# Patient Record
Sex: FEMALE | Race: WHITE
[De-identification: names, ages, dates, MRNs, and addresses within clinical notes are randomized per-mention and may not be internally consistent; named-entity substitution may affect disease eponyms.]

---

## 2018-11-26 ENCOUNTER — HOSPITAL ENCOUNTER (INPATIENT)
Dept: HOSPITAL 72 - EMR | Age: 78
LOS: 52 days | Discharge: NURSING, REHAB OR LONG-TERM CARE | DRG: 5 | End: 2019-01-17
Payer: MEDICARE

## 2018-11-26 VITALS — DIASTOLIC BLOOD PRESSURE: 33 MMHG | SYSTOLIC BLOOD PRESSURE: 105 MMHG

## 2018-11-26 VITALS — SYSTOLIC BLOOD PRESSURE: 206 MMHG | DIASTOLIC BLOOD PRESSURE: 23 MMHG

## 2018-11-26 VITALS — DIASTOLIC BLOOD PRESSURE: 31 MMHG | SYSTOLIC BLOOD PRESSURE: 104 MMHG

## 2018-11-26 VITALS — DIASTOLIC BLOOD PRESSURE: 44 MMHG | SYSTOLIC BLOOD PRESSURE: 109 MMHG

## 2018-11-26 VITALS — SYSTOLIC BLOOD PRESSURE: 98 MMHG | DIASTOLIC BLOOD PRESSURE: 41 MMHG

## 2018-11-26 VITALS — SYSTOLIC BLOOD PRESSURE: 128 MMHG | DIASTOLIC BLOOD PRESSURE: 34 MMHG

## 2018-11-26 VITALS — SYSTOLIC BLOOD PRESSURE: 167 MMHG | DIASTOLIC BLOOD PRESSURE: 44 MMHG

## 2018-11-26 VITALS — SYSTOLIC BLOOD PRESSURE: 128 MMHG | DIASTOLIC BLOOD PRESSURE: 17 MMHG

## 2018-11-26 VITALS — SYSTOLIC BLOOD PRESSURE: 112 MMHG | DIASTOLIC BLOOD PRESSURE: 26 MMHG

## 2018-11-26 VITALS — BODY MASS INDEX: 22.5 KG/M2 | HEIGHT: 62 IN | WEIGHT: 122.25 LBS

## 2018-11-26 VITALS — DIASTOLIC BLOOD PRESSURE: 20 MMHG | SYSTOLIC BLOOD PRESSURE: 99 MMHG

## 2018-11-26 VITALS — SYSTOLIC BLOOD PRESSURE: 125 MMHG | DIASTOLIC BLOOD PRESSURE: 24 MMHG

## 2018-11-26 VITALS — DIASTOLIC BLOOD PRESSURE: 46 MMHG | SYSTOLIC BLOOD PRESSURE: 124 MMHG

## 2018-11-26 VITALS — SYSTOLIC BLOOD PRESSURE: 92 MMHG | DIASTOLIC BLOOD PRESSURE: 61 MMHG

## 2018-11-26 VITALS — SYSTOLIC BLOOD PRESSURE: 154 MMHG | DIASTOLIC BLOOD PRESSURE: 34 MMHG

## 2018-11-26 VITALS — SYSTOLIC BLOOD PRESSURE: 99 MMHG | DIASTOLIC BLOOD PRESSURE: 47 MMHG

## 2018-11-26 DIAGNOSIS — I48.0: ICD-10-CM

## 2018-11-26 DIAGNOSIS — G93.40: ICD-10-CM

## 2018-11-26 DIAGNOSIS — I46.9: ICD-10-CM

## 2018-11-26 DIAGNOSIS — T17.590A: ICD-10-CM

## 2018-11-26 DIAGNOSIS — Z79.01: ICD-10-CM

## 2018-11-26 DIAGNOSIS — J90: ICD-10-CM

## 2018-11-26 DIAGNOSIS — N18.6: ICD-10-CM

## 2018-11-26 DIAGNOSIS — K29.70: ICD-10-CM

## 2018-11-26 DIAGNOSIS — Z78.1: ICD-10-CM

## 2018-11-26 DIAGNOSIS — E87.1: ICD-10-CM

## 2018-11-26 DIAGNOSIS — I12.0: ICD-10-CM

## 2018-11-26 DIAGNOSIS — E43: ICD-10-CM

## 2018-11-26 DIAGNOSIS — D63.1: ICD-10-CM

## 2018-11-26 DIAGNOSIS — Z99.2: ICD-10-CM

## 2018-11-26 DIAGNOSIS — E11.22: ICD-10-CM

## 2018-11-26 DIAGNOSIS — E86.0: ICD-10-CM

## 2018-11-26 DIAGNOSIS — L97.429: ICD-10-CM

## 2018-11-26 DIAGNOSIS — L89.159: ICD-10-CM

## 2018-11-26 DIAGNOSIS — Z99.11: ICD-10-CM

## 2018-11-26 DIAGNOSIS — I73.9: ICD-10-CM

## 2018-11-26 DIAGNOSIS — R71.0: ICD-10-CM

## 2018-11-26 DIAGNOSIS — J96.22: ICD-10-CM

## 2018-11-26 DIAGNOSIS — M86.9: ICD-10-CM

## 2018-11-26 DIAGNOSIS — E11.621: ICD-10-CM

## 2018-11-26 DIAGNOSIS — I34.0: ICD-10-CM

## 2018-11-26 DIAGNOSIS — B96.20: ICD-10-CM

## 2018-11-26 DIAGNOSIS — F03.91: ICD-10-CM

## 2018-11-26 DIAGNOSIS — L03.115: ICD-10-CM

## 2018-11-26 DIAGNOSIS — A41.9: Primary | ICD-10-CM

## 2018-11-26 DIAGNOSIS — I27.20: ICD-10-CM

## 2018-11-26 DIAGNOSIS — R00.1: ICD-10-CM

## 2018-11-26 DIAGNOSIS — N39.0: ICD-10-CM

## 2018-11-26 DIAGNOSIS — R65.21: ICD-10-CM

## 2018-11-26 DIAGNOSIS — J15.1: ICD-10-CM

## 2018-11-26 DIAGNOSIS — J98.11: ICD-10-CM

## 2018-11-26 LAB
ADD MANUAL DIFF: NO
ALBUMIN SERPL-MCNC: 1.7 G/DL (ref 3.4–5)
ALBUMIN/GLOB SERPL: 0.3 {RATIO} (ref 1–2.7)
ALP SERPL-CCNC: 99 U/L (ref 46–116)
ALT SERPL-CCNC: 11 U/L (ref 12–78)
ANION GAP SERPL CALC-SCNC: 5 MMOL/L (ref 5–15)
APPEARANCE UR: (no result)
APTT BLD: 32 SEC (ref 23–33)
APTT PPP: (no result) S
AST SERPL-CCNC: 19 U/L (ref 15–37)
BASOPHILS NFR BLD AUTO: 1.6 % (ref 0–2)
BILIRUB SERPL-MCNC: 0.2 MG/DL (ref 0.2–1)
BUN SERPL-MCNC: 42 MG/DL (ref 7–18)
CALCIUM SERPL-MCNC: 9 MG/DL (ref 8.5–10.1)
CHLORIDE SERPL-SCNC: 101 MMOL/L (ref 98–107)
CK MB SERPL-MCNC: 2.5 NG/ML (ref 0–3.6)
CK SERPL-CCNC: 25 U/L (ref 26–308)
CO2 SERPL-SCNC: 31 MMOL/L (ref 21–32)
CREAT SERPL-MCNC: 3.1 MG/DL (ref 0.55–1.3)
EOSINOPHIL NFR BLD AUTO: 0.5 % (ref 0–3)
ERYTHROCYTE [DISTWIDTH] IN BLOOD BY AUTOMATED COUNT: 21.5 % (ref 11.6–14.8)
GLOBULIN SER-MCNC: 6.6 G/DL
GLUCOSE UR STRIP-MCNC: NEGATIVE MG/DL
HCT VFR BLD CALC: 35.2 % (ref 37–47)
HGB BLD-MCNC: 10 G/DL (ref 12–16)
INR PPP: 0.9 (ref 0.9–1.1)
KETONES UR QL STRIP: NEGATIVE
LEUKOCYTE ESTERASE UR QL STRIP: (no result)
LYMPHOCYTES NFR BLD AUTO: 10.3 % (ref 20–45)
MCV RBC AUTO: 97 FL (ref 80–99)
MONOCYTES NFR BLD AUTO: 7.5 % (ref 1–10)
NEUTROPHILS NFR BLD AUTO: 80.1 % (ref 45–75)
NITRITE UR QL STRIP: NEGATIVE
PH UR STRIP: 7 [PH] (ref 4.5–8)
PLATELET # BLD: 573 K/UL (ref 150–450)
POTASSIUM SERPL-SCNC: 5.5 MMOL/L (ref 3.5–5.1)
PROT UR QL STRIP: (no result)
RBC # BLD AUTO: 3.62 M/UL (ref 4.2–5.4)
SODIUM SERPL-SCNC: 137 MMOL/L (ref 136–145)
SP GR UR STRIP: 1.03 (ref 1–1.03)
UROBILINOGEN UR-MCNC: NORMAL MG/DL (ref 0–1)
WBC # BLD AUTO: 13.3 K/UL (ref 4.8–10.8)

## 2018-11-26 PROCEDURE — 86580 TB INTRADERMAL TEST: CPT

## 2018-11-26 PROCEDURE — 86901 BLOOD TYPING SEROLOGIC RH(D): CPT

## 2018-11-26 PROCEDURE — 82803 BLOOD GASES ANY COMBINATION: CPT

## 2018-11-26 PROCEDURE — 83605 ASSAY OF LACTIC ACID: CPT

## 2018-11-26 PROCEDURE — 80053 COMPREHEN METABOLIC PANEL: CPT

## 2018-11-26 PROCEDURE — 85730 THROMBOPLASTIN TIME PARTIAL: CPT

## 2018-11-26 PROCEDURE — 87340 HEPATITIS B SURFACE AG IA: CPT

## 2018-11-26 PROCEDURE — 31500 INSERT EMERGENCY AIRWAY: CPT

## 2018-11-26 PROCEDURE — 82977 ASSAY OF GGT: CPT

## 2018-11-26 PROCEDURE — 94002 VENT MGMT INPAT INIT DAY: CPT

## 2018-11-26 PROCEDURE — 82607 VITAMIN B-12: CPT

## 2018-11-26 PROCEDURE — 86705 HEP B CORE ANTIBODY IGM: CPT

## 2018-11-26 PROCEDURE — 85610 PROTHROMBIN TIME: CPT

## 2018-11-26 PROCEDURE — 86709 HEPATITIS A IGM ANTIBODY: CPT

## 2018-11-26 PROCEDURE — 82140 ASSAY OF AMMONIA: CPT

## 2018-11-26 PROCEDURE — 94003 VENT MGMT INPAT SUBQ DAY: CPT

## 2018-11-26 PROCEDURE — 31645 BRNCHSC W/THER ASPIR 1ST: CPT

## 2018-11-26 PROCEDURE — 85007 BL SMEAR W/DIFF WBC COUNT: CPT

## 2018-11-26 PROCEDURE — 87081 CULTURE SCREEN ONLY: CPT

## 2018-11-26 PROCEDURE — 71045 X-RAY EXAM CHEST 1 VIEW: CPT

## 2018-11-26 PROCEDURE — 86920 COMPATIBILITY TEST SPIN: CPT

## 2018-11-26 PROCEDURE — 70450 CT HEAD/BRAIN W/O DYE: CPT

## 2018-11-26 PROCEDURE — 93306 TTE W/DOPPLER COMPLETE: CPT

## 2018-11-26 PROCEDURE — 82270 OCCULT BLOOD FECES: CPT

## 2018-11-26 PROCEDURE — 94664 DEMO&/EVAL PT USE INHALER: CPT

## 2018-11-26 PROCEDURE — 93970 EXTREMITY STUDY: CPT

## 2018-11-26 PROCEDURE — 92950 HEART/LUNG RESUSCITATION CPR: CPT

## 2018-11-26 PROCEDURE — 94150 VITAL CAPACITY TEST: CPT

## 2018-11-26 PROCEDURE — 93005 ELECTROCARDIOGRAM TRACING: CPT

## 2018-11-26 PROCEDURE — 76700 US EXAM ABDOM COMPLETE: CPT

## 2018-11-26 PROCEDURE — 86140 C-REACTIVE PROTEIN: CPT

## 2018-11-26 PROCEDURE — 83880 ASSAY OF NATRIURETIC PEPTIDE: CPT

## 2018-11-26 PROCEDURE — 99291 CRITICAL CARE FIRST HOUR: CPT

## 2018-11-26 PROCEDURE — 96365 THER/PROPH/DIAG IV INF INIT: CPT

## 2018-11-26 PROCEDURE — 82728 ASSAY OF FERRITIN: CPT

## 2018-11-26 PROCEDURE — 82962 GLUCOSE BLOOD TEST: CPT

## 2018-11-26 PROCEDURE — 84443 ASSAY THYROID STIM HORMONE: CPT

## 2018-11-26 PROCEDURE — 84550 ASSAY OF BLOOD/URIC ACID: CPT

## 2018-11-26 PROCEDURE — 84100 ASSAY OF PHOSPHORUS: CPT

## 2018-11-26 PROCEDURE — 93925 LOWER EXTREMITY STUDY: CPT

## 2018-11-26 PROCEDURE — 94760 N-INVAS EAR/PLS OXIMETRY 1: CPT

## 2018-11-26 PROCEDURE — 82746 ASSAY OF FOLIC ACID SERUM: CPT

## 2018-11-26 PROCEDURE — 76705 ECHO EXAM OF ABDOMEN: CPT

## 2018-11-26 PROCEDURE — 83735 ASSAY OF MAGNESIUM: CPT

## 2018-11-26 PROCEDURE — 81003 URINALYSIS AUTO W/O SCOPE: CPT

## 2018-11-26 PROCEDURE — 82550 ASSAY OF CK (CPK): CPT

## 2018-11-26 PROCEDURE — 87324 CLOSTRIDIUM AG IA: CPT

## 2018-11-26 PROCEDURE — 87205 SMEAR GRAM STAIN: CPT

## 2018-11-26 PROCEDURE — 86803 HEPATITIS C AB TEST: CPT

## 2018-11-26 PROCEDURE — 85025 COMPLETE CBC W/AUTO DIFF WBC: CPT

## 2018-11-26 PROCEDURE — 87086 URINE CULTURE/COLONY COUNT: CPT

## 2018-11-26 PROCEDURE — 36600 WITHDRAWAL OF ARTERIAL BLOOD: CPT

## 2018-11-26 PROCEDURE — 36415 COLL VENOUS BLD VENIPUNCTURE: CPT

## 2018-11-26 PROCEDURE — 96366 THER/PROPH/DIAG IV INF ADDON: CPT

## 2018-11-26 PROCEDURE — 96375 TX/PRO/DX INJ NEW DRUG ADDON: CPT

## 2018-11-26 PROCEDURE — 94660 CPAP INITIATION&MGMT: CPT

## 2018-11-26 PROCEDURE — 84484 ASSAY OF TROPONIN QUANT: CPT

## 2018-11-26 PROCEDURE — 93880 EXTRACRANIAL BILAT STUDY: CPT

## 2018-11-26 PROCEDURE — 86710 INFLUENZA VIRUS ANTIBODY: CPT

## 2018-11-26 PROCEDURE — 05H633Z INSERTION OF INFUSION DEVICE INTO LEFT SUBCLAVIAN VEIN, PERCUTANEOUS APPROACH: ICD-10-PCS

## 2018-11-26 PROCEDURE — 87040 BLOOD CULTURE FOR BACTERIA: CPT

## 2018-11-26 PROCEDURE — 82553 CREATINE MB FRACTION: CPT

## 2018-11-26 PROCEDURE — 87181 SC STD AGAR DILUTION PER AGT: CPT

## 2018-11-26 PROCEDURE — 93990 DOPPLER FLOW TESTING: CPT

## 2018-11-26 PROCEDURE — 94640 AIRWAY INHALATION TREATMENT: CPT

## 2018-11-26 PROCEDURE — 86850 RBC ANTIBODY SCREEN: CPT

## 2018-11-26 PROCEDURE — 83540 ASSAY OF IRON: CPT

## 2018-11-26 PROCEDURE — 96367 TX/PROPH/DG ADDL SEQ IV INF: CPT

## 2018-11-26 PROCEDURE — 80202 ASSAY OF VANCOMYCIN: CPT

## 2018-11-26 PROCEDURE — 86900 BLOOD TYPING SEROLOGIC ABO: CPT

## 2018-11-26 PROCEDURE — 83550 IRON BINDING TEST: CPT

## 2018-11-26 PROCEDURE — 80048 BASIC METABOLIC PNL TOTAL CA: CPT

## 2018-11-26 PROCEDURE — 87070 CULTURE OTHR SPECIMN AEROBIC: CPT

## 2018-11-26 PROCEDURE — 74018 RADEX ABDOMEN 1 VIEW: CPT

## 2018-11-26 RX ADMIN — DEXTROSE MONOHYDRATE SCH MLS/HR: 50 INJECTION, SOLUTION INTRAVENOUS at 17:00

## 2018-11-26 RX ADMIN — PANTOPRAZOLE SODIUM SCH MG: 40 INJECTION, POWDER, FOR SOLUTION INTRAVENOUS at 21:05

## 2018-11-26 RX ADMIN — DOPAMINE HYDROCHLORIDE IN DEXTROSE SCH MLS/HR: 1.6 INJECTION, SOLUTION INTRAVENOUS at 14:21

## 2018-11-26 NOTE — DIAGNOSTIC IMAGING REPORT
Indications: Dementia, altered mental status

 

Technique: Spiral acquisitions obtained through the brain. Angled axial and coronal 5

x 5 mm slices were reconstructed. Total dose length product 1362.01 mGycm.  CTDI

vol(s) 70.38 mGy. Dose reduction achieved using automated exposure control

 

Comparison: None.

 

Findings: There is slight image degradation due to motion artifact. There is

age-related enlargement of the ventricles and extra axial CSF spaces. There is

periventricular white matter low-attenuation, consistent with chronic ischemic

change. Old lacunar infarct is seen in the anterior limb of the right internal

capsule. Asymmetric basal ganglia calcification seen on the left. No acute

intercranial hemorrhage nor edema, mass effect, nor midline shift. The visualized

orbits and sinuses are unremarkable. The mastoids are clear. The calvarium is

hyperostotic, intact.

 

Impression: Somewhat limited exam due to motion artifact

 

No gross acute intracranial bleed or mass effect

 

Chronic and age-related changes, as described.

 

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

 

The CT scanner at [facility] is accredited by the [employer] College of

Radiology and the scans are performed using protocols designed to limit radiation

exposure to as low as reasonably achievable to attain images of sufficient resolution

adequate for diagnostic evaluation.

## 2018-11-26 NOTE — NUR
CASE MANAGEMENT: REVIEW



78/F BIBA FROM [facility]



CC: AMS



SI: PYELONEPHRITIS . AMS

T 96.1 HR 82 RR 14 /49 SAT 99% MECH VENT FIO2 100

WBC 13.3 H/H 10.0/35.2 5.5 

ABG: PH 7.170 PCO2 74.4 PO2 66.9 HCO3 26.5 SAT 88.6 







IS: VANCO IV X1

ALBUTEROL HHN X1

ZOSYN IV X1

NS IVF BOLUS X1

ALBUMIN IV X1





***INTERQUAL CRITERIA MET: PATIENT ADMITTED TO ICU 11/26/2018***

DCP: PATIENT IS FROM [facility]

## 2018-11-26 NOTE — GENERAL PROGRESS NOTE
Progress Note


Progress Note


Surgery:





After intubation patient hypotensive and bradycardic requiring urgent access.  


Attempt at subclavian unsuccessful.  asked radiology if ultrasound available 

for IJ placement.  


Radiology on way to place IJ with ultrasound guidance.  


thank you.











Bob Lewis Nov 26, 2018 14:14

## 2018-11-26 NOTE — EMERGENCY ROOM REPORT
History of Present Illness


General


Chief Complaint:  Altered Mental Status


Source:  Medical Record, EMS





Present Illness


HPI


This patient presents from a skilled nursing facility.  The patient has 

multiple chronic medical problems.  She has a history of end-stage renal 

disease and is dialysis dependent, dementia and inability to ambulate with 

muscle weakness.  EMS report that they were called to the skilled nursing 

facility for being more altered than usual.  Usually the patient is oriented to 

herself.  There was also concern about oxygenation.  The patient herself is 

minimally responsive and unable to give any history.  There is no other history 

from EMS or the skilled nursing facility.


Allergies:  


Coded Allergies:  


     No Known Allergies (Unverified , 11/26/18)





Patient History


Past Medical History:  see triage record, DM, HTN, MI, CAD, GERD, CVA/TIA, 

dementia, renal disease, dialysis


Past Surgical History:  unable to obtain


Pertinent Family History:  unable to obtain


Social History:  Denies: smoking, alcohol use, drug use


Last Menstrual Period:  unk


Pregnant Now:  No


Reviewed Nursing Documentation:  PMH: Agreed; PSxH: Agreed





Nursing Documentation-PMH


Hx Cardiac Problems:  Yes - a-fib


Hx Hypertension:  Yes


Hx Diabetes:  Yes - esrd





Review of Systems


All Other Systems:  limited





Physical Exam





Vital Signs








  Date Time  Temp Pulse Resp B/P (MAP) Pulse Ox O2 Delivery O2 Flow Rate FiO2


 


11/26/18 08:25      Nasal Cannula  


 


11/26/18 08:25 96.1 82 14 143/40 94  2.0 








Sp02 EP Interpretation:  reviewed, abnormal


General Appearance:  no apparent distress, alert, GCS 15, non-toxic, cachetic, 

thin, Chronically Ill


Head:  normocephalic, atraumatic


Eyes:  bilateral eye normal inspection, bilateral eye PERRL


ENT:  hearing grossly normal, normal pharynx, no angioedema


Neck:  full range of motion, supple/symm/no masses


Respiratory:  chest non-tender, lungs clear, no respiratory distress, no 

retraction, no accessory muscle use, decreased breath sounds


Cardiovascular #1:  regular rate, rhythm, no edema, systolic murmur


Gastrointestinal:  normal bowel sounds, non tender, soft, non-distended, no 

guarding, no rebound


Rectal:  deferred


Musculoskeletal:  other - Contractures.  At baseline


Neurologic:  alert, other - Non-focal.  Altered/Dementia


Psychiatric:  judgement/insight normal, memory normal, mood/affect normal, no 

suicidal/homicidal ideation


Skin:  warm/dry, well hydrated, other - See RN skin exam.  Multiple DU.





Procedures


Intubation


Intubation :  


   Consent:  Emergent


   Intubation Method:  orotracheal


   Tube Size (cm):  7.0


   Medications:  Etomidate, Rocuronium


   Breath Sounds after Intubation:  equal


   Intubation Complications:  no complications


   Post Intubation Xray:  Yes


   Progress/Xray Impression:  L. Lung opacification


   Attempts:  One


   Patient Tolerated:  Well


   Complications:  None





Medical Decision Making


Diagnostic Impression:  


 Primary Impression:  


 Altered mental status


 Additional Impressions:  


 Pyelonephritis


 Pneumonia


ER Course


This elderly and chronically ill female presents with altered mental status.  

She is found to have pyelonephritis and pneumonia.  The patient was 

hypertensive with systolic blood pressures in the 200s on arrival.  However, I'

m unsure of the accuracy of these readings as the diastolic was very low.  

These blood pressures were obtained on the leg and in an elderly female I 

suspect there is significant peripheral arterial disease.  The patient's right 

upper extremity has a PICC line in place in the left upper extremity has an AV 

fistula.  That is why the blood pressures are being taken on the leg.  The 

patient was not tachycardic or in any discomfort or distress during her entire 

ED course.  Initially I had planned and placed in this patient and his stepdown 

unit secondary to the blood pressures, however, the blood pressure read in the 

150s systolic and the patient remained without tachycardia and appeared double 

and nondistressed, sitting the patient was downgraded to telemetry.  The 

patient was given broad-spectrum antibiotics.





Added note: After the patient was admitted to telemetry she desaturated 

suddenly became more altered.  She was transferred to the ICU and placed on a 

nonrebreather mask and I was called from the ICU to come intubate this patient.

  The patient's saturations were in the high 80s and occasionally in the 90s.  

There was a very poor waveform.  However, the patient clearly had declined and 

was intubated by rapid think with intubation without complication on the first 

attempt.  Please see my procedure note.  Post intubation chest x-ray shows 

complete opacification of the left lung consistent with obstruction.  This is a 

new finding from the previous chest x-ray done in the emergency department and 

is likely the etiology of the patient's rapid respiratory failure.  Possibly 

this is secondary to a mucous plug.





This patient is critically ill.  This patient required complex medical decision-

making, aggressive intervention, extensive laboratory workup and monitoring.





Critical care time: 40 minutes.





Laboratory Tests








Test


  11/26/18


08:30 11/26/18


12:30


 


White Blood Count


  13.3 K/UL


(4.8-10.8)  H 


 


 


Red Blood Count


  3.62 M/UL


(4.20-5.40)  L 


 


 


Hemoglobin


  10.0 G/DL


(12.0-16.0)  L 


 


 


Hematocrit


  35.2 %


(37.0-47.0)  L 


 


 


Mean Corpuscular Volume 97 FL (80-99)   


 


Mean Corpuscular Hemoglobin


  27.5 PG


(27.0-31.0) 


 


 


Mean Corpuscular Hemoglobin


Concent 28.4 G/DL


(32.0-36.0)  L 


 


 


Red Cell Distribution Width


  21.5 %


(11.6-14.8)  H 


 


 


Platelet Count


  573 K/UL


(150-450)  H 


 


 


Mean Platelet Volume


  6.1 FL


(6.5-10.1)  L 


 


 


Neutrophils (%) (Auto)


  80.1 %


(45.0-75.0)  H 


 


 


Lymphocytes (%) (Auto)


  10.3 %


(20.0-45.0)  L 


 


 


Monocytes (%) (Auto)


  7.5 %


(1.0-10.0) 


 


 


Eosinophils (%) (Auto)


  0.5 %


(0.0-3.0) 


 


 


Basophils (%) (Auto)


  1.6 %


(0.0-2.0) 


 


 


Prothrombin Time


  10.0 SEC


(9.30-11.50) 


 


 


Prothrombin Time INR 0.9 (0.9-1.1)   


 


PTT


  32 SEC (23-33)


  


 


 


Urine Color Pale yellow   


 


Urine Appearance Turbid   


 


Urine pH 7 (4.5-8.0)   


 


Urine Specific Gravity


  1.030


(1.005-1.035) 


 


 


Urine Protein


  4+ (NEGATIVE)


H 


 


 


Urine Glucose (UA)


  Negative


(NEGATIVE) 


 


 


Urine Ketones


  Negative


(NEGATIVE) 


 


 


Urine Blood


  5+ (NEGATIVE)


H 


 


 


Urine Nitrite


  Negative


(NEGATIVE) 


 


 


Urine Bilirubin


  Negative


(NEGATIVE) 


 


 


Urine Urobilinogen


  Normal MG/DL


(0.0-1.0) 


 


 


Urine Leukocyte Esterase


  3+ (NEGATIVE)


H 


 


 


Urine RBC


  30-40 /HPF (0


- 2)  H 


 


 


Urine WBC


  Tntc /HPF (0 -


2)  H 


 


 


Urine Squamous Epithelial


Cells Few /LPF


(NONE/OCC) 


 


 


Urine Bacteria


  Moderate /HPF


(NONE)  H 


 


 


Sodium Level


  137 MMOL/L


(136-145) 


 


 


Potassium Level


  5.5 MMOL/L


(3.5-5.1)  H 


 


 


Chloride Level


  101 MMOL/L


() 


 


 


Carbon Dioxide Level


  31 MMOL/L


(21-32) 


 


 


Anion Gap


  5 mmol/L


(5-15) 


 


 


Blood Urea Nitrogen


  42 mg/dL


(7-18)  H 


 


 


Creatinine


  3.1 MG/DL


(0.55-1.30)  H 


 


 


Estimate Glomerular


Filtration Rate  mL/min (>60)  


  


 


 


Glucose Level


  174 MG/DL


()  H 


 


 


Lactic Acid Level


  0.70 mmol/L


(0.4-2.0) 


 


 


Calcium Level


  9.0 MG/DL


(8.5-10.1) 


 


 


Total Bilirubin


  0.2 MG/DL


(0.2-1.0) 


 


 


Aspartate Amino Transferase


(AST) 19 U/L (15-37)


  


 


 


Alanine Aminotransferase (ALT)


  11 U/L (12-78)


L 


 


 


Alkaline Phosphatase


  99 U/L


() 


 


 


Total Creatine Kinase


  25 U/L


()  L 


 


 


Creatine Kinase MB


  2.5 NG/ML


(0.0-3.6) 


 


 


Creatine Kinase MB Relative


Index 10.0  


  


 


 


Troponin I


  0.026 ng/mL


(0.000-0.056) 


 


 


Total Protein


  8.3 G/DL


(6.4-8.2)  H 


 


 


Albumin


  1.7 G/DL


(3.4-5.0)  L 


 


 


Globulin 6.6 g/dL   


 


Albumin/Globulin Ratio


  0.3 (1.0-2.7)


L 


 


 


Arterial Blood pH


  


  7.170


(7.350-7.450)


 


Arterial Blood Partial


Pressure CO2 


  74.4 mmHg


(35.0-45.0)  *H


 


Arterial Blood Partial


Pressure O2 


  66.9 mmHg


(75.0-100.0)  L


 


Arterial Blood HCO3


  


  26.5 mmol/L


(22.0-26.0)  H


 


Arterial Blood Oxygen


Saturation 


  88.6 %


()  *L


 


Arterial Blood Base Excess  -2.9 (-2-2)  L


 


Arcadio Test  Positive  








Microbiology








 Date/Time


Source Procedure


Growth Status


 


 


 11/26/18 08:35


Nasal Nares Influenza Types A,B Antigen (ARIAS) - Final Complete








EKG Diagnostic Results


Rate:  normal


Rhythm:  NSR


ST Segments:  no acute changes





Rhythm Strip Diag. Results


EP Interpretation:  yes


Rate:  80's


Rhythm:  NSR, no PVC's, no ectopy





Chest X-Ray Diagnostic Results


Chest X-Ray Diagnostic Results :  


   Chest X-Ray Ordered:  Yes


   # of Views/Limited/Complete:  1 View


   Indication:  Other - AMS


   EP Interpretation:  Yes


   Interpretation:  other - RLL opacity


   Impression:  Other - See above


   Electronically Signed by:  Buck





Last Vital Signs








  Date Time  Temp Pulse Resp B/P (MAP) Pulse Ox O2 Delivery O2 Flow Rate FiO2


 


11/26/18 08:27 96.1 82 12 264/49  Nasal Cannula 2.0 


 


11/26/18 08:25     94   








Disposition:  ADMITTED AS INPATIENT


Condition:  Serious











Justa Weaver DO Nov 26, 2018 09:30

## 2018-11-26 NOTE — OPERATIVE NOTE - PDOC
Operative Note


Operative Note


Date of Operation/Procedure:  Nov 26, 2018


Pre-op Diagnosis:


Sepsis, hypotensive, bradycardic, critical care


Procedure:


left subclavian central venous catheter insertion


Post-op Diagnosis:  same as pre-op


Surgeon:  marti


Anesthesia:  local


Specimen:  none


Complications:  none


Condition:  unstable


Estimated Blood Loss:  minimal


Drains:  none


Implant(s) used?:  No


Indications for Procedure


78 year old female currently admitted for care and management with acute 

decompensation requiring intubation and ICU care.  Hypotensive, bradycardic, 

septic.  Requiring fluids, meds, and pressors.  Central venous access 

recommended and indicated.  patient with right arm midline with only one port 

and unknown age.  difficult peripheral access.  Hx of ESRD on HD.  Evaluated 

extremities and not viable peripheral noted.  Left upper arm with significant 

edema.  Graft not viable as no thrill or bruits noted.  Discussed with 

nephrology.


Description of Procedure


Patient placed in trendelenberg position.  right midline noted.  right upper 

extremity without significant edema or notable surgical history.  left upper 

extremity with significant edema and noted prior surgery.  left graft not 

viable as no thrill or bruits.  right chest wall prepped and draped.  all 

proper equipment worn.  on second pass left subclavian central venous catheter 

was cannulated. venous blood noted.  guidewire placed over needle.  needle 

removed.  skin incision made and dilator used.  central venous catheter 

inserted over guidewire and guidewire removed.  all 3 ports flushed and 

aspirated.  line sutured in place.  dressings applied.  cxr ordered.











Bob Lewis Nov 26, 2018 14:29

## 2018-11-26 NOTE — DIAGNOSTIC IMAGING REPORT
Indication: Shortness of

 

Technique: One view of the chest

 

Comparison: None

 

Findings: There are moderate to large bilateral pleural effusions. There is pulmonary

interstitial edema. There is suggestion of abrupt cut off of the left mainstem

bronchus, endobronchial occlusion not excludable. The heart is enlarged. There is a

right arm midline. The tip is at the level of the upstream subclavian vein. Extensive

surgical clips are seen in the left upper quadrant of the abdomen. The bones are

unremarkable

 

Impression: Cardiomegaly

 

Evidence of congestive heart failure, with pulmonary interstitial edema, moderate to

large bilateral pleural effusions

 

Suggestion of abrupt cut off of the left mainstem bronchus. Could be artifactual, but

endobronchial occlusion not excludable

## 2018-11-26 NOTE — CONSULTATION
Consult Note


Consult Note





asked to evaluate the patient at the request of Dr Cardoso


78 y old female


in ICU


Hypotensive


intubated


left arm fistula no bruit


right arm picc line


unresponsive


triple lumen on left subclavian by surgeon


Assessment/Plan





ESRD


Hypotensive


Septic shock


edematous


hypoalbuminemia


left lung white out post intubation








Fluid challenge


respiratary support


no candidate for dialysis now


pressors


antibiotics











Murphy Templeton MD Nov 26, 2018 14:32

## 2018-11-26 NOTE — CONSULTATION
DATE OF CONSULTATION:  11/26/2018

CARDIAC CONSULTATION



CONSULTING PHYSICIAN:  Mariusz Wade M.D.



REFERRING PHYSICIAN:  Quentin Cardoso M.D.



REASON FOR REFERRAL:  Hypotension and bradycardia.



HISTORY OF PRESENT ILLNESS:  This is a 78-year-old female, who is really

unable to provide much in terms of history and physical, really unable to

provide meaningful history whatsoever.  [facility] and apparently was transferred to acute care

hospital for becoming more altered than usual, apparently usually oriented

to self, and was also apparently an issue with oxygenation.  She was

minimally responsive and unable to give any meaningful history, and she

was admitted to the hospital at [facility] and subsequently

was noted to be in respiratory compromise requiring urgent intubation for

left lung collapse and mucus plugging, and became hypotensive and

bradycardic, required multiple medications and central line was placed due

to lack of vascular access.  _____ in the ICU.  I was notified to see the

patient because of hypotension and bradycardia with blood pressure in the

80s range despite being on maximum doses of dopamine and heart rate in the

80s now on maximum doses of dopamine.  The patient on questioning appears

to be awake and responsive.  Denies any pain in her lower extremities.

Denies any pain in her chest.  Does admit to being short of breath.  Does

admit to be dizzy lightheaded and does admit to having abdominal pain.



PAST MEDICAL HISTORY:  Extensive and includes recurrent urinary tract

infection, history of dysphagia, end-stage renal disease, on hemodialysis,

diabetes mellitus, paroxysmal episodes of atrial fibrillation, essential

hypertension, anemia, protein-calorie malnutrition, dementia, pleural

effusions, gastroesophageal reflux disease, pressure ulcers, neuralgia,

neuritis, open wound of right lower extremity and left lower extremity

being noted.



ALLERGIES:  She is reportedly not allergic to any medications.



SOCIAL HISTORY:  She is a resident of [facility] facility.  Tobacco and

alcoholic beverages are not known.



REVIEW OF SYSTEMS:  As mentioned in HPI.



PHYSICAL EXAMINATION:

GENERAL:  Shows to be elderly female, on a mechanical ventilator.  As

mentioned, she appears to be responsive and communicative.

VITAL SIGNS:  At this time, heart rate of 100.  Last blood pressure was in

the 80s systolic over 50s.

NECK:  Supple.

LUNGS:  Crackles noted at the left base.

CARDIAC:  Regular rhythm.  No heaves or thrills noted.

ABDOMEN:  Soft.  There is some tenderness.  No rebound.  No

guarding.

EXTREMITIES:  There is edema of the upper extremities, the left much worse

than on the right.  Lower extremity edema is minimal.

NEUROLOGICAL:  She is awake and responsive.



LABORATORY VALUES:  White count of 13.3, hemoglobin 10, and platelet count

of 573,000.  Blood gases, pH of 7.17, pCO2 of 74, pO2 of 67, bicarbonate

of 26, 88% saturation.  Sodium 137, potassium 5.5, chloride 101,

bicarbonate 31, BUN of 42, creatinine 3.1, and glucose of 174.  Troponin

was 0.026.  Lactic acid 0.71.  Alkaline phosphatase of 99.  AST and ALT

are normal limits.  Albumin of 1.7 and total protein of 8.3.  INR 0.9 and

PTT of 32.  Urinalysis shows too numerous to count wbc's, 30-40 rbc's, and

3+ leukocyte esterase, although the patient is a known dialysis patient.



ASSESSMENT AND PLAN:

1. Hypotension, possibly sepsis versus volume.

2. Paroxysmal episodes of atrial fibrillation history.

3. Bradycardia secondary to medications, amiodarone possibly.

4. Diabetes mellitus.

5. End-stage renal disease, on hemodialysis.

6. Lung collapse.

7. Respiratory failure, status post intubation.

8. Dysphagia.

9. History of dementia.



Dr. Cardoso, this patient was seen in cardiac consultation.  The patient

will be receiving an albumin bolus ordered by Dr. Templeton from

Nephrology.  She is on maximum dose of dopamine and blood pressure was

apparently low.  We will switch over to Levophed.  She should be continued

on her amiodarone.  Thyroid-stimulating hormone to be checked to make sure

that she is not hypothyroid related to amiodarone therapy.  Pressors to

maintain mean arterial blood pressure in the 60s.  She used to receive

empiric antibiotics.  Her influenza screen was negative.  Her

electrocardiogram that was performed was sinus with right bundle-branch

conduction defect at the time of admission.  Her telemetry basically shows

sinus rhythm and no episodes sinus tachycardia despite her sepsis, likely

from suppression from the beta-blockers.  Again, amiodarone-induced

hypothyroidism needs to be excluded.  Of note, her blood pressure prior to

admission _____ to a level of 200, making adrenal insufficiency likely.

Her blood pressures are being checked in her right lower extremity because

of a shunt that she has on the left upper extremity and a central line

that she has on her right upper extremity, which may additionally effect

blood pressure readings.  I will follow the patient along with you.









  ______________________________________________

  Mariusz Wade M.D.





DR:  Brian

D:  11/26/2018 15:51

T:  11/26/2018 17:26

JOB#:  [id number]

CC:

## 2018-11-26 NOTE — NUR
NURSE NOTES:

HS care rendered.Pos. chg.Kept comfortable.Due med admin.Suctioned.See Latest V/S.Levo.drip 
at 6mcg/min.NPO.Cont.on IV Therapy.

## 2018-11-26 NOTE — DIAGNOSTIC IMAGING REPORT
Indication: Post intubation

 

Technique: One view of the chest

 

Comparison: 5 hours earlier

 

Findings: Interim endotracheal intubation, endotracheal tube tip projecting

approximately 4 cm above the noah. Again demonstrated is abrupt cut off of the left

mainstem bronchus, with now complete opacification of the left lung likely indicating

complete atelectasis of such. The right lung demonstrates better inflation than on

the previous exam. Large right pleural effusion is again demonstrated. There is a

nasogastric tube in place, tip of which projects at the level of the gastric

fundus/body junction. Right arm midline again demonstrated

 

Impression: Satisfactory endotracheal intubation

 

Satisfactory nasogastric intubation

 

Abrupt cut off of the left mainstem bronchus and complete opacification of the left

hemithorax, suspect endobronchial obstruction with complete atelectasis of left lung.

Previous exam also showed evidence of significant pleural fluid on the left

 

Improved aeration of the right lung

 

Large right pleural effusion, moderate interstitial congestion persist

 

Value findings phoned to Dr. Cardoso at the time of interpretation

## 2018-11-26 NOTE — CONSULTATION
History of Present Illness


General


Date patient seen:  Nov 26, 2018


Chief Complaint:  Altered Mental Status


Reason for Consultation:  venous access, critical care





Present Illness


HPI


78 year old elderly female with multiple medical comorbidities just admitted 

and now with respiratory compromise requiring urgent intubation.  Hypotensive, 

edouard cardic, left lung collapse/plug, now requiring multiple meds, fluids, and 

possible pressors.  only has right arm midline with single lumen.  cannot 

obtain peripheral access.  central venous access needed for critical care.  

surgery called to evaluate and assist with care.


Allergies:  


Coded Allergies:  


     No Known Allergies (Unverified , 11/26/18)





Medication History


Scheduled


Alprazolam* (Xanax*), 1 MG ORAL TID, (Reported)


Amino Acids/Protein Hydrolys (Pro-Stat Liquid), 30 ML ORAL TWICE A DAY, (

Reported)


Amiodarone Hcl* (Amiodarone Hcl*), 200 MG ORAL EVERY 12 HOURS, (Reported)


Ascorbic Acid* (Vitamin C*), 500 MG ORAL DAILY, (Reported)


Docusate Sodium* (Docusate Sodium*), 100 MG ORAL TWICE A DAY, (Reported)


Gabapentin* (Gabapentin*), 300 MG ORAL BEDTIME, (Reported)


Metoprolol Tartrate* (Metoprolol Tartrate*), 50 MG ORAL EVERY 12 HOURS, (

Reported)


Mupirocin* (Mupirocin*), 1 APPLIC TOPIC THREE TIMES A DAY, (Reported)


Pantoprazole* (Pantoprazole*), 40 MG ORAL DAILY, (Reported)


Sertraline Hcl* (Sertraline Hcl*), 100 MG ORAL DAILY, (Reported)


Sevelamer Carbonate* (Renvela*), 800 MG ORAL THREE TIMES A DAY, (Reported)





Scheduled PRN


Acetaminophen* (Acetaminophen 325MG Tablet*), 650 MG ORAL Q4H PRN for Pain 

Scale (3-5), (Reported)





Miscellaneous Medications


Apixaban (Eliquis), 2.5 MG PO, (Reported)


Sennosides/Docusate Sodium (Senna Laxative Tablet), 2 EACH PO, (Reported)





Patient History


Limited by:  medical condition


History Provided By:  Medical Record


Healthcare decision maker





Resuscitation status





Advanced Directive on File








Past Medical/Surgical History


Past Medical/Surgical History:  


(1) Hypotension


(2) Bradycardia


(3) Sepsis


(4) Pyelonephritis


(5) Altered mental status





Review of Systems


ROS Narrative


cannot obtain given medical condition





Physical Exam


General Appearance:  severe distress, thin


Lines, tubes and drains:  other


HEENT:  atraumatic


Neck:  normal inspection


Respiratory/Chest:  on vent, other


Cardiovascular/Chest:  bradycardia


Abdomen:  soft, no organomegaly, no mass


Extremities:  other


Skin Exam:  warm/dry


Neurologic:  unresponsiveness





Last 24 Hour Vital Signs








  Date Time  Temp Pulse Resp B/P (MAP) Pulse Ox O2 Delivery O2 Flow Rate FiO2


 


11/26/18 11:15  71 19 154/34 99 Non-Rebreather 10.0 98


 


11/26/18 09:55 96.1 77 19 167/44 99 Non-Rebreather 10.0 100


 


11/26/18 09:55  79 18   Non-Rebreather 15.0 100


 


11/26/18 09:19  79 18  93 Non-Rebreather  100


 


11/26/18 09:09 96.1 88 17 206/23 94 Non-Rebreather 15.0 100


 


11/26/18 09:08  80 17  96 Non-Rebreather  100


 


11/26/18 09:08  80 17   Non-Rebreather  100


 


11/26/18 08:27 96.1 82 12 264/49  Nasal Cannula 2.0 


 


11/26/18 08:25 96.1 82 14 143/40 94 Nasal Cannula 2.0 


 


11/26/18 08:25      Nasal Cannula  











Laboratory Tests








Test


  11/26/18


08:30 11/26/18


12:30


 


White Blood Count


  13.3 K/UL


(4.8-10.8)  H 


 


 


Red Blood Count


  3.62 M/UL


(4.20-5.40)  L 


 


 


Hemoglobin


  10.0 G/DL


(12.0-16.0)  L 


 


 


Hematocrit


  35.2 %


(37.0-47.0)  L 


 


 


Mean Corpuscular Volume 97 FL (80-99)   


 


Mean Corpuscular Hemoglobin


  27.5 PG


(27.0-31.0) 


 


 


Mean Corpuscular Hemoglobin


Concent 28.4 G/DL


(32.0-36.0)  L 


 


 


Red Cell Distribution Width


  21.5 %


(11.6-14.8)  H 


 


 


Platelet Count


  573 K/UL


(150-450)  H 


 


 


Mean Platelet Volume


  6.1 FL


(6.5-10.1)  L 


 


 


Neutrophils (%) (Auto)


  80.1 %


(45.0-75.0)  H 


 


 


Lymphocytes (%) (Auto)


  10.3 %


(20.0-45.0)  L 


 


 


Monocytes (%) (Auto)


  7.5 %


(1.0-10.0) 


 


 


Eosinophils (%) (Auto)


  0.5 %


(0.0-3.0) 


 


 


Basophils (%) (Auto)


  1.6 %


(0.0-2.0) 


 


 


Prothrombin Time


  10.0 SEC


(9.30-11.50) 


 


 


Prothromb Time International


Ratio 0.9 (0.9-1.1)  


  


 


 


Activated Partial


Thromboplast Time 32 SEC (23-33)


  


 


 


Urine Color Pale yellow   


 


Urine Appearance Turbid   


 


Urine pH 7 (4.5-8.0)   


 


Urine Specific Gravity


  1.030


(1.005-1.035) 


 


 


Urine Protein


  4+ (NEGATIVE)


H 


 


 


Urine Glucose (UA)


  Negative


(NEGATIVE) 


 


 


Urine Ketones


  Negative


(NEGATIVE) 


 


 


Urine Blood


  5+ (NEGATIVE)


H 


 


 


Urine Nitrite


  Negative


(NEGATIVE) 


 


 


Urine Bilirubin


  Negative


(NEGATIVE) 


 


 


Urine Urobilinogen


  Normal MG/DL


(0.0-1.0) 


 


 


Urine Leukocyte Esterase


  3+ (NEGATIVE)


H 


 


 


Urine RBC


  30-40 /HPF (0


- 2)  H 


 


 


Urine WBC


  Tntc /HPF (0 -


2)  H 


 


 


Urine Squamous Epithelial


Cells Few /LPF


(NONE/OCC) 


 


 


Urine Bacteria


  Moderate /HPF


(NONE)  H 


 


 


Sodium Level


  137 MMOL/L


(136-145) 


 


 


Potassium Level


  5.5 MMOL/L


(3.5-5.1)  H 


 


 


Chloride Level


  101 MMOL/L


() 


 


 


Carbon Dioxide Level


  31 MMOL/L


(21-32) 


 


 


Anion Gap


  5 mmol/L


(5-15) 


 


 


Blood Urea Nitrogen


  42 mg/dL


(7-18)  H 


 


 


Creatinine


  3.1 MG/DL


(0.55-1.30)  H 


 


 


Estimat Glomerular Filtration


Rate  mL/min (>60)  


  


 


 


Glucose Level


  174 MG/DL


()  H 


 


 


Lactic Acid Level


  0.70 mmol/L


(0.4-2.0) 


 


 


Calcium Level


  9.0 MG/DL


(8.5-10.1) 


 


 


Total Bilirubin


  0.2 MG/DL


(0.2-1.0) 


 


 


Aspartate Amino Transf


(AST/SGOT) 19 U/L (15-37)


  


 


 


Alanine Aminotransferase


(ALT/SGPT) 11 U/L (12-78)


L 


 


 


Alkaline Phosphatase


  99 U/L


() 


 


 


Total Creatine Kinase


  25 U/L


()  L 


 


 


Creatine Kinase MB


  2.5 NG/ML


(0.0-3.6) 


 


 


Creatine Kinase MB Relative


Index 10.0  


  


 


 


Troponin I


  0.026 ng/mL


(0.000-0.056) 


 


 


Total Protein


  8.3 G/DL


(6.4-8.2)  H 


 


 


Albumin


  1.7 G/DL


(3.4-5.0)  L 


 


 


Globulin 6.6 g/dL   


 


Albumin/Globulin Ratio


  0.3 (1.0-2.7)


L 


 


 


Arterial Blood pH


  


  7.170


(7.350-7.450)


 


Arterial Blood Partial


Pressure CO2 


  74.4 mmHg


(35.0-45.0)  *H


 


Arterial Blood Partial


Pressure O2 


  66.9 mmHg


(75.0-100.0)  L


 


Arterial Blood HCO3


  


  26.5 mmol/L


(22.0-26.0)  H


 


Arterial Blood Oxygen


Saturation 


  88.6 %


()  *L


 


Arterial Blood Base Excess  -2.9 (-2-2)  L


 


Arcadio Test  Positive  











Microbiology








 Date/Time


Source Procedure


Growth Status


 


 


 11/26/18 08:35


Nasal Nares Influenza Types A,B Antigen (ARIAS) - Final Complete








Height (Feet):  5


Height (Inches):  3.00


Weight (Pounds):  145





Assessment/Plan


Problem List:  


(1) Sepsis


Assessment & Plan:  acute decompensation 


hypotensive


bradycardic


respiratory decompensation requiring intubation and vent support


needs central venous access for meds, fluids and possible pressors 


thank you


will follow with recs


ICD Codes:  A41.9 - Sepsis, unspecified organism


SNOMED:  54080412


(2) Hypotension


ICD Codes:  I95.9 - Hypotension, unspecified


SNOMED:  96363291


(3) Bradycardia


ICD Codes:  R00.1 - Bradycardia, unspecified


SNOMED:  92306892


(4) Altered mental status


ICD Codes:  R41.82 - Altered mental status, unspecified


SNOMED:  530876828


Status:  unchanged











JoshuaBob Nov 26, 2018 14:08

## 2018-11-26 NOTE — NUR
ED Nurse Note:pt. was transfered to ICU for respiratory distress, ABG done , 
report given to Juliana LEON

## 2018-11-26 NOTE — NUR
ATTEMTPTED TO DRAW ABG POST INTUBATION SEVERAL TIMES BY 3 RTs, BUT UNABLE TO OBTAIN ARTERIAL 
SAMPLE (KEEP GETTING VENOUS).  NIGHT SHIFT RT MADE AWARE AND WILL ALSO ATTEMPT TO OBTAIN 
ARTERIAL SAMPLE.

## 2018-11-26 NOTE — CARDIOLOGY PROGRESS NOTE
Assessment/Plan


Assessment/Plan


[id number]





switch to  levopphed 


alb ordered by dr choudhury 


check tsh to exclude amiod induced hypothyroid 


empic abx 


abd u/s need to exclude abd sepsis 


check





Objective





Last 24 Hour Vital Signs








  Date Time  Temp Pulse Resp B/P (MAP) Pulse Ox O2 Delivery O2 Flow Rate FiO2


 


11/26/18 14:35 96.1 71 19 154/34 99 Non-Rebreather 10.0 98


 


11/26/18 14:21    81/21    


 


11/26/18 11:15  71 19 154/34 99 Non-Rebreather 10.0 98


 


11/26/18 09:55 96.1 77 19 167/44 99 Non-Rebreather 10.0 100


 


11/26/18 09:55  79 18   Non-Rebreather 15.0 100


 


11/26/18 09:19  79 18  93 Non-Rebreather  100


 


11/26/18 09:09 96.1 88 17 206/23 94 Non-Rebreather 15.0 100


 


11/26/18 09:08  80 17  96 Non-Rebreather  100


 


11/26/18 09:08  80 17   Non-Rebreather  100


 


11/26/18 08:27 96.1 82 12 264/49  Nasal Cannula 2.0 


 


11/26/18 08:25 96.1 82 14 143/40 94 Nasal Cannula 2.0 


 


11/26/18 08:25      Nasal Cannula  











Laboratory Tests








Test


  11/26/18


08:30 11/26/18


12:30


 


White Blood Count


  13.3 K/UL


(4.8-10.8)  H 


 


 


Red Blood Count


  3.62 M/UL


(4.20-5.40)  L 


 


 


Hemoglobin


  10.0 G/DL


(12.0-16.0)  L 


 


 


Hematocrit


  35.2 %


(37.0-47.0)  L 


 


 


Mean Corpuscular Volume 97 FL (80-99)   


 


Mean Corpuscular Hemoglobin


  27.5 PG


(27.0-31.0) 


 


 


Mean Corpuscular Hemoglobin


Concent 28.4 G/DL


(32.0-36.0)  L 


 


 


Red Cell Distribution Width


  21.5 %


(11.6-14.8)  H 


 


 


Platelet Count


  573 K/UL


(150-450)  H 


 


 


Mean Platelet Volume


  6.1 FL


(6.5-10.1)  L 


 


 


Neutrophils (%) (Auto)


  80.1 %


(45.0-75.0)  H 


 


 


Lymphocytes (%) (Auto)


  10.3 %


(20.0-45.0)  L 


 


 


Monocytes (%) (Auto)


  7.5 %


(1.0-10.0) 


 


 


Eosinophils (%) (Auto)


  0.5 %


(0.0-3.0) 


 


 


Basophils (%) (Auto)


  1.6 %


(0.0-2.0) 


 


 


Prothrombin Time


  10.0 SEC


(9.30-11.50) 


 


 


Prothromb Time International


Ratio 0.9 (0.9-1.1)  


  


 


 


Activated Partial


Thromboplast Time 32 SEC (23-33)


  


 


 


Urine Color Pale yellow   


 


Urine Appearance Turbid   


 


Urine pH 7 (4.5-8.0)   


 


Urine Specific Gravity


  1.030


(1.005-1.035) 


 


 


Urine Protein


  4+ (NEGATIVE)


H 


 


 


Urine Glucose (UA)


  Negative


(NEGATIVE) 


 


 


Urine Ketones


  Negative


(NEGATIVE) 


 


 


Urine Blood


  5+ (NEGATIVE)


H 


 


 


Urine Nitrite


  Negative


(NEGATIVE) 


 


 


Urine Bilirubin


  Negative


(NEGATIVE) 


 


 


Urine Urobilinogen


  Normal MG/DL


(0.0-1.0) 


 


 


Urine Leukocyte Esterase


  3+ (NEGATIVE)


H 


 


 


Urine RBC


  30-40 /HPF (0


- 2)  H 


 


 


Urine WBC


  Tntc /HPF (0 -


2)  H 


 


 


Urine Squamous Epithelial


Cells Few /LPF


(NONE/OCC) 


 


 


Urine Bacteria


  Moderate /HPF


(NONE)  H 


 


 


Sodium Level


  137 MMOL/L


(136-145) 


 


 


Potassium Level


  5.5 MMOL/L


(3.5-5.1)  H 


 


 


Chloride Level


  101 MMOL/L


() 


 


 


Carbon Dioxide Level


  31 MMOL/L


(21-32) 


 


 


Anion Gap


  5 mmol/L


(5-15) 


 


 


Blood Urea Nitrogen


  42 mg/dL


(7-18)  H 


 


 


Creatinine


  3.1 MG/DL


(0.55-1.30)  H 


 


 


Estimat Glomerular Filtration


Rate  mL/min (>60)  


  


 


 


Glucose Level


  174 MG/DL


()  H 


 


 


Lactic Acid Level


  0.70 mmol/L


(0.4-2.0) 


 


 


Calcium Level


  9.0 MG/DL


(8.5-10.1) 


 


 


Total Bilirubin


  0.2 MG/DL


(0.2-1.0) 


 


 


Aspartate Amino Transf


(AST/SGOT) 19 U/L (15-37)


  


 


 


Alanine Aminotransferase


(ALT/SGPT) 11 U/L (12-78)


L 


 


 


Alkaline Phosphatase


  99 U/L


() 


 


 


Total Creatine Kinase


  25 U/L


()  L 


 


 


Creatine Kinase MB


  2.5 NG/ML


(0.0-3.6) 


 


 


Creatine Kinase MB Relative


Index 10.0  


  


 


 


Troponin I


  0.026 ng/mL


(0.000-0.056) 


 


 


Total Protein


  8.3 G/DL


(6.4-8.2)  H 


 


 


Albumin


  1.7 G/DL


(3.4-5.0)  L 


 


 


Globulin 6.6 g/dL   


 


Albumin/Globulin Ratio


  0.3 (1.0-2.7)


L 


 


 


Arterial Blood pH


  


  7.170


(7.350-7.450)


 


Arterial Blood Partial


Pressure CO2 


  74.4 mmHg


(35.0-45.0)  *H


 


Arterial Blood Partial


Pressure O2 


  66.9 mmHg


(75.0-100.0)  L


 


Arterial Blood HCO3


  


  26.5 mmol/L


(22.0-26.0)  H


 


Arterial Blood Oxygen


Saturation 


  88.6 %


()  *L


 


Arterial Blood Base Excess  -2.9 (-2-2)  L


 


Arcadio Test  Positive  











Microbiology








 Date/Time


Source Procedure


Growth Status


 


 


 11/26/18 08:35


Nasal Nares Influenza Types A,B Antigen (ARIAS) - Final Complete

















Mariusz Wade MD Nov 26, 2018 15:51

## 2018-11-26 NOTE — NUR
ED Nurse Note:pt. was BIBA from SNF for ALOC , on arrival she is A/Ox0, O2 
sats 94% on non rebrizer mask at 15L, pt. placed on cardiac monitor, EKG done 
,blood and urine sent to labs, F/C removed as of ERMD order, pt. receiving 
breathing treatment , IV antibiotic given, pt. came with ASHISH single lumen PICC 
line in working condition

## 2018-11-26 NOTE — NUR
NURSE NOTES:

Pressor chg.to Levo.as ordered.Pharma.called for albumin order stated still no verification 
on compatibility to levo.B/P improved on Levo.drip.Cont.to monitor.

## 2018-11-26 NOTE — NUR
NURSE NOTES:

Recvd.on a vent.Orally intubated see settings,AAO able to follows simple command.On 
bila.soft wrist restraints prev.self-injury.See V/S.Dopa.drip in progress TIT. to 
BPS>90.Scope SR.Denies CP.Unable to Admin.Alb.Per pharma.incompatible to Dopa. 
aware.See order for LEVO.drip.F/Cath intact.Newly inserted by am nurse.Anuric.Hemodialysis 
pt.suctioned Tk beige sec.NS Lavaged.

## 2018-11-26 NOTE — NUR
ED Nurse Note:pt. has labored breathing VS are with in normals, MD notified and 
OKed to transfer pt. to SDU

## 2018-11-26 NOTE — NUR
NURSE NOTES:

Patient arrived from ED, with one Midline access, on the right arm and left arm has a AV 
shunt, she gets hemodialysis MWF, requires intubation ordered by MD esquivel. BP is also low, 
a NGT was applied, chest xray done. Has wounds, and is on a SPR mattress. Left lung is very 
diminished. Is from [facility]. MD esquivel has been called and notified about 
the patients condition. CHest xray showed a collapsed left lung, family has been notified 
about consent for broncoscopy, patient is ful code with no known allergies , Dr Deleon is 
on the case and has been notified of patient condition.

## 2018-11-26 NOTE — DIAGNOSTIC IMAGING REPORT
Indication: Post line placement

 

Technique: One view of the chest

 

Comparison: One hour earlier

 

Findings: Interim placement left subclavian central venous catheter, tip of which

projects cephalad beyond the edge of image, presumably within the internal jugular

vein. Stable satisfactory position of nasogastric and endotracheal tubes. The left

lung has partially reexpanded in the interim. There is still opacification of the

left lower hemithorax, however. The left mainstem bronchus now appears to be grossly

patent. Abrupt cut off seen previously is not definitely evident currently. Large

right pleural effusion, generalized interstitial edema persist

 

Impression: Malposition of left subclavian central venous catheter, tip presumably

within the left internal jugular vein. Dr. Lewis is aware

 

Interim partial reaeration of the left lung. However, there is still considerable

left mid and lower lung opacification, presumably due to combination of pleural fluid

and residual atelectasis

 

Interstitial edema, large right pleural effusion persists

 

Findings discussed by phone with patient's nurse at the time of interpretation

## 2018-11-26 NOTE — HISTORY AND PHYSICAL REPORT
DATE OF ADMISSION:  11/26/2018

HISTORY OF PRESENT ILLNESS:  This is a 78-year-old female, who came to the

hospital with complaint of having alteration in her mental status.  The

patient was apparently doing fairly well at home; however she is on

hemodialysis.  She was minimally responsive.  She was seen in the ER and

workup was done which showed left lung pneumonia.  The patient was

subsequently intubated at my request by the ER physician.  The patient

subsequently become hypotensive and bradycardic and a central line was

placed.  She is currently on dopamine.  At this point, she has been seen

by Cardiology, Nephrology, and General Surgery.



PAST MEDICAL HISTORY:  Notable for previous urinary tract infection, ESRD

on dialysis, diabetes mellitus, atrial fibrillation, hypertension, anemia,

dementia, GERD, history of open wounds of the lower extremities.



ALLERGIES:  None.



SOCIAL HISTORY:  She is a resident of convalescent facility.  Tobacco,

alcohol, ___________  not known.



HOME MEDICATIONS:  Reviewed and reconciled in chart.



PHYSICAL EXAMINATION:

GENERAL:  Reveals a elderly female.

VITAL SIGNS:  Blood pressure is 90/60, heart rate 102, respirations 22, she

is afebrile.

HEENT:  Unremarkable.  She is intubated but alert.

CHEST:  Clear breath sounds bilaterally with decreased breath sounds at the

bases.

ABDOMEN:  Soft.

NEUROLOGIC:  Nonfocal.



LABORATORY AND DIAGNOSTIC DATA:  White count 13123, hemoglobin 10, platelet

count is normal.  ABG shows pH 7.17, pCO2 74, pO2 57.  Potassium 5.5,

creatinine of 3.1, glucose 174.  She has multiple pus cells in the urine.

X-ray chest shows left lung opacifications suspicious for left mucus plug.

 



IMPRESSION:

1. Hypotension.

2. Sepsis.

3. Atrial fibrillation.

4. Bradycardia.

5. Diabetes mellitus.

6. End-stage renal disease on dialysis.

7. Left lung collapse.

8. Respiratory failure.

9. Dysphagia.

10. Dementia.



DISCUSSION:

1. Continue medications.

2. Start Levophed.

3. She will need a bronchoscopy.  I discussed with the son who agrees

and consents.

4. She needs central line.  She will need   ______

5. Use broad-spectrum antibiotics.  I concur with the use of Protonix,

vancomycin, Zosyn, IV fluids, albumin.  The patient is critically ill.

Discussed with family and will follow carefully.









  ______________________________________________

  Quentin Cardoso M.D.





DR:  Kleber

D:  11/26/2018 20:41

T:  11/26/2018 21:02

JOB#:  [id number]

CC:

## 2018-11-27 VITALS — DIASTOLIC BLOOD PRESSURE: 37 MMHG | SYSTOLIC BLOOD PRESSURE: 132 MMHG

## 2018-11-27 VITALS — DIASTOLIC BLOOD PRESSURE: 44 MMHG | SYSTOLIC BLOOD PRESSURE: 98 MMHG

## 2018-11-27 VITALS — DIASTOLIC BLOOD PRESSURE: 39 MMHG | SYSTOLIC BLOOD PRESSURE: 153 MMHG

## 2018-11-27 VITALS — DIASTOLIC BLOOD PRESSURE: 28 MMHG | SYSTOLIC BLOOD PRESSURE: 122 MMHG

## 2018-11-27 VITALS — DIASTOLIC BLOOD PRESSURE: 34 MMHG | SYSTOLIC BLOOD PRESSURE: 132 MMHG

## 2018-11-27 VITALS — DIASTOLIC BLOOD PRESSURE: 78 MMHG | SYSTOLIC BLOOD PRESSURE: 106 MMHG

## 2018-11-27 VITALS — DIASTOLIC BLOOD PRESSURE: 39 MMHG | SYSTOLIC BLOOD PRESSURE: 124 MMHG

## 2018-11-27 VITALS — DIASTOLIC BLOOD PRESSURE: 36 MMHG | SYSTOLIC BLOOD PRESSURE: 131 MMHG

## 2018-11-27 VITALS — DIASTOLIC BLOOD PRESSURE: 32 MMHG | SYSTOLIC BLOOD PRESSURE: 103 MMHG

## 2018-11-27 VITALS — DIASTOLIC BLOOD PRESSURE: 40 MMHG | SYSTOLIC BLOOD PRESSURE: 55 MMHG

## 2018-11-27 VITALS — DIASTOLIC BLOOD PRESSURE: 45 MMHG | SYSTOLIC BLOOD PRESSURE: 152 MMHG

## 2018-11-27 VITALS — DIASTOLIC BLOOD PRESSURE: 29 MMHG | SYSTOLIC BLOOD PRESSURE: 95 MMHG

## 2018-11-27 VITALS — DIASTOLIC BLOOD PRESSURE: 58 MMHG | SYSTOLIC BLOOD PRESSURE: 97 MMHG

## 2018-11-27 VITALS — DIASTOLIC BLOOD PRESSURE: 40 MMHG | SYSTOLIC BLOOD PRESSURE: 140 MMHG

## 2018-11-27 VITALS — DIASTOLIC BLOOD PRESSURE: 53 MMHG | SYSTOLIC BLOOD PRESSURE: 135 MMHG

## 2018-11-27 VITALS — SYSTOLIC BLOOD PRESSURE: 153 MMHG | DIASTOLIC BLOOD PRESSURE: 42 MMHG

## 2018-11-27 VITALS — SYSTOLIC BLOOD PRESSURE: 133 MMHG | DIASTOLIC BLOOD PRESSURE: 37 MMHG

## 2018-11-27 VITALS — SYSTOLIC BLOOD PRESSURE: 100 MMHG | DIASTOLIC BLOOD PRESSURE: 33 MMHG

## 2018-11-27 VITALS — SYSTOLIC BLOOD PRESSURE: 101 MMHG | DIASTOLIC BLOOD PRESSURE: 45 MMHG

## 2018-11-27 VITALS — DIASTOLIC BLOOD PRESSURE: 42 MMHG | SYSTOLIC BLOOD PRESSURE: 59 MMHG

## 2018-11-27 VITALS — DIASTOLIC BLOOD PRESSURE: 42 MMHG | SYSTOLIC BLOOD PRESSURE: 138 MMHG

## 2018-11-27 VITALS — SYSTOLIC BLOOD PRESSURE: 90 MMHG | DIASTOLIC BLOOD PRESSURE: 43 MMHG

## 2018-11-27 VITALS — SYSTOLIC BLOOD PRESSURE: 95 MMHG | DIASTOLIC BLOOD PRESSURE: 18 MMHG

## 2018-11-27 VITALS — SYSTOLIC BLOOD PRESSURE: 85 MMHG | DIASTOLIC BLOOD PRESSURE: 13 MMHG

## 2018-11-27 VITALS — SYSTOLIC BLOOD PRESSURE: 154 MMHG | DIASTOLIC BLOOD PRESSURE: 67 MMHG

## 2018-11-27 VITALS — DIASTOLIC BLOOD PRESSURE: 27 MMHG | SYSTOLIC BLOOD PRESSURE: 118 MMHG

## 2018-11-27 VITALS — SYSTOLIC BLOOD PRESSURE: 117 MMHG | DIASTOLIC BLOOD PRESSURE: 54 MMHG

## 2018-11-27 VITALS — SYSTOLIC BLOOD PRESSURE: 139 MMHG | DIASTOLIC BLOOD PRESSURE: 42 MMHG

## 2018-11-27 VITALS — DIASTOLIC BLOOD PRESSURE: 51 MMHG | SYSTOLIC BLOOD PRESSURE: 114 MMHG

## 2018-11-27 VITALS — SYSTOLIC BLOOD PRESSURE: 62 MMHG | DIASTOLIC BLOOD PRESSURE: 34 MMHG

## 2018-11-27 VITALS — DIASTOLIC BLOOD PRESSURE: 72 MMHG | SYSTOLIC BLOOD PRESSURE: 96 MMHG

## 2018-11-27 VITALS — SYSTOLIC BLOOD PRESSURE: 126 MMHG | DIASTOLIC BLOOD PRESSURE: 26 MMHG

## 2018-11-27 VITALS — DIASTOLIC BLOOD PRESSURE: 51 MMHG | SYSTOLIC BLOOD PRESSURE: 132 MMHG

## 2018-11-27 VITALS — DIASTOLIC BLOOD PRESSURE: 56 MMHG | SYSTOLIC BLOOD PRESSURE: 87 MMHG

## 2018-11-27 VITALS — DIASTOLIC BLOOD PRESSURE: 31 MMHG | SYSTOLIC BLOOD PRESSURE: 126 MMHG

## 2018-11-27 VITALS — SYSTOLIC BLOOD PRESSURE: 121 MMHG | DIASTOLIC BLOOD PRESSURE: 66 MMHG

## 2018-11-27 VITALS — DIASTOLIC BLOOD PRESSURE: 83 MMHG | SYSTOLIC BLOOD PRESSURE: 110 MMHG

## 2018-11-27 VITALS — SYSTOLIC BLOOD PRESSURE: 142 MMHG | DIASTOLIC BLOOD PRESSURE: 51 MMHG

## 2018-11-27 VITALS — SYSTOLIC BLOOD PRESSURE: 113 MMHG | DIASTOLIC BLOOD PRESSURE: 48 MMHG

## 2018-11-27 VITALS — SYSTOLIC BLOOD PRESSURE: 132 MMHG | DIASTOLIC BLOOD PRESSURE: 37 MMHG

## 2018-11-27 VITALS — SYSTOLIC BLOOD PRESSURE: 120 MMHG | DIASTOLIC BLOOD PRESSURE: 23 MMHG

## 2018-11-27 VITALS — SYSTOLIC BLOOD PRESSURE: 98 MMHG | DIASTOLIC BLOOD PRESSURE: 38 MMHG

## 2018-11-27 VITALS — SYSTOLIC BLOOD PRESSURE: 135 MMHG | DIASTOLIC BLOOD PRESSURE: 62 MMHG

## 2018-11-27 VITALS — DIASTOLIC BLOOD PRESSURE: 30 MMHG | SYSTOLIC BLOOD PRESSURE: 125 MMHG

## 2018-11-27 VITALS — SYSTOLIC BLOOD PRESSURE: 143 MMHG | DIASTOLIC BLOOD PRESSURE: 40 MMHG

## 2018-11-27 VITALS — DIASTOLIC BLOOD PRESSURE: 29 MMHG | SYSTOLIC BLOOD PRESSURE: 115 MMHG

## 2018-11-27 VITALS — DIASTOLIC BLOOD PRESSURE: 35 MMHG | SYSTOLIC BLOOD PRESSURE: 130 MMHG

## 2018-11-27 VITALS — SYSTOLIC BLOOD PRESSURE: 124 MMHG | DIASTOLIC BLOOD PRESSURE: 26 MMHG

## 2018-11-27 VITALS — SYSTOLIC BLOOD PRESSURE: 106 MMHG | DIASTOLIC BLOOD PRESSURE: 57 MMHG

## 2018-11-27 VITALS — DIASTOLIC BLOOD PRESSURE: 43 MMHG | SYSTOLIC BLOOD PRESSURE: 146 MMHG

## 2018-11-27 VITALS — DIASTOLIC BLOOD PRESSURE: 22 MMHG | SYSTOLIC BLOOD PRESSURE: 97 MMHG

## 2018-11-27 LAB
ADD MANUAL DIFF: YES
ALBUMIN SERPL-MCNC: 1.5 G/DL (ref 3.4–5)
ALBUMIN/GLOB SERPL: 0.3 {RATIO} (ref 1–2.7)
ALP SERPL-CCNC: 75 U/L (ref 46–116)
ALT SERPL-CCNC: 7 U/L (ref 12–78)
AMMONIA PLAS-SCNC: 11 UMOL/L (ref 11–32)
ANION GAP SERPL CALC-SCNC: 12 MMOL/L (ref 5–15)
AST SERPL-CCNC: 15 U/L (ref 15–37)
BILIRUB SERPL-MCNC: 0.5 MG/DL (ref 0.2–1)
BUN SERPL-MCNC: 47 MG/DL (ref 7–18)
CALCIUM SERPL-MCNC: 8.4 MG/DL (ref 8.5–10.1)
CHLORIDE SERPL-SCNC: 102 MMOL/L (ref 98–107)
CO2 SERPL-SCNC: 25 MMOL/L (ref 21–32)
CREAT SERPL-MCNC: 3.5 MG/DL (ref 0.55–1.3)
ERYTHROCYTE [DISTWIDTH] IN BLOOD BY AUTOMATED COUNT: 20.9 % (ref 11.6–14.8)
GAMMA GLUTAMYL TRANSPEPTIDASE: 8 U/L (ref 5–85)
GLOBULIN SER-MCNC: 5.5 G/DL
HCT VFR BLD CALC: 30.2 % (ref 37–47)
HGB BLD-MCNC: 9 G/DL (ref 12–16)
MCV RBC AUTO: 95 FL (ref 80–99)
PHOSPHATE SERPL-MCNC: 3.3 MG/DL (ref 2.5–4.9)
PLATELET # BLD: 420 K/UL (ref 150–450)
POTASSIUM SERPL-SCNC: 5.1 MMOL/L (ref 3.5–5.1)
RBC # BLD AUTO: 3.2 M/UL (ref 4.2–5.4)
SODIUM SERPL-SCNC: 139 MMOL/L (ref 136–145)
WBC # BLD AUTO: 20 K/UL (ref 4.8–10.8)

## 2018-11-27 PROCEDURE — 5A1D70Z PERFORMANCE OF URINARY FILTRATION, INTERMITTENT, LESS THAN 6 HOURS PER DAY: ICD-10-PCS

## 2018-11-27 PROCEDURE — 0BH17EZ INSERTION OF ENDOTRACHEAL AIRWAY INTO TRACHEA, VIA NATURAL OR ARTIFICIAL OPENING: ICD-10-PCS

## 2018-11-27 PROCEDURE — 5A1955Z RESPIRATORY VENTILATION, GREATER THAN 96 CONSECUTIVE HOURS: ICD-10-PCS

## 2018-11-27 PROCEDURE — 0BC78ZZ EXTIRPATION OF MATTER FROM LEFT MAIN BRONCHUS, VIA NATURAL OR ARTIFICIAL OPENING ENDOSCOPIC: ICD-10-PCS

## 2018-11-27 RX ADMIN — DOPAMINE HYDROCHLORIDE IN DEXTROSE SCH MLS/HR: 1.6 INJECTION, SOLUTION INTRAVENOUS at 23:30

## 2018-11-27 RX ADMIN — SODIUM CHLORIDE SCH MLS/HR: 0.9 INJECTION INTRAVENOUS at 14:00

## 2018-11-27 RX ADMIN — CHLORHEXIDINE GLUCONATE SCH APPLIC: 213 SOLUTION TOPICAL at 20:26

## 2018-11-27 RX ADMIN — HUMAN INSULIN SCH MLS/HR: 100 INJECTION, SOLUTION SUBCUTANEOUS at 13:17

## 2018-11-27 RX ADMIN — PANTOPRAZOLE SODIUM SCH MG: 40 INJECTION, POWDER, FOR SOLUTION INTRAVENOUS at 09:30

## 2018-11-27 RX ADMIN — PANTOPRAZOLE SODIUM SCH MG: 40 INJECTION, POWDER, FOR SOLUTION INTRAVENOUS at 20:33

## 2018-11-27 RX ADMIN — SODIUM CHLORIDE SCH MLS/HR: 0.9 INJECTION INTRAVENOUS at 23:34

## 2018-11-27 RX ADMIN — AMIODARONE HYDROCHLORIDE SCH MG: 200 TABLET ORAL at 20:33

## 2018-11-27 RX ADMIN — DEXTROSE MONOHYDRATE SCH MLS/HR: 50 INJECTION, SOLUTION INTRAVENOUS at 09:31

## 2018-11-27 NOTE — NUR
NURSE NOTES:

Left a message for Dr. Wade regarding patient running A. fibrillation with RVR rate 
ranging from 130-150s, patient has no prn orders. Will await MD call back.

## 2018-11-27 NOTE — CARDIOLOGY PROGRESS NOTE
Assessment/Plan


Assessment/Plan


1. Hypotension, possibly sepsis versus volume.


2. Paroxysmal episodes of atrial fibrillation history.


3. Bradycardia secondary to medications, amiodarone possibly.


4. Diabetes mellitus.


5. End-stage renal disease, on hemodialysis.


6. Lung collapse.


7. Respiratory failure, status post intubation.


8. Dysphagia.


9. History of dementia.


10. Pulm htn 


11.  Pleural effusion 











has been  in and out of afib will increae amiod for a few doses watch for edouard 


tele reviewed ekg reviewed 


echo reviewed


was extubated but did poorly then reintubated 


hd bronch 


echo personally reviewed lv function is hyperdynamic 


has bilat pleural effusion 


on vasopressor 


d/w rn regradin taper 


was off pressor until after extubation 


urine cx postive 


keep on vent 


may need to consider thoracentesis


[facility] 


d/w son 


remain critically ill but better than last ntie 


iv abx 


dialysis





Subjective


Cardiovascular:  Denies: chest pain, lightheadedness


Respiratory:  Denies: shortness of breath


Gastrointestinal/Abdominal:  Denies: abdominal pain


Genitourinary:  Denies: burning


Subjective


on the vent not verbal but communicates with head motion





Objective





Last 24 Hour Vital Signs








  Date Time  Temp Pulse Resp B/P (MAP) Pulse Ox O2 Delivery O2 Flow Rate FiO2


 


11/27/18 18:00    141/39    


 


11/27/18 17:00  78 16 153/39 (77) 100   


 


11/27/18 17:00    153/39    


 


11/27/18 16:35  79 16     50


 


11/27/18 16:30  78 16 153/42 (79) 100   


 


11/27/18 16:00      Mechanical Ventilator  





      Mechanical Ventilator  





      Mechanical Ventilator  


 


11/27/18 16:00  76      


 


11/27/18 16:00    153/42    


 


11/27/18 16:00  78 20 146/43 (77) 100   


 


11/27/18 15:30  77 20 152/45 (80) 100   


 


11/27/18 15:17  74 20     50


 


11/27/18 15:00  78 20 142/51 (81) 100   


 


11/27/18 14:36    154/54    


 


11/27/18 14:30  118 20 154/67 (96) 93   


 


11/27/18 14:00  114 20 132/51 (78) 100   


 


11/27/18 14:00    132/51    


 


11/27/18 13:57        50


 


11/27/18 13:40        50


 


11/27/18 13:25  118 20 114/51 (72) 100   


 


11/27/18 13:20  118 20 117/54 (75) 100   


 


11/27/18 13:15  121 20 87/56 (66) 94   


 


11/27/18 13:10  119 20 62/34 (43) 94   


 


11/27/18 13:05  124 20 59/42 (48) 94   


 


11/27/18 13:04  123 20     100


 


11/27/18 13:00    55/40    


 


11/27/18 13:00  123 22 55/40 (45) 94   


 


11/27/18 12:55  142 8  71 Facial  100


 


11/27/18 12:47        100


 


11/27/18 12:47      Mechanical Ventilator  





      Mechanical Ventilator  





      Mechanical Ventilator  


 


11/27/18 12:30  122 22 103/32 (55) 93   


 


11/27/18 12:00  128      


 


11/27/18 12:00    138/42    


 


11/27/18 12:00 98.9 120 22 138/42 (74) 82   


 


11/27/18 11:30  128 22 139/42 (74) 91   


 


11/27/18 11:13  140 18   Venturi Mask 12.0 40


 


11/27/18 11:13      Venturi Mask 12.0 40


 


11/27/18 11:11      Venturi Mask 12.0 40


 


11/27/18 11:00  136 22 135/53 (80) 91   


 


11/27/18 11:00    135/53    


 


11/27/18 10:30  143 22 135/62 (86) 91   


 


11/27/18 10:00  142 22 121/66 (84) 95   


 


11/27/18 10:00    121/66    


 


11/27/18 09:30  127 20 101/45 (63) 100   


 


11/27/18 09:00  130 20 106/57 (73) 100   


 


11/27/18 09:00    106/57    


 


11/27/18 09:00        45


 


11/27/18 08:42  131 20     50


 


11/27/18 08:30 99.1 129 20 97/58 (71) 100   


 


11/27/18 08:00      Mechanical Ventilator  





      Mechanical Ventilator  





      Mechanical Ventilator  


 


11/27/18 08:00  127 20 85/13 (37) 100   


 


11/27/18 08:00    124/26    


 


11/27/18 08:00  79      


 


11/27/18 07:30  75 20 124/26 (58) 100   


 


11/27/18 07:00    124/39    


 


11/27/18 07:00  75 20 124/39 (67) 100   


 


11/27/18 06:51  74 20     50


 


11/27/18 06:00  142 20 96/72 (80) 100   


 


11/27/18 05:04  145 20     50


 


11/27/18 05:00  146 20 106/78 (87) 100   


 


11/27/18 04:30  154 20 95/29 (51) 100   


 


11/27/18 04:18    110/83    


 


11/27/18 04:00        50


 


11/27/18 04:00  154      


 


11/27/18 04:00 99.2 154 22 110/83 (92) 100   


 


11/27/18 04:00      Mechanical Ventilator  





      Mechanical Ventilator  


 


11/27/18 03:30  158 21 90/43 (59) 100   


 


11/27/18 03:00  73 20 113/48 (69) 100   


 


11/27/18 03:00  75 20     50


 


11/27/18 02:30  69 20 98/38 (58) 100   


 


11/27/18 02:00  68 20 120/23 (55) 100   


 


11/27/18 01:46  68 20     50


 


11/27/18 01:30  68 20 126/26 (59) 100   


 


11/27/18 01:00  69 20 118/27 (57) 100   


 


11/27/18 00:30  69 20 100/33 (55) 100   


 


11/27/18 00:00      Mechanical Ventilator  





      Mechanical Ventilator  


 


11/27/18 00:00        50


 


11/27/18 00:00 97.2 67 20 98/44 (62) 100   


 


11/26/18 23:30  66 20 105/33 (57) 100   


 


11/26/18 23:04  65 21     50


 


11/26/18 23:00  66 18 92/61 (71) 100   


 


11/26/18 22:30  64 21 99/47 (64) 100   


 


11/26/18 22:00  64 20 112/26 (54) 100   


 


11/26/18 21:30  63 19 104/31 (55) 100   


 


11/26/18 21:00  63 16 98/41 (60) 100   


 


11/26/18 20:46  64 21     50


 


11/26/18 20:40        50


 


11/26/18 20:30  63 20 109/44 (65) 100   


 


11/26/18 20:25  62      


 


11/26/18 20:21  64 20     100


 


11/26/18 20:00  67 18 128/34 (65) 100   


 


11/26/18 20:00    99/20    


 


11/26/18 20:00        100


 


11/26/18 19:55      Mechanical Ventilator  





      Mechanical Ventilator  


 


11/26/18 19:45  82 20 99/20 (46) 99   


 


11/26/18 19:30  90 19 125/24 (57) 99   


 


11/26/18 19:15  99 20 124/46 (72) 99   


 


11/26/18 19:00 96.6 100 20 128/17 (54) 100   








General Appearance:  no apparent distress, alert


Neck:  supple


Cardiovascular:  normal rate


Respiratory/Chest:  lungs clear, normal breath sounds


Abdomen:  normal bowel sounds, non tender, soft


Extremities:  no swelling











Intake and Output  


 


 11/26/18 11/27/18





 19:00 07:00


 


Intake Total 127.5 ml 1307.5 ml


 


Output Total 160 ml 15 ml


 


Balance -32.5 ml 1292.5 ml


 


  


 


Intake Oral 0 ml 0 ml


 


IV Total 127.5 ml 1307.5 ml


 


Output Urine Total 160 ml 15 ml











Laboratory Tests








Test


  11/26/18


20:38 11/27/18


05:50 11/27/18


08:02 11/27/18


13:55


 


Arterial Blood pH


  7.549


(7.350-7.450) 


  


  7.467


(7.350-7.450)


 


Arterial Blood Partial


Pressure CO2 28.0 mmHg


(35.0-45.0)  L 


  


  33.5 mmHg


(35.0-45.0)  L


 


Arterial Blood Partial


Pressure O2 395.6 mmHg


(75.0-100.0)  H 


  


  274.6 mmHg


(75.0-100.0)  H


 


Arterial Blood HCO3


  23.9 mmol/L


(22.0-26.0) 


  


  23.7 mmol/L


(22.0-26.0)


 


Arterial Blood Oxygen


Saturation 99.9 %


() 


  


  99.8 %


()


 


Arterial Blood Base Excess 2.1 (-2-2)  H   0.2 (-2-2)  


 


Arcadio Test Positive     N/a  


 


White Blood Count


  


  20.0 K/UL


(4.8-10.8)  #H 


  


 


 


Red Blood Count


  


  3.20 M/UL


(4.20-5.40)  L 


  


 


 


Hemoglobin


  


  9.0 G/DL


(12.0-16.0)  L 


  


 


 


Hematocrit


  


  30.2 %


(37.0-47.0)  L 


  


 


 


Mean Corpuscular Volume  95 FL (80-99)    


 


Mean Corpuscular Hemoglobin


  


  28.3 PG


(27.0-31.0) 


  


 


 


Mean Corpuscular Hemoglobin


Concent 


  29.9 G/DL


(32.0-36.0)  L 


  


 


 


Red Cell Distribution Width


  


  20.9 %


(11.6-14.8)  H 


  


 


 


Platelet Count


  


  420 K/UL


(150-450) 


  


 


 


Mean Platelet Volume


  


  6.1 FL


(6.5-10.1)  L 


  


 


 


Neutrophils (%) (Auto)


  


  % (45.0-75.0)


  


  


 


 


Lymphocytes (%) (Auto)


  


  % (20.0-45.0)


  


  


 


 


Monocytes (%) (Auto)   % (1.0-10.0)    


 


Eosinophils (%) (Auto)   % (0.0-3.0)    


 


Basophils (%) (Auto)   % (0.0-2.0)    


 


Differential Total Cells


Counted 


  100  


  


  


 


 


Neutrophils % (Manual)  89 % (45-75)  H  


 


Lymphocytes % (Manual)  7 % (20-45)  L  


 


Monocytes % (Manual)  3 % (1-10)    


 


Eosinophils % (Manual)  0 % (0-3)    


 


Basophils % (Manual)  1 % (0-2)    


 


Band Neutrophils  0 % (0-8)    


 


Nucleated Red Blood Cells  1 /100 WBC    


 


Platelet Estimate  Adequate    


 


Platelet Morphology  Normal    


 


Hypochromasia  2+    


 


Anisocytosis  2+    


 


Sodium Level


  


  139 MMOL/L


(136-145) 


  


 


 


Potassium Level


  


  5.1 MMOL/L


(3.5-5.1) 


  


 


 


Chloride Level


  


  102 MMOL/L


() 


  


 


 


Carbon Dioxide Level


  


  25 MMOL/L


(21-32) 


  


 


 


Anion Gap


  


  12 mmol/L


(5-15) 


  


 


 


Blood Urea Nitrogen


  


  47 mg/dL


(7-18)  H 


  


 


 


Creatinine


  


  3.5 MG/DL


(0.55-1.30)  H 


  


 


 


Estimat Glomerular Filtration


Rate 


   mL/min (>60)  


  


  


 


 


Glucose Level


  


  115 MG/DL


()  H 


  


 


 


Lactic Acid Level


  


  2.80 mmol/L


(0.4-2.0)  H 1.90 mmol/L


(0.66-2.22) 


 


 


Uric Acid


  


  5.7 MG/DL


(2.6-7.2) 


  


 


 


Calcium Level


  


  8.4 MG/DL


(8.5-10.1)  L 


  


 


 


Phosphorus Level


  


  3.3 MG/DL


(2.5-4.9) 


  


 


 


Magnesium Level


  


  2.1 MG/DL


(1.8-2.4) 


  


 


 


Total Bilirubin


  


  0.5 MG/DL


(0.2-1.0) 


  


 


 


Gamma Glutamyl Transpeptidase  8 U/L (5-85)    


 


Aspartate Amino Transf


(AST/SGOT) 


  15 U/L (15-37)


  


  


 


 


Alanine Aminotransferase


(ALT/SGPT) 


  7 U/L (12-78)


L 


  


 


 


Alkaline Phosphatase


  


  75 U/L


() 


  


 


 


Ammonia


  


  11 umol/L


(11-32) 


  


 


 


C-Reactive Protein,


Quantitative 


  11.3 mg/dL


(0.00-0.90)  H 


  


 


 


Pro-B-Type Natriuretic Peptide


  


  > 34128 pg/mL


(0-125)  H 


  


 


 


Total Protein


  


  7.0 G/DL


(6.4-8.2) 


  


 


 


Albumin


  


  1.5 G/DL


(3.4-5.0)  L 


  


 


 


Globulin  5.5 g/dL    


 


Albumin/Globulin Ratio


  


  0.3 (1.0-2.7)


L 


  


 











Microbiology








 Date/Time


Source Procedure


Growth Status


 


 


 11/26/18 08:35


Nasal Nares Influenza Types A,B Antigen (ARIAS) - Final Complete


 


 11/26/18 08:30


Urine,Clean Catch Urine Culture - Preliminary


Gram Negative Bacillus 1 Resulted

















Mariusz Wade MD Nov 27, 2018 18:56

## 2018-11-27 NOTE — NUR
NURSE NOTES:

Patient received lying in bed, awake, alert, orally intubated ET size 7.0, lip at 20cm vent 
settings: AC 20, TV-500, FiO2 50%, PEEP-5, saturating 99%, crackles heard to lung upon 
auscultation, for bronchoscopy today. NPO, with left nare NG tube. Pabon intact and 
draining, yellow with sediments to whitish colored output. Right upper Arm Midline running 
Levophed at 6 mcg/min. Sinus Rhythm on the monitor rate 70. Will continue to monitor.

## 2018-11-27 NOTE — NUR
HAND-OFF: 

Report given to CAROLYN Hanks. Dr. Wade at facility, was updated on patient's status was A. 
fib RVR but converted to SR, still on levophed drip.

## 2018-11-27 NOTE — NUR
NURSE NOTES:

Bedside bronchoscopy done by Dr. Cardoso, RN and 2 RTs at bedside. Patient tolerated by 
patient. Planning for extubation in about 20 minutes as patient tolerates and remove NG tube 
once extubated per MD. Also ordered BiPAP PRN, Fio2 40%, place on venturi mask after 
extubation.

## 2018-11-27 NOTE — NEPHROLOGY PROGRESS NOTE
Assessment/Plan


Problem List:  


(1) ESRD (end stage renal disease)


(2) Hypotension


(3) Sepsis


(4) Hypoalbuminemia


Assessment





ESRD


Hypotensive


Septic shock


edematous


hypoalbuminemia


left lung white out post intubation


Plan





Fluid challenge


respiratary support


no candidate for dialysis now


pressors


antibiotics


bronch suction left lung





Subjective


ROS Limited/Unobtainable:  Yes





Objective


Objective





Last 24 Hour Vital Signs








  Date Time  Temp Pulse Resp B/P (MAP) Pulse Ox O2 Delivery O2 Flow Rate FiO2


 


11/27/18 09:00        45


 


11/27/18 08:42  131 20     50


 


11/27/18 07:00  75 20 124/39 (67) 100   


 


11/27/18 06:51  74 20     50


 


11/27/18 06:00  142 20 96/72 (80) 100   


 


11/27/18 05:04  145 20     50


 


11/27/18 05:00  146 20 106/78 (87) 100   


 


11/27/18 04:30  154 20 95/29 (51) 100   


 


11/27/18 04:18    110/83    


 


11/27/18 04:00        50


 


11/27/18 04:00  154      


 


11/27/18 04:00 99.2 154 22 110/83 (92) 100   


 


11/27/18 04:00      Mechanical Ventilator  





      Mechanical Ventilator  


 


11/27/18 03:30  158 21 90/43 (59) 100   


 


11/27/18 03:00  73 20 113/48 (69) 100   


 


11/27/18 03:00  75 20     50


 


11/27/18 02:30  69 20 98/38 (58) 100   


 


11/27/18 02:00  68 20 120/23 (55) 100   


 


11/27/18 01:46  68 20     50


 


11/27/18 01:30  68 20 126/26 (59) 100   


 


11/27/18 01:00  69 20 118/27 (57) 100   


 


11/27/18 00:30  69 20 100/33 (55) 100   


 


11/27/18 00:00      Mechanical Ventilator  





      Mechanical Ventilator  


 


11/27/18 00:00        50


 


11/27/18 00:00 97.2 67 20 98/44 (62) 100   


 


11/26/18 23:30  66 20 105/33 (57) 100   


 


11/26/18 23:04  65 21     50


 


11/26/18 23:00  66 18 92/61 (71) 100   


 


11/26/18 22:30  64 21 99/47 (64) 100   


 


11/26/18 22:00  64 20 112/26 (54) 100   


 


11/26/18 21:30  63 19 104/31 (55) 100   


 


11/26/18 21:00  63 16 98/41 (60) 100   


 


11/26/18 20:46  64 21     50


 


11/26/18 20:40        50


 


11/26/18 20:30  63 20 109/44 (65) 100   


 


11/26/18 20:25  62      


 


11/26/18 20:21  64 20     100


 


11/26/18 20:00  67 18 128/34 (65) 100   


 


11/26/18 20:00    99/20    


 


11/26/18 20:00        100


 


11/26/18 19:55      Mechanical Ventilator  





      Mechanical Ventilator  


 


11/26/18 19:45  82 20 99/20 (46) 99   


 


11/26/18 19:30  90 19 125/24 (57) 99   


 


11/26/18 19:15  99 20 124/46 (72) 99   


 


11/26/18 19:00 96.6 100 20 128/17 (54) 100   


 


11/26/18 17:01  69 20     100


 


11/26/18 16:00        100


 


11/26/18 16:00  100      


 


11/26/18 14:41  71 20     100


 


11/26/18 14:35 96.1 71 19 154/34 99 Non-Rebreather 10.0 98


 


11/26/18 14:21    81/21    


 


11/26/18 13:55      Mechanical Ventilator  





      Mechanical Ventilator  


 


11/26/18 12:55  65 20     100


 


11/26/18 11:15  71 19 154/34 99 Non-Rebreather 10.0 98

















Intake and Output  


 


 11/26/18 11/27/18





 19:00 07:00


 


Intake Total 127.5 ml 1285.0 ml


 


Output Total 160 ml 15 ml


 


Balance -32.5 ml 1270.0 ml


 


  


 


Intake Oral 0 ml 0 ml


 


IV Total 127.5 ml 1285.0 ml


 


Output Urine Total 160 ml 15 ml








Laboratory Tests


11/26/18 12:30: 


Arterial Blood pH 7.170*L, Arterial Blood Partial Pressure CO2 74.4*H, Arterial 

Blood Partial Pressure O2 66.9L, Arterial Blood HCO3 26.5H, Arterial Blood 

Oxygen Saturation 88.6*L, Arterial Blood Base Excess -2.9L, Arcadio Test Positive


11/26/18 18:00: 


Troponin I 0.025, Thyroid Stimulating Hormone (TSH) 2.721


11/26/18 20:38: 


Arterial Blood pH 7.549H, Arterial Blood Partial Pressure CO2 28.0L, Arterial 

Blood Partial Pressure O2 395.6H, Arterial Blood HCO3 23.9, Arterial Blood 

Oxygen Saturation 99.9, Arterial Blood Base Excess 2.1H, Arcadio Test Positive


11/27/18 05:50: 


White Blood Count 20.0#H, Red Blood Count 3.20L, Hemoglobin 9.0L, Hematocrit 

30.2L, Mean Corpuscular Volume 95, Mean Corpuscular Hemoglobin 28.3, Mean 

Corpuscular Hemoglobin Concent 29.9L, Red Cell Distribution Width 20.9H, 

Platelet Count 420, Mean Platelet Volume 6.1L, Neutrophils (%) (Auto) , 

Lymphocytes (%) (Auto) , Monocytes (%) (Auto) , Eosinophils (%) (Auto) , 

Basophils (%) (Auto) , Neutrophils % (Manual) [Pending], Lymphocytes % (Manual) 

[Pending], Platelet Estimate [Pending], Platelet Morphology [Pending], Sodium 

Level 139, Potassium Level 5.1, Chloride Level 102, Carbon Dioxide Level 25, 

Anion Gap 12, Blood Urea Nitrogen 47H, Creatinine 3.5H, Estimat Glomerular 

Filtration Rate , Glucose Level 115H, Lactic Acid Level 2.80H, Uric Acid 5.7, 

Calcium Level 8.4L, Phosphorus Level 3.3, Magnesium Level 2.1, Total Bilirubin 

0.5, Gamma Glutamyl Transpeptidase 8, Aspartate Amino Transf (AST/SGOT) 15, 

Alanine Aminotransferase (ALT/SGPT) 7L, Alkaline Phosphatase 75, Ammonia 11, C-

Reactive Protein, Quantitative 10.8H, Pro-B-Type Natriuretic Peptide > 61130C, 

Total Protein 7.0, Albumin 1.5L, Globulin 5.5, Albumin/Globulin Ratio 0.3L


11/27/18 08:02: Lactic Acid Level 1.90


Height (Feet):  5


Height (Inches):  2.00


Weight (Pounds):  132


General Appearance:  mild distress


EENT:  other - intubated having bronch


Cardiovascular:  tachycardia, arrhythmia


Respiratory/Chest:  decreased breath sounds


Abdomen:  distended











Murphy Templeton MD Nov 27, 2018 10:30

## 2018-11-27 NOTE — NUR
RD ASSESSMENT & RECOMMENDATIONS

SEE CARE ACTIVITY FOR COMPLETE ASSESSMENT



DAILY ESTIMATED NEEDS:

Needs based on Critical care+ Wounds + ESRD (HD held at this time)/ 60kg 

22-30  kcals/kg 

5191-4843  total kcals

(without HD: 1g/kg) (with HD: 1.5-2.0g/kg)  g protein/kg

(without HD: 60g) (with HD: 90-120g)  g total protein 





NUTRITION DIAGNOSIS:

* Swallowing difficulty R/T respiratory status as evidenced by pt orally

intubated, NPO at this time.

* Increased kcal/prot needs R/T wound healing as evidenced by pt admitted

w/ multiple wounds including advanced sacral wound, pending evaluation.

* Altered nutrition related lab values R/T ESRD dx, clinical condition as

evidenced by elev creat (3.5), elev BNP >98655, low BP, on pressor.



CURRENT DIET:  NPO 





PO DIET RECOMMENDATIONS:

SLP evaluation post extubation, prior to PO diet.  



ENTERAL NUTRITION RECOMMENDATIONS:

Nepro @ 40ml/hr x 24 hrs + Prosource 1pkt TID  to provide 960ml, 1728kcal, 111g prot, 698ml 
free water 



** WITH HEMODYNAMIC STABILITY **

    - (w/ HD) 1. Initiate Nepro @ 10ml/hr x 6 hrs, advance 10mlq 4-6 hrs as tolerated to 
goal rate of 40ml/hr x 24 hrs

                  2. Add Prosource 1 pkt TID.

    - (without HD) Rec goal rate of Nepro @ 35ml/hr x 24 hrs to provide 840ml, 1512kcal, 68g 
prot, 611ml free water



*****WITHOUT HEMODYNAMIC STABILITY, REC TROPHIC FEEDS OF NEPRO @ 10ML/HR X 24 HRS*****







ADDITIONAL RECOMMENDATIONS:

* Calibrated bedscale wt for accurate CBW 

* Monitor HD stability for ability to feed, resume HD 

* Wound healing- w/ GI access rec to give Nephrovite x 1, Scotty 1pkt BID 

          -f/up with wound evaluation.  

* Monitor Renal fxn and lytes- HD held at this time

## 2018-11-27 NOTE — NUR
NURSE NOTES:

Recvd.on a Vent.Awake,Alert orally intubated,see settings.S/P BRONCHO.by  
Sat-%.Suctioned beige tk. sec.NS Lavaged.See V/S.LEVO.drip in progress TIT.to 
BPS>90.Scope SR had episodes of AT-Fib with RVR. aware.See order for 
amiodarone.Pos. chg.Maintain on Restraints prev.self-injury.AVF (L) upper arm pos.thrill and 
Bruit.Tony.for poss.HD in am.[facility] aware

## 2018-11-27 NOTE — DIAGNOSTIC IMAGING REPORT
Indication: Post intubation

 

Technique: One view of the chest

 

Comparison: 11/26/2018

 

Findings: There is an endotracheal tube again demonstrated, tip in good position

approximately 4 cm above the noah. Stable satisfactory position of nasogastric

tube. There is slightly improved aeration of the left lung. Large left pleural

effusion nonetheless persists. Moderate to large right pleural effusion is again

demonstrated, may be slightly improved. Diffuse interstitial congestion is unchanged.

The heart remains borderline enlarged. Right arm midline again demonstrated

 

Impression: Satisfactory position of endotracheal tube

 

Somewhat improved aeration of the left lung. This may be due to decreased pleural

fluid, versus improved atelectasis, or both. Large left pleural effusion nonetheless

persists

 

Apparent decreased but persistent moderate to large right pleural effusion

 

Persistent interstitial congestion

 

Other stable tube and line positions as described.

## 2018-11-27 NOTE — NUR
NURSE NOTES:

Patient extubated per Dr. Cardoso's order after done with bronchoscopy procedure earlier. 
Patient extubated by Ruthann RT, VS were: 142/61, RR-19, Oxygen saturation of 99% on venturi 
mask at 40%, heart rate 130-140s. Head of bed elevated. Will continue to monitor.

## 2018-11-27 NOTE — NUR
NURSE NOTES:

Left a message to Dr. Pate in regards to patient unable to obtain another peripheral IV 
access for other medications, was unable to administer albumin ordered by Dr. Templeton and 
unable to give zosyn for patient scheduled at 1400 and son of patient at bedside wanting to 
speak with him. Will await call back.

## 2018-11-27 NOTE — EMERGENCY ROOM REPORT
Physical Exam


Called to intubate patient.  The patient had a bronchoscopy in the morning.  

She was extubated.  [facility] patient she was fully alert.  She had deteriorating 

mental status after that.  Respiratory therapy tried BiPAP just prior to my 

being called to intubate the patient.  She failed BiPAP and was extremely 

lethargic.


Last 24 Hour Vital Signs








  Date Time  Temp Pulse Resp B/P (MAP) Pulse Ox O2 Delivery O2 Flow Rate FiO2


 


11/27/18 15:17  74 20     50


 


11/27/18 14:36    154/54    


 


11/27/18 14:00    132/51    


 


11/27/18 13:57        50


 


11/27/18 13:40        50


 


11/27/18 13:25  118 20 114/51 (72) 100   


 


11/27/18 13:20  118 20 117/54 (75) 100   


 


11/27/18 13:15  121 20 87/56 (66) 94   


 


11/27/18 13:10  119 20 62/34 (43) 94   


 


11/27/18 13:05  124 20 59/42 (48) 94   


 


11/27/18 13:04  123 20     100


 


11/27/18 13:00    55/40    


 


11/27/18 13:00  123 22 55/40 (45) 94   


 


11/27/18 12:55  142 8  71 Facial  100


 


11/27/18 12:47        100


 


11/27/18 12:47      Mechanical Ventilator  





      Mechanical Ventilator  





      Mechanical Ventilator  


 


11/27/18 12:30  122 22 103/32 (55) 93   


 


11/27/18 12:00    138/42    


 


11/27/18 12:00 98.9 120 22 138/42 (74) 82   


 


11/27/18 11:30  128 22 139/42 (74) 91   


 


11/27/18 11:13  140 18   Venturi Mask 12.0 40


 


11/27/18 11:13      Venturi Mask 12.0 40


 


11/27/18 11:11      Venturi Mask 12.0 40


 


11/27/18 11:00  136 22 135/53 (80) 91   


 


11/27/18 11:00    135/53    


 


11/27/18 10:30  143 22 135/62 (86) 91   


 


11/27/18 10:00  142 22 121/66 (84) 95   


 


11/27/18 10:00    121/66    


 


11/27/18 09:30  127 20 101/45 (63) 100   


 


11/27/18 09:00  130 20 106/57 (73) 100   


 


11/27/18 09:00    106/57    


 


11/27/18 09:00        45


 


11/27/18 08:42  131 20     50


 


11/27/18 08:30 99.1 129 20 97/58 (71) 100   


 


11/27/18 08:00      Mechanical Ventilator  





      Mechanical Ventilator  





      Mechanical Ventilator  


 


11/27/18 08:00  127 20 85/13 (37) 100   


 


11/27/18 08:00    124/26    


 


11/27/18 07:30  75 20 124/26 (58) 100   


 


11/27/18 07:00    124/39    


 


11/27/18 07:00  75 20 124/39 (67) 100   


 


11/27/18 06:51  74 20     50


 


11/27/18 06:00  142 20 96/72 (80) 100   


 


11/27/18 05:04  145 20     50


 


11/27/18 05:00  146 20 106/78 (87) 100   


 


11/27/18 04:30  154 20 95/29 (51) 100   


 


11/27/18 04:18    110/83    


 


11/27/18 04:00        50


 


11/27/18 04:00  154      


 


11/27/18 04:00 99.2 154 22 110/83 (92) 100   


 


11/27/18 04:00      Mechanical Ventilator  





      Mechanical Ventilator  


 


11/27/18 03:30  158 21 90/43 (59) 100   


 


11/27/18 03:00  73 20 113/48 (69) 100   


 


11/27/18 03:00  75 20     50


 


11/27/18 02:30  69 20 98/38 (58) 100   


 


11/27/18 02:00  68 20 120/23 (55) 100   


 


11/27/18 01:46  68 20     50


 


11/27/18 01:30  68 20 126/26 (59) 100   


 


11/27/18 01:00  69 20 118/27 (57) 100   


 


11/27/18 00:30  69 20 100/33 (55) 100   


 


11/27/18 00:00      Mechanical Ventilator  





      Mechanical Ventilator  


 


11/27/18 00:00        50


 


11/27/18 00:00 97.2 67 20 98/44 (62) 100   


 


11/26/18 23:30  66 20 105/33 (57) 100   


 


11/26/18 23:04  65 21     50


 


11/26/18 23:00  66 18 92/61 (71) 100   


 


11/26/18 22:30  64 21 99/47 (64) 100   


 


11/26/18 22:00  64 20 112/26 (54) 100   


 


11/26/18 21:30  63 19 104/31 (55) 100   


 


11/26/18 21:00  63 16 98/41 (60) 100   


 


11/26/18 20:46  64 21     50


 


11/26/18 20:40        50


 


11/26/18 20:30  63 20 109/44 (65) 100   


 


11/26/18 20:25  62      


 


11/26/18 20:21  64 20     100


 


11/26/18 20:00  67 18 128/34 (65) 100   


 


11/26/18 20:00    99/20    


 


11/26/18 20:00        100


 


11/26/18 19:55      Mechanical Ventilator  





      Mechanical Ventilator  


 


11/26/18 19:45  82 20 99/20 (46) 99   


 


11/26/18 19:30  90 19 125/24 (57) 99   


 


11/26/18 19:15  99 20 124/46 (72) 99   


 


11/26/18 19:00 96.6 100 20 128/17 (54) 100   


 


11/26/18 17:01  69 20     100








Sp02 EP Interpretation:  reviewed, abnormal - interpreted as low by me


General Appearance:  Stupor


Eyes:  bilateral eye normal inspection, bilateral eye PERRL


ENT:  moist mucus membranes


Neck:  supple


Respiratory:  decreased breath sounds, other - poor tidal volume - assisted by 

RT


Cardiovascular #1:  normal peripheral pulses, regular rate, rhythm


Gastrointestinal:  normal inspection


Musculoskeletal:  other - flaccid


Neurologic:  other - stupor


Psychiatric:  other - stupor


Skin:  cyanosis


Intubation


Intubation :  


   Consent:  Emergent


   Intubation Method:  orotracheal


   Tube Size (cm):  7.5


   Medications:  Other - none


   Breath Sounds after Intubation:  equal


   Intubation Complications:  no complications


   Post Intubation Xray:  Yes


   Attempts:  One


   Patient Tolerated:  Well


   Complications:  None





Medical Decision Making


Diagnostic Impression:  


 Primary Impression:  


 Respiratory failure


 Qualified Codes:  J96.01 - Acute respiratory failure with hypoxia; J96.02 - 

Acute respiratory failure with hypercapnia


 Additional Impressions:  


 Altered mental status


 Pyelonephritis


 Pneumonia


ER Course


Patient recently extubated with poor tidal volume and stupor.


Intubated.


Improved oxygenation prior and post intubation.


CXR with appropriate tube placement.


Chest X-Ray Diagnostic Results


Chest X-Ray Diagnostic Results :  


   Chest X-Ray Ordered:  Yes


   # of Views/Limited/Complete:  1 View


   EP Interpretation:  Yes


   Interpretation:  no effusion, no pneumothorax, other - ET good placement and 

bilateral infiltrates





Last Vital Signs








  Date Time  Temp Pulse Resp B/P (MAP) Pulse Ox O2 Delivery O2 Flow Rate FiO2


 


11/27/18 15:17  74 20     50


 


11/27/18 14:36    154/54    


 


11/27/18 13:25     100   


 


11/27/18 12:55      Facial  


 


11/27/18 12:00 98.9       


 


11/27/18 11:13       12.0 








Status:  improved


Disposition:  ADMITTED AS INPATIENT


Condition:  Critical


Referrals:  


NON PHYSICIAN (PCP)











Simon Hill MD Nov 27, 2018 16:12

## 2018-11-27 NOTE — NUR
NURSE NOTES:

HS care rendered.Pos to comfort.Due med admin.FSBS-128,covered.Suctioned.See V/S.Levo drip 
taper down to 6mcg/min.

-------------------------------------------------------------------------------

Addendum: 11/27/18 at 2258 by KIRK MCGOWAN RN

-------------------------------------------------------------------------------

error in FSBS.No accucheck.

## 2018-11-27 NOTE — OPERATIVE NOTE - PDOC
Operative Note


Operative Note


Date of Operation/Procedure:  Nov 27, 2018


Chief Complaint:  left lung collapse


Pre-op Diagnosis:


left lung collapse


Procedure:


bronchoscopy and lavage


Post-op Diagnosis:


mucous plug removed from left side


Post-op Diagnosis:  same as pre-op


Surgeon:  Walker


Anesthesia:  local, other - none


Specimen:  none


Complications:  none


Condition:  unstable


Estimated Blood Loss:  minimal


Drains:  none


Implant(s) used?:  No


Description of Procedure


after informed consent from son, a bronchoscope was introduced using existing 

endotracheal tube down to level of noah  Inspection followed by lavage of 

both major bronchi.  Mucous plugs were removed.  procedure was completed 

without complications











Quentin Cardoso MD Nov 27, 2018 14:08

## 2018-11-27 NOTE — GENERAL SURGERY PROGRESS NOTE
General Surgery-Progress Note


Subjective


Procedure Performed


left subclavian central venous catheter insertion


Additional Comments


central line removed after noted to have malpositioned.  unable to rewire. 


leukocytosis


labs noted


on pressors intermittently 


extubated this AM





Objective





Last 24 Hour Vital Signs








  Date Time  Temp Pulse Resp B/P (MAP) Pulse Ox O2 Delivery O2 Flow Rate FiO2


 


11/27/18 15:17  74 20     50


 


11/27/18 14:36    154/54    


 


11/27/18 14:00    132/51    


 


11/27/18 13:57        50


 


11/27/18 13:40        50


 


11/27/18 13:25  118 20 114/51 (72) 100   


 


11/27/18 13:20  118 20 117/54 (75) 100   


 


11/27/18 13:15  121 20 87/56 (66) 94   


 


11/27/18 13:10  119 20 62/34 (43) 94   


 


11/27/18 13:05  124 20 59/42 (48) 94   


 


11/27/18 13:04  123 20     100


 


11/27/18 13:00    55/40    


 


11/27/18 13:00  123 22 55/40 (45) 94   


 


11/27/18 12:55  142 8  71 Facial  100


 


11/27/18 12:47        100


 


11/27/18 12:47      Mechanical Ventilator  





      Mechanical Ventilator  





      Mechanical Ventilator  


 


11/27/18 12:30  122 22 103/32 (55) 93   


 


11/27/18 12:00    138/42    


 


11/27/18 12:00 98.9 120 22 138/42 (74) 82   


 


11/27/18 11:30  128 22 139/42 (74) 91   


 


11/27/18 11:13  140 18   Venturi Mask 12.0 40


 


11/27/18 11:13      Venturi Mask 12.0 40


 


11/27/18 11:11      Venturi Mask 12.0 40


 


11/27/18 11:00  136 22 135/53 (80) 91   


 


11/27/18 11:00    135/53    


 


11/27/18 10:30  143 22 135/62 (86) 91   


 


11/27/18 10:00  142 22 121/66 (84) 95   


 


11/27/18 10:00    121/66    


 


11/27/18 09:30  127 20 101/45 (63) 100   


 


11/27/18 09:00  130 20 106/57 (73) 100   


 


11/27/18 09:00    106/57    


 


11/27/18 09:00        45


 


11/27/18 08:42  131 20     50


 


11/27/18 08:30 99.1 129 20 97/58 (71) 100   


 


11/27/18 08:00      Mechanical Ventilator  





      Mechanical Ventilator  





      Mechanical Ventilator  


 


11/27/18 08:00  127 20 85/13 (37) 100   


 


11/27/18 08:00    124/26    


 


11/27/18 07:30  75 20 124/26 (58) 100   


 


11/27/18 07:00    124/39    


 


11/27/18 07:00  75 20 124/39 (67) 100   


 


11/27/18 06:51  74 20     50


 


11/27/18 06:00  142 20 96/72 (80) 100   


 


11/27/18 05:04  145 20     50


 


11/27/18 05:00  146 20 106/78 (87) 100   


 


11/27/18 04:30  154 20 95/29 (51) 100   


 


11/27/18 04:18    110/83    


 


11/27/18 04:00        50


 


11/27/18 04:00  154      


 


11/27/18 04:00 99.2 154 22 110/83 (92) 100   


 


11/27/18 04:00      Mechanical Ventilator  





      Mechanical Ventilator  


 


11/27/18 03:30  158 21 90/43 (59) 100   


 


11/27/18 03:00  73 20 113/48 (69) 100   


 


11/27/18 03:00  75 20     50


 


11/27/18 02:30  69 20 98/38 (58) 100   


 


11/27/18 02:00  68 20 120/23 (55) 100   


 


11/27/18 01:46  68 20     50


 


11/27/18 01:30  68 20 126/26 (59) 100   


 


11/27/18 01:00  69 20 118/27 (57) 100   


 


11/27/18 00:30  69 20 100/33 (55) 100   


 


11/27/18 00:00      Mechanical Ventilator  





      Mechanical Ventilator  


 


11/27/18 00:00        50


 


11/27/18 00:00 97.2 67 20 98/44 (62) 100   


 


11/26/18 23:30  66 20 105/33 (57) 100   


 


11/26/18 23:04  65 21     50


 


11/26/18 23:00  66 18 92/61 (71) 100   


 


11/26/18 22:30  64 21 99/47 (64) 100   


 


11/26/18 22:00  64 20 112/26 (54) 100   


 


11/26/18 21:30  63 19 104/31 (55) 100   


 


11/26/18 21:00  63 16 98/41 (60) 100   


 


11/26/18 20:46  64 21     50


 


11/26/18 20:40        50


 


11/26/18 20:30  63 20 109/44 (65) 100   


 


11/26/18 20:25  62      


 


11/26/18 20:21  64 20     100


 


11/26/18 20:00  67 18 128/34 (65) 100   


 


11/26/18 20:00    99/20    


 


11/26/18 20:00        100


 


11/26/18 19:55      Mechanical Ventilator  





      Mechanical Ventilator  


 


11/26/18 19:45  82 20 99/20 (46) 99   


 


11/26/18 19:30  90 19 125/24 (57) 99   


 


11/26/18 19:15  99 20 124/46 (72) 99   


 


11/26/18 19:00 96.6 100 20 128/17 (54) 100   


 


11/26/18 17:01  69 20     100


 


11/26/18 16:00        100


 


11/26/18 16:00  100      








I&O











Intake and Output  


 


 11/26/18 11/27/18





 19:00 07:00


 


Intake Total 127.5 ml 1307.5 ml


 


Output Total 160 ml 15 ml


 


Balance -32.5 ml 1292.5 ml


 


  


 


Intake Oral 0 ml 0 ml


 


IV Total 127.5 ml 1307.5 ml


 


Output Urine Total 160 ml 15 ml








Dressing:  dry


Wound:  clean


Drains:  other


Cardiovascular:  RSR


Respiratory:  decreased breath sounds


Abdomen:  soft, flat, non-tender, present bowel sounds


Extremities:  no cyanosis





Laboratory Tests








Test


  11/26/18


18:00 11/26/18


20:38 11/27/18


05:50 11/27/18


08:02


 


Troponin I


  0.025 ng/mL


(0.000-0.056) 


  


  


 


 


Thyroid Stimulating Hormone


(TSH) 2.721 uiU/mL


(0.358-3.740) 


  


  


 


 


Arterial Blood pH


  


  7.549


(7.350-7.450) 


  


 


 


Arterial Blood Partial


Pressure CO2 


  28.0 mmHg


(35.0-45.0)  L 


  


 


 


Arterial Blood Partial


Pressure O2 


  395.6 mmHg


(75.0-100.0)  H 


  


 


 


Arterial Blood HCO3


  


  23.9 mmol/L


(22.0-26.0) 


  


 


 


Arterial Blood Oxygen


Saturation 


  99.9 %


() 


  


 


 


Arterial Blood Base Excess  2.1 (-2-2)  H  


 


Arcadio Test  Positive    


 


White Blood Count


  


  


  20.0 K/UL


(4.8-10.8)  #H 


 


 


Red Blood Count


  


  


  3.20 M/UL


(4.20-5.40)  L 


 


 


Hemoglobin


  


  


  9.0 G/DL


(12.0-16.0)  L 


 


 


Hematocrit


  


  


  30.2 %


(37.0-47.0)  L 


 


 


Mean Corpuscular Volume   95 FL (80-99)   


 


Mean Corpuscular Hemoglobin


  


  


  28.3 PG


(27.0-31.0) 


 


 


Mean Corpuscular Hemoglobin


Concent 


  


  29.9 G/DL


(32.0-36.0)  L 


 


 


Red Cell Distribution Width


  


  


  20.9 %


(11.6-14.8)  H 


 


 


Platelet Count


  


  


  420 K/UL


(150-450) 


 


 


Mean Platelet Volume


  


  


  6.1 FL


(6.5-10.1)  L 


 


 


Neutrophils (%) (Auto)


  


  


  % (45.0-75.0)


  


 


 


Lymphocytes (%) (Auto)


  


  


  % (20.0-45.0)


  


 


 


Monocytes (%) (Auto)    % (1.0-10.0)   


 


Eosinophils (%) (Auto)    % (0.0-3.0)   


 


Basophils (%) (Auto)    % (0.0-2.0)   


 


Differential Total Cells


Counted 


  


  100  


  


 


 


Neutrophils % (Manual)   89 % (45-75)  H 


 


Lymphocytes % (Manual)   7 % (20-45)  L 


 


Monocytes % (Manual)   3 % (1-10)   


 


Eosinophils % (Manual)   0 % (0-3)   


 


Basophils % (Manual)   1 % (0-2)   


 


Band Neutrophils   0 % (0-8)   


 


Nucleated Red Blood Cells   1 /100 WBC   


 


Platelet Estimate   Adequate   


 


Platelet Morphology   Normal   


 


Hypochromasia   2+   


 


Anisocytosis   2+   


 


Sodium Level


  


  


  139 MMOL/L


(136-145) 


 


 


Potassium Level


  


  


  5.1 MMOL/L


(3.5-5.1) 


 


 


Chloride Level


  


  


  102 MMOL/L


() 


 


 


Carbon Dioxide Level


  


  


  25 MMOL/L


(21-32) 


 


 


Anion Gap


  


  


  12 mmol/L


(5-15) 


 


 


Blood Urea Nitrogen


  


  


  47 mg/dL


(7-18)  H 


 


 


Creatinine


  


  


  3.5 MG/DL


(0.55-1.30)  H 


 


 


Estimat Glomerular Filtration


Rate 


  


   mL/min (>60)  


  


 


 


Glucose Level


  


  


  115 MG/DL


()  H 


 


 


Lactic Acid Level


  


  


  2.80 mmol/L


(0.4-2.0)  H 1.90 mmol/L


(0.66-2.22)


 


Uric Acid


  


  


  5.7 MG/DL


(2.6-7.2) 


 


 


Calcium Level


  


  


  8.4 MG/DL


(8.5-10.1)  L 


 


 


Phosphorus Level


  


  


  3.3 MG/DL


(2.5-4.9) 


 


 


Magnesium Level


  


  


  2.1 MG/DL


(1.8-2.4) 


 


 


Total Bilirubin


  


  


  0.5 MG/DL


(0.2-1.0) 


 


 


Gamma Glutamyl Transpeptidase   8 U/L (5-85)   


 


Aspartate Amino Transf


(AST/SGOT) 


  


  15 U/L (15-37)


  


 


 


Alanine Aminotransferase


(ALT/SGPT) 


  


  7 U/L (12-78)


L 


 


 


Alkaline Phosphatase


  


  


  75 U/L


() 


 


 


Ammonia


  


  


  11 umol/L


(11-32) 


 


 


C-Reactive Protein,


Quantitative 


  


  11.3 mg/dL


(0.00-0.90)  H 


 


 


Pro-B-Type Natriuretic Peptide


  


  


  > 60804 pg/mL


(0-125)  H 


 


 


Total Protein


  


  


  7.0 G/DL


(6.4-8.2) 


 


 


Albumin


  


  


  1.5 G/DL


(3.4-5.0)  L 


 


 


Globulin   5.5 g/dL   


 


Albumin/Globulin Ratio


  


  


  0.3 (1.0-2.7)


L 


 


 


Test


  11/27/18


13:55 


  


  


 


 


Arterial Blood pH


  7.467


(7.350-7.450) 


  


  


 


 


Arterial Blood Partial


Pressure CO2 33.5 mmHg


(35.0-45.0)  L 


  


  


 


 


Arterial Blood Partial


Pressure O2 274.6 mmHg


(75.0-100.0)  H 


  


  


 


 


Arterial Blood HCO3


  23.7 mmol/L


(22.0-26.0) 


  


  


 


 


Arterial Blood Oxygen


Saturation 99.8 %


() 


  


  


 


 


Arterial Blood Base Excess 0.2 (-2-2)     


 


Arcadio Test N/a     











Plan


Problems:  


(1) Sepsis


Assessment & Plan:  acute decompensation 


hypotensive


bradycardic


respiratory decompensation requiring intubation and vent support - since 

extubated and doing better


midline being used and safe for now


may need another line later if peripheral not possible after resuscitation


once more stable can consider diet


wean pressors


thank you


will follow with recs 





(2) Hypotension


(3) Bradycardia


(4) Altered mental status











Bob Lewis Nov 27, 2018 15:52

## 2018-11-27 NOTE — CARDIOLOGY REPORT
--------------- APPROVED REPORT --------------





EXAM: Two-dimensional and M-mode echocardiogram with Doppler and color 

Doppler.



INDICATION

LV function



M-Mode DIMENSIONS 

IVSd1.3 (0.7-1.1cm)Left Atrium (MM)5.4 (1.6-4.0cm)

LVDd4.1 (3.5-5.6cm)Aortic Root3.0 (2.0-3.7cm)

PWd1.3 (0.7-1.1cm)Aortic Cusp Exc.1.7 (1.5-2.0cm)



LVDs2.4 (2.5-4.0cm)

PWs1.9 cm





Technically difficult study due to poor acoustical windows.

Normal left ventricular chamber size, systolic function and wall motion to extent 

visualized.

Left ventricular ejection fraction estimated to be  65-70 %.

Mild left ventricular hypertrophy.

No evidence of pericardial effusion. 

Severe left atrial enlargement.

Mild right atrial enlargement.

Right ventricular chamber size is within normal limits.

Moderate focal aortic valve sclerosis with adequate cusp excursion.

Moderately thickened mitral valve leaflets with mildly reduced excursion. Echogenic 

material note on mitral valve leaflets may be calcification.

Heavy mitral annulus and aortic root calcification.

Pulmonic valve not well visualized.

Normal tricuspid valve structure. 

IVC at normal size with physiologic collapse.



A  color flow and spectral Doppler study was performed and revealed:

Trace aortic regurgitation.

Moderate mitral regurgitation.

Peak mitral valve pressure gradient of  17 mmHg and a mean gradient of  7 mmHg

Can not determine left ventricular diastolic function based on mitral diastolic 

velocities due to atrial fibrillation.

Moderate tricuspid regurgitation.

Tricuspid  systolic velocities suggests peak right ventricular systolic pressure of  77  

mmHg, consistent with severe pulmonary hypertension.

Pulmonic regurgitation present.

## 2018-11-27 NOTE — NUR
NURSE NOTES:

Patient re-intubated at bedside by ER Doctor Dr. Hill. According to charge nurse patient 
was desaturating in the 70s while on the venti mask, place on the BIPAP still to no avail, 
oxygen saturation was low in the mid 80s, patient diaphoretic and increased work of 
breathing. Inserted ET 7.5, lipline at 23, AC- 20, TV-500, FiO2 100%, PEEP-5. CXR ordered 
and ABG to be drawn. Will continue to monitor the patient.

## 2018-11-27 NOTE — NUR
Received Patient on vent settings of ACVC RR 20, , FIO2 45%, PEEP +5. Patient is 
currently in no distress. Intubated with a 7.0 ETT at 20cm at the lip. Suctioned small 
amount of thin white secretions. Breath sounds reveal diminished rhonchi bilaterally. HR 
high 70s saturating at 100%. Vent plugged into red outlet. Alarms are on and audible. Will 
continue to monitor throughout the day.

## 2018-11-27 NOTE — NUR
NURSE NOTES:

Nursing Director CAROLYN Daily also tried multiple IV attempts x 3, failed. Miyeon RN also 
tried also to insert IV x2, but to no avail.

## 2018-11-27 NOTE — NUR
Patient extubated per Dr. Larose orders at 1100. Placed patient on Venti mask of 40% FIO2 
12 LPM. After about an hour an a half I got a call from the ICU saying to place PT on BIPAP 
because increased WOB. Placed patient on BIPAP 12/6 Fio2 100% for about 2 minutes. Charge 
Nurse called ER doctor Liz to come upstairs and reintubate Patient. Reintubated Patient 
with a 7.5 ETT at 23 at the lip. Vent settings ACVC RR 20, , FIO2 100%, PEEP +5.

## 2018-11-27 NOTE — NUR
CASE MANAGEMENT: REVIEW





SI: A-FIB . SEPSIS . ESRD ON HD 

T 98.9 HR 72 RR 20 BP 85/13  % MECH VENT FIO2 50

WBC 20.0 H/H 9.0/30.2 BUN 47 CR 3.5 LACTIC ACID 2.80 







IS: AMIODARONE PO Q12HR

LEVOPHED GTT

DOPAMINE GTT 





***ICU STATUS***

DCP: PATIENT IS FROM [facility]

## 2018-11-27 NOTE — NUR
NURSE NOTES:

Left a message to Dr. Cardoso regarding patient's white count of 20.0 from 13.3, will await 
call back from MD.

## 2018-11-27 NOTE — NUR
NURSE NOTES:

Attempted to insert peripheral IV line to infuse Albumin IV since there is no compatibility 
to run medication with levophed per Jhon Pharmacist. Attempted x2 and Trinidad LEON attempted 
also x2 but to no avail.

## 2018-11-27 NOTE — DIAGNOSTIC IMAGING REPORT
Indication: Abdominal pain

 

Technique: Gray-scale and duplex images of the upper abdomen were obtained

 

Comparison: none

 

Findings: Exam is limited due to patient body habitus   Gallbladder is surgically

absent. Common bile duct measures 5 mm in diameter.  No intrahepatic biliary ductal

dilatation.  Liver demonstrates normal echogenicity, no focal abnormality.   Portal

vein and hepatic veins are patent.   Pancreas is incompletely visualized due to

overlying bowel gas, visualized portions are unremarkable.    Spleen is unremarkable.

 Left kidney cannot be visualized. Right kidney measures 10.3 cm length. Right kidney

demonstrates increased echogenicity  There is no hydronephrosis.   Multiple cysts are

seen in the right kidney . Non-aneurysmal abdominal aorta .  There is a right large

pleural effusion incidentally noted

 

Impression: Surgically absent gallbladder. Negative for dilated ducts

 

Limited exam, as described, with nonvisualization of the left kidney, incomplete

visualization of the pancreas

 

Increased right renal echogenicity, consistent with medical renal disease

 

Large right pleural effusion

 

Incidental finding right renal cysts

## 2018-11-27 NOTE — PULMONOLOGY PROGRESS NOTE
Assessment/Plan


Assessment/Plan


IMPRESSION:


1. Hypotension.


2. Sepsis.


3. Atrial fibrillation.


4. Bradycardia.


5. Diabetes mellitus.


6. End-stage renal disease on dialysis.


7. Left lung collapse.


8. Respiratory failure.


9. Dysphagia.


10. Dementia.





DISCUSSION:


1. Continue medications.


2. as needed Levophed.


3. She will need a bronchoscopy.  I discussed with the son who agrees


and consents.


4. She needs central line versus PICC line


5. Use broad-spectrum antibiotics.  I concur with the use of Protonix,


vancomycin, Zosyn, IV fluids, albumin.  


6. I will attempt to wean and extubate today.














  ______________________________________________


  Quentin Cardoso M.D.





Subjective


Interval Events:  awake and responsive, xray chest reviewed


Constitutional:  Reports: no symptoms


HEENT:  Repors: no symptoms


Respiratory:  Reports: no symptoms


Cardiovascular:  Reports: no symptoms


Gastrointestinal/Abdominal:  Reports: no symptoms


Genitourinary:  Reports: no symptoms


Neurologic:  Reports: no symptoms


Allergies:  


Coded Allergies:  


     No Known Allergies (Unverified , 11/26/18)





Objective





Last 24 Hour Vital Signs








  Date Time  Temp Pulse Resp B/P (MAP) Pulse Ox O2 Delivery O2 Flow Rate FiO2


 


11/27/18 13:57        50


 


11/27/18 13:04  123 20     100


 


11/27/18 12:55  142 8  71 Facial  100


 


11/27/18 12:47      Mechanical Ventilator  





      Mechanical Ventilator  





      Mechanical Ventilator  


 


11/27/18 11:13  140 18   Venturi Mask 12.0 40


 


11/27/18 11:13      Venturi Mask 12.0 40


 


11/27/18 11:11      Venturi Mask 12.0 40


 


11/27/18 10:00  142 22 121/66 (84) 95   


 


11/27/18 09:30  127 20 101/45 (63) 100   


 


11/27/18 09:00  130 20 106/57 (73) 100   


 


11/27/18 09:00        45


 


11/27/18 08:42  131 20     50


 


11/27/18 08:30 99.1 129 20 97/58 (71) 100   


 


11/27/18 08:00      Mechanical Ventilator  





      Mechanical Ventilator  





      Mechanical Ventilator  


 


11/27/18 08:00  127 20 85/13 (37) 100   


 


11/27/18 07:30  75 20 124/26 (58) 100   


 


11/27/18 07:00  75 20 124/39 (67) 100   


 


11/27/18 06:51  74 20     50


 


11/27/18 06:00  142 20 96/72 (80) 100   


 


11/27/18 05:04  145 20     50


 


11/27/18 05:00  146 20 106/78 (87) 100   


 


11/27/18 04:30  154 20 95/29 (51) 100   


 


11/27/18 04:18    110/83    


 


11/27/18 04:00        50


 


11/27/18 04:00  154      


 


11/27/18 04:00 99.2 154 22 110/83 (92) 100   


 


11/27/18 04:00      Mechanical Ventilator  





      Mechanical Ventilator  


 


11/27/18 03:30  158 21 90/43 (59) 100   


 


11/27/18 03:00  73 20 113/48 (69) 100   


 


11/27/18 03:00  75 20     50


 


11/27/18 02:30  69 20 98/38 (58) 100   


 


11/27/18 02:00  68 20 120/23 (55) 100   


 


11/27/18 01:46  68 20     50


 


11/27/18 01:30  68 20 126/26 (59) 100   


 


11/27/18 01:00  69 20 118/27 (57) 100   


 


11/27/18 00:30  69 20 100/33 (55) 100   


 


11/27/18 00:00      Mechanical Ventilator  





      Mechanical Ventilator  


 


11/27/18 00:00        50


 


11/27/18 00:00 97.2 67 20 98/44 (62) 100   


 


11/26/18 23:30  66 20 105/33 (57) 100   


 


11/26/18 23:04  65 21     50


 


11/26/18 23:00  66 18 92/61 (71) 100   


 


11/26/18 22:30  64 21 99/47 (64) 100   


 


11/26/18 22:00  64 20 112/26 (54) 100   


 


11/26/18 21:30  63 19 104/31 (55) 100   


 


11/26/18 21:00  63 16 98/41 (60) 100   


 


11/26/18 20:46  64 21     50


 


11/26/18 20:40        50


 


11/26/18 20:30  63 20 109/44 (65) 100   


 


11/26/18 20:25  62      


 


11/26/18 20:21  64 20     100


 


11/26/18 20:00  67 18 128/34 (65) 100   


 


11/26/18 20:00    99/20    


 


11/26/18 20:00        100


 


11/26/18 19:55      Mechanical Ventilator  





      Mechanical Ventilator  


 


11/26/18 19:45  82 20 99/20 (46) 99   


 


11/26/18 19:30  90 19 125/24 (57) 99   


 


11/26/18 19:15  99 20 124/46 (72) 99   


 


11/26/18 19:00 96.6 100 20 128/17 (54) 100   


 


11/26/18 17:01  69 20     100


 


11/26/18 16:00        100


 


11/26/18 16:00  100      


 


11/26/18 14:41  71 20     100


 


11/26/18 14:35 96.1 71 19 154/34 99 Non-Rebreather 10.0 98


 


11/26/18 14:21    81/21    

















Intake and Output  


 


 11/26/18 11/27/18





 19:00 07:00


 


Intake Total 127.5 ml 1285.0 ml


 


Output Total 160 ml 15 ml


 


Balance -32.5 ml 1270.0 ml


 


  


 


Intake Oral 0 ml 0 ml


 


IV Total 127.5 ml 1285.0 ml


 


Output Urine Total 160 ml 15 ml








General Appearance:  no acute distress


HEENT:  normocephalic


Respiratory/Chest:  chest wall non-tender, lungs clear


Cardiovascular:  normal peripheral pulses, normal rate


Abdomen:  normal bowel sounds, soft, non tender





Microbiology








 Date/Time


Source Procedure


Growth Status


 


 


 11/26/18 08:35


Nasal Nares Influenza Types A,B Antigen (ARIAS) - Final Complete


 


 11/26/18 08:30


Urine,Clean Catch Urine Culture - Preliminary


Gram Negative Bacillus 1 Resulted








Laboratory Tests


11/26/18 18:00: 


Troponin I 0.025, Thyroid Stimulating Hormone (TSH) 2.721


11/26/18 20:38: 


Arterial Blood pH 7.549H, Arterial Blood Partial Pressure CO2 28.0L, Arterial 

Blood Partial Pressure O2 395.6H, Arterial Blood HCO3 23.9, Arterial Blood 

Oxygen Saturation 99.9, Arterial Blood Base Excess 2.1H, Arcadio Test Positive


11/27/18 05:50: 


White Blood Count 20.0#H, Red Blood Count 3.20L, Hemoglobin 9.0L, Hematocrit 

30.2L, Mean Corpuscular Volume 95, Mean Corpuscular Hemoglobin 28.3, Mean 

Corpuscular Hemoglobin Concent 29.9L, Red Cell Distribution Width 20.9H, 

Platelet Count 420, Mean Platelet Volume 6.1L, Neutrophils (%) (Auto) , 

Lymphocytes (%) (Auto) , Monocytes (%) (Auto) , Eosinophils (%) (Auto) , 

Basophils (%) (Auto) , Differential Total Cells Counted 100, Neutrophils % (

Manual) 89H, Lymphocytes % (Manual) 7L, Monocytes % (Manual) 3, Eosinophils % (

Manual) 0, Basophils % (Manual) 1, Band Neutrophils 0, Nucleated Red Blood 

Cells 1, Platelet Estimate Adequate, Platelet Morphology Normal, Hypochromasia 2

+, Anisocytosis 2+, Sodium Level 139, Potassium Level 5.1, Chloride Level 102, 

Carbon Dioxide Level 25, Anion Gap 12, Blood Urea Nitrogen 47H, Creatinine 3.5H

, Estimat Glomerular Filtration Rate , Glucose Level 115H, Lactic Acid Level 

2.80H, Uric Acid 5.7, Calcium Level 8.4L, Phosphorus Level 3.3, Magnesium Level 

2.1, Total Bilirubin 0.5, Gamma Glutamyl Transpeptidase 8, Aspartate Amino 

Transf (AST/SGOT) 15, Alanine Aminotransferase (ALT/SGPT) 7L, Alkaline 

Phosphatase 75, Ammonia 11, C-Reactive Protein, Quantitative 11.3H, Pro-B-Type 

Natriuretic Peptide > 28521H, Total Protein 7.0, Albumin 1.5L, Globulin 5.5, 

Albumin/Globulin Ratio 0.3L


11/27/18 08:02: Lactic Acid Level 1.90


11/27/18 13:55: 


Arterial Blood pH 7.467H, Arterial Blood Partial Pressure CO2 33.5L, Arterial 

Blood Partial Pressure O2 274.6H, Arterial Blood HCO3 23.7, Arterial Blood 

Oxygen Saturation 99.8, Arterial Blood Base Excess 0.2, Arcadio Test N/a





Current Medications








 Medications


  (Trade)  Dose


 Ordered  Sig/Tony


 Route


 PRN Reason  Start Time


 Stop Time Status Last Admin


Dose Admin


 


 Chlorhexidine


 Gluconate


  (Juana-Hex 2%)  1 applic  DAILY@2000


 TOPIC


   11/27/18 20:00


 12/27/18 19:59   


 


 


 Dextrose/Sodium


 Chloride  1,000 ml @ 


 50 mls/hr  Q20H


 IV


   11/27/18 10:27


 12/27/18 10:26  11/27/18 13:17


 


 


 Dopamine HCl/


 Dextrose  250 ml @ 0


 mls/hr  Q24H


 IV


   11/26/18 14:21


 12/26/18 14:20  11/26/18 14:21


 


 


 Heparin Sodium/


 Sodium Chloride


  (Heparin 2000


 units/Ns 1000ml


 premix)  2,000 unit  ONCE  PRN


 INJ


 PICC  11/27/18 14:00


 11/28/18 23:59   


 


 


 Lidocaine HCl


  (Xylocaine 1%


 30ml)  30 ml  ONCE  PRN


 INJ


 PICC  11/27/18 14:00


 11/28/18 23:59   


 


 


 Norepinephrine


 Bitartrate 4 mg/


 Dextrose  250 ml @ 0


 mls/hr  Q24H


 IV


   11/26/18 16:30


 12/26/18 16:29  11/27/18 04:18


 


 


 Pantoprazole


  (Protonix)  40 mg  Q12HR


 IVP


   11/26/18 21:00


 12/27/18 08:59  11/27/18 09:30


 


 


 Piperacillin Sod/


 Tazobactam Sod


 2.25 gm/Dextrose  55 ml @ 


 110 mls/hr  Q8HR


 IV


   11/27/18 14:00


 12/2/18 13:59   


 


 


 Vancomycin HCl


  (Vanco rx to


 dose)  1 ea  DAILY  PRN


 MISC


 Per rx protocol  11/26/18 14:30


 12/26/18 14:29   


 

















Quentin Cardoso MD Nov 27, 2018 14:04

## 2018-11-28 VITALS — SYSTOLIC BLOOD PRESSURE: 102 MMHG | DIASTOLIC BLOOD PRESSURE: 24 MMHG

## 2018-11-28 VITALS — SYSTOLIC BLOOD PRESSURE: 108 MMHG | DIASTOLIC BLOOD PRESSURE: 20 MMHG

## 2018-11-28 VITALS — SYSTOLIC BLOOD PRESSURE: 104 MMHG | DIASTOLIC BLOOD PRESSURE: 21 MMHG

## 2018-11-28 VITALS — DIASTOLIC BLOOD PRESSURE: 21 MMHG | SYSTOLIC BLOOD PRESSURE: 99 MMHG

## 2018-11-28 VITALS — DIASTOLIC BLOOD PRESSURE: 32 MMHG | SYSTOLIC BLOOD PRESSURE: 113 MMHG

## 2018-11-28 VITALS — SYSTOLIC BLOOD PRESSURE: 105 MMHG | DIASTOLIC BLOOD PRESSURE: 25 MMHG

## 2018-11-28 VITALS — SYSTOLIC BLOOD PRESSURE: 114 MMHG | DIASTOLIC BLOOD PRESSURE: 36 MMHG

## 2018-11-28 VITALS — SYSTOLIC BLOOD PRESSURE: 118 MMHG | DIASTOLIC BLOOD PRESSURE: 42 MMHG

## 2018-11-28 VITALS — DIASTOLIC BLOOD PRESSURE: 15 MMHG | SYSTOLIC BLOOD PRESSURE: 131 MMHG

## 2018-11-28 VITALS — DIASTOLIC BLOOD PRESSURE: 10 MMHG | SYSTOLIC BLOOD PRESSURE: 141 MMHG

## 2018-11-28 VITALS — SYSTOLIC BLOOD PRESSURE: 140 MMHG | DIASTOLIC BLOOD PRESSURE: 28 MMHG

## 2018-11-28 VITALS — SYSTOLIC BLOOD PRESSURE: 116 MMHG | DIASTOLIC BLOOD PRESSURE: 61 MMHG

## 2018-11-28 VITALS — DIASTOLIC BLOOD PRESSURE: 18 MMHG | SYSTOLIC BLOOD PRESSURE: 102 MMHG

## 2018-11-28 VITALS — DIASTOLIC BLOOD PRESSURE: 23 MMHG | SYSTOLIC BLOOD PRESSURE: 104 MMHG

## 2018-11-28 VITALS — SYSTOLIC BLOOD PRESSURE: 111 MMHG | DIASTOLIC BLOOD PRESSURE: 20 MMHG

## 2018-11-28 VITALS — SYSTOLIC BLOOD PRESSURE: 98 MMHG | DIASTOLIC BLOOD PRESSURE: 23 MMHG

## 2018-11-28 VITALS — SYSTOLIC BLOOD PRESSURE: 139 MMHG | DIASTOLIC BLOOD PRESSURE: 35 MMHG

## 2018-11-28 VITALS — SYSTOLIC BLOOD PRESSURE: 104 MMHG | DIASTOLIC BLOOD PRESSURE: 24 MMHG

## 2018-11-28 VITALS — SYSTOLIC BLOOD PRESSURE: 110 MMHG | DIASTOLIC BLOOD PRESSURE: 29 MMHG

## 2018-11-28 VITALS — DIASTOLIC BLOOD PRESSURE: 22 MMHG | SYSTOLIC BLOOD PRESSURE: 105 MMHG

## 2018-11-28 VITALS — DIASTOLIC BLOOD PRESSURE: 26 MMHG | SYSTOLIC BLOOD PRESSURE: 100 MMHG

## 2018-11-28 VITALS — DIASTOLIC BLOOD PRESSURE: 29 MMHG | SYSTOLIC BLOOD PRESSURE: 108 MMHG

## 2018-11-28 VITALS — SYSTOLIC BLOOD PRESSURE: 107 MMHG | DIASTOLIC BLOOD PRESSURE: 29 MMHG

## 2018-11-28 VITALS — DIASTOLIC BLOOD PRESSURE: 22 MMHG | SYSTOLIC BLOOD PRESSURE: 100 MMHG

## 2018-11-28 VITALS — DIASTOLIC BLOOD PRESSURE: 31 MMHG | SYSTOLIC BLOOD PRESSURE: 150 MMHG

## 2018-11-28 VITALS — SYSTOLIC BLOOD PRESSURE: 109 MMHG | DIASTOLIC BLOOD PRESSURE: 21 MMHG

## 2018-11-28 VITALS — DIASTOLIC BLOOD PRESSURE: 17 MMHG | SYSTOLIC BLOOD PRESSURE: 90 MMHG

## 2018-11-28 LAB
% IRON SATURATION: 16 % (ref 15–50)
ADD MANUAL DIFF: NO
ADD MANUAL DIFF: YES
ALBUMIN SERPL-MCNC: 1.6 G/DL (ref 3.4–5)
ALBUMIN/GLOB SERPL: 0.4 {RATIO} (ref 1–2.7)
ALP SERPL-CCNC: 61 U/L (ref 46–116)
ALT SERPL-CCNC: 6 U/L (ref 12–78)
ANION GAP SERPL CALC-SCNC: 12 MMOL/L (ref 5–15)
AST SERPL-CCNC: 11 U/L (ref 15–37)
BILIRUB SERPL-MCNC: 0.6 MG/DL (ref 0.2–1)
BUN SERPL-MCNC: 52 MG/DL (ref 7–18)
CALCIUM SERPL-MCNC: 8 MG/DL (ref 8.5–10.1)
CHLORIDE SERPL-SCNC: 103 MMOL/L (ref 98–107)
CO2 SERPL-SCNC: 23 MMOL/L (ref 21–32)
CREAT SERPL-MCNC: 3.7 MG/DL (ref 0.55–1.3)
ERYTHROCYTE [DISTWIDTH] IN BLOOD BY AUTOMATED COUNT: 19.7 % (ref 11.6–14.8)
ERYTHROCYTE [DISTWIDTH] IN BLOOD BY AUTOMATED COUNT: 20 % (ref 11.6–14.8)
FERRITIN SERPL-MCNC: 1080 NG/ML (ref 8–388)
GLOBULIN SER-MCNC: 4.4 G/DL
HCT VFR BLD CALC: 22.1 % (ref 37–47)
HCT VFR BLD CALC: 22.2 % (ref 37–47)
HGB BLD-MCNC: 6.7 G/DL (ref 12–16)
HGB BLD-MCNC: 6.8 G/DL (ref 12–16)
IRON SERPL-MCNC: 13 UG/DL (ref 50–175)
MCV RBC AUTO: 91 FL (ref 80–99)
MCV RBC AUTO: 93 FL (ref 80–99)
PHOSPHATE SERPL-MCNC: 3.1 MG/DL (ref 2.5–4.9)
PLATELET # BLD: 253 K/UL (ref 150–450)
PLATELET # BLD: 254 K/UL (ref 150–450)
POTASSIUM SERPL-SCNC: 4.2 MMOL/L (ref 3.5–5.1)
RBC # BLD AUTO: 2.39 M/UL (ref 4.2–5.4)
RBC # BLD AUTO: 2.41 M/UL (ref 4.2–5.4)
SODIUM SERPL-SCNC: 138 MMOL/L (ref 136–145)
TIBC SERPL-MCNC: 83 UG/DL (ref 250–450)
UNSATURATED IRON BINDING: 70 UG/DL (ref 112–346)
WBC # BLD AUTO: 13.3 K/UL (ref 4.8–10.8)
WBC # BLD AUTO: 13.7 K/UL (ref 4.8–10.8)

## 2018-11-28 RX ADMIN — SODIUM CHLORIDE SCH MLS/HR: 0.9 INJECTION INTRAVENOUS at 06:11

## 2018-11-28 RX ADMIN — MEROPENEM SCH MLS/HR: 500 INJECTION INTRAVENOUS at 13:30

## 2018-11-28 RX ADMIN — HUMAN INSULIN SCH MLS/HR: 100 INJECTION, SOLUTION SUBCUTANEOUS at 06:11

## 2018-11-28 RX ADMIN — SODIUM CHLORIDE SCH MLS/HR: 0.9 INJECTION INTRAVENOUS at 21:15

## 2018-11-28 RX ADMIN — PANTOPRAZOLE SODIUM SCH MG: 40 INJECTION, POWDER, FOR SOLUTION INTRAVENOUS at 09:12

## 2018-11-28 RX ADMIN — PANTOPRAZOLE SODIUM SCH MG: 40 INJECTION, POWDER, FOR SOLUTION INTRAVENOUS at 21:15

## 2018-11-28 RX ADMIN — CHLORHEXIDINE GLUCONATE SCH APPLIC: 213 SOLUTION TOPICAL at 21:04

## 2018-11-28 RX ADMIN — ERYTHROPOIETIN SCH UNITS: 20000 INJECTION, SOLUTION INTRAVENOUS; SUBCUTANEOUS at 21:16

## 2018-11-28 RX ADMIN — AMIODARONE HYDROCHLORIDE SCH MG: 200 TABLET ORAL at 09:13

## 2018-11-28 RX ADMIN — AMIODARONE HYDROCHLORIDE SCH MG: 200 TABLET ORAL at 21:15

## 2018-11-28 NOTE — NUR
NURSE NOTES:

See V/S.Remain OFF Levo.drip.Cont.on IV Hydration.Bathe,Linen chg.Blood drawn for cbc/cmp 
etc.spec.to lab.

## 2018-11-28 NOTE — DIAGNOSTIC IMAGING REPORT
--------------- APPROVED REPORT --------------





CPT Code: 38792



Present Symptoms

Shortness of breath 





BILATERAL: Imaging reveals a patent deep venous system bilaterally. There is no evidence 

of thrombus within the femoral, popliteal or tibial segments. The greater saphenous veins 

are also within normal limits. Doppler indicates normal spontaneous flow within these 

segments.

## 2018-11-28 NOTE — NUR
NURSE NOTES:

1 unit PRBC Blood transfusion finished, VS taken and recorded , BP - 115/48, HR- 68, T-98.0 
axillary, RR-18. No transfusion reaction noted. Currently getting hemodialysis.

## 2018-11-28 NOTE — NUR
NURSE NOTES:

Dr. Daigle made aware of hemoglobin 6.8, hct 22.1, from hgb 9.0 yesterday, orders received 
to transfuse 1 unit PRBC and epogen 79852 units SQ every MWF. Order noted and carried out.

## 2018-11-28 NOTE — NUR
NURSE NOTES:

HS care rendered.Pos. chg.Kept comfortable.Suctioned.HD completed Sanya.procedure.Due meds 
admin.Cardioscope rhythm converted to At-Fib. aware.Pt.on Amio.

## 2018-11-28 NOTE — NUR
NURSE NOTES:

Patient received lying in bed, awake, alert, orally intubated ET size 7.5, lip at 23cm vent 
settings: AC 20, TV-500, FiO2 50%, PEEP-5, saturating 100%. Left nare NG tube in place, NPO. 
Bilateral soft wrist restraints in place, pulses intact. Left ARM with swelling noted, left 
arm AV shunt. Pabon intact and draining, pinkish with sediments to whitish colored output. 
Right upper Arm Midline running D5S at 50 ml/hr. Sinus Rhythm on the monitor rate 70. Will 
continue to monitor the patient. Pending hemodialysis today.

## 2018-11-28 NOTE — NUR
NURSE NOTES:

Dr. Santillan made aware of patient positive for VRE of rectum, no orders at this time. 
Contact precaution implemented.

## 2018-11-28 NOTE — NUR
NURSE NOTES:

Patient started with 1 unit PRBC, verified blood and patient with Mi Yeon, RN. VS taken and 
recorded in paper form. Son at bedside.

## 2018-11-28 NOTE — CONSULTATION
Consult Note


Assessment/Plan


A/


1) Bilateral lower extremity wounds - arterial etiology?


2) Cellulitis RLE 2/2 infected wounds


3) DM


4) h/o PAD


5) ESRD





P/


1) Cont current wound care regimen. No surgical intervention indicated at this 

time. Leukocytosis resolving.


2) Vasc consult pending with Dr Spencer. Will await recs


3) Abx per ID


4) Will cancel MRI of the foot at this time. Will need MRI of left ankle and 

left tib-fib. Discussed with nursing that patient was admitted with mid line 

access, unclear why. Nurse will call SNF to determine reason for access and if 

previous imaging done.


5) Will follow closely





Thank you Dick Adams DPM Nov 28, 2018 12:45

## 2018-11-28 NOTE — CARDIOLOGY REPORT
--------------- APPROVED REPORT --------------





EKG Measurement

Heart Pbcm60GEAG

SC 

152P79

[id number]

[id number]

[id number]





Normal sinus rhythm

Normal ECG

## 2018-11-28 NOTE — NUR
UNABLE TO PERFORM MRI BECAUSE PT ON VENT, WHICH CAN'T BE BROUGHT INTO M.R. MAGNET. CAROLYN DIAS 
HAS BEEN INFORMED. TJB 11:38

## 2018-11-28 NOTE — NUR
NURSE NOTES:WOUND CARE NOTES:Pt presented with multiple pressure injuries present on 
admission. Full thickness pressure injury to sacrum (L)(L)2.4cm x (W)4.4cm x (D)0.9cm, 
undermining 12-12 with tunneling 9-10 by 11.3cm. In close proximity to sacral wound on L 
buttocks purple ,indurated area with small area of slough measuring (L)0.6cm x (W)0.5cm 
noted. Arched surgical scar noted to L buttocks at site of tunneling. Resolving pressure 
injury noted to R ischium (L)3cm x (W)2cm. Wound bed epithelialized with maroon 
discoloration without induration periwound. Closed area with white pocket and purple margins 
noted to distal,paz R tibia (L)6cm x (W)2.7cm.Pt flinched when site minimally palpated. 
Full thickness wound noted to distal/medial RLE (L)6.2cm x (W)3.8cm .Wound noted to have 95% 
mixed slough /necrosis ,marginal erythema. Wound malodorous.Wound posterior R tibia (L)4.7cm 
x (W) 3cm ,100% necrotic with marginal erythema. Dry eschar with marginal erythema  (L)0.5cm 
x (W)0.7cm noted to medial R heel.Non-blanchable erythema noted to medial L malleolus. Dry 
brown eschar noted to medial L 1st malleolus (L)1.4cm x (W)1cm. L heel and lateral aspect 
boggy with non-blanchable erythema. Pt flinches when area minimally palpated.



Tx.Plan:Cleanse sacral wound with Saline.Loose pack with Hydrogel impregnated packing strip 
(Attention to tunneled area 

           at 9-10).Cover with Optifoam drsg Daily and prn.

           Reposition at least every 2hours or as tolerated.

           Air Fluidized mattress.

           Off-load heels with pillow.

## 2018-11-28 NOTE — CONSULTATION
DATE OF CONSULTATION:  11/28/2018

INFECTIOUS DISEASES CONSULTATION



CONSULTING PHYSICIAN:  Chase Santillan M.D.



REFERRING PHYSICIAN:  Gavino Daigle M.D.



REASON FOR CONSULTATION:  Decubitus ulcer infection.



HISTORY OF PRESENTING ILLNESS:  This is a 78-year-old lady with history of

diabetes, renal failure, on dialysis, hypertension, GERD, dementia, who

came in because she was hypotensive.  She was found to have pneumonia and

decubitus ulcer infection, and Infectious Diseases consultation has been

obtained for antibiotics.



PAST MEDICAL HISTORY:

1. History of diabetes.

2. Renal failure, on dialysis.

3. Hypertension.

4. Atrial fibrillation.

5. Anemia.

6. Dementia.

7. GERD.



SOCIAL HISTORY:  Unknown.



FAMILY HISTORY:  Unknown.



REVIEW OF SYSTEMS:  Unable to obtain currently.



MEDICATIONS:  As an inpatient, she is on Epogen, iron, amiodarone,

chlorhexidine, subcutaneous heparin, Zosyn, lidocaine, Protonix,

vancomycin, norepinephrine, dopamine.



ALLERGIES:  No known drug allergies.



PHYSICAL EXAMINATION:

VITAL SIGNS:  Temperature of 97.6, T-max of 98.9, pulse of 63, respiratory

rate of 12, blood pressure 104/23, O2 saturation 100%.

HEENT:  Pupils equally reactive to light and accommodation.  Mouth appears

clean without thrush.  The patient is intubated.

NECK:  Supple.  No adenopathy.  No JVD.

CARDIOVASCULAR:  Regular rate and rhythm.  No murmurs.

LUNGS:  Clear to auscultation bilaterally.  No crackles.  No

wheezes.

ABDOMEN:  Soft and nontender.  No organomegaly.

EXTREMITIES:  No cyanosis, no clubbing, and no edema.  Right arm PICC line

noted.

SKIN:  Sacral decubitus ulcer noted.  Left heel ulcer noted, which is

necrotic.  Leg ulcer noted, which is necrotic on the right side.



LABORATORY AND DIAGNOSTIC DATA:  White count 13.3, hemoglobin 6.8,

hematocrit 22.1, MCV 91, and platelet count of 253,000.  Sodium 138,

potassium 4.2, chloride 103, bicarbonate 23, BUN 52, creatinine 3.7,

glucose 72, calcium 8.  Total bilirubin 0.6, AST 11, ALT 6, alkaline

phosphatase 61.  Beta-natriuretic peptide more than 35,000.  Total protein

6, albumin 1.6.  UA is showing too numerous to count white cells.  Urine

culture is growing ESBL E. coli which is susceptible to piperacillin and

tazobactam as well as ertapenem, as well as Streptococcus species.

11/26/2018, rectal swab was positive for VRE.  11/26/2018, blood cultures

are negative.  11/26/2018, nasal swab was negative for influenza A and B.

 



ASSESSMENT:  Chest x-ray is showing somewhat improved aeration of the left

lung, this may be due to decreased pleural fluid versus atelectasis, large

left-sided pleural effusion _____ moderate-to-large right-sided pleural

effusion noted.  2D echocardiogram is showing trace aortic regurgitation,

moderate mitral regurgitation, severe pulmonary hypertension noted.

Ultrasound of legs showed no evidence of DVT.  Abdominal ultrasound is

showing surgically absent gallbladder, negative for dilated ducts, medical

renal disease noted, large right-sided pleural effusion noted.  CT of head

is showing no gross bleed or mass effect, chronic age-related changes

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



ASSESSMENT:

1. This is a 78-year-old lady with history of dementia, diabetes, renal

failure, on dialysis, who comes in with altered mental status and was

found to have ESBL E. coli urinary tract infection as well as

Streptococcus urinary tract infection, would be concerned regarding VRE

urinary tract infection.

2. Septic shock.

3. Leukocytosis is improving.

4. Decubitus ulcer infection.



PLAN:

1. Discontinue vancomycin and Zosyn.

2. We will start the patient on meropenem and linezolid.

3. We will follow up cultures and adjust antibiotics accordingly.



I would like to thank, Dr. Daigle, for this consultation.









  ______________________________________________

  Chase Santillan M.D.





DR:  Brice

D:  11/28/2018 11:01

T:  11/28/2018 17:24

JOB#:  [id number]

CC:  Gavino Daigle M.D.; Fax#:  [phone]

## 2018-11-28 NOTE — NUR
NURSE NOTES:

Patient's son Simon at bedside, able to interact with son. No complains of pain at this 
time. No acute respiratory distress.

## 2018-11-28 NOTE — CARDIOLOGY REPORT
--------------- APPROVED REPORT --------------





EKG Measurement

Heart [id number]

OH 

174P53

[id number]

[id number]

[id number]





Normal sinus rhythm

Possible Left atrial enlargement

Possible Anterior infarct, age undetermined

Abnormal ECG

## 2018-11-28 NOTE — NEPHROLOGY PROGRESS NOTE
Assessment/Plan


Assessment


Seee below


Plan


Sepsis due to infected diabetic ulcers, R/O Osteo. On IV Abx. Called ID, Vasc. 

Sx, Podiatry.


Septic Shock pressors PRN


ESRD HD q MWF 


Anemia of CKD transfuse today, SAMANTHA high dose.


Resp Failure - Vent per Pul. Try to wean





Subjective


Subjective


Awake + alert





Objective


Objective





Last 24 Hour Vital Signs








  Date Time  Temp Pulse Resp B/P (MAP) Pulse Ox O2 Delivery O2 Flow Rate FiO2


 


11/28/18 08:40  65 20     40


 


11/28/18 08:00        50


 


11/28/18 08:00      Mechanical Ventilator  





      Mechanical Ventilator  





      Mechanical Ventilator  


 


11/28/18 06:46  60 20     40


 


11/28/18 06:30  71 20 116/61 (79) 100   


 


11/28/18 06:00  61 20 102/24 (50) 100   


 


11/28/18 06:00      Mechanical Ventilator  





      Mechanical Ventilator  





      Mechanical Ventilator  


 


11/28/18 05:20  61 20     40


 


11/28/18 05:00  63 20 108/29 (55) 100   


 


11/28/18 04:00        50


 


11/28/18 04:00      Mechanical Ventilator  





      Mechanical Ventilator  





      Mechanical Ventilator  


 


11/28/18 04:00  66 20 102/18 (46) 100   


 


11/28/18 04:00  66      


 


11/28/18 03:00  71 20     40


 


11/28/18 03:00  70 20 105/22 (49) 100   


 


11/28/18 02:00      Mechanical Ventilator  





      Mechanical Ventilator  





      Mechanical Ventilator  


 


11/28/18 02:00  68 20 105/25 (51) 100   


 


11/28/18 01:30  67 20 100/22 (48) 100   


 


11/28/18 01:00  71 20     40


 


11/28/18 01:00  72 20 104/24 (50) 100   


 


11/28/18 00:30  65 20 100/26 (50) 100   


 


11/28/18 00:00        50


 


11/28/18 00:00      Mechanical Ventilator  





      Mechanical Ventilator  





      Mechanical Ventilator  


 


11/28/18 00:00 98.7 73 20 90/17 (41) 100   


 


11/28/18 00:00  69      


 


11/27/18 23:30    97/22    


 


11/27/18 23:30  73 20 97/22 (47) 100   


 


11/27/18 23:00  77 20 95/18 (43) 100   


 


11/27/18 22:50  71 20     40


 


11/27/18 22:30  69 20 122/28 (59) 100   


 


11/27/18 22:00  69 20 126/31 (62) 99   


 


11/27/18 21:30  70 20 125/30 (61) 100   


 


11/27/18 21:28    115/29    


 


11/27/18 21:19  72 20     40


 


11/27/18 21:00  74 20 115/29 (57) 100   


 


11/27/18 20:45  72 17 133/37 (69) 100   


 


11/27/18 20:30  72 20 143/40 (74) 100   


 


11/27/18 20:15  71 20 131/36 (67) 100   


 


11/27/18 20:00      Mechanical Ventilator  





      Mechanical Ventilator  





      Mechanical Ventilator  


 


11/27/18 20:00        50


 


11/27/18 20:00  72      


 


11/27/18 20:00 98.9 72 17 131/36 (67) 100   


 


11/27/18 19:30  73 17 130/35 (66) 100   


 


11/27/18 19:28  73 16     40


 


11/27/18 19:00  74 16 132/34 (66) 100   


 


11/27/18 18:30  72 16 132/37 (68) 100   


 


11/27/18 18:00    141/39    


 


11/27/18 18:00  72 16 132/37 (68) 100   


 


11/27/18 17:30  78 16 140/40 (73) 100   


 


11/27/18 17:00  78 16 153/39 (77) 100   


 


11/27/18 17:00    153/39    


 


11/27/18 16:35  79 16     50


 


11/27/18 16:30  78 16 153/42 (79) 100   


 


11/27/18 16:00      Mechanical Ventilator  





      Mechanical Ventilator  





      Mechanical Ventilator  


 


11/27/18 16:00  76      


 


11/27/18 16:00    153/42    


 


11/27/18 16:00  78 20 146/43 (77) 100   


 


11/27/18 15:30  77 20 152/45 (80) 100   


 


11/27/18 15:17  74 20     50


 


11/27/18 15:00  78 20 142/51 (81) 100   


 


11/27/18 14:36    154/54    


 


11/27/18 14:30  118 20 154/67 (96) 93   


 


11/27/18 14:00  114 20 132/51 (78) 100   


 


11/27/18 14:00    132/51    


 


11/27/18 13:57        50


 


11/27/18 13:40        50


 


11/27/18 13:25  118 20 114/51 (72) 100   


 


11/27/18 13:20  118 20 117/54 (75) 100   


 


11/27/18 13:15  121 20 87/56 (66) 94   


 


11/27/18 13:10  119 20 62/34 (43) 94   


 


11/27/18 13:05  124 20 59/42 (48) 94   


 


11/27/18 13:04  123 20     100


 


11/27/18 13:00    55/40    


 


11/27/18 13:00  123 22 55/40 (45) 94   


 


11/27/18 12:55  142 8  71 Facial  100


 


11/27/18 12:47        100


 


11/27/18 12:47      Mechanical Ventilator  





      Mechanical Ventilator  





      Mechanical Ventilator  


 


11/27/18 12:30  122 22 103/32 (55) 93   


 


11/27/18 12:00  128      


 


11/27/18 12:00    138/42    


 


11/27/18 12:00 98.9 120 22 138/42 (74) 82   


 


11/27/18 11:30  128 22 139/42 (74) 91   


 


11/27/18 11:13  140 18   Venturi Mask 12.0 40


 


11/27/18 11:13      Venturi Mask 12.0 40


 


11/27/18 11:11      Venturi Mask 12.0 40


 


11/27/18 11:00  136 22 135/53 (80) 91   


 


11/27/18 11:00    135/53    


 


11/27/18 10:30  143 22 135/62 (86) 91   

















Intake and Output  


 


 11/27/18 11/28/18





 18:59 06:59


 


Intake Total 582.5 ml 755.0 ml


 


Output Total 93 ml 30 ml


 


Balance 489.5 ml 725.0 ml


 


  


 


Intake Oral 0 ml 0 ml


 


IV Total 582.5 ml 705.0 ml


 


Other  50 ml


 


Output Urine Total 93 ml 30 ml


 


# Bowel Movements  1








Laboratory Tests


11/27/18 13:55: 


Arterial Blood pH 7.467H, Arterial Blood Partial Pressure CO2 33.5L, Arterial 

Blood Partial Pressure O2 274.6H, Arterial Blood HCO3 23.7, Arterial Blood 

Oxygen Saturation 99.8, Arterial Blood Base Excess 0.2, Arcadio Test N/a


11/28/18 04:30: 


White Blood Count 13.7H, Red Blood Count 2.39L, Hemoglobin 6.7*L, Hematocrit 

22.2L, Mean Corpuscular Volume 93, Mean Corpuscular Hemoglobin 28.0, Mean 

Corpuscular Hemoglobin Concent 30.2L, Red Cell Distribution Width 20.0H, 

Platelet Count 254, Mean Platelet Volume 6.3L, Neutrophils (%) (Auto) , 

Lymphocytes (%) (Auto) , Monocytes (%) (Auto) , Eosinophils (%) (Auto) , 

Basophils (%) (Auto) , Differential Total Cells Counted 100, Neutrophils % (

Manual) 89H, Lymphocytes % (Manual) 4L, Monocytes % (Manual) 4, Eosinophils % (

Manual) 3, Basophils % (Manual) 0, Band Neutrophils 0, Platelet Estimate 

Adequate, Platelet Morphology Normal, Hypochromasia 1+, Anisocytosis 1+, Sodium 

Level 138, Potassium Level 4.2, Chloride Level 103, Carbon Dioxide Level 23, 

Anion Gap 12, Blood Urea Nitrogen 52H, Creatinine 3.7H, Estimat Glomerular 

Filtration Rate , Glucose Level 72L, Uric Acid 6.7, Calcium Level 8.0L, 

Phosphorus Level 3.1, Magnesium Level 1.9, Iron Level 13L, Total Iron Binding 

Capacity 83L, Percent Iron Saturation 16, Unsaturated Iron Binding 70L, 

Ferritin 1080H, Total Bilirubin 0.6, Aspartate Amino Transf (AST/SGOT) 11L, 

Alanine Aminotransferase (ALT/SGPT) 6L, Alkaline Phosphatase 61, Troponin I 

0.055, Pro-B-Type Natriuretic Peptide > 59410T, Total Protein 6.0L, Albumin 1.6L

, Globulin 4.4, Albumin/Globulin Ratio 0.4L, Vitamin B12 Level 525, Folate 18.1

, Random Vancomycin Level 34.9


11/28/18 05:30: 


White Blood Count 13.3H, Red Blood Count 2.41L, Hemoglobin 6.8*L, Hematocrit 

22.1L, Mean Corpuscular Volume 91, Mean Corpuscular Hemoglobin 28.0, Mean 

Corpuscular Hemoglobin Concent 30.6L, Red Cell Distribution Width 19.7H, 

Platelet Count 253, Mean Platelet Volume 6.1L, Neutrophils (%) (Auto) , 

Lymphocytes (%) (Auto) , Monocytes (%) (Auto) , Eosinophils (%) (Auto) , 

Basophils (%) (Auto) 


11/28/18 08:30: 


Arterial Blood pH 7.565*H, Arterial Blood Partial Pressure CO2 25.5L, Arterial 

Blood Partial Pressure O2 70.1L, Arterial Blood HCO3 22.6, Arterial Blood 

Oxygen Saturation 94.9L, Arterial Blood Base Excess 0.7, Arcadio Test Positive


Height (Feet):  5


Height (Inches):  2.00


Weight (Pounds):  131


Objective


Intubated, On vent.


Cv Rr


Lungs B tenishachi.


Abd SNT. BS +


E Rt. foot diabetic ulcers with pus











Gavino Daigle MD Nov 28, 2018 10:15

## 2018-11-28 NOTE — CONSULTATION
DATE OF CONSULTATION:  11/28/2018

CONSULTING PHYSICIAN:  Dick Freedman D.P.M.



REQUESTING PHYSICIAN:  Gavino Daigle M.D.



REASON FOR CONSULTATION:  Nonhealing chronic ulcers of bilateral lower

extremities that are infected.



HISTORY OF PRESENT ILLNESS:  The patient is a 78-year-old female, who was

admitted to [facility] on 11/26/2018 for pyelonephritis and

altered mental status.  The patient had a complicated emergency department

course requiring intubation.  History was mainly obtained through chart

review.  It was noted the patient has been admitted from [facility] and had some type of midline placed.  Unclear what the

access was used for.



PAST MEDICAL HISTORY:  Significant for bradycardia, sepsis, hypertension,

pneumonia, pyelonephritis, altered mental status, end-stage renal disease,

hypoalbuminemia, history of dysphagia, history of recurrent urinary tract

infections, diabetes mellitus, atrial fibrillation, dementia,

gastroesophageal reflux disease, and neuralgia.



MEDICATIONS:  Per MAR and include linezolid, meropenem, heparin, and single

dose of Zosyn.



ALLERGIES:  She has no known drug allergies.



SOCIAL HISTORY:  Noncontributory.



REVIEW OF SYSTEMS:  Unobtainable as the patient is intubated.



PHYSICAL EXAMINATION:

VITAL SIGNS:  Temperature is 97.6, pulse is 69, respiration is 15, blood

pressure is 104/23, and saturating 100% on ventilator.

LOWER EXTREMITY:  Vascular, nonpalpable pedal pulses noted bilaterally.

Feet are equally warm.  No cyanosis is noted.

DERMATOLOGICAL:  Full-thickness ulceration is noted on the right posterior

leg, which is completely necrotic.  There is erythematous border noted.

Mild serous discharge is noted from the site.  There is a right anterior

leg ulcer, which appears superficial in nature.  Periwound erythema is

noted.  Again, mild serous drainage, but no bone or tendon exposed.  There

is a full-thickness right medial ankle wound that is down to the level of

muscle.  No bone or tendon is exposed.  This is the largest and most

extensive wound on the lower extremity.  Moderate amount of serous

drainage is noted.  No malodor is noted from the site.  Periwound erythema

is noted.  There are two small areas of necrosis on the left heel as well

as the left hallux.  These are completely dry.  No signs of infection are

noted.

MUSCULOSKELETAL:  No gross deformities are noted.



LABORATORY DATA:  White blood cell count is 13.3, down from yesterday of

21.0, hemoglobin and hematocrit is 6.8 and 22.1, and platelet count is

253,000.  Potassium is 4.2, BUN is 52, and creatinine is 3.7.  Lactic acid

is 1.90.  Glucose is 72.  C-reactive protein is 11.3.  Total protein is

1.5.  INR 0.9.  A venous ultrasounds of bilateral lower extremities shows

no deep venous thrombosis.



CULTURES:  Rectal cultures positive for vancomycin resistant enterococcus.

Urine cultures are positive for ESBL.  Blood cultures, no growth x2 after

24 hours.



ASSESSMENT:

1. Bilateral lower extremity wounds-arterial etiology?

2. Cellulitis, right lower extremity secondary to infected wounds.

3. Diabetes mellitus.

4. History of peripheral arterial disease.

5. End-stage renal disease.



PLAN:

1. Continue wound care regimen.  No surgical intervention is indicated

at this time.  Leukocytosis is resolving.

2. Vascular consult is pending with Dr. Spencer.  We will await

records.

3. Continue antibiotics per Infectious Disease.

4. We will cancel MRI of the foot at this time.  We will need an MRI of

the left ankle and left tib fib, but discussed with nursing that the

patient was admitted with a midline access unclear why.  Nurse will call a

skilled nursing facility to determine the reason for access and if

previous imaging done to elaborate if access was needed for antibiotic

course for possible osteomyelitis.

5. We will follow closely.



Thank you for the courtesy of this consultation, Dr. Daigle.









  ______________________________________________

  Dick Freedman D.P.M.





DR:  CRISTAL

D:  11/28/2018 13:18

T:  11/28/2018 19:38

JOB#:  [id number]

CC:

## 2018-11-28 NOTE — NUR
NURSE NOTES:

Recvd.on a vent.orally intubated.See settings.Lungs few scatt Rh.P.Ox-96%.suctioned tk.beige 
sec.NS Lavaged.On soft wrist restraints prev.self-injury.AVF (L) upper arm HD in 
progress.See V/s.Scope SR.denies CP.

## 2018-11-28 NOTE — NUR
CASE MANAGEMENT: REVIEW





SI: A-FIB . SEPSIS . ESRD ON HD 

T 97.6 HR 66 RR 20 BP 98/23 SAT 98% MECH VENT FIO2 40

WBC 13.3 H/H 6.7/22.2 BUN 52 CR 3.7 







IS: AMIODARONE PO Q12HR

LEVOPHED GTT

DOPAMINE GTT 





***ICU STATUS***

DCP: PATIENT IS FROM [facility]

## 2018-11-28 NOTE — GENERAL SURGERY PROGRESS NOTE
General Surgery-Progress Note


Subjective


Procedure Performed


left subclavian central venous catheter insertion


Additional Comments


respiratory insufficiency; reintubated.  wounds noted.





Objective





Last 24 Hour Vital Signs








  Date Time  Temp Pulse Resp B/P (MAP) Pulse Ox O2 Delivery O2 Flow Rate FiO2


 


11/28/18 13:06  69 15     40


 


11/28/18 13:00  66 18 111/20 (50) 99   


 


11/28/18 12:00  72      


 


11/28/18 12:00        40


 


11/28/18 12:00 98.0 80 21 108/20 (49) 100   


 


11/28/18 12:00      Mechanical Ventilator  





      Mechanical Ventilator  





      Mechanical Ventilator  


 


11/28/18 11:00  80 21 109/21 (50) 100   


 


11/28/18 10:59  76 14     40


 


11/28/18 10:08  63 12     


 


11/28/18 10:00  63 20 104/23 (50) 100   


 


11/28/18 09:00  65 20 98/23 (48) 98   


 


11/28/18 08:40  65 20     40


 


11/28/18 08:00 97.6 66 20 104/21 (48) 96   


 


11/28/18 08:00        50


 


11/28/18 08:00      Mechanical Ventilator  





      Mechanical Ventilator  





      Mechanical Ventilator  


 


11/28/18 08:00  64      


 


11/28/18 07:00  63 20 99/21 (47) 100   


 


11/28/18 06:46  60 20     40


 


11/28/18 06:30  71 20 116/61 (79) 100   


 


11/28/18 06:00  61 20 102/24 (50) 100   


 


11/28/18 06:00      Mechanical Ventilator  





      Mechanical Ventilator  





      Mechanical Ventilator  


 


11/28/18 05:20  61 20     40


 


11/28/18 05:00  63 20 108/29 (55) 100   


 


11/28/18 04:00        50


 


11/28/18 04:00      Mechanical Ventilator  





      Mechanical Ventilator  





      Mechanical Ventilator  


 


11/28/18 04:00  66 20 102/18 (46) 100   


 


11/28/18 04:00  66      


 


11/28/18 03:00  71 20     40


 


11/28/18 03:00  70 20 105/22 (49) 100   


 


11/28/18 02:00      Mechanical Ventilator  





      Mechanical Ventilator  





      Mechanical Ventilator  


 


11/28/18 02:00  68 20 105/25 (51) 100   


 


11/28/18 01:30  67 20 100/22 (48) 100   


 


11/28/18 01:00  71 20     40


 


11/28/18 01:00  72 20 104/24 (50) 100   


 


11/28/18 00:30  65 20 100/26 (50) 100   


 


11/28/18 00:00        50


 


11/28/18 00:00      Mechanical Ventilator  





      Mechanical Ventilator  





      Mechanical Ventilator  


 


11/28/18 00:00 98.7 73 20 90/17 (41) 100   


 


11/28/18 00:00  69      


 


11/27/18 23:30    97/22    


 


11/27/18 23:30  73 20 97/22 (47) 100   


 


11/27/18 23:00  77 20 95/18 (43) 100   


 


11/27/18 22:50  71 20     40


 


11/27/18 22:30  69 20 122/28 (59) 100   


 


11/27/18 22:00  69 20 126/31 (62) 99   


 


11/27/18 21:30  70 20 125/30 (61) 100   


 


11/27/18 21:28    115/29    


 


11/27/18 21:19  72 20     40


 


11/27/18 21:00  74 20 115/29 (57) 100   


 


11/27/18 20:45  72 17 133/37 (69) 100   


 


11/27/18 20:30  72 20 143/40 (74) 100   


 


11/27/18 20:15  71 20 131/36 (67) 100   


 


11/27/18 20:00      Mechanical Ventilator  





      Mechanical Ventilator  





      Mechanical Ventilator  


 


11/27/18 20:00        50


 


11/27/18 20:00  72      


 


11/27/18 20:00 98.9 72 17 131/36 (67) 100   


 


11/27/18 19:30  73 17 130/35 (66) 100   


 


11/27/18 19:28  73 16     40


 


11/27/18 19:00  74 16 132/34 (66) 100   


 


11/27/18 18:30  72 16 132/37 (68) 100   


 


11/27/18 18:00    141/39    


 


11/27/18 18:00  72 16 132/37 (68) 100   


 


11/27/18 17:30  78 16 140/40 (73) 100   


 


11/27/18 17:00  78 16 153/39 (77) 100   


 


11/27/18 17:00    153/39    


 


11/27/18 16:35  79 16     50


 


11/27/18 16:30  78 16 153/42 (79) 100   


 


11/27/18 16:00      Mechanical Ventilator  





      Mechanical Ventilator  





      Mechanical Ventilator  


 


11/27/18 16:00  76      


 


11/27/18 16:00    153/42    


 


11/27/18 16:00  78 20 146/43 (77) 100   


 


11/27/18 15:30  77 20 152/45 (80) 100   


 


11/27/18 15:17  74 20     50


 


11/27/18 15:00  78 20 142/51 (81) 100   


 


11/27/18 14:36    154/54    


 


11/27/18 14:30  118 20 154/67 (96) 93   


 


11/27/18 14:00  114 20 132/51 (78) 100   


 


11/27/18 14:00    132/51    


 


11/27/18 13:57        50








I&O











Intake and Output  


 


 11/27/18 11/28/18





 19:00 07:00


 


Intake Total 647.5 ml 667.5 ml


 


Output Total 98 ml 55 ml


 


Balance 549.5 ml 612.5 ml


 


  


 


Intake Oral 0 ml 0 ml


 


IV Total 647.5 ml 617.5 ml


 


Other  50 ml


 


Output Urine Total 98 ml 55 ml


 


# Bowel Movements  1








Dressing:  saturated


Wound:  other


Drains:  other


Cardiovascular:  RSR


Respiratory:  decreased breath sounds


Abdomen:  soft, flat, present bowel sounds


Extremities:  other





Laboratory Tests








Test


  11/27/18


13:55 11/28/18


04:30 11/28/18


05:30 11/28/18


08:30


 


Arterial Blood pH


  7.467


(7.350-7.450) 


  


  7.565


(7.350-7.450)


 


Arterial Blood Partial


Pressure CO2 33.5 mmHg


(35.0-45.0)  L 


  


  25.5 mmHg


(35.0-45.0)  L


 


Arterial Blood Partial


Pressure O2 274.6 mmHg


(75.0-100.0)  H 


  


  70.1 mmHg


(75.0-100.0)  L


 


Arterial Blood HCO3


  23.7 mmol/L


(22.0-26.0) 


  


  22.6 mmol/L


(22.0-26.0)


 


Arterial Blood Oxygen


Saturation 99.8 %


() 


  


  94.9 %


()  L


 


Arterial Blood Base Excess 0.2 (-2-2)     0.7 (-2-2)  


 


Arcadio Test N/a     Positive  


 


White Blood Count


  


  13.7 K/UL


(4.8-10.8)  H 13.3 K/UL


(4.8-10.8)  H 


 


 


Red Blood Count


  


  2.39 M/UL


(4.20-5.40)  L 2.41 M/UL


(4.20-5.40)  L 


 


 


Hemoglobin


  


  6.7 G/DL


(12.0-16.0)  *L 6.8 G/DL


(12.0-16.0)  *L 


 


 


Hematocrit


  


  22.2 %


(37.0-47.0)  L 22.1 %


(37.0-47.0)  L 


 


 


Mean Corpuscular Volume  93 FL (80-99)   91 FL (80-99)   


 


Mean Corpuscular Hemoglobin


  


  28.0 PG


(27.0-31.0) 28.0 PG


(27.0-31.0) 


 


 


Mean Corpuscular Hemoglobin


Concent 


  30.2 G/DL


(32.0-36.0)  L 30.6 G/DL


(32.0-36.0)  L 


 


 


Red Cell Distribution Width


  


  20.0 %


(11.6-14.8)  H 19.7 %


(11.6-14.8)  H 


 


 


Platelet Count


  


  254 K/UL


(150-450) 253 K/UL


(150-450) 


 


 


Mean Platelet Volume


  


  6.3 FL


(6.5-10.1)  L 6.1 FL


(6.5-10.1)  L 


 


 


Neutrophils (%) (Auto)


  


  % (45.0-75.0)


  % (45.0-75.0)


  


 


 


Lymphocytes (%) (Auto)


  


  % (20.0-45.0)


  % (20.0-45.0)


  


 


 


Monocytes (%) (Auto)   % (1.0-10.0)    % (1.0-10.0)   


 


Eosinophils (%) (Auto)   % (0.0-3.0)    % (0.0-3.0)   


 


Basophils (%) (Auto)   % (0.0-2.0)    % (0.0-2.0)   


 


Differential Total Cells


Counted 


  100  


  


  


 


 


Neutrophils % (Manual)  89 % (45-75)  H  


 


Lymphocytes % (Manual)  4 % (20-45)  L  


 


Monocytes % (Manual)  4 % (1-10)    


 


Eosinophils % (Manual)  3 % (0-3)    


 


Basophils % (Manual)  0 % (0-2)    


 


Band Neutrophils  0 % (0-8)    


 


Platelet Estimate  Adequate    


 


Platelet Morphology  Normal    


 


Hypochromasia  1+    


 


Anisocytosis  1+    


 


Sodium Level


  


  138 MMOL/L


(136-145) 


  


 


 


Potassium Level


  


  4.2 MMOL/L


(3.5-5.1) 


  


 


 


Chloride Level


  


  103 MMOL/L


() 


  


 


 


Carbon Dioxide Level


  


  23 MMOL/L


(21-32) 


  


 


 


Anion Gap


  


  12 mmol/L


(5-15) 


  


 


 


Blood Urea Nitrogen


  


  52 mg/dL


(7-18)  H 


  


 


 


Creatinine


  


  3.7 MG/DL


(0.55-1.30)  H 


  


 


 


Estimat Glomerular Filtration


Rate 


   mL/min (>60)  


  


  


 


 


Glucose Level


  


  72 MG/DL


()  L 


  


 


 


Uric Acid


  


  6.7 MG/DL


(2.6-7.2) 


  


 


 


Calcium Level


  


  8.0 MG/DL


(8.5-10.1)  L 


  


 


 


Phosphorus Level


  


  3.1 MG/DL


(2.5-4.9) 


  


 


 


Magnesium Level


  


  1.9 MG/DL


(1.8-2.4) 


  


 


 


Iron Level


  


  13 ug/dL


()  L 


  


 


 


Total Iron Binding Capacity


  


  83 ug/dL


(250-450)  L 


  


 


 


Percent Iron Saturation  16 % (15-50)    


 


Unsaturated Iron Binding


  


  70 ug/dL


(112-346)  L 


  


 


 


Ferritin


  


  1080 NG/ML


(8-388)  H 


  


 


 


Total Bilirubin


  


  0.6 MG/DL


(0.2-1.0) 


  


 


 


Aspartate Amino Transf


(AST/SGOT) 


  11 U/L (15-37)


L 


  


 


 


Alanine Aminotransferase


(ALT/SGPT) 


  6 U/L (12-78)


L 


  


 


 


Alkaline Phosphatase


  


  61 U/L


() 


  


 


 


Troponin I


  


  0.055 ng/mL


(0.000-0.056) 


  


 


 


Pro-B-Type Natriuretic Peptide


  


  > 04865 pg/mL


(0-125)  H 


  


 


 


Total Protein


  


  6.0 G/DL


(6.4-8.2)  L 


  


 


 


Albumin


  


  1.6 G/DL


(3.4-5.0)  L 


  


 


 


Globulin  4.4 g/dL    


 


Albumin/Globulin Ratio


  


  0.4 (1.0-2.7)


L 


  


 


 


Vitamin B12 Level


  


  525 PG/ML


(193-986) 


  


 


 


Folate


  


  18.1 NG/ML


(8.6-58.9) 


  


 


 


Random Vancomycin Level  34.9 ug/mL    











Plan


Problems:  


(1) Sepsis


Assessment & Plan:  acute decompensation 


hypotensive


bradycardic


respiratory decompensation requiring intubation and vent support - failed 

extubation 


midline being used and safe for now


may need another line later if peripheral not possible - PICC


thank you


will follow with recs 





(2) Hypotension


(3) Bradycardia


(4) Altered mental status


(5) Decubital ulcer


Assessment & Plan:  Patient with multiple pressure injuries where have been 

present since admission. 


Full thickness pressure injury to sacrum (L)(L)2.4cm x (W)4.4cm x (D)0.9cm, 

undermining 12-12 with tunneling 9-10 by 11.3cm. 


In close proximity to sacral wound on L buttocks purple ,indurated area with 

small area of slough measuring (L)0.6cm x (W)0.5cm noted. Arched surgical scar 

noted to L buttocks at site of tunneling. 


Resolving pressure injury noted to R ischium (L)3cm x (W)2cm. Wound bed 

epithelialized with maroon discoloration without induration periwound. 


Closed area with white pocket and purple margins noted to distal,paz R tibia 

(L)6cm x (W)2.7cm


Full thickness wound noted to distal/medial RLE (L)6.2cm x (W)3.8cm .Wound 

noted to have 95% mixed slough /necrosis ,marginal erythema. Wound malodorous.


Wound posterior R tibia (L)4.7cm x (W) 3cm ,100% necrotic with marginal 

erythema. Dry eschar with marginal erythema 


(L)0.5cm x (W)0.7cm noted to medial R heel.Non-blanchable erythema noted to 

medial L malleolus. 


Dry brown eschar noted to medial L 1st malleolus (L)1.4cm x (W)1cm. 


L heel and lateral aspect boggy with non-blanchable erythema. 





Tx.Plan:


Cleanse sacral wound with Saline.Loose pack with Hydrogel impregnated packing 

strip (Attention to tunneled area at 9-10).Cover with Optifoam drsg Daily and 

prn.


           


Reposition at least every 2hours or as tolerated.


           


Air Fluidized mattress.


           


Off-load heels with pillow.





Appreciate Podiatry and Vascular input














Bob Lweis Nov 28, 2018 13:50

## 2018-11-28 NOTE — CARDIOLOGY PROGRESS NOTE
Assessment/Plan


Assessment/Plan


1. Hypotension, possibly sepsis versus volume.


2. Paroxysmal episodes of atrial fibrillation history.


3. Bradycardia secondary to medications, amiodarone possibly.


4. Diabetes mellitus.


5. End-stage renal disease, on hemodialysis.


6. Lung collapse.


7. Respiratory failure, status post intubation.


8. Dysphagia.


9. History of dementia.


10. Pulm htn 


11.  Pleural effusion 








 in and out of afib will increae amiod for a few doses watch for edouard 


tele reviewed ekg reviewed 


echo personally reviewed lv function is hyperdynamic 


has bilat pleural effusion 


off vasopressor   


urine cx postive 


wean o ff vent 


may need to consider thoracentesis


cotienu icu leve of care for now 


remain critically ill 


iv abx 


dialysis  1 liter removed





Subjective


Cardiovascular:  Denies: chest pain, irregular heart rate, lightheadedness


Respiratory:  Denies: shortness of breath


Gastrointestinal/Abdominal:  Denies: abdominal pain


Genitourinary:  Denies: burning


Subjective


on the vent not verbal but communicates with head motion





Objective





Last 24 Hour Vital Signs








  Date Time  Temp Pulse Resp B/P (MAP) Pulse Ox O2 Delivery O2 Flow Rate FiO2


 


11/28/18 21:27  102 17     40


 


11/28/18 19:29  70 14     40


 


11/28/18 18:00  68 18 114/36 (62) 100   


 


11/28/18 17:11  68 15     40


 


11/28/18 17:00  68 16 140/28 (65) 99   


 


11/28/18 16:00  68      


 


11/28/18 16:00 98.0 68 16 131/15 (53) 98   


 


11/28/18 16:00        40


 


11/28/18 16:00      Mechanical Ventilator  





      Mechanical Ventilator  





      Mechanical Ventilator  


 


11/28/18 15:00 99.1 68 14 141/10 (53) 98   


 


11/28/18 14:56  69 15     40


 


11/28/18 14:00  72 19 113/32 (59) 98   


 


11/28/18 13:06  69 15     40


 


11/28/18 13:00  66 18 111/20 (50) 99   


 


11/28/18 12:00  72      


 


11/28/18 12:00        40


 


11/28/18 12:00 98.0 80 21 108/20 (49) 100   


 


11/28/18 12:00      Mechanical Ventilator  





      Mechanical Ventilator  





      Mechanical Ventilator  


 


11/28/18 11:00  80 21 109/21 (50) 100   


 


11/28/18 10:59  76 14     40


 


11/28/18 10:08  63 12     


 


11/28/18 10:00  63 20 104/23 (50) 100   


 


11/28/18 09:00  65 20 98/23 (48) 98   


 


11/28/18 08:40  65 20     40


 


11/28/18 08:00 97.6 66 20 104/21 (48) 96   


 


11/28/18 08:00        50


 


11/28/18 08:00      Mechanical Ventilator  





      Mechanical Ventilator  





      Mechanical Ventilator  


 


11/28/18 08:00  64      


 


11/28/18 07:00  63 20 99/21 (47) 100   


 


11/28/18 06:46  60 20     40


 


11/28/18 06:30  71 20 116/61 (79) 100   


 


11/28/18 06:00  61 20 102/24 (50) 100   


 


11/28/18 06:00      Mechanical Ventilator  





      Mechanical Ventilator  





      Mechanical Ventilator  


 


11/28/18 05:20  61 20     40


 


11/28/18 05:00  63 20 108/29 (55) 100   


 


11/28/18 04:00        50


 


11/28/18 04:00      Mechanical Ventilator  





      Mechanical Ventilator  





      Mechanical Ventilator  


 


11/28/18 04:00  66 20 102/18 (46) 100   


 


11/28/18 04:00  66      


 


11/28/18 03:00  71 20     40


 


11/28/18 03:00  70 20 105/22 (49) 100   


 


11/28/18 02:00      Mechanical Ventilator  





      Mechanical Ventilator  





      Mechanical Ventilator  


 


11/28/18 02:00  68 20 105/25 (51) 100   


 


11/28/18 01:30  67 20 100/22 (48) 100   


 


11/28/18 01:00  71 20     40


 


11/28/18 01:00  72 20 104/24 (50) 100   


 


11/28/18 00:30  65 20 100/26 (50) 100   


 


11/28/18 00:00        50


 


11/28/18 00:00      Mechanical Ventilator  





      Mechanical Ventilator  





      Mechanical Ventilator  


 


11/28/18 00:00 98.7 73 20 90/17 (41) 100   


 


11/28/18 00:00  69      


 


11/27/18 23:30    97/22    


 


11/27/18 23:30  73 20 97/22 (47) 100   


 


11/27/18 23:00  77 20 95/18 (43) 100   


 


11/27/18 22:50  71 20     40


 


11/27/18 22:30  69 20 122/28 (59) 100   


 


11/27/18 22:00  69 20 126/31 (62) 99   








General Appearance:  alert


Neck:  supple


Cardiovascular:  normal rate, regular rhythm


Respiratory/Chest:  lungs clear


Abdomen:  normal bowel sounds, non tender, soft


Extremities:  no swelling











Intake and Output  


 


 11/27/18 11/28/18





 18:59 06:59


 


Intake Total 582.5 ml 755.0 ml


 


Output Total 93 ml 30 ml


 


Balance 489.5 ml 725.0 ml


 


  


 


Intake Oral 0 ml 0 ml


 


IV Total 582.5 ml 705.0 ml


 


Other  50 ml


 


Output Urine Total 93 ml 30 ml


 


# Bowel Movements  1











Laboratory Tests








Test


  11/28/18


04:30 11/28/18


05:30 11/28/18


08:30


 


White Blood Count


  13.7 K/UL


(4.8-10.8)  H 13.3 K/UL


(4.8-10.8)  H 


 


 


Red Blood Count


  2.39 M/UL


(4.20-5.40)  L 2.41 M/UL


(4.20-5.40)  L 


 


 


Hemoglobin


  6.7 G/DL


(12.0-16.0)  *L 6.8 G/DL


(12.0-16.0)  *L 


 


 


Hematocrit


  22.2 %


(37.0-47.0)  L 22.1 %


(37.0-47.0)  L 


 


 


Mean Corpuscular Volume 93 FL (80-99)   91 FL (80-99)   


 


Mean Corpuscular Hemoglobin


  28.0 PG


(27.0-31.0) 28.0 PG


(27.0-31.0) 


 


 


Mean Corpuscular Hemoglobin


Concent 30.2 G/DL


(32.0-36.0)  L 30.6 G/DL


(32.0-36.0)  L 


 


 


Red Cell Distribution Width


  20.0 %


(11.6-14.8)  H 19.7 %


(11.6-14.8)  H 


 


 


Platelet Count


  254 K/UL


(150-450) 253 K/UL


(150-450) 


 


 


Mean Platelet Volume


  6.3 FL


(6.5-10.1)  L 6.1 FL


(6.5-10.1)  L 


 


 


Neutrophils (%) (Auto)


  % (45.0-75.0)


  % (45.0-75.0)


  


 


 


Lymphocytes (%) (Auto)


  % (20.0-45.0)


  % (20.0-45.0)


  


 


 


Monocytes (%) (Auto)  % (1.0-10.0)    % (1.0-10.0)   


 


Eosinophils (%) (Auto)  % (0.0-3.0)    % (0.0-3.0)   


 


Basophils (%) (Auto)  % (0.0-2.0)    % (0.0-2.0)   


 


Differential Total Cells


Counted 100  


  


  


 


 


Neutrophils % (Manual) 89 % (45-75)  H  


 


Lymphocytes % (Manual) 4 % (20-45)  L  


 


Monocytes % (Manual) 4 % (1-10)    


 


Eosinophils % (Manual) 3 % (0-3)    


 


Basophils % (Manual) 0 % (0-2)    


 


Band Neutrophils 0 % (0-8)    


 


Platelet Estimate Adequate    


 


Platelet Morphology Normal    


 


Hypochromasia 1+    


 


Anisocytosis 1+    


 


Sodium Level


  138 MMOL/L


(136-145) 


  


 


 


Potassium Level


  4.2 MMOL/L


(3.5-5.1) 


  


 


 


Chloride Level


  103 MMOL/L


() 


  


 


 


Carbon Dioxide Level


  23 MMOL/L


(21-32) 


  


 


 


Anion Gap


  12 mmol/L


(5-15) 


  


 


 


Blood Urea Nitrogen


  52 mg/dL


(7-18)  H 


  


 


 


Creatinine


  3.7 MG/DL


(0.55-1.30)  H 


  


 


 


Estimat Glomerular Filtration


Rate  mL/min (>60)  


  


  


 


 


Glucose Level


  72 MG/DL


()  L 


  


 


 


Uric Acid


  6.7 MG/DL


(2.6-7.2) 


  


 


 


Calcium Level


  8.0 MG/DL


(8.5-10.1)  L 


  


 


 


Phosphorus Level


  3.1 MG/DL


(2.5-4.9) 


  


 


 


Magnesium Level


  1.9 MG/DL


(1.8-2.4) 


  


 


 


Iron Level


  13 ug/dL


()  L 


  


 


 


Total Iron Binding Capacity


  83 ug/dL


(250-450)  L 


  


 


 


Percent Iron Saturation 16 % (15-50)    


 


Unsaturated Iron Binding


  70 ug/dL


(112-346)  L 


  


 


 


Ferritin


  1080 NG/ML


(8-388)  H 


  


 


 


Total Bilirubin


  0.6 MG/DL


(0.2-1.0) 


  


 


 


Aspartate Amino Transf


(AST/SGOT) 11 U/L (15-37)


L 


  


 


 


Alanine Aminotransferase


(ALT/SGPT) 6 U/L (12-78)


L 


  


 


 


Alkaline Phosphatase


  61 U/L


() 


  


 


 


Troponin I


  0.055 ng/mL


(0.000-0.056) 


  


 


 


Pro-B-Type Natriuretic Peptide


  > 55534 pg/mL


(0-125)  H 


  


 


 


Total Protein


  6.0 G/DL


(6.4-8.2)  L 


  


 


 


Albumin


  1.6 G/DL


(3.4-5.0)  L 


  


 


 


Globulin 4.4 g/dL    


 


Albumin/Globulin Ratio


  0.4 (1.0-2.7)


L 


  


 


 


Vitamin B12 Level


  525 PG/ML


(193-986) 


  


 


 


Folate


  18.1 NG/ML


(8.6-58.9) 


  


 


 


Random Vancomycin Level 34.9 ug/mL    


 


Arterial Blood pH


  


  


  7.565


(7.350-7.450)


 


Arterial Blood Partial


Pressure CO2 


  


  25.5 mmHg


(35.0-45.0)  L


 


Arterial Blood Partial


Pressure O2 


  


  70.1 mmHg


(75.0-100.0)  L


 


Arterial Blood HCO3


  


  


  22.6 mmol/L


(22.0-26.0)


 


Arterial Blood Oxygen


Saturation 


  


  94.9 %


()  L


 


Arterial Blood Base Excess   0.7 (-2-2)  


 


Arcadio Test   Positive  











Microbiology








 Date/Time


Source Procedure


Growth Status


 


 


 11/26/18 08:45


Blood Blood Culture - Preliminary


NO GROWTH AFTER 24 HOURS Resulted


 


 11/26/18 08:35


Blood Blood Culture - Preliminary


NO GROWTH AFTER 24 HOURS Resulted


 


 11/26/18 08:35


Nasal Nares Influenza Types A,B Antigen (ARIAS) - Final Complete


 


 11/26/18 08:30


Urine,Clean Catch Urine Culture - Preliminary


Escherichia Coli - Esbl


Streptococcus Species Resulted


 


 11/26/18 11:16


Rectum VRE Culture - Final


Enterococcus Faecium - Vre Complete

















Mariusz Wade MD Nov 28, 2018 22:01

## 2018-11-29 VITALS — DIASTOLIC BLOOD PRESSURE: 42 MMHG | SYSTOLIC BLOOD PRESSURE: 131 MMHG

## 2018-11-29 VITALS — DIASTOLIC BLOOD PRESSURE: 22 MMHG | SYSTOLIC BLOOD PRESSURE: 140 MMHG

## 2018-11-29 VITALS — SYSTOLIC BLOOD PRESSURE: 121 MMHG | DIASTOLIC BLOOD PRESSURE: 44 MMHG

## 2018-11-29 VITALS — DIASTOLIC BLOOD PRESSURE: 12 MMHG | SYSTOLIC BLOOD PRESSURE: 92 MMHG

## 2018-11-29 VITALS — SYSTOLIC BLOOD PRESSURE: 123 MMHG | DIASTOLIC BLOOD PRESSURE: 36 MMHG

## 2018-11-29 VITALS — DIASTOLIC BLOOD PRESSURE: 15 MMHG | SYSTOLIC BLOOD PRESSURE: 138 MMHG

## 2018-11-29 VITALS — DIASTOLIC BLOOD PRESSURE: 33 MMHG | SYSTOLIC BLOOD PRESSURE: 123 MMHG

## 2018-11-29 VITALS — DIASTOLIC BLOOD PRESSURE: 27 MMHG | SYSTOLIC BLOOD PRESSURE: 118 MMHG

## 2018-11-29 VITALS — SYSTOLIC BLOOD PRESSURE: 135 MMHG | DIASTOLIC BLOOD PRESSURE: 39 MMHG

## 2018-11-29 VITALS — DIASTOLIC BLOOD PRESSURE: 44 MMHG | SYSTOLIC BLOOD PRESSURE: 124 MMHG

## 2018-11-29 VITALS — DIASTOLIC BLOOD PRESSURE: 22 MMHG | SYSTOLIC BLOOD PRESSURE: 135 MMHG

## 2018-11-29 VITALS — SYSTOLIC BLOOD PRESSURE: 144 MMHG | DIASTOLIC BLOOD PRESSURE: 42 MMHG

## 2018-11-29 VITALS — DIASTOLIC BLOOD PRESSURE: 38 MMHG | SYSTOLIC BLOOD PRESSURE: 111 MMHG

## 2018-11-29 VITALS — SYSTOLIC BLOOD PRESSURE: 133 MMHG | DIASTOLIC BLOOD PRESSURE: 27 MMHG

## 2018-11-29 VITALS — SYSTOLIC BLOOD PRESSURE: 131 MMHG | DIASTOLIC BLOOD PRESSURE: 26 MMHG

## 2018-11-29 VITALS — DIASTOLIC BLOOD PRESSURE: 34 MMHG | SYSTOLIC BLOOD PRESSURE: 110 MMHG

## 2018-11-29 VITALS — DIASTOLIC BLOOD PRESSURE: 38 MMHG | SYSTOLIC BLOOD PRESSURE: 143 MMHG

## 2018-11-29 VITALS — SYSTOLIC BLOOD PRESSURE: 145 MMHG | DIASTOLIC BLOOD PRESSURE: 93 MMHG

## 2018-11-29 VITALS — SYSTOLIC BLOOD PRESSURE: 117 MMHG | DIASTOLIC BLOOD PRESSURE: 39 MMHG

## 2018-11-29 VITALS — DIASTOLIC BLOOD PRESSURE: 36 MMHG | SYSTOLIC BLOOD PRESSURE: 125 MMHG

## 2018-11-29 VITALS — SYSTOLIC BLOOD PRESSURE: 117 MMHG | DIASTOLIC BLOOD PRESSURE: 58 MMHG

## 2018-11-29 VITALS — DIASTOLIC BLOOD PRESSURE: 30 MMHG | SYSTOLIC BLOOD PRESSURE: 115 MMHG

## 2018-11-29 RX ADMIN — PANTOPRAZOLE SODIUM SCH MG: 40 INJECTION, POWDER, FOR SOLUTION INTRAVENOUS at 20:37

## 2018-11-29 RX ADMIN — CHLORHEXIDINE GLUCONATE SCH APPLIC: 213 SOLUTION TOPICAL at 20:01

## 2018-11-29 RX ADMIN — AMIODARONE HYDROCHLORIDE SCH MG: 200 TABLET ORAL at 20:37

## 2018-11-29 RX ADMIN — PANTOPRAZOLE SODIUM SCH MG: 40 INJECTION, POWDER, FOR SOLUTION INTRAVENOUS at 08:44

## 2018-11-29 RX ADMIN — AMIODARONE HYDROCHLORIDE SCH MG: 200 TABLET ORAL at 09:16

## 2018-11-29 RX ADMIN — SODIUM CHLORIDE SCH MLS/HR: 0.9 INJECTION INTRAVENOUS at 20:36

## 2018-11-29 RX ADMIN — MEROPENEM SCH MLS/HR: 500 INJECTION INTRAVENOUS at 08:45

## 2018-11-29 NOTE — NUR
NURSE NOTES: Patient repositioned, patient put back on to original vent setting due to ABG 
results. Dr. Cardoso office called and left a voice message informing him of results. 
Awaiting call back. Patient restraints were removed during hygiene and during oral care. 
Head of bed is at 30 degrees, bed is locked and in the lowest position, bed alarm is on. 
Will continue to monitor patient and follow plan of care.

## 2018-11-29 NOTE — NUR
NURSE NOTES: Patient repositioned, patient orally suctioned with white tannish secretions. 
Oral care has been given. No acute distress noted. Patient bilateral wrist restraints has 
been released for repositioning and oral care, skin in intact and pulses are  present. 
Patient will continue to be monitored.

## 2018-11-29 NOTE — INFECTIOUS DISEASES PROG NOTE
Assessment/Plan


Assessment/Plan


A:


Sepsis


UTI


Cellulitis of R leg


Hypercapnic respiratory


failure


ESRD on HD


DM


Anemia


Dementia


PVD


R pleural effusion


Mucous plug


P;


Continue Zyvox & Meropenem





Subjective


ROS Limited/Unobtainable:  Yes


Neurologic:  Reports: other - on restraint


Allergies:  


Coded Allergies:  


     No Known Allergies (Unverified , 11/26/18)





Objective


Vital Signs





Last 24 Hour Vital Signs








  Date Time  Temp Pulse Resp B/P (MAP) Pulse Ox O2 Delivery O2 Flow Rate FiO2


 


11/29/18 09:29  94 14     40


 


11/29/18 06:51  107 13     40


 


11/29/18 06:00  100 13 133/27 (62) 96   


 


11/29/18 05:14  98 13     40


 


11/29/18 05:00  101 14 135/39 (71) 96   


 


11/29/18 04:00      Mechanical Ventilator  





      Mechanical Ventilator  





      Mechanical Ventilator  


 


11/29/18 04:00        40


 


11/29/18 04:00 98.5 100 16 124/44 (70) 100   


 


11/29/18 04:00  100      


 


11/29/18 03:10  108 15     40


 


11/29/18 03:00  103 15 123/36 (65) 98   


 


11/29/18 02:00  101 14 111/38 (62) 100   


 


11/29/18 01:00  106 16 117/39 (65) 100   


 


11/29/18 00:46  102 14     40


 


11/29/18 00:00        40


 


11/29/18 00:00      Mechanical Ventilator  





      Mechanical Ventilator  





      Mechanical Ventilator  


 


11/29/18 00:00 98.8 105 16 110/34 (59) 97   


 


11/29/18 00:00  105      


 


11/28/18 23:00  102 15 107/29 (55) 97   


 


11/28/18 22:42  107 13     40


 


11/28/18 22:00  108 16 118/42 (67) 98   


 


11/28/18 21:27  102 17     40


 


11/28/18 21:00  107 17 110/29 (56) 98   


 


11/28/18 20:00      Mechanical Ventilator  





      Mechanical Ventilator  





      Mechanical Ventilator  


 


11/28/18 20:00  106      


 


11/28/18 20:00 98.7 106 17 139/35 (69) 94   


 


11/28/18 20:00        40


 


11/28/18 19:29  70 14     40


 


11/28/18 19:00  76 20 150/31 (70) 96   


 


11/28/18 18:00  68 18 114/36 (62) 100   


 


11/28/18 17:11  68 15     40


 


11/28/18 17:00  68 16 140/28 (65) 99   


 


11/28/18 16:00  68      


 


11/28/18 16:00 98.0 68 16 131/15 (53) 98   


 


11/28/18 16:00        40


 


11/28/18 16:00      Mechanical Ventilator  





      Mechanical Ventilator  





      Mechanical Ventilator  


 


11/28/18 15:00 99.1 68 14 141/10 (53) 98   


 


11/28/18 14:56  69 15     40


 


11/28/18 14:00  72 19 113/32 (59) 98   


 


11/28/18 13:06  69 15     40


 


11/28/18 13:00  66 18 111/20 (50) 99   


 


11/28/18 12:00  72      


 


11/28/18 12:00        40


 


11/28/18 12:00 98.0 80 21 108/20 (49) 100   


 


11/28/18 12:00      Mechanical Ventilator  





      Mechanical Ventilator  





      Mechanical Ventilator  


 


11/28/18 11:00  80 21 109/21 (50) 100   


 


11/28/18 10:59  76 14     40


 


11/28/18 10:08  63 12     


 


11/28/18 10:00  63 20 104/23 (50) 100   








Height (Feet):  5


Height (Inches):  2.00


Weight (Pounds):  130


HEENT:  other - orally intubated


Respiratory/Chest:  decreased breath sounds, other - in R side, on ventilator


Abdomen:  soft, non tender


Extremities:  other - mild edema of left arm


Skin:  ulcers


Neurologic/Psychiatric:  alert, responsive





Microbiology








 Date/Time


Source Procedure


Growth Status


 


 


 11/26/18 11:16


Rectum VRE Culture - Final


Enterococcus Faecium - Vre Complete











Current Medications








 Medications


  (Trade)  Dose


 Ordered  Sig/Tony


 Route


 PRN Reason  Start Time


 Stop Time Status Last Admin


Dose Admin


 


 Amiodarone HCl


  (Cordarone)  200 mg  EVERY 12  HOURS


 ORAL


   11/27/18 21:00


 12/27/18 20:59  11/29/18 09:16


 


 


 Chlorhexidine


 Gluconate


  (Juana-Hex 2%)  1 applic  DAILY@2000


 TOPIC


   11/27/18 20:00


 12/27/18 19:59  11/28/18 21:04


 


 


 Epoetin Rupesh


  (Procrit (for


 ESRD on dialysis))  10,000 units  MON-WED-FRI


 SUBQ


   11/28/18 21:00


 12/28/18 20:59  11/28/18 21:16


 


 


 Iron Sucrose 100


 mg/Sodium Chloride  60 ml @ 


 240 mls/hr  BEDTIME


 IV


   11/28/18 21:00


 12/2/18 21:14  11/28/18 21:15


 


 


 Linezolid


  (Zyvox)  600 mg  EVERY 12  HOURS


 ORAL


   11/28/18 21:00


 12/3/18 20:59  11/29/18 08:44


 


 


 Meropenem 500 mg/


 Sodium Chloride  55 ml @ 


 110 mls/hr  DAILY


 IVPB


   11/28/18 12:00


 12/3/18 11:59  11/29/18 08:45


 


 


 Norepinephrine


 Bitartrate 4 mg/


 Dextrose  250 ml @ 0


 mls/hr  Q24H


 IV


   11/26/18 16:30


 12/26/18 16:29  11/27/18 21:28


 


 


 Pantoprazole


  (Protonix)  40 mg  Q12HR


 IVP


   11/26/18 21:00


 12/27/18 08:59  11/29/18 08:44


 

















Luke Maynard MD Nov 29, 2018 10:05

## 2018-11-29 NOTE — NUR
NURSE NOTES: Called Dr. Daigle to inform him that the patient does not have Labs for today 
and informed him that yesterday the hemoglobin was 6.8 and we have not had any labs drawn 
afterwards results. Awaiting call back.

## 2018-11-29 NOTE — NUR
NURSE NOTES:Called and left message for  to make aware of PICC line order for 
today. Awaiting call back.

## 2018-11-29 NOTE — NUR
RESPIRATORY NOTE:

Received pt on AC 12, 500VT, 40%, PEEP +5. Pt intubated w/ ETT 7.5 @ 23cm lipline, secured 
by anchorfast. Pt is alert/awake, follows commands. Son is currently at bedside. Pt's hands 
on soft restraints to prevent from self-extubation. B/S moy. rhonchi, sxn minimal amounts of 
thick/thin, clear/white secretions. Vent plugged into red outlet, ambubag at bedside. Pt 
resting comfrotably, in no apparent distress at this time. Will continue to monitor pt.

## 2018-11-29 NOTE — NUR
NURSE NOTES:

CALLED [facility] BY DAY SHIFT BERNIE/CAROLYN THAT PATTIE WAS AWARE.

## 2018-11-29 NOTE — NUR
NURSE NOTES:Dr. Daigle came to visit the patient and made aware of todays no lab orders. 
Dr. Daigle said labs are to be drawn with dialysis. Will continue to monitor patient and 
follow plan of care.

## 2018-11-29 NOTE — NUR
NURSE NOTES: patient had a BM, patient cleaned and repositioned. Patient weaning trial has 
begun with Cpap 8, 40% fio2, 02 saturation 95%. No acute distress noted at this time. 
Bilateral heels are elevated, bilateral heel protectors. patient is afebrile.Head of bed is 
at 30 degrees, bed is locked an in the lowest position. Will continue to monitor the patient 
and follow plan of care.

## 2018-11-29 NOTE — NUR
NURSE NOTES: Patient repositioned, patient is asleep, son is at bedside. patient is on vent 
settings Ac 12, , Fio2 40%, peep 5, 02 saturation 99%. No change in condition. Patient 
has bilateral restraints skin is intact, pulses are present. Head of bed is at 30 degrees, 
bed is locked and in the lowest position, bed alarm is on. Will continue to monitor patient 
and follow plan of care.

## 2018-11-29 NOTE — CARDIOLOGY PROGRESS NOTE
Assessment/Plan


Assessment/Plan


1. Hypotension, possibly sepsis versus volume.


2. Paroxysmal episodes of atrial fibrillation history.


3. Bradycardia secondary to medications, amiodarone possibly.


4. Diabetes mellitus.


5. End-stage renal disease, on hemodialysis.


6. Lung collapse.


7. Respiratory failure, status post intubation.


8. Dysphagia.


9. History of dementia.


10. Pulm htn 


11.  Pleural effusion 








 in and out of afib will increae amiod for a few doses watch for edouard 


tele reviewed


echo personally reviewed lv function is hyperdynamic 


has bilat pleural effusion 


off vasopressor   


urine cx postive 


wean o ff vent 


may need to consider thoracentesis


cotienu icu leve of care for now 


is anemic may need prbc tx will 


? source f anemia 


cannot anticoagualte until h/h stable


labs in am





Subjective


Cardiovascular:  Denies: chest pain


Respiratory:  Denies: shortness of breath


Gastrointestinal/Abdominal:  Denies: abdominal pain


Genitourinary:  Denies: burning


Subjective


on the vent not verbal but communicates with head motion





Objective





Last 24 Hour Vital Signs








  Date Time  Temp Pulse Resp B/P (MAP) Pulse Ox O2 Delivery O2 Flow Rate FiO2


 


11/29/18 21:00  90 15     40


 


11/29/18 21:00  98 17 145/93 (110) 98   


 


11/29/18 20:00 98.6 101 16 131/42 (71) 98   


 


11/29/18 19:00  96 16 144/42 (76) 98   


 


11/29/18 18:50  87 12     40


 


11/29/18 18:00  96 14 125/36 (65) 98   


 


11/29/18 17:00  98 14     40


 


11/29/18 17:00  96 19 123/33 (63) 97   


 


11/29/18 16:30    123/33    


 


11/29/18 16:00 98.9 97 12 92/12 (38) 95   


 


11/29/18 16:00  108      


 


11/29/18 16:00      Mechanical Ventilator  





      Mechanical Ventilator  





      Mechanical Ventilator  


 


11/29/18 15:08        40


 


11/29/18 15:05  115 14     40


 


11/29/18 15:00  115 19 135/22 (59) 92   


 


11/29/18 14:00  122 19  93   


 


11/29/18 13:21  105 18     40


 


11/29/18 13:00  125 27  94   


 


11/29/18 12:00        40


 


11/29/18 12:00  103      


 


11/29/18 12:00      Mechanical Ventilator  





      Mechanical Ventilator  





      Mechanical Ventilator  


 


11/29/18 12:00  121 27 118/27 (57) 96   


 


11/29/18 11:00  110 22 117/58 (77) 93   


 


11/29/18 10:48  110 14     40


 


11/29/18 10:48     95   


 


11/29/18 10:00  100 14 121/44 (69) 98   


 


11/29/18 09:29  94 14     40


 


11/29/18 09:00  102 15 115/30 (58) 96   


 


11/29/18 08:00  106      


 


11/29/18 08:00        40


 


11/29/18 08:00  100 13 138/15 (56) 96   


 


11/29/18 08:00      Mechanical Ventilator  





      Mechanical Ventilator  





      Mechanical Ventilator  


 


11/29/18 07:00 100.0 101 13 140/22 (61) 97   


 


11/29/18 06:51  107 13     40


 


11/29/18 06:00  100 13 133/27 (62) 96   


 


11/29/18 05:14  98 13     40


 


11/29/18 05:00  101 14 135/39 (71) 96   


 


11/29/18 04:00      Mechanical Ventilator  





      Mechanical Ventilator  





      Mechanical Ventilator  


 


11/29/18 04:00        40


 


11/29/18 04:00 98.5 100 16 124/44 (70) 100   


 


11/29/18 04:00  100      


 


11/29/18 03:10  108 15     40


 


11/29/18 03:00  103 15 123/36 (65) 98   


 


11/29/18 02:00  101 14 111/38 (62) 100   


 


11/29/18 01:00  106 16 117/39 (65) 100   


 


11/29/18 00:46  102 14     40


 


11/29/18 00:00        40


 


11/29/18 00:00      Mechanical Ventilator  





      Mechanical Ventilator  





      Mechanical Ventilator  


 


11/29/18 00:00 98.8 105 16 110/34 (59) 97   


 


11/29/18 00:00  105      


 


11/28/18 23:00  102 15 107/29 (55) 97   


 


11/28/18 22:42  107 13     40








General Appearance:  alert, on vent


Neck:  supple


Cardiovascular:  irregularly irregular


Respiratory/Chest:  lungs clear


Abdomen:  normal bowel sounds, non tender, soft


Extremities:  no swelling











Intake and Output  


 


 11/28/18 11/29/18





 19:00 07:00


 


Intake Total 290 ml 110 ml


 


Output Total 105 ml 0 ml


 


Balance 185 ml 110 ml


 


  


 


Intake Oral 60 ml 0 ml


 


Free Water 120 ml 


 


IV Total 110 ml 60 ml


 


Other  50 ml


 


Output Urine Total 105 ml 0 ml











Laboratory Tests








Test


  11/29/18


13:50


 


Arterial Blood pH


  7.250


(7.350-7.450)


 


Arterial Blood Partial


Pressure CO2 48.3 mmHg


(35.0-45.0)  H


 


Arterial Blood Partial


Pressure O2 75.1 mmHg


(75.0-100.0)


 


Arterial Blood HCO3


  20.8 mmol/L


(22.0-26.0)  L


 


Arterial Blood Oxygen


Saturation 91.8 %


()  L


 


Arterial Blood Base Excess -6.4 (-2-2)  L


 


Arcadio Test Positive  











Microbiology








 Date/Time


Source Procedure


Growth Status


 


 


 11/28/18 11:40


Other(Specify in comment) Gram Stain - Final Resulted


 


 11/28/18 11:40


Other(Specify in comment) Wound Culture - Preliminary Resulted





 11/28/18 11:40


Sacral Wound Gram Stain - Final Resulted


 


 11/28/18 11:40


Sacral Wound Wound Culture - Preliminary Resulted

















Mariusz Wade MD Nov 29, 2018 22:07

## 2018-11-29 NOTE — NUR
Social Service Note



Patient currently orally intubated, opened eyes briefly and unable to make needs known.  
Patient is a resident of [facility] 11/15/2018.  Patient receives dialysis at 
[facility] [phone], T,TH,Sat at 745am.  SW obtained a copy of patient's advance 
directive from [facility].  Patient's son Simon Consbruck [phone] and Jesús Consbruck 
[phone].  SW addressed code status, patient is a full code at this time. POLST in 
chart.  Son is requesting a nursing facility in the [facility] to be closer to him 
and his brother.  LYRIC will inform CM.  Will monitor and assist as needed.

## 2018-11-29 NOTE — NEPHROLOGY PROGRESS NOTE
Assessment/Plan


Assessment


Seee below


Plan


MOF


Sepsis due to infected diabetic ulcers, R/O Osteo. On IV Abx. Called ID, Vasc. 

Sx, Podiatry.


Septic Shock pressors PRN


ESRD HD q MWF 


Anemia of CKD transfuse today, SAMANTHA high dose.


Resp Failure - Vent per Pul. Try to wean


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 with LAE. LVEF 65% . Anticoagulate?





Subjective


Subjective


Awake + alert. Having leg Arterial Duplex.





Objective


Objective





Last 24 Hour Vital Signs








  Date Time  Temp Pulse Resp B/P (MAP) Pulse Ox O2 Delivery O2 Flow Rate FiO2


 


11/29/18 09:29  94 14     40


 


11/29/18 06:51  107 13     40


 


11/29/18 06:00  100 13 133/27 (62) 96   


 


11/29/18 05:14  98 13     40


 


11/29/18 05:00  101 14 135/39 (71) 96   


 


11/29/18 04:00      Mechanical Ventilator  





      Mechanical Ventilator  





      Mechanical Ventilator  


 


11/29/18 04:00        40


 


11/29/18 04:00 98.5 100 16 124/44 (70) 100   


 


11/29/18 04:00  100      


 


11/29/18 03:10  108 15     40


 


11/29/18 03:00  103 15 123/36 (65) 98   


 


11/29/18 02:00  101 14 111/38 (62) 100   


 


11/29/18 01:00  106 16 117/39 (65) 100   


 


11/29/18 00:46  102 14     40


 


11/29/18 00:00        40


 


11/29/18 00:00      Mechanical Ventilator  





      Mechanical Ventilator  





      Mechanical Ventilator  


 


11/29/18 00:00 98.8 105 16 110/34 (59) 97   


 


11/29/18 00:00  105      


 


11/28/18 23:00  102 15 107/29 (55) 97   


 


11/28/18 22:42  107 13     40


 


11/28/18 22:00  108 16 118/42 (67) 98   


 


11/28/18 21:27  102 17     40


 


11/28/18 21:00  107 17 110/29 (56) 98   


 


11/28/18 20:00      Mechanical Ventilator  





      Mechanical Ventilator  





      Mechanical Ventilator  


 


11/28/18 20:00  106      


 


11/28/18 20:00 98.7 106 17 139/35 (69) 94   


 


11/28/18 20:00        40


 


11/28/18 19:29  70 14     40


 


11/28/18 19:00  76 20 150/31 (70) 96   


 


11/28/18 18:00  68 18 114/36 (62) 100   


 


11/28/18 17:11  68 15     40


 


11/28/18 17:00  68 16 140/28 (65) 99   


 


11/28/18 16:00  68      


 


11/28/18 16:00 98.0 68 16 131/15 (53) 98   


 


11/28/18 16:00        40


 


11/28/18 16:00      Mechanical Ventilator  





      Mechanical Ventilator  





      Mechanical Ventilator  


 


11/28/18 15:00 99.1 68 14 141/10 (53) 98   


 


11/28/18 14:56  69 15     40


 


11/28/18 14:00  72 19 113/32 (59) 98   


 


11/28/18 13:06  69 15     40


 


11/28/18 13:00  66 18 111/20 (50) 99   


 


11/28/18 12:00  72      


 


11/28/18 12:00        40


 


11/28/18 12:00 98.0 80 21 108/20 (49) 100   


 


11/28/18 12:00      Mechanical Ventilator  





      Mechanical Ventilator  





      Mechanical Ventilator  


 


11/28/18 11:00  80 21 109/21 (50) 100   


 


11/28/18 10:59  76 14     40


 


11/28/18 10:08  63 12     


 


11/28/18 10:00  63 20 104/23 (50) 100   

















Intake and Output  


 


 11/28/18 11/29/18





 19:00 07:00


 


Intake Total 290 ml 110 ml


 


Output Total 105 ml 0 ml


 


Balance 185 ml 110 ml


 


  


 


Intake Oral 60 ml 0 ml


 


Free Water 120 ml 


 


IV Total 110 ml 60 ml


 


Other  50 ml


 


Output Urine Total 105 ml 0 ml








Height (Feet):  5


Height (Inches):  2.00


Weight (Pounds):  130


Objective


Intubated, On vent.


Cv IRR/IRR


Lungs B shai.


Abd SNT. BS +


E Rt. foot diabetic ulcers with pus











Gavino Daigle MD Nov 29, 2018 09:48

## 2018-11-29 NOTE — PULMONOLOGY PROGRESS NOTE
Assessment/Plan


Assessment/Plan


IMPRESSION:


1. Hypotension.


2. Sepsis.


3. Atrial fibrillation.


4. Bradycardia.


5. Diabetes mellitus.


6. End-stage renal disease on dialysis.


7. Left lung collapse.


8. Respiratory failure.


9. Dysphagia.


10. Dementia.





DISCUSSION:


1. Continue medications.


2. as needed Levophed.


3. status post bronchoscopy


4. Central access


5. Use broad-spectrum antibiotics.  


6. continue weaning attempts














  ______________________________________________


  Quentin Cardoso M.D.





Subjective


Interval Events:  remains intubated


Constitutional:  Reports: no symptoms


HEENT:  Repors: no symptoms


Respiratory:  Reports: no symptoms


Cardiovascular:  Reports: no symptoms


Gastrointestinal/Abdominal:  Reports: no symptoms


Genitourinary:  Reports: no symptoms


Allergies:  


Coded Allergies:  


     No Known Allergies (Unverified , 11/26/18)





Objective





Last 24 Hour Vital Signs








  Date Time  Temp Pulse Resp B/P (MAP) Pulse Ox O2 Delivery O2 Flow Rate FiO2


 


11/29/18 12:00  121 27 118/27 (57) 96   


 


11/29/18 11:00  110 22 117/58 (77) 93   


 


11/29/18 10:48  110 14     40


 


11/29/18 10:48     95   


 


11/29/18 10:00  100 14 121/44 (69) 98   


 


11/29/18 09:29  94 14     40


 


11/29/18 09:00  102 15 115/30 (58) 96   


 


11/29/18 08:00  106      


 


11/29/18 08:00        40


 


11/29/18 08:00  100 13 138/15 (56) 96   


 


11/29/18 08:00      Mechanical Ventilator  





      Mechanical Ventilator  





      Mechanical Ventilator  


 


11/29/18 07:00 100.0 101 13 140/22 (61) 97   


 


11/29/18 06:51  107 13     40


 


11/29/18 06:00  100 13 133/27 (62) 96   


 


11/29/18 05:14  98 13     40


 


11/29/18 05:00  101 14 135/39 (71) 96   


 


11/29/18 04:00      Mechanical Ventilator  





      Mechanical Ventilator  





      Mechanical Ventilator  


 


11/29/18 04:00        40


 


11/29/18 04:00 98.5 100 16 124/44 (70) 100   


 


11/29/18 04:00  100      


 


11/29/18 03:10  108 15     40


 


11/29/18 03:00  103 15 123/36 (65) 98   


 


11/29/18 02:00  101 14 111/38 (62) 100   


 


11/29/18 01:00  106 16 117/39 (65) 100   


 


11/29/18 00:46  102 14     40


 


11/29/18 00:00        40


 


11/29/18 00:00      Mechanical Ventilator  





      Mechanical Ventilator  





      Mechanical Ventilator  


 


11/29/18 00:00 98.8 105 16 110/34 (59) 97   


 


11/29/18 00:00  105      


 


11/28/18 23:00  102 15 107/29 (55) 97   


 


11/28/18 22:42  107 13     40


 


11/28/18 22:00  108 16 118/42 (67) 98   


 


11/28/18 21:27  102 17     40


 


11/28/18 21:00  107 17 110/29 (56) 98   


 


11/28/18 20:00      Mechanical Ventilator  





      Mechanical Ventilator  





      Mechanical Ventilator  


 


11/28/18 20:00  106      


 


11/28/18 20:00 98.7 106 17 139/35 (69) 94   


 


11/28/18 20:00        40


 


11/28/18 19:29  70 14     40


 


11/28/18 19:00  76 20 150/31 (70) 96   


 


11/28/18 18:00  68 18 114/36 (62) 100   


 


11/28/18 17:11  68 15     40


 


11/28/18 17:00  68 16 140/28 (65) 99   


 


11/28/18 16:00  68      


 


11/28/18 16:00 98.0 68 16 131/15 (53) 98   


 


11/28/18 16:00        40


 


11/28/18 16:00      Mechanical Ventilator  





      Mechanical Ventilator  





      Mechanical Ventilator  


 


11/28/18 15:00 99.1 68 14 141/10 (53) 98   


 


11/28/18 14:56  69 15     40


 


11/28/18 14:00  72 19 113/32 (59) 98   


 


11/28/18 13:06  69 15     40

















Intake and Output  


 


 11/28/18 11/29/18





 18:59 06:59


 


Intake Total 400 ml 110 ml


 


Output Total 125 ml 10 ml


 


Balance 275 ml 100 ml


 


  


 


Intake Oral 60 ml 0 ml


 


Free Water 120 ml 


 


IV Total 220 ml 60 ml


 


Other  50 ml


 


Output Urine Total 125 ml 10 ml








General Appearance:  no acute distress


HEENT:  normocephalic


Respiratory/Chest:  chest wall non-tender, lungs clear


Cardiovascular:  normal peripheral pulses


Abdomen:  normal bowel sounds





Microbiology








 Date/Time


Source Procedure


Growth Status


 


 


 11/28/18 11:40


Other(Specify in comment) Gram Stain - Final Resulted


 


 11/28/18 11:40


Other(Specify in comment) Wound Culture - Preliminary Resulted





 11/28/18 11:40


Sacral Wound Gram Stain - Final Resulted


 


 11/28/18 11:40


Sacral Wound Wound Culture - Preliminary Resulted











Current Medications








 Medications


  (Trade)  Dose


 Ordered  Sig/Tony


 Route


 PRN Reason  Start Time


 Stop Time Status Last Admin


Dose Admin


 


 Amiodarone HCl


  (Cordarone)  200 mg  EVERY 12  HOURS


 ORAL


   11/27/18 21:00


 12/27/18 20:59  11/29/18 09:16


 


 


 Chlorhexidine


 Gluconate


  (Juana-Hex 2%)  1 applic  DAILY@2000


 TOPIC


   11/27/18 20:00


 12/27/18 19:59  11/28/18 21:04


 


 


 Epoetin Rupesh


  (Procrit (for


 ESRD on dialysis))  10,000 units  MON-WED-FRI


 SUBQ


   11/28/18 21:00


 12/28/18 20:59  11/28/18 21:16


 


 


 Heparin Sodium


  (Porcine)


  (Heparin Sod


 1000 units/ml


 10ml)  2,000 unit  ONCE  PRN


 IV


 DIALYSIS  11/29/18 09:54


 11/30/18 23:59   


 


 


 Iron Sucrose 100


 mg/Sodium Chloride  60 ml @ 


 240 mls/hr  BEDTIME


 IV


   11/28/18 21:00


 12/2/18 21:14  11/28/18 21:15


 


 


 Linezolid


  (Zyvox)  600 mg  EVERY 12  HOURS


 ORAL


   11/28/18 21:00


 12/3/18 20:59  11/29/18 08:44


 


 


 Meropenem 500 mg/


 Sodium Chloride  55 ml @ 


 110 mls/hr  DAILY


 IVPB


   11/28/18 12:00


 12/3/18 11:59  11/29/18 08:45


 


 


 Norepinephrine


 Bitartrate 4 mg/


 Dextrose  250 ml @ 0


 mls/hr  Q24H


 IV


   11/26/18 16:30


 12/26/18 16:29  11/27/18 21:28


 


 


 Pantoprazole


  (Protonix)  40 mg  Q12HR


 IVP


   11/26/18 21:00


 12/27/18 08:59  11/29/18 08:44


 


 


 Sodium Chloride  1,000 ml @ 


 500 mls/hr  Q2H PRN


 IVLG


 sbp<90 during hd  11/29/18 09:54


 11/30/18 23:59   


 

















Quentin Cardoso MD Nov 29, 2018 13:04

## 2018-11-29 NOTE — NUR
NURSE NOTES: Patient repositioned, patient is alert and trying to mouth words, patient is 
A-fib on the monitor. Patient now has orders for weaning protocol. No acute distress noted. 
Will continue to monitor patient and follow plan of care.

## 2018-11-29 NOTE — GENERAL SURGERY PROGRESS NOTE
General Surgery-Progress Note


Subjective


Procedure Performed


left subclavian central venous catheter insertion


Additional Comments


in ICU on vent.  awake and alert today.  responsive.  following commands.  son 

at bedside.





Objective





Last 24 Hour Vital Signs








  Date Time  Temp Pulse Resp B/P (MAP) Pulse Ox O2 Delivery O2 Flow Rate FiO2


 


11/29/18 17:00  98 14     40


 


11/29/18 17:00  96 19 123/33 (63) 97   


 


11/29/18 16:30    123/33    


 


11/29/18 16:00 98.9 97 12 92/12 (38) 95   


 


11/29/18 16:00  108      


 


11/29/18 16:00      Mechanical Ventilator  





      Mechanical Ventilator  





      Mechanical Ventilator  


 


11/29/18 15:08        40


 


11/29/18 15:05  115 14     40


 


11/29/18 15:00  115 19 135/22 (59) 92   


 


11/29/18 14:00  122 19  93   


 


11/29/18 13:21  105 18     40


 


11/29/18 13:00  125 27  94   


 


11/29/18 12:00        40


 


11/29/18 12:00  103      


 


11/29/18 12:00      Mechanical Ventilator  





      Mechanical Ventilator  





      Mechanical Ventilator  


 


11/29/18 12:00  121 27 118/27 (57) 96   


 


11/29/18 11:00  110 22 117/58 (77) 93   


 


11/29/18 10:48  110 14     40


 


11/29/18 10:48     95   


 


11/29/18 10:00  100 14 121/44 (69) 98   


 


11/29/18 09:29  94 14     40


 


11/29/18 09:00  102 15 115/30 (58) 96   


 


11/29/18 08:00  106      


 


11/29/18 08:00        40


 


11/29/18 08:00  100 13 138/15 (56) 96   


 


11/29/18 08:00      Mechanical Ventilator  





      Mechanical Ventilator  





      Mechanical Ventilator  


 


11/29/18 07:00 100.0 101 13 140/22 (61) 97   


 


11/29/18 06:51  107 13     40


 


11/29/18 06:00  100 13 133/27 (62) 96   


 


11/29/18 05:14  98 13     40


 


11/29/18 05:00  101 14 135/39 (71) 96   


 


11/29/18 04:00      Mechanical Ventilator  





      Mechanical Ventilator  





      Mechanical Ventilator  


 


11/29/18 04:00        40


 


11/29/18 04:00 98.5 100 16 124/44 (70) 100   


 


11/29/18 04:00  100      


 


11/29/18 03:10  108 15     40


 


11/29/18 03:00  103 15 123/36 (65) 98   


 


11/29/18 02:00  101 14 111/38 (62) 100   


 


11/29/18 01:00  106 16 117/39 (65) 100   


 


11/29/18 00:46  102 14     40


 


11/29/18 00:00        40


 


11/29/18 00:00      Mechanical Ventilator  





      Mechanical Ventilator  





      Mechanical Ventilator  


 


11/29/18 00:00 98.8 105 16 110/34 (59) 97   


 


11/29/18 00:00  105      


 


11/28/18 23:00  102 15 107/29 (55) 97   


 


11/28/18 22:42  107 13     40


 


11/28/18 22:00  108 16 118/42 (67) 98   


 


11/28/18 21:27  102 17     40


 


11/28/18 21:00  107 17 110/29 (56) 98   


 


11/28/18 20:00      Mechanical Ventilator  





      Mechanical Ventilator  





      Mechanical Ventilator  


 


11/28/18 20:00  106      


 


11/28/18 20:00 98.7 106 17 139/35 (69) 94   


 


11/28/18 20:00        40


 


11/28/18 19:29  70 14     40


 


11/28/18 19:00  76 20 150/31 (70) 96   


 


11/28/18 18:00  68 18 114/36 (62) 100   








I&O











Intake and Output  


 


 11/28/18 11/29/18





 19:00 07:00


 


Intake Total 290 ml 110 ml


 


Output Total 105 ml 0 ml


 


Balance 185 ml 110 ml


 


  


 


Intake Oral 60 ml 0 ml


 


Free Water 120 ml 


 


IV Total 110 ml 60 ml


 


Other  50 ml


 


Output Urine Total 105 ml 0 ml








Dressing:  saturated


Wound:  other


Drains:  other


Cardiovascular:  RSR


Respiratory:  decreased breath sounds


Abdomen:  soft, flat, non-tender, present bowel sounds


Extremities:  edema, tenderness, no cyanosis





Laboratory Tests








Test


  11/29/18


13:50


 


Arterial Blood pH


  7.250


(7.350-7.450)


 


Arterial Blood Partial


Pressure CO2 48.3 mmHg


(35.0-45.0)  H


 


Arterial Blood Partial


Pressure O2 75.1 mmHg


(75.0-100.0)


 


Arterial Blood HCO3


  20.8 mmol/L


(22.0-26.0)  L


 


Arterial Blood Oxygen


Saturation 91.8 %


()  L


 


Arterial Blood Base Excess -6.4 (-2-2)  L


 


Arcadio Test Positive  











Plan


Problems:  


(1) Sepsis


Assessment & Plan:  acute decompensation 


hypotensive


bradycardic


respiratory decompensation requiring intubation and vent support - failed 

extubation 


midline being used and safe for now


may need another line later if peripheral not possible - PICC


thank you


will follow with recs 





(2) Hypotension


(3) Bradycardia


(4) Altered mental status


(5) Decubital ulcer


Assessment & Plan:  Patient with multiple pressure injuries where have been 

present since admission. 


Full thickness pressure injury to sacrum (L)(L)2.4cm x (W)4.4cm x (D)0.9cm, 

undermining 12-12 with tunneling 9-10 by 11.3cm. 


In close proximity to sacral wound on L buttocks purple ,indurated area with 

small area of slough measuring (L)0.6cm x (W)0.5cm noted. Arched surgical scar 

noted to L buttocks at site of tunneling. 


Resolving pressure injury noted to R ischium (L)3cm x (W)2cm. Wound bed 

epithelialized with maroon discoloration without induration periwound. 


Closed area with white pocket and purple margins noted to distal,paz R tibia 

(L)6cm x (W)2.7cm


Full thickness wound noted to distal/medial RLE (L)6.2cm x (W)3.8cm .Wound 

noted to have 95% mixed slough /necrosis ,marginal erythema. Wound malodorous.


Wound posterior R tibia (L)4.7cm x (W) 3cm ,100% necrotic with marginal 

erythema. Dry eschar with marginal erythema 


(L)0.5cm x (W)0.7cm noted to medial R heel.Non-blanchable erythema noted to 

medial L malleolus. 


Dry brown eschar noted to medial L 1st malleolus (L)1.4cm x (W)1cm. 


L heel and lateral aspect boggy with non-blanchable erythema. 





Tx.Plan:


Cleanse sacral wound with Saline.Loose pack with Hydrogel impregnated packing 

strip (Attention to tunneled area at 9-10).Cover with Optifoam drsg Daily and 

prn.


           


Reposition at least every 2hours or as tolerated.


           


Air Fluidized mattress.


           


Off-load heels with pillow.





Appreciate Podiatry and Vascular input














Bob Lewis Nov 29, 2018 17:54

## 2018-11-29 NOTE — NUR
NURSE NOTES: Received patient from CAROLYN Hanks. Patient received laying in bed, orally 
intubated ETT size 7.5, 23cm midline, Ac 12, , Fio2 40%, Peep 5, Bilateral lung sound 
of rhonchi. No acute distress noted, 02 saturation 97%. Patient is connected to cardiac 
monitor , patient is A-FIB on the monitor. Paten is NPO, abdomen is soft and non 
tender. Patient is anuric. Patient has a right midline single lumen , patient and 
asymptomatic. Patient has a left AV shunt, bruit and thrill present. Patient left arm is 
edematous. Patients upper extremities and hands are swollen. Patient has several lower 
extremities wounds, pictures have been taken and dressing are dry and intact at this time. 
Call light has been given to patient and she has been instructed on how to use the call 
light, bed is locked and in the lowest position, bed alarm is on. Will continue to monitor 
patient and follow plan of care.


-------------------------------------------------------------------------------

Addendum: 11/29/18 at 1059 by BERNIE FOWLER RN

-------------------------------------------------------------------------------

Patient also has bilateral wrist restraints pulses are present and skin at the wrist is 
intact.

## 2018-11-29 NOTE — NUR
CASE MANAGEMENT: REVIEW





SI: A-FIB . SEPSIS . ESRD ON HD 

T 98.9  RR 14 BP 92/12 SAT 95% MECH VENT FIO2 40









IS: AMIODARONE PO Q12HR

LEVOPHED GTT

DOPAMINE GTT 





***ICU STATUS***

DCP: PATIENT IS FROM [facility]

## 2018-11-29 NOTE — NUR
NURSE NOTES:

PATIENT AWOKE, ALERT, ORIENTED, ON ETT TO VENT AC12//FIO2 40%/PEEP 5, O2 SATURATION 98% 
NOTED, NGT TO LEFT NARES, INTACT, ABDOMEN SOFT, HYPOACTIVE BOWEL SOUND X4 QUADRANTS,  NO 
BOWEL MOVEMENT STATUS, AV SHUNT TO LEFT UPPER ARM, PALPABLE THRILL, BRUIT, MID-LINE TO RIGHT 
ARM, INTACT AND PATENT, 2 POINT SOFT RESTRAINTS FOR SAFETY, ON P200 BED, PROVIDED CALL LIGHT 
WITHIN REACH, MADE LOWER BED POSITION, WILL CONTINUE TO MONITOR.

## 2018-11-29 NOTE — DIAGNOSTIC IMAGING REPORT
Indication: Abdominal pain

 

Technique: Grayscale duplex images of the abdominal aorta

 

Comparison: Ultrasound abdomen 11/26/2018

 

Findings: Abdominal aorta is normal in caliber. Color Doppler imaging demonstrates

patency, with normal Doppler flow velocities and waveforms. The proximal common iliac

arteries are also patent, nonaneurysmal.

 

Impression: Unremarkable abdominal aorta

## 2018-11-29 NOTE — NUR
RESPIRATORY NOTE: 



Pt. was received on Mechanical ventilator: AC: 500, 12, 40%, +5. Patient is currently 
stable, but HR elevates. Patient is comfortable on current vent settings. will continue to 
monitor patient

## 2018-11-29 NOTE — NUR
NURSE NOTES:

Bathe,Linen chg.Suctioned.Sanya.vent settings.More Awake,Alert.VSS.Scope Rhythm IN/OUT 
SR-At.FIB.Asymptomatic.

## 2018-11-29 NOTE — NUR
NURSE NOTES: Patient repositioned, patient cleaned, son is at bedside. Patient has denied 
pain entire shift. Patient dressing have all been changed. Dr. Lewis came to see 
patient. No new orders noted. Bilateral legs are elevated, bed is locked and in the lowest 
position, bed alarm is on, 3 side rails are up. Will continue to monitor patient and follow 
plan of care. patient remains NPO.

## 2018-11-30 VITALS — DIASTOLIC BLOOD PRESSURE: 24 MMHG | SYSTOLIC BLOOD PRESSURE: 128 MMHG

## 2018-11-30 VITALS — SYSTOLIC BLOOD PRESSURE: 79 MMHG | DIASTOLIC BLOOD PRESSURE: 40 MMHG

## 2018-11-30 VITALS — SYSTOLIC BLOOD PRESSURE: 102 MMHG | DIASTOLIC BLOOD PRESSURE: 13 MMHG

## 2018-11-30 VITALS — DIASTOLIC BLOOD PRESSURE: 19 MMHG | SYSTOLIC BLOOD PRESSURE: 157 MMHG

## 2018-11-30 VITALS — SYSTOLIC BLOOD PRESSURE: 138 MMHG | DIASTOLIC BLOOD PRESSURE: 15 MMHG

## 2018-11-30 VITALS — SYSTOLIC BLOOD PRESSURE: 103 MMHG | DIASTOLIC BLOOD PRESSURE: 16 MMHG

## 2018-11-30 VITALS — SYSTOLIC BLOOD PRESSURE: 112 MMHG | DIASTOLIC BLOOD PRESSURE: 18 MMHG

## 2018-11-30 VITALS — SYSTOLIC BLOOD PRESSURE: 128 MMHG | DIASTOLIC BLOOD PRESSURE: 27 MMHG

## 2018-11-30 VITALS — SYSTOLIC BLOOD PRESSURE: 107 MMHG | DIASTOLIC BLOOD PRESSURE: 20 MMHG

## 2018-11-30 VITALS — DIASTOLIC BLOOD PRESSURE: 26 MMHG | SYSTOLIC BLOOD PRESSURE: 117 MMHG

## 2018-11-30 VITALS — SYSTOLIC BLOOD PRESSURE: 84 MMHG | DIASTOLIC BLOOD PRESSURE: 14 MMHG

## 2018-11-30 VITALS — SYSTOLIC BLOOD PRESSURE: 124 MMHG | DIASTOLIC BLOOD PRESSURE: 17 MMHG

## 2018-11-30 VITALS — DIASTOLIC BLOOD PRESSURE: 32 MMHG | SYSTOLIC BLOOD PRESSURE: 100 MMHG

## 2018-11-30 VITALS — SYSTOLIC BLOOD PRESSURE: 114 MMHG | DIASTOLIC BLOOD PRESSURE: 21 MMHG

## 2018-11-30 VITALS — SYSTOLIC BLOOD PRESSURE: 123 MMHG | DIASTOLIC BLOOD PRESSURE: 46 MMHG

## 2018-11-30 VITALS — SYSTOLIC BLOOD PRESSURE: 120 MMHG | DIASTOLIC BLOOD PRESSURE: 23 MMHG

## 2018-11-30 VITALS — SYSTOLIC BLOOD PRESSURE: 109 MMHG | DIASTOLIC BLOOD PRESSURE: 18 MMHG

## 2018-11-30 VITALS — SYSTOLIC BLOOD PRESSURE: 115 MMHG | DIASTOLIC BLOOD PRESSURE: 17 MMHG

## 2018-11-30 VITALS — SYSTOLIC BLOOD PRESSURE: 118 MMHG | DIASTOLIC BLOOD PRESSURE: 12 MMHG

## 2018-11-30 VITALS — DIASTOLIC BLOOD PRESSURE: 17 MMHG | SYSTOLIC BLOOD PRESSURE: 120 MMHG

## 2018-11-30 VITALS — SYSTOLIC BLOOD PRESSURE: 122 MMHG | DIASTOLIC BLOOD PRESSURE: 13 MMHG

## 2018-11-30 VITALS — SYSTOLIC BLOOD PRESSURE: 145 MMHG | DIASTOLIC BLOOD PRESSURE: 31 MMHG

## 2018-11-30 VITALS — DIASTOLIC BLOOD PRESSURE: 11 MMHG | SYSTOLIC BLOOD PRESSURE: 154 MMHG

## 2018-11-30 VITALS — SYSTOLIC BLOOD PRESSURE: 110 MMHG | DIASTOLIC BLOOD PRESSURE: 72 MMHG

## 2018-11-30 LAB
ADD MANUAL DIFF: NO
ALBUMIN SERPL-MCNC: 1.9 G/DL (ref 3.4–5)
ALBUMIN/GLOB SERPL: 0.4 {RATIO} (ref 1–2.7)
ALP SERPL-CCNC: 141 U/L (ref 46–116)
ALT SERPL-CCNC: 8 U/L (ref 12–78)
ANION GAP SERPL CALC-SCNC: 9 MMOL/L (ref 5–15)
AST SERPL-CCNC: 9 U/L (ref 15–37)
BASOPHILS NFR BLD AUTO: 0.9 % (ref 0–2)
BILIRUB SERPL-MCNC: 0.4 MG/DL (ref 0.2–1)
BUN SERPL-MCNC: 32 MG/DL (ref 7–18)
CALCIUM SERPL-MCNC: 8.2 MG/DL (ref 8.5–10.1)
CHLORIDE SERPL-SCNC: 98 MMOL/L (ref 98–107)
CO2 SERPL-SCNC: 23 MMOL/L (ref 21–32)
CREAT SERPL-MCNC: 4.6 MG/DL (ref 0.55–1.3)
EOSINOPHIL NFR BLD AUTO: 4.5 % (ref 0–3)
ERYTHROCYTE [DISTWIDTH] IN BLOOD BY AUTOMATED COUNT: 15.5 % (ref 11.6–14.8)
GLOBULIN SER-MCNC: 5.4 G/DL
HCT VFR BLD CALC: 34.4 % (ref 37–47)
HGB BLD-MCNC: 10.4 G/DL (ref 12–16)
IRON SERPL-MCNC: 57 UG/DL (ref 50–175)
LYMPHOCYTES NFR BLD AUTO: 19 % (ref 20–45)
MCV RBC AUTO: 89 FL (ref 80–99)
MONOCYTES NFR BLD AUTO: 7.7 % (ref 1–10)
NEUTROPHILS NFR BLD AUTO: 68 % (ref 45–75)
PLATELET # BLD: 148 K/UL (ref 150–450)
POTASSIUM SERPL-SCNC: 4 MMOL/L (ref 3.5–5.1)
RBC # BLD AUTO: 3.87 M/UL (ref 4.2–5.4)
SODIUM SERPL-SCNC: 130 MMOL/L (ref 136–145)
WBC # BLD AUTO: 5.2 K/UL (ref 4.8–10.8)

## 2018-11-30 RX ADMIN — PANTOPRAZOLE SODIUM SCH MG: 40 INJECTION, POWDER, FOR SOLUTION INTRAVENOUS at 20:49

## 2018-11-30 RX ADMIN — SODIUM CHLORIDE SCH MLS/HR: 0.9 INJECTION INTRAVENOUS at 20:50

## 2018-11-30 RX ADMIN — ERYTHROPOIETIN SCH UNITS: 20000 INJECTION, SOLUTION INTRAVENOUS; SUBCUTANEOUS at 20:49

## 2018-11-30 RX ADMIN — PANTOPRAZOLE SODIUM SCH MG: 40 INJECTION, POWDER, FOR SOLUTION INTRAVENOUS at 09:19

## 2018-11-30 RX ADMIN — AMIODARONE HYDROCHLORIDE SCH MG: 200 TABLET ORAL at 09:20

## 2018-11-30 RX ADMIN — CHLORHEXIDINE GLUCONATE SCH APPLIC: 213 SOLUTION TOPICAL at 19:27

## 2018-11-30 RX ADMIN — AMIODARONE HYDROCHLORIDE SCH MG: 200 TABLET ORAL at 20:48

## 2018-11-30 RX ADMIN — MEROPENEM SCH MLS/HR: 500 INJECTION INTRAVENOUS at 09:19

## 2018-11-30 NOTE — CARDIOLOGY PROGRESS NOTE
Assessment/Plan


Assessment/Plan


1. Hypotension, possibly sepsis versus volume.


2. Paroxysmal episodes of atrial fibrillation history.


3. Bradycardia secondary to medications, amiodarone possibly.


4. Diabetes mellitus.


5. End-stage renal disease, on hemodialysis.


6. Lung collapse.


7. Respiratory failure, status post intubation.


8. Dysphagia.


9. History of dementia.


10. Pulm htn 


11.  Pleural effusion 








 in and out of afib will increae amiod for a few doses watch for edouard 


tele reviewed


echo personally reviewed lv function is hyperdynamic 


has bilat pleural effusion 


off vasopressor   


urine cx postive 


wean o ff vent 


may need to consider thoracentesis


cotienu icu leve of care for now s 


anemic s/p prbc tx


? source f anemia 


cannot anticoagualte until h/h stable


labs in am





Subjective


Cardiovascular:  Denies: chest pain, lightheadedness, palpitations


Respiratory:  Reports: shortness of breath


Gastrointestinal/Abdominal:  Denies: abdominal pain


Subjective


on the vent not verbal but communicates with head motion





Objective





Last 24 Hour Vital Signs








  Date Time  Temp Pulse Resp B/P (MAP) Pulse Ox O2 Delivery O2 Flow Rate FiO2


 


11/30/18 17:15  85 14     40


 


11/30/18 17:00 97.6 93 14 120/17 (51) 100   


 


11/30/18 16:30    120/17    


 


11/30/18 16:00        40


 


11/30/18 16:00  92 16 115/17 (49) 100   


 


11/30/18 16:00      Mechanical Ventilator  





      Mechanical Ventilator  





      Mechanical Ventilator  


 


11/30/18 16:00  87      


 


11/30/18 15:00  92 16 84/14 (37) 100   


 


11/30/18 14:40  83 17     40


 


11/30/18 14:00  95 12 114/21 (52) 100   


 


11/30/18 13:00  94 12 100/32 (54) 100   


 


11/30/18 12:55  95 14     40


 


11/30/18 12:00  95 13 103/16 (45) 99   


 


11/30/18 12:00        40


 


11/30/18 12:00  108      


 


11/30/18 12:00      Mechanical Ventilator  





      Mechanical Ventilator  





      Mechanical Ventilator  


 


11/30/18 11:00  99 15 110/72 (85) 97   


 


11/30/18 10:49  111 22     40


 


11/30/18 10:00  119 28 128/24 (58) 97   


 


11/30/18 09:06     98   


 


11/30/18 09:03  86 22     40


 


11/30/18 09:00  121 24 109/18 (48) 98   


 


11/30/18 08:00  90      


 


11/30/18 08:00 99.4 96 13 122/13 (49) 99   


 


11/30/18 08:00        40


 


11/30/18 08:00      Mechanical Ventilator  





      Mechanical Ventilator  





      Mechanical Ventilator  


 


11/30/18 07:00  94 12     40


 


11/30/18 07:00  86 12 117/26 (56) 99   


 


11/30/18 06:00  94 14 118/12 (47) 99   


 


11/30/18 05:00  99 14 123/46 (71) 98   


 


11/30/18 05:00  90 13     40


 


11/30/18 04:06  90      


 


11/30/18 04:00        40


 


11/30/18 04:00 98.6 98 13 102/13 (42) 99   


 


11/30/18 04:00      Mechanical Ventilator  





      Mechanical Ventilator  





      Mechanical Ventilator  


 


11/30/18 03:00  98 14 120/23 (55) 98   


 


11/30/18 02:55  94 14     40


 


11/30/18 02:00  98 16 112/18 (49) 98   


 


11/30/18 01:00  98 15 79/40 (53) 97   


 


11/30/18 00:41  106 17     40


 


11/30/18 00:00        40


 


11/30/18 00:00      Mechanical Ventilator  





      Mechanical Ventilator  





      Mechanical Ventilator  


 


11/30/18 00:00 99.1 96 14 124/17 (52) 97   


 


11/29/18 23:50  100      


 


11/29/18 23:00  97 17 143/38 (73) 97   


 


11/29/18 22:55  92 17     40


 


11/29/18 22:00  98 15 131/26 (61) 98   


 


11/29/18 21:00  90 15     40


 


11/29/18 21:00  98 17 145/93 (110) 98   


 


11/29/18 20:00        40


 


11/29/18 20:00 98.6 101 16 131/42 (71) 98   


 


11/29/18 20:00      Mechanical Ventilator  





      Mechanical Ventilator  





      Mechanical Ventilator  


 


11/29/18 19:37  99      








General Appearance:  no apparent distress, alert, on vent, patient on isolation


Neck:  supple


Cardiovascular:  irregularly irregular


Respiratory/Chest:  lungs clear


Abdomen:  normal bowel sounds, non tender, soft


Extremities:  trace edema











Intake and Output  


 


 11/29/18 11/30/18





 18:59 06:59


 


Intake Total 110 ml 110 ml


 


Balance 110 ml 110 ml


 


  


 


Intake Oral 0 ml 0 ml


 


IV Total 110 ml 60 ml


 


Other  50 ml


 


# Bowel Movements 1 2











Laboratory Tests








Test


  11/30/18


01:45 11/30/18


04:00


 


Stool Occult Blood


  Negative


(NEGATIVE) 


 


 


White Blood Count


  


  5.2 K/UL


(4.8-10.8)


 


Red Blood Count


  


  3.87 M/UL


(4.20-5.40)  L


 


Hemoglobin


  


  10.4 G/DL


(12.0-16.0)  L


 


Hematocrit


  


  34.4 %


(37.0-47.0)  L


 


Mean Corpuscular Volume  89 FL (80-99)  


 


Mean Corpuscular Hemoglobin


  


  27.0 PG


(27.0-31.0)


 


Mean Corpuscular Hemoglobin


Concent 


  30.3 G/DL


(32.0-36.0)  L


 


Red Cell Distribution Width


  


  15.5 %


(11.6-14.8)  H


 


Platelet Count


  


  148 K/UL


(150-450)  L


 


Mean Platelet Volume


  


  8.4 FL


(6.5-10.1)


 


Neutrophils (%) (Auto)


  


  68.0 %


(45.0-75.0)


 


Lymphocytes (%) (Auto)


  


  19.0 %


(20.0-45.0)  L


 


Monocytes (%) (Auto)


  


  7.7 %


(1.0-10.0)


 


Eosinophils (%) (Auto)


  


  4.5 %


(0.0-3.0)  H


 


Basophils (%) (Auto)


  


  0.9 %


(0.0-2.0)


 


Sodium Level


  


  130 MMOL/L


(136-145)  L


 


Potassium Level


  


  4.0 MMOL/L


(3.5-5.1)


 


Chloride Level


  


  98 MMOL/L


()


 


Carbon Dioxide Level


  


  23 MMOL/L


(21-32)


 


Anion Gap


  


  9 mmol/L


(5-15)


 


Blood Urea Nitrogen


  


  32 mg/dL


(7-18)  H


 


Creatinine


  


  4.6 MG/DL


(0.55-1.30)  H


 


Estimat Glomerular Filtration


Rate 


   mL/min (>60)  


 


 


Glucose Level


  


  110 MG/DL


()  H


 


Calcium Level


  


  8.2 MG/DL


(8.5-10.1)  L


 


Iron Level


  


  57 ug/dL


()


 


Total Bilirubin


  


  0.4 MG/DL


(0.2-1.0)


 


Aspartate Amino Transf


(AST/SGOT) 


  9 U/L (15-37)


L


 


Alanine Aminotransferase


(ALT/SGPT) 


  8 U/L (12-78)


L


 


Alkaline Phosphatase


  


  141 U/L


()  H


 


Total Protein


  


  7.3 G/DL


(6.4-8.2)


 


Albumin


  


  1.9 G/DL


(3.4-5.0)  L


 


Globulin  5.4 g/dL  


 


Albumin/Globulin Ratio


  


  0.4 (1.0-2.7)


L











Microbiology








 Date/Time


Source Procedure


Growth Status


 


 


 11/28/18 11:40


Other(Specify in comment) Gram Stain - Final Resulted


 


 11/28/18 11:40 Wound Culture - Preliminary


Gram Negative Bacillus 1


Candida Albicans Resulted





 11/28/18 11:40


Sacral Wound Gram Stain - Final Resulted


 


 11/28/18 11:40 Wound Culture - Preliminary


Staphylococcus Aureus


Candida Albicans Resulted

















Mariusz Wade MD Nov 30, 2018 19:08

## 2018-11-30 NOTE — NUR
CASE MANAGEMENT: REVIEW





SI: A-FIB . SEPSIS . ESRD ON HD 

T 99.1  RR 12 BP  79/40 SAT 98% MECH VENT FIO2 40 

H/H 10.4/34.4  BUN 32 CR 4.6 







IS: AMIODARONE PO Q12HR

LEVOPHED GTT

PROTONIX IV Q12HR

PROCRIT SQ MWF

ZYVOX PO Q12HR 

NGT FEEDING NEPRO @10ML/HR 



***ICU STATUS***

DCP: PATIENT IS FROM [facility]

## 2018-11-30 NOTE — NUR
RESPIRATORY NOTE:

Received pt on vent, intubated with ETT 7.5 placed 23cm at the lip, secured via anchor fast. 
no redness around facial area. alarms on and audible. vent plugged into red outlet with ambu 
bag at bedside. will cont to monitor throughout the day.

## 2018-11-30 NOTE — NUR
NURSE NOTES:

PATIENT WANTED TO REMOVE RESTRAINTS AND TRIED TO TOUCH LINE THAT INFORMED AND EDUCATED FOR 
REMOVAL RESTRAINTS AND PT'S CARE , RELEASED RESTRAINTS AND REAPPLIED FOR SAFETY, WILL 
CONTINUE TO MONITOR.

## 2018-11-30 NOTE — DIAGNOSTIC IMAGING REPORT
--------------- APPROVED REPORT --------------





CPT Code: 31547



Vascular Symptoms

Comments: Altered menal status

Weakness



Doppler Spectral Velocity Analysis

RightLeft







arteries. The Doppler spectral flow analysis indicates the degree of stenosis is minimal 

(20%) in the common carotid arteries, (10%) in the internal carotid arteries, and (10%) 

in the external carotid arteries.

VERTEBRAL- The vertebral arteries are patent, without evidence of stenosis or steal.



arteries. The Doppler spectral flow analysis indicates the degree of stenosis is minimal 

(20%) in the common carotid arteries, (5%) in the internal carotid arteries, and (10%) in 

the external carotid arteries.

VERTEBRAL- The vertebral arteries are patent, without evidence of stenosis or steal.

## 2018-11-30 NOTE — NUR
NURSE NOTES: Patient repositioned. No change in condition. hemodialysis pulled of 1 liter. 
Patient linen has been changed, dressing are dry and intact. Bilateral arms and legs are 
elevated with pillows, heel protectors are noted. Bed is locked and in the lowest position, 
bed alarm is on. Will continue to monitor patient and follow plan of care.

## 2018-11-30 NOTE — NUR
NURSE NOTES: Patient began hemodialysis. Patient son called and inquired about the patient 
having dialysis. Patient repositioned prior to hemodialysis starting, oral care given and 
suctioned moderate amount  of thick white sections. Head of bed is locked and in the lowest 
position, bed alarm is on, bed is locked and in th eowst position, bilateral arms and legs 
elevated with pillows, heels protectors noted.

## 2018-11-30 NOTE — NUR
NURSE NOTES:

PATIENT AWOKE, ALERT, ORIENTED, ON ETT TO VENT AC12//FIO2 40%/PEEP 5, O2 SATURATION 
100% NOTED, NGT TO LEFT NARES, INTACT, ONGOING NEPRO AT 20ML/HR, NO RESIDUE NOTED, ABDOMEN 
SOFT, HYPOACTIVE BOWEL SOUND X4 QUADRANTS,  NO BOWEL MOVEMENT STATUS, AV SHUNT TO LEFT UPPER 
ARM, PALPABLE THRILL, BRUIT, MID-LINE TO RIGHT ARM, INTACT AND PATENT, 2 POINT SOFT 
RESTRAINTS FOR SAFETY, ON P200 BED, PROVIDED CALL LIGHT WITHIN REACH, MADE LOWER BED 
POSITION, WILL CONTINUE TO MONITOR.

## 2018-11-30 NOTE — DIAGNOSTIC IMAGING REPORT
--------------- APPROVED REPORT --------------





CPT Code: 32399



Symptoms

Right 

Comments: Right leg cellulitis





RIGHT LEG: Common femoral artery waveform analysis is within normal limits at rest. Color 

flow duplex sonography reveals calcification throughout the superficial femoral and 

popliteal arteries. There is no evidence of significant stenosis or occlusion within 

these segments. Doppler flow was triphasic in these segments. The tibioperoneal trunk was 

not well visualized. The distal posterior, and anterior tibial arteries were not well 

visualized. The proximal posterior, anterior and dorsalis pedis arteries were also 

calcified. Doppler tibial artery waveform analysis is compatible with minimal ischemia at 

rest.

LEFT LEG: Common femoral artery waveform analysis is within normal limits at rest. Color 

flow duplex sonography reveals calcification throughout the superficial femoral and 

popliteal arteries. There is no evidence of significant stenosis or occlusion within 

these segments. Doppler flow was triphasic in these segments. The tibioperoneal trunk was 

not well visualized. The distal posterior, and anterior tibial arteries were not well 

visualized. The proximal posterior, anterior and dorsalis pedis arteries were also 

calcified. Doppler tibial artery waveform analysis is compatible with mild ischemia at 

rest.

## 2018-11-30 NOTE — PULMONOLOGY PROGRESS NOTE
Assessment/Plan


Assessment/Plan


IMPRESSION:


1. Hypotension.


2. Sepsis.


3. Atrial fibrillation.


4. Bradycardia.


5. Diabetes mellitus.


6. End-stage renal disease on dialysis.


7. Left lung collapse.


8. Respiratory failure.


9. Dysphagia.


10. Dementia.


11. Anemia





DISCUSSION:


1. Continue medications.


2. as needed Levophed.


3. status post bronchoscopy


4. Central access


5. Use broad-spectrum antibiotics.  


6. continue weaning attempts














  ______________________________________________


  Quentin Cardoso M.D.





Subjective


Interval Events:  Not weaning; s/p prbc


Constitutional:  Reports: no symptoms


HEENT:  Repors: no symptoms


Cardiovascular:  Reports: no symptoms


Gastrointestinal/Abdominal:  Reports: no symptoms


Genitourinary:  Reports: no symptoms


Allergies:  


Coded Allergies:  


     No Known Allergies (Unverified , 11/26/18)





Objective





Last 24 Hour Vital Signs








  Date Time  Temp Pulse Resp B/P (MAP) Pulse Ox O2 Delivery O2 Flow Rate FiO2


 


11/30/18 07:00  94 12     40


 


11/30/18 07:00  86 12 117/26 (56) 99   


 


11/30/18 06:00  94 14 118/12 (47) 99   


 


11/30/18 05:00  99 14 123/46 (71) 98   


 


11/30/18 05:00  90 13     40


 


11/30/18 04:06  90      


 


11/30/18 04:00        40


 


11/30/18 04:00 98.6 98 13 102/13 (42) 99   


 


11/30/18 04:00      Mechanical Ventilator  





      Mechanical Ventilator  





      Mechanical Ventilator  


 


11/30/18 03:00  98 14 120/23 (55) 98   


 


11/30/18 02:55  94 14     40


 


11/30/18 02:00  98 16 112/18 (49) 98   


 


11/30/18 01:00  98 15 79/40 (53) 97   


 


11/30/18 00:41  106 17     40


 


11/30/18 00:00        40


 


11/30/18 00:00      Mechanical Ventilator  





      Mechanical Ventilator  





      Mechanical Ventilator  


 


11/30/18 00:00 99.1 96 14 124/17 (52) 97   


 


11/29/18 23:50  100      


 


11/29/18 23:00  97 17 143/38 (73) 97   


 


11/29/18 22:55  92 17     40


 


11/29/18 22:00  98 15 131/26 (61) 98   


 


11/29/18 21:00  90 15     40


 


11/29/18 21:00  98 17 145/93 (110) 98   


 


11/29/18 20:00        40


 


11/29/18 20:00 98.6 101 16 131/42 (71) 98   


 


11/29/18 20:00      Mechanical Ventilator  





      Mechanical Ventilator  





      Mechanical Ventilator  


 


11/29/18 19:37  99      


 


11/29/18 19:00  96 16 144/42 (76) 98   


 


11/29/18 18:50  87 12     40


 


11/29/18 18:00  96 14 125/36 (65) 98   


 


11/29/18 17:00  98 14     40


 


11/29/18 17:00  96 19 123/33 (63) 97   


 


11/29/18 16:30    123/33    


 


11/29/18 16:00 98.9 97 12 92/12 (38) 95   


 


11/29/18 16:00  108      


 


11/29/18 16:00      Mechanical Ventilator  





      Mechanical Ventilator  





      Mechanical Ventilator  


 


11/29/18 15:08        40


 


11/29/18 15:05  115 14     40


 


11/29/18 15:00  115 19 135/22 (59) 92   


 


11/29/18 14:00  122 19  93   


 


11/29/18 13:21  105 18     40


 


11/29/18 13:00  125 27  94   


 


11/29/18 12:00        40


 


11/29/18 12:00  103      


 


11/29/18 12:00      Mechanical Ventilator  





      Mechanical Ventilator  





      Mechanical Ventilator  


 


11/29/18 12:00  121 27 118/27 (57) 96   


 


11/29/18 11:00  110 22 117/58 (77) 93   


 


11/29/18 10:48  110 14     40


 


11/29/18 10:48     95   


 


11/29/18 10:00  100 14 121/44 (69) 98   


 


11/29/18 09:29  94 14     40

















Intake and Output  


 


 11/29/18 11/30/18





 19:00 07:00


 


Intake Total 110 ml 110 ml


 


Balance 110 ml 110 ml


 


  


 


Intake Oral 0 ml 0 ml


 


IV Total 110 ml 60 ml


 


Other  50 ml


 


# Bowel Movements 1 2








General Appearance:  no acute distress


HEENT:  normocephalic


Respiratory/Chest:  chest wall non-tender, lungs clear


Cardiovascular:  normal peripheral pulses, normal rate


Abdomen:  normal bowel sounds





Microbiology








 Date/Time


Source Procedure


Growth Status


 


 


 11/28/18 11:40


Other(Specify in comment) Gram Stain - Final Resulted


 


 11/28/18 11:40


Other(Specify in comment) Wound Culture - Preliminary Resulted





 11/28/18 11:40


Sacral Wound Gram Stain - Final Resulted


 


 11/28/18 11:40


Sacral Wound Wound Culture - Preliminary Resulted








Laboratory Tests


11/29/18 13:50: 


Arterial Blood pH 7.250*L, Arterial Blood Partial Pressure CO2 48.3H, Arterial 

Blood Partial Pressure O2 75.1, Arterial Blood HCO3 20.8L, Arterial Blood 

Oxygen Saturation 91.8L, Arterial Blood Base Excess -6.4L, Arcadio Test Positive


11/30/18 01:45: Stool Occult Blood [Pending]


11/30/18 04:00: 


White Blood Count 5.2, Red Blood Count 3.87L, Hemoglobin 10.4L, Hematocrit 34.4L

, Mean Corpuscular Volume 89, Mean Corpuscular Hemoglobin 27.0, Mean 

Corpuscular Hemoglobin Concent 30.3L, Red Cell Distribution Width 15.5H, 

Platelet Count 148L, Mean Platelet Volume 8.4, Neutrophils (%) (Auto) 68.0, 

Lymphocytes (%) (Auto) 19.0L, Monocytes (%) (Auto) 7.7, Eosinophils (%) (Auto) 

4.5H, Basophils (%) (Auto) 0.9, Sodium Level 130L, Potassium Level 4.0, 

Chloride Level 98, Carbon Dioxide Level 23, Anion Gap 9, Blood Urea Nitrogen 32H

, Creatinine 4.6H, Estimat Glomerular Filtration Rate , Glucose Level 110H, 

Calcium Level 8.2L, Iron Level 57, Total Bilirubin 0.4, Aspartate Amino Transf (

AST/SGOT) 9L, Alanine Aminotransferase (ALT/SGPT) 8L, Alkaline Phosphatase 141H

, Total Protein 7.3, Albumin 1.9L, Globulin 5.4, Albumin/Globulin Ratio 0.4L





Current Medications








 Medications


  (Trade)  Dose


 Ordered  Sig/Tony


 Route


 PRN Reason  Start Time


 Stop Time Status Last Admin


Dose Admin


 


 Amiodarone HCl


  (Cordarone)  200 mg  EVERY 12  HOURS


 ORAL


   11/27/18 21:00


 12/27/18 20:59  11/29/18 20:37


 


 


 Chlorhexidine


 Gluconate


  (Juana-Hex 2%)  1 applic  DAILY@2000


 TOPIC


   11/27/18 20:00


 12/27/18 19:59  11/29/18 20:01


 


 


 Epoetin Rupesh


  (Procrit (for


 ESRD on dialysis))  10,000 units  MON-WED-FRI


 SUBQ


   11/28/18 21:00


 12/28/18 20:59  11/28/18 21:16


 


 


 Heparin Sodium


  (Porcine)


  (Heparin Sod


 1000 units/ml


 10ml)  2,000 unit  ONCE  PRN


 IV


 DIALYSIS  11/29/18 09:54


 11/30/18 23:59   


 


 


 Iron Sucrose 100


 mg/Sodium Chloride  60 ml @ 


 240 mls/hr  BEDTIME


 IV


   11/28/18 21:00


 12/2/18 21:14  11/29/18 20:36


 


 


 Linezolid


  (Zyvox)  600 mg  EVERY 12  HOURS


 ORAL


   11/28/18 21:00


 12/3/18 20:59  11/29/18 20:37


 


 


 Meropenem 500 mg/


 Sodium Chloride  55 ml @ 


 110 mls/hr  DAILY


 IVPB


   11/28/18 12:00


 12/3/18 11:59  11/29/18 08:45


 


 


 Norepinephrine


 Bitartrate 4 mg/


 Dextrose  250 ml @ 0


 mls/hr  Q24H


 IV


   11/26/18 16:30


 12/26/18 16:29  11/27/18 21:28


 


 


 Pantoprazole


  (Protonix)  40 mg  Q12HR


 IVP


   11/26/18 21:00


 12/27/18 08:59  11/29/18 20:37


 


 


 Sodium Chloride  1,000 ml @ 


 500 mls/hr  Q2H PRN


 IVLG


 sbp<90 during hd  11/29/18 09:54


 11/30/18 23:59   


 

















Quentin Cardoso MD Nov 30, 2018 09:03

## 2018-11-30 NOTE — NUR
NURSE NOTES: Patient repositioned, attempted to suction patient, patient refused. Will try 
to suctioned patient later on throughout shift. patient restraints released and reapplied. 
Patient is watching tv. No acute distress noted. Patient denies pain. Bilateral arms and 
legs are elevated with pillows, heel protectors are noted. Head of bed is locked and in the 
lowest position. Will continue to monitor patient and follow plan of care.

## 2018-11-30 NOTE — NUR
NURSE NOTES: Patient received from [facility]. Patient received in bed with ETT size 7.5, 
23cm, Vent setting Ac 12, Tv 500, Fio2 40%, P5, 02 saturation 100%. Bilateral lung sound of 
rhonchi upon auscultation. Patient is connected to the cardiac monitor HR 96, Patient is 
a-fib on the monitor. Patient abdomen is soft and non tender, patient is NPO at this time, 
Patient has a left nares NGT tube patent and flushed with 20ml, no residual noted, placement 
verified with bubbling sound heard upon auscultation. Patient is anuric. Patient has 
hemodialysis scheduled. Patient has a left arm Av shift patent bruit and thrill noted, right 
arm AV shunt not in use. Patient has sacral wound covered in dressing, right leg ulcer, left 
big toe ulcer and DTI noted. Right Midline patent with single lumen patent with TKO. Head of 
bed is at 30 degrees, bed is locked and in the lowest position. Bilateral wrist restraints 
noted, skin is intact and pulses are present. Will continue to monitor patient and follow 
plan of care.

## 2018-11-30 NOTE — NUR
NURSE NOTES: Called Dr. Daigle to inform him of patient positive MRSA of the nares 
results. Will endorse night shift nurse to follow up.

## 2018-11-30 NOTE — PODIATRIC PROGRESS NOTE
Assessment/Plan


Patient


Juliana Vogt is a 78 year old female who was admitted on Nov 26, 2018 at 10

:32 with


Assessment/Plan


A/


1) Bilateral lower extremity wounds - arterial etiology


2) Cellulitis RLE 2/2 infected wounds - improving


3) DM


4) h/o PAD


5) ESRD





P/


1) Wound orders placed for foam border gauze to be replaced daily. No surgical 

intervention indicated at this time. Leukocytosis resolved


2) Vasc consult with Dr Spencer - will need angio of RLE


3) Abx per ID


4) Will need MRI of left ankle and left tib-fib when more stable.  


5) Will follow closely





Thank you Dr Daigle





Subjective


Allergies:  


Coded Allergies:  


     No Known Allergies (Unverified , 11/26/18)


Subjective


Patient intubated but more alert





Objective


Exam





Last 24 Hour Vital Signs








  Date Time  Temp Pulse Resp B/P (MAP) Pulse Ox O2 Delivery O2 Flow Rate FiO2


 


11/30/18 07:00  94 12     40


 


11/30/18 07:00  86 12 117/26 (56) 99   


 


11/30/18 06:00  94 14 118/12 (47) 99   


 


11/30/18 05:00  99 14 123/46 (71) 98   


 


11/30/18 05:00  90 13     40


 


11/30/18 04:06  90      


 


11/30/18 04:00        40


 


11/30/18 04:00 98.6 98 13 102/13 (42) 99   


 


11/30/18 04:00      Mechanical Ventilator  





      Mechanical Ventilator  





      Mechanical Ventilator  


 


11/30/18 03:00  98 14 120/23 (55) 98   


 


11/30/18 02:55  94 14     40


 


11/30/18 02:00  98 16 112/18 (49) 98   


 


11/30/18 01:00  98 15 79/40 (53) 97   


 


11/30/18 00:41  106 17     40


 


11/30/18 00:00        40


 


11/30/18 00:00      Mechanical Ventilator  





      Mechanical Ventilator  





      Mechanical Ventilator  


 


11/30/18 00:00 99.1 96 14 124/17 (52) 97   


 


11/29/18 23:50  100      


 


11/29/18 23:00  97 17 143/38 (73) 97   


 


11/29/18 22:55  92 17     40


 


11/29/18 22:00  98 15 131/26 (61) 98   


 


11/29/18 21:00  90 15     40


 


11/29/18 21:00  98 17 145/93 (110) 98   


 


11/29/18 20:00        40


 


11/29/18 20:00 98.6 101 16 131/42 (71) 98   


 


11/29/18 20:00      Mechanical Ventilator  





      Mechanical Ventilator  





      Mechanical Ventilator  


 


11/29/18 19:37  99      


 


11/29/18 19:00  96 16 144/42 (76) 98   


 


11/29/18 18:50  87 12     40


 


11/29/18 18:00  96 14 125/36 (65) 98   


 


11/29/18 17:00  98 14     40


 


11/29/18 17:00  96 19 123/33 (63) 97   


 


11/29/18 16:30    123/33    


 


11/29/18 16:00 98.9 97 12 92/12 (38) 95   


 


11/29/18 16:00  108      


 


11/29/18 16:00      Mechanical Ventilator  





      Mechanical Ventilator  





      Mechanical Ventilator  


 


11/29/18 15:08        40


 


11/29/18 15:05  115 14     40


 


11/29/18 15:00  115 19 135/22 (59) 92   


 


11/29/18 14:00  122 19  93   


 


11/29/18 13:21  105 18     40


 


11/29/18 13:00  125 27  94   


 


11/29/18 12:00        40


 


11/29/18 12:00  103      


 


11/29/18 12:00      Mechanical Ventilator  





      Mechanical Ventilator  





      Mechanical Ventilator  


 


11/29/18 12:00  121 27 118/27 (57) 96   


 


11/29/18 11:00  110 22 117/58 (77) 93   


 


11/29/18 10:48  110 14     40


 


11/29/18 10:48     95   


 


11/29/18 10:00  100 14 121/44 (69) 98   


 


11/29/18 09:29  94 14     40


 


11/29/18 09:00  102 15 115/30 (58) 96   











Laboratory Tests








Test


  11/29/18


13:50 11/30/18


01:45 11/30/18


04:00


 


Arterial Blood pH


  7.250


(7.350-7.450) 


  


 


 


Arterial Blood Partial


Pressure CO2 48.3 mmHg


(35.0-45.0)  H 


  


 


 


Arterial Blood Partial


Pressure O2 75.1 mmHg


(75.0-100.0) 


  


 


 


Arterial Blood HCO3


  20.8 mmol/L


(22.0-26.0)  L 


  


 


 


Arterial Blood Oxygen


Saturation 91.8 %


()  L 


  


 


 


Arterial Blood Base Excess -6.4 (-2-2)  L  


 


Arcadio Test Positive    


 


Stool Occult Blood  Pending   


 


White Blood Count


  


  


  5.2 K/UL


(4.8-10.8)


 


Red Blood Count


  


  


  3.87 M/UL


(4.20-5.40)  L


 


Hemoglobin


  


  


  10.4 G/DL


(12.0-16.0)  L


 


Hematocrit


  


  


  34.4 %


(37.0-47.0)  L


 


Mean Corpuscular Volume   89 FL (80-99)  


 


Mean Corpuscular Hemoglobin


  


  


  27.0 PG


(27.0-31.0)


 


Mean Corpuscular Hemoglobin


Concent 


  


  30.3 G/DL


(32.0-36.0)  L


 


Red Cell Distribution Width


  


  


  15.5 %


(11.6-14.8)  H


 


Platelet Count


  


  


  148 K/UL


(150-450)  L


 


Mean Platelet Volume


  


  


  8.4 FL


(6.5-10.1)


 


Neutrophils (%) (Auto)


  


  


  68.0 %


(45.0-75.0)


 


Lymphocytes (%) (Auto)


  


  


  19.0 %


(20.0-45.0)  L


 


Monocytes (%) (Auto)


  


  


  7.7 %


(1.0-10.0)


 


Eosinophils (%) (Auto)


  


  


  4.5 %


(0.0-3.0)  H


 


Basophils (%) (Auto)


  


  


  0.9 %


(0.0-2.0)


 


Sodium Level


  


  


  130 MMOL/L


(136-145)  L


 


Potassium Level


  


  


  4.0 MMOL/L


(3.5-5.1)


 


Chloride Level


  


  


  98 MMOL/L


()


 


Carbon Dioxide Level


  


  


  23 MMOL/L


(21-32)


 


Anion Gap


  


  


  9 mmol/L


(5-15)


 


Blood Urea Nitrogen


  


  


  32 mg/dL


(7-18)  H


 


Creatinine


  


  


  4.6 MG/DL


(0.55-1.30)  H


 


Estimat Glomerular Filtration


Rate 


  


   mL/min (>60)  


 


 


Glucose Level


  


  


  110 MG/DL


()  H


 


Calcium Level


  


  


  8.2 MG/DL


(8.5-10.1)  L


 


Iron Level


  


  


  57 ug/dL


()


 


Total Bilirubin


  


  


  0.4 MG/DL


(0.2-1.0)


 


Aspartate Amino Transf


(AST/SGOT) 


  


  9 U/L (15-37)


L


 


Alanine Aminotransferase


(ALT/SGPT) 


  


  8 U/L (12-78)


L


 


Alkaline Phosphatase


  


  


  141 U/L


()  H


 


Total Protein


  


  


  7.3 G/DL


(6.4-8.2)


 


Albumin


  


  


  1.9 G/DL


(3.4-5.0)  L


 


Globulin   5.4 g/dL  


 


Albumin/Globulin Ratio


  


  


  0.4 (1.0-2.7)


L











Microbiology








 Date/Time


Source Procedure


Growth Status


 


 


 11/26/18 08:45


Blood Blood Culture - Preliminary


NO GROWTH AFTER 72 HOURS Resulted





 11/28/18 11:40


Other(Specify in comment) Gram Stain - Final Resulted


 


 11/28/18 11:40


Other(Specify in comment) Wound Culture - Preliminary Resulted


 


 11/26/18 08:35


Nasal Nares Influenza Types A,B Antigen (ARIAS) - Final Complete


 


 11/26/18 08:30


Urine,Clean Catch Urine Culture - Final


Escherichia Coli - Esbl


Enterococcus Faecium - Vre Complete





 11/28/18 11:40


Sacral Wound Gram Stain - Final Resulted


 


 11/28/18 11:40


Sacral Wound Wound Culture - Preliminary Resulted











Dermatological


Dermatological Narrative


dressings are clean dry and intact. No Dick Hu [id number] Nov 30, 2018 08:47

## 2018-11-30 NOTE — NUR
RD ASSESSMENT & RECOMMENDATIONS

SEE CARE ACTIVITY FOR COMPLETE ASSESSMENT



DAILY ESTIMATED NEEDS:

Needs based on Critical care+ Advanced Wounds + ESRD/ 60kg 

22-30  kcals/kg 

4967-4834  total kcals

1.5-2.0  g protein/kg

90-120g  g total protein 

per MD  mL/kg

 total fluid mLs



NUTRITION DIAGNOSIS:

* Swallowing difficulty R/T respiratory status as evidenced by pt orally

intubated, NPO at this time.

* Increased kcal/prot needs R/T wound healing as evidenced by pt admitted

w/ multiple wounds including stage 4 sacral wound, refer to WC eval.

* Altered nutrition related lab values R/T ESRD dx, clinical condition as

evidenced by elev creat (4.6)trend up, low Na (130), elev BNP >74593, low

BP, pressor held at this time.





CURRENT TF:NPO 





PO DIET RECOMMENDATIONS:

SLP evaluation post extubation, prior to PO diet -> CCHO MED, RENAL 





ENTERAL NUTRITION RECOMMENDATIONS:

Nepro @ 40ml/hr x 24 hrs + Prosource 1pkt TID  to provide 960ml, 1728kcal, 111g prot, 698ml 
free water 



** WITH HEMODYNAMIC STABILITY **

1. Initiate Nepro @ 10ml/hr x 8 hrs, advance 10mlq 6-8 hrs as tolerated to goal rate

2. Add Prosource 1 pkt TID to meet increased prot needs





*****WITHOUT HEMODYNAMIC STABILITY, REC TROPHIC FEEDS OF NEPRO @ 10ML/HR X 24 HRS*****







ADDITIONAL RECOMMENDATIONS:

* Calibrated bedscale wt for accurate CBW 

* Monitor HD stability- NE held at this time  

* Wound healing-  Nephrovite x 1, Scotty 1pkt BID 

* Monitor Renal fxn and lytes 

* SSI w/ TF- h/o DM 

* SLP eval post extubation

## 2018-11-30 NOTE — GENERAL SURGERY PROGRESS NOTE
General Surgery-Progress Note


Subjective


Procedure Performed


left subclavian central venous catheter insertion


Additional Comments


doing well overall.  labs improved





Objective





Last 24 Hour Vital Signs








  Date Time  Temp Pulse Resp B/P (MAP) Pulse Ox O2 Delivery O2 Flow Rate FiO2


 


11/30/18 16:00        40


 


11/30/18 16:00  92 16 115/17 (49) 100   


 


11/30/18 16:00      Mechanical Ventilator  





      Mechanical Ventilator  





      Mechanical Ventilator  


 


11/30/18 16:00  87      


 


11/30/18 15:00  92 16 84/14 (37) 100   


 


11/30/18 14:40  83 17     40


 


11/30/18 14:00  95 12 114/21 (52) 100   


 


11/30/18 13:00  94 12 100/32 (54) 100   


 


11/30/18 12:55  95 14     40


 


11/30/18 12:00  95 13 103/16 (45) 99   


 


11/30/18 12:00        40


 


11/30/18 12:00  108      


 


11/30/18 12:00      Mechanical Ventilator  





      Mechanical Ventilator  





      Mechanical Ventilator  


 


11/30/18 11:00  99 15 110/72 (85) 97   


 


11/30/18 10:49  111 22     40


 


11/30/18 10:00  119 28 128/24 (58) 97   


 


11/30/18 09:06     98   


 


11/30/18 09:03  86 22     40


 


11/30/18 09:00  121 24 109/18 (48) 98   


 


11/30/18 08:00  90      


 


11/30/18 08:00 99.4 96 13 122/13 (49) 99   


 


11/30/18 08:00        40


 


11/30/18 08:00      Mechanical Ventilator  





      Mechanical Ventilator  





      Mechanical Ventilator  


 


11/30/18 07:00  94 12     40


 


11/30/18 07:00  86 12 117/26 (56) 99   


 


11/30/18 06:00  94 14 118/12 (47) 99   


 


11/30/18 05:00  99 14 123/46 (71) 98   


 


11/30/18 05:00  90 13     40


 


11/30/18 04:06  90      


 


11/30/18 04:00        40


 


11/30/18 04:00 98.6 98 13 102/13 (42) 99   


 


11/30/18 04:00      Mechanical Ventilator  





      Mechanical Ventilator  





      Mechanical Ventilator  


 


11/30/18 03:00  98 14 120/23 (55) 98   


 


11/30/18 02:55  94 14     40


 


11/30/18 02:00  98 16 112/18 (49) 98   


 


11/30/18 01:00  98 15 79/40 (53) 97   


 


11/30/18 00:41  106 17     40


 


11/30/18 00:00        40


 


11/30/18 00:00      Mechanical Ventilator  





      Mechanical Ventilator  





      Mechanical Ventilator  


 


11/30/18 00:00 99.1 96 14 124/17 (52) 97   


 


11/29/18 23:50  100      


 


11/29/18 23:00  97 17 143/38 (73) 97   


 


11/29/18 22:55  92 17     40


 


11/29/18 22:00  98 15 131/26 (61) 98   


 


11/29/18 21:00  90 15     40


 


11/29/18 21:00  98 17 145/93 (110) 98   


 


11/29/18 20:00        40


 


11/29/18 20:00 98.6 101 16 131/42 (71) 98   


 


11/29/18 20:00      Mechanical Ventilator  





      Mechanical Ventilator  





      Mechanical Ventilator  


 


11/29/18 19:37  99      


 


11/29/18 19:00  96 16 144/42 (76) 98   


 


11/29/18 18:50  87 12     40


 


11/29/18 18:00  96 14 125/36 (65) 98   








I&O











Intake and Output  


 


 11/29/18 11/30/18





 18:59 06:59


 


Intake Total 110 ml 110 ml


 


Balance 110 ml 110 ml


 


  


 


Intake Oral 0 ml 0 ml


 


IV Total 110 ml 60 ml


 


Other  50 ml


 


# Bowel Movements 1 2








Dressing:  saturated


Wound:  other


Drains:  other


Cardiovascular:  RSR


Respiratory:  decreased breath sounds


Abdomen:  soft, flat, non-tender, present bowel sounds


Extremities:  other





Laboratory Tests








Test


  11/30/18


01:45 11/30/18


04:00


 


Stool Occult Blood


  Negative


(NEGATIVE) 


 


 


White Blood Count


  


  5.2 K/UL


(4.8-10.8)


 


Red Blood Count


  


  3.87 M/UL


(4.20-5.40)  L


 


Hemoglobin


  


  10.4 G/DL


(12.0-16.0)  L


 


Hematocrit


  


  34.4 %


(37.0-47.0)  L


 


Mean Corpuscular Volume  89 FL (80-99)  


 


Mean Corpuscular Hemoglobin


  


  27.0 PG


(27.0-31.0)


 


Mean Corpuscular Hemoglobin


Concent 


  30.3 G/DL


(32.0-36.0)  L


 


Red Cell Distribution Width


  


  15.5 %


(11.6-14.8)  H


 


Platelet Count


  


  148 K/UL


(150-450)  L


 


Mean Platelet Volume


  


  8.4 FL


(6.5-10.1)


 


Neutrophils (%) (Auto)


  


  68.0 %


(45.0-75.0)


 


Lymphocytes (%) (Auto)


  


  19.0 %


(20.0-45.0)  L


 


Monocytes (%) (Auto)


  


  7.7 %


(1.0-10.0)


 


Eosinophils (%) (Auto)


  


  4.5 %


(0.0-3.0)  H


 


Basophils (%) (Auto)


  


  0.9 %


(0.0-2.0)


 


Sodium Level


  


  130 MMOL/L


(136-145)  L


 


Potassium Level


  


  4.0 MMOL/L


(3.5-5.1)


 


Chloride Level


  


  98 MMOL/L


()


 


Carbon Dioxide Level


  


  23 MMOL/L


(21-32)


 


Anion Gap


  


  9 mmol/L


(5-15)


 


Blood Urea Nitrogen


  


  32 mg/dL


(7-18)  H


 


Creatinine


  


  4.6 MG/DL


(0.55-1.30)  H


 


Estimat Glomerular Filtration


Rate 


   mL/min (>60)  


 


 


Glucose Level


  


  110 MG/DL


()  H


 


Calcium Level


  


  8.2 MG/DL


(8.5-10.1)  L


 


Iron Level


  


  57 ug/dL


()


 


Total Bilirubin


  


  0.4 MG/DL


(0.2-1.0)


 


Aspartate Amino Transf


(AST/SGOT) 


  9 U/L (15-37)


L


 


Alanine Aminotransferase


(ALT/SGPT) 


  8 U/L (12-78)


L


 


Alkaline Phosphatase


  


  141 U/L


()  H


 


Total Protein


  


  7.3 G/DL


(6.4-8.2)


 


Albumin


  


  1.9 G/DL


(3.4-5.0)  L


 


Globulin  5.4 g/dL  


 


Albumin/Globulin Ratio


  


  0.4 (1.0-2.7)


L











Plan


Problems:  


(1) Sepsis


Assessment & Plan:  acute decompensation 


hypotensive


bradycardic


respiratory decompensation requiring intubation and vent support - failed 

extubation 





improving


labs improved


she looks much better


cont current ICU care and management


thank you


will follow with recs 





(2) Hypotension


(3) Bradycardia


(4) Altered mental status


(5) Decubital ulcer


Assessment & Plan:  Patient with multiple pressure injuries where have been 

present since admission. 


Full thickness pressure injury to sacrum (L)(L)2.4cm x (W)4.4cm x (D)0.9cm, 

undermining 12-12 with tunneling 9-10 by 11.3cm. 


In close proximity to sacral wound on L buttocks purple ,indurated area with 

small area of slough measuring (L)0.6cm x (W)0.5cm noted. Arched surgical scar 

noted to L buttocks at site of tunneling. 


Resolving pressure injury noted to R ischium (L)3cm x (W)2cm. Wound bed 

epithelialized with maroon discoloration without induration periwound. 


Closed area with white pocket and purple margins noted to distal,paz R tibia 

(L)6cm x (W)2.7cm


Full thickness wound noted to distal/medial RLE (L)6.2cm x (W)3.8cm .Wound 

noted to have 95% mixed slough /necrosis ,marginal erythema. Wound malodorous.


Wound posterior R tibia (L)4.7cm x (W) 3cm ,100% necrotic with marginal 

erythema. Dry eschar with marginal erythema 


(L)0.5cm x (W)0.7cm noted to medial R heel.Non-blanchable erythema noted to 

medial L malleolus. 


Dry brown eschar noted to medial L 1st malleolus (L)1.4cm x (W)1cm. 


L heel and lateral aspect boggy with non-blanchable erythema. 





Tx.Plan:


Cleanse sacral wound with Saline.Loose pack with Hydrogel impregnated packing 

strip (Attention to tunneled area at 9-10).Cover with Optifoam drsg Daily and 

prn.


           


Reposition at least every 2hours or as tolerated.


           


Air Fluidized mattress.


           


Off-load heels with pillow.





Appreciate Podiatry and Vascular input





Plan for anigo late by Vascular 














Bob Lewis Nov 30, 2018 17:02

## 2018-11-30 NOTE — GENERAL PROGRESS NOTE
Progress Note


Progress Note


Patient seen and examined earlier





Resp failure


Awake following commands


Off pressors


Arrhythmia


ESRD


Calcific multilevel tibial arterial occlusive disease


2+ femorals 2+ popliteal palpable pulses with absent DP PT pulses


Feet warm


Right medial deep ankle wound


Left heel ankle decub necrosis





Rec


Medical optimization in progress


Weaning trial


Optimize nutrition


Will need selective leg angiogram to assess for percutaneous revascularization


Ankle wound care per podiatry--I&D wound vac


Abx per ID


Off load & anticoagulate for arrhythmia








d/w icu nurse


d/w pmd











Himanshu Spencer MD Nov 30, 2018 08:18

## 2018-11-30 NOTE — NEPHROLOGY PROGRESS NOTE
Assessment/Plan


Assessment


Seee below


Plan


MOF, improving slowly.


Sepsis due to infected diabetic ulcers, R/O Osteo. On IV Abx. Called ID, Vasc. 

Sx, Podiatry.


Septic Shock pressors PRN


ESRD HD q MWF 


Anemia of CKD transfused yesterday, ASMANTHA high dose. Hct up to 34!


Resp Failure - Vent per Pul. Trying to wean. KEVIN Cardoso.


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 with LAE. LVEF 65% . Anticoagulate when 

stable. KEVIN Wade.


PVD. Needs Angio. KEVIN Spencer.





Subjective


Subjective


Awake + alert. No c/o





Objective


Objective





Last 24 Hour Vital Signs








  Date Time  Temp Pulse Resp B/P (MAP) Pulse Ox O2 Delivery O2 Flow Rate FiO2


 


11/30/18 07:00  94 12     40


 


11/30/18 07:00  86 12 117/26 (56) 99   


 


11/30/18 06:00  94 14 118/12 (47) 99   


 


11/30/18 05:00  99 14 123/46 (71) 98   


 


11/30/18 05:00  90 13     40


 


11/30/18 04:06  90      


 


11/30/18 04:00        40


 


11/30/18 04:00 98.6 98 13 102/13 (42) 99   


 


11/30/18 04:00      Mechanical Ventilator  





      Mechanical Ventilator  





      Mechanical Ventilator  


 


11/30/18 03:00  98 14 120/23 (55) 98   


 


11/30/18 02:55  94 14     40


 


11/30/18 02:00  98 16 112/18 (49) 98   


 


11/30/18 01:00  98 15 79/40 (53) 97   


 


11/30/18 00:41  106 17     40


 


11/30/18 00:00        40


 


11/30/18 00:00      Mechanical Ventilator  





      Mechanical Ventilator  





      Mechanical Ventilator  


 


11/30/18 00:00 99.1 96 14 124/17 (52) 97   


 


11/29/18 23:50  100      


 


11/29/18 23:00  97 17 143/38 (73) 97   


 


11/29/18 22:55  92 17     40


 


11/29/18 22:00  98 15 131/26 (61) 98   


 


11/29/18 21:00  90 15     40


 


11/29/18 21:00  98 17 145/93 (110) 98   


 


11/29/18 20:00        40


 


11/29/18 20:00 98.6 101 16 131/42 (71) 98   


 


11/29/18 20:00      Mechanical Ventilator  





      Mechanical Ventilator  





      Mechanical Ventilator  


 


11/29/18 19:37  99      


 


11/29/18 19:00  96 16 144/42 (76) 98   


 


11/29/18 18:50  87 12     40


 


11/29/18 18:00  96 14 125/36 (65) 98   


 


11/29/18 17:00  98 14     40


 


11/29/18 17:00  96 19 123/33 (63) 97   


 


11/29/18 16:30    123/33    


 


11/29/18 16:00 98.9 97 12 92/12 (38) 95   


 


11/29/18 16:00  108      


 


11/29/18 16:00      Mechanical Ventilator  





      Mechanical Ventilator  





      Mechanical Ventilator  


 


11/29/18 15:08        40


 


11/29/18 15:05  115 14     40


 


11/29/18 15:00  115 19 135/22 (59) 92   


 


11/29/18 14:00  122 19  93   


 


11/29/18 13:21  105 18     40


 


11/29/18 13:00  125 27  94   


 


11/29/18 12:00        40


 


11/29/18 12:00  103      


 


11/29/18 12:00      Mechanical Ventilator  





      Mechanical Ventilator  





      Mechanical Ventilator  


 


11/29/18 12:00  121 27 118/27 (57) 96   


 


11/29/18 11:00  110 22 117/58 (77) 93   


 


11/29/18 10:48  110 14     40


 


11/29/18 10:48     95   


 


11/29/18 10:00  100 14 121/44 (69) 98   


 


11/29/18 09:29  94 14     40


 


11/29/18 09:00  102 15 115/30 (58) 96   


 


11/29/18 08:00  106      


 


11/29/18 08:00        40


 


11/29/18 08:00  100 13 138/15 (56) 96   


 


11/29/18 08:00      Mechanical Ventilator  





      Mechanical Ventilator  





      Mechanical Ventilator  

















Intake and Output  


 


 11/29/18 11/30/18





 19:00 07:00


 


Intake Total 110 ml 110 ml


 


Balance 110 ml 110 ml


 


  


 


Intake Oral 0 ml 0 ml


 


IV Total 110 ml 60 ml


 


Other  50 ml


 


# Bowel Movements 1 2








Laboratory Tests


11/29/18 13:50: 


Arterial Blood pH 7.250*L, Arterial Blood Partial Pressure CO2 48.3H, Arterial 

Blood Partial Pressure O2 75.1, Arterial Blood HCO3 20.8L, Arterial Blood 

Oxygen Saturation 91.8L, Arterial Blood Base Excess -6.4L, Arcadio Test Positive


11/30/18 01:45: Stool Occult Blood [Pending]


11/30/18 04:00: 


White Blood Count 5.2, Red Blood Count 3.87L, Hemoglobin 10.4L, Hematocrit 34.4L

, Mean Corpuscular Volume 89, Mean Corpuscular Hemoglobin 27.0, Mean 

Corpuscular Hemoglobin Concent 30.3L, Red Cell Distribution Width 15.5H, 

Platelet Count 148L, Mean Platelet Volume 8.4, Neutrophils (%) (Auto) 68.0, 

Lymphocytes (%) (Auto) 19.0L, Monocytes (%) (Auto) 7.7, Eosinophils (%) (Auto) 

4.5H, Basophils (%) (Auto) 0.9, Sodium Level 130L, Potassium Level 4.0, 

Chloride Level 98, Carbon Dioxide Level 23, Anion Gap 9, Blood Urea Nitrogen 32H

, Creatinine 4.6H, Estimat Glomerular Filtration Rate , Glucose Level 110H, 

Calcium Level 8.2L, Iron Level 57, Total Bilirubin 0.4, Aspartate Amino Transf (

AST/SGOT) 9L, Alanine Aminotransferase (ALT/SGPT) 8L, Alkaline Phosphatase 141H

, Total Protein 7.3, Albumin 1.9L, Globulin 5.4, Albumin/Globulin Ratio 0.4L


Height (Feet):  5


Height (Inches):  2.00


Weight (Pounds):  131


Objective


Intubated, On vent.


Cv IRR/IRR


Lungs B shai.


Abd SNT. BS +


E Rt. foot diabetic ulcers with pus











Gavino Daigle MD Nov 30, 2018 07:32

## 2018-11-30 NOTE — NUR
NURSE NOTES: Patient reposition, patient connected to the heart monitor A-fib on the 
monitor. Patient has been put  

C-pap PS 8, Fio2 40% at 0900, patient tolerating so far. Patient left arm swollen 2+ 
indentation, bilateral radial pulses bounding upon palpation. Patient suctioned orally with 
thick white secretions. Patient given oral care. Patient started on feeding nepro at 10ml, 
goal is 30. Head of bed is at 30 degrees, bed is locked and in the lowest position. Will 
continue to monitor patient and follow plan of care.

## 2018-11-30 NOTE — NUR
RESPIRATORY NOTE:

Received pt on AC 12, 500VT, 40%, PEEP +5. Pt intubated w/ ETT 7.5 @ 23cm lipline, secured 
by anchorfast. Pt is alert/awake, follows commands. Pt's hands on soft restraints to prevent 
from self-extubation. B/S moy. rhonchi, sxn scant amounts of thick/thin, clear/white 
secretions. Vent plugged into red outlet, ambubag at bedside. Pt resting comfortably on 
current settings, in no apparent distress at this time. Will continue to monitor pt.

## 2018-12-01 VITALS — DIASTOLIC BLOOD PRESSURE: 34 MMHG | SYSTOLIC BLOOD PRESSURE: 68 MMHG

## 2018-12-01 VITALS — DIASTOLIC BLOOD PRESSURE: 36 MMHG | SYSTOLIC BLOOD PRESSURE: 123 MMHG

## 2018-12-01 VITALS — DIASTOLIC BLOOD PRESSURE: 30 MMHG | SYSTOLIC BLOOD PRESSURE: 163 MMHG

## 2018-12-01 VITALS — DIASTOLIC BLOOD PRESSURE: 25 MMHG | SYSTOLIC BLOOD PRESSURE: 163 MMHG

## 2018-12-01 VITALS — SYSTOLIC BLOOD PRESSURE: 169 MMHG | DIASTOLIC BLOOD PRESSURE: 17 MMHG

## 2018-12-01 VITALS — SYSTOLIC BLOOD PRESSURE: 150 MMHG | DIASTOLIC BLOOD PRESSURE: 42 MMHG

## 2018-12-01 VITALS — SYSTOLIC BLOOD PRESSURE: 171 MMHG | DIASTOLIC BLOOD PRESSURE: 34 MMHG

## 2018-12-01 VITALS — SYSTOLIC BLOOD PRESSURE: 181 MMHG | DIASTOLIC BLOOD PRESSURE: 15 MMHG

## 2018-12-01 VITALS — SYSTOLIC BLOOD PRESSURE: 142 MMHG | DIASTOLIC BLOOD PRESSURE: 19 MMHG

## 2018-12-01 VITALS — DIASTOLIC BLOOD PRESSURE: 48 MMHG | SYSTOLIC BLOOD PRESSURE: 167 MMHG

## 2018-12-01 VITALS — DIASTOLIC BLOOD PRESSURE: 26 MMHG | SYSTOLIC BLOOD PRESSURE: 134 MMHG

## 2018-12-01 VITALS — SYSTOLIC BLOOD PRESSURE: 125 MMHG | DIASTOLIC BLOOD PRESSURE: 42 MMHG

## 2018-12-01 VITALS — DIASTOLIC BLOOD PRESSURE: 13 MMHG | SYSTOLIC BLOOD PRESSURE: 195 MMHG

## 2018-12-01 VITALS — SYSTOLIC BLOOD PRESSURE: 179 MMHG | DIASTOLIC BLOOD PRESSURE: 17 MMHG

## 2018-12-01 VITALS — SYSTOLIC BLOOD PRESSURE: 160 MMHG | DIASTOLIC BLOOD PRESSURE: 27 MMHG

## 2018-12-01 VITALS — SYSTOLIC BLOOD PRESSURE: 164 MMHG | DIASTOLIC BLOOD PRESSURE: 21 MMHG

## 2018-12-01 VITALS — SYSTOLIC BLOOD PRESSURE: 142 MMHG | DIASTOLIC BLOOD PRESSURE: 21 MMHG

## 2018-12-01 VITALS — DIASTOLIC BLOOD PRESSURE: 26 MMHG | SYSTOLIC BLOOD PRESSURE: 197 MMHG

## 2018-12-01 VITALS — DIASTOLIC BLOOD PRESSURE: 26 MMHG | SYSTOLIC BLOOD PRESSURE: 146 MMHG

## 2018-12-01 VITALS — SYSTOLIC BLOOD PRESSURE: 153 MMHG | DIASTOLIC BLOOD PRESSURE: 19 MMHG

## 2018-12-01 VITALS — SYSTOLIC BLOOD PRESSURE: 157 MMHG | DIASTOLIC BLOOD PRESSURE: 25 MMHG

## 2018-12-01 VITALS — DIASTOLIC BLOOD PRESSURE: 54 MMHG | SYSTOLIC BLOOD PRESSURE: 127 MMHG

## 2018-12-01 RX ADMIN — MEROPENEM SCH MLS/HR: 500 INJECTION INTRAVENOUS at 08:36

## 2018-12-01 RX ADMIN — CHLORHEXIDINE GLUCONATE SCH APPLIC: 213 SOLUTION TOPICAL at 20:38

## 2018-12-01 RX ADMIN — FENTANYL CITRATE PRN MCG: 50 INJECTION INTRAMUSCULAR; INTRAVENOUS at 20:40

## 2018-12-01 RX ADMIN — PANTOPRAZOLE SODIUM SCH MG: 40 INJECTION, POWDER, FOR SOLUTION INTRAVENOUS at 20:39

## 2018-12-01 RX ADMIN — AMIODARONE HYDROCHLORIDE SCH MG: 200 TABLET ORAL at 08:35

## 2018-12-01 RX ADMIN — AMIODARONE HYDROCHLORIDE SCH MG: 200 TABLET ORAL at 20:39

## 2018-12-01 RX ADMIN — SODIUM CHLORIDE SCH MLS/HR: 0.9 INJECTION INTRAVENOUS at 20:38

## 2018-12-01 RX ADMIN — PANTOPRAZOLE SODIUM SCH MG: 40 INJECTION, POWDER, FOR SOLUTION INTRAVENOUS at 08:35

## 2018-12-01 RX ADMIN — FENTANYL CITRATE PRN MCG: 50 INJECTION INTRAMUSCULAR; INTRAVENOUS at 23:12

## 2018-12-01 NOTE — NUR
Received Patient on ACVC RR 12, , Fio2 40%, PEEP +5. Patient intubated with a 7.5 ETT 
at 23cm at the lip. Patient currently on soft restraints to prevent self extubation. 
Bilateral rhonchi heard throughout lung fields. Suctioned thin white/ clear secretions. 
Patient is alert and awake and follows commands. Vent plugged into red outlet. Alarms on and 
audible. Will continue to monitor throughout the day.

## 2018-12-01 NOTE — NUR
NURSE NOTES:

patient asleep in bed comfortably. vss. afebrile. Tolerating well with current vent 
settings. o2 saturation %.no facial grimace or moaning noted.

## 2018-12-01 NOTE — GENERAL SURGERY PROGRESS NOTE
General Surgery-Progress Note


Subjective


Procedure Performed


left subclavian central venous catheter insertion


Symptoms:  improved


Additional Comments


awake.  responsive.  wants ET tube out.  wants restraints off





Objective





Last 24 Hour Vital Signs








  Date Time  Temp Pulse Resp B/P (MAP) Pulse Ox O2 Delivery O2 Flow Rate FiO2


 


12/1/18 15:05  78 12     40


 


12/1/18 12:49  78 13     40


 


12/1/18 12:00      Mechanical Ventilator  





      Mechanical Ventilator  





      Mechanical Ventilator  


 


12/1/18 12:00        40


 


12/1/18 11:26  80 13     40


 


12/1/18 10:00  83 12 163/30 (74) 100   


 


12/1/18 09:38     100   


 


12/1/18 09:37  102 12     40


 


12/1/18 09:19  83 22     40


 


12/1/18 09:00  88 15 181/15 (70) 100   


 


12/1/18 08:00  79 12 167/48 (87) 100   


 


12/1/18 08:00      Mechanical Ventilator  





      Mechanical Ventilator  





      Mechanical Ventilator  


 


12/1/18 08:00        40


 


12/1/18 07:00  81 12 171/34 (79) 100   


 


12/1/18 06:55  81 13     40


 


12/1/18 06:00  84 12 123/36 (65) 100   


 


12/1/18 05:05  105 14     40


 


12/1/18 05:00  99 15 127/54 (78) 100   


 


12/1/18 04:00        40


 


12/1/18 04:00 98.0 100 16 125/42 (69) 99   


 


12/1/18 04:00      Mechanical Ventilator  





      Mechanical Ventilator  





      Mechanical Ventilator  


 


12/1/18 03:52  101      


 


12/1/18 03:03  96 16     40


 


12/1/18 03:00  98 15 142/19 (60) 100   


 


12/1/18 02:00  100 14 146/26 (66) 100   


 


12/1/18 01:00  95 16     40


 


12/1/18 01:00  102 15 142/21 (61) 100   


 


12/1/18 00:15  99      


 


12/1/18 00:00 98.0 99 15 134/26 (62) 100   


 


12/1/18 00:00        40


 


12/1/18 00:00      Mechanical Ventilator  





      Mechanical Ventilator  





      Mechanical Ventilator  


 


11/30/18 23:00  96 13     40


 


11/30/18 23:00  98 15 145/31 (69) 100   


 


11/30/18 22:00  97 14 157/19 (65) 99   


 


11/30/18 21:00  98 14     40


 


11/30/18 21:00  97 14 154/11 (58) 100   


 


11/30/18 20:00      Mechanical Ventilator  





      Mechanical Ventilator  





      Mechanical Ventilator  


 


11/30/18 20:00        40


 


11/30/18 20:00 98.1 96 15 138/15 (56) 100   


 


11/30/18 20:00  92      


 


11/30/18 19:00  96 13     40


 


11/30/18 19:00  93 15 128/27 (60) 100   


 


11/30/18 18:00  93 13 107/20 (49) 100   


 


11/30/18 17:15  85 14     40


 


11/30/18 17:00 97.6 93 14 120/17 (51) 100   


 


11/30/18 16:30    120/17    


 


11/30/18 16:00        40


 


11/30/18 16:00  92 16 115/17 (49) 100   


 


11/30/18 16:00      Mechanical Ventilator  





      Mechanical Ventilator  





      Mechanical Ventilator  


 


11/30/18 16:00  87      








I&O











Intake and Output  


 


 11/30/18 12/1/18





 19:00 07:00


 


Intake Total 265 ml 460 ml


 


Balance 265 ml 460 ml


 


  


 


Intake Oral 0 ml 0 ml


 


Free Water 70 ml 


 


IV Total 55 ml 60 ml


 


Tube Feeding 140 ml 350 ml


 


Other  50 ml


 


# Voids  1


 


# Bowel Movements  4








Dressing:  other


Wound:  other


Drains:  other


Cardiovascular:  RSR


Respiratory:  decreased breath sounds


Abdomen:  soft, flat, non-tender, present bowel sounds


Extremities:  other





Laboratory Tests








Test


  12/1/18


07:25


 


Arterial Blood pH


  7.396


(7.350-7.450)


 


Arterial Blood Partial


Pressure CO2 39.8 mmHg


(35.0-45.0)


 


Arterial Blood Partial


Pressure O2 95.1 mmHg


(75.0-100.0)


 


Arterial Blood HCO3


  23.9 mmol/L


(22.0-26.0)


 


Arterial Blood Oxygen


Saturation 96.9 %


()


 


Arterial Blood Base Excess -0.9 (-2-2)  


 


Arcadio Test N/a  











Plan


Problems:  


(1) Sepsis


Assessment & Plan:  acute decompensation 


hypotensive


bradycardic


respiratory decompensation requiring intubation and vent support - failed 

extubation 





improving


leukocytosis resolved


she looks much better


cont current ICU care and management


thank you


will follow with recs 





(2) Hypotension


(3) Bradycardia


(4) Altered mental status


(5) Decubital ulcer


Assessment & Plan:  Patient with multiple pressure injuries where have been 

present since admission. 


Full thickness pressure injury to sacrum (L)(L)2.4cm x (W)4.4cm x (D)0.9cm, 

undermining 12-12 with tunneling 9-10 by 11.3cm. 


In close proximity to sacral wound on L buttocks purple ,indurated area with 

small area of slough measuring (L)0.6cm x (W)0.5cm noted. Arched surgical scar 

noted to L buttocks at site of tunneling. 


Resolving pressure injury noted to R ischium (L)3cm x (W)2cm. Wound bed 

epithelialized with maroon discoloration without induration periwound. 


Closed area with white pocket and purple margins noted to distal,paz R tibia 

(L)6cm x (W)2.7cm


Full thickness wound noted to distal/medial RLE (L)6.2cm x (W)3.8cm .Wound 

noted to have 95% mixed slough /necrosis ,marginal erythema. Wound malodorous.


Wound posterior R tibia (L)4.7cm x (W) 3cm ,100% necrotic with marginal 

erythema. Dry eschar with marginal erythema 


(L)0.5cm x (W)0.7cm noted to medial R heel.Non-blanchable erythema noted to 

medial L malleolus. 


Dry brown eschar noted to medial L 1st malleolus (L)1.4cm x (W)1cm. 


L heel and lateral aspect boggy with non-blanchable erythema. 





Tx.Plan:


Cleanse sacral wound with Saline.Loose pack with Hydrogel impregnated packing 

strip (Attention to tunneled area at 9-10).Cover with Optifoam drsg Daily and 

prn.


           


Reposition at least every 2hours or as tolerated.


           


Air Fluidized mattress.


           


Off-load heels with pillow.





Appreciate Podiatry and Vascular input





Plan for anigo late by Vascular 





Additional Comments


son requests for restraints to be removed.  he is at bedside and states will 

monitor her.  explained risks of doing so and he expressed understanding.  

discussed risks of self extubation or removal of lines and tubes.  he 

understood and will monitor her.  okay to loosen restraints as they are causing 

her anxiety.











Bob Leiws Dec 1, 2018 15:31

## 2018-12-01 NOTE — NUR
NURSE NOTES:

MORNING CARE AND ORAL CARE WAS DONE, PATIENT TRIED TO REMOVE NGT, WILL CONTINUE TO MONITOR 
AND SECURED RESTRAINTS.

## 2018-12-01 NOTE — NUR
NURSE NOTES:

patient has multiple wounds and c/o pain in sacral area (6/10). there is no pain medication 
order. called Dr. ABAD and obtained that Fentanyl 25 mcg IV q2hrs and PRN for severe pain. 
will proceed the order.

## 2018-12-01 NOTE — NEPHROLOGY PROGRESS NOTE
Assessment/Plan


Assessment


Seee below


Plan


MOF, improving slowly.


Sepsis due to infected diabetic ulcers, R/O Osteo. On IV Abx. Called ID, Vasc. 

Sx, Podiatry.


Septic Shock pressors PRN


ESRD HD q MWF 


Anemia of CKD transfused yesterday, SAMANTHA high dose. Hct up to 34!


Resp Failure - Vent per Pul. Trying to wean. KEVIN Cardoso.


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 with LAE. LVEF 65% . Anticoagulate when 

stable. KEVIN Wade.


PVD. Needs Angio. KEVIN Spencer.





Subjective


Subjective


Awake + alert. No c/o





Objective


Objective





Last 24 Hour Vital Signs








  Date Time  Temp Pulse Resp B/P (MAP) Pulse Ox O2 Delivery O2 Flow Rate FiO2


 


12/1/18 11:26  80 13     40


 


12/1/18 10:00  83 12 163/30 (74) 100   


 


12/1/18 09:38     100   


 


12/1/18 09:37  102 12     40


 


12/1/18 09:19  83 22     40


 


12/1/18 09:00  88 15 181/15 (70) 100   


 


12/1/18 08:00  79 12 167/48 (87) 100   


 


12/1/18 08:00      Mechanical Ventilator  





      Mechanical Ventilator  





      Mechanical Ventilator  


 


12/1/18 08:00        40


 


12/1/18 07:00  81 12 171/34 (79) 100   


 


12/1/18 06:55  81 13     40


 


12/1/18 06:00  84 12 123/36 (65) 100   


 


12/1/18 05:05  105 14     40


 


12/1/18 05:00  99 15 127/54 (78) 100   


 


12/1/18 04:00        40


 


12/1/18 04:00 98.0 100 16 125/42 (69) 99   


 


12/1/18 04:00      Mechanical Ventilator  





      Mechanical Ventilator  





      Mechanical Ventilator  


 


12/1/18 03:52  101      


 


12/1/18 03:03  96 16     40


 


12/1/18 03:00  98 15 142/19 (60) 100   


 


12/1/18 02:00  100 14 146/26 (66) 100   


 


12/1/18 01:00  95 16     40


 


12/1/18 01:00  102 15 142/21 (61) 100   


 


12/1/18 00:15  99      


 


12/1/18 00:00 98.0 99 15 134/26 (62) 100   


 


12/1/18 00:00        40


 


12/1/18 00:00      Mechanical Ventilator  





      Mechanical Ventilator  





      Mechanical Ventilator  


 


11/30/18 23:00  96 13     40


 


11/30/18 23:00  98 15 145/31 (69) 100   


 


11/30/18 22:00  97 14 157/19 (65) 99   


 


11/30/18 21:00  98 14     40


 


11/30/18 21:00  97 14 154/11 (58) 100   


 


11/30/18 20:00      Mechanical Ventilator  





      Mechanical Ventilator  





      Mechanical Ventilator  


 


11/30/18 20:00        40


 


11/30/18 20:00 98.1 96 15 138/15 (56) 100   


 


11/30/18 20:00  92      


 


11/30/18 19:00  96 13     40


 


11/30/18 19:00  93 15 128/27 (60) 100   


 


11/30/18 18:00  93 13 107/20 (49) 100   


 


11/30/18 17:15  85 14     40


 


11/30/18 17:00 97.6 93 14 120/17 (51) 100   


 


11/30/18 16:30    120/17    


 


11/30/18 16:00        40


 


11/30/18 16:00  92 16 115/17 (49) 100   


 


11/30/18 16:00      Mechanical Ventilator  





      Mechanical Ventilator  





      Mechanical Ventilator  


 


11/30/18 16:00  87      


 


11/30/18 15:00  92 16 84/14 (37) 100   


 


11/30/18 14:40  83 17     40


 


11/30/18 14:00  95 12 114/21 (52) 100   


 


11/30/18 13:00  94 12 100/32 (54) 100   


 


11/30/18 12:55  95 14     40


 


11/30/18 12:00  95 13 103/16 (45) 99   


 


11/30/18 12:00        40


 


11/30/18 12:00  108      


 


11/30/18 12:00      Mechanical Ventilator  





      Mechanical Ventilator  





      Mechanical Ventilator  

















Intake and Output  


 


 11/30/18 12/1/18





 19:00 07:00


 


Intake Total 265 ml 460 ml


 


Balance 265 ml 460 ml


 


  


 


Intake Oral 0 ml 0 ml


 


Free Water 70 ml 


 


IV Total 55 ml 60 ml


 


Tube Feeding 140 ml 350 ml


 


Other  50 ml


 


# Voids  1


 


# Bowel Movements  4








Laboratory Tests


12/1/18 07:25: 


Arterial Blood pH 7.396, Arterial Blood Partial Pressure CO2 39.8, Arterial 

Blood Partial Pressure O2 95.1, Arterial Blood HCO3 23.9, Arterial Blood Oxygen 

Saturation 96.9, Arterial Blood Base Excess -0.9, Arcadio Test N/a


Height (Feet):  5


Height (Inches):  2.00


Weight (Pounds):  129


Objective


Intubated, On vent.


Cv IRR/IRR


Lungs B tenishachi.


Abd SNT. BS +


E Rt. foot diabetic ulcers with pus











Gavino Daigle MD Dec 1, 2018 11:49

## 2018-12-01 NOTE — NUR
NURSE NOTES:

Received patient and the change of shift from out going nurse Rey RN.  Awake, alert and 
able to respond with nodding head and blinking eyes.

Patient is on ventilator and setting AC 12, , FiO2 40% and peep 5. NGT feeding Nephro 
is infusing @ 30ml/hr mildred no residual. Head of bed in 30 degree.  Heel protector intact on 
both feet. No s/s of acute distress. HR 78, oxygen saturation 100%, Resp 14 and B/P 167/48.

## 2018-12-01 NOTE — DIAGNOSTIC IMAGING REPORT
EXAM:

  XR Chest, 1 View

 

CLINICAL HISTORY:

  ABN CHST

 

TECHNIQUE:

  Frontal view of the chest.

 

COMPARISON:

  Chest x-ray 11/27/18

 

FINDINGS:

  Lungs:  Mild vascular congestion is stable.  Bibasilar 

atelectasis/airspace disease, stable.

  Pleural space:  Small right pleural effusion, stable.  Moderate left 

pleural effusion increased in size.  No pneumothorax.

  Heart:  Cardiomegaly.

  Mediastinum:  Unremarkable.

  Bones/joints:  Unremarkable.

  Vasculature:  Aortic knob calcification.

  Tubes, lines and devices:  Right PICC line tip to the right axilla is 

unchanged.  Endotracheal tube 1.8 cm above the noah.  NG tube traverses 

the diaphragm into the dominant.

 

IMPRESSION:     

1.  Mild vascular congestion is stable.  Bibasilar atelectasis/airspace 

disease.   

2.   Moderate left pleural effusion increased in size. Small right 

pleural effusion, stable. 

3.  Lines and tubes stable.

## 2018-12-01 NOTE — PULMONOLGY CRITICAL CARE NOTE
Critical Care - Asmt/Plan


Assessment/Plan:


Assessment/Plan


IMPRESSION:


1. Hypotension.


2. Sepsis.


3. Atrial fibrillation.


4. Bradycardia.


5. Diabetes mellitus.


6. End-stage renal disease on dialysis.


7. Left lung collapse.


8. Respiratory failure.


9. Dysphagia.


10. Dementia.


11. Anemia





DISCUSSION:


1. Continue medications.


2. as needed Levophed.


3. status post bronchoscopy


4. Central access


5. Use broad-spectrum antibiotics.  


6. continue weaning attempts














  ______________________________________________


  Quentin Cardoso M.D.





Subjective


Interval Events:  Not weaning; s/p prbc


Constitutional:  Reports: no symptoms


HEENT:  Repors: no symptoms


Cardiovascular:  Reports: no symptoms


Gastrointestinal/Abdominal:  Reports: no symptoms


Genitourinary:  Reports: no symptoms


Allergies:  


Coded Allergies:  


     No Known Allergies (Unverified , 11/26/18)





Objective





Vital Signs Noted





General Appearance:  no acute distress


HEENT:  normocephalic


Respiratory/Chest:  chest wall non-tender, lungs clear


Cardiovascular:  normal peripheral pulses, normal rate


Abdomen:  normal bowel sounds





Microbiology








 Date/Time


Source Procedure


Growth Status


 


 


 11/28/18 11:40


Other(Specify in comment) Gram Stain - Final Resulted


 


 11/28/18 11:40


Other(Specify in comment) Wound Culture - Preliminary Resulted





 11/28/18 11:40


Sacral Wound Gram Stain - Final Resulted


 


 11/28/18 11:40


Sacral Wound Wound Culture - Preliminary Resulted








Laboratory Tests


11/29/18 13:50: 


Arterial Blood pH 7.250*L, Arterial Blood Partial Pressure CO2 48.3H, Arterial 

Blood Partial Pressure O2 75.1, Arterial Blood HCO3 20.8L, Arterial Blood 

Oxygen Saturation 91.8L, Arterial Blood Base Excess -6.4L, Arcadio Test Positive


11/30/18 01:45: Stool Occult Blood [Pending]


11/30/18 04:00: 


White Blood Count 5.2, Red Blood Count 3.87L, Hemoglobin 10.4L, Hematocrit 34.4L

, Mean Corpuscular Volume 89, Mean Corpuscular Hemoglobin 27.0, Mean 

Corpuscular Hemoglobin Concent 30.3L, Red Cell Distribution Width 15.5H, 

Platelet Count 148L, Mean Platelet Volume 8.4, Neutrophils (%) (Auto) 68.0, 

Lymphocytes (%) (Auto) 19.0L, Monocytes (%) (Auto) 7.7, Eosinophils (%) (Auto) 

4.5H, Basophils (%) (Auto) 0.9, Sodium Level 130L, Potassium Level 4.0, 

Chloride Level 98, Carbon Dioxide Level 23, Anion Gap 9, Blood Urea Nitrogen 32H

, Creatinine 4.6H, Estimat Glomerular Filtration Rate , Glucose Level 110H, 

Calcium Level 8.2L, Iron Level 57, Total Bilirubin 0.4, Aspartate Amino Transf (

AST/SGOT) 9L, Alanine Aminotransferase (ALT/SGPT) 8L, Alkaline Phosphatase 141H

, Total Protein 7.3, Albumin 1.9L, Globulin 5.4, Albumin/Globulin Ratio 0.4L





Current Medications








 Medications


  (Trade)  Dose


 Ordered  Sig/Tony


 Route


 PRN Reason  Start Time


 Stop Time Status Last Admin


Dose Admin


 


 Amiodarone HCl


  (Cordarone)  200 mg  EVERY 12  HOURS


 ORAL


   11/27/18 21:00


 12/27/18 20:59  11/29/18 20:37


 


 


 Chlorhexidine


 Gluconate


  (Juana-Hex 2%)  1 applic  DAILY@2000


 TOPIC


   11/27/18 20:00


 12/27/18 19:59  11/29/18 20:01


 


 


 Epoetin Rupesh


  (Procrit (for


 ESRD on dialysis))  10,000 units  MON-WED-FRI


 SUBQ


   11/28/18 21:00


 12/28/18 20:59  11/28/18 21:16


 


 


 Heparin Sodium


  (Porcine)


  (Heparin Sod


 1000 units/ml


 10ml)  2,000 unit  ONCE  PRN


 IV


 DIALYSIS  11/29/18 09:54


 11/30/18 23:59   


 


 


 Iron Sucrose 100


 mg/Sodium Chloride  60 ml @ 


 240 mls/hr  BEDTIME


 IV


   11/28/18 21:00


 12/2/18 21:14  11/29/18 20:36


 


 


 Linezolid


  (Zyvox)  600 mg  EVERY 12  HOURS


 ORAL


   11/28/18 21:00


 12/3/18 20:59  11/29/18 20:37


 


 


 Meropenem 500 mg/


 Sodium Chloride  55 ml @ 


 110 mls/hr  DAILY


 IVPB


   11/28/18 12:00


 12/3/18 11:59  11/29/18 08:45


 


 


 Norepinephrine


 Bitartrate 4 mg/


 Dextrose  250 ml @ 0


 mls/hr  Q24H


 IV


   11/26/18 16:30


 12/26/18 16:29  11/27/18 21:28


 


 


 Pantoprazole


  (Protonix)  40 mg  Q12HR


 IVP


   11/26/18 21:00


 12/27/18 08:59  11/29/18 20:37


 


 


 Sodium Chloride  1,000 ml @ 


 500 mls/hr  Q2H PRN


 IVLG


 sbp<90 during hd  11/29/18 09:54


 11/30/18 23:59   


 











Critical Care - Objective





Last 24 Hour Vital Signs








  Date Time  Temp Pulse Resp B/P (MAP) Pulse Ox O2 Delivery O2 Flow Rate FiO2


 


12/1/18 12:49  78 13     40


 


12/1/18 11:26  80 13     40


 


12/1/18 10:00  83 12 163/30 (74) 100   


 


12/1/18 09:38     100   


 


12/1/18 09:37  102 12     40


 


12/1/18 09:19  83 22     40


 


12/1/18 09:00  88 15 181/15 (70) 100   


 


12/1/18 08:00  79 12 167/48 (87) 100   


 


12/1/18 08:00      Mechanical Ventilator  





      Mechanical Ventilator  





      Mechanical Ventilator  


 


12/1/18 08:00        40


 


12/1/18 07:00  81 12 171/34 (79) 100   


 


12/1/18 06:55  81 13     40


 


12/1/18 06:00  84 12 123/36 (65) 100   


 


12/1/18 05:05  105 14     40


 


12/1/18 05:00  99 15 127/54 (78) 100   


 


12/1/18 04:00        40


 


12/1/18 04:00 98.0 100 16 125/42 (69) 99   


 


12/1/18 04:00      Mechanical Ventilator  





      Mechanical Ventilator  





      Mechanical Ventilator  


 


12/1/18 03:52  101      


 


12/1/18 03:03  96 16     40


 


12/1/18 03:00  98 15 142/19 (60) 100   


 


12/1/18 02:00  100 14 146/26 (66) 100   


 


12/1/18 01:00  95 16     40


 


12/1/18 01:00  102 15 142/21 (61) 100   


 


12/1/18 00:15  99      


 


12/1/18 00:00 98.0 99 15 134/26 (62) 100   


 


12/1/18 00:00        40


 


12/1/18 00:00      Mechanical Ventilator  





      Mechanical Ventilator  





      Mechanical Ventilator  


 


11/30/18 23:00  96 13     40


 


11/30/18 23:00  98 15 145/31 (69) 100   


 


11/30/18 22:00  97 14 157/19 (65) 99   


 


11/30/18 21:00  98 14     40


 


11/30/18 21:00  97 14 154/11 (58) 100   


 


11/30/18 20:00      Mechanical Ventilator  





      Mechanical Ventilator  





      Mechanical Ventilator  


 


11/30/18 20:00        40


 


11/30/18 20:00 98.1 96 15 138/15 (56) 100   


 


11/30/18 20:00  92      


 


11/30/18 19:00  96 13     40


 


11/30/18 19:00  93 15 128/27 (60) 100   


 


11/30/18 18:00  93 13 107/20 (49) 100   


 


11/30/18 17:15  85 14     40


 


11/30/18 17:00 97.6 93 14 120/17 (51) 100   


 


11/30/18 16:30    120/17    


 


11/30/18 16:00        40


 


11/30/18 16:00  92 16 115/17 (49) 100   


 


11/30/18 16:00      Mechanical Ventilator  





      Mechanical Ventilator  





      Mechanical Ventilator  


 


11/30/18 16:00  87      


 


11/30/18 15:00  92 16 84/14 (37) 100   


 


11/30/18 14:40  83 17     40











Critical Care - Subjective


ROS Limited/Unobtainable:  Yes


Condition:  stable


FI02:  40


Vent Support Breath Rate:  12


Vent Support Mode:  AC


Vent Tidal Volume:  500


Sputum Amount:  Scant


PEEP:  5.0


PIP:  32


Tube Feeding Amount:  30


I&O:











Intake and Output  


 


 11/30/18 12/1/18





 19:00 07:00


 


Intake Total 265 ml 460 ml


 


Balance 265 ml 460 ml


 


  


 


Intake Oral 0 ml 0 ml


 


Free Water 70 ml 


 


IV Total 55 ml 60 ml


 


Tube Feeding 140 ml 350 ml


 


Other  50 ml


 


# Voids  1


 


# Bowel Movements  4








ET-Tube:  7.5


ET Position:  23











Simon Silva MD Dec 1, 2018 14:21

## 2018-12-01 NOTE — NUR
NURSE NOTES:

Patient was seen by Dr. Mac and no new order. B/p 190/29, HR 79, resp 15 and oxygen 
saturation 100%.

## 2018-12-01 NOTE — CARDIOLOGY PROGRESS NOTE
Assessment/Plan


Assessment/Plan


respiratory failure, improving, plan to wean 


atrial fibrillation, at present time in sinus rhythm, but was in a fib last 

night and her rate was about 100. not anticoagulatetd due to low HB





Subjective


Subjective


The patient is intubated, family member at her site. she is pointing to her arms

, complaining on discomfort and then to her abdomen.





Objective





Last 24 Hour Vital Signs








  Date Time  Temp Pulse Resp B/P (MAP) Pulse Ox O2 Delivery O2 Flow Rate FiO2


 


12/1/18 17:01  75 14     40


 


12/1/18 17:00  74 15 164/21 (68) 100   


 


12/1/18 16:30    150/31    


 


12/1/18 16:00      Mechanical Ventilator  





      Mechanical Ventilator  





      Mechanical Ventilator  


 


12/1/18 16:00  77 13 160/27 (71) 100   


 


12/1/18 16:00        40


 


12/1/18 15:05  78 12     40


 


12/1/18 15:00  78 14 169/17 (67) 100   


 


12/1/18 14:00  77 14 163/25 (71) 100   


 


12/1/18 13:00  77 13 197/26 (82) 100   


 


12/1/18 12:49  78 13     40


 


12/1/18 12:00      Mechanical Ventilator  





      Mechanical Ventilator  





      Mechanical Ventilator  


 


12/1/18 12:00        40


 


12/1/18 12:00 98.4 80 16 195/13 (73) 100   


 


12/1/18 12:00  79      


 


12/1/18 11:26  80 13     40


 


12/1/18 11:00  83 14 195/13 (73) 100   


 


12/1/18 10:00  83 12 163/30 (74) 100   


 


12/1/18 09:38     100   


 


12/1/18 09:37  102 12     40


 


12/1/18 09:19  83 22     40


 


12/1/18 09:00  88 15 181/15 (70) 100   


 


12/1/18 08:00  79      


 


12/1/18 08:00 98.7 79 12 167/48 (87) 100   


 


12/1/18 08:00      Mechanical Ventilator  





      Mechanical Ventilator  





      Mechanical Ventilator  


 


12/1/18 08:00        40


 


12/1/18 07:00  81 12 171/34 (79) 100   


 


12/1/18 06:55  81 13     40


 


12/1/18 06:00  84 12 123/36 (65) 100   


 


12/1/18 05:05  105 14     40


 


12/1/18 05:00  99 15 127/54 (78) 100   


 


12/1/18 04:00        40


 


12/1/18 04:00 98.0 100 16 125/42 (69) 99   


 


12/1/18 04:00      Mechanical Ventilator  





      Mechanical Ventilator  





      Mechanical Ventilator  


 


12/1/18 03:52  101      


 


12/1/18 03:03  96 16     40


 


12/1/18 03:00  98 15 142/19 (60) 100   


 


12/1/18 02:00  100 14 146/26 (66) 100   


 


12/1/18 01:00  95 16     40


 


12/1/18 01:00  102 15 142/21 (61) 100   


 


12/1/18 00:15  99      


 


12/1/18 00:00 98.0 99 15 134/26 (62) 100   


 


12/1/18 00:00        40


 


12/1/18 00:00      Mechanical Ventilator  





      Mechanical Ventilator  





      Mechanical Ventilator  


 


11/30/18 23:00  96 13     40


 


11/30/18 23:00  98 15 145/31 (69) 100   


 


11/30/18 22:00  97 14 157/19 (65) 99   


 


11/30/18 21:00  98 14     40


 


11/30/18 21:00  97 14 154/11 (58) 100   


 


11/30/18 20:00      Mechanical Ventilator  





      Mechanical Ventilator  





      Mechanical Ventilator  


 


11/30/18 20:00        40


 


11/30/18 20:00 98.1 96 15 138/15 (56) 100   


 


11/30/18 20:00  92      


 


11/30/18 19:00  96 13     40


 


11/30/18 19:00  93 15 128/27 (60) 100   








General Appearance:  on vent


EENT:  PERRL/EOMI


Neck:  no JVD


Rhythm:  Afib - in and out of atrial fibrillation


Cardiovascular:  tachycardia, systolic murmur


Respiratory/Chest:  decreased breath sounds


Abdomen:  non tender


Extremities:  slow capillary refill











Intake and Output  


 


 11/30/18 12/1/18





 18:59 06:59


 


Intake Total 265 ml 430 ml


 


Balance 265 ml 430 ml


 


  


 


Intake Oral 0 ml 0 ml


 


Free Water 70 ml 


 


IV Total 55 ml 60 ml


 


Tube Feeding 140 ml 320 ml


 


Other  50 ml


 


# Voids  1


 


# Bowel Movements  4











Laboratory Tests








Test


  12/1/18


07:25


 


Arterial Blood pH


  7.396


(7.350-7.450)


 


Arterial Blood Partial


Pressure CO2 39.8 mmHg


(35.0-45.0)


 


Arterial Blood Partial


Pressure O2 95.1 mmHg


(75.0-100.0)


 


Arterial Blood HCO3


  23.9 mmol/L


(22.0-26.0)


 


Arterial Blood Oxygen


Saturation 96.9 %


()


 


Arterial Blood Base Excess -0.9 (-2-2)  


 


[facility] Test N/a  

















Annette Sykes MD Dec 1, 2018 18:10

## 2018-12-01 NOTE — NUR
NURSE NOTES:

Able to respond with care and no s/s of acute distress. B/P 181/15, HR 88, Resp 15 and 
oxygen saturation 100%.

## 2018-12-01 NOTE — NUR
NURSE NOTES:

Received report from CAROLYN SYKES. patient is awake, able to communicate with nodding head and 
blinking eyes. patient is orally intubated with 7.5 ETT at 23cm at the lip.ventilated with 
AC12  FIO2 35% PEEP 5 and sating %. no s/s of respiratory or cardiac distress 
noted. SR  with HR of 69. /25 RR 14. Afebrile.ongoing NGT feeding of Nepro at 30cc/hr 
with no residual. HOB elevated.patient is currently on bilateral soft wrist restraints to 
prevent self extubation.AV shunt on left upper arm with positive bruit and thrill. Midline 
on ASHISH patent and asymptomatic.no signs of pain at this time.Family at bedside. bed in 
locked and lowest position and alarm on. call light in reach.will resume plan of care.

## 2018-12-01 NOTE — INFECTIOUS DISEASES PROG NOTE
Assessment/Plan


Assessment/Plan


antibiotics : linezolid, meropenem





A


1. VRE | e.coli UTI


2. shock


3. leucocytosis improving


4. respiratory failure


5. renal failure


6. decubitus ulcers


7. nasal MRSA colonization


8. rectal VRE colonization





P


1. continue linezolid, meropenem


2. will follow up cultures





Subjective


ROS Limited/Unobtainable:  Yes


Allergies:  


Coded Allergies:  


     No Known Allergies (Unverified , 11/26/18)





Objective


Vital Signs





Last 24 Hour Vital Signs








  Date Time  Temp Pulse Resp B/P (MAP) Pulse Ox O2 Delivery O2 Flow Rate FiO2


 


12/1/18 10:00  83 12 163/30 (74) 100   


 


12/1/18 09:38     100   


 


12/1/18 09:37  102 11     40


 


12/1/18 09:19  83 22     40


 


12/1/18 09:00  88 15 181/15 (70) 100   


 


12/1/18 08:00  79 12 167/48 (87) 100   


 


12/1/18 08:00      Mechanical Ventilator  





      Mechanical Ventilator  





      Mechanical Ventilator  


 


12/1/18 08:00        40


 


12/1/18 07:00  81 12 171/34 (79) 100   


 


12/1/18 06:55  81 13     40


 


12/1/18 06:00  84 12 123/36 (65) 100   


 


12/1/18 05:05  105 14     40


 


12/1/18 05:00  99 15 127/54 (78) 100   


 


12/1/18 04:00        40


 


12/1/18 04:00 98.0 100 16 125/42 (69) 99   


 


12/1/18 04:00      Mechanical Ventilator  





      Mechanical Ventilator  





      Mechanical Ventilator  


 


12/1/18 03:52  101      


 


12/1/18 03:03  96 16     40


 


12/1/18 03:00  98 15 142/19 (60) 100   


 


12/1/18 02:00  100 14 146/26 (66) 100   


 


12/1/18 01:00  95 16     40


 


12/1/18 01:00  102 15 142/21 (61) 100   


 


12/1/18 00:15  99      


 


12/1/18 00:00 98.0 99 15 134/26 (62) 100   


 


12/1/18 00:00        40


 


12/1/18 00:00      Mechanical Ventilator  





      Mechanical Ventilator  





      Mechanical Ventilator  


 


11/30/18 23:00  96 13     40


 


11/30/18 23:00  98 15 145/31 (69) 100   


 


11/30/18 22:00  97 14 157/19 (65) 99   


 


11/30/18 21:00  98 14     40


 


11/30/18 21:00  97 14 154/11 (58) 100   


 


11/30/18 20:00      Mechanical Ventilator  





      Mechanical Ventilator  





      Mechanical Ventilator  


 


11/30/18 20:00        40


 


11/30/18 20:00 98.1 96 15 138/15 (56) 100   


 


11/30/18 20:00  92      


 


11/30/18 19:00  96 13     40


 


11/30/18 19:00  93 15 128/27 (60) 100   


 


11/30/18 18:00  93 13 107/20 (49) 100   


 


11/30/18 17:15  85 14     40


 


11/30/18 17:00 97.6 93 14 120/17 (51) 100   


 


11/30/18 16:30    120/17    


 


11/30/18 16:00        40


 


11/30/18 16:00  92 16 115/17 (49) 100   


 


11/30/18 16:00      Mechanical Ventilator  





      Mechanical Ventilator  





      Mechanical Ventilator  


 


11/30/18 16:00  87      


 


11/30/18 15:00  92 16 84/14 (37) 100   


 


11/30/18 14:40  83 17     40


 


11/30/18 14:00  95 12 114/21 (52) 100   


 


11/30/18 13:00  94 12 100/32 (54) 100   


 


11/30/18 12:55  95 14     40


 


11/30/18 12:00  95 13 103/16 (45) 99   


 


11/30/18 12:00        40


 


11/30/18 12:00  108      


 


11/30/18 12:00      Mechanical Ventilator  





      Mechanical Ventilator  





      Mechanical Ventilator  








Height (Feet):  5


Height (Inches):  2.00


Weight (Pounds):  129


HEENT:  other - intubated


Respiratory/Chest:  lungs clear


Cardiovascular:  normal rate, regular rhythm, no gallop/murmur


Abdomen:  soft, non tender


Extremities:  no edema, other - right arm PICC





Microbiology








 Date/Time


Source Procedure


Growth Status


 


 


 11/28/18 11:40


Other(Specify in comment) Gram Stain - Final Complete


 


 11/28/18 11:40 Wound Culture - Final


Pseudomonas Aeruginosa


Candida Albicans Complete





 11/28/18 11:40


Sacral Wound Gram Stain - Final Complete


 


 11/28/18 11:40 Wound Culture - Final


Staphylococcus Aureus - Mrsa


Candida Albicans Complete











Laboratory Tests








Test


  12/1/18


07:25


 


Arterial Blood pH


  7.396


(7.350-7.450)


 


Arterial Blood Partial


Pressure CO2 39.8 mmHg


(35.0-45.0)


 


Arterial Blood Partial


Pressure O2 95.1 mmHg


(75.0-100.0)


 


Arterial Blood HCO3


  23.9 mmol/L


(22.0-26.0)


 


Arterial Blood Oxygen


Saturation 96.9 %


()


 


Arterial Blood Base Excess -0.9 (-2-2)  


 


Arcadio Test N/a  











Current Medications








 Medications


  (Trade)  Dose


 Ordered  Sig/Tony


 Route


 PRN Reason  Start Time


 Stop Time Status Last Admin


Dose Admin


 


 Amiodarone HCl


  (Cordarone)  200 mg  EVERY 12  HOURS


 ORAL


   11/27/18 21:00


 12/27/18 20:59  12/1/18 08:35


 


 


 Chlorhexidine


 Gluconate


  (Juana-Hex 2%)  1 applic  DAILY@2000


 TOPIC


   11/27/18 20:00


 12/27/18 19:59  11/30/18 19:27


 


 


 Epoetin Rupesh


  (Procrit (for


 ESRD on dialysis))  10,000 units  MON-WED-FRI


 SUBQ


   11/28/18 21:00


 12/28/18 20:59  11/30/18 20:49


 


 


 Iron Sucrose 100


 mg/Sodium Chloride  60 ml @ 


 240 mls/hr  BEDTIME


 IV


   11/28/18 21:00


 12/2/18 21:14  11/30/18 20:50


 


 


 Linezolid


  (Zyvox)  600 mg  EVERY 12  HOURS


 ORAL


   11/28/18 21:00


 12/3/18 20:59  12/1/18 08:36


 


 


 Meropenem 500 mg/


 Sodium Chloride  55 ml @ 


 110 mls/hr  DAILY


 IVPB


   11/28/18 12:00


 12/3/18 11:59  12/1/18 08:36


 


 


 Norepinephrine


 Bitartrate 4 mg/


 Dextrose  250 ml @ 0


 mls/hr  Q24H


 IV


   11/26/18 16:30


 12/26/18 16:29  11/27/18 21:28


 


 


 Pantoprazole


  (Protonix)  40 mg  Q12HR


 IVP


   11/26/18 21:00


 12/27/18 08:59  12/1/18 08:35


 

















Chase Santillan MD Dec 1, 2018 11:09

## 2018-12-01 NOTE — NUR
NURSE NOTES:

Dr. ABAD seen and examined patient and ordered that weaning determination tomorrow morning. 
will carried order.

## 2018-12-02 VITALS — SYSTOLIC BLOOD PRESSURE: 118 MMHG | DIASTOLIC BLOOD PRESSURE: 22 MMHG

## 2018-12-02 VITALS — DIASTOLIC BLOOD PRESSURE: 39 MMHG | SYSTOLIC BLOOD PRESSURE: 61 MMHG

## 2018-12-02 VITALS — SYSTOLIC BLOOD PRESSURE: 154 MMHG | DIASTOLIC BLOOD PRESSURE: 17 MMHG

## 2018-12-02 VITALS — DIASTOLIC BLOOD PRESSURE: 27 MMHG | SYSTOLIC BLOOD PRESSURE: 127 MMHG

## 2018-12-02 VITALS — DIASTOLIC BLOOD PRESSURE: 26 MMHG | SYSTOLIC BLOOD PRESSURE: 178 MMHG

## 2018-12-02 VITALS — DIASTOLIC BLOOD PRESSURE: 24 MMHG | SYSTOLIC BLOOD PRESSURE: 125 MMHG

## 2018-12-02 VITALS — SYSTOLIC BLOOD PRESSURE: 163 MMHG | DIASTOLIC BLOOD PRESSURE: 18 MMHG

## 2018-12-02 VITALS — DIASTOLIC BLOOD PRESSURE: 36 MMHG | SYSTOLIC BLOOD PRESSURE: 121 MMHG

## 2018-12-02 VITALS — DIASTOLIC BLOOD PRESSURE: 15 MMHG | SYSTOLIC BLOOD PRESSURE: 159 MMHG

## 2018-12-02 VITALS — DIASTOLIC BLOOD PRESSURE: 18 MMHG | SYSTOLIC BLOOD PRESSURE: 162 MMHG

## 2018-12-02 VITALS — SYSTOLIC BLOOD PRESSURE: 198 MMHG | DIASTOLIC BLOOD PRESSURE: 31 MMHG

## 2018-12-02 VITALS — DIASTOLIC BLOOD PRESSURE: 17 MMHG | SYSTOLIC BLOOD PRESSURE: 178 MMHG

## 2018-12-02 VITALS — SYSTOLIC BLOOD PRESSURE: 171 MMHG | DIASTOLIC BLOOD PRESSURE: 15 MMHG

## 2018-12-02 VITALS — SYSTOLIC BLOOD PRESSURE: 158 MMHG | DIASTOLIC BLOOD PRESSURE: 15 MMHG

## 2018-12-02 VITALS — SYSTOLIC BLOOD PRESSURE: 127 MMHG | DIASTOLIC BLOOD PRESSURE: 49 MMHG

## 2018-12-02 VITALS — DIASTOLIC BLOOD PRESSURE: 22 MMHG | SYSTOLIC BLOOD PRESSURE: 120 MMHG

## 2018-12-02 VITALS — DIASTOLIC BLOOD PRESSURE: 17 MMHG | SYSTOLIC BLOOD PRESSURE: 159 MMHG

## 2018-12-02 VITALS — DIASTOLIC BLOOD PRESSURE: 12 MMHG | SYSTOLIC BLOOD PRESSURE: 121 MMHG

## 2018-12-02 VITALS — SYSTOLIC BLOOD PRESSURE: 166 MMHG | DIASTOLIC BLOOD PRESSURE: 22 MMHG

## 2018-12-02 VITALS — DIASTOLIC BLOOD PRESSURE: 26 MMHG | SYSTOLIC BLOOD PRESSURE: 170 MMHG

## 2018-12-02 VITALS — SYSTOLIC BLOOD PRESSURE: 84 MMHG | DIASTOLIC BLOOD PRESSURE: 67 MMHG

## 2018-12-02 VITALS — DIASTOLIC BLOOD PRESSURE: 30 MMHG | SYSTOLIC BLOOD PRESSURE: 168 MMHG

## 2018-12-02 RX ADMIN — FENTANYL CITRATE PRN MCG: 50 INJECTION INTRAMUSCULAR; INTRAVENOUS at 02:50

## 2018-12-02 RX ADMIN — AMIODARONE HYDROCHLORIDE SCH MG: 200 TABLET ORAL at 20:48

## 2018-12-02 RX ADMIN — MINOXIDIL SCH MG: 2.5 TABLET ORAL at 20:48

## 2018-12-02 RX ADMIN — FENTANYL CITRATE PRN MCG: 50 INJECTION INTRAMUSCULAR; INTRAVENOUS at 13:14

## 2018-12-02 RX ADMIN — SODIUM CHLORIDE SCH MLS/HR: 0.9 INJECTION INTRAVENOUS at 20:46

## 2018-12-02 RX ADMIN — FENTANYL CITRATE PRN MCG: 50 INJECTION INTRAMUSCULAR; INTRAVENOUS at 17:33

## 2018-12-02 RX ADMIN — PANTOPRAZOLE SODIUM SCH MG: 40 INJECTION, POWDER, FOR SOLUTION INTRAVENOUS at 09:31

## 2018-12-02 RX ADMIN — PANTOPRAZOLE SODIUM SCH MG: 40 INJECTION, POWDER, FOR SOLUTION INTRAVENOUS at 20:46

## 2018-12-02 RX ADMIN — MEROPENEM SCH MLS/HR: 500 INJECTION INTRAVENOUS at 09:41

## 2018-12-02 RX ADMIN — AMIODARONE HYDROCHLORIDE SCH MG: 200 TABLET ORAL at 09:41

## 2018-12-02 RX ADMIN — CHLORHEXIDINE GLUCONATE SCH APPLIC: 213 SOLUTION TOPICAL at 20:09

## 2018-12-02 RX ADMIN — FENTANYL CITRATE PRN MCG: 50 INJECTION INTRAMUSCULAR; INTRAVENOUS at 09:47

## 2018-12-02 NOTE — INFECTIOUS DISEASES PROG NOTE
Assessment/Plan


Assessment/Plan


A:


Sepsis


UTI


Cellulitis of R leg


Hypercapnic respiratory


failure


ESRD on HD


DM


Anemia


Dementia


PVD


R pleural effusion


Mucous plug


P;


Continue Zyvox & Meropenem





Subjective


ROS Limited/Unobtainable:  Yes


Constitutional:  Reports: no symptoms


Respiratory:  Reports: other - failed weaning


Neurologic:  Reports: other - on restraint


Allergies:  


Coded Allergies:  


     No Known Allergies (Unverified , 11/26/18)





Objective


Vital Signs





Last 24 Hour Vital Signs








  Date Time  Temp Pulse Resp B/P (MAP) Pulse Ox O2 Delivery O2 Flow Rate FiO2


 


12/2/18 10:49  62 12     30


 


12/2/18 09:04     100   


 


12/2/18 08:57  69 14     30


 


12/2/18 07:00  64 14 178/26 (76) 100   


 


12/2/18 06:45  64 13     30


 


12/2/18 06:00  65 14 178/17 (70) 100   


 


12/2/18 05:23  67 13     30


 


12/2/18 05:00 98.5 68 13 166/22 (70) 100   


 


12/2/18 04:00  68 12 158/15 (62) 100   


 


12/2/18 04:00  67      


 


12/2/18 04:00      Mechanical Ventilator  





      Mechanical Ventilator  





      Mechanical Ventilator  


 


12/2/18 04:00        35


 


12/2/18 03:14  65 13     35


 


12/2/18 03:00  64 12 159/15 (63) 100   


 


12/2/18 02:00  67 14 159/17 (64) 100   


 


12/2/18 01:05  67 12     35


 


12/2/18 01:00  66 13 162/18 (66) 100   


 


12/2/18 00:00        35


 


12/2/18 00:00 98.5 68 12 154/17 (62) 100   


 


12/2/18 00:00      Mechanical Ventilator  





      Mechanical Ventilator  





      Mechanical Ventilator  


 


12/2/18 00:00  71      


 


12/1/18 23:06  74 12     35


 


12/1/18 23:00  73 14 179/17 (70) 100   


 


12/1/18 22:00  72 13 68/34 (45) 100   


 


12/1/18 21:03  71 12     35


 


12/1/18 21:00  71 13 150/42 (78) 100   


 


12/1/18 20:00  69      


 


12/1/18 20:00      Mechanical Ventilator  





      Mechanical Ventilator  





      Mechanical Ventilator  


 


12/1/18 20:00        35


 


12/1/18 20:00 98.6 73 20 157/25 (69) 100   


 


12/1/18 19:00  75 14 153/19 (63) 100   


 


12/1/18 18:51  75 13     35


 


12/1/18 18:00  75 14 153/19 (63) 100   


 


12/1/18 17:01  75 14     40


 


12/1/18 17:00  74 15 164/21 (68) 100   


 


12/1/18 16:30    150/31    


 


12/1/18 16:00      Mechanical Ventilator  





      Mechanical Ventilator  





      Mechanical Ventilator  


 


12/1/18 16:00 98.6 77 13 160/27 (71) 100   


 


12/1/18 16:00        40


 


12/1/18 15:05  78 12     40


 


12/1/18 15:00  78 14 169/17 (67) 100   


 


12/1/18 14:00  77 14 163/25 (71) 100   


 


12/1/18 13:00  77 13 197/26 (82) 100   


 


12/1/18 12:49  78 13     40


 


12/1/18 12:00      Mechanical Ventilator  





      Mechanical Ventilator  





      Mechanical Ventilator  


 


12/1/18 12:00        40


 


12/1/18 12:00 98.4 80 16 195/13 (73) 100   


 


12/1/18 12:00  79      








Height (Feet):  5


Height (Inches):  2.00


Weight (Pounds):  132


General Appearance:  no acute distress


HEENT:  mucous membranes moist, other - orally intubated


Respiratory/Chest:  lungs clear, other - on ventilator


Cardiovascular:  normal rate, other -  R arm midline


Abdomen:  soft, non tender, other - NG tube


Neurologic/Psychiatric:  alert, responsive





Current Medications








 Medications


  (Trade)  Dose


 Ordered  Sig/Tony


 Route


 PRN Reason  Start Time


 Stop Time Status Last Admin


Dose Admin


 


 Amiodarone HCl


  (Cordarone)  200 mg  EVERY 12  HOURS


 ORAL


   11/27/18 21:00


 12/27/18 20:59  12/2/18 09:41


 


 


 Chlorhexidine


 Gluconate


  (Juana-Hex 2%)  1 applic  DAILY@2000


 TOPIC


   11/27/18 20:00


 12/27/18 19:59  12/1/18 20:38


 


 


 Epoetin Rupesh


  (Procrit (for


 ESRD on dialysis))  10,000 units  MON-WED-FRI


 SUBQ


   11/28/18 21:00


 12/28/18 20:59  11/30/18 20:49


 


 


 Fentanyl Citrate


  (Sublimaze 100


 mcg/2 mL)  25 mcg  Q2H  PRN


 IV


 Pain Scale (6-10)  12/1/18 20:15


 12/8/18 20:14  12/2/18 09:47


 


 


 Heparin Sodium


  (Porcine)


  (Heparin Sod


 1000 units/ml


 10ml)  2,000 unit  ONCE


 IV


   12/3/18 10:30


 12/3/18 23:59   


 


 


 Iron Sucrose 100


 mg/Sodium Chloride  60 ml @ 


 240 mls/hr  BEDTIME


 IV


   11/28/18 21:00


 12/2/18 21:14  12/1/18 20:38


 


 


 Linezolid


  (Zyvox)  600 mg  EVERY 12  HOURS


 ORAL


   11/28/18 21:00


 12/3/18 20:59  12/2/18 09:41


 


 


 Meropenem 500 mg/


 Sodium Chloride  55 ml @ 


 110 mls/hr  DAILY


 IVPB


   11/28/18 12:00


 12/3/18 11:59  12/2/18 09:41


 


 


 Minoxidil


  (Loniten)  5 mg  Q12HR


 NG


   12/2/18 21:00


 1/1/19 20:59   


 


 


 Norepinephrine


 Bitartrate 4 mg/


 Dextrose  250 ml @ 0


 mls/hr  Q24H


 IV


   11/26/18 16:30


 12/26/18 16:29  11/27/18 21:28


 


 


 Pantoprazole


  (Protonix)  40 mg  Q12HR


 IVP


   11/26/18 21:00


 12/27/18 08:59  12/2/18 09:31


 


 


 Sodium Chloride  1,000 ml @ 


 500 mls/hr  Q2H PRN


 IVLG


 sbp<90 during hd  12/3/18 10:19


 12/3/18 23:59   


 

















Luke Maynard MD Dec 2, 2018 11:46

## 2018-12-02 NOTE — NEPHROLOGY PROGRESS NOTE
Assessment/Plan


Assessment


Seee below


Plan


MOF, improving slowly.


Sepsis due to infected diabetic ulcers, R/O Osteo. On IV Abx. Called ID, Vasc. 

Sx, Podiatry.


Septic Shock pressors PRN


ESRD HD q MWF 


Anemia of CKD transfused yesterday, SAMANTHA high dose. Hct up to 34!


Resp Failure - Vent per Pul. Trying to wean. KEVIN Cardoso.


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 with LAE. LVEF 65% . Anticoagulate when 

stable. KEVIN Wade.


PVD. Needs Angio. KEVIN Spencer.





Subjective


Subjective


Awake + alert. No c/o





Objective


Objective





Last 24 Hour Vital Signs








  Date Time  Temp Pulse Resp B/P (MAP) Pulse Ox O2 Delivery O2 Flow Rate FiO2


 


12/2/18 09:04     100   


 


12/2/18 08:57  69 14     30


 


12/2/18 07:00  64 14 178/26 (76) 100   


 


12/2/18 06:45  64 13     30


 


12/2/18 06:00  65 14 178/17 (70) 100   


 


12/2/18 05:23  67 13     30


 


12/2/18 05:00 98.5 68 13 166/22 (70) 100   


 


12/2/18 04:00  68 12 158/15 (62) 100   


 


12/2/18 04:00  67      


 


12/2/18 04:00      Mechanical Ventilator  





      Mechanical Ventilator  





      Mechanical Ventilator  


 


12/2/18 04:00        35


 


12/2/18 03:14  65 13     35


 


12/2/18 03:00  64 12 159/15 (63) 100   


 


12/2/18 02:00  67 14 159/17 (64) 100   


 


12/2/18 01:05  67 12     35


 


12/2/18 01:00  66 13 162/18 (66) 100   


 


12/2/18 00:00        35


 


12/2/18 00:00 98.5 68 12 154/17 (62) 100   


 


12/2/18 00:00      Mechanical Ventilator  





      Mechanical Ventilator  





      Mechanical Ventilator  


 


12/2/18 00:00  71      


 


12/1/18 23:06  74 12     35


 


12/1/18 23:00  73 14 179/17 (70) 100   


 


12/1/18 22:00  72 13 68/34 (45) 100   


 


12/1/18 21:03  71 12     35


 


12/1/18 21:00  71 13 150/42 (78) 100   


 


12/1/18 20:00  69      


 


12/1/18 20:00      Mechanical Ventilator  





      Mechanical Ventilator  





      Mechanical Ventilator  


 


12/1/18 20:00        35


 


12/1/18 20:00 98.6 73 20 157/25 (69) 100   


 


12/1/18 19:00  75 14 153/19 (63) 100   


 


12/1/18 18:51  75 13     35


 


12/1/18 18:00  75 14 153/19 (63) 100   


 


12/1/18 17:01  75 14     40


 


12/1/18 17:00  74 15 164/21 (68) 100   


 


12/1/18 16:30    150/31    


 


12/1/18 16:00      Mechanical Ventilator  





      Mechanical Ventilator  





      Mechanical Ventilator  


 


12/1/18 16:00 98.6 77 13 160/27 (71) 100   


 


12/1/18 16:00        40


 


12/1/18 15:05  78 12     40


 


12/1/18 15:00  78 14 169/17 (67) 100   


 


12/1/18 14:00  77 14 163/25 (71) 100   


 


12/1/18 13:00  77 13 197/26 (82) 100   


 


12/1/18 12:49  78 13     40


 


12/1/18 12:00      Mechanical Ventilator  





      Mechanical Ventilator  





      Mechanical Ventilator  


 


12/1/18 12:00        40


 


12/1/18 12:00 98.4 80 16 195/13 (73) 100   


 


12/1/18 12:00  79      


 


12/1/18 11:26  80 13     40


 


12/1/18 11:00  83 14 195/13 (73) 100   

















Intake and Output  


 


 12/1/18 12/2/18





 19:00 07:00


 


Intake Total 360 ml 570 ml


 


Balance 360 ml 570 ml


 


  


 


Free Water  150 ml


 


IV Total  60 ml


 


Tube Feeding 360 ml 360 ml


 


# Voids  1


 


# Bowel Movements 4 2








Height (Feet):  5


Height (Inches):  2.00


Weight (Pounds):  132


Objective


Intubated, On vent.


BP very high 180/40 p ~60 A. Fib


Cv IRR/IRR


Lungs B ronchi.


Abd SNT. BS +


E Rt. foot diabetic ulcers with pus











Gavino Daigle MD Dec 2, 2018 10:19

## 2018-12-02 NOTE — GENERAL SURGERY PROGRESS NOTE
General Surgery-Progress Note


Subjective


Procedure Performed


left subclavian central venous catheter insertion


Additional Comments


no acute events.  able to write on paper.  son at bedside.





Objective





Last 24 Hour Vital Signs








  Date Time  Temp Pulse Resp B/P (MAP) Pulse Ox O2 Delivery O2 Flow Rate FiO2


 


12/2/18 12:47  60 12     30


 


12/2/18 12:00      Mechanical Ventilator  





      Mechanical Ventilator  





      Mechanical Ventilator  


 


12/2/18 12:00  63      


 


12/2/18 10:49  62 12     30


 


12/2/18 09:04     100   


 


12/2/18 08:57  69 14     30


 


12/2/18 08:00  69      


 


12/2/18 08:00      Mechanical Ventilator  





      Mechanical Ventilator  





      Mechanical Ventilator  


 


12/2/18 07:00  64 14 178/26 (76) 100   


 


12/2/18 06:45  64 13     30


 


12/2/18 06:00  65 14 178/17 (70) 100   


 


12/2/18 05:23  67 13     30


 


12/2/18 05:00 98.5 68 13 166/22 (70) 100   


 


12/2/18 04:00  68 12 158/15 (62) 100   


 


12/2/18 04:00  67      


 


12/2/18 04:00      Mechanical Ventilator  





      Mechanical Ventilator  





      Mechanical Ventilator  


 


12/2/18 04:00        35


 


12/2/18 03:14  65 13     35


 


12/2/18 03:00  64 12 159/15 (63) 100   


 


12/2/18 02:00  67 14 159/17 (64) 100   


 


12/2/18 01:05  67 12     35


 


12/2/18 01:00  66 13 162/18 (66) 100   


 


12/2/18 00:00        35


 


12/2/18 00:00 98.5 68 12 154/17 (62) 100   


 


12/2/18 00:00      Mechanical Ventilator  





      Mechanical Ventilator  





      Mechanical Ventilator  


 


12/2/18 00:00  71      


 


12/1/18 23:06  74 12     35


 


12/1/18 23:00  73 14 179/17 (70) 100   


 


12/1/18 22:00  72 13 68/34 (45) 100   


 


12/1/18 21:03  71 12     35


 


12/1/18 21:00  71 13 150/42 (78) 100   


 


12/1/18 20:00  69      


 


12/1/18 20:00      Mechanical Ventilator  





      Mechanical Ventilator  





      Mechanical Ventilator  


 


12/1/18 20:00        35


 


12/1/18 20:00 98.6 73 20 157/25 (69) 100   


 


12/1/18 19:00  75 14 153/19 (63) 100   


 


12/1/18 18:51  75 13     35


 


12/1/18 18:00  75 14 153/19 (63) 100   


 


12/1/18 17:01  75 14     40


 


12/1/18 17:00  74 15 164/21 (68) 100   


 


12/1/18 16:30    150/31    


 


12/1/18 16:00      Mechanical Ventilator  





      Mechanical Ventilator  





      Mechanical Ventilator  


 


12/1/18 16:00 98.6 77 13 160/27 (71) 100   


 


12/1/18 16:00        40


 


12/1/18 15:05  78 12     40


 


12/1/18 15:00  78 14 169/17 (67) 100   


 


12/1/18 14:00  77 14 163/25 (71) 100   








I&O











Intake and Output  


 


 12/1/18 12/2/18





 19:00 07:00


 


Intake Total 360 ml 570 ml


 


Balance 360 ml 570 ml


 


  


 


Free Water  150 ml


 


IV Total  60 ml


 


Tube Feeding 360 ml 360 ml


 


# Voids  1


 


# Bowel Movements 4 2








Dressing:  other


Wound:  other


Drains:  other


Cardiovascular:  RSR


Respiratory:  decreased breath sounds


Abdomen:  soft, flat, non-tender, present bowel sounds


Extremities:  no cyanosis





Plan


Problems:  


(1) Sepsis


Assessment & Plan:  acute decompensation 


hypotensive


bradycardic


respiratory decompensation requiring intubation and vent support - failed 

extubation 





improving


leukocytosis resolved


she looks much better


cont current ICU care and management


thank you


will follow with recs 





(2) Hypotension


(3) Bradycardia


(4) Altered mental status


(5) Decubital ulcer


Assessment & Plan:  Patient with multiple pressure injuries where have been 

present since admission. 


Full thickness pressure injury to sacrum (L)(L)2.4cm x (W)4.4cm x (D)0.9cm, 

undermining 12-12 with tunneling 9-10 by 11.3cm. 


In close proximity to sacral wound on L buttocks purple ,indurated area with 

small area of slough measuring (L)0.6cm x (W)0.5cm noted. Arched surgical scar 

noted to L buttocks at site of tunneling. 


Resolving pressure injury noted to R ischium (L)3cm x (W)2cm. Wound bed 

epithelialized with maroon discoloration without induration periwound. 


Closed area with white pocket and purple margins noted to distal,paz R tibia 

(L)6cm x (W)2.7cm


Full thickness wound noted to distal/medial RLE (L)6.2cm x (W)3.8cm .Wound 

noted to have 95% mixed slough /necrosis ,marginal erythema. Wound malodorous.


Wound posterior R tibia (L)4.7cm x (W) 3cm ,100% necrotic with marginal 

erythema. Dry eschar with marginal erythema 


(L)0.5cm x (W)0.7cm noted to medial R heel.Non-blanchable erythema noted to 

medial L malleolus. 


Dry brown eschar noted to medial L 1st malleolus (L)1.4cm x (W)1cm. 


L heel and lateral aspect boggy with non-blanchable erythema. 





Tx.Plan:


Cleanse sacral wound with Saline.Loose pack with Hydrogel impregnated packing 

strip (Attention to tunneled area at 9-10).Cover with Optifoam drsg Daily and 

prn.


           


Reposition at least every 2hours or as tolerated.


           


Air Fluidized mattress.


           


Off-load heels with pillow.





Appreciate Podiatry and Vascular input





Plan for anigo late by Vascular 














Bob Lewis Dec 2, 2018 14:07

## 2018-12-02 NOTE — NUR
RESPIRATORY NOTE:

Received pt on AC 12, 500VT, 30%, PEEP +5. Pt intubated w/ ETT 7.5 @ 23cm lipline, secured 
by anchorfast. Pt alert/awake, follows commands. Both hands on soft restraints to prevent pt 
from self-extubation. Vent plugged into red outlet, ambubag at bedside. Pt denies SOB/chest 
pain at this time. Will continue to monitor pt.

## 2018-12-02 NOTE — PULMONOLGY CRITICAL CARE NOTE
Critical Care - Asmt/Plan


Assessment/Plan:


Assessment/Plan


IMPRESSION:


1. Hypotension.


2. Sepsis.


3. Atrial fibrillation.


4. Bradycardia.


5. Diabetes mellitus.


6. End-stage renal disease on dialysis.


7. Left lung collapse.


8. Respiratory failure.


9. Dysphagia.


10. Dementia.


11. Anemia





DISCUSSION:


1. Continue medications.


2. as needed Levophed.


3. status post bronchoscopy


4. Central access


5. Use broad-spectrum antibiotics.  


6. continue weaning attempts














  ______________________________________________


  Quentin Cardoso M.D.





Subjective


Interval Events:  Not weaning; s/p prbc


Constitutional:  Reports: no symptoms


HEENT:  Repors: no symptoms


Cardiovascular:  Reports: no symptoms


Gastrointestinal/Abdominal:  Reports: no symptoms


Genitourinary:  Reports: no symptoms


Allergies:  


Coded Allergies:  


     No Known Allergies (Unverified , 11/26/18)





Objective





Vital Signs Noted





General Appearance:  no acute distress


HEENT:  normocephalic


Respiratory/Chest:  chest wall non-tender, lungs clear


Cardiovascular:  normal peripheral pulses, normal rate


Abdomen:  normal bowel sounds





Microbiology








 Date/Time


Source Procedure


Growth Status


 


 


 11/28/18 11:40


Other(Specify in comment) Gram Stain - Final Resulted


 


 11/28/18 11:40


Other(Specify in comment) Wound Culture - Preliminary Resulted





 11/28/18 11:40


Sacral Wound Gram Stain - Final Resulted


 


 11/28/18 11:40


Sacral Wound Wound Culture - Preliminary Resulted








Laboratory Tests


11/29/18 13:50: 


Arterial Blood pH 7.250*L, Arterial Blood Partial Pressure CO2 48.3H, Arterial 

Blood Partial Pressure O2 75.1, Arterial Blood HCO3 20.8L, Arterial Blood 

Oxygen Saturation 91.8L, Arterial Blood Base Excess -6.4L, Arcadio Test Positive


11/30/18 01:45: Stool Occult Blood [Pending]


11/30/18 04:00: 


White Blood Count 5.2, Red Blood Count 3.87L, Hemoglobin 10.4L, Hematocrit 34.4L

, Mean Corpuscular Volume 89, Mean Corpuscular Hemoglobin 27.0, Mean 

Corpuscular Hemoglobin Concent 30.3L, Red Cell Distribution Width 15.5H, 

Platelet Count 148L, Mean Platelet Volume 8.4, Neutrophils (%) (Auto) 68.0, 

Lymphocytes (%) (Auto) 19.0L, Monocytes (%) (Auto) 7.7, Eosinophils (%) (Auto) 

4.5H, Basophils (%) (Auto) 0.9, Sodium Level 130L, Potassium Level 4.0, 

Chloride Level 98, Carbon Dioxide Level 23, Anion Gap 9, Blood Urea Nitrogen 32H

, Creatinine 4.6H, Estimat Glomerular Filtration Rate , Glucose Level 110H, 

Calcium Level 8.2L, Iron Level 57, Total Bilirubin 0.4, Aspartate Amino Transf (

AST/SGOT) 9L, Alanine Aminotransferase (ALT/SGPT) 8L, Alkaline Phosphatase 141H

, Total Protein 7.3, Albumin 1.9L, Globulin 5.4, Albumin/Globulin Ratio 0.4L





Current Medications








 Medications


  (Trade)  Dose


 Ordered  Sig/Tony


 Route


 PRN Reason  Start Time


 Stop Time Status Last Admin


Dose Admin


 


 Amiodarone HCl


  (Cordarone)  200 mg  EVERY 12  HOURS


 ORAL


   11/27/18 21:00


 12/27/18 20:59  11/29/18 20:37


 


 


 Chlorhexidine


 Gluconate


  (Juana-Hex 2%)  1 applic  DAILY@2000


 TOPIC


   11/27/18 20:00


 12/27/18 19:59  11/29/18 20:01


 


 


 Epoetin Rupesh


  (Procrit (for


 ESRD on dialysis))  10,000 units  MON-WED-FRI


 SUBQ


   11/28/18 21:00


 12/28/18 20:59  11/28/18 21:16


 


 


 Heparin Sodium


  (Porcine)


  (Heparin Sod


 1000 units/ml


 10ml)  2,000 unit  ONCE  PRN


 IV


 DIALYSIS  11/29/18 09:54


 11/30/18 23:59   


 


 


 Iron Sucrose 100


 mg/Sodium Chloride  60 ml @ 


 240 mls/hr  BEDTIME


 IV


   11/28/18 21:00


 12/2/18 21:14  11/29/18 20:36


 


 


 Linezolid


  (Zyvox)  600 mg  EVERY 12  HOURS


 ORAL


   11/28/18 21:00


 12/3/18 20:59  11/29/18 20:37


 


 


 Meropenem 500 mg/


 Sodium Chloride  55 ml @ 


 110 mls/hr  DAILY


 IVPB


   11/28/18 12:00


 12/3/18 11:59  11/29/18 08:45


 


 


 Norepinephrine


 Bitartrate 4 mg/


 Dextrose  250 ml @ 0


 mls/hr  Q24H


 IV


   11/26/18 16:30


 12/26/18 16:29  11/27/18 21:28


 


 


 Pantoprazole


  (Protonix)  40 mg  Q12HR


 IVP


   11/26/18 21:00


 12/27/18 08:59  11/29/18 20:37


 


 


 Sodium Chloride  1,000 ml @ 


 500 mls/hr  Q2H PRN


 IVLG


 sbp<90 during hd  11/29/18 09:54


 11/30/18 23:59   


 











Critical Care - Objective





Last 24 Hour Vital Signs








  Date Time  Temp Pulse Resp B/P (MAP) Pulse Ox O2 Delivery O2 Flow Rate FiO2


 


12/2/18 10:49  62 12     30


 


12/2/18 09:04     100   


 


12/2/18 08:57  69 14     30


 


12/2/18 07:00  64 14 178/26 (76) 100   


 


12/2/18 06:45  64 13     30


 


12/2/18 06:00  65 14 178/17 (70) 100   


 


12/2/18 05:23  67 13     30


 


12/2/18 05:00 98.5 68 13 166/22 (70) 100   


 


12/2/18 04:00  68 12 158/15 (62) 100   


 


12/2/18 04:00  67      


 


12/2/18 04:00      Mechanical Ventilator  





      Mechanical Ventilator  





      Mechanical Ventilator  


 


12/2/18 04:00        35


 


12/2/18 03:14  65 13     35


 


12/2/18 03:00  64 12 159/15 (63) 100   


 


12/2/18 02:00  67 14 159/17 (64) 100   


 


12/2/18 01:05  67 12     35


 


12/2/18 01:00  66 13 162/18 (66) 100   


 


12/2/18 00:00        35


 


12/2/18 00:00 98.5 68 12 154/17 (62) 100   


 


12/2/18 00:00      Mechanical Ventilator  





      Mechanical Ventilator  





      Mechanical Ventilator  


 


12/2/18 00:00  71      


 


12/1/18 23:06  74 12     35


 


12/1/18 23:00  73 14 179/17 (70) 100   


 


12/1/18 22:00  72 13 68/34 (45) 100   


 


12/1/18 21:03  71 12     35


 


12/1/18 21:00  71 13 150/42 (78) 100   


 


12/1/18 20:00  69      


 


12/1/18 20:00      Mechanical Ventilator  





      Mechanical Ventilator  





      Mechanical Ventilator  


 


12/1/18 20:00        35


 


12/1/18 20:00 98.6 73 20 157/25 (69) 100   


 


12/1/18 19:00  75 14 153/19 (63) 100   


 


12/1/18 18:51  75 13     35


 


12/1/18 18:00  75 14 153/19 (63) 100   


 


12/1/18 17:01  75 14     40


 


12/1/18 17:00  74 15 164/21 (68) 100   


 


12/1/18 16:30    150/31    


 


12/1/18 16:00      Mechanical Ventilator  





      Mechanical Ventilator  





      Mechanical Ventilator  


 


12/1/18 16:00 98.6 77 13 160/27 (71) 100   


 


12/1/18 16:00        40


 


12/1/18 15:05  78 12     40


 


12/1/18 15:00  78 14 169/17 (67) 100   


 


12/1/18 14:00  77 14 163/25 (71) 100   


 


12/1/18 13:00  77 13 197/26 (82) 100   


 


12/1/18 12:49  78 13     40


 


12/1/18 12:00      Mechanical Ventilator  





      Mechanical Ventilator  





      Mechanical Ventilator  


 


12/1/18 12:00        40


 


12/1/18 12:00 98.4 80 16 195/13 (73) 100   


 


12/1/18 12:00  79      











Critical Care - Subjective


ROS Limited/Unobtainable:  No


FI02:  30


Vent Support Breath Rate:  12


Vent Support Mode:  AC


Vent Tidal Volume:  500


Sputum Amount:  Small


PEEP:  5.0


PIP:  28


Tube Feeding Amount:  30


I&O:











Intake and Output  


 


 12/1/18 12/2/18





 19:00 07:00


 


Intake Total 360 ml 570 ml


 


Balance 360 ml 570 ml


 


  


 


Free Water  150 ml


 


IV Total  60 ml


 


Tube Feeding 360 ml 360 ml


 


# Voids  1


 


# Bowel Movements 4 2








ET-Tube:  7.5


ET Position:  23











Simon Silva MD Dec 2, 2018 11:52

## 2018-12-02 NOTE — NUR
NURSE NOTES:

Received report from Shauna LEON. patient is awake, able to communicate with nodding head and 
blinking eyes. patient is  intubated with 7.5 ETT at 23cm at the lip.ventilated with AC12 TV 
500 FIO2 35% PEEP 5 and sating 100%. no s/s of respiratory or cardiac distress noted. VSS. 
Afebrile.ongoing NGT feeding of Nepro at 30cc/hr, no residual.  bilateral soft wrist 
restraints .AV shunt on left upper arm with positive bruit and thrill. Midline on ASHISH patent 
and asymptomatic. 6/10 pain at this time. Family at bedside. bed in locked and lowest 
position and alarm on. call light in reach.will resume plan of care.

## 2018-12-02 NOTE — NUR
NURSE NOTES:

Received pt in no acute distress. Asleep but arousable to voice. Remains orally intubated 
and appears to be tolerating current vent settings of AC12  fiO2 .30, peep 5 and 
saturating 100%Secretions mod amt, mostly orally, thin, white, Chest sounds with scatttered 
rhonchi. Pt apparrently failed weaning today. On bila soft wrist restraints as pt tends to 
be impulsive. NGT in situ, and TF with Nepro continue at 30ml/h with 0 residuals. Abdomen 
soft, no BM at this time. MKidline cath on ASHISH intact. IV at TKO. AV graft on VALE with 
strong bruit and thrill. Anuric, pt foe HD ruth morning. Wound dressings dry and intact. Will 
continue to monitor

## 2018-12-02 NOTE — NUR
RESPIRATORY NOTE:



Patient received mechanically ventilated on  with current ordered vent settings. 
Patient is orally intubated with a size 7.5 ETT tube with 23cm at the lip line. ETT tube is 
secured with anchor fast with no apparent skin breakdown or redness noted at this time. 
Bilateral coarse breath sounds were auscultated and moderate amount thick clear light/yellow 
secretions were suctioned without incident. There is an ambu bag available at the bedside 
and the vent is connected to a red outlet. Vent alarms are are audible and functional. Will 
continue to monitor patient.

## 2018-12-02 NOTE — CARDIOLOGY PROGRESS NOTE
Assessment/Plan


Assessment/Plan


no atrial fibrillation since last night





Subjective


Subjective


The patient is intubated, family member at her site. did not sleep at night





Objective





Last 24 Hour Vital Signs








  Date Time  Temp Pulse Resp B/P (MAP) Pulse Ox O2 Delivery O2 Flow Rate FiO2


 


12/2/18 12:47  60 12     30


 


12/2/18 12:00        30


 


12/2/18 12:00      Mechanical Ventilator  





      Mechanical Ventilator  





      Mechanical Ventilator  


 


12/2/18 12:00  63      


 


12/2/18 10:49  62 12     30


 


12/2/18 09:04     100   


 


12/2/18 08:57  69 14     30


 


12/2/18 08:00        35


 


12/2/18 08:00  69      


 


12/2/18 08:00      Mechanical Ventilator  





      Mechanical Ventilator  





      Mechanical Ventilator  


 


12/2/18 07:00  64 14 178/26 (76) 100   


 


12/2/18 06:45  64 13     30


 


12/2/18 06:00  65 14 178/17 (70) 100   


 


12/2/18 05:23  67 13     30


 


12/2/18 05:00 98.5 68 13 166/22 (70) 100   


 


12/2/18 04:00  68 12 158/15 (62) 100   


 


12/2/18 04:00  67      


 


12/2/18 04:00      Mechanical Ventilator  





      Mechanical Ventilator  





      Mechanical Ventilator  


 


12/2/18 04:00        35


 


12/2/18 03:14  65 13     35


 


12/2/18 03:00  64 12 159/15 (63) 100   


 


12/2/18 02:00  67 14 159/17 (64) 100   


 


12/2/18 01:05  67 12     35


 


12/2/18 01:00  66 13 162/18 (66) 100   


 


12/2/18 00:00        35


 


12/2/18 00:00 98.5 68 12 154/17 (62) 100   


 


12/2/18 00:00      Mechanical Ventilator  





      Mechanical Ventilator  





      Mechanical Ventilator  


 


12/2/18 00:00  71      


 


12/1/18 23:06  74 12     35


 


12/1/18 23:00  73 14 179/17 (70) 100   


 


12/1/18 22:00  72 13 68/34 (45) 100   


 


12/1/18 21:03  71 12     35


 


12/1/18 21:00  71 13 150/42 (78) 100   


 


12/1/18 20:00  69      


 


12/1/18 20:00      Mechanical Ventilator  





      Mechanical Ventilator  





      Mechanical Ventilator  


 


12/1/18 20:00        35


 


12/1/18 20:00 98.6 73 20 157/25 (69) 100   


 


12/1/18 19:00  75 14 153/19 (63) 100   


 


12/1/18 18:51  75 13     35


 


12/1/18 18:00  75 14 153/19 (63) 100   


 


12/1/18 17:01  75 14     40


 


12/1/18 17:00  74 15 164/21 (68) 100   


 


12/1/18 16:30    150/31    


 


12/1/18 16:00      Mechanical Ventilator  





      Mechanical Ventilator  





      Mechanical Ventilator  


 


12/1/18 16:00 98.6 77 13 160/27 (71) 100   


 


12/1/18 16:00        40


 


12/1/18 15:05  78 12     40


 


12/1/18 15:00  78 14 169/17 (67) 100   








General Appearance:  on vent


EENT:  PERRL/EOMI


Neck:  no JVD


Cardiovascular:  regular rhythm


Respiratory/Chest:  decreased breath sounds, crackles/rales


Abdomen:  soft


Extremities:  slow capillary refill











Intake and Output  


 


 12/1/18 12/2/18





 19:00 07:00


 


Intake Total 360 ml 570 ml


 


Balance 360 ml 570 ml


 


  


 


Free Water  150 ml


 


IV Total  60 ml


 


Tube Feeding 360 ml 360 ml


 


# Voids  1


 


# Bowel Movements 4 2

















Annette Sykes MD Dec 2, 2018 14:02

## 2018-12-02 NOTE — NUR
CASE MANAGEMENT: REVIEW



12/02/2018



SI: A-FIB . SEPSIS . ESRD ON HD 

T 98.5 HR 68 RR 13 B/P 166/22 SATS 100% ON MECH VENT fIO2 35 

NO LABS TODAY 







IS: AMIODARONE PO Q12HR

LEVOPHED GTT

PROTONIX IV Q12HR

PROCRIT SQ MWF

ZYVOX PO Q12HR 

NGT FEEDING NEPRO @10ML/HR 



***ICU STATUS***



DCP: PATIENT IS FROM [facility]

## 2018-12-02 NOTE — NUR
NURSE NOTES:

Awake, no signs of acute distress noted.Tolerating well with NGT feeds.no residual noted. 
keep HOB elevated.

## 2018-12-02 NOTE — NUR
NURSE NOTES:

provided sponge bath with using CHG. Suctioned with small amount of white thin 
secretions.oral care done.SR with HR of 68.Afebrile.continue on bilateral soft wrist 
restraints to prevent self extubation and pull out medical device.will continue to monitor 
patient..

## 2018-12-03 VITALS — DIASTOLIC BLOOD PRESSURE: 13 MMHG | SYSTOLIC BLOOD PRESSURE: 119 MMHG

## 2018-12-03 VITALS — SYSTOLIC BLOOD PRESSURE: 102 MMHG | DIASTOLIC BLOOD PRESSURE: 47 MMHG

## 2018-12-03 VITALS — DIASTOLIC BLOOD PRESSURE: 12 MMHG | SYSTOLIC BLOOD PRESSURE: 123 MMHG

## 2018-12-03 VITALS — DIASTOLIC BLOOD PRESSURE: 16 MMHG | SYSTOLIC BLOOD PRESSURE: 116 MMHG

## 2018-12-03 VITALS — DIASTOLIC BLOOD PRESSURE: 36 MMHG | SYSTOLIC BLOOD PRESSURE: 102 MMHG

## 2018-12-03 VITALS — SYSTOLIC BLOOD PRESSURE: 121 MMHG | DIASTOLIC BLOOD PRESSURE: 40 MMHG

## 2018-12-03 VITALS — SYSTOLIC BLOOD PRESSURE: 101 MMHG | DIASTOLIC BLOOD PRESSURE: 33 MMHG

## 2018-12-03 VITALS — SYSTOLIC BLOOD PRESSURE: 123 MMHG | DIASTOLIC BLOOD PRESSURE: 12 MMHG

## 2018-12-03 VITALS — SYSTOLIC BLOOD PRESSURE: 104 MMHG | DIASTOLIC BLOOD PRESSURE: 30 MMHG

## 2018-12-03 VITALS — SYSTOLIC BLOOD PRESSURE: 128 MMHG | DIASTOLIC BLOOD PRESSURE: 30 MMHG

## 2018-12-03 VITALS — DIASTOLIC BLOOD PRESSURE: 19 MMHG | SYSTOLIC BLOOD PRESSURE: 116 MMHG

## 2018-12-03 VITALS — DIASTOLIC BLOOD PRESSURE: 60 MMHG | SYSTOLIC BLOOD PRESSURE: 94 MMHG

## 2018-12-03 VITALS — SYSTOLIC BLOOD PRESSURE: 127 MMHG | DIASTOLIC BLOOD PRESSURE: 22 MMHG

## 2018-12-03 VITALS — DIASTOLIC BLOOD PRESSURE: 24 MMHG | SYSTOLIC BLOOD PRESSURE: 107 MMHG

## 2018-12-03 VITALS — SYSTOLIC BLOOD PRESSURE: 102 MMHG | DIASTOLIC BLOOD PRESSURE: 36 MMHG

## 2018-12-03 VITALS — DIASTOLIC BLOOD PRESSURE: 12 MMHG | SYSTOLIC BLOOD PRESSURE: 124 MMHG

## 2018-12-03 VITALS — SYSTOLIC BLOOD PRESSURE: 125 MMHG | DIASTOLIC BLOOD PRESSURE: 18 MMHG

## 2018-12-03 VITALS — SYSTOLIC BLOOD PRESSURE: 120 MMHG | DIASTOLIC BLOOD PRESSURE: 44 MMHG

## 2018-12-03 VITALS — SYSTOLIC BLOOD PRESSURE: 122 MMHG | DIASTOLIC BLOOD PRESSURE: 36 MMHG

## 2018-12-03 VITALS — SYSTOLIC BLOOD PRESSURE: 121 MMHG | DIASTOLIC BLOOD PRESSURE: 17 MMHG

## 2018-12-03 VITALS — SYSTOLIC BLOOD PRESSURE: 83 MMHG | DIASTOLIC BLOOD PRESSURE: 36 MMHG

## 2018-12-03 VITALS — SYSTOLIC BLOOD PRESSURE: 91 MMHG | DIASTOLIC BLOOD PRESSURE: 48 MMHG

## 2018-12-03 VITALS — DIASTOLIC BLOOD PRESSURE: 18 MMHG | SYSTOLIC BLOOD PRESSURE: 109 MMHG

## 2018-12-03 VITALS — SYSTOLIC BLOOD PRESSURE: 130 MMHG | DIASTOLIC BLOOD PRESSURE: 12 MMHG

## 2018-12-03 LAB
ADD MANUAL DIFF: NO
ALBUMIN SERPL-MCNC: 1.5 G/DL (ref 3.4–5)
ALBUMIN/GLOB SERPL: 0.3 {RATIO} (ref 1–2.7)
ALP SERPL-CCNC: 99 U/L (ref 46–116)
ALT SERPL-CCNC: 6 U/L (ref 12–78)
ANION GAP SERPL CALC-SCNC: 11 MMOL/L (ref 5–15)
AST SERPL-CCNC: 15 U/L (ref 15–37)
BASOPHILS NFR BLD AUTO: 1.5 % (ref 0–2)
BILIRUB SERPL-MCNC: 0.3 MG/DL (ref 0.2–1)
BUN SERPL-MCNC: 46 MG/DL (ref 7–18)
CALCIUM SERPL-MCNC: 8.7 MG/DL (ref 8.5–10.1)
CHLORIDE SERPL-SCNC: 105 MMOL/L (ref 98–107)
CO2 SERPL-SCNC: 25 MMOL/L (ref 21–32)
CREAT SERPL-MCNC: 3.6 MG/DL (ref 0.55–1.3)
EOSINOPHIL NFR BLD AUTO: 4.6 % (ref 0–3)
ERYTHROCYTE [DISTWIDTH] IN BLOOD BY AUTOMATED COUNT: 18.8 % (ref 11.6–14.8)
GLOBULIN SER-MCNC: 4.6 G/DL
HCT VFR BLD CALC: 31.4 % (ref 37–47)
HGB BLD-MCNC: 9.4 G/DL (ref 12–16)
LYMPHOCYTES NFR BLD AUTO: 9.2 % (ref 20–45)
MCV RBC AUTO: 93 FL (ref 80–99)
MONOCYTES NFR BLD AUTO: 10.2 % (ref 1–10)
NEUTROPHILS NFR BLD AUTO: 74.6 % (ref 45–75)
PHOSPHATE SERPL-MCNC: 3.8 MG/DL (ref 2.5–4.9)
PLATELET # BLD: 278 K/UL (ref 150–450)
POTASSIUM SERPL-SCNC: 3.4 MMOL/L (ref 3.5–5.1)
RBC # BLD AUTO: 3.37 M/UL (ref 4.2–5.4)
SODIUM SERPL-SCNC: 141 MMOL/L (ref 136–145)
WBC # BLD AUTO: 5.4 K/UL (ref 4.8–10.8)

## 2018-12-03 RX ADMIN — METOPROLOL TARTRATE SCH MG: 25 TABLET, FILM COATED ORAL at 21:00

## 2018-12-03 RX ADMIN — PANTOPRAZOLE SODIUM SCH MG: 40 INJECTION, POWDER, FOR SOLUTION INTRAVENOUS at 09:39

## 2018-12-03 RX ADMIN — ERYTHROPOIETIN SCH UNITS: 20000 INJECTION, SOLUTION INTRAVENOUS; SUBCUTANEOUS at 20:50

## 2018-12-03 RX ADMIN — MEROPENEM SCH MLS/HR: 500 INJECTION INTRAVENOUS at 09:39

## 2018-12-03 RX ADMIN — MINOXIDIL SCH MG: 2.5 TABLET ORAL at 21:00

## 2018-12-03 RX ADMIN — AMIODARONE HYDROCHLORIDE SCH MG: 200 TABLET ORAL at 20:40

## 2018-12-03 RX ADMIN — CHLORHEXIDINE GLUCONATE SCH APPLIC: 213 SOLUTION TOPICAL at 20:39

## 2018-12-03 RX ADMIN — MINOXIDIL SCH MG: 2.5 TABLET ORAL at 09:00

## 2018-12-03 RX ADMIN — PANTOPRAZOLE SODIUM SCH MG: 40 INJECTION, POWDER, FOR SOLUTION INTRAVENOUS at 20:39

## 2018-12-03 RX ADMIN — AMIODARONE HYDROCHLORIDE SCH MG: 200 TABLET ORAL at 09:38

## 2018-12-03 NOTE — NUR
NURSE NOTES:

R.T reports pt not making volume effort to continue SIMV mode. Will attempt again later when 
pt is more awake.

## 2018-12-03 NOTE — NUR
RESPIRATORY NOTE: Received pt on current vent settings. Pt endotracheal tube is patent and 
secured. No resp distress noted. Suctioned pt prn. Vent alarms are on and audible. Vent is 
plugged into red outlet. Will monitor pt progress.

## 2018-12-03 NOTE — NEPHROLOGY PROGRESS NOTE
Assessment/Plan


Assessment


Seee below


Plan


MOF, improving slowly.


Sepsis due to infected diabetic ulcers, R/O Osteo. On IV Abx. Called ID, Vasc. 

Sx, Podiatry.


Septic Shock pressors PRN


ESRD HD q MWF 


Anemia of CKD , SAMANTHA high dose. Transfuse PRN.


Resp Failure - Vent per Pul. Trying to wean. KEVIN Cardoso.


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 with LAE. LVEF 65% . Anticoagulate when 

stable. KEVIN Wade.


PVD. Needs Angio. KEVIN Spencer.





Subjective


Subjective


Awake + alert. No c/o





Objective


Objective





Last 24 Hour Vital Signs








  Date Time  Temp Pulse Resp B/P (MAP) Pulse Ox O2 Delivery O2 Flow Rate FiO2


 


12/3/18 08:00        30


 


12/3/18 08:00      Mechanical Ventilator  





      Mechanical Ventilator  





      Mechanical Ventilator  


 


12/3/18 07:00  53 12 123/12 (49) 100   


 


12/3/18 06:58  55 12     30


 


12/3/18 06:00  52 12 128/30 (62) 99   


 


12/3/18 05:12  54 12     30


 


12/3/18 05:00  56 12 121/17 (51) 100   


 


12/3/18 04:00        30


 


12/3/18 04:00 97.7 61 13 116/19 (51) 100   


 


12/3/18 04:00  61      


 


12/3/18 04:00      Mechanical Ventilator  





      Mechanical Ventilator  





      Mechanical Ventilator  


 


12/3/18 03:12  59 12     30


 


12/3/18 03:00  63 12 125/18 (53) 100   


 


12/3/18 02:00  53 12 109/18 (48) 100   


 


12/3/18 01:10  54 12     30


 


12/3/18 01:00  56 12 124/12 (49) 100   


 


12/3/18 00:00 97.7 56 12 127/22 (57) 100   


 


12/3/18 00:00      Mechanical Ventilator  





      Mechanical Ventilator  





      Mechanical Ventilator  


 


12/3/18 00:00        30


 


12/2/18 23:13  53 12     30


 


12/2/18 23:00  52 12 120/22 (54) 100   


 


12/2/18 22:00  53 12 118/22 (54) 100   


 


12/2/18 21:05  63 12     30


 


12/2/18 21:00  56 12 125/24 (57) 100   


 


12/2/18 20:48    121/36    


 


12/2/18 20:00 98.5 54 12 121/36 (64) 100   


 


12/2/18 20:00        30


 


12/2/18 20:00      Mechanical Ventilator  





      Mechanical Ventilator  





      Mechanical Ventilator  


 


12/2/18 20:00  54      


 


12/2/18 19:03  61 12     30


 


12/2/18 19:03  61 12   Mechanical Ventilator  30


 


12/2/18 19:00  57 14 127/49 (75) 100   


 


12/2/18 18:00  57 14 127/27 (60) 100   


 


12/2/18 17:06  63 12     30


 


12/2/18 17:00  65 14 84/67 (73) 100   


 


12/2/18 16:30    102/52    


 


12/2/18 16:00 98.6 62 14 121/12 (48) 100   


 


12/2/18 16:00        30


 


12/2/18 16:00      Mechanical Ventilator  





      Mechanical Ventilator  





      Mechanical Ventilator  


 


12/2/18 16:00  60      


 


12/2/18 15:00  62 14 61/39 (46) 100   


 


12/2/18 14:46  62 12     30


 


12/2/18 14:00  64 14 171/15 (66) 100   


 


12/2/18 13:00  60 14 163/18 (66) 100   


 


12/2/18 12:47  60 12     30


 


12/2/18 12:00        30


 


12/2/18 12:00      Mechanical Ventilator  





      Mechanical Ventilator  





      Mechanical Ventilator  


 


12/2/18 12:00  63      


 


12/2/18 12:00 98.2 60 12 168/30 (76) 100   


 


12/2/18 11:00  62 13 178/26 (76) 100   


 


12/2/18 10:49  62 12     30


 


12/2/18 10:00  64 12 198/31 (86) 100   


 


12/2/18 09:04     100   


 


12/2/18 09:00  74 14 178/26 (76) 99   


 


12/2/18 08:57  69 14     30

















Intake and Output  


 


 12/2/18 12/3/18





 19:00 07:00


 


Intake Total 470 ml 360 ml


 


Balance 470 ml 360 ml


 


  


 


IV Total 110 ml 


 


Tube Feeding 360 ml 360 ml


 


# Bowel Movements 1 








Laboratory Tests


12/3/18 04:45: 


White Blood Count 5.4, Red Blood Count 3.37L, Hemoglobin 9.4L, Hematocrit 31.4L

, Mean Corpuscular Volume 93, Mean Corpuscular Hemoglobin 27.8, Mean 

Corpuscular Hemoglobin Concent 29.9L, Red Cell Distribution Width 18.8H, 

Platelet Count 278, Mean Platelet Volume 6.1L, Neutrophils (%) (Auto) 74.6, 

Lymphocytes (%) (Auto) 9.2L, Monocytes (%) (Auto) 10.2H, Eosinophils (%) (Auto) 

4.6H, Basophils (%) (Auto) 1.5, Sodium Level 141, Potassium Level 3.4L, 

Chloride Level 105, Carbon Dioxide Level 25, Anion Gap 11, Blood Urea Nitrogen 

46H, Creatinine 3.6H, Estimat Glomerular Filtration Rate , Glucose Level 113H, 

Calcium Level 8.7, Phosphorus Level 3.8, Total Bilirubin 0.3, Aspartate Amino 

Transf (AST/SGOT) 15, Alanine Aminotransferase (ALT/SGPT) 6L, Alkaline 

Phosphatase 99, Total Protein 6.1L, Albumin 1.5L, Globulin 4.6, Albumin/

Globulin Ratio 0.3L


Height (Feet):  5


Height (Inches):  2.00


Weight (Pounds):  144


Objective


Intubated, On vent.


BP measured more accurately with a smaller cuff placed on rt. FA.


Cv IRR/IRR


Lungs B ronchi.


Abd SNT. BS +


E Rt. foot diabetic ulcers with pus











Gavino Daigle MD Dec 3, 2018 08:54

## 2018-12-03 NOTE — NUR
NURSE NOTES:

Received report from Jeronimo LEON. patient is asleep,arouses with light shaking. appears 
fatigued. no c/o pain at this time. patient is  intubated with 7.5 ETT at 23cm at the 
lip.ventilated with AC12  FIO2 35% PEEP 5 and 02 npt881%. No s/s of respiratory 
distress noted. VSS. Afebrile. NGT feeding of Nepro at 30cc/hr, no residual. abdomen flat, 
bowel sounds present in all quadrants.  bilateral soft wrist restraints noted, circulation 
check. AV shunt on left upper arm, bruit and thrill present. ASHISH midline patent and intact. 
contact and fall precautions in place. bed in locked and lowest position and alarm on. call 
light in reach. education on H>D and plan of care. will  continue to monitor to pt closely..

## 2018-12-03 NOTE — NUR
NURSE NOTES:

Called pt's son Simon to inform him that H.D will be complete in an hour. Pt son requested 
notification. pt VSS. 111/38 91 99%, 16,

## 2018-12-03 NOTE — PULMONOLOGY PROGRESS NOTE
Assessment/Plan


Assessment/Plan


IMPRESSION:


1. Hypotension.


2. Sepsis.


3. Atrial fibrillation.


4. Bradycardia.


5. Diabetes mellitus.


6. End-stage renal disease on dialysis.


7. Left lung collapse. Resolved


8. Respiratory failure.


9. Dysphagia.


10. Dementia.


11. Anemia


12. Pneumonia





DISCUSSION:


1. Continue medications.


2. as needed Levophed.


3. status post bronchoscopy


4. Central access


5. Use broad-spectrum antibiotics.  


6. continue weaning attempts














  ______________________________________________


  Quentin Cardoso M.D.





Subjective


Interval Events:  Remains on vent


Constitutional:  Reports: no symptoms


HEENT:  Repors: no symptoms


Respiratory:  Reports: no symptoms


Cardiovascular:  Reports: no symptoms


Gastrointestinal/Abdominal:  Reports: no symptoms


Genitourinary:  Reports: no symptoms


Allergies:  


Coded Allergies:  


     No Known Allergies (Unverified , 11/26/18)





Objective





Last 24 Hour Vital Signs








  Date Time  Temp Pulse Resp B/P (MAP) Pulse Ox O2 Delivery O2 Flow Rate FiO2


 


12/3/18 08:58     100   


 


12/3/18 08:58  58 12     30


 


12/3/18 08:00        30


 


12/3/18 08:00      Mechanical Ventilator  





      Mechanical Ventilator  





      Mechanical Ventilator  


 


12/3/18 07:00  53 12 123/12 (49) 100   


 


12/3/18 06:58  55 12     30


 


12/3/18 06:00  52 12 128/30 (62) 99   


 


12/3/18 05:12  54 12     30


 


12/3/18 05:00  56 12 121/17 (51) 100   


 


12/3/18 04:00        30


 


12/3/18 04:00 97.7 61 13 116/19 (51) 100   


 


12/3/18 04:00  61      


 


12/3/18 04:00      Mechanical Ventilator  





      Mechanical Ventilator  





      Mechanical Ventilator  


 


12/3/18 03:12  59 12     30


 


12/3/18 03:00  63 12 125/18 (53) 100   


 


12/3/18 02:00  53 12 109/18 (48) 100   


 


12/3/18 01:10  54 12     30


 


12/3/18 01:00  56 12 124/12 (49) 100   


 


12/3/18 00:00 97.7 56 12 127/22 (57) 100   


 


12/3/18 00:00      Mechanical Ventilator  





      Mechanical Ventilator  





      Mechanical Ventilator  


 


12/3/18 00:00        30


 


12/2/18 23:13  53 12     30


 


12/2/18 23:00  52 12 120/22 (54) 100   


 


12/2/18 22:00  53 12 118/22 (54) 100   


 


12/2/18 21:05  63 12     30


 


12/2/18 21:00  56 12 125/24 (57) 100   


 


12/2/18 20:48    121/36    


 


12/2/18 20:00 98.5 54 12 121/36 (64) 100   


 


12/2/18 20:00        30


 


12/2/18 20:00      Mechanical Ventilator  





      Mechanical Ventilator  





      Mechanical Ventilator  


 


12/2/18 20:00  54      


 


12/2/18 19:03  61 12     30


 


12/2/18 19:03  61 12   Mechanical Ventilator  30


 


12/2/18 19:00  57 14 127/49 (75) 100   


 


12/2/18 18:00  57 14 127/27 (60) 100   


 


12/2/18 17:06  63 12     30


 


12/2/18 17:00  65 14 84/67 (73) 100   


 


12/2/18 16:30    102/52    


 


12/2/18 16:00 98.6 62 14 121/12 (48) 100   


 


12/2/18 16:00        30


 


12/2/18 16:00      Mechanical Ventilator  





      Mechanical Ventilator  





      Mechanical Ventilator  


 


12/2/18 16:00  60      


 


12/2/18 15:00  62 14 61/39 (46) 100   


 


12/2/18 14:46  62 12     30


 


12/2/18 14:00  64 14 171/15 (66) 100   


 


12/2/18 13:00  60 14 163/18 (66) 100   


 


12/2/18 12:47  60 12     30


 


12/2/18 12:00        30


 


12/2/18 12:00      Mechanical Ventilator  





      Mechanical Ventilator  





      Mechanical Ventilator  


 


12/2/18 12:00  63      


 


12/2/18 12:00 98.2 60 12 168/30 (76) 100   


 


12/2/18 11:00  62 13 178/26 (76) 100   


 


12/2/18 10:49  62 12     30


 


12/2/18 10:00  64 12 198/31 (86) 100   

















Intake and Output  


 


 12/2/18 12/3/18





 19:00 07:00


 


Intake Total 470 ml 360 ml


 


Balance 470 ml 360 ml


 


  


 


IV Total 110 ml 


 


Tube Feeding 360 ml 360 ml


 


# Bowel Movements 1 








General Appearance:  no acute distress


HEENT:  normocephalic


Respiratory/Chest:  chest wall non-tender


Cardiovascular:  normal peripheral pulses, normal rate


Abdomen:  normal bowel sounds, soft, non tender


Laboratory Tests


12/3/18 04:45: 


White Blood Count 5.4, Red Blood Count 3.37L, Hemoglobin 9.4L, Hematocrit 31.4L

, Mean Corpuscular Volume 93, Mean Corpuscular Hemoglobin 27.8, Mean 

Corpuscular Hemoglobin Concent 29.9L, Red Cell Distribution Width 18.8H, 

Platelet Count 278, Mean Platelet Volume 6.1L, Neutrophils (%) (Auto) 74.6, 

Lymphocytes (%) (Auto) 9.2L, Monocytes (%) (Auto) 10.2H, Eosinophils (%) (Auto) 

4.6H, Basophils (%) (Auto) 1.5, Sodium Level 141, Potassium Level 3.4L, 

Chloride Level 105, Carbon Dioxide Level 25, Anion Gap 11, Blood Urea Nitrogen 

46H, Creatinine 3.6H, Estimat Glomerular Filtration Rate , Glucose Level 113H, 

Calcium Level 8.7, Phosphorus Level 3.8, Total Bilirubin 0.3, Aspartate Amino 

Transf (AST/SGOT) 15, Alanine Aminotransferase (ALT/SGPT) 6L, Alkaline 

Phosphatase 99, Total Protein 6.1L, Albumin 1.5L, Globulin 4.6, Albumin/

Globulin Ratio 0.3L





Current Medications








 Medications


  (Trade)  Dose


 Ordered  Sig/Tony


 Route


 PRN Reason  Start Time


 Stop Time Status Last Admin


Dose Admin


 


 Amiodarone HCl


  (Cordarone)  200 mg  EVERY 12  HOURS


 ORAL


   11/27/18 21:00


 12/27/18 20:59  12/2/18 20:48


 


 


 Chlorhexidine


 Gluconate


  (Juana-Hex 2%)  1 applic  DAILY@2000


 TOPIC


   11/27/18 20:00


 12/27/18 19:59  12/2/18 20:09


 


 


 Epoetin Rupesh


  (Procrit (for


 ESRD on dialysis))  10,000 units  MON-WED-FRI


 SUBQ


   11/28/18 21:00


 12/28/18 20:59  11/30/18 20:49


 


 


 Fentanyl Citrate


  (Sublimaze 100


 mcg/2 mL)  25 mcg  Q2H  PRN


 IV


 Pain Scale (6-10)  12/1/18 20:15


 12/8/18 20:14  12/2/18 17:33


 


 


 Heparin Sodium


  (Porcine)


  (Heparin Sod


 1000 units/ml


 10ml)  2,000 unit  ONCE


 IV


   12/3/18 10:30


 12/3/18 23:59   


 


 


 Linezolid


  (Zyvox)  600 mg  EVERY 12  HOURS


 ORAL


   11/28/18 21:00


 12/8/18 20:59  12/2/18 20:48


 


 


 Meropenem 500 mg/


 Sodium Chloride  55 ml @ 


 110 mls/hr  DAILY


 IVPB


   11/28/18 12:00


 12/8/18 11:59  12/2/18 09:41


 


 


 Minoxidil


  (Loniten)  5 mg  Q12HR


 NG


   12/2/18 21:00


 1/1/19 20:59  12/2/18 20:48


 


 


 Norepinephrine


 Bitartrate 4 mg/


 Dextrose  250 ml @ 0


 mls/hr  Q24H


 IV


   11/26/18 16:30


 12/26/18 16:29  11/27/18 21:28


 


 


 Pantoprazole


  (Protonix)  40 mg  Q12HR


 IVP


   11/26/18 21:00


 12/27/18 08:59  12/2/18 20:46


 


 


 Sodium Chloride  1,000 ml @ 


 500 mls/hr  Q2H PRN


 IVLG


 sbp<90 during hd  12/3/18 10:19


 12/3/18 23:59   


 

















Quentin Cardoso MD Dec 3, 2018 09:14

## 2018-12-03 NOTE — NUR
CASE MANAGEMENT: REVIEW





SI: A-FIB . SEPSIS . ESRD ON HD 

T 97.7 HR 53 RR 12 BP 91/48 % MECH VENT FIO2 30

H/H 9.4/31.4 3.4 BUN 46 CR 3.6 







IS: LEVOPHED GTT

PROTONIX IV Q12HR

PROCRIT SQ MWF

ZYVOX PO Q12HR 

NGT FEEDING NEPRO @10ML/HR 



***ICU STATUS***

DCP: PATIENT IS FROM [facility]

## 2018-12-03 NOTE — NUR
NURSE NOTES:

Endorsement received from CAROLYN Silva. Patient awake, opens eyes spontaneously, follows 
commands. Communicates by nodding or shaking head when asked with a yes or no question. 
Orally intubated with ET 7.5, 23 lipline. AC 12 Vt 500 PEEP 5 30% FiO2. With NGT at left 
nare, placement reconfirmed per auscultation. Ongoing Nepro 30 ml/hr. No residual. With 
right upper arm midline, with good backflow and no resistance when flushed. On SPR mattress. 
Bed locked and in low position. Head of bed elevated. Call light within reach.

## 2018-12-03 NOTE — NUR
NURSE NOTES:

Continues to sleep soundly. Bilat soft wrist restraints renewed per order. HR 56, sinus 
edouard; asymptomatic. Afebrile.

## 2018-12-03 NOTE — DIAGNOSTIC IMAGING REPORT
--------------- APPROVED REPORT --------------





CPT Code: 32547



Present Symptoms

Comments: Weakness





BILATERAL UPPER EXTREMITY: Imaging reveals patency of the internal jugular, subclavian, 

axillary and brachial veins. The cephalic and basilic veins are also patent.  Doppler 

indicates normal spontaneous flow within these venous segments, bilaterally. The mid 

brachial vein in left arm was not imaged due to PICC Line bandage.

LEFT UPPER EXTREMITY: Velocities obtained from the fistula are within normal limits in 

the proximal and distal anastomotic site. The venous outflow is widely patent. There is 

no evidence of pseudo-aneurysm or abscess. Edema is noted in lower left arm.

## 2018-12-03 NOTE — GENERAL SURGERY PROGRESS NOTE
General Surgery-Progress Note


Subjective


Procedure Performed


left subclavian central venous catheter insertion


Additional Comments


still intubated.  looks more tired today.





Objective





Last 24 Hour Vital Signs








  Date Time  Temp Pulse Resp B/P (MAP) Pulse Ox O2 Delivery O2 Flow Rate FiO2


 


12/3/18 13:00  60 12 121/40 (67) 100   


 


12/3/18 12:45        30


 


12/3/18 12:42  58 16     30


 


12/3/18 12:00  55      


 


12/3/18 12:00 97.9 55 12 116/16 (49) 100   


 


12/3/18 12:00      Mechanical Ventilator  





      Mechanical Ventilator  





      Mechanical Ventilator  


 


12/3/18 12:00        30


 


12/3/18 11:18  57 12     30


 


12/3/18 11:00  58 12 130/12 (51) 100   


 


12/3/18 10:00  58 12 119/13 (48) 100   


 


12/3/18 09:00    91/48    


 


12/3/18 09:00  64 13 123/12 (49) 100   


 


12/3/18 08:58     100   


 


12/3/18 08:58  58 12     30


 


12/3/18 08:00        30


 


12/3/18 08:00  57      


 


12/3/18 08:00 97.8 62 16 91/48 (62) 99   


 


12/3/18 08:00      Mechanical Ventilator  





      Mechanical Ventilator  





      Mechanical Ventilator  


 


12/3/18 07:00  53 12 123/12 (49) 100   


 


12/3/18 06:58  55 12     30


 


12/3/18 06:00  52 12 128/30 (62) 99   


 


12/3/18 05:12  54 12     30


 


12/3/18 05:00  56 12 121/17 (51) 100   


 


12/3/18 04:00        30


 


12/3/18 04:00 97.7 61 13 116/19 (51) 100   


 


12/3/18 04:00  61      


 


12/3/18 04:00      Mechanical Ventilator  





      Mechanical Ventilator  





      Mechanical Ventilator  


 


12/3/18 03:12  59 12     30


 


12/3/18 03:00  63 12 125/18 (53) 100   


 


12/3/18 02:00  53 12 109/18 (48) 100   


 


12/3/18 01:10  54 12     30


 


12/3/18 01:00  56 12 124/12 (49) 100   


 


12/3/18 00:00 97.7 56 12 127/22 (57) 100   


 


12/3/18 00:00      Mechanical Ventilator  





      Mechanical Ventilator  





      Mechanical Ventilator  


 


12/3/18 00:00        30


 


12/2/18 23:13  53 12     30


 


12/2/18 23:00  52 12 120/22 (54) 100   


 


12/2/18 22:00  53 12 118/22 (54) 100   


 


12/2/18 21:05  63 12     30


 


12/2/18 21:00  56 12 125/24 (57) 100   


 


12/2/18 20:48    121/36    


 


12/2/18 20:00 98.5 54 12 121/36 (64) 100   


 


12/2/18 20:00        30


 


12/2/18 20:00      Mechanical Ventilator  





      Mechanical Ventilator  





      Mechanical Ventilator  


 


12/2/18 20:00  54      


 


12/2/18 19:03  61 12     30


 


12/2/18 19:03  61 12   Mechanical Ventilator  30


 


12/2/18 19:00  57 14 127/49 (75) 100   


 


12/2/18 18:00  57 14 127/27 (60) 100   


 


12/2/18 17:06  63 12     30


 


12/2/18 17:00  65 14 84/67 (73) 100   


 


12/2/18 16:30    102/52    


 


12/2/18 16:00 98.6 62 14 121/12 (48) 100   


 


12/2/18 16:00        30


 


12/2/18 16:00      Mechanical Ventilator  





      Mechanical Ventilator  





      Mechanical Ventilator  


 


12/2/18 16:00  60      


 


12/2/18 15:00  62 14 61/39 (46) 100   


 


12/2/18 14:46  62 12     30








I&O











Intake and Output  


 


 12/2/18 12/3/18





 19:00 07:00


 


Intake Total 470 ml 360 ml


 


Balance 470 ml 360 ml


 


  


 


IV Total 110 ml 


 


Tube Feeding 360 ml 360 ml


 


# Bowel Movements 1 








Dressing:  saturated


Wound:  other


Drains:  other


Cardiovascular:  RSR


Respiratory:  clear


Abdomen:  soft, non-tender, present bowel sounds


Extremities:  other





Laboratory Tests








Test


  12/3/18


04:45


 


White Blood Count


  5.4 K/UL


(4.8-10.8)


 


Red Blood Count


  3.37 M/UL


(4.20-5.40)  L


 


Hemoglobin


  9.4 G/DL


(12.0-16.0)  L


 


Hematocrit


  31.4 %


(37.0-47.0)  L


 


Mean Corpuscular Volume 93 FL (80-99)  


 


Mean Corpuscular Hemoglobin


  27.8 PG


(27.0-31.0)


 


Mean Corpuscular Hemoglobin


Concent 29.9 G/DL


(32.0-36.0)  L


 


Red Cell Distribution Width


  18.8 %


(11.6-14.8)  H


 


Platelet Count


  278 K/UL


(150-450)


 


Mean Platelet Volume


  6.1 FL


(6.5-10.1)  L


 


Neutrophils (%) (Auto)


  74.6 %


(45.0-75.0)


 


Lymphocytes (%) (Auto)


  9.2 %


(20.0-45.0)  L


 


Monocytes (%) (Auto)


  10.2 %


(1.0-10.0)  H


 


Eosinophils (%) (Auto)


  4.6 %


(0.0-3.0)  H


 


Basophils (%) (Auto)


  1.5 %


(0.0-2.0)


 


Sodium Level


  141 MMOL/L


(136-145)


 


Potassium Level


  3.4 MMOL/L


(3.5-5.1)  L


 


Chloride Level


  105 MMOL/L


()


 


Carbon Dioxide Level


  25 MMOL/L


(21-32)


 


Anion Gap


  11 mmol/L


(5-15)


 


Blood Urea Nitrogen


  46 mg/dL


(7-18)  H


 


Creatinine


  3.6 MG/DL


(0.55-1.30)  H


 


Estimat Glomerular Filtration


Rate  mL/min (>60)  


 


 


Glucose Level


  113 MG/DL


()  H


 


Calcium Level


  8.7 MG/DL


(8.5-10.1)


 


Phosphorus Level


  3.8 MG/DL


(2.5-4.9)


 


Total Bilirubin


  0.3 MG/DL


(0.2-1.0)


 


Aspartate Amino Transf


(AST/SGOT) 15 U/L (15-37)


 


 


Alanine Aminotransferase


(ALT/SGPT) 6 U/L (12-78)


L


 


Alkaline Phosphatase


  99 U/L


()


 


Total Protein


  6.1 G/DL


(6.4-8.2)  L


 


Albumin


  1.5 G/DL


(3.4-5.0)  L


 


Globulin 4.6 g/dL  


 


Albumin/Globulin Ratio


  0.3 (1.0-2.7)


L











Plan


Problems:  


(1) Sepsis


Assessment & Plan:  acute decompensation 


hypotensive


bradycardic


respiratory decompensation requiring intubation and vent support - failed 

extubation 





improving


leukocytosis resolved


cont current ICU care and management


may need trach if unable to wean off went with history of failed extubation 


thank you


will follow with recs 





(2) Hypotension


(3) Bradycardia


(4) Altered mental status


(5) Decubital ulcer


Assessment & Plan:  Patient with multiple pressure injuries where have been 

present since admission. 


Full thickness pressure injury to sacrum (L)(L)2.4cm x (W)4.4cm x (D)0.9cm, 

undermining 12-12 with tunneling 9-10 by 11.3cm. 


In close proximity to sacral wound on L buttocks purple ,indurated area with 

small area of slough measuring (L)0.6cm x (W)0.5cm noted. Arched surgical scar 

noted to L buttocks at site of tunneling. 


Resolving pressure injury noted to R ischium (L)3cm x (W)2cm. Wound bed 

epithelialized with maroon discoloration without induration periwound. 


Closed area with white pocket and purple margins noted to distal,paz R tibia 

(L)6cm x (W)2.7cm


Full thickness wound noted to distal/medial RLE (L)6.2cm x (W)3.8cm .Wound 

noted to have 95% mixed slough /necrosis ,marginal erythema. Wound malodorous.


Wound posterior R tibia (L)4.7cm x (W) 3cm ,100% necrotic with marginal 

erythema. Dry eschar with marginal erythema 


(L)0.5cm x (W)0.7cm noted to medial R heel.Non-blanchable erythema noted to 

medial L malleolus. 


Dry brown eschar noted to medial L 1st malleolus (L)1.4cm x (W)1cm. 


L heel and lateral aspect boggy with non-blanchable erythema. 





Tx.Plan:


Cleanse sacral wound with Saline.Loose pack with Hydrogel impregnated packing 

strip (Attention to tunneled area at 9-10).Cover with Optifoam drsg Daily and 

prn.


           


Reposition at least every 2hours or as tolerated.


           


Air Fluidized mattress.


           


Off-load heels with pillow.





Appreciate Podiatry and Vascular input





Plan for anigo late by Vascular 














Bob Lewis Dec 3, 2018 14:39

## 2018-12-03 NOTE — NUR
NURSE NOTES:

Complete bath given. No BM. Awake and calm, cooperative. Wound dressings changed. Remains 
anuric. Left UA AV graft with strong bruit and thrill. Tolerates TF and current vent 
parameters.

## 2018-12-03 NOTE — CARDIOLOGY PROGRESS NOTE
Assessment/Plan


Assessment/Plan


1. Hypotension, possibly sepsis versus volume.


2. Paroxysmal episodes of atrial fibrillation history.


3. Bradycardia secondary to medications, amiodarone possibly.


4. Diabetes mellitus.


5. End-stage renal disease, on hemodialysis.


6. Lung collapse.


7. Respiratory failure, status post intubation.


8. Dysphagia.


9. History of dementia.


10. Pulm htn 


11.  Pleural effusion 








in and out afib 


tele reviewed


echo personally reviewed lv function is hyperdynamic 


has bilat pleural effusion 


off vasopressor   


urine cx postive 


wean o ff vent 


may need to consider thoracentesis


anemic s/p prbc tx


? source of anemia stool ob - one timde 


consider heparin if hgb stable





Subjective


ROS Limited/Unobtainable:  Yes


Subjective


on the vent not verbal but communicates with head motion





Objective





Last 24 Hour Vital Signs








  Date Time  Temp Pulse Resp B/P (MAP) Pulse Ox O2 Delivery O2 Flow Rate FiO2


 


12/3/18 19:05  101 13     30


 


12/3/18 19:00  102 14 107/24 (51) 99   


 


12/3/18 18:00  102 14 104/30 (54) 99   


 


12/3/18 17:21  58 12     30


 


12/3/18 17:00  102 14 83/36 (52) 99   


 


12/3/18 16:30    95/32    


 


12/3/18 16:00  110      


 


12/3/18 16:00 97.9 101 16 102/47 (65) 98   


 


12/3/18 16:00      Mechanical Ventilator  





      Mechanical Ventilator  





      Mechanical Ventilator  


 


12/3/18 15:25  57 12     30


 


12/3/18 15:00  102 16 120/44 (69) 100   


 


12/3/18 14:00  59 12 122/36 (64) 100   


 


12/3/18 13:32        30


 


12/3/18 13:00  60 12 121/40 (67) 100   


 


12/3/18 12:45        30


 


12/3/18 12:42  58 16     30


 


12/3/18 12:00  55      


 


12/3/18 12:00 97.9 55 12 116/16 (49) 100   


 


12/3/18 12:00      Mechanical Ventilator  





      Mechanical Ventilator  





      Mechanical Ventilator  


 


12/3/18 12:00        30


 


12/3/18 11:18  57 12     30


 


12/3/18 11:00  58 12 130/12 (51) 100   


 


12/3/18 10:00  58 12 119/13 (48) 100   


 


12/3/18 09:00    91/48    


 


12/3/18 09:00  64 13 123/12 (49) 100   


 


12/3/18 08:58     100   


 


12/3/18 08:58  58 12     30


 


12/3/18 08:00        30


 


12/3/18 08:00  57      


 


12/3/18 08:00 97.8 62 16 91/48 (62) 99   


 


12/3/18 08:00      Mechanical Ventilator  





      Mechanical Ventilator  





      Mechanical Ventilator  


 


12/3/18 07:00  53 12 123/12 (49) 100   


 


12/3/18 06:58  55 12     30


 


12/3/18 06:00  52 12 128/30 (62) 99   


 


12/3/18 05:12  54 12     30


 


12/3/18 05:00  56 12 121/17 (51) 100   


 


12/3/18 04:00        30


 


12/3/18 04:00 97.7 61 13 116/19 (51) 100   


 


12/3/18 04:00  61      


 


12/3/18 04:00      Mechanical Ventilator  





      Mechanical Ventilator  





      Mechanical Ventilator  


 


12/3/18 03:12  59 12     30


 


12/3/18 03:00  63 12 125/18 (53) 100   


 


12/3/18 02:00  53 12 109/18 (48) 100   


 


12/3/18 01:10  54 12     30


 


12/3/18 01:00  56 12 124/12 (49) 100   


 


12/3/18 00:00 97.7 56 12 127/22 (57) 100   


 


12/3/18 00:00      Mechanical Ventilator  





      Mechanical Ventilator  





      Mechanical Ventilator  


 


12/3/18 00:00        30


 


12/2/18 23:13  53 12     30


 


12/2/18 23:00  52 12 120/22 (54) 100   


 


12/2/18 22:00  53 12 118/22 (54) 100   


 


12/2/18 21:05  63 12     30


 


12/2/18 21:00  56 12 125/24 (57) 100   


 


12/2/18 20:48    121/36    


 


12/2/18 20:00 98.5 54 12 121/36 (64) 100   


 


12/2/18 20:00        30


 


12/2/18 20:00      Mechanical Ventilator  





      Mechanical Ventilator  





      Mechanical Ventilator  


 


12/2/18 20:00  54      








General Appearance:  no apparent distress, on vent, patient on isolation


Neck:  supple


Cardiovascular:  tachycardia, irregularly irregular


Respiratory/Chest:  rhonchi - bilaterally


Abdomen:  normal bowel sounds, non tender, soft


Extremities:  no swelling











Intake and Output  


 


 12/2/18 12/3/18





 19:00 07:00


 


Intake Total 470 ml 360 ml


 


Balance 470 ml 360 ml


 


  


 


IV Total 110 ml 


 


Tube Feeding 360 ml 360 ml


 


# Bowel Movements 1 











Laboratory Tests








Test


  12/3/18


04:45


 


White Blood Count


  5.4 K/UL


(4.8-10.8)


 


Red Blood Count


  3.37 M/UL


(4.20-5.40)  L


 


Hemoglobin


  9.4 G/DL


(12.0-16.0)  L


 


Hematocrit


  31.4 %


(37.0-47.0)  L


 


Mean Corpuscular Volume 93 FL (80-99)  


 


Mean Corpuscular Hemoglobin


  27.8 PG


(27.0-31.0)


 


Mean Corpuscular Hemoglobin


Concent 29.9 G/DL


(32.0-36.0)  L


 


Red Cell Distribution Width


  18.8 %


(11.6-14.8)  H


 


Platelet Count


  278 K/UL


(150-450)


 


Mean Platelet Volume


  6.1 FL


(6.5-10.1)  L


 


Neutrophils (%) (Auto)


  74.6 %


(45.0-75.0)


 


Lymphocytes (%) (Auto)


  9.2 %


(20.0-45.0)  L


 


Monocytes (%) (Auto)


  10.2 %


(1.0-10.0)  H


 


Eosinophils (%) (Auto)


  4.6 %


(0.0-3.0)  H


 


Basophils (%) (Auto)


  1.5 %


(0.0-2.0)


 


Sodium Level


  141 MMOL/L


(136-145)


 


Potassium Level


  3.4 MMOL/L


(3.5-5.1)  L


 


Chloride Level


  105 MMOL/L


()


 


Carbon Dioxide Level


  25 MMOL/L


(21-32)


 


Anion Gap


  11 mmol/L


(5-15)


 


Blood Urea Nitrogen


  46 mg/dL


(7-18)  H


 


Creatinine


  3.6 MG/DL


(0.55-1.30)  H


 


Estimat Glomerular Filtration


Rate  mL/min (>60)  


 


 


Glucose Level


  113 MG/DL


()  H


 


Calcium Level


  8.7 MG/DL


(8.5-10.1)


 


Phosphorus Level


  3.8 MG/DL


(2.5-4.9)


 


Total Bilirubin


  0.3 MG/DL


(0.2-1.0)


 


Aspartate Amino Transf


(AST/SGOT) 15 U/L (15-37)


 


 


Alanine Aminotransferase


(ALT/SGPT) 6 U/L (12-78)


L


 


Alkaline Phosphatase


  99 U/L


()


 


Total Protein


  6.1 G/DL


(6.4-8.2)  L


 


Albumin


  1.5 G/DL


(3.4-5.0)  L


 


Globulin 4.6 g/dL  


 


Albumin/Globulin Ratio


  0.3 (1.0-2.7)


L

















Mariusz Wade MD Dec 3, 2018 19:21

## 2018-12-04 VITALS — DIASTOLIC BLOOD PRESSURE: 26 MMHG | SYSTOLIC BLOOD PRESSURE: 108 MMHG

## 2018-12-04 VITALS — SYSTOLIC BLOOD PRESSURE: 108 MMHG | DIASTOLIC BLOOD PRESSURE: 26 MMHG

## 2018-12-04 VITALS — SYSTOLIC BLOOD PRESSURE: 109 MMHG | DIASTOLIC BLOOD PRESSURE: 48 MMHG

## 2018-12-04 VITALS — SYSTOLIC BLOOD PRESSURE: 116 MMHG | DIASTOLIC BLOOD PRESSURE: 19 MMHG

## 2018-12-04 VITALS — SYSTOLIC BLOOD PRESSURE: 114 MMHG | DIASTOLIC BLOOD PRESSURE: 42 MMHG

## 2018-12-04 VITALS — SYSTOLIC BLOOD PRESSURE: 118 MMHG | DIASTOLIC BLOOD PRESSURE: 63 MMHG

## 2018-12-04 VITALS — DIASTOLIC BLOOD PRESSURE: 12 MMHG | SYSTOLIC BLOOD PRESSURE: 95 MMHG

## 2018-12-04 VITALS — DIASTOLIC BLOOD PRESSURE: 48 MMHG | SYSTOLIC BLOOD PRESSURE: 134 MMHG

## 2018-12-04 VITALS — SYSTOLIC BLOOD PRESSURE: 102 MMHG | DIASTOLIC BLOOD PRESSURE: 29 MMHG

## 2018-12-04 VITALS — DIASTOLIC BLOOD PRESSURE: 45 MMHG | SYSTOLIC BLOOD PRESSURE: 134 MMHG

## 2018-12-04 VITALS — DIASTOLIC BLOOD PRESSURE: 45 MMHG | SYSTOLIC BLOOD PRESSURE: 98 MMHG

## 2018-12-04 VITALS — SYSTOLIC BLOOD PRESSURE: 111 MMHG | DIASTOLIC BLOOD PRESSURE: 26 MMHG

## 2018-12-04 VITALS — SYSTOLIC BLOOD PRESSURE: 129 MMHG | DIASTOLIC BLOOD PRESSURE: 25 MMHG

## 2018-12-04 VITALS — SYSTOLIC BLOOD PRESSURE: 136 MMHG | DIASTOLIC BLOOD PRESSURE: 26 MMHG

## 2018-12-04 VITALS — SYSTOLIC BLOOD PRESSURE: 97 MMHG | DIASTOLIC BLOOD PRESSURE: 60 MMHG

## 2018-12-04 VITALS — SYSTOLIC BLOOD PRESSURE: 112 MMHG | DIASTOLIC BLOOD PRESSURE: 46 MMHG

## 2018-12-04 VITALS — SYSTOLIC BLOOD PRESSURE: 115 MMHG | DIASTOLIC BLOOD PRESSURE: 41 MMHG

## 2018-12-04 VITALS — SYSTOLIC BLOOD PRESSURE: 102 MMHG | DIASTOLIC BLOOD PRESSURE: 37 MMHG

## 2018-12-04 VITALS — SYSTOLIC BLOOD PRESSURE: 123 MMHG | DIASTOLIC BLOOD PRESSURE: 30 MMHG

## 2018-12-04 VITALS — SYSTOLIC BLOOD PRESSURE: 118 MMHG | DIASTOLIC BLOOD PRESSURE: 89 MMHG

## 2018-12-04 VITALS — SYSTOLIC BLOOD PRESSURE: 128 MMHG | DIASTOLIC BLOOD PRESSURE: 22 MMHG

## 2018-12-04 VITALS — SYSTOLIC BLOOD PRESSURE: 118 MMHG | DIASTOLIC BLOOD PRESSURE: 28 MMHG

## 2018-12-04 VITALS — SYSTOLIC BLOOD PRESSURE: 116 MMHG | DIASTOLIC BLOOD PRESSURE: 13 MMHG

## 2018-12-04 VITALS — SYSTOLIC BLOOD PRESSURE: 123 MMHG | DIASTOLIC BLOOD PRESSURE: 16 MMHG

## 2018-12-04 RX ADMIN — METOPROLOL TARTRATE SCH MG: 25 TABLET, FILM COATED ORAL at 08:49

## 2018-12-04 RX ADMIN — PANTOPRAZOLE SODIUM SCH MG: 40 INJECTION, POWDER, FOR SOLUTION INTRAVENOUS at 08:51

## 2018-12-04 RX ADMIN — AMIODARONE HYDROCHLORIDE SCH MG: 200 TABLET ORAL at 21:03

## 2018-12-04 RX ADMIN — CHLORHEXIDINE GLUCONATE SCH APPLIC: 213 SOLUTION TOPICAL at 20:09

## 2018-12-04 RX ADMIN — METOPROLOL TARTRATE SCH MG: 25 TABLET, FILM COATED ORAL at 21:03

## 2018-12-04 RX ADMIN — MEROPENEM SCH MLS/HR: 500 INJECTION INTRAVENOUS at 10:08

## 2018-12-04 RX ADMIN — PANTOPRAZOLE SODIUM SCH MG: 40 INJECTION, POWDER, FOR SOLUTION INTRAVENOUS at 21:01

## 2018-12-04 RX ADMIN — MINOXIDIL SCH MG: 2.5 TABLET ORAL at 08:52

## 2018-12-04 RX ADMIN — AMIODARONE HYDROCHLORIDE SCH MG: 200 TABLET ORAL at 08:51

## 2018-12-04 RX ADMIN — MINOXIDIL SCH MG: 2.5 TABLET ORAL at 21:03

## 2018-12-04 NOTE — NUR
NURSE NOTES:

Patient's son at bedside, updated on patient's status. Patient with no acute distress. 
Respirations even and unlabored. Repositioned patient.

## 2018-12-04 NOTE — NUR
RD ASSESSMENT & RECOMMENDATIONS

SEE CARE ACTIVITY FOR COMPLETE ASSESSMENT



DAILY ESTIMATED NEEDS:

Needs based on Critical care+ Advanced Wounds + ESRD/ 60kg 

22-30  kcals/kg 

9880-1296  total kcals

1.5-2.0  g protein/kg

90-120g  g total protein 

Fluid per MD  



NUTRITION DIAGNOSIS:

* Swallowing difficulty R/T respiratory status as evidenced by pt orally

intubated, now on NGT feeds. (UPDATED)

* Increased kcal/prot needs R/T wound healing as evidenced by pt admitted

w/ multiple wounds including stage 4 sacral wound, refer to WC eval.

* Altered nutrition related lab values R/T ESRD dx, clinical condition as

evidenced by elev creat (4.6-> 3.6), low Na (130-> wnl), elev BNP >33084,

low BP, pressor held at this time.





CURRENT TF: Nepro @30ml/hr 





PO DIET RECOMMENDATIONS:

SLP evaluation post extubation, prior to PO diet -> [facility] MED, RENAL 





ENTERAL NUTRITION RECOMMENDATIONS:

Nepro @ 35ml/hr x 24 hrs + Prosource 1pkt TID  to provide 840ml, 1512kcal, 68g + 33g prot, 
611ml free water 



** WITH HEMODYNAMIC STABILITY **

1. Initiate Nepro @ 10ml/hr x 8 hrs, advance 10mlq 6-8 hrs as

    tolerated to goal rate

2. Add Prosource 1 pkt TID to meet increased prot needs



*****WITHOUT HEMODYNAMIC STABILITY: REC TROPHIC FEEDS OF NEPRO @ 10ML/HR X 24 HRS*****







ADDITIONAL RECOMMENDATIONS:

* Calibrated bedscale wt for accurate CBW 

* Monitor HD stability- NE held at this time  

* Wound healing-  Nephrovite x 1, Scotty 1pkt BID 

* Monitor Renal fxn and lytes 

* SSI w/ TF- h/o DM 

* SLP eval post extubation

## 2018-12-04 NOTE — NUR
NURSE NOTES:

Bed bath, change of linens, change of dressings done. Patient refused oral care at this 
time.

## 2018-12-04 NOTE — NUR
RESPIRATORY NOTE: Placed pt on SIMV 8- 30% FiO2- peep of 5- PS 10, pt is tolerating well, no 
SOB or resp distress noted. Pt is alert and awake, understands the weaning plan. CAROLYN Donaldson 
notified. Will continue to monitor closely.

## 2018-12-04 NOTE — NUR
NURSE NOTES:

Received patient from CAROLYN Perry. Patient awake, alert and oriented, opens eyes spontaneously, 
able to follow commands. Orally intubated with ET 7.5, lip line 23cm, AC 12, , PEEP 5, 
FiO2 30%, O2 sat 100%. No acute distress noted. NGT at left nare, running nepro 30ml/hr. No 
residual. Head of bed elevated. Right upper arm midline intact and patent. On bilateral 
wrist soft restraints. Skin is intact, pulses present. Wound dressings placed, intact, clean 
and dry. Heels are offloaded. On weaning trials. Left AV shunt with bruit and thrill. noted 
with left arm edema. no signs of infection. Afib with rate of low 100s. Bed in lowest 
position. Bed alarm on. Call light within reach.

## 2018-12-04 NOTE — CARDIOLOGY PROGRESS NOTE
Assessment/Plan


Assessment/Plan


1. Hypotension, possibly sepsis versus volume.


2. Paroxysmal episodes of atrial fibrillation history.


3. Bradycardia secondary to medications, amiodarone possibly.


4. Diabetes mellitus.


5. End-stage renal disease, on hemodialysis.


6. Lung collapse.


7. Respiratory failure, status post intubation.


8. Dysphagia.


9. History of dementia.


10. Pulm htn 


11.  Pleural effusion 








in and out afib still despite bid amiod will increase to 400 mh bid watchj for 

edouard 


tele reviewed


echo personally reviewed lv function is hyperdynamic 


has bilat pleural effusion 


off vasopressor   


urine cx postive 


wean o ff vent 


may need to consider thoracentesis


anemic s/p prbc tx


? source of anemia stool ob - one time 


consider heparin if hgb stable 


repeat stool ob and if still neg will start on heparin





Subjective


ROS Limited/Unobtainable:  Yes


Subjective


on the vent not verbal but communicates with head motion





Objective





Last 24 Hour Vital Signs








  Date Time  Temp Pulse Resp B/P (MAP) Pulse Ox O2 Delivery O2 Flow Rate FiO2


 


12/4/18 19:19  93 12     30


 


12/4/18 16:30  88 12     30


 


12/4/18 14:40  88 12     30


 


12/4/18 12:40        30


 


12/4/18 12:32  98 12     30


 


12/4/18 12:00  101      


 


12/4/18 12:00 99.0 97 20 118/89 (99) 100   


 


12/4/18 12:00      Mechanical Ventilator  





      Mechanical Ventilator  





      Mechanical Ventilator  


 


12/4/18 11:00  94 18 116/13 (47) 100   


 


12/4/18 10:34  86 14     30


 


12/4/18 10:00  93 16 116/19 (51) 100   


 


12/4/18 09:00  100 19 102/37 (58) 100   


 


12/4/18 08:49  114  97/60    


 


12/4/18 08:38  95 20     30


 


12/4/18 08:37     97   


 


12/4/18 08:18  99 16     30


 


12/4/18 08:00 98.0 102 19 109/48 (68) 99   


 


12/4/18 08:00  105      


 


12/4/18 08:00      Mechanical Ventilator  





      Mechanical Ventilator  





      Mechanical Ventilator  


 


12/4/18 08:00        30


 


12/4/18 07:26  91 12     30


 


12/4/18 07:00  95 12 97/60 (72) 100   


 


12/4/18 06:00  89 14 102/29 (53) 100   


 


12/4/18 05:12  84 12     30


 


12/4/18 05:00 97.9 102 14 98/45 (62) 100   


 


12/4/18 04:00  84      


 


12/4/18 04:00        30


 


12/4/18 04:00  102 14 134/45 (74) 100   


 


12/4/18 04:00      Mechanical Ventilator  





      Mechanical Ventilator  





      Mechanical Ventilator  


 


12/4/18 03:27  98 12     30


 


12/4/18 03:00  98 12 134/48 (76) 100   


 


12/4/18 02:00  102 12 112/46 (68) 100   


 


12/4/18 01:00  101 12 115/41 (65) 100   


 


12/4/18 00:56  96 13     30


 


12/4/18 00:00        30


 


12/4/18 00:00 97.9 95 15 114/42 (66) 100   


 


12/4/18 00:00      Mechanical Ventilator  





      Mechanical Ventilator  





      Mechanical Ventilator  


 


12/4/18 00:00  101      


 


12/3/18 23:20  99 14     30


 


12/3/18 23:00  104 15 102/36 (58) 100   


 


12/3/18 22:00  103 15 102/36 (58) 100   


 


12/3/18 21:00  102  90/35    


 


12/3/18 21:00    90/35    


 


12/3/18 21:00  103 14 101/33 (55) 100   


 


12/3/18 20:48  99 12     30








General Appearance:  on vent, patient on isolation


Neck:  supple


Cardiovascular:  irregularly irregular


Respiratory/Chest:  lungs clear, normal breath sounds


Abdomen:  non tender, soft


Extremities:  no swelling











Intake and Output  


 


 12/3/18 12/4/18





 19:00 07:00


 


Intake Total 2620 ml 470 ml


 


Balance 2620 ml 470 ml


 


  


 


Free Water 150 ml 50 ml


 


IV Total 110 ml 


 


Tube Feeding 360 ml 360 ml


 


Hemodialysis 2000 ml 


 


Other  60 ml


 


# Voids 1 


 


# Bowel Movements 1 1

















Mariusz Wade MD Dec 4, 2018 20:03

## 2018-12-04 NOTE — NUR
NURSE NOTES:

Patient was repositioned, oral care provided. patient kept clean. No distress noted. Patient 
on bilateral wrist soft restraints. Skin intact, warm and dry.

## 2018-12-04 NOTE — DIAGNOSTIC IMAGING REPORT
Indication: Abnormal chest sounds

 

Technique: One view of the chest

 

Comparison: 12/1/2018

 

Findings: Stable satisfactory positions of endotracheal and nasogastric tubes.

Right-sided pleural effusion is stable or slightly increased. Left-sided pleural

effusion appears improved, and there is improved aeration of the left lung overall as

compared to prior study. Interstitial congestion in both lungs is probably unchanged

 

Impression: Stable slightly increased right-sided pleural effusion

 

Improved left pleural effusion and overall improved left lung aeration, over 3 days

 

Persistent interstitial edema

## 2018-12-04 NOTE — NUR
NURSE NOTES:

Dr. Cardoso in facility, assessed the patient, updated on patient status. patient was weaned 
yesterday, lasted for 1hour on SIMV. On weaning determination daily. Last chest xray done 
was 12/1. MD acknowledged and will order chest xray.

## 2018-12-04 NOTE — INFECTIOUS DISEASES PROG NOTE
Assessment/Plan


Assessment/Plan


antibiotics : linezolid, meropenem





A


1. VRE | e.coli UTI


2. shock


3. leucocytosis resolved


4. respiratory failure


5. renal failure


6. decubitus ulcers


7. nasal MRSA colonization


8. rectal VRE colonization


9. pleural effusion





P


1. continue linezolid, meropenem


2. sputum culture


3. will follow up cultures





Subjective


ROS Limited/Unobtainable:  Yes


Allergies:  


Coded Allergies:  


     No Known Allergies (Unverified , 11/26/18)





Objective


Vital Signs





Last 24 Hour Vital Signs








  Date Time  Temp Pulse Resp B/P (MAP) Pulse Ox O2 Delivery O2 Flow Rate FiO2


 


12/4/18 08:49  114  97/60    


 


12/4/18 08:38  95 20     30


 


12/4/18 08:37     97   


 


12/4/18 08:18  99 16     30


 


12/4/18 08:00        30


 


12/4/18 07:26  91 12     30


 


12/4/18 06:00  89 14 102/29 (53) 100   


 


12/4/18 05:12  84 12     30


 


12/4/18 05:00 97.9 102 14 98/45 (62) 100   


 


12/4/18 04:00  84      


 


12/4/18 04:00        30


 


12/4/18 04:00  102 14 134/45 (74) 100   


 


12/4/18 04:00      Mechanical Ventilator  





      Mechanical Ventilator  





      Mechanical Ventilator  


 


12/4/18 03:27  98 12     30


 


12/4/18 03:00  98 12 134/48 (76) 100   


 


12/4/18 02:00  102 12 112/46 (68) 100   


 


12/4/18 01:00  101 12 115/41 (65) 100   


 


12/4/18 00:56  96 13     30


 


12/4/18 00:00        30


 


12/4/18 00:00 97.9 95 15 114/42 (66) 100   


 


12/4/18 00:00      Mechanical Ventilator  





      Mechanical Ventilator  





      Mechanical Ventilator  


 


12/4/18 00:00  101      


 


12/3/18 23:20  99 14     30


 


12/3/18 23:00  104 15 102/36 (58) 100   


 


12/3/18 22:00  103 15 102/36 (58) 100   


 


12/3/18 21:00  102  90/35    


 


12/3/18 21:00    90/35    


 


12/3/18 21:00  103 14 101/33 (55) 100   


 


12/3/18 20:48  99 12     30


 


12/3/18 20:00 97.8 103 15 94/60 (71) 100   


 


12/3/18 20:00      Mechanical Ventilator  





      Mechanical Ventilator  





      Mechanical Ventilator  


 


12/3/18 20:00  101      


 


12/3/18 20:00        30


 


12/3/18 19:05  101 13     30


 


12/3/18 19:00  102 14 107/24 (51) 99   


 


12/3/18 18:00  102 14 104/30 (54) 99   


 


12/3/18 17:21  58 12     30


 


12/3/18 17:00  102 14 83/36 (52) 99   


 


12/3/18 16:30    95/32    


 


12/3/18 16:00  110      


 


12/3/18 16:00 97.9 101 16 102/47 (65) 98   


 


12/3/18 16:00      Mechanical Ventilator  





      Mechanical Ventilator  





      Mechanical Ventilator  


 


12/3/18 15:25  57 12     30


 


12/3/18 15:00  102 16 120/44 (69) 100   


 


12/3/18 14:00  59 12 122/36 (64) 100   


 


12/3/18 13:32        30


 


12/3/18 13:00  60 12 121/40 (67) 100   


 


12/3/18 12:45        30


 


12/3/18 12:42  58 16     30


 


12/3/18 12:00  55      


 


12/3/18 12:00 97.9 55 12 116/16 (49) 100   


 


12/3/18 12:00      Mechanical Ventilator  





      Mechanical Ventilator  





      Mechanical Ventilator  


 


12/3/18 12:00        30


 


12/3/18 11:18  57 12     30


 


12/3/18 11:00  58 12 130/12 (51) 100   








Height (Feet):  5


Height (Inches):  2.00


Weight (Pounds):  123


HEENT:  other - intubated


Respiratory/Chest:  lungs clear


Cardiovascular:  normal rate, regular rhythm, no gallop/murmur


Abdomen:  soft, non tender


Extremities:  no edema, other - right arm PICC





Current Medications








 Medications


  (Trade)  Dose


 Ordered  Sig/Tony


 Route


 PRN Reason  Start Time


 Stop Time Status Last Admin


Dose Admin


 


 Amiodarone HCl


  (Cordarone)  200 mg  EVERY 12  HOURS


 ORAL


   11/27/18 21:00


 12/27/18 20:59  12/4/18 08:51


 


 


 Chlorhexidine


 Gluconate


  (Juana-Hex 2%)  1 applic  DAILY@2000


 TOPIC


   11/27/18 20:00


 12/27/18 19:59  12/3/18 20:39


 


 


 Epoetin Rupesh


  (Procrit (for


 ESRD on dialysis))  10,000 units  MON-WED-FRI


 SUBQ


   11/28/18 21:00


 12/28/18 20:59  12/3/18 20:50


 


 


 Fentanyl Citrate


  (Sublimaze 100


 mcg/2 mL)  25 mcg  Q2H  PRN


 IV


 Pain Scale (6-10)  12/1/18 20:15


 12/8/18 20:14  12/2/18 17:33


 


 


 Linezolid


  (Zyvox)  600 mg  EVERY 12  HOURS


 ORAL


   11/28/18 21:00


 12/8/18 20:59  12/4/18 08:50


 


 


 Meropenem 500 mg/


 Sodium Chloride  55 ml @ 


 110 mls/hr  DAILY


 IVPB


   11/28/18 12:00


 12/8/18 11:59  12/4/18 10:08


 


 


 Metoprolol


 Tartrate


  (Lopressor)  12.5 mg  Q12HR


 ORAL


   12/3/18 21:00


 1/2/19 20:59  12/4/18 08:49


 


 


 Minoxidil


  (Loniten)  5 mg  Q12HR


 NG


   12/2/18 21:00


 1/1/19 20:59  12/2/18 20:48


 


 


 Norepinephrine


 Bitartrate 4 mg/


 Dextrose  250 ml @ 0


 mls/hr  Q24H


 IV


   11/26/18 16:30


 12/26/18 16:29  11/27/18 21:28


 


 


 Pantoprazole


  (Protonix)  40 mg  Q12HR


 IVP


   11/26/18 21:00


 12/27/18 08:59  12/4/18 08:51


 

















Chase Santillan MD Dec 4, 2018 10:29

## 2018-12-04 NOTE — NUR
NURSE NOTES:

Recvd.on a vent.orally intubated.See settings.Lungs few scatt.Rh.Diminished BS at 
bases.P.Ox-100%.Bila.soft wrist restraints on,Prev.self-injury.See V/S.Scope 
At-Fib. aware.NGT-Feeding in progress.AVF (L)upper arm pos.thrill and bruit.Tony 
for poss. HD in am.Pos.chg.Suctioned.

## 2018-12-04 NOTE — NUR
NURSE NOTES:

HS care rendered.Backrub with Lotion.Pos.to comfort.Suctioned.NS lavaged.Sanya.4hrs of weaning 
today.Back on AC mode at [facility].Due med admin.

## 2018-12-04 NOTE — NEPHROLOGY PROGRESS NOTE
Assessment/Plan


Assessment


Seee below


Plan


MOF, improving slowly.


Sepsis due to infected diabetic ulcers, R/O Osteo. On IV Abx. Called ID, Vasc. 

Sx, Podiatry.


Septic Shock pressors PRN


ESRD HD q MWF 


Anemia of CKD , SAMANTHA high dose. Transfuse PRN.


Resp Failure - Vent per Pul. Trying to wean. KEVIN Cardoso.


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 with LAE. LVEF 65% . Anticoagulate when 

stable. KEVIN Wade.


PVD. Needs Angio. KEVIN Spencer.





Subjective


Subjective


Awake + alert. No c/o





Objective


Objective





Last 24 Hour Vital Signs








  Date Time  Temp Pulse Resp B/P (MAP) Pulse Ox O2 Delivery O2 Flow Rate FiO2


 


12/4/18 12:40        30


 


12/4/18 12:32  98 12     30


 


12/4/18 12:00  101      


 


12/4/18 12:00 99.0 97 20 118/89 (99) 100   


 


12/4/18 12:00      Mechanical Ventilator  





      Mechanical Ventilator  





      Mechanical Ventilator  


 


12/4/18 11:00  94 18 116/13 (47) 100   


 


12/4/18 10:34  86 14     30


 


12/4/18 10:00  93 16 116/19 (51) 100   


 


12/4/18 09:00  100 19 102/37 (58) 100   


 


12/4/18 08:49  114  97/60    


 


12/4/18 08:38  95 20     30


 


12/4/18 08:37     97   


 


12/4/18 08:18  99 16     30


 


12/4/18 08:00 98.0 102 19 109/48 (68) 99   


 


12/4/18 08:00  105      


 


12/4/18 08:00      Mechanical Ventilator  





      Mechanical Ventilator  





      Mechanical Ventilator  


 


12/4/18 08:00        30


 


12/4/18 07:26  91 12     30


 


12/4/18 07:00  95 12 97/60 (72) 100   


 


12/4/18 06:00  89 14 102/29 (53) 100   


 


12/4/18 05:12  84 12     30


 


12/4/18 05:00 97.9 102 14 98/45 (62) 100   


 


12/4/18 04:00  84      


 


12/4/18 04:00        30


 


12/4/18 04:00  102 14 134/45 (74) 100   


 


12/4/18 04:00      Mechanical Ventilator  





      Mechanical Ventilator  





      Mechanical Ventilator  


 


12/4/18 03:27  98 12     30


 


12/4/18 03:00  98 12 134/48 (76) 100   


 


12/4/18 02:00  102 12 112/46 (68) 100   


 


12/4/18 01:00  101 12 115/41 (65) 100   


 


12/4/18 00:56  96 13     30


 


12/4/18 00:00        30


 


12/4/18 00:00 97.9 95 15 114/42 (66) 100   


 


12/4/18 00:00      Mechanical Ventilator  





      Mechanical Ventilator  





      Mechanical Ventilator  


 


12/4/18 00:00  101      


 


12/3/18 23:20  99 14     30


 


12/3/18 23:00  104 15 102/36 (58) 100   


 


12/3/18 22:00  103 15 102/36 (58) 100   


 


12/3/18 21:00  102  90/35    


 


12/3/18 21:00    90/35    


 


12/3/18 21:00  103 14 101/33 (55) 100   


 


12/3/18 20:48  99 12     30


 


12/3/18 20:00 97.8 103 15 94/60 (71) 100   


 


12/3/18 20:00      Mechanical Ventilator  





      Mechanical Ventilator  





      Mechanical Ventilator  


 


12/3/18 20:00  101      


 


12/3/18 20:00        30


 


12/3/18 19:05  101 13     30


 


12/3/18 19:00  102 14 107/24 (51) 99   


 


12/3/18 18:00  102 14 104/30 (54) 99   


 


12/3/18 17:21  58 12     30


 


12/3/18 17:00  102 14 83/36 (52) 99   


 


12/3/18 16:30    95/32    


 


12/3/18 16:00  110      


 


12/3/18 16:00 97.9 101 16 102/47 (65) 98   


 


12/3/18 16:00      Mechanical Ventilator  





      Mechanical Ventilator  





      Mechanical Ventilator  


 


12/3/18 15:25  57 12     30


 


12/3/18 15:00  102 16 120/44 (69) 100   

















Intake and Output  


 


 12/3/18 12/4/18





 19:00 07:00


 


Intake Total 2620 ml 470 ml


 


Balance 2620 ml 470 ml


 


  


 


Free Water 150 ml 50 ml


 


IV Total 110 ml 


 


Tube Feeding 360 ml 360 ml


 


Hemodialysis 2000 ml 


 


Other  60 ml


 


# Voids 1 


 


# Bowel Movements 1 1








Height (Feet):  5


Height (Inches):  2.00


Weight (Pounds):  123


Objective


Intubated, On vent.


BP measured more accurately with a smaller cuff placed on rt. FA.


Cv IRR/IRR


Lungs B ronchi.


Abd SNT. BS +


E Rt. foot diabetic ulcers with pus











Gavino Daigle MD Dec 4, 2018 14:14

## 2018-12-04 NOTE — NUR
NURSE NOTES:

Dr. Daigle in facility, saw and assessed patient. MD informed of patient without PRN 
medications for pain, ordered tylenol prn, agreed to discontinued levophed order. Order 
noted and carried out.

## 2018-12-04 NOTE — PULMONOLOGY PROGRESS NOTE
Assessment/Plan


Assessment/Plan


IMPRESSION:


1. Hypotension.


2. Sepsis.


3. Atrial fibrillation.


4. Bradycardia.


5. Diabetes mellitus.


6. End-stage renal disease on dialysis.


7. Left lung collapse. Resolved


8. Respiratory failure.


9. Dysphagia.


10. Dementia.


11. Anemia


12. Pneumonia





DISCUSSION:


1. Continue medications.


2. as needed Levophed.


3. status post bronchoscopy


4. Central access


5. Use broad-spectrum antibiotics.  


6. continue weaning attempts





May need repeat bronchoscopy as she has persistent consolidation LLL


WIll check CXR today














  ______________________________________________


  Quentin Cardoso M.D.





Subjective


Interval Events:  Failed wean yesterday


Constitutional:  Reports: no symptoms


HEENT:  Repors: no symptoms


Respiratory:  Reports: no symptoms


Cardiovascular:  Reports: no symptoms


Gastrointestinal/Abdominal:  Reports: no symptoms


Genitourinary:  Reports: no symptoms


Allergies:  


Coded Allergies:  


     No Known Allergies (Unverified , 11/26/18)





Objective





Last 24 Hour Vital Signs








  Date Time  Temp Pulse Resp B/P (MAP) Pulse Ox O2 Delivery O2 Flow Rate FiO2


 


12/4/18 07:26  91 12     30


 


12/4/18 06:00  89 14 102/29 (53) 100   


 


12/4/18 05:12  84 12     30


 


12/4/18 05:00 97.9 102 14 98/45 (62) 100   


 


12/4/18 04:00  84      


 


12/4/18 04:00        30


 


12/4/18 04:00  102 14 134/45 (74) 100   


 


12/4/18 04:00      Mechanical Ventilator  





      Mechanical Ventilator  





      Mechanical Ventilator  


 


12/4/18 03:27  98 12     30


 


12/4/18 03:00  98 12 134/48 (76) 100   


 


12/4/18 02:00  102 12 112/46 (68) 100   


 


12/4/18 01:00  101 12 115/41 (65) 100   


 


12/4/18 00:56  96 13     30


 


12/4/18 00:00        30


 


12/4/18 00:00 97.9 95 15 114/42 (66) 100   


 


12/4/18 00:00      Mechanical Ventilator  





      Mechanical Ventilator  





      Mechanical Ventilator  


 


12/4/18 00:00  101      


 


12/3/18 23:20  99 14     30


 


12/3/18 23:00  104 15 102/36 (58) 100   


 


12/3/18 22:00  103 15 102/36 (58) 100   


 


12/3/18 21:00  102  90/35    


 


12/3/18 21:00    90/35    


 


12/3/18 21:00  103 14 101/33 (55) 100   


 


12/3/18 20:48  99 12     30


 


12/3/18 20:00 97.8 103 15 94/60 (71) 100   


 


12/3/18 20:00      Mechanical Ventilator  





      Mechanical Ventilator  





      Mechanical Ventilator  


 


12/3/18 20:00  101      


 


12/3/18 20:00        30


 


12/3/18 19:05  101 13     30


 


12/3/18 19:00  102 14 107/24 (51) 99   


 


12/3/18 18:00  102 14 104/30 (54) 99   


 


12/3/18 17:21  58 12     30


 


12/3/18 17:00  102 14 83/36 (52) 99   


 


12/3/18 16:30    95/32    


 


12/3/18 16:00  110      


 


12/3/18 16:00 97.9 101 16 102/47 (65) 98   


 


12/3/18 16:00      Mechanical Ventilator  





      Mechanical Ventilator  





      Mechanical Ventilator  


 


12/3/18 15:25  57 12     30


 


12/3/18 15:00  102 16 120/44 (69) 100   


 


12/3/18 14:00  59 12 122/36 (64) 100   


 


12/3/18 13:32        30


 


12/3/18 13:00  60 12 121/40 (67) 100   


 


12/3/18 12:45        30


 


12/3/18 12:42  58 16     30


 


12/3/18 12:00  55      


 


12/3/18 12:00 97.9 55 12 116/16 (49) 100   


 


12/3/18 12:00      Mechanical Ventilator  





      Mechanical Ventilator  





      Mechanical Ventilator  


 


12/3/18 12:00        30


 


12/3/18 11:18  57 12     30


 


12/3/18 11:00  58 12 130/12 (51) 100   


 


12/3/18 10:00  58 12 119/13 (48) 100   


 


12/3/18 09:00    91/48    


 


12/3/18 09:00  64 13 123/12 (49) 100   


 


12/3/18 08:58     100   


 


12/3/18 08:58  58 12     30

















Intake and Output  


 


 12/3/18 12/4/18





 19:00 07:00


 


Intake Total 2620 ml 440 ml


 


Balance 2620 ml 440 ml


 


  


 


Free Water 150 ml 50 ml


 


IV Total 110 ml 


 


Tube Feeding 360 ml 330 ml


 


Hemodialysis 2000 ml 


 


Other  60 ml


 


# Voids 1 


 


# Bowel Movements 1 1








General Appearance:  no acute distress


HEENT:  normocephalic


Respiratory/Chest:  chest wall non-tender, decreased breath sounds


Cardiovascular:  normal peripheral pulses, normal rate


Abdomen:  normal bowel sounds





Current Medications








 Medications


  (Trade)  Dose


 Ordered  Sig/Tony


 Route


 PRN Reason  Start Time


 Stop Time Status Last Admin


Dose Admin


 


 Amiodarone HCl


  (Cordarone)  200 mg  EVERY 12  HOURS


 ORAL


   11/27/18 21:00


 12/27/18 20:59  12/3/18 20:40


 


 


 Chlorhexidine


 Gluconate


  (Juana-Hex 2%)  1 applic  DAILY@2000


 TOPIC


   11/27/18 20:00


 12/27/18 19:59  12/3/18 20:39


 


 


 Epoetin Rupesh


  (Procrit (for


 ESRD on dialysis))  10,000 units  MON-WED-FRI


 SUBQ


   11/28/18 21:00


 12/28/18 20:59  12/3/18 20:50


 


 


 Fentanyl Citrate


  (Sublimaze 100


 mcg/2 mL)  25 mcg  Q2H  PRN


 IV


 Pain Scale (6-10)  12/1/18 20:15


 12/8/18 20:14  12/2/18 17:33


 


 


 Linezolid


  (Zyvox)  600 mg  EVERY 12  HOURS


 ORAL


   11/28/18 21:00


 12/8/18 20:59  12/3/18 20:39


 


 


 Meropenem 500 mg/


 Sodium Chloride  55 ml @ 


 110 mls/hr  DAILY


 IVPB


   11/28/18 12:00


 12/8/18 11:59  12/3/18 09:39


 


 


 Metoprolol


 Tartrate


  (Lopressor)  12.5 mg  Q12HR


 ORAL


   12/3/18 21:00


 1/2/19 20:59   


 


 


 Minoxidil


  (Loniten)  5 mg  Q12HR


 NG


   12/2/18 21:00


 1/1/19 20:59  12/2/18 20:48


 


 


 Norepinephrine


 Bitartrate 4 mg/


 Dextrose  250 ml @ 0


 mls/hr  Q24H


 IV


   11/26/18 16:30


 12/26/18 16:29  11/27/18 21:28


 


 


 Pantoprazole


  (Protonix)  40 mg  Q12HR


 IVP


   11/26/18 21:00


 12/27/18 08:59  12/3/18 20:39


 

















Quentin Cardoso MD Dec 4, 2018 08:19

## 2018-12-04 NOTE — NUR
RESPIRATORY NOTE: Received pt on ETT size 7.5 @ 23cm lips line with current vent settings: 
AC 12-500ml-30%- peep of 5, saturates at %. No SOB or acute resp distress noted, vent 
is plugged into the red outlet, alarms are set and audible, ambu bag is at bedside. Will 
continue to monitor.

## 2018-12-04 NOTE — NUR
CASE MANAGEMENT: REVIEW





SI: A-FIB . SEPSIS . ESRD ON HD . ORALLY INTUBATED 

T 99.0  RR 20 /89 % MECH VENT FIO2 30









IS: MEROPENEM IV QD

AMIODARONE PO Q12HR

PROTONIX IV Q12HR

LOPRESSOR PO Q12HR

NGT FEEDING NEPRO @10ML/HR 



***ICU STATUS***

DCP: PATIENT IS FROM [facility]

## 2018-12-04 NOTE — NUR
NURSE NOTES:

Patient not in respiratory distress, tolerating weaning. On SIMV rate 8, pressure support 
10, O2 saturation 100%, respiration rate 19, even and unlabored. Will continue to monitor.

## 2018-12-05 VITALS — SYSTOLIC BLOOD PRESSURE: 131 MMHG | DIASTOLIC BLOOD PRESSURE: 10 MMHG

## 2018-12-05 VITALS — SYSTOLIC BLOOD PRESSURE: 137 MMHG | DIASTOLIC BLOOD PRESSURE: 17 MMHG

## 2018-12-05 VITALS — SYSTOLIC BLOOD PRESSURE: 120 MMHG | DIASTOLIC BLOOD PRESSURE: 12 MMHG

## 2018-12-05 VITALS — SYSTOLIC BLOOD PRESSURE: 124 MMHG | DIASTOLIC BLOOD PRESSURE: 21 MMHG

## 2018-12-05 VITALS — DIASTOLIC BLOOD PRESSURE: 62 MMHG | SYSTOLIC BLOOD PRESSURE: 96 MMHG

## 2018-12-05 VITALS — SYSTOLIC BLOOD PRESSURE: 161 MMHG | DIASTOLIC BLOOD PRESSURE: 20 MMHG

## 2018-12-05 VITALS — SYSTOLIC BLOOD PRESSURE: 92 MMHG | DIASTOLIC BLOOD PRESSURE: 15 MMHG

## 2018-12-05 VITALS — SYSTOLIC BLOOD PRESSURE: 142 MMHG | DIASTOLIC BLOOD PRESSURE: 20 MMHG

## 2018-12-05 VITALS — DIASTOLIC BLOOD PRESSURE: 59 MMHG | SYSTOLIC BLOOD PRESSURE: 133 MMHG

## 2018-12-05 VITALS — DIASTOLIC BLOOD PRESSURE: 10 MMHG | SYSTOLIC BLOOD PRESSURE: 130 MMHG

## 2018-12-05 VITALS — SYSTOLIC BLOOD PRESSURE: 118 MMHG | DIASTOLIC BLOOD PRESSURE: 37 MMHG

## 2018-12-05 VITALS — SYSTOLIC BLOOD PRESSURE: 126 MMHG | DIASTOLIC BLOOD PRESSURE: 21 MMHG

## 2018-12-05 VITALS — SYSTOLIC BLOOD PRESSURE: 90 MMHG | DIASTOLIC BLOOD PRESSURE: 73 MMHG

## 2018-12-05 VITALS — SYSTOLIC BLOOD PRESSURE: 131 MMHG | DIASTOLIC BLOOD PRESSURE: 92 MMHG

## 2018-12-05 VITALS — SYSTOLIC BLOOD PRESSURE: 70 MMHG | DIASTOLIC BLOOD PRESSURE: 54 MMHG

## 2018-12-05 VITALS — SYSTOLIC BLOOD PRESSURE: 91 MMHG | DIASTOLIC BLOOD PRESSURE: 77 MMHG

## 2018-12-05 VITALS — SYSTOLIC BLOOD PRESSURE: 119 MMHG | DIASTOLIC BLOOD PRESSURE: 13 MMHG

## 2018-12-05 VITALS — DIASTOLIC BLOOD PRESSURE: 59 MMHG | SYSTOLIC BLOOD PRESSURE: 98 MMHG

## 2018-12-05 VITALS — DIASTOLIC BLOOD PRESSURE: 27 MMHG

## 2018-12-05 VITALS — SYSTOLIC BLOOD PRESSURE: 136 MMHG | DIASTOLIC BLOOD PRESSURE: 13 MMHG

## 2018-12-05 VITALS — DIASTOLIC BLOOD PRESSURE: 21 MMHG | SYSTOLIC BLOOD PRESSURE: 112 MMHG

## 2018-12-05 VITALS — DIASTOLIC BLOOD PRESSURE: 56 MMHG | SYSTOLIC BLOOD PRESSURE: 115 MMHG

## 2018-12-05 VITALS — SYSTOLIC BLOOD PRESSURE: 140 MMHG | DIASTOLIC BLOOD PRESSURE: 16 MMHG

## 2018-12-05 VITALS — DIASTOLIC BLOOD PRESSURE: 19 MMHG | SYSTOLIC BLOOD PRESSURE: 127 MMHG

## 2018-12-05 LAB
ADD MANUAL DIFF: NO
ALBUMIN SERPL-MCNC: 1.3 G/DL (ref 3.4–5)
ALBUMIN/GLOB SERPL: 0.3 {RATIO} (ref 1–2.7)
ALP SERPL-CCNC: 70 U/L (ref 46–116)
ALT SERPL-CCNC: < 6 U/L (ref 12–78)
ANION GAP SERPL CALC-SCNC: 9 MMOL/L (ref 5–15)
AST SERPL-CCNC: 14 U/L (ref 15–37)
BASOPHILS NFR BLD AUTO: 0.7 % (ref 0–2)
BILIRUB SERPL-MCNC: 0.3 MG/DL (ref 0.2–1)
BUN SERPL-MCNC: 49 MG/DL (ref 7–18)
CALCIUM SERPL-MCNC: 8.4 MG/DL (ref 8.5–10.1)
CHLORIDE SERPL-SCNC: 104 MMOL/L (ref 98–107)
CO2 SERPL-SCNC: 29 MMOL/L (ref 21–32)
CREAT SERPL-MCNC: 3.4 MG/DL (ref 0.55–1.3)
EOSINOPHIL NFR BLD AUTO: 4.8 % (ref 0–3)
ERYTHROCYTE [DISTWIDTH] IN BLOOD BY AUTOMATED COUNT: 18.8 % (ref 11.6–14.8)
GLOBULIN SER-MCNC: 4.8 G/DL
HCT VFR BLD CALC: 30.7 % (ref 37–47)
HGB BLD-MCNC: 9.3 G/DL (ref 12–16)
LYMPHOCYTES NFR BLD AUTO: 18 % (ref 20–45)
MCV RBC AUTO: 94 FL (ref 80–99)
MONOCYTES NFR BLD AUTO: 10.7 % (ref 1–10)
NEUTROPHILS NFR BLD AUTO: 65.8 % (ref 45–75)
PHOSPHATE SERPL-MCNC: 2.9 MG/DL (ref 2.5–4.9)
PLATELET # BLD: 243 K/UL (ref 150–450)
POTASSIUM SERPL-SCNC: 3.3 MMOL/L (ref 3.5–5.1)
RBC # BLD AUTO: 3.26 M/UL (ref 4.2–5.4)
SODIUM SERPL-SCNC: 142 MMOL/L (ref 136–145)
WBC # BLD AUTO: 4.6 K/UL (ref 4.8–10.8)

## 2018-12-05 RX ADMIN — METOPROLOL TARTRATE SCH MG: 25 TABLET, FILM COATED ORAL at 21:43

## 2018-12-05 RX ADMIN — PANTOPRAZOLE SODIUM SCH MG: 40 INJECTION, POWDER, FOR SOLUTION INTRAVENOUS at 10:02

## 2018-12-05 RX ADMIN — ERYTHROPOIETIN SCH UNITS: 20000 INJECTION, SOLUTION INTRAVENOUS; SUBCUTANEOUS at 20:39

## 2018-12-05 RX ADMIN — CHLORHEXIDINE GLUCONATE SCH APPLIC: 213 SOLUTION TOPICAL at 20:38

## 2018-12-05 RX ADMIN — METOPROLOL TARTRATE SCH MG: 25 TABLET, FILM COATED ORAL at 10:00

## 2018-12-05 RX ADMIN — MINOXIDIL SCH MG: 2.5 TABLET ORAL at 09:59

## 2018-12-05 RX ADMIN — MEROPENEM SCH MLS/HR: 500 INJECTION INTRAVENOUS at 10:03

## 2018-12-05 RX ADMIN — PANTOPRAZOLE SODIUM SCH MG: 40 INJECTION, POWDER, FOR SOLUTION INTRAVENOUS at 20:38

## 2018-12-05 RX ADMIN — MINOXIDIL SCH MG: 2.5 TABLET ORAL at 21:42

## 2018-12-05 RX ADMIN — AMIODARONE HYDROCHLORIDE SCH MG: 200 TABLET ORAL at 10:00

## 2018-12-05 NOTE — NUR
NURSE NOTES: Patient repositioned, patient received oral care and suctioned with clear thick 
oral secretions. Patient had a large bowel movement, gaitan brown and soft. No acute 
distress noted No change in condition. Stool has been collected and sent down to the lab. 
Head of bed is at 30 degrees, bed is locked and in the lowest position, bed alarm is on, 3 
side rails are up. Will continue to monitor patient and follow plan of care.

## 2018-12-05 NOTE — NUR
NURSE NOTES: Patient received from CAROLYN Hanks. Patient is alert in bed, patient is has a ETT 
to the middle of the mouth, size 7.5, 23cm at the lip, vent setting AC 12, , FIO2 30%, 
Peep 5, 02 saturation 100%. No acute distress noted at this time. Patient is connected to 
the cardiac monitor A-fib and sinus rhythm HR 63. Bilateral lung sound of rhonchi upon 
auscultation. Abdomen is large, soft and non tender, patient has a NGT in the right nares, 
55cm at the nose line, patient is connected to feeding Nepro 1.8 at 50ml/hr, no residual 
noted, placement verification heard gurgling upon auscultation. Patient is anuric. Patient 
has a sacral wound and several wounds on both legs, dressing are all dry and intact at this 
time. Patient has a right midline, asymptomatic, patent with TKO. Bed is at 30 degrees, bed 
is locked and in the lowest position, bed alarm is on, will continue to monitor patient and 
follow plan of care.

## 2018-12-05 NOTE — CARDIOLOGY PROGRESS NOTE
Assessment/Plan


Assessment/Plan


1. Hypotension, possibly sepsis versus volume.


2. Paroxysmal episodes of atrial fibrillation history.


3. Bradycardia secondary to medications, amiodarone possibly.


4. Diabetes mellitus.


5. End-stage renal disease, on hemodialysis.


6. Lung collapse.


7. Respiratory failure, status post intubation.


8. Dysphagia.


9. History of dementia.


10. Pulm htn 


11.  Pleural effusion 








in and out afib still despite bid amiod will increase to 400 mh bid watchj for 

edouard 


tele reviewed


stool ob not resulted yet 


nwo sinus increase amiod for a few days





Subjective


Subjective


on the vent not verbal but communicates with head motion





Objective





Last 24 Hour Vital Signs








  Date Time  Temp Pulse Resp B/P (MAP) Pulse Ox O2 Delivery O2 Flow Rate FiO2


 


12/5/18 18:00 98.2 68 15 /27 99   


 


12/5/18 17:00  68 14 92/15 (40) 100   


 


12/5/18 16:47  68 13     30


 


12/5/18 16:00      Mechanical Ventilator  





      Mechanical Ventilator  


 


12/5/18 16:00  67      


 


12/5/18 16:00        30


 


12/5/18 16:00  67 14 70/54 (59) 99   


 


12/5/18 15:00  66 14 91/77 (82) 100   


 


12/5/18 14:54  64 12     30


 


12/5/18 14:00  64 13 131/92 (105) 100   


 


12/5/18 13:15  61 12     30


 


12/5/18 13:00  64 14 96/62 (73) 100   


 


12/5/18 12:00        30


 


12/5/18 12:00  64 14 118/37 (64) 100   


 


12/5/18 12:00      Mechanical Ventilator  





      Mechanical Ventilator  


 


12/5/18 12:00  58      


 


12/5/18 11:00  67 19 133/59 (83) 100   


 


12/5/18 10:49  58 12     30


 


12/5/18 10:00  89 19 115/56 (75) 100   


 


12/5/18 10:00  64  108/17    


 


12/5/18 09:59    108/17    


 


12/5/18 09:00  65 13 127/19 (55) 100   


 


12/5/18 08:37     100   


 


12/5/18 08:36  69 17     30


 


12/5/18 08:32  62 10     30


 


12/5/18 08:00  61      


 


12/5/18 08:00 98.2 60 12 124/21 (55) 100   


 


12/5/18 08:00      Mechanical Ventilator  





      Mechanical Ventilator  





      Mechanical Ventilator  


 


12/5/18 08:00        30


 


12/5/18 07:29  61 12     30


 


12/5/18 07:00  63 12 136/13 (54) 100   


 


12/5/18 06:00  65 12 112/21 (51) 100   


 


12/5/18 05:01  63 12     30


 


12/5/18 05:00  64 14 98/59 (72) 100   


 


12/5/18 04:00 98.4 61 13 140/16 (57) 100   


 


12/5/18 04:00      Mechanical Ventilator  





      Mechanical Ventilator  





      Mechanical Ventilator  


 


12/5/18 04:00        30


 


12/5/18 04:00  61      


 


12/5/18 03:06  58 12     30


 


12/5/18 03:00  58 12 137/17 (57) 100   


 


12/5/18 02:00  58 12 131/10 (50) 100   


 


12/5/18 01:10  58 12     30


 


12/5/18 01:00  58 12 126/21 (56) 100   


 


12/5/18 00:00 98.3 58 12 90/73 (79) 100   


 


12/5/18 00:00        30


 


12/5/18 00:00      Mechanical Ventilator  





      Mechanical Ventilator  





      Mechanical Ventilator  


 


12/5/18 00:00  58      


 


12/4/18 23:15  58 12     30


 


12/4/18 23:00  59 12 123/16 (51) 100   


 


12/4/18 22:00  60 12 128/22 (57) 100   


 


12/4/18 21:24  64 13     30


 


12/4/18 21:03  104  118/63    


 


12/4/18 21:03    118/63    


 


12/4/18 21:00  92 12 118/63 (81) 100   








General Appearance:  on vent, patient on isolation











Intake and Output  


 


 12/4/18 12/5/18





 19:00 07:00


 


Intake Total 620 ml 470 ml


 


Balance 620 ml 470 ml


 


  


 


Free Water 150 ml 110 ml


 


IV Total 110 ml 


 


Tube Feeding 360 ml 360 ml


 


# Bowel Movements  1











Laboratory Tests








Test


  12/5/18


04:57


 


White Blood Count


  4.6 K/UL


(4.8-10.8)  L


 


Red Blood Count


  3.26 M/UL


(4.20-5.40)  L


 


Hemoglobin


  9.3 G/DL


(12.0-16.0)  L


 


Hematocrit


  30.7 %


(37.0-47.0)  L


 


Mean Corpuscular Volume 94 FL (80-99)  


 


Mean Corpuscular Hemoglobin


  28.6 PG


(27.0-31.0)


 


Mean Corpuscular Hemoglobin


Concent 30.4 G/DL


(32.0-36.0)  L


 


Red Cell Distribution Width


  18.8 %


(11.6-14.8)  H


 


Platelet Count


  243 K/UL


(150-450)


 


Mean Platelet Volume


  6.5 FL


(6.5-10.1)


 


Neutrophils (%) (Auto)


  65.8 %


(45.0-75.0)


 


Lymphocytes (%) (Auto)


  18.0 %


(20.0-45.0)  L


 


Monocytes (%) (Auto)


  10.7 %


(1.0-10.0)  H


 


Eosinophils (%) (Auto)


  4.8 %


(0.0-3.0)  H


 


Basophils (%) (Auto)


  0.7 %


(0.0-2.0)


 


Sodium Level


  142 MMOL/L


(136-145)


 


Potassium Level


  3.3 MMOL/L


(3.5-5.1)  L


 


Chloride Level


  104 MMOL/L


()


 


Carbon Dioxide Level


  29 MMOL/L


(21-32)


 


Anion Gap


  9 mmol/L


(5-15)


 


Blood Urea Nitrogen


  49 mg/dL


(7-18)  H


 


Creatinine


  3.4 MG/DL


(0.55-1.30)  H


 


Estimat Glomerular Filtration


Rate  mL/min (>60)  


 


 


Glucose Level


  87 MG/DL


()


 


Calcium Level


  8.4 MG/DL


(8.5-10.1)  L


 


Phosphorus Level


  2.9 MG/DL


(2.5-4.9)


 


Total Bilirubin


  0.3 MG/DL


(0.2-1.0)


 


Aspartate Amino Transf


(AST/SGOT) 14 U/L (15-37)


L


 


Alanine Aminotransferase


(ALT/SGPT) < 6 U/L


(12-78)  L


 


Alkaline Phosphatase


  70 U/L


()


 


Total Protein


  6.1 G/DL


(6.4-8.2)  L


 


Albumin


  1.3 G/DL


(3.4-5.0)  L


 


Globulin 4.8 g/dL  


 


Albumin/Globulin Ratio


  0.3 (1.0-2.7)


L











Microbiology








 Date/Time


Source Procedure


Growth Status


 


 


 12/4/18 17:30


Sputum Gram Stain - Final Resulted


 


 12/4/18 17:30


Sputum Sputum Culture - Preliminary Resulted

















Mariusz Wade MD Dec 5, 2018 20:23

## 2018-12-05 NOTE — CARDIOLOGY PROGRESS NOTE
Assessment/Plan


Assessment/Plan


1. Hypotension, possibly sepsis versus volume.


2. Paroxysmal episodes of atrial fibrillation history.


3. Bradycardia secondary to medications, amiodarone possibly.


4. Diabetes mellitus.


5. End-stage renal disease, on hemodialysis.


6. Lung collapse.


7. Respiratory failure, status post intubation.


8. Dysphagia.


9. History of dementia.


10. Pulm htn 


11.  Pleural effusion 








in and out afib still despite bid amiod will increase to 400 mh bid watch for 

edouard 


tele reviewed


stool ob not resulted yet 


now sinus increase amiod for a few days





Subjective


Subjective


on the vent not verbal but communicates with head motion





Objective





Last 24 Hour Vital Signs








  Date Time  Temp Pulse Resp B/P (MAP) Pulse Ox O2 Delivery O2 Flow Rate FiO2


 


12/5/18 18:00 98.2 68 15 /27 99   


 


12/5/18 17:00  68 14 92/15 (40) 100   


 


12/5/18 16:47  68 13     30


 


12/5/18 16:00      Mechanical Ventilator  





      Mechanical Ventilator  


 


12/5/18 16:00  67      


 


12/5/18 16:00        30


 


12/5/18 16:00  67 14 70/54 (59) 99   


 


12/5/18 15:00  66 14 91/77 (82) 100   


 


12/5/18 14:54  64 12     30


 


12/5/18 14:00  64 13 131/92 (105) 100   


 


12/5/18 13:15  61 12     30


 


12/5/18 13:00  64 14 96/62 (73) 100   


 


12/5/18 12:00        30


 


12/5/18 12:00  64 14 118/37 (64) 100   


 


12/5/18 12:00      Mechanical Ventilator  





      Mechanical Ventilator  


 


12/5/18 12:00  58      


 


12/5/18 11:00  67 19 133/59 (83) 100   


 


12/5/18 10:49  58 12     30


 


12/5/18 10:00  89 19 115/56 (75) 100   


 


12/5/18 10:00  64  108/17    


 


12/5/18 09:59    108/17    


 


12/5/18 09:00  65 13 127/19 (55) 100   


 


12/5/18 08:37     100   


 


12/5/18 08:36  69 17     30


 


12/5/18 08:32  62 10     30


 


12/5/18 08:00  61      


 


12/5/18 08:00 98.2 60 12 124/21 (55) 100   


 


12/5/18 08:00      Mechanical Ventilator  





      Mechanical Ventilator  





      Mechanical Ventilator  


 


12/5/18 08:00        30


 


12/5/18 07:29  61 12     30


 


12/5/18 07:00  63 12 136/13 (54) 100   


 


12/5/18 06:00  65 12 112/21 (51) 100   


 


12/5/18 05:01  63 12     30


 


12/5/18 05:00  64 14 98/59 (72) 100   


 


12/5/18 04:00 98.4 61 13 140/16 (57) 100   


 


12/5/18 04:00      Mechanical Ventilator  





      Mechanical Ventilator  





      Mechanical Ventilator  


 


12/5/18 04:00        30


 


12/5/18 04:00  61      


 


12/5/18 03:06  58 12     30


 


12/5/18 03:00  58 12 137/17 (57) 100   


 


12/5/18 02:00  58 12 131/10 (50) 100   


 


12/5/18 01:10  58 12     30


 


12/5/18 01:00  58 12 126/21 (56) 100   


 


12/5/18 00:00 98.3 58 12 90/73 (79) 100   


 


12/5/18 00:00        30


 


12/5/18 00:00      Mechanical Ventilator  





      Mechanical Ventilator  





      Mechanical Ventilator  


 


12/5/18 00:00  58      


 


12/4/18 23:15  58 12     30


 


12/4/18 23:00  59 12 123/16 (51) 100   


 


12/4/18 22:00  60 12 128/22 (57) 100   


 


12/4/18 21:24  64 13     30


 


12/4/18 21:03  104  118/63    


 


12/4/18 21:03    118/63    


 


12/4/18 21:00  92 12 118/63 (81) 100   

















Intake and Output  


 


 12/4/18 12/5/18





 19:00 07:00


 


Intake Total 620 ml 470 ml


 


Balance 620 ml 470 ml


 


  


 


Free Water 150 ml 110 ml


 


IV Total 110 ml 


 


Tube Feeding 360 ml 360 ml


 


# Bowel Movements  1











Laboratory Tests








Test


  12/5/18


04:57


 


White Blood Count


  4.6 K/UL


(4.8-10.8)  L


 


Red Blood Count


  3.26 M/UL


(4.20-5.40)  L


 


Hemoglobin


  9.3 G/DL


(12.0-16.0)  L


 


Hematocrit


  30.7 %


(37.0-47.0)  L


 


Mean Corpuscular Volume 94 FL (80-99)  


 


Mean Corpuscular Hemoglobin


  28.6 PG


(27.0-31.0)


 


Mean Corpuscular Hemoglobin


Concent 30.4 G/DL


(32.0-36.0)  L


 


Red Cell Distribution Width


  18.8 %


(11.6-14.8)  H


 


Platelet Count


  243 K/UL


(150-450)


 


Mean Platelet Volume


  6.5 FL


(6.5-10.1)


 


Neutrophils (%) (Auto)


  65.8 %


(45.0-75.0)


 


Lymphocytes (%) (Auto)


  18.0 %


(20.0-45.0)  L


 


Monocytes (%) (Auto)


  10.7 %


(1.0-10.0)  H


 


Eosinophils (%) (Auto)


  4.8 %


(0.0-3.0)  H


 


Basophils (%) (Auto)


  0.7 %


(0.0-2.0)


 


Sodium Level


  142 MMOL/L


(136-145)


 


Potassium Level


  3.3 MMOL/L


(3.5-5.1)  L


 


Chloride Level


  104 MMOL/L


()


 


Carbon Dioxide Level


  29 MMOL/L


(21-32)


 


Anion Gap


  9 mmol/L


(5-15)


 


Blood Urea Nitrogen


  49 mg/dL


(7-18)  H


 


Creatinine


  3.4 MG/DL


(0.55-1.30)  H


 


Estimat Glomerular Filtration


Rate  mL/min (>60)  


 


 


Glucose Level


  87 MG/DL


()


 


Calcium Level


  8.4 MG/DL


(8.5-10.1)  L


 


Phosphorus Level


  2.9 MG/DL


(2.5-4.9)


 


Total Bilirubin


  0.3 MG/DL


(0.2-1.0)


 


Aspartate Amino Transf


(AST/SGOT) 14 U/L (15-37)


L


 


Alanine Aminotransferase


(ALT/SGPT) < 6 U/L


(12-78)  L


 


Alkaline Phosphatase


  70 U/L


()


 


Total Protein


  6.1 G/DL


(6.4-8.2)  L


 


Albumin


  1.3 G/DL


(3.4-5.0)  L


 


Globulin 4.8 g/dL  


 


Albumin/Globulin Ratio


  0.3 (1.0-2.7)


L











Microbiology








 Date/Time


Source Procedure


Growth Status


 


 


 12/4/18 17:30


Sputum Gram Stain - Final Resulted


 


 12/4/18 17:30


Sputum Sputum Culture - Preliminary Resulted

















Mariusz Wade MD Dec 5, 2018 20:27

## 2018-12-05 NOTE — NUR
NURSE NOTES:

Inct.of soft brownish stool.Lynda Larsen chg.Blood drawn for cbc/cmp and phos.spec.to 
Lab.Suctioned.Tolerated vent.settings.Weaning trial again this am.See V/S.Scope pattern 
remain sinus.No CP.Tony.for poss.HD this am.AVF pos.thrill and bruit.See I/O.

## 2018-12-05 NOTE — NEPHROLOGY PROGRESS NOTE
Assessment/Plan


Assessment


Seee below


Plan


MOF, improving slowly.


Sepsis due to infected diabetic ulcers. On IV Abx. Followed by ID, Vasc. Sx, 

Podiatry.


Septic Shock pressors PRN


ESRD HD q MWF 


Anemia of CKD , SAMANTHA high dose. Transfuse PRN.


Resp Failure - Vent per Pul. Trying to wean. KEVIN Cardoso.


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 with LAE. LVEF 65% . Anticoagulate when 

stable. KEVIN Wade.


PVD. Needs Angio. KEVIN Spencer.





Subjective


Subjective


Awake + alert. No c/o





Objective


Objective





Last 24 Hour Vital Signs








  Date Time  Temp Pulse Resp B/P (MAP) Pulse Ox O2 Delivery O2 Flow Rate FiO2


 


12/5/18 07:29  61 12     30


 


12/5/18 07:00  63 12 136/13 (54) 100   


 


12/5/18 06:00  65 12 112/21 (51) 100   


 


12/5/18 05:01  63 12     30


 


12/5/18 05:00  64 14 98/59 (72) 100   


 


12/5/18 04:00 98.4 61 13 140/16 (57) 100   


 


12/5/18 04:00      Mechanical Ventilator  





      Mechanical Ventilator  





      Mechanical Ventilator  


 


12/5/18 04:00        30


 


12/5/18 04:00  61      


 


12/5/18 03:06  58 12     30


 


12/5/18 03:00  58 12 137/17 (57) 100   


 


12/5/18 02:00  58 12 131/10 (50) 100   


 


12/5/18 01:10  58 12     30


 


12/5/18 01:00  58 12 126/21 (56) 100   


 


12/5/18 00:00 98.3 58 12 90/73 (79) 100   


 


12/5/18 00:00        30


 


12/5/18 00:00      Mechanical Ventilator  





      Mechanical Ventilator  





      Mechanical Ventilator  


 


12/5/18 00:00  58      


 


12/4/18 23:15  58 12     30


 


12/4/18 23:00  59 12 123/16 (51) 100   


 


12/4/18 22:00  60 12 128/22 (57) 100   


 


12/4/18 21:24  64 13     30


 


12/4/18 21:03  104  118/63    


 


12/4/18 21:03    118/63    


 


12/4/18 21:00  92 12 118/63 (81) 100   


 


12/4/18 20:00  90      


 


12/4/18 20:00        30


 


12/4/18 20:00 98.7 90 12 95/12 (39) 100   


 


12/4/18 20:00      Mechanical Ventilator  





      Mechanical Ventilator  





      Mechanical Ventilator  


 


12/4/18 19:19  93 12     30


 


12/4/18 19:00  94 12 111/26 (54) 100   


 


12/4/18 18:00  92 12 118/28 (58) 100   


 


12/4/18 17:00  93 12 129/25 (59) 100   


 


12/4/18 16:30  88 12     30


 


12/4/18 16:00 98.5 94 12 136/26 (62) 100   


 


12/4/18 16:00      Mechanical Ventilator  





      Mechanical Ventilator  





      Mechanical Ventilator  


 


12/4/18 16:00  89      


 


12/4/18 15:00  93 12 108/26 (53) 100   


 


12/4/18 14:40  88 12     30


 


12/4/18 14:00      Mechanical Ventilator  





      Mechanical Ventilator  





      Mechanical Ventilator  


 


12/4/18 14:00  92 12 108/26 (53) 100   


 


12/4/18 13:00  91 12 123/30 (61) 100   


 


12/4/18 12:40        30


 


12/4/18 12:32  98 12     30


 


12/4/18 12:00  101      


 


12/4/18 12:00 99.0 97 20 118/89 (99) 100   


 


12/4/18 12:00      Mechanical Ventilator  





      Mechanical Ventilator  





      Mechanical Ventilator  


 


12/4/18 11:00  94 18 116/13 (47) 100   


 


12/4/18 10:34  86 14     30


 


12/4/18 10:00  93 16 116/19 (51) 100   


 


12/4/18 09:00  100 19 102/37 (58) 100   


 


12/4/18 08:49  114  97/60    


 


12/4/18 08:38  95 20     30


 


12/4/18 08:37     97   


 


12/4/18 08:18  99 16     30


 


12/4/18 08:00 98.0 102 19 109/48 (68) 99   


 


12/4/18 08:00  105      


 


12/4/18 08:00      Mechanical Ventilator  





      Mechanical Ventilator  





      Mechanical Ventilator  


 


12/4/18 08:00        30

















Intake and Output  


 


 12/4/18 12/5/18





 18:59 06:59


 


Intake Total 620 ml 470 ml


 


Balance 620 ml 470 ml


 


  


 


Free Water 150 ml 110 ml


 


IV Total 110 ml 


 


Tube Feeding 360 ml 360 ml


 


# Bowel Movements  1








Laboratory Tests


12/5/18 04:57: 


White Blood Count 4.6L, Red Blood Count 3.26L, Hemoglobin 9.3L, Hematocrit 30.7L

, Mean Corpuscular Volume 94, Mean Corpuscular Hemoglobin 28.6, Mean 

Corpuscular Hemoglobin Concent 30.4L, Red Cell Distribution Width 18.8H, 

Platelet Count 243, Mean Platelet Volume 6.5, Neutrophils (%) (Auto) 65.8, 

Lymphocytes (%) (Auto) 18.0L, Monocytes (%) (Auto) 10.7H, Eosinophils (%) (Auto

) 4.8H, Basophils (%) (Auto) 0.7, Sodium Level 142, Potassium Level 3.3L, 

Chloride Level 104, Carbon Dioxide Level 29, Anion Gap 9, Blood Urea Nitrogen 

49H, Creatinine 3.4H, Estimat Glomerular Filtration Rate , Glucose Level 87, 

Calcium Level 8.4L, Phosphorus Level 2.9, Total Bilirubin 0.3, Aspartate Amino 

Transf (AST/SGOT) 14L, Alanine Aminotransferase (ALT/SGPT) < 6L, Alkaline 

Phosphatase 70, Total Protein 6.1L, Albumin 1.3L, Globulin 4.8, Albumin/

Globulin Ratio 0.3L


Height (Feet):  5


Height (Inches):  2.00


Weight (Pounds):  125


Objective


Intubated, On vent.


BP measured more accurately with a smaller cuff placed on rt. FA.


Cv IRR/IRR


Lungs B shai.


Abd SNT. BS +


E Rt. foot diabetic ulcers with pus











Gavino Daigle MD Dec 5, 2018 07:45

## 2018-12-05 NOTE — NUR
NURSE NOTES: Hemodialysis has been completed. HD nurse informed staff that he removed 1 
Liter from the patient. Hemodialysis nurse also informed staff that dressing was changed. 
Will  continue to monitor patient and follow plan of care.

## 2018-12-05 NOTE — GENERAL SURGERY PROGRESS NOTE
General Surgery-Progress Note


Subjective


Procedure Performed


left subclavian central venous catheter insertion


Additional Comments


no acute events.  still intubated.  more awake and responsive today.  follow 

commands





Objective





Last 24 Hour Vital Signs








  Date Time  Temp Pulse Resp B/P (MAP) Pulse Ox O2 Delivery O2 Flow Rate FiO2


 


12/5/18 13:15  61 12     30


 


12/5/18 12:00        30


 


12/5/18 12:00      Mechanical Ventilator  





      Mechanical Ventilator  


 


12/5/18 12:00  58      


 


12/5/18 11:00  67 19 133/59 (83) 100   


 


12/5/18 10:49  58 12     30


 


12/5/18 10:00  89 19 115/56 (75) 100   


 


12/5/18 10:00  64  108/17    


 


12/5/18 09:59    108/17    


 


12/5/18 09:00  65 13 127/19 (55) 100   


 


12/5/18 08:37     100   


 


12/5/18 08:36  69 17     30


 


12/5/18 08:32  62 10     30


 


12/5/18 08:00  61      


 


12/5/18 08:00 98.2 60 12 124/21 (55) 100   


 


12/5/18 08:00      Mechanical Ventilator  





      Mechanical Ventilator  





      Mechanical Ventilator  


 


12/5/18 08:00        30


 


12/5/18 07:29  61 12     30


 


12/5/18 07:00  63 12 136/13 (54) 100   


 


12/5/18 06:00  65 12 112/21 (51) 100   


 


12/5/18 05:01  63 12     30


 


12/5/18 05:00  64 14 98/59 (72) 100   


 


12/5/18 04:00 98.4 61 13 140/16 (57) 100   


 


12/5/18 04:00      Mechanical Ventilator  





      Mechanical Ventilator  





      Mechanical Ventilator  


 


12/5/18 04:00        30


 


12/5/18 04:00  61      


 


12/5/18 03:06  58 12     30


 


12/5/18 03:00  58 12 137/17 (57) 100   


 


12/5/18 02:00  58 12 131/10 (50) 100   


 


12/5/18 01:10  58 12     30


 


12/5/18 01:00  58 12 126/21 (56) 100   


 


12/5/18 00:00 98.3 58 12 90/73 (79) 100   


 


12/5/18 00:00        30


 


12/5/18 00:00      Mechanical Ventilator  





      Mechanical Ventilator  





      Mechanical Ventilator  


 


12/5/18 00:00  58      


 


12/4/18 23:15  58 12     30


 


12/4/18 23:00  59 12 123/16 (51) 100   


 


12/4/18 22:00  60 12 128/22 (57) 100   


 


12/4/18 21:24  64 13     30


 


12/4/18 21:03  104  118/63    


 


12/4/18 21:03    118/63    


 


12/4/18 21:00  92 12 118/63 (81) 100   


 


12/4/18 20:00  90      


 


12/4/18 20:00        30


 


12/4/18 20:00 98.7 90 12 95/12 (39) 100   


 


12/4/18 20:00      Mechanical Ventilator  





      Mechanical Ventilator  





      Mechanical Ventilator  


 


12/4/18 19:19  93 12     30


 


12/4/18 19:00  94 12 111/26 (54) 100   


 


12/4/18 18:00  92 12 118/28 (58) 100   


 


12/4/18 17:00  93 12 129/25 (59) 100   


 


12/4/18 16:30  88 12     30


 


12/4/18 16:00 98.5 94 12 136/26 (62) 100   


 


12/4/18 16:00      Mechanical Ventilator  





      Mechanical Ventilator  





      Mechanical Ventilator  


 


12/4/18 16:00  89      


 


12/4/18 15:00  93 12 108/26 (53) 100   


 


12/4/18 14:40  88 12     30


 


12/4/18 14:00      Mechanical Ventilator  





      Mechanical Ventilator  





      Mechanical Ventilator  


 


12/4/18 14:00  92 12 108/26 (53) 100   








I&O











Intake and Output  


 


 12/4/18 12/5/18





 19:00 07:00


 


Intake Total 620 ml 470 ml


 


Balance 620 ml 470 ml


 


  


 


Free Water 150 ml 110 ml


 


IV Total 110 ml 


 


Tube Feeding 360 ml 360 ml


 


# Bowel Movements  1








Dressing:  other


Wound:  other


Drains:  other


Cardiovascular:  RSR


Respiratory:  decreased breath sounds


Abdomen:  soft, flat, non-tender, present bowel sounds


Extremities:  other





Laboratory Tests








Test


  12/5/18


04:57


 


White Blood Count


  4.6 K/UL


(4.8-10.8)  L


 


Red Blood Count


  3.26 M/UL


(4.20-5.40)  L


 


Hemoglobin


  9.3 G/DL


(12.0-16.0)  L


 


Hematocrit


  30.7 %


(37.0-47.0)  L


 


Mean Corpuscular Volume 94 FL (80-99)  


 


Mean Corpuscular Hemoglobin


  28.6 PG


(27.0-31.0)


 


Mean Corpuscular Hemoglobin


Concent 30.4 G/DL


(32.0-36.0)  L


 


Red Cell Distribution Width


  18.8 %


(11.6-14.8)  H


 


Platelet Count


  243 K/UL


(150-450)


 


Mean Platelet Volume


  6.5 FL


(6.5-10.1)


 


Neutrophils (%) (Auto)


  65.8 %


(45.0-75.0)


 


Lymphocytes (%) (Auto)


  18.0 %


(20.0-45.0)  L


 


Monocytes (%) (Auto)


  10.7 %


(1.0-10.0)  H


 


Eosinophils (%) (Auto)


  4.8 %


(0.0-3.0)  H


 


Basophils (%) (Auto)


  0.7 %


(0.0-2.0)


 


Sodium Level


  142 MMOL/L


(136-145)


 


Potassium Level


  3.3 MMOL/L


(3.5-5.1)  L


 


Chloride Level


  104 MMOL/L


()


 


Carbon Dioxide Level


  29 MMOL/L


(21-32)


 


Anion Gap


  9 mmol/L


(5-15)


 


Blood Urea Nitrogen


  49 mg/dL


(7-18)  H


 


Creatinine


  3.4 MG/DL


(0.55-1.30)  H


 


Estimat Glomerular Filtration


Rate  mL/min (>60)  


 


 


Glucose Level


  87 MG/DL


()


 


Calcium Level


  8.4 MG/DL


(8.5-10.1)  L


 


Phosphorus Level


  2.9 MG/DL


(2.5-4.9)


 


Total Bilirubin


  0.3 MG/DL


(0.2-1.0)


 


Aspartate Amino Transf


(AST/SGOT) 14 U/L (15-37)


L


 


Alanine Aminotransferase


(ALT/SGPT) < 6 U/L


(12-78)  L


 


Alkaline Phosphatase


  70 U/L


()


 


Total Protein


  6.1 G/DL


(6.4-8.2)  L


 


Albumin


  1.3 G/DL


(3.4-5.0)  L


 


Globulin 4.8 g/dL  


 


Albumin/Globulin Ratio


  0.3 (1.0-2.7)


L











Plan


Problems:  


(1) Sepsis


Assessment & Plan:  acute decompensation 


hypotensive


bradycardic


respiratory decompensation requiring intubation and vent support - failed 

extubation 





leukocytosis resolved


cont current ICU care and management


may need trach if unable to wean off went with history of failed extubation 


thank you


will follow with recs 





(2) Hypotension


(3) Bradycardia


(4) Altered mental status


(5) Decubital ulcer


Assessment & Plan:  Patient with multiple pressure injuries where have been 

present since admission. 


Full thickness pressure injury to sacrum (L)(L)2.4cm x (W)4.4cm x (D)0.9cm, 

undermining 12-12 with tunneling 9-10 by 11.3cm. 


In close proximity to sacral wound on L buttocks purple ,indurated area with 

small area of slough measuring (L)0.6cm x (W)0.5cm noted. Arched surgical scar 

noted to L buttocks at site of tunneling. 


Resolving pressure injury noted to R ischium (L)3cm x (W)2cm. Wound bed 

epithelialized with maroon discoloration without induration periwound. 


Closed area with white pocket and purple margins noted to distal,paz R tibia 

(L)6cm x (W)2.7cm


Full thickness wound noted to distal/medial RLE (L)6.2cm x (W)3.8cm .Wound 

noted to have 95% mixed slough /necrosis ,marginal erythema. Wound malodorous.


Wound posterior R tibia (L)4.7cm x (W) 3cm ,100% necrotic with marginal 

erythema. Dry eschar with marginal erythema 


(L)0.5cm x (W)0.7cm noted to medial R heel.Non-blanchable erythema noted to 

medial L malleolus. 


Dry brown eschar noted to medial L 1st malleolus (L)1.4cm x (W)1cm. 


L heel and lateral aspect boggy with non-blanchable erythema. 





Tx.Plan:


Cleanse sacral wound with Saline.Loose pack with Hydrogel impregnated packing 

strip (Attention to tunneled area at 9-10).Cover with Optifoam drsg Daily and 

prn.


           


Reposition at least every 2hours or as tolerated.


           


Air Fluidized mattress.


           


Off-load heels with pillow.





Appreciate Podiatry and Vascular input





Plan for anigo late by Vascular 














Bob Lewis Dec 5, 2018 13:40

## 2018-12-05 NOTE — NUR
Weaning began. Patient placed on Spontaneous Breathing with PS 8 and PEEP +5. Will continue 
to monitor patient.

## 2018-12-05 NOTE — NUR
NURSE NOTES: Patient repositioned, received oral care, patient orally suctioned with thick, 
clear sputum. Patient is resistive to have her mouth cleaned. Will continue to educate and 
encourage patient to allow staff to suction and clean her mouth. Patient received 
medications. Bed is at 30 degrees, bed is locked ans in the lowest position, bed alarm is 
on. Will continue to monitor patient and follow plan of care.

## 2018-12-05 NOTE — NUR
CASE MANAGEMENT: REVIEW





SI: A-FIB . SEPSIS . ESRD ON HD MWF  . ORALLY INTUBATED 

T 98.4 HR 61 RR 12 BP 98/59 % MECH VENT FIO2 30

WBC 4.6 H/H 9.3/30.7 K 3.3 BUN 49 CR 3.4 









IS: MEROPENEM IV QD

AMIODARONE PO Q12HR

PROTONIX IV Q12HR

LOPRESSOR PO Q12HR

NGT FEEDING NEPRO @10ML/HR 

TRANSFUSE PRBC PRN



***ICU STATUS***

DCP: PATIENT IS FROM [facility]

## 2018-12-05 NOTE — NUR
NURSE NOTES:

Endorsement received from CAROLYN Marks. Patient awake, opens eyes spontaneously, follows 
commands. Communicates by nodding or shaking head when asked with a yes or no question. 
Orally intubated with ET 7.5, 23 lipline. AC 12 Vt 500 PEEP 5 30% FiO2. NGT pulled out by 
patient. Inserted at left nare, with 2 RN verifying placement per auscultation. With right 
upper arm midline, with good backflow and no resistance when flushed. Bilateral soft wrist 
restraints present. With AV shunt at left arm. On SPR mattress. Bed locked and in low 
position. Head of bed elevated. Call light within reach

## 2018-12-05 NOTE — PULMONOLOGY PROGRESS NOTE
Assessment/Plan


Assessment/Plan


IMPRESSION:


1. Hypotension.


2. Sepsis.


3. Atrial fibrillation.


4. Bradycardia.


5. Diabetes mellitus.


6. End-stage renal disease on dialysis.


7. Left lung collapse. Resolved


8. Respiratory failure.


9. Dysphagia.


10. Dementia.


11. Anemia


12. Pneumonia





DISCUSSION:


1. Continue medications.


2. CXR improved


3. status post bronchoscopy


4. Central access


5. Use broad-spectrum antibiotics.  


6. continue weaning attempts





Continue weaning











  ______________________________________________


  Quentin Cardoso M.D.





Subjective


Interval Events:  Remains intubated; CXR is better


Constitutional:  Reports: no symptoms


HEENT:  Repors: no symptoms


Respiratory:  Reports: no symptoms


Cardiovascular:  Reports: no symptoms


Gastrointestinal/Abdominal:  Reports: no symptoms


Allergies:  


Coded Allergies:  


     No Known Allergies (Unverified , 11/26/18)





Objective





Last 24 Hour Vital Signs








  Date Time  Temp Pulse Resp B/P (MAP) Pulse Ox O2 Delivery O2 Flow Rate FiO2


 


12/5/18 06:00  65 12 112/21 (51) 100   


 


12/5/18 05:01  63 12     30


 


12/5/18 05:00  64 14 98/59 (72) 100   


 


12/5/18 04:00 98.4 61 13 140/16 (57) 100   


 


12/5/18 04:00      Mechanical Ventilator  





      Mechanical Ventilator  





      Mechanical Ventilator  


 


12/5/18 04:00        30


 


12/5/18 04:00  61      


 


12/5/18 03:06  58 12     30


 


12/5/18 03:00  58 12 137/17 (57) 100   


 


12/5/18 02:00  58 12 131/10 (50) 100   


 


12/5/18 01:10  58 12     30


 


12/5/18 01:00  58 12 126/21 (56) 100   


 


12/5/18 00:00 98.3 58 12 90/73 (79) 100   


 


12/5/18 00:00        30


 


12/5/18 00:00      Mechanical Ventilator  





      Mechanical Ventilator  





      Mechanical Ventilator  


 


12/5/18 00:00  58      


 


12/4/18 23:15  58 12     30


 


12/4/18 23:00  59 12 123/16 (51) 100   


 


12/4/18 22:00  60 12 128/22 (57) 100   


 


12/4/18 21:24  64 13     30


 


12/4/18 21:03  104  118/63    


 


12/4/18 21:03    118/63    


 


12/4/18 21:00  92 12 118/63 (81) 100   


 


12/4/18 20:00  90      


 


12/4/18 20:00        30


 


12/4/18 20:00 98.7 90 12 95/12 (39) 100   


 


12/4/18 20:00      Mechanical Ventilator  





      Mechanical Ventilator  





      Mechanical Ventilator  


 


12/4/18 19:19  93 12     30


 


12/4/18 19:00  94 12 111/26 (54) 100   


 


12/4/18 18:00  92 12 118/28 (58) 100   


 


12/4/18 17:00  93 12 129/25 (59) 100   


 


12/4/18 16:30  88 12     30


 


12/4/18 16:00 98.5 94 12 136/26 (62) 100   


 


12/4/18 16:00      Mechanical Ventilator  





      Mechanical Ventilator  





      Mechanical Ventilator  


 


12/4/18 16:00  89      


 


12/4/18 15:00  93 12 108/26 (53) 100   


 


12/4/18 14:40  88 12     30


 


12/4/18 14:00      Mechanical Ventilator  





      Mechanical Ventilator  





      Mechanical Ventilator  


 


12/4/18 14:00  92 12 108/26 (53) 100   


 


12/4/18 13:00  91 12 123/30 (61) 100   


 


12/4/18 12:40        30


 


12/4/18 12:32  98 12     30


 


12/4/18 12:00  101      


 


12/4/18 12:00 99.0 97 20 118/89 (99) 100   


 


12/4/18 12:00      Mechanical Ventilator  





      Mechanical Ventilator  





      Mechanical Ventilator  


 


12/4/18 11:00  94 18 116/13 (47) 100   


 


12/4/18 10:34  86 14     30


 


12/4/18 10:00  93 16 116/19 (51) 100   


 


12/4/18 09:00  100 19 102/37 (58) 100   


 


12/4/18 08:49  114  97/60    


 


12/4/18 08:38  95 20     30


 


12/4/18 08:37     97   


 


12/4/18 08:18  99 16     30


 


12/4/18 08:00 98.0 102 19 109/48 (68) 99   


 


12/4/18 08:00  105      


 


12/4/18 08:00      Mechanical Ventilator  





      Mechanical Ventilator  





      Mechanical Ventilator  


 


12/4/18 08:00        30


 


12/4/18 07:26  91 12     30

















Intake and Output  


 


 12/4/18 12/5/18





 18:59 06:59


 


Intake Total 620 ml 470 ml


 


Balance 620 ml 470 ml


 


  


 


Free Water 150 ml 110 ml


 


IV Total 110 ml 


 


Tube Feeding 360 ml 360 ml


 


# Bowel Movements  1








General Appearance:  no acute distress


HEENT:  normocephalic


Respiratory/Chest:  chest wall non-tender, decreased breath sounds


Cardiovascular:  normal peripheral pulses, regular rhythm


Abdomen:  normal bowel sounds, soft, non tender


Laboratory Tests


12/5/18 04:57: 


White Blood Count 4.6L, Red Blood Count 3.26L, Hemoglobin 9.3L, Hematocrit 30.7L

, Mean Corpuscular Volume 94, Mean Corpuscular Hemoglobin 28.6, Mean 

Corpuscular Hemoglobin Concent 30.4L, Red Cell Distribution Width 18.8H, 

Platelet Count 243, Mean Platelet Volume 6.5, Neutrophils (%) (Auto) 65.8, 

Lymphocytes (%) (Auto) 18.0L, Monocytes (%) (Auto) 10.7H, Eosinophils (%) (Auto

) 4.8H, Basophils (%) (Auto) 0.7, Sodium Level 142, Potassium Level 3.3L, 

Chloride Level 104, Carbon Dioxide Level 29, Anion Gap 9, Blood Urea Nitrogen 

49H, Creatinine 3.4H, Estimat Glomerular Filtration Rate , Glucose Level 87, 

Calcium Level 8.4L, Phosphorus Level 2.9, Total Bilirubin 0.3, Aspartate Amino 

Transf (AST/SGOT) 14L, Alanine Aminotransferase (ALT/SGPT) < 6L, Alkaline 

Phosphatase 70, Total Protein 6.1L, Albumin 1.3L, Globulin 4.8, Albumin/

Globulin Ratio 0.3L





Current Medications








 Medications


  (Trade)  Dose


 Ordered  Sig/Tony


 Route


 PRN Reason  Start Time


 Stop Time Status Last Admin


Dose Admin


 


 Acetaminophen


  (Tylenol)  650 mg  Q6H  PRN


 ORAL


 Mild Pain/Temp > 100.5  12/4/18 14:15


 1/3/19 14:14   


 


 


 Amiodarone HCl


  (Cordarone)  200 mg  EVERY 12  HOURS


 ORAL


   11/27/18 21:00


 12/27/18 20:59  12/4/18 21:03


 


 


 Chlorhexidine


 Gluconate


  (Juana-Hex 2%)  1 applic  DAILY@2000


 TOPIC


   11/27/18 20:00


 12/27/18 19:59  12/4/18 20:09


 


 


 Epoetin Rupesh


  (Procrit (for


 ESRD on dialysis))  10,000 units  MON-WED-FRI


 SUBQ


   11/28/18 21:00


 12/28/18 20:59  12/3/18 20:50


 


 


 Fentanyl Citrate


  (Sublimaze 100


 mcg/2 mL)  25 mcg  Q2H  PRN


 IV


 Pain Scale (6-10)  12/1/18 20:15


 12/8/18 20:14  12/2/18 17:33


 


 


 Heparin Sodium


  (Porcine)


  (Heparin Sod


 1000 units/ml


 10ml)  2,000 unit  ONCE


 IV


   12/5/18 14:15


 12/5/18 23:59   


 


 


 Linezolid


  (Zyvox)  600 mg  EVERY 12  HOURS


 ORAL


   11/28/18 21:00


 12/8/18 20:59  12/4/18 21:03


 


 


 Meropenem 500 mg/


 Sodium Chloride  55 ml @ 


 110 mls/hr  DAILY


 IVPB


   11/28/18 12:00


 12/8/18 11:59  12/4/18 10:08


 


 


 Metoprolol


 Tartrate


  (Lopressor)  12.5 mg  Q12HR


 ORAL


   12/3/18 21:00


 1/2/19 20:59  12/4/18 21:03


 


 


 Minoxidil


  (Loniten)  5 mg  Q12HR


 NG


   12/2/18 21:00


 1/1/19 20:59  12/4/18 21:03


 


 


 Pantoprazole


  (Protonix)  40 mg  Q12HR


 IVP


   11/26/18 21:00


 12/27/18 08:59  12/4/18 21:01


 


 


 Sodium Chloride  1,000 ml @ 


 500 mls/hr  Q2H PRN


 IVLG


 sbp<90 during hd  12/5/18 14:02


 12/5/18 23:59   


 

















Quentin Cardoso MD Dec 5, 2018 07:04

## 2018-12-05 NOTE — NUR
NURSE NOTES: Patient repositioned, oral care given. Patient is alert mouthing words, patient 
has bilateral wrist restraint, skin is intact, pulses are present. Bilateral arms and legs 
are elevated with pillows, head of bed is elevated to 30 degrees, bed is locked and in the 
lowest position, Bed alarm is on. No acute distress noted, no change in condition. Will 
continue to monitor patient and follow plan of care.

## 2018-12-05 NOTE — CONSULTATION
DATE OF CONSULTATION:  11/30/2018

VASCULAR SURGERY



CONSULTING PHYSICIAN:  Himanshu Spencer M.D.



REFERRING PHYSICIAN:  Sammy Garcia M.D.



REASON FOR CONSULTATION:  Lower extremity wounds, ulceration, and PAD.



HISTORY OF PRESENT COMPLAINT:  This is a 78-year-old female, who is

currently in the intensive care unit at [facility] with

respiratory failure, on a ventilator.  The patient has a history of

end-stage renal failure, on hemodialysis, was found to have right

bilateral leg wound and necrosis.  Vascular Surgery is consulted for

further evaluation.  The patient is currently awake on a ventilator and

follows simple commands, but all the history is obtained from the medical

records.



PAST MEDICAL HISTORY:  As above.  History of arrhythmia, end-stage renal

failure, on hemodialysis, hypertension, and PAD.



MEDICATIONS:  See attached MAR.



ALLERGIES:  No known drug allergies.



SOCIAL HISTORY:  Unobtainable.



FAMILY HISTORY:  Unobtainable.



SYSTEM REVIEW:  Unobtainable.  She is on the ventilator.



PHYSICAL EXAMINATION:

VITAL SIGNS:  The patient is afebrile at 97, heart rate 70, blood pressure

130/70, and respirations 16.  She has bilateral arm edema.  She has

palpable radial pulses.

LUNGS:  Rhonchi bilaterally.  On the ventilator.

HEART:  Regular rate and rhythm.

ABDOMEN:  Soft and nontender.

EXTREMITIES:  The patient has palpable femoral pulses and palpable

popliteal pulses.  Absent dorsalis pedis and posterior tibial pulses

bilaterally.  Feet are warm.  She has a right medial ankle wound, which is

deep with necrosis.  She has a left ankle heel decubitus necrosis with no

significant infection.



IMPRESSION:

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

necrosis.  Right ankle wound appears to be deeper.

2. Respiratory failure, on a ventilator.

3. End-stage renal failure, on hemodialysis.

4. History of arrhythmia.



PLAN:

1. Medical optimization progress.  Weaning trial.  If she fails weaning,

she will need a tracheostomy and a feeding gastrostomy tube.

2. Optimize nutrition.  Ankle wound care by Podiatry service.  She will

need I and D and wound VAC.  Antibiotics per Infectious Disease Service.

Offload the legs with decubitus precautions and anticoagulants for her

arrhythmia.  Once more medically optimized and cleared and out of

intensive care unit, we will schedule the patient for selective leg

angiography to assess for percutaneous revascularization to improve the

flow and wound healing.  The above was discussed at length with the

primary medical service and intensive care unit nurse.









  ______________________________________________

  Himanshu Spencer M.D.





DR:  TAMMY

D:  12/04/2018 17:08

T:  12/05/2018 01:54

JOB#:  [id number]

CC:  Himanshu Spencer M.D.; Fax#:  [phone]

SAMMY GARCIA M.D.  ; FAX#:  [phone]

## 2018-12-06 VITALS — SYSTOLIC BLOOD PRESSURE: 121 MMHG | DIASTOLIC BLOOD PRESSURE: 24 MMHG

## 2018-12-06 VITALS — DIASTOLIC BLOOD PRESSURE: 17 MMHG | SYSTOLIC BLOOD PRESSURE: 101 MMHG

## 2018-12-06 VITALS — DIASTOLIC BLOOD PRESSURE: 35 MMHG | SYSTOLIC BLOOD PRESSURE: 133 MMHG

## 2018-12-06 VITALS — SYSTOLIC BLOOD PRESSURE: 74 MMHG | DIASTOLIC BLOOD PRESSURE: 55 MMHG

## 2018-12-06 VITALS — DIASTOLIC BLOOD PRESSURE: 25 MMHG | SYSTOLIC BLOOD PRESSURE: 118 MMHG

## 2018-12-06 VITALS — SYSTOLIC BLOOD PRESSURE: 90 MMHG | DIASTOLIC BLOOD PRESSURE: 60 MMHG

## 2018-12-06 VITALS — SYSTOLIC BLOOD PRESSURE: 96 MMHG | DIASTOLIC BLOOD PRESSURE: 23 MMHG

## 2018-12-06 VITALS — DIASTOLIC BLOOD PRESSURE: 33 MMHG | SYSTOLIC BLOOD PRESSURE: 130 MMHG

## 2018-12-06 VITALS — DIASTOLIC BLOOD PRESSURE: 16 MMHG | SYSTOLIC BLOOD PRESSURE: 106 MMHG

## 2018-12-06 VITALS — SYSTOLIC BLOOD PRESSURE: 123 MMHG | DIASTOLIC BLOOD PRESSURE: 11 MMHG

## 2018-12-06 VITALS — SYSTOLIC BLOOD PRESSURE: 136 MMHG | DIASTOLIC BLOOD PRESSURE: 87 MMHG

## 2018-12-06 VITALS — DIASTOLIC BLOOD PRESSURE: 66 MMHG | SYSTOLIC BLOOD PRESSURE: 133 MMHG

## 2018-12-06 VITALS — DIASTOLIC BLOOD PRESSURE: 23 MMHG | SYSTOLIC BLOOD PRESSURE: 92 MMHG

## 2018-12-06 VITALS — DIASTOLIC BLOOD PRESSURE: 90 MMHG | SYSTOLIC BLOOD PRESSURE: 109 MMHG

## 2018-12-06 VITALS — SYSTOLIC BLOOD PRESSURE: 81 MMHG | DIASTOLIC BLOOD PRESSURE: 63 MMHG

## 2018-12-06 VITALS — SYSTOLIC BLOOD PRESSURE: 111 MMHG | DIASTOLIC BLOOD PRESSURE: 35 MMHG

## 2018-12-06 VITALS — SYSTOLIC BLOOD PRESSURE: 101 MMHG | DIASTOLIC BLOOD PRESSURE: 16 MMHG

## 2018-12-06 VITALS — SYSTOLIC BLOOD PRESSURE: 137 MMHG | DIASTOLIC BLOOD PRESSURE: 33 MMHG

## 2018-12-06 VITALS — DIASTOLIC BLOOD PRESSURE: 20 MMHG | SYSTOLIC BLOOD PRESSURE: 128 MMHG

## 2018-12-06 VITALS — DIASTOLIC BLOOD PRESSURE: 93 MMHG | SYSTOLIC BLOOD PRESSURE: 108 MMHG

## 2018-12-06 VITALS — DIASTOLIC BLOOD PRESSURE: 98 MMHG | SYSTOLIC BLOOD PRESSURE: 118 MMHG

## 2018-12-06 VITALS — SYSTOLIC BLOOD PRESSURE: 111 MMHG | DIASTOLIC BLOOD PRESSURE: 22 MMHG

## 2018-12-06 VITALS — SYSTOLIC BLOOD PRESSURE: 104 MMHG | DIASTOLIC BLOOD PRESSURE: 80 MMHG

## 2018-12-06 VITALS — SYSTOLIC BLOOD PRESSURE: 144 MMHG | DIASTOLIC BLOOD PRESSURE: 11 MMHG

## 2018-12-06 PROCEDURE — 0JBN0ZZ EXCISION OF RIGHT LOWER LEG SUBCUTANEOUS TISSUE AND FASCIA, OPEN APPROACH: ICD-10-PCS

## 2018-12-06 RX ADMIN — METOPROLOL TARTRATE SCH MG: 25 TABLET, FILM COATED ORAL at 09:09

## 2018-12-06 RX ADMIN — AMIODARONE HYDROCHLORIDE SCH MG: 200 TABLET ORAL at 14:08

## 2018-12-06 RX ADMIN — METOPROLOL TARTRATE SCH MG: 25 TABLET, FILM COATED ORAL at 20:54

## 2018-12-06 RX ADMIN — PANTOPRAZOLE SODIUM SCH MG: 40 INJECTION, POWDER, FOR SOLUTION INTRAVENOUS at 09:07

## 2018-12-06 RX ADMIN — MINOXIDIL SCH MG: 2.5 TABLET ORAL at 20:50

## 2018-12-06 RX ADMIN — MINOXIDIL SCH MG: 2.5 TABLET ORAL at 09:08

## 2018-12-06 RX ADMIN — AMIODARONE HYDROCHLORIDE SCH MG: 200 TABLET ORAL at 22:41

## 2018-12-06 RX ADMIN — PANTOPRAZOLE SODIUM SCH MG: 40 INJECTION, POWDER, FOR SOLUTION INTRAVENOUS at 20:48

## 2018-12-06 RX ADMIN — MEROPENEM SCH MLS/HR: 500 INJECTION INTRAVENOUS at 09:07

## 2018-12-06 RX ADMIN — CHLORHEXIDINE GLUCONATE SCH APPLIC: 213 SOLUTION TOPICAL at 20:07

## 2018-12-06 NOTE — NUR
NURSE NOTES:

Received from CAROLYN Marks. Patient awake, opens eyes spontaneously. Able to  Communicate by 
writing. Orally intubated with ET 7.5, 23 lip line. AC 12 Vt 500 PEEP 5  FiO2 30%. ASHISH 
midline. On bilateral soft wrist restraints present. With AV shunt at left arm. Contact per 
cautions,  On SPR mattress. Bed locked and in low position. Head of bed elevated. Call light 
within reach. No signs of distress noted. Will continue to monitor.

## 2018-12-06 NOTE — NUR
RESPIRATORY NOTE:placed pt back on previous vent settings due to pt complaining she can't 
breath and low bp. RN notified of changes. Will cont. to monitor pt.

## 2018-12-06 NOTE — PULMONOLOGY PROGRESS NOTE
Assessment/Plan


Assessment/Plan


IMPRESSION:


1. Hypotension.


2. Sepsis.


3. Atrial fibrillation.


4. Bradycardia.


5. Diabetes mellitus.


6. End-stage renal disease on dialysis.


7. Left lung collapse. Resolved


8. Respiratory failure.


9. Dysphagia.


10. Dementia.


11. Anemia


12. Pneumonia





DISCUSSION:


1. Continue medications.


2. CXR improved


3. status post bronchoscopy


4. Central access


5. Use broad-spectrum antibiotics.  


6. continue weaning attempts





Continue weaning; discussed with RN


CXR improved











  ______________________________________________


  Quentin Cardoso M.D.





Subjective


Interval Events:  None new; unable to wean yesterday


Constitutional:  Reports: no symptoms


HEENT:  Repors: no symptoms


Respiratory:  Reports: no symptoms


Cardiovascular:  Reports: no symptoms


Gastrointestinal/Abdominal:  Reports: no symptoms


Genitourinary:  Reports: no symptoms


Allergies:  


Coded Allergies:  


     No Known Allergies (Unverified , 11/26/18)





Objective





Last 24 Hour Vital Signs








  Date Time  Temp Pulse Resp B/P (MAP) Pulse Ox O2 Delivery O2 Flow Rate FiO2


 


12/6/18 09:09  70  108/93    


 


12/6/18 09:08    108/93    


 


12/6/18 07:06  72 13     30


 


12/6/18 07:00  70 14 104/80 (88) 100   


 


12/6/18 06:00  70 14 111/22 (51) 100   


 


12/6/18 05:30  70 12     30


 


12/6/18 05:00  70 14 133/35 (67) 100   


 


12/6/18 04:00  69      


 


12/6/18 04:00      Mechanical Ventilator  





      Mechanical Ventilator  


 


12/6/18 04:00 99.0 71 14 144/11 (55) 100   


 


12/6/18 04:00        30


 


12/6/18 03:30  71 12     30


 


12/6/18 03:00  70 14 133/66 (88) 100   


 


12/6/18 02:00  71 15 123/11 (48) 100   


 


12/6/18 01:30  70 12     30


 


12/6/18 01:00 99.8 68 15 121/24 (56) 100   


 


12/6/18 00:00  68 15 136/87 (103) 100   


 


12/6/18 00:00  68      


 


12/6/18 00:00      Mechanical Ventilator  





      Mechanical Ventilator  


 


12/5/18 23:38  67 13     30


 


12/5/18 23:00  68 14 130/10 (50) 100   


 


12/5/18 22:00  73 17 120/12 (48) 99   


 


12/5/18 21:43  76  128/13    


 


12/5/18 21:42    128/13    


 


12/5/18 21:30  82 14     30


 


12/5/18 21:00  79 17 119/13 (48) 99   


 


12/5/18 20:00      Mechanical Ventilator  





      Mechanical Ventilator  


 


12/5/18 20:00 99.0 79 17 142/20 (60) 99   


 


12/5/18 20:00  76      


 


12/5/18 20:00        30


 


12/5/18 19:30  84 15     30


 


12/5/18 19:00  79 17 161/20 (67) 99   


 


12/5/18 18:00 98.2 68 15 /27 99   


 


12/5/18 17:00  68 14 92/15 (40) 100   


 


12/5/18 16:47  68 13     30


 


12/5/18 16:00      Mechanical Ventilator  





      Mechanical Ventilator  


 


12/5/18 16:00  67      


 


12/5/18 16:00        30


 


12/5/18 16:00  67 14 70/54 (59) 99   


 


12/5/18 15:00  66 14 91/77 (82) 100   


 


12/5/18 14:54  64 12     30


 


12/5/18 14:00  64 13 131/92 (105) 100   


 


12/5/18 13:15  61 12     30


 


12/5/18 13:00  64 14 96/62 (73) 100   


 


12/5/18 12:00        30


 


12/5/18 12:00  64 14 118/37 (64) 100   


 


12/5/18 12:00      Mechanical Ventilator  





      Mechanical Ventilator  


 


12/5/18 12:00  58      


 


12/5/18 11:00  67 19 133/59 (83) 100   


 


12/5/18 10:49  58 12     30


 


12/5/18 10:00  89 19 115/56 (75) 100   


 


12/5/18 10:00  64  108/17    


 


12/5/18 09:59    108/17    

















Intake and Output  


 


 12/5/18 12/6/18





 18:59 06:59


 


Intake Total 1360 ml 450 ml


 


Balance 1360 ml 450 ml


 


  


 


Free Water 60 ml 90 ml


 


Tube Feeding 300 ml 360 ml


 


Hemodialysis 1000 ml 


 


# Bowel Movements 1 








General Appearance:  no acute distress


HEENT:  normocephalic


Respiratory/Chest:  chest wall non-tender


Cardiovascular:  normal peripheral pulses, normal rate


Abdomen:  normal bowel sounds





Microbiology








 Date/Time


Source Procedure


Growth Status


 


 


 12/4/18 17:30


Sputum Gram Stain - Final Complete


 


 12/4/18 17:30 Sputum Culture - Final


Candida Albicans Complete








Laboratory Tests


12/5/18 19:55: Stool Occult Blood [Pending]





Current Medications








 Medications


  (Trade)  Dose


 Ordered  Sig/Tony


 Route


 PRN Reason  Start Time


 Stop Time Status Last Admin


Dose Admin


 


 Acetaminophen


  (Tylenol)  650 mg  Q6H  PRN


 ORAL


 Mild Pain/Temp > 100.5  12/4/18 14:15


 1/3/19 14:14   


 


 


 Amiodarone HCl


  (Cordarone)  400 mg  EVERY 12  HOURS


 ORAL


   12/5/18 21:00


 1/4/19 20:59  12/5/18 20:39


 


 


 Chlorhexidine


 Gluconate


  (Juana-Hex 2%)  1 applic  DAILY@2000


 TOPIC


   11/27/18 20:00


 12/27/18 19:59  12/5/18 20:38


 


 


 Epoetin Rupesh


  (Procrit (for


 ESRD on dialysis))  10,000 units  MON-WED-FRI


 SUBQ


   11/28/18 21:00


 12/28/18 20:59  12/5/18 20:39


 


 


 Heparin Sodium


  (Porcine)


  (Heparin Sod


 1000 units/ml


 10ml)  2,000 unit  ONCE  ONCE


 IV


   12/7/18 08:00


 12/7/18 08:01   


 


 


 Linezolid


  (Zyvox)  600 mg  EVERY 12  HOURS


 ORAL


   11/28/18 21:00


 12/8/18 20:59  12/6/18 09:10


 


 


 Meropenem 500 mg/


 Sodium Chloride  55 ml @ 


 110 mls/hr  DAILY


 IVPB


   11/28/18 12:00


 12/8/18 11:59  12/6/18 09:07


 


 


 Metoprolol


 Tartrate


  (Lopressor)  12.5 mg  Q12HR


 ORAL


   12/3/18 21:00


 1/2/19 20:59  12/6/18 09:09


 


 


 Minoxidil


  (Loniten)  5 mg  Q12HR


 NG


   12/2/18 21:00


 1/1/19 20:59  12/6/18 09:08


 


 


 Pantoprazole


  (Protonix)  40 mg  Q12HR


 IVP


   11/26/18 21:00


 12/27/18 08:59  12/6/18 09:07


 


 


 Sodium Chloride  1,000 ml @ 


 500 mls/hr  Q2H PRN


 IVLG


 sbp<90 during hd  12/7/18 07:51


 1/6/19 07:50   


 

















Quentin Cardoso MD Dec 6, 2018 09:35

## 2018-12-06 NOTE — NUR
NURSE NOTES: Patient stopped weaning, patient is complaining that she cant breath and her 
blood pressure dropped. patient put back on original vent settings Ac 12, Tv 500, fio2 30%, 
peep 5, 02 saturation 100%. Bilateral arms are elevated and bilateral heels are elevated 
both with pillows. Head of bed is at 30 degrees, be is locked and in the lowest position, 3 
side rails are up. Will continue to monitor patient and follow plan of care.

## 2018-12-06 NOTE — NUR
NURSE NOTES: Patient had a bowel movement, patient cleaned. patient does not want blood 
pressure cuff to be on, patient is constantly refusing bp cuff and restraints. patient has 
been educated on the importance of both Bp, and restraints. No change of condition, No acute 
distress noted. Bilateral arms elevated with pillows, bilateral legs with pillows. Head of 
bed is at 30 degrees, bed alarm is on, bed is locked and in its lowest position. Will 
continue to monitor patient and follow plan of care.

## 2018-12-06 NOTE — NUR
NURSE NOTES: Patient repositioned, orally suctioned, No change in condition. No acute 
distress noted. Nepro at 30ml, no residual noted. Head of be is at 30 degrees, bed is locked 
and in the lowest position, 3 side rails are up. Bilateral arms are elevated, bilateral legs 
are elevated both with pillows. Will continue to monitor patient and follow plan of care.

## 2018-12-06 NOTE — NUR
NURSE NOTES: Patient repositioned, oral care given, No acute distress noted. Head of bed is 
at 30 degrees, bed is locked, bed alarm is on, 3 side rails are up. Will continue to monitor 
patient and follow plan of care.

## 2018-12-06 NOTE — NUR
NURSE NOTES: Called Dr. Cartagena left message with  (Chau) in regards to 
patient increased dose of amiodarone, Patient heart rate is in the 60's. Awaiting call back.

## 2018-12-06 NOTE — NUR
CASE MANAGEMENT: REVIEW





SI: A-FIB . SEPSIS . ESRD ON HD MWF  . ORALLY INTUBATED 

T 98.2 HR 70 RR 14 BP 92/23 % MECH VENT FIO2 30









IS: MEROPENEM IV QD

ZYVOX PO QD

NGT FEEDING NEPRO @10ML/HR 





***ICU STATUS***

DCP: PATIENT IS FROM [facility]

## 2018-12-06 NOTE — NUR
NURSE NOTES: Called [facility] at [phone] spoke to HARVINDER to inform of order for tomorrow 
scheduled hemodialysis. Will continue to monitor patient and follow plan of care.

## 2018-12-06 NOTE — NUR
RD ASSESSMENT & RECOMMENDATIONS

SEE CARE ACTIVITY FOR COMPLETE ASSESSMENT



DAILY ESTIMATED NEEDS:

Needs based on Critical care+ Advanced Wounds + ESRD/ 60kg 

22-30  kcals/kg 

[id number]  total kcals

1.5-2.0  g protein/kg

90-120g  g total protein 

Fluid per MD, on HD 

 

NUTRITION DIAGNOSIS:

* Swallowing difficulty R/T respiratory status as evidenced by pt orally

intubated, now on NGT feeds. (UPDATED)

* Increased kcal/prot needs R/T wound healing as evidenced by pt admitted

w/ multiple wounds including stage 4 sacral wound, refer to WC eval.

* Altered nutrition related lab values R/T ESRD dx, clinical condition as

evidenced by elev creat (4.6-> 3.4), low Na (130-> wnl), elev BNP >39814,

low BP, pressor held at this time.





CURRENT TF: Nepro @30ml/hr 



PO DIET RECOMMENDATIONS:

SLP evaluation post extubation, prior to PO diet -> CCHO MED, RENAL 



ENTERAL NUTRITION RECOMMENDATIONS:

Nepro @ 35ml/hr x 24 hrs + Prosource 1pkt TID  

to provide 840ml, 1512kcal, 68g + 33g prot, 611ml free water 



** WITH HEMODYNAMIC STABILITY **

1. Initiate Nepro @ 10ml/hr x 8 hrs, advance 10mlq 6-8 hrs 

    as tolerated to goal rate

2. Add Prosource 1 pkt TID to meet increased prot needs



(WITHOUT HEMODYNAMIC STABILITY, REC TROPHIC FEEDS OF NEPRO @ 10ML/HR X 24 HRS)







ADDITIONAL RECOMMENDATIONS:

* Calibrated bedscale wt for accurate CBW 

   (pt w/ added air release mattress + pump, est 20-25#) 

* Monitor HD stability- NE held at this time  

* Wound healing-  Nephrovite x 1, Scotty 1pkt BID 

* Monitor Renal fxn and lytes 

* SSI w/ TF- h/o DM 

* SLP eval post extubation as appropriate

## 2018-12-06 NOTE — NUR
NURSE NOTES: Received patient from [facility]. Received patient alert, intubated with size 7.5, 
23cm at the lip, vent setting AC 12, Tv 500, fio2 30%, peep 5, 02 saturation 100%. No acute 
distress noted. Patient is connected to the cardiac monitor, patent is sinus rhythm at this 
time on the monitor. Patient has a right upper arm picc, asymptomatic, patent with TKO. 
Patient has bilateral wrist restraints, skin is intact, bilateral pulse are resent.Patient 
has left extremity swelling.Patient has a NGT in the left nares, placement verified with 
auscultation bubbling sounds heard, no residual noted, NGT running NEPRO at 30ml. Patient 
anuric. Contact precaution noted. Head of bed is at 30 degrees, bed is locked and in the 
lowest position, bed alarm is on, 3 side rails are up. Will continue to monitor patient and 
follow plan of care.

## 2018-12-06 NOTE — NUR
NURSE NOTES: Patient began weaning trial PS 8, fio2 30%. Patient tolerating so far. Will 
continue to monitor patient and follow plan of care.

## 2018-12-06 NOTE — CARDIOLOGY PROGRESS NOTE
Assessment/Plan


Assessment/Plan


1. Hypotension, possibly sepsis versus volume.


2. Paroxysmal episodes of atrial fibrillation history.


3. Bradycardia secondary to medications, amiodarone possibly.


4. Diabetes mellitus.


5. End-stage renal disease, on hemodialysis.


6. Lung collapse.


7. Respiratory failure, status post intubation.


8. Dysphagia.


9. History of dementia.


10. Pulm htn 


11.  Pleural effusion 








in and out afib amido now 200 tid  


tele reviewed


stool ob neg x2 


now sinus increase amiod for a few days





Subjective


ROS Limited/Unobtainable:  Yes


Subjective


on the vent not verbal but communicates with head motion





Objective





Last 24 Hour Vital Signs








  Date Time  Temp Pulse Resp B/P (MAP) Pulse Ox O2 Delivery O2 Flow Rate FiO2


 


12/6/18 20:00        30


 


12/6/18 20:00      Mechanical Ventilator  


 


12/6/18 19:29  75 12     30


 


12/6/18 18:00 97.7 73 15 106/16 (46) 100   


 


12/6/18 17:00  75 15 109/90 (96) 100   


 


12/6/18 16:30  74 12     30


 


12/6/18 16:00      Mechanical Ventilator  





      Mechanical Ventilator  


 


12/6/18 16:00  65 12 101/17 (45) 100   


 


12/6/18 16:00  73      


 


12/6/18 16:00        30


 


12/6/18 15:00  72 15 118/98 (105) 100   


 


12/6/18 14:30  75 14     30


 


12/6/18 14:00  71 15 74/55 (61) 100   


 


12/6/18 13:30  71 12     30


 


12/6/18 13:00  73 15 118/25 (56) 100   


 


12/6/18 12:00      Mechanical Ventilator  





      Mechanical Ventilator  


 


12/6/18 12:00  74 20 96/23 (47) 100   


 


12/6/18 12:00        30


 


12/6/18 12:00  73      


 


12/6/18 11:10  79 18     30


 


12/6/18 11:10     100   


 


12/6/18 11:00  82 20 81/63 (69) 100   


 


12/6/18 10:00  81 21 90/60 (70) 100   


 


12/6/18 09:40  78 16     30


 


12/6/18 09:09  70  108/93    


 


12/6/18 09:08    108/93    


 


12/6/18 09:00  70 15 108/93 (98) 100   


 


12/6/18 08:00      Mechanical Ventilator  





      Mechanical Ventilator  


 


12/6/18 08:00 98.2 70 14 92/23 (46) 100   


 


12/6/18 08:00        30


 


12/6/18 08:00  71      


 


12/6/18 07:06  72 13     30


 


12/6/18 07:00  70 14 104/80 (88) 100   


 


12/6/18 06:00  70 14 111/22 (51) 100   


 


12/6/18 05:30  70 12     30


 


12/6/18 05:00  70 14 133/35 (67) 100   


 


12/6/18 04:00  69      


 


12/6/18 04:00      Mechanical Ventilator  





      Mechanical Ventilator  


 


12/6/18 04:00 99.0 71 14 144/11 (55) 100   


 


12/6/18 04:00        30


 


12/6/18 03:30  71 12     30


 


12/6/18 03:00  70 14 133/66 (88) 100   


 


12/6/18 02:00  71 15 123/11 (48) 100   


 


12/6/18 01:30  70 12     30


 


12/6/18 01:00 99.8 68 15 121/24 (56) 100   


 


12/6/18 00:00  68 15 136/87 (103) 100   


 


12/6/18 00:00  68      


 


12/6/18 00:00      Mechanical Ventilator  





      Mechanical Ventilator  


 


12/5/18 23:38  67 13     30


 


12/5/18 23:00  68 14 130/10 (50) 100   


 


12/5/18 22:00  73 17 120/12 (48) 99   


 


12/5/18 21:43  76  128/13    


 


12/5/18 21:42    128/13    


 


12/5/18 21:30  82 14     30


 


12/5/18 21:00  79 17 119/13 (48) 99   

















Intake and Output  


 


 12/5/18 12/6/18





 19:00 07:00


 


Intake Total 1470 ml 480 ml


 


Balance 1470 ml 480 ml


 


  


 


Free Water 60 ml 120 ml


 


IV Total 110 ml 


 


Tube Feeding 300 ml 360 ml


 


Hemodialysis 1000 ml 


 


# Bowel Movements 1 











Microbiology








 Date/Time


Source Procedure


Growth Status


 


 


 12/4/18 17:30


Sputum Gram Stain - Final Complete


 


 12/4/18 17:30 Sputum Culture - Final


Candida Albicans Complete

















Mariusz Wade MD Dec 6, 2018 20:42

## 2018-12-06 NOTE — NEPHROLOGY PROGRESS NOTE
Assessment/Plan


Assessment


Seee below


Plan


MOF, improving slowly.


Sepsis due to infected diabetic ulcers. On IV Abx. Followed by ID, Vasc. Sx, 

Podiatry.


Septic Shock pressors PRN


ESRD HD q MWF 


Anemia of CKD , SAMANTHA high dose. Transfuse PRN.


Resp Failure - Vent per Pul. Trying to wean. KEVIN Cardoso.


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 with LAE. LVEF 65% . Anticoagulate when 

stable. KEVIN Wade. In and out A. Fib.











If unweanable will need Trach + PEG ------> Subacute





Subjective


Subjective


Awake + alert. No c/o





Objective


Objective





Last 24 Hour Vital Signs








  Date Time  Temp Pulse Resp B/P (MAP) Pulse Ox O2 Delivery O2 Flow Rate FiO2


 


12/6/18 07:00  70 14 104/80 (88) 100   


 


12/6/18 06:00  70 14 111/22 (51) 100   


 


12/6/18 05:30  70 12     30


 


12/6/18 05:00  70 14 133/35 (67) 100   


 


12/6/18 04:00  69      


 


12/6/18 04:00      Mechanical Ventilator  





      Mechanical Ventilator  


 


12/6/18 04:00 99.0 71 14 144/11 (55) 100   


 


12/6/18 04:00        30


 


12/6/18 03:30  71 12     30


 


12/6/18 03:00  70 14 133/66 (88) 100   


 


12/6/18 02:00  71 15 123/11 (48) 100   


 


12/6/18 01:30  70 12     30


 


12/6/18 01:00 99.8 68 15 121/24 (56) 100   


 


12/6/18 00:00  68 15 136/87 (103) 100   


 


12/6/18 00:00  68      


 


12/6/18 00:00      Mechanical Ventilator  





      Mechanical Ventilator  


 


12/5/18 23:38  67 13     30


 


12/5/18 23:00  68 14 130/10 (50) 100   


 


12/5/18 22:00  73 17 120/12 (48) 99   


 


12/5/18 21:43  76  128/13    


 


12/5/18 21:42    128/13    


 


12/5/18 21:30  82 14     30


 


12/5/18 21:00  79 17 119/13 (48) 99   


 


12/5/18 20:00      Mechanical Ventilator  





      Mechanical Ventilator  


 


12/5/18 20:00 99.0 79 17 142/20 (60) 99   


 


12/5/18 20:00  76      


 


12/5/18 20:00        30


 


12/5/18 19:30  84 15     30


 


12/5/18 19:00  79 17 161/20 (67) 99   


 


12/5/18 18:00 98.2 68 15 /27 99   


 


12/5/18 17:00  68 14 92/15 (40) 100   


 


12/5/18 16:47  68 13     30


 


12/5/18 16:00      Mechanical Ventilator  





      Mechanical Ventilator  


 


12/5/18 16:00  67      


 


12/5/18 16:00        30


 


12/5/18 16:00  67 14 70/54 (59) 99   


 


12/5/18 15:00  66 14 91/77 (82) 100   


 


12/5/18 14:54  64 12     30


 


12/5/18 14:00  64 13 131/92 (105) 100   


 


12/5/18 13:15  61 12     30


 


12/5/18 13:00  64 14 96/62 (73) 100   


 


12/5/18 12:00        30


 


12/5/18 12:00  64 14 118/37 (64) 100   


 


12/5/18 12:00      Mechanical Ventilator  





      Mechanical Ventilator  


 


12/5/18 12:00  58      


 


12/5/18 11:00  67 19 133/59 (83) 100   


 


12/5/18 10:49  58 12     30


 


12/5/18 10:00  89 19 115/56 (75) 100   


 


12/5/18 10:00  64  108/17    


 


12/5/18 09:59    108/17    


 


12/5/18 09:00  65 13 127/19 (55) 100   


 


12/5/18 08:37     100   


 


12/5/18 08:36  69 17     30


 


12/5/18 08:32  62 10     30


 


12/5/18 08:00  61      


 


12/5/18 08:00 98.2 60 12 124/21 (55) 100   


 


12/5/18 08:00      Mechanical Ventilator  





      Mechanical Ventilator  





      Mechanical Ventilator  


 


12/5/18 08:00        30

















Intake and Output  


 


 12/5/18 12/6/18





 18:59 06:59


 


Intake Total 1360 ml 450 ml


 


Balance 1360 ml 450 ml


 


  


 


Free Water 60 ml 90 ml


 


Tube Feeding 300 ml 360 ml


 


Hemodialysis 1000 ml 


 


# Bowel Movements 1 








Laboratory Tests


12/5/18 19:55: Stool Occult Blood [Pending]


Height (Feet):  5


Height (Inches):  2.00


Weight (Pounds):  125


Objective


Intubated, On vent.


BP measured more accurately with a smaller cuff placed on rt. FA.


Cv IRR/IRR


Lungs B ronchi.


Abd SNT. BS +


E Rt. foot diabetic ulcers with pus











Gavino Daigle MD Dec 6, 2018 07:46

## 2018-12-06 NOTE — NUR
NURSE NOTES: Called Dr Griggs [phone] spoke to Temi in regards to holding the 
amiodarone. Awaiting call back.

## 2018-12-06 NOTE — NUR
NURSE NOTES: Patient repositioned, patient suctioned with thick clear secretions, patient 
released from restraints. Patient is clean and comfortable, denies pain at this time, no 
acute distress noted. Head of bed is at 30 degrees, bed is locked and in the lowest 
position, bed alarm is on. Will continue to monitor patient and follow plan of care.

## 2018-12-06 NOTE — GENERAL SURGERY PROGRESS NOTE
General Surgery-Progress Note


Subjective


Procedure Performed


left subclavian central venous catheter insertion


Additional Comments


awake and following commands.  asking for ET tube removal





Objective





Last 24 Hour Vital Signs








  Date Time  Temp Pulse Resp B/P (MAP) Pulse Ox O2 Delivery O2 Flow Rate FiO2


 


12/6/18 13:30  71 12     30


 


12/6/18 13:00  73 15 118/25 (56) 100   


 


12/6/18 12:00      Mechanical Ventilator  





      Mechanical Ventilator  


 


12/6/18 12:00  74 20 96/23 (47) 100   


 


12/6/18 12:00        30


 


12/6/18 12:00  73      


 


12/6/18 11:10  79 18     30


 


12/6/18 11:10     100   


 


12/6/18 11:00  82 20 81/63 (69) 100   


 


12/6/18 10:00  81 21 90/60 (70) 100   


 


12/6/18 09:40  78 16     30


 


12/6/18 09:09  70  108/93    


 


12/6/18 09:08    108/93    


 


12/6/18 09:00  70 15 108/93 (98) 100   


 


12/6/18 08:00      Mechanical Ventilator  





      Mechanical Ventilator  


 


12/6/18 08:00 98.2 70 14 92/23 (46) 100   


 


12/6/18 08:00        30


 


12/6/18 08:00  71      


 


12/6/18 07:06  72 13     30


 


12/6/18 07:00  70 14 104/80 (88) 100   


 


12/6/18 06:00  70 14 111/22 (51) 100   


 


12/6/18 05:30  70 12     30


 


12/6/18 05:00  70 14 133/35 (67) 100   


 


12/6/18 04:00  69      


 


12/6/18 04:00      Mechanical Ventilator  





      Mechanical Ventilator  


 


12/6/18 04:00 99.0 71 14 144/11 (55) 100   


 


12/6/18 04:00        30


 


12/6/18 03:30  71 12     30


 


12/6/18 03:00  70 14 133/66 (88) 100   


 


12/6/18 02:00  71 15 123/11 (48) 100   


 


12/6/18 01:30  70 12     30


 


12/6/18 01:00 99.8 68 15 121/24 (56) 100   


 


12/6/18 00:00  68 15 136/87 (103) 100   


 


12/6/18 00:00  68      


 


12/6/18 00:00      Mechanical Ventilator  





      Mechanical Ventilator  


 


12/5/18 23:38  67 13     30


 


12/5/18 23:00  68 14 130/10 (50) 100   


 


12/5/18 22:00  73 17 120/12 (48) 99   


 


12/5/18 21:43  76  128/13    


 


12/5/18 21:42    128/13    


 


12/5/18 21:30  82 14     30


 


12/5/18 21:00  79 17 119/13 (48) 99   


 


12/5/18 20:00      Mechanical Ventilator  





      Mechanical Ventilator  


 


12/5/18 20:00 99.0 79 17 142/20 (60) 99   


 


12/5/18 20:00  76      


 


12/5/18 20:00        30


 


12/5/18 19:30  84 15     30


 


12/5/18 19:00  79 17 161/20 (67) 99   


 


12/5/18 18:00 98.2 68 15 /27 99   


 


12/5/18 17:00  68 14 92/15 (40) 100   


 


12/5/18 16:47  68 13     30


 


12/5/18 16:00      Mechanical Ventilator  





      Mechanical Ventilator  


 


12/5/18 16:00  67      


 


12/5/18 16:00        30


 


12/5/18 16:00  67 14 70/54 (59) 99   


 


12/5/18 15:00  66 14 91/77 (82) 100   


 


12/5/18 14:54  64 12     30








I&O











Intake and Output  


 


 12/5/18 12/6/18





 19:00 07:00


 


Intake Total 1470 ml 480 ml


 


Balance 1470 ml 480 ml


 


  


 


Free Water 60 ml 120 ml


 


IV Total 110 ml 


 


Tube Feeding 300 ml 360 ml


 


Hemodialysis 1000 ml 


 


# Bowel Movements 1 








Dressing:  other


Wound:  other


Drains:  other


Cardiovascular:  RSR


Respiratory:  decreased breath sounds


Abdomen:  soft, flat, non-tender, present bowel sounds


Extremities:  other





Laboratory Tests








Test


  12/5/18


19:55


 


Stool Occult Blood


  Negative


(NEGATIVE)











Plan


Problems:  


(1) Sepsis


Assessment & Plan:  acute decompensation 


hypotensive


bradycardic


respiratory decompensation requiring intubation and vent support - failed 

extubation 





leukocytosis resolved


cont current ICU care and management


may need trach if unable to wean off went with history of failed extubation 


thank you


will follow with recs 





(2) Hypotension


(3) Bradycardia


(4) Altered mental status


(5) Decubital ulcer


Assessment & Plan:  Patient with multiple pressure injuries where have been 

present since admission. 


Full thickness pressure injury to sacrum (L)(L)2.4cm x (W)4.4cm x (D)0.9cm, 

undermining 12-12 with tunneling 9-10 by 11.3cm. 


In close proximity to sacral wound on L buttocks purple ,indurated area with 

small area of slough measuring (L)0.6cm x (W)0.5cm noted. Arched surgical scar 

noted to L buttocks at site of tunneling. 


Resolving pressure injury noted to R ischium (L)3cm x (W)2cm. Wound bed 

epithelialized with maroon discoloration without induration periwound. 


Closed area with white pocket and purple margins noted to distal,paz R tibia 

(L)6cm x (W)2.7cm


Full thickness wound noted to distal/medial RLE (L)6.2cm x (W)3.8cm .Wound 

noted to have 95% mixed slough /necrosis ,marginal erythema. Wound malodorous.


Wound posterior R tibia (L)4.7cm x (W) 3cm ,100% necrotic with marginal 

erythema. Dry eschar with marginal erythema 


(L)0.5cm x (W)0.7cm noted to medial R heel.Non-blanchable erythema noted to 

medial L malleolus. 


Dry brown eschar noted to medial L 1st malleolus (L)1.4cm x (W)1cm. 


L heel and lateral aspect boggy with non-blanchable erythema. 





Tx.Plan:


Cleanse sacral wound with Saline.Loose pack with Hydrogel impregnated packing 

strip (Attention to tunneled area at 9-10).Cover with Optifoam drsg Daily and 

prn.


           


Reposition at least every 2hours or as tolerated.


           


Air Fluidized mattress.


           


Off-load heels with pillow.





Appreciate Podiatry and Vascular input





Plan for anigo late by Vascular 














Bob Lewis Dec 6, 2018 14:03

## 2018-12-06 NOTE — NUR
RESPIRATORY NOTE:Started Weaning pt on cpap psv 8 peep 5 fio2 30%. pt is stable and showing 
no s/s of distress. Rn notified of weaning. will cont. to monitor pt.

## 2018-12-06 NOTE — PODIATRIC PROGRESS NOTE
Assessment/Plan


Patient


Juliana Vogt is a 78 year old female who was admitted on Nov 26, 2018 at 10

:32 with


Problems:  


(1) ESRD (end stage renal disease)


(2) Decubital ulcer


(3) Pneumonia


(4) Pyelonephritis


(5) Altered mental status


Assessment/Plan


R posterior leg wound and medial ankle wound was excisionally debrided the 

wound utilizing sharp debridement. Patient tolerated the procedure. 


continue local wound care cleanse with NS and application Santyl.





Subjective


Allergies:  


Coded Allergies:  


     No Known Allergies (Unverified , 11/26/18)


Subjective


patient was seen at the bed side in NAD f/u b/l lower extremity wounds. Patient 

is afebrile WBC is wnl. 


Patient was consulted for vascular procedure but was differed for now due to 

patients current condition and her status in ICU. Local wound care is 

recommended.





Objective


Exam





Last 24 Hour Vital Signs








  Date Time  Temp Pulse Resp B/P (MAP) Pulse Ox O2 Delivery O2 Flow Rate FiO2


 


12/6/18 13:30  71 12     30


 


12/6/18 13:00  73 15 118/25 (56) 100   


 


12/6/18 12:00      Mechanical Ventilator  





      Mechanical Ventilator  


 


12/6/18 12:00  74 20 96/23 (47) 100   


 


12/6/18 12:00        30


 


12/6/18 12:00  73      


 


12/6/18 11:10  79 18     30


 


12/6/18 11:10     100   


 


12/6/18 11:00  82 20 81/63 (69) 100   


 


12/6/18 10:00  81 21 90/60 (70) 100   


 


12/6/18 09:40  78 16     30


 


12/6/18 09:09  70  108/93    


 


12/6/18 09:08    108/93    


 


12/6/18 09:00  70 15 108/93 (98) 100   


 


12/6/18 08:00      Mechanical Ventilator  





      Mechanical Ventilator  


 


12/6/18 08:00 98.2 70 14 92/23 (46) 100   


 


12/6/18 08:00        30


 


12/6/18 08:00  71      


 


12/6/18 07:06  72 13     30


 


12/6/18 07:00  70 14 104/80 (88) 100   


 


12/6/18 06:00  70 14 111/22 (51) 100   


 


12/6/18 05:30  70 12     30


 


12/6/18 05:00  70 14 133/35 (67) 100   


 


12/6/18 04:00  69      


 


12/6/18 04:00      Mechanical Ventilator  





      Mechanical Ventilator  


 


12/6/18 04:00 99.0 71 14 144/11 (55) 100   


 


12/6/18 04:00        30


 


12/6/18 03:30  71 12     30


 


12/6/18 03:00  70 14 133/66 (88) 100   


 


12/6/18 02:00  71 15 123/11 (48) 100   


 


12/6/18 01:30  70 12     30


 


12/6/18 01:00 99.8 68 15 121/24 (56) 100   


 


12/6/18 00:00  68 15 136/87 (103) 100   


 


12/6/18 00:00  68      


 


12/6/18 00:00      Mechanical Ventilator  





      Mechanical Ventilator  


 


12/5/18 23:38  67 13     30


 


12/5/18 23:00  68 14 130/10 (50) 100   


 


12/5/18 22:00  73 17 120/12 (48) 99   


 


12/5/18 21:43  76  128/13    


 


12/5/18 21:42    128/13    


 


12/5/18 21:30  82 14     30


 


12/5/18 21:00  79 17 119/13 (48) 99   


 


12/5/18 20:00      Mechanical Ventilator  





      Mechanical Ventilator  


 


12/5/18 20:00 99.0 79 17 142/20 (60) 99   


 


12/5/18 20:00  76      


 


12/5/18 20:00        30


 


12/5/18 19:30  84 15     30


 


12/5/18 19:00  79 17 161/20 (67) 99   


 


12/5/18 18:00 98.2 68 15 /27 99   


 


12/5/18 17:00  68 14 92/15 (40) 100   


 


12/5/18 16:47  68 13     30


 


12/5/18 16:00      Mechanical Ventilator  





      Mechanical Ventilator  


 


12/5/18 16:00  67      


 


12/5/18 16:00        30


 


12/5/18 16:00  67 14 70/54 (59) 99   











Laboratory Tests








Test


  12/5/18


19:55


 


Stool Occult Blood


  Negative


(NEGATIVE)











Microbiology








 Date/Time


Source Procedure


Growth Status


 


 


 11/26/18 08:45


Blood Blood Culture - Final


NO GROWTH AFTER 5 DAYS Complete





 11/28/18 11:40


Other(Specify in comment) Gram Stain - Final Complete


 


 11/28/18 11:40 Wound Culture - Final


Pseudomonas Aeruginosa


Candida Albicans Complete





 12/4/18 17:30


Sputum Gram Stain - Final Complete


 


 12/4/18 17:30 Sputum Culture - Final


Candida Albicans Complete


 


 11/26/18 08:30


Urine,Clean Catch Urine Culture - Final


Escherichia Coli - Esbl


Enterococcus Faecium - Vre Complete





 11/28/18 11:40


Sacral Wound Gram Stain - Final Complete


 


 11/28/18 11:40 Wound Culture - Final


Staphylococcus Aureus - Mrsa


Candida Albicans Complete











Dermatological


Dermatological Narrative


Ulceration is noted to anterior R shin. fibrous tissue is noted. no drainage or 

discharge, no pus or odor stable. 


Ulceration noted posterior aspect of the R leg. Black fibrotic tissue noted. 50/

50 pink not beefy base. no drainage or discharge. extended to subq


Ulceration R ankle probs to bone think fibrotic tissue with periwound erythema. 

non granulating pink rim is seen consisted with arterial ulceration. no 

drainage or discharge is seen no sign of infection is noted. no cellulitis. 


Ulceration noted to the Medial aspect of the L heel dry scab, no sign of 

drainage or discharge noted.











Simon Denson DPM Dec 6, 2018 15:38

## 2018-12-06 NOTE — NUR
NURSE NOTES:

Oral care , turned and repositioned at this time. No signs of distress noted. Will continue 
to monitor.

## 2018-12-06 NOTE — NUR
NURSE NOTES: Dr. Griggs called back new orders noted and carried out. Will continue to 
monitor patient and follow plan of care.

## 2018-12-07 VITALS — SYSTOLIC BLOOD PRESSURE: 99 MMHG | DIASTOLIC BLOOD PRESSURE: 30 MMHG

## 2018-12-07 VITALS — DIASTOLIC BLOOD PRESSURE: 33 MMHG | SYSTOLIC BLOOD PRESSURE: 105 MMHG

## 2018-12-07 VITALS — DIASTOLIC BLOOD PRESSURE: 64 MMHG | SYSTOLIC BLOOD PRESSURE: 125 MMHG

## 2018-12-07 VITALS — DIASTOLIC BLOOD PRESSURE: 43 MMHG | SYSTOLIC BLOOD PRESSURE: 142 MMHG

## 2018-12-07 VITALS — DIASTOLIC BLOOD PRESSURE: 62 MMHG | SYSTOLIC BLOOD PRESSURE: 122 MMHG

## 2018-12-07 VITALS — SYSTOLIC BLOOD PRESSURE: 98 MMHG | DIASTOLIC BLOOD PRESSURE: 30 MMHG

## 2018-12-07 VITALS — SYSTOLIC BLOOD PRESSURE: 145 MMHG | DIASTOLIC BLOOD PRESSURE: 24 MMHG

## 2018-12-07 VITALS — DIASTOLIC BLOOD PRESSURE: 19 MMHG | SYSTOLIC BLOOD PRESSURE: 115 MMHG

## 2018-12-07 VITALS — DIASTOLIC BLOOD PRESSURE: 22 MMHG | SYSTOLIC BLOOD PRESSURE: 98 MMHG

## 2018-12-07 VITALS — DIASTOLIC BLOOD PRESSURE: 29 MMHG | SYSTOLIC BLOOD PRESSURE: 138 MMHG

## 2018-12-07 VITALS — DIASTOLIC BLOOD PRESSURE: 18 MMHG | SYSTOLIC BLOOD PRESSURE: 97 MMHG

## 2018-12-07 VITALS — DIASTOLIC BLOOD PRESSURE: 27 MMHG | SYSTOLIC BLOOD PRESSURE: 99 MMHG

## 2018-12-07 VITALS — DIASTOLIC BLOOD PRESSURE: 12 MMHG | SYSTOLIC BLOOD PRESSURE: 126 MMHG

## 2018-12-07 VITALS — SYSTOLIC BLOOD PRESSURE: 99 MMHG | DIASTOLIC BLOOD PRESSURE: 24 MMHG

## 2018-12-07 VITALS — DIASTOLIC BLOOD PRESSURE: 34 MMHG | SYSTOLIC BLOOD PRESSURE: 103 MMHG

## 2018-12-07 VITALS — DIASTOLIC BLOOD PRESSURE: 18 MMHG | SYSTOLIC BLOOD PRESSURE: 102 MMHG

## 2018-12-07 VITALS — DIASTOLIC BLOOD PRESSURE: 17 MMHG | SYSTOLIC BLOOD PRESSURE: 113 MMHG

## 2018-12-07 VITALS — SYSTOLIC BLOOD PRESSURE: 131 MMHG | DIASTOLIC BLOOD PRESSURE: 24 MMHG

## 2018-12-07 VITALS — DIASTOLIC BLOOD PRESSURE: 25 MMHG | SYSTOLIC BLOOD PRESSURE: 90 MMHG

## 2018-12-07 VITALS — DIASTOLIC BLOOD PRESSURE: 64 MMHG | SYSTOLIC BLOOD PRESSURE: 128 MMHG

## 2018-12-07 VITALS — SYSTOLIC BLOOD PRESSURE: 125 MMHG | DIASTOLIC BLOOD PRESSURE: 19 MMHG

## 2018-12-07 VITALS — SYSTOLIC BLOOD PRESSURE: 122 MMHG | DIASTOLIC BLOOD PRESSURE: 30 MMHG

## 2018-12-07 VITALS — DIASTOLIC BLOOD PRESSURE: 68 MMHG | SYSTOLIC BLOOD PRESSURE: 128 MMHG

## 2018-12-07 VITALS — SYSTOLIC BLOOD PRESSURE: 129 MMHG | DIASTOLIC BLOOD PRESSURE: 61 MMHG

## 2018-12-07 LAB
ADD MANUAL DIFF: NO
ALBUMIN SERPL-MCNC: 1.5 G/DL (ref 3.4–5)
ALBUMIN/GLOB SERPL: 0.3 {RATIO} (ref 1–2.7)
ALP SERPL-CCNC: 98 U/L (ref 46–116)
ALT SERPL-CCNC: < 6 U/L (ref 12–78)
ANION GAP SERPL CALC-SCNC: 8 MMOL/L (ref 5–15)
AST SERPL-CCNC: 19 U/L (ref 15–37)
BASOPHILS NFR BLD AUTO: 1 % (ref 0–2)
BILIRUB SERPL-MCNC: 0.3 MG/DL (ref 0.2–1)
BUN SERPL-MCNC: 44 MG/DL (ref 7–18)
CALCIUM SERPL-MCNC: 8.8 MG/DL (ref 8.5–10.1)
CHLORIDE SERPL-SCNC: 102 MMOL/L (ref 98–107)
CO2 SERPL-SCNC: 30 MMOL/L (ref 21–32)
CREAT SERPL-MCNC: 3.1 MG/DL (ref 0.55–1.3)
EOSINOPHIL NFR BLD AUTO: 2.3 % (ref 0–3)
ERYTHROCYTE [DISTWIDTH] IN BLOOD BY AUTOMATED COUNT: 19.4 % (ref 11.6–14.8)
GLOBULIN SER-MCNC: 5.3 G/DL
HCT VFR BLD CALC: 33 % (ref 37–47)
HGB BLD-MCNC: 10 G/DL (ref 12–16)
LYMPHOCYTES NFR BLD AUTO: 11.6 % (ref 20–45)
MCV RBC AUTO: 94 FL (ref 80–99)
MONOCYTES NFR BLD AUTO: 8.9 % (ref 1–10)
NEUTROPHILS NFR BLD AUTO: 76.1 % (ref 45–75)
PLATELET # BLD: 233 K/UL (ref 150–450)
POTASSIUM SERPL-SCNC: 3 MMOL/L (ref 3.5–5.1)
RBC # BLD AUTO: 3.51 M/UL (ref 4.2–5.4)
SODIUM SERPL-SCNC: 140 MMOL/L (ref 136–145)
WBC # BLD AUTO: 6.1 K/UL (ref 4.8–10.8)

## 2018-12-07 RX ADMIN — CHLORHEXIDINE GLUCONATE SCH APPLIC: 213 SOLUTION TOPICAL at 20:19

## 2018-12-07 RX ADMIN — MINOXIDIL SCH MG: 2.5 TABLET ORAL at 09:16

## 2018-12-07 RX ADMIN — AMIODARONE HYDROCHLORIDE SCH MG: 200 TABLET ORAL at 05:15

## 2018-12-07 RX ADMIN — METOPROLOL TARTRATE SCH MG: 25 TABLET, FILM COATED ORAL at 20:26

## 2018-12-07 RX ADMIN — AMIODARONE HYDROCHLORIDE SCH MG: 200 TABLET ORAL at 13:34

## 2018-12-07 RX ADMIN — AMIODARONE HYDROCHLORIDE SCH MG: 200 TABLET ORAL at 21:28

## 2018-12-07 RX ADMIN — MINOXIDIL SCH MG: 2.5 TABLET ORAL at 20:26

## 2018-12-07 RX ADMIN — PANTOPRAZOLE SODIUM SCH MG: 40 INJECTION, POWDER, FOR SOLUTION INTRAVENOUS at 09:16

## 2018-12-07 RX ADMIN — METOPROLOL TARTRATE SCH MG: 25 TABLET, FILM COATED ORAL at 09:00

## 2018-12-07 RX ADMIN — ERYTHROPOIETIN SCH UNITS: 20000 INJECTION, SOLUTION INTRAVENOUS; SUBCUTANEOUS at 21:28

## 2018-12-07 RX ADMIN — HEPARIN SODIUM SCH UNITS: 5000 INJECTION INTRAVENOUS; SUBCUTANEOUS at 21:30

## 2018-12-07 RX ADMIN — PANTOPRAZOLE SODIUM SCH MG: 40 INJECTION, POWDER, FOR SOLUTION INTRAVENOUS at 20:19

## 2018-12-07 RX ADMIN — MEROPENEM SCH MLS/HR: 500 INJECTION INTRAVENOUS at 09:16

## 2018-12-07 NOTE — NUR
NURSE NOTES:

BM x1. Currently being dialyzed. Diastolic bp low (20) but patient asymptomatic / tolerating 
well.

## 2018-12-07 NOTE — PULMONOLOGY PROGRESS NOTE
Assessment/Plan


Assessment/Plan


1. Hypotension.


2. Sepsis.


3. Atrial fibrillation.


4. Bradycardia.


5. Diabetes mellitus.


6. End-stage renal disease on dialysis.


7. Left lung collapse. s/p bronchoscopy 


8. Respiratory failure, acute


9. Dysphagia.


10. Dementia.


11. Anemia


12. Pneumonia





PLAN


respiratory care


try to wean


monitor imaging


oxygen 


aspiration precautions


keep negative


supportive care


will follow up and recommend


impression, plan, and exam edited and reviewed in detail


care discussed with RN





medications/laboratory data/nursing notes/ICU care reviewed in detail


note reviewed and edited


care discussed with RN and RT


ICU time spent 40  minutes





Subjective


ROS Limited/Unobtainable:  Yes


Allergies:  


Coded Allergies:  


     No Known Allergies (Unverified , 11/26/18)


Subjective


care noted and reviewed


no distress at present


s/p bronchoscopy





Objective





Last 24 Hour Vital Signs








  Date Time  Temp Pulse Resp B/P (MAP) Pulse Ox O2 Delivery O2 Flow Rate FiO2


 


12/7/18 07:01  65 12     30


 


12/7/18 07:00  67 16 126/12 (50) 100   


 


12/7/18 06:00  65 16 131/24 (59) 100   


 


12/7/18 05:29  65 12     30


 


12/7/18 05:00  76 16 129/61 (83) 100   


 


12/7/18 04:00  67      


 


12/7/18 04:00  66 13 122/30 (60) 100   


 


12/7/18 04:00        30


 


12/7/18 04:00      Mechanical Ventilator  


 


12/7/18 03:40  69 12     30


 


12/7/18 03:00  70 12 125/19 (54) 100   


 


12/7/18 02:00  70 14 142/43 (76) 100   


 


12/7/18 01:05  70 13     30


 


12/7/18 01:00  70 13 138/29 (65) 100   


 


12/7/18 00:00      Mechanical Ventilator  


 


12/7/18 00:00        30


 


12/7/18 00:00  65      


 


12/7/18 00:00 98.3 68 16 145/24 (64) 100   


 


12/6/18 23:00  63 12 130/33 (65) 100   


 


12/6/18 22:37  62 12     30


 


12/6/18 22:00  63 12 137/33 (67) 100   


 


12/6/18 21:00  73 15 128/20 (56) 100   


 


12/6/18 20:54  71  128/20    


 


12/6/18 20:50    128/20    


 


12/6/18 20:44  74 16     30


 


12/6/18 20:00 98.1 74 15 111/35 (60) 100   


 


12/6/18 20:00        30


 


12/6/18 20:00  74      


 


12/6/18 20:00      Mechanical Ventilator  


 


12/6/18 19:29  75 12     30


 


12/6/18 19:00  74 16 101/16 (44) 100   


 


12/6/18 18:00 97.7 73 15 106/16 (46) 100   


 


12/6/18 17:00  75 15 109/90 (96) 100   


 


12/6/18 16:30  74 12     30


 


12/6/18 16:00      Mechanical Ventilator  





      Mechanical Ventilator  


 


12/6/18 16:00  65 12 101/17 (45) 100   


 


12/6/18 16:00  73      


 


12/6/18 16:00        30


 


12/6/18 15:00  72 15 118/98 (105) 100   


 


12/6/18 14:30  75 14     30


 


12/6/18 14:00  71 15 74/55 (61) 100   


 


12/6/18 13:30  71 12     30


 


12/6/18 13:00  73 15 118/25 (56) 100   


 


12/6/18 12:00      Mechanical Ventilator  





      Mechanical Ventilator  


 


12/6/18 12:00  74 20 96/23 (47) 100   


 


12/6/18 12:00        30


 


12/6/18 12:00  73      


 


12/6/18 11:10  79 18     30


 


12/6/18 11:10     100   


 


12/6/18 11:00  82 20 81/63 (69) 100   


 


12/6/18 10:00  81 21 90/60 (70) 100   


 


12/6/18 09:40  78 16     30


 


12/6/18 09:09  70  108/93    


 


12/6/18 09:08    108/93    


 


12/6/18 09:00  70 15 108/93 (98) 100   


 


12/6/18 08:00      Mechanical Ventilator  





      Mechanical Ventilator  


 


12/6/18 08:00 98.2 70 14 92/23 (46) 100   


 


12/6/18 08:00        30


 


12/6/18 08:00  71      

















Intake and Output  


 


 12/6/18 12/7/18





 19:00 07:00


 


Intake Total 500 ml 360 ml


 


Balance 500 ml 360 ml


 


  


 


Free Water 30 ml 


 


IV Total 110 ml 


 


Tube Feeding 360 ml 360 ml


 


# Bowel Movements  2








Objective


WDWN


NAD


intubated


aware


reduced breath sounds bilaterally without rhonchi or wheeze


[id number] without MRG


NABS nontender no HSM


no CCE


cachectic


nonfocal





Microbiology








 Date/Time


Source Procedure


Growth Status


 


 


 12/4/18 17:30


Sputum Gram Stain - Final Complete


 


 12/4/18 17:30 Sputum Culture - Final


Candida Albicans Complete








Laboratory Tests


12/7/18 04:15: 


White Blood Count 6.1, Red Blood Count 3.51L, Hemoglobin 10.0L, Hematocrit 33.0L

, Mean Corpuscular Volume 94, Mean Corpuscular Hemoglobin 28.5, Mean 

Corpuscular Hemoglobin Concent 30.3L, Red Cell Distribution Width 19.4H, 

Platelet Count 233, Mean Platelet Volume 6.0L, Neutrophils (%) (Auto) 76.1H, 

Lymphocytes (%) (Auto) 11.6L, Monocytes (%) (Auto) 8.9, Eosinophils (%) (Auto) 

2.3, Basophils (%) (Auto) 1.0, Sodium Level 140, Potassium Level 3.0L, Chloride 

Level 102, Carbon Dioxide Level 30, Anion Gap 8, Blood Urea Nitrogen 44H, 

Creatinine 3.1H, Estimat Glomerular Filtration Rate , Glucose Level 117H, 

Calcium Level 8.8, Total Bilirubin 0.3, Aspartate Amino Transf (AST/SGOT) 19, 

Alanine Aminotransferase (ALT/SGPT) < 6L, Alkaline Phosphatase 98, Total 

Protein 6.8, Albumin 1.5L, Globulin 5.3, Albumin/Globulin Ratio 0.3L





Current Medications








 Medications


  (Trade)  Dose


 Ordered  Sig/Tony


 Route


 PRN Reason  Start Time


 Stop Time Status Last Admin


Dose Admin


 


 Acetaminophen


  (Tylenol)  650 mg  Q6H  PRN


 ORAL


 Mild Pain/Temp > 100.5  12/4/18 14:15


 1/3/19 14:14  12/6/18 20:11


 


 


 Amiodarone HCl


  (Cordarone)  200 mg  EVERY 8  HOURS


 NG


   12/6/18 14:00


 1/5/19 13:59  12/7/18 05:15


 


 


 Chlorhexidine


 Gluconate


  (Juana-Hex 2%)  1 applic  DAILY@2000


 TOPIC


   11/27/18 20:00


 12/27/18 19:59  12/6/18 20:07


 


 


 Collagenase


  (Santyl)  1 applic  QHS


 TOPIC


   12/6/18 21:00


 1/5/19 20:59  12/6/18 20:57


 


 


 Epoetin Rupesh


  (Procrit (for


 ESRD on dialysis))  10,000 units  MON-WED-FRI


 SUBQ


   11/28/18 21:00


 12/28/18 20:59  12/5/18 20:39


 


 


 Heparin Sodium


  (Porcine)


  (Heparin Sod


 1000 units/ml


 10ml)  2,000 unit  ONCE  ONCE


 IV


   12/7/18 08:00


 12/7/18 08:01   


 


 


 Linezolid


  (Zyvox)  600 mg  EVERY 12  HOURS


 ORAL


   11/28/18 21:00


 12/8/18 20:59  12/6/18 20:50


 


 


 Meropenem 500 mg/


 Sodium Chloride  55 ml @ 


 110 mls/hr  DAILY


 IVPB


   11/28/18 12:00


 12/8/18 11:59  12/6/18 09:07


 


 


 Metoprolol


 Tartrate


  (Lopressor)  12.5 mg  Q12HR


 ORAL


   12/3/18 21:00


 1/2/19 20:59  12/6/18 20:54


 


 


 Minoxidil


  (Loniten)  5 mg  Q12HR


 NG


   12/2/18 21:00


 1/1/19 20:59  12/6/18 20:50


 


 


 Pantoprazole


  (Protonix)  40 mg  Q12HR


 IVP


   11/26/18 21:00


 12/27/18 08:59  12/6/18 20:48


 


 


 Sodium Chloride  1,000 ml @ 


 500 mls/hr  Q2H PRN


 IVLG


 sbp<90 during hd  12/7/18 07:51


 1/6/19 07:50   


 

















Omar Luna MD Dec 7, 2018 07:42

## 2018-12-07 NOTE — NUR
NURSE NOTES:

Pt sleeping at this time. Turned and repositioned. Tolerating current vent settings. No 
signs of distress noted. Will continue to monitor.

## 2018-12-07 NOTE — CARDIOLOGY PROGRESS NOTE
Assessment/Plan


Problem List:  


(1) Paroxysmal A-fib


(2) Bradycardia


(3) Sepsis


(4) Hypotension


(5) Pneumonia


Status:  stable, unchanged


Status Narrative


Pt w/ respiratory failure, on vent.


Sepsis - e coli UTI and ? pneumonia. Hemodynamically improved  - off pressors.


Pt w/ brief AF/AFL today, w  reversion to SR.


Assessment/Plan


Continue vent support 


Continue iv antibiotics per ID


Amiodarone per ng tube. Not a candidate for anticoagulation, due to bleeding 

risk, anemia.





Subjective


ROS Limited/Unobtainable:  Yes


Subjective


Cardiology for Dr. Wade





Intubated,  minimally responsive. Able to follow simple commands.





Objective





Last 24 Hour Vital Signs








  Date Time  Temp Pulse Resp B/P (MAP) Pulse Ox O2 Delivery O2 Flow Rate FiO2


 


12/7/18 19:32  84 30   Mechanical Ventilator  60


 


12/7/18 19:00 98.4 67 16 99/24 (49) 100   


 


12/7/18 18:49  78 12     30


 


12/7/18 18:00  65 16 98/22 (47) 100   


 


12/7/18 17:00  72 16 99/27 (51) 100   


 


12/7/18 16:29  68 12     30


 


12/7/18 16:00        30


 


12/7/18 16:00  66      


 


12/7/18 16:00  67 16 99/30 (53) 100   


 


12/7/18 16:00      Mechanical Ventilator  


 


12/7/18 15:00  66 16 105/33 (57) 100   


 


12/7/18 14:44  64 12     30


 


12/7/18 14:00  65 16 98/30 (52) 100   


 


12/7/18 13:00 98.7 59 16 90/25 (46) 100   


 


12/7/18 12:44  62 12     30


 


12/7/18 12:00  78 16 128/64 (85) 100   


 


12/7/18 12:00      Mechanical Ventilator  


 


12/7/18 12:00  59      


 


12/7/18 11:00  80 17 122/62 (82) 100   


 


12/7/18 10:42  66 12     30


 


12/7/18 10:00  87 16 125/64 (84) 100   


 


12/7/18 09:45        30


 


12/7/18 09:16    128/68    


 


12/7/18 09:00  96 16 128/68 (88) 100   


 


12/7/18 09:00  78  128/58    


 


12/7/18 08:34  73 19     30


 


12/7/18 08:33     100   


 


12/7/18 08:00 98.8 82 16 103/34 (57) 100   


 


12/7/18 08:00        30


 


12/7/18 08:00      Mechanical Ventilator  


 


12/7/18 08:00  88      


 


12/7/18 07:01  65 12     30


 


12/7/18 07:00  67 16 126/12 (50) 100   


 


12/7/18 06:00  65 16 131/24 (59) 100   


 


12/7/18 05:29  65 12     30


 


12/7/18 05:00  76 16 129/61 (83) 100   


 


12/7/18 04:00  67      


 


12/7/18 04:00  66 13 122/30 (60) 100   


 


12/7/18 04:00        30


 


12/7/18 04:00      Mechanical Ventilator  


 


12/7/18 03:40  69 12     30


 


12/7/18 03:00  70 12 125/19 (54) 100   


 


12/7/18 02:00  70 14 142/43 (76) 100   


 


12/7/18 01:05  70 13     30


 


12/7/18 01:00  70 13 138/29 (65) 100   


 


12/7/18 00:00      Mechanical Ventilator  


 


12/7/18 00:00        30


 


12/7/18 00:00  65      


 


12/7/18 00:00 98.3 68 16 145/24 (64) 100   


 


12/6/18 23:00  63 12 130/33 (65) 100   


 


12/6/18 22:37  62 12     30


 


12/6/18 22:00  63 12 137/33 (67) 100   


 


12/6/18 21:00  73 15 128/20 (56) 100   


 


12/6/18 20:54  71  128/20    


 


12/6/18 20:50    128/20    


 


12/6/18 20:44  74 16     30








General Appearance:  lethargic, on vent


EENT:  PERRL/EOMI, other - et tube,


Neck:  supple


Rhythm:  NSR


Cardiovascular:  normal rate, regular rhythm, no gallop/murmur


Respiratory/Chest:  other - clear anteriorly


Abdomen:  non tender, soft


Extremities:  no swelling











Intake and Output  


 


 12/6/18 12/7/18





 19:00 07:00


 


Intake Total 500 ml 360 ml


 


Balance 500 ml 360 ml


 


  


 


Free Water 30 ml 


 


IV Total 110 ml 


 


Tube Feeding 360 ml 360 ml


 


# Bowel Movements  2











Laboratory Tests








Test


  12/7/18


04:15


 


White Blood Count


  6.1 K/UL


(4.8-10.8)


 


Red Blood Count


  3.51 M/UL


(4.20-5.40)  L


 


Hemoglobin


  10.0 G/DL


(12.0-16.0)  L


 


Hematocrit


  33.0 %


(37.0-47.0)  L


 


Mean Corpuscular Volume 94 FL (80-99)  


 


Mean Corpuscular Hemoglobin


  28.5 PG


(27.0-31.0)


 


Mean Corpuscular Hemoglobin


Concent 30.3 G/DL


(32.0-36.0)  L


 


Red Cell Distribution Width


  19.4 %


(11.6-14.8)  H


 


Platelet Count


  233 K/UL


(150-450)


 


Mean Platelet Volume


  6.0 FL


(6.5-10.1)  L


 


Neutrophils (%) (Auto)


  76.1 %


(45.0-75.0)  H


 


Lymphocytes (%) (Auto)


  11.6 %


(20.0-45.0)  L


 


Monocytes (%) (Auto)


  8.9 %


(1.0-10.0)


 


Eosinophils (%) (Auto)


  2.3 %


(0.0-3.0)


 


Basophils (%) (Auto)


  1.0 %


(0.0-2.0)


 


Sodium Level


  140 MMOL/L


(136-145)


 


Potassium Level


  3.0 MMOL/L


(3.5-5.1)  L


 


Chloride Level


  102 MMOL/L


()


 


Carbon Dioxide Level


  30 MMOL/L


(21-32)


 


Anion Gap


  8 mmol/L


(5-15)


 


Blood Urea Nitrogen


  44 mg/dL


(7-18)  H


 


Creatinine


  3.1 MG/DL


(0.55-1.30)  H


 


Estimat Glomerular Filtration


Rate  mL/min (>60)  


 


 


Glucose Level


  117 MG/DL


()  H


 


Calcium Level


  8.8 MG/DL


(8.5-10.1)


 


Total Bilirubin


  0.3 MG/DL


(0.2-1.0)


 


Aspartate Amino Transf


(AST/SGOT) 19 U/L (15-37)


 


 


Alanine Aminotransferase


(ALT/SGPT) < 6 U/L


(12-78)  L


 


Alkaline Phosphatase


  98 U/L


()


 


Total Protein


  6.8 G/DL


(6.4-8.2)


 


Albumin


  1.5 G/DL


(3.4-5.0)  L


 


Globulin 5.3 g/dL  


 


Albumin/Globulin Ratio


  0.3 (1.0-2.7)


L

















Alexandra Acosta MD Dec 7, 2018 20:09

## 2018-12-07 NOTE — NUR
NURSE NOTES:

Patient turned and repositioned,mouth care provided,no c/o pain or respiratory distress 
noted.Will continue to monitor

## 2018-12-07 NOTE — NUR
NURSE NOTES:

Patient turned and repositioned. Denies any pain at this time. No new orders at this time. 
Will continue to monitor patient.

## 2018-12-07 NOTE — NUR
NURSE NOTES:

Received patient alert & oriented x3. Able to make needs known by writing it or pointing. 
Cardiac monitor showing SR. Patient is intubated 7.5/23 cm LL AC 12  FiO2 30% Peep of 
5. Lung sounds diminished bilaterally. Soft, round abdomen, non-tender. Hypoactive bowel 
sounds present on all 4 quadrants. Patient has an OGT Nepro at 30 cc/hr. Dialysis scheduled 
for today. L AV shunt. R midline. patent flushing. Poassium reported to MD. Order obtained 
for weaning trial. Patient verbalizes understanding to cooperate with weaning. Bilateral 
soft wrist restraints on. No discoloration or edema noted related to restraints. Bed on 
lowest position. HOB elevated, call light within reach, bed alarm on for safety. Will 
continue to monitor patient.

## 2018-12-07 NOTE — NUR
NURSE NOTES:

All meds given at this time. Pt comfortable. Denies any pain or discomfort. Will continue to 
monitor.

## 2018-12-07 NOTE — NUR
CASE MANAGEMENT: REVIEW





SI: A-FIB . SEPSIS . ESRD ON HD MWF  . ORALLY INTUBATED 

T 98.7 HR 59 RR 16 BP 90/25 % MECH VENT FIO2 30

H/H 10.0/33.0 K 3.0 BUN 44 CR 3.1 







IS: AMIODARONE NG Q8HR

LOPRESSOR NG Q12HR 

MINOXIDIL NG Q12HR

PROCRIT SQ MWF 

MEROPENEM IV QD

NGT FEEDING NEPRO @10ML/HR 





***ICU STATUS***

DCP: PATIENT IS FROM [facility]

## 2018-12-07 NOTE — NUR
NURSE NOTES:

Patient turned and repositioned. In & Out confusion. Patient cannot differentiate if she is 
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 Reoriented. No new orders. Will continue plan of care.

## 2018-12-07 NOTE — NUR
NURSE NOTES:

Pt able to communicate through writing. states she wants to call her son. Made her aware of 
time and son will be paged in the morning.

## 2018-12-07 NOTE — NUR
NURSE NOTES:

Received bedside report from CAROLYN Cabezas.Patient stable,confused,A&O x3,A fib on cardiac 
monitor,tolerated setting well AC 12  FiO2 30% PEEP 5,GT in place running with Nepro 
30 ml/hr,BS active in all quadrants,dialysis done today 2 L out, 

L AV shunt for dialysis,and ASHISH midline for meds,no blood withdraw,bed secured in a low 
safety position,call light within a reach,will continue to monitor and follow POC

## 2018-12-07 NOTE — NUR
NURSE NOTES:

Patient turned and repositioned. Denies any pain at this time. Cooperating with restraints 
off. No new orders at this time. Will continue to monitor patient.

## 2018-12-07 NOTE — NUR
RESPIRATORY NOTE:

started to wean pt at 0830 with CPAP PS8, fio2 30%. vitals are WNL. will cont to monitor

## 2018-12-07 NOTE — NUR
RESPIRATORY NOTE:

Received pt on the following vent settings, AC 12/500/ peep 5/ 30% fi02 with 7.5 ett 23 at 
the lip. Airway is secure and patent. Ambu bag at bedside. Vent alarms are on and audible. 
Will continue to monitor.

## 2018-12-07 NOTE — NUR
NURSE NOTES:

Morning care provided at this time. Wound care done. Turned and repositioned at this time. 
No signs of distress noted. Will continue with plan of care.

## 2018-12-07 NOTE — NUR
RESPIRATORY NOTE:

received pt awake, alert and responsive. intubated with 7.5 ETT at 23cm at the lip. made 
aware that pt will be weaning today. pt on current vent settings; ac 12 500 30% +5 with 
alarms on and audible and set appropriately. vent plugged into red outlet with ambu bag at 
bedside. will cont to monitor.

## 2018-12-07 NOTE — INFECTIOUS DISEASES PROG NOTE
Assessment/Plan


Assessment/Plan


antibiotics : linezolid, meropenem





A


1. VRE | e.coli UTI


2. shock resolved


3. leucocytosis resolved


4. respiratory failure


5. renal failure


6. decubitus ulcers


7. nasal MRSA colonization


8. rectal VRE colonization


9. pleural effusion





P


1. continue meropenem


2. d/c linezolid


3. will follow up cultures





Subjective


ROS Limited/Unobtainable:  Yes


Allergies:  


Coded Allergies:  


     No Known Allergies (Unverified , 11/26/18)





Objective


Vital Signs





Last 24 Hour Vital Signs








  Date Time  Temp Pulse Resp B/P (MAP) Pulse Ox O2 Delivery O2 Flow Rate FiO2


 


12/7/18 10:00  87 16 125/64 (84) 100   


 


12/7/18 09:45        30


 


12/7/18 09:16    128/68    


 


12/7/18 09:00  96 16 128/68 (88) 100   


 


12/7/18 09:00  78  128/58    


 


12/7/18 08:34  73 19     30


 


12/7/18 08:33     100   


 


12/7/18 08:00 98.8 82 16 103/34 (57) 100   


 


12/7/18 08:00        30


 


12/7/18 08:00      Mechanical Ventilator  


 


12/7/18 08:00  88      


 


12/7/18 07:01  65 12     30


 


12/7/18 07:00  67 16 126/12 (50) 100   


 


12/7/18 06:00  65 16 131/24 (59) 100   


 


12/7/18 05:29  65 12     30


 


12/7/18 05:00  76 16 129/61 (83) 100   


 


12/7/18 04:00  67      


 


12/7/18 04:00  66 13 122/30 (60) 100   


 


12/7/18 04:00        30


 


12/7/18 04:00      Mechanical Ventilator  


 


12/7/18 03:40  69 12     30


 


12/7/18 03:00  70 12 125/19 (54) 100   


 


12/7/18 02:00  70 14 142/43 (76) 100   


 


12/7/18 01:05  70 13     30


 


12/7/18 01:00  70 13 138/29 (65) 100   


 


12/7/18 00:00      Mechanical Ventilator  


 


12/7/18 00:00        30


 


12/7/18 00:00  65      


 


12/7/18 00:00 98.3 68 16 145/24 (64) 100   


 


12/6/18 23:00  63 12 130/33 (65) 100   


 


12/6/18 22:37  62 12     30


 


12/6/18 22:00  63 12 137/33 (67) 100   


 


12/6/18 21:00  73 15 128/20 (56) 100   


 


12/6/18 20:54  71  128/20    


 


12/6/18 20:50    128/20    


 


12/6/18 20:44  74 16     30


 


12/6/18 20:00 98.1 74 15 111/35 (60) 100   


 


12/6/18 20:00        30


 


12/6/18 20:00  74      


 


12/6/18 20:00      Mechanical Ventilator  


 


12/6/18 19:29  75 12     30


 


12/6/18 19:00  74 16 101/16 (44) 100   


 


12/6/18 18:00 97.7 73 15 106/16 (46) 100   


 


12/6/18 17:00  75 15 109/90 (96) 100   


 


12/6/18 16:30  74 12     30


 


12/6/18 16:00      Mechanical Ventilator  





      Mechanical Ventilator  


 


12/6/18 16:00  65 12 101/17 (45) 100   


 


12/6/18 16:00  73      


 


12/6/18 16:00        30


 


12/6/18 15:00  72 15 118/98 (105) 100   


 


12/6/18 14:30  75 14     30


 


12/6/18 14:00  71 15 74/55 (61) 100   


 


12/6/18 13:30  71 12     30


 


12/6/18 13:00  73 15 118/25 (56) 100   


 


12/6/18 12:00      Mechanical Ventilator  





      Mechanical Ventilator  


 


12/6/18 12:00  74 20 96/23 (47) 100   


 


12/6/18 12:00        30


 


12/6/18 12:00  73      


 


12/6/18 11:10  79 18     30


 


12/6/18 11:10     100   


 


12/6/18 11:00  82 20 81/63 (69) 100   








Height (Feet):  5


Height (Inches):  2.00


Weight (Pounds):  123


HEENT:  other - intubated


Respiratory/Chest:  lungs clear


Cardiovascular:  normal rate, regular rhythm, no gallop/murmur


Abdomen:  soft, non tender


Extremities:  no edema, other - right arm PICC





Microbiology








 Date/Time


Source Procedure


Growth Status


 


 


 12/4/18 17:30


Sputum Gram Stain - Final Complete


 


 12/4/18 17:30 Sputum Culture - Final


Candida Albicans Complete











Laboratory Tests








Test


  12/7/18


04:15


 


White Blood Count


  6.1 K/UL


(4.8-10.8)


 


Red Blood Count


  3.51 M/UL


(4.20-5.40)  L


 


Hemoglobin


  10.0 G/DL


(12.0-16.0)  L


 


Hematocrit


  33.0 %


(37.0-47.0)  L


 


Mean Corpuscular Volume 94 FL (80-99)  


 


Mean Corpuscular Hemoglobin


  28.5 PG


(27.0-31.0)


 


Mean Corpuscular Hemoglobin


Concent 30.3 G/DL


(32.0-36.0)  L


 


Red Cell Distribution Width


  19.4 %


(11.6-14.8)  H


 


Platelet Count


  233 K/UL


(150-450)


 


Mean Platelet Volume


  6.0 FL


(6.5-10.1)  L


 


Neutrophils (%) (Auto)


  76.1 %


(45.0-75.0)  H


 


Lymphocytes (%) (Auto)


  11.6 %


(20.0-45.0)  L


 


Monocytes (%) (Auto)


  8.9 %


(1.0-10.0)


 


Eosinophils (%) (Auto)


  2.3 %


(0.0-3.0)


 


Basophils (%) (Auto)


  1.0 %


(0.0-2.0)


 


Sodium Level


  140 MMOL/L


(136-145)


 


Potassium Level


  3.0 MMOL/L


(3.5-5.1)  L


 


Chloride Level


  102 MMOL/L


()


 


Carbon Dioxide Level


  30 MMOL/L


(21-32)


 


Anion Gap


  8 mmol/L


(5-15)


 


Blood Urea Nitrogen


  44 mg/dL


(7-18)  H


 


Creatinine


  3.1 MG/DL


(0.55-1.30)  H


 


Estimat Glomerular Filtration


Rate  mL/min (>60)  


 


 


Glucose Level


  117 MG/DL


()  H


 


Calcium Level


  8.8 MG/DL


(8.5-10.1)


 


Total Bilirubin


  0.3 MG/DL


(0.2-1.0)


 


Aspartate Amino Transf


(AST/SGOT) 19 U/L (15-37)


 


 


Alanine Aminotransferase


(ALT/SGPT) < 6 U/L


(12-78)  L


 


Alkaline Phosphatase


  98 U/L


()


 


Total Protein


  6.8 G/DL


(6.4-8.2)


 


Albumin


  1.5 G/DL


(3.4-5.0)  L


 


Globulin 5.3 g/dL  


 


Albumin/Globulin Ratio


  0.3 (1.0-2.7)


L











Current Medications








 Medications


  (Trade)  Dose


 Ordered  Sig/Tony


 Route


 PRN Reason  Start Time


 Stop Time Status Last Admin


Dose Admin


 


 Acetaminophen


  (Tylenol)  650 mg  Q6H  PRN


 ORAL


 Mild Pain/Temp > 100.5  12/4/18 14:15


 1/3/19 14:14  12/6/18 20:11


 


 


 Amiodarone HCl


  (Cordarone)  200 mg  EVERY 8  HOURS


 NG


   12/6/18 14:00


 1/5/19 13:59  12/7/18 05:15


 


 


 Chlorhexidine


 Gluconate


  (Juana-Hex 2%)  1 applic  DAILY@2000


 TOPIC


   11/27/18 20:00


 12/27/18 19:59  12/6/18 20:07


 


 


 Collagenase


  (Santyl)  1 applic  QHS


 TOPIC


   12/6/18 21:00


 1/5/19 20:59  12/6/18 20:57


 


 


 Epoetin Rupesh


  (Procrit (for


 ESRD on dialysis))  10,000 units  MON-WED-FRI


 SUBQ


   11/28/18 21:00


 12/28/18 20:59  12/5/18 20:39


 


 


 Linezolid


  (Zyvox)  600 mg  EVERY 12  HOURS


 ORAL


   11/28/18 21:00


 12/12/18 20:59  12/7/18 09:15


 


 


 Meropenem 500 mg/


 Sodium Chloride  55 ml @ 


 110 mls/hr  DAILY


 IVPB


   11/28/18 12:00


 12/12/18 11:59  12/7/18 09:16


 


 


 Metoprolol


 Tartrate


  (Lopressor)  12.5 mg  Q12HR


 ORAL


   12/3/18 21:00


 1/2/19 20:59  12/6/18 20:54


 


 


 Minoxidil


  (Loniten)  5 mg  Q12HR


 NG


   12/2/18 21:00


 1/1/19 20:59  12/7/18 09:16


 


 


 Pantoprazole


  (Protonix)  40 mg  Q12HR


 IVP


   11/26/18 21:00


 12/27/18 08:59  12/7/18 09:16


 


 


 Sodium Chloride  1,000 ml @ 


 500 mls/hr  Q2H PRN


 IVLG


 sbp<90 during hd  12/7/18 07:51


 1/6/19 07:50   


 

















Chase Santillan MD Dec 7, 2018 10:42

## 2018-12-07 NOTE — NEPHROLOGY PROGRESS NOTE
Assessment/Plan


Assessment


Seee below


Plan


MOF, improving slowly.


Sepsis due to infected diabetic ulcers. On IV Abx. Followed by ID, Vasc. Sx, 

Podiatry.


Septic Shock pressors PRN


ESRD HD q MWF 


Anemia of CKD , SAMANTHA high dose. Transfuse PRN.


Resp Failure - Vent per Pul. Trying to wean. KEVIN Cardoso.


A. 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 with LAE. LVEF 65% . Anticoagulate when 

stable. KEVIN Wade. In and out A. Fib.











If un weanable will need Trach + PEG ------> Subacute


DW pt's son @ bedside.





Subjective


Subjective


Awake + alert. No c/o





Objective


Objective





Last 24 Hour Vital Signs








  Date Time  Temp Pulse Resp B/P (MAP) Pulse Ox O2 Delivery O2 Flow Rate FiO2


 


12/7/18 08:34  73 19     30


 


12/7/18 08:33     100   


 


12/7/18 07:01  65 12     30


 


12/7/18 07:00  67 16 126/12 (50) 100   


 


12/7/18 06:00  65 16 131/24 (59) 100   


 


12/7/18 05:29  65 12     30


 


12/7/18 05:00  76 16 129/61 (83) 100   


 


12/7/18 04:00  67      


 


12/7/18 04:00  66 13 122/30 (60) 100   


 


12/7/18 04:00        30


 


12/7/18 04:00      Mechanical Ventilator  


 


12/7/18 03:40  69 12     30


 


12/7/18 03:00  70 12 125/19 (54) 100   


 


12/7/18 02:00  70 14 142/43 (76) 100   


 


12/7/18 01:05  70 13     30


 


12/7/18 01:00  70 13 138/29 (65) 100   


 


12/7/18 00:00      Mechanical Ventilator  


 


12/7/18 00:00        30


 


12/7/18 00:00  65      


 


12/7/18 00:00 98.3 68 16 145/24 (64) 100   


 


12/6/18 23:00  63 12 130/33 (65) 100   


 


12/6/18 22:37  62 12     30


 


12/6/18 22:00  63 12 137/33 (67) 100   


 


12/6/18 21:00  73 15 128/20 (56) 100   


 


12/6/18 20:54  71  128/20    


 


12/6/18 20:50    128/20    


 


12/6/18 20:44  74 16     30


 


12/6/18 20:00 98.1 74 15 111/35 (60) 100   


 


12/6/18 20:00        30


 


12/6/18 20:00  74      


 


12/6/18 20:00      Mechanical Ventilator  


 


12/6/18 19:29  75 12     30


 


12/6/18 19:00  74 16 101/16 (44) 100   


 


12/6/18 18:00 97.7 73 15 106/16 (46) 100   


 


12/6/18 17:00  75 15 109/90 (96) 100   


 


12/6/18 16:30  74 12     30


 


12/6/18 16:00      Mechanical Ventilator  





      Mechanical Ventilator  


 


12/6/18 16:00  65 12 101/17 (45) 100   


 


12/6/18 16:00  73      


 


12/6/18 16:00        30


 


12/6/18 15:00  72 15 118/98 (105) 100   


 


12/6/18 14:30  75 14     30


 


12/6/18 14:00  71 15 74/55 (61) 100   


 


12/6/18 13:30  71 12     30


 


12/6/18 13:00  73 15 118/25 (56) 100   


 


12/6/18 12:00      Mechanical Ventilator  





      Mechanical Ventilator  


 


12/6/18 12:00  74 20 96/23 (47) 100   


 


12/6/18 12:00        30


 


12/6/18 12:00  73      


 


12/6/18 11:10  79 18     30


 


12/6/18 11:10     100   


 


12/6/18 11:00  82 20 81/63 (69) 100   


 


12/6/18 10:00  81 21 90/60 (70) 100   


 


12/6/18 09:40  78 16     30


 


12/6/18 09:09  70  108/93    


 


12/6/18 09:08    108/93    

















Intake and Output  


 


 12/6/18 12/7/18





 19:00 07:00


 


Intake Total 500 ml 360 ml


 


Balance 500 ml 360 ml


 


  


 


Free Water 30 ml 


 


IV Total 110 ml 


 


Tube Feeding 360 ml 360 ml


 


# Bowel Movements  2








Laboratory Tests


12/7/18 04:15: 


White Blood Count 6.1, Red Blood Count 3.51L, Hemoglobin 10.0L, Hematocrit 33.0L

, Mean Corpuscular Volume 94, Mean Corpuscular Hemoglobin 28.5, Mean 

Corpuscular Hemoglobin Concent 30.3L, Red Cell Distribution Width 19.4H, 

Platelet Count 233, Mean Platelet Volume 6.0L, Neutrophils (%) (Auto) 76.1H, 

Lymphocytes (%) (Auto) 11.6L, Monocytes (%) (Auto) 8.9, Eosinophils (%) (Auto) 

2.3, Basophils (%) (Auto) 1.0, Sodium Level 140, Potassium Level 3.0L, Chloride 

Level 102, Carbon Dioxide Level 30, Anion Gap 8, Blood Urea Nitrogen 44H, 

Creatinine 3.1H, Estimat Glomerular Filtration Rate , Glucose Level 117H, 

Calcium Level 8.8, Total Bilirubin 0.3, Aspartate Amino Transf (AST/SGOT) 19, 

Alanine Aminotransferase (ALT/SGPT) < 6L, Alkaline Phosphatase 98, Total 

Protein 6.8, Albumin 1.5L, Globulin 5.3, Albumin/Globulin Ratio 0.3L


Height (Feet):  5


Height (Inches):  2.00


Weight (Pounds):  123


Objective


Intubated, On vent.


BP measured more accurately with a smaller cuff placed on rt. FA.


Cv IRR/IRR


Lungs B ronchi.


Abd SNT. BS +


E Rt. foot diabetic ulcers with pus











Gavino Daigle MD Dec 7, 2018 09:05

## 2018-12-07 NOTE — NUR
NURSE NOTES:

Patient turned and repositioned. Denies pain or discomfort at the time. Son at bedside. 
Educated on restraint removal. Patient verbalized understanding. No longer restrained. MD 
aware. Will continue to monitor patient

## 2018-12-08 VITALS — SYSTOLIC BLOOD PRESSURE: 88 MMHG | DIASTOLIC BLOOD PRESSURE: 22 MMHG

## 2018-12-08 VITALS — DIASTOLIC BLOOD PRESSURE: 17 MMHG | SYSTOLIC BLOOD PRESSURE: 100 MMHG

## 2018-12-08 VITALS — SYSTOLIC BLOOD PRESSURE: 99 MMHG | DIASTOLIC BLOOD PRESSURE: 14 MMHG

## 2018-12-08 VITALS — SYSTOLIC BLOOD PRESSURE: 93 MMHG | DIASTOLIC BLOOD PRESSURE: 25 MMHG

## 2018-12-08 VITALS — DIASTOLIC BLOOD PRESSURE: 14 MMHG | SYSTOLIC BLOOD PRESSURE: 105 MMHG

## 2018-12-08 VITALS — SYSTOLIC BLOOD PRESSURE: 85 MMHG | DIASTOLIC BLOOD PRESSURE: 21 MMHG

## 2018-12-08 VITALS — SYSTOLIC BLOOD PRESSURE: 112 MMHG | DIASTOLIC BLOOD PRESSURE: 21 MMHG

## 2018-12-08 VITALS — SYSTOLIC BLOOD PRESSURE: 112 MMHG | DIASTOLIC BLOOD PRESSURE: 30 MMHG

## 2018-12-08 VITALS — DIASTOLIC BLOOD PRESSURE: 13 MMHG | SYSTOLIC BLOOD PRESSURE: 97 MMHG

## 2018-12-08 VITALS — DIASTOLIC BLOOD PRESSURE: 14 MMHG | SYSTOLIC BLOOD PRESSURE: 98 MMHG

## 2018-12-08 VITALS — DIASTOLIC BLOOD PRESSURE: 17 MMHG | SYSTOLIC BLOOD PRESSURE: 91 MMHG

## 2018-12-08 VITALS — DIASTOLIC BLOOD PRESSURE: 21 MMHG | SYSTOLIC BLOOD PRESSURE: 115 MMHG

## 2018-12-08 VITALS — SYSTOLIC BLOOD PRESSURE: 87 MMHG | DIASTOLIC BLOOD PRESSURE: 17 MMHG

## 2018-12-08 VITALS — SYSTOLIC BLOOD PRESSURE: 96 MMHG | DIASTOLIC BLOOD PRESSURE: 14 MMHG

## 2018-12-08 VITALS — SYSTOLIC BLOOD PRESSURE: 98 MMHG | DIASTOLIC BLOOD PRESSURE: 18 MMHG

## 2018-12-08 VITALS — DIASTOLIC BLOOD PRESSURE: 22 MMHG | SYSTOLIC BLOOD PRESSURE: 116 MMHG

## 2018-12-08 VITALS — DIASTOLIC BLOOD PRESSURE: 77 MMHG | SYSTOLIC BLOOD PRESSURE: 94 MMHG

## 2018-12-08 VITALS — DIASTOLIC BLOOD PRESSURE: 20 MMHG | SYSTOLIC BLOOD PRESSURE: 94 MMHG

## 2018-12-08 VITALS — SYSTOLIC BLOOD PRESSURE: 103 MMHG | DIASTOLIC BLOOD PRESSURE: 91 MMHG

## 2018-12-08 VITALS — DIASTOLIC BLOOD PRESSURE: 14 MMHG | SYSTOLIC BLOOD PRESSURE: 83 MMHG

## 2018-12-08 VITALS — SYSTOLIC BLOOD PRESSURE: 100 MMHG | DIASTOLIC BLOOD PRESSURE: 20 MMHG

## 2018-12-08 VITALS — SYSTOLIC BLOOD PRESSURE: 89 MMHG | DIASTOLIC BLOOD PRESSURE: 16 MMHG

## 2018-12-08 VITALS — DIASTOLIC BLOOD PRESSURE: 16 MMHG | SYSTOLIC BLOOD PRESSURE: 93 MMHG

## 2018-12-08 VITALS — DIASTOLIC BLOOD PRESSURE: 15 MMHG | SYSTOLIC BLOOD PRESSURE: 96 MMHG

## 2018-12-08 VITALS — SYSTOLIC BLOOD PRESSURE: 106 MMHG | DIASTOLIC BLOOD PRESSURE: 17 MMHG

## 2018-12-08 RX ADMIN — MEROPENEM SCH MLS/HR: 500 INJECTION INTRAVENOUS at 08:37

## 2018-12-08 RX ADMIN — METOPROLOL TARTRATE SCH MG: 25 TABLET, FILM COATED ORAL at 21:11

## 2018-12-08 RX ADMIN — PANTOPRAZOLE SODIUM SCH MG: 40 INJECTION, POWDER, FOR SOLUTION INTRAVENOUS at 08:36

## 2018-12-08 RX ADMIN — METOPROLOL TARTRATE SCH MG: 25 TABLET, FILM COATED ORAL at 08:38

## 2018-12-08 RX ADMIN — PANTOPRAZOLE SODIUM SCH MG: 40 INJECTION, POWDER, FOR SOLUTION INTRAVENOUS at 20:53

## 2018-12-08 RX ADMIN — MINOXIDIL SCH MG: 2.5 TABLET ORAL at 08:37

## 2018-12-08 RX ADMIN — CHLORHEXIDINE GLUCONATE SCH APPLIC: 213 SOLUTION TOPICAL at 19:42

## 2018-12-08 RX ADMIN — AMIODARONE HYDROCHLORIDE SCH MG: 200 TABLET ORAL at 05:08

## 2018-12-08 RX ADMIN — HEPARIN SODIUM SCH UNITS: 5000 INJECTION INTRAVENOUS; SUBCUTANEOUS at 21:39

## 2018-12-08 RX ADMIN — HEPARIN SODIUM SCH UNITS: 5000 INJECTION INTRAVENOUS; SUBCUTANEOUS at 14:47

## 2018-12-08 RX ADMIN — HEPARIN SODIUM SCH UNITS: 5000 INJECTION INTRAVENOUS; SUBCUTANEOUS at 05:10

## 2018-12-08 RX ADMIN — MINOXIDIL SCH MG: 2.5 TABLET ORAL at 21:12

## 2018-12-08 RX ADMIN — AMIODARONE HYDROCHLORIDE SCH MG: 200 TABLET ORAL at 20:54

## 2018-12-08 NOTE — NUR
NURSE NOTES:

All medications given on time,patient comfortable resting in a bed,V/S stable,no c/o pain,no 
respiratory distress noted.will continue to monitor.

## 2018-12-08 NOTE — CARDIOLOGY PROGRESS NOTE
Assessment/Plan


Problem List:  


(1) Paroxysmal A-fib


(2) Bradycardia


(3) Sepsis


(4) Hypotension


(5) Pneumonia


Status:  stable, progressing


Status Narrative


Pt w/ respiratory failure, on vent.


Undergoing weaning trial today - tolerating well. 


Sepsis - e coli UTI and ? pneumonia, on antibiotics


PAF - maintaining SR on amiodarone


Assessment/Plan


Wean from vent, as per Dr. Hay


Continue iv antibiotics per ID


Amiodarone per ng tube. Will decrease to 200 mg bid.  No anticoagulation, due 

to anemia, bleeding risk.





Subjective


ROS Limited/Unobtainable:  Yes


Subjective


Cardiology for Dr. Wade





Intubated,  on cpap weaning trial.


Responds to voice





Objective





Last 24 Hour Vital Signs








  Date Time  Temp Pulse Resp B/P (MAP) Pulse Ox O2 Delivery O2 Flow Rate FiO2


 


12/8/18 14:00  64 13 88/22 (44) 100   


 


12/8/18 13:25  64 12     30


 


12/8/18 13:00  65 12 93/25 (47) 100   


 


12/8/18 12:00  72      


 


12/8/18 12:00        30


 


12/8/18 12:00      Mechanical Ventilator  





      Mechanical Ventilator  





      Mechanical Ventilator  





      Mechanical Ventilator  


 


12/8/18 12:00 99.4 68 12 116/22 (53) 100   


 


12/8/18 11:30  77 15 94/77 (83) 99   


 


12/8/18 11:00  66 13 105/14 (44) 99   


 


12/8/18 10:43  66 12     30


 


12/8/18 10:00  63 10 99/14 (42) 100   


 


12/8/18 09:00  65 7 87/17 (40) 100   


 


12/8/18 08:39  69 12     30


 


12/8/18 08:38  74  98/13    


 


12/8/18 08:37    89/16    


 


12/8/18 08:00  74      


 


12/8/18 08:00        30


 


12/8/18 08:00      Mechanical Ventilator  





      Mechanical Ventilator  





      Mechanical Ventilator  





      Mechanical Ventilator  


 


12/8/18 08:00 99.7 67 12 98/14 (42) 100   


 


12/8/18 07:00 97.3 66 12 89/16 (40) 100   


 


12/8/18 06:56  67 12     30


 


12/8/18 06:00 97.5 68 10 91/17 (41) 99   


 


12/8/18 05:00 97.3 74 12 93/16 (41) 100   


 


12/8/18 04:52  70 12     30


 


12/8/18 04:06  71      


 


12/8/18 04:00        30


 


12/8/18 04:00 97.4 69 14 96/15 (42) 100   


 


12/8/18 04:00      Mechanical Ventilator  





      Mechanical Ventilator  





      Mechanical Ventilator  





      Mechanical Ventilator  


 


12/8/18 03:12  77 14     30


 


12/8/18 03:00 97.8 72 12 112/30 (57) 100   


 


12/8/18 02:00 97.6 65 12 83/14 (37) 100   


 


12/8/18 01:00  67 12 96/14 (41) 100   


 


12/8/18 01:00  67 12     30


 


12/8/18 00:00      Mechanical Ventilator  





      Mechanical Ventilator  





      Mechanical Ventilator  





      Mechanical Ventilator  


 


12/8/18 00:00  65      


 


12/8/18 00:00 97.7 68 12 97/13 (41) 100   


 


12/8/18 00:00        30


 


12/7/18 23:06  71 12     30


 


12/7/18 23:00 97.4 65 16 102/18 (46) 100   


 


12/7/18 22:00 97.5 79 18 97/18 (44) 100   


 


12/7/18 21:00 97.8 74 16 115/19 (51) 100   


 


12/7/18 20:54  66 12     30


 


12/7/18 20:26  74  115/19    


 


12/7/18 20:26    115/19    


 


12/7/18 20:00      Mechanical Ventilator  





      Mechanical Ventilator  





      Mechanical Ventilator  





      Mechanical Ventilator  


 


12/7/18 20:00 97.6 71 16 113/17 (49) 100   


 


12/7/18 20:00        30


 


12/7/18 19:52  69      


 


12/7/18 19:32  84 30   Mechanical Ventilator  60


 


12/7/18 19:00 98.4 67 16 99/24 (49) 100   


 


12/7/18 18:49  78 12     30


 


12/7/18 18:00  65 16 98/22 (47) 100   


 


12/7/18 17:00  72 16 99/27 (51) 100   


 


12/7/18 16:29  68 12     30


 


12/7/18 16:00        30


 


12/7/18 16:00  66      


 


12/7/18 16:00  67 16 99/30 (53) 100   


 


12/7/18 16:00      Mechanical Ventilator  


 


12/7/18 15:00  66 16 105/33 (57) 100   


 


12/7/18 14:44  64 12     30








General Appearance:  WD/WN, no apparent distress, on vent, other - ng, et tubes


EENT:  PERRL/EOMI


Neck:  supple, no JVD


Rhythm:  NSR


Cardiovascular:  normal rate, regular rhythm, other - i/vi SKUHDEEP at apex


Respiratory/Chest:  lungs clear, other - clear anteriorly


Abdomen:  non tender, soft


Extremities:  no swelling











Intake and Output  


 


 12/7/18 12/8/18





 18:59 06:59


 


Intake Total 360 ml 2360 ml


 


Output Total  0 ml


 


Balance 360 ml 2360 ml


 


  


 


Tube Feeding 360 ml 360 ml


 


Hemodialysis  2000 ml


 


Output Urine Total  0 ml


 


# Bowel Movements  2











Labs








Test


  12/7/18


04:15


 


White Blood Count


  6.1 K/UL


(4.8-10.8)


 


Red Blood Count


  3.51 M/UL


(4.20-5.40)


 


Hemoglobin


  10.0 G/DL


(12.0-16.0)


 


Hematocrit


  33.0 %


(37.0-47.0)


 


Mean Corpuscular Volume 94 FL (80-99) 


 


Mean Corpuscular Hemoglobin


  28.5 PG


(27.0-31.0)


 


Mean Corpuscular Hemoglobin


Concent 30.3 G/DL


(32.0-36.0)


 


Red Cell Distribution Width


  19.4 %


(11.6-14.8)


 


Platelet Count


  233 K/UL


(150-450)


 


Mean Platelet Volume


  6.0 FL


(6.5-10.1)


 


Neutrophils (%) (Auto)


  76.1 %


(45.0-75.0)


 


Lymphocytes (%) (Auto)


  11.6 %


(20.0-45.0)


 


Monocytes (%) (Auto)


  8.9 %


(1.0-10.0)


 


Eosinophils (%) (Auto)


  2.3 %


(0.0-3.0)


 


Basophils (%) (Auto)


  1.0 %


(0.0-2.0)


 


Sodium Level


  140 MMOL/L


(136-145)


 


Potassium Level


  3.0 MMOL/L


(3.5-5.1)


 


Chloride Level


  102 MMOL/L


()


 


Carbon Dioxide Level


  30 MMOL/L


(21-32)


 


Anion Gap


  8 mmol/L


(5-15)


 


Blood Urea Nitrogen


  44 mg/dL


(7-18)


 


Creatinine


  3.1 MG/DL


(0.55-1.30)


 


Estimat Glomerular Filtration


Rate  mL/min (>60) 


 


 


Glucose Level


  117 MG/DL


()


 


Calcium Level


  8.8 MG/DL


(8.5-10.1)


 


Total Bilirubin


  0.3 MG/DL


(0.2-1.0)


 


Aspartate Amino Transf


(AST/SGOT) 19 U/L (15-37) 


 


 


Alanine Aminotransferase


(ALT/SGPT) < 6 U/L


(12-78)


 


Alkaline Phosphatase


  98 U/L


()


 


Total Protein


  6.8 G/DL


(6.4-8.2)


 


Albumin


  1.5 G/DL


(3.4-5.0)


 


Globulin 5.3 g/dL 


 


Albumin/Globulin Ratio 0.3 (1.0-2.7) 

















Alexandra Acosta MD Dec 8, 2018 14:23

## 2018-12-08 NOTE — NUR
NURSE NOTES:

Patient clean and dry,repositioned.Dressing changed,wound cleansed with NS,pat and 
dry,applied santyl and cover with  optifoam.

## 2018-12-08 NOTE — GENERAL SURGERY PROGRESS NOTE
General Surgery-Progress Note


Subjective


Procedure Performed


left subclavian central venous catheter insertion


Additional Comments


no acute events.  still on vent.  weaning





Objective





Last 24 Hour Vital Signs








  Date Time  Temp Pulse Resp B/P (MAP) Pulse Ox O2 Delivery O2 Flow Rate FiO2


 


12/8/18 13:25  64 12     30


 


12/8/18 13:00  65 12 93/25 (47) 100   


 


12/8/18 12:00  72      


 


12/8/18 12:00        30


 


12/8/18 12:00      Mechanical Ventilator  





      Mechanical Ventilator  





      Mechanical Ventilator  





      Mechanical Ventilator  


 


12/8/18 12:00 99.4 68 12 116/22 (53) 100   


 


12/8/18 11:30  77 15 94/77 (83) 99   


 


12/8/18 11:00  66 13 105/14 (44) 99   


 


12/8/18 10:43  66 12     30


 


12/8/18 10:00  63 10 99/14 (42) 100   


 


12/8/18 09:00  65 7 87/17 (40) 100   


 


12/8/18 08:39  69 12     30


 


12/8/18 08:38  74  98/13    


 


12/8/18 08:37    89/16    


 


12/8/18 08:00  74      


 


12/8/18 08:00        30


 


12/8/18 08:00      Mechanical Ventilator  





      Mechanical Ventilator  





      Mechanical Ventilator  





      Mechanical Ventilator  


 


12/8/18 08:00 99.7 67 12 98/14 (42) 100   


 


12/8/18 07:00 97.3 66 12 89/16 (40) 100   


 


12/8/18 06:56  67 12     30


 


12/8/18 06:00 97.5 68 10 91/17 (41) 99   


 


12/8/18 05:00 97.3 74 12 93/16 (41) 100   


 


12/8/18 04:52  70 12     30


 


12/8/18 04:06  71      


 


12/8/18 04:00        30


 


12/8/18 04:00 97.4 69 14 96/15 (42) 100   


 


12/8/18 04:00      Mechanical Ventilator  





      Mechanical Ventilator  





      Mechanical Ventilator  





      Mechanical Ventilator  


 


12/8/18 03:12  77 14     30


 


12/8/18 03:00 97.8 72 12 112/30 (57) 100   


 


12/8/18 02:00 97.6 65 12 83/14 (37) 100   


 


12/8/18 01:00  67 12 96/14 (41) 100   


 


12/8/18 01:00  67 12     30


 


12/8/18 00:00      Mechanical Ventilator  





      Mechanical Ventilator  





      Mechanical Ventilator  





      Mechanical Ventilator  


 


12/8/18 00:00  65      


 


12/8/18 00:00 97.7 68 12 97/13 (41) 100   


 


12/8/18 00:00        30


 


12/7/18 23:06  71 12     30


 


12/7/18 23:00 97.4 65 16 102/18 (46) 100   


 


12/7/18 22:00 97.5 79 18 97/18 (44) 100   


 


12/7/18 21:00 97.8 74 16 115/19 (51) 100   


 


12/7/18 20:54  66 12     30


 


12/7/18 20:26  74  115/19    


 


12/7/18 20:26    115/19    


 


12/7/18 20:00      Mechanical Ventilator  





      Mechanical Ventilator  





      Mechanical Ventilator  





      Mechanical Ventilator  


 


12/7/18 20:00 97.6 71 16 113/17 (49) 100   


 


12/7/18 20:00        30


 


12/7/18 19:52  69      


 


12/7/18 19:32  84 30   Mechanical Ventilator  60


 


12/7/18 19:00 98.4 67 16 99/24 (49) 100   


 


12/7/18 18:49  78 12     30


 


12/7/18 18:00  65 16 98/22 (47) 100   


 


12/7/18 17:00  72 16 99/27 (51) 100   


 


12/7/18 16:29  68 12     30


 


12/7/18 16:00        30


 


12/7/18 16:00  66      


 


12/7/18 16:00  67 16 99/30 (53) 100   


 


12/7/18 16:00      Mechanical Ventilator  


 


12/7/18 15:00  66 16 105/33 (57) 100   


 


12/7/18 14:44  64 12     30








I&O











Intake and Output  


 


 12/7/18 12/8/18





 18:59 06:59


 


Intake Total 360 ml 2360 ml


 


Output Total  0 ml


 


Balance 360 ml 2360 ml


 


  


 


Tube Feeding 360 ml 360 ml


 


Hemodialysis  2000 ml


 


Output Urine Total  0 ml


 


# Bowel Movements  2








Dressing:  other


Wound:  other


Drains:  other


Cardiovascular:  RSR


Respiratory:  decreased breath sounds


Abdomen:  soft, flat, non-tender, present bowel sounds


Extremities:  no cyanosis, other





Plan


Problems:  


(1) Sepsis


Assessment & Plan:  acute decompensation 


hypotensive


bradycardic


respiratory decompensation requiring intubation and vent support - failed 

extubation 





labs noted 


still on vent 


cont current ICU care and management


may need trach if unable to wean off went with history of failed extubation 


thank you


will follow with recs 





(2) Hypotension


(3) Bradycardia


(4) Altered mental status


(5) Decubital ulcer


Assessment & Plan:  Patient with multiple pressure injuries where have been 

present since admission. 


Full thickness pressure injury to sacrum (L)(L)2.4cm x (W)4.4cm x (D)0.9cm, 

undermining 12-12 with tunneling 9-10 by 11.3cm. 


In close proximity to sacral wound on L buttocks purple ,indurated area with 

small area of slough measuring (L)0.6cm x (W)0.5cm noted. Arched surgical scar 

noted to L buttocks at site of tunneling. 


Resolving pressure injury noted to R ischium (L)3cm x (W)2cm. Wound bed 

epithelialized with maroon discoloration without induration periwound. 


Closed area with white pocket and purple margins noted to distal,paz R tibia 

(L)6cm x (W)2.7cm


Full thickness wound noted to distal/medial RLE (L)6.2cm x (W)3.8cm .Wound 

noted to have 95% mixed slough /necrosis ,marginal erythema. Wound malodorous.


Wound posterior R tibia (L)4.7cm x (W) 3cm ,100% necrotic with marginal 

erythema. Dry eschar with marginal erythema 


(L)0.5cm x (W)0.7cm noted to medial R heel.Non-blanchable erythema noted to 

medial L malleolus. 


Dry brown eschar noted to medial L 1st malleolus (L)1.4cm x (W)1cm. 


L heel and lateral aspect boggy with non-blanchable erythema. 





Tx.Plan:


Cleanse sacral wound with Saline.Loose pack with Hydrogel impregnated packing 

strip (Attention to tunneled area at 9-10).Cover with Optifoam drsg Daily and 

prn.


           


Reposition at least every 2hours or as tolerated.


           


Air Fluidized mattress.


           


Off-load heels with pillow.





Appreciate Podiatry and Vascular input





Plan for anigo late by Vascular 














Bob Lewis Dec 8, 2018 14:05

## 2018-12-08 NOTE — INFECTIOUS DISEASES PROG NOTE
Assessment/Plan


Assessment/Plan


A:


Sepsis


UTI


Cellulitis of R leg


Hypercapnic respiratory


failure


ESRD on HD


DM


Anemia


Dementia


PVD


R pleural effusion


Mucous plug


P;


Continue  Meropenem





Subjective


ROS Limited/Unobtainable:  Yes


Constitutional:  Reports: no symptoms


Musculoskeletal:  Reports: pain


Allergies:  


Coded Allergies:  


     No Known Allergies (Unverified , 11/26/18)





Objective


Vital Signs





Last 24 Hour Vital Signs








  Date Time  Temp Pulse Resp B/P (MAP) Pulse Ox O2 Delivery O2 Flow Rate FiO2


 


12/8/18 12:00      Mechanical Ventilator  





      Mechanical Ventilator  





      Mechanical Ventilator  





      Mechanical Ventilator  


 


12/8/18 11:30  77 15 94/77 (83) 99   


 


12/8/18 11:00  66 13 105/14 (44) 99   


 


12/8/18 10:43  66 12     30


 


12/8/18 10:00  63 10 99/14 (42) 100   


 


12/8/18 09:00  65 7 87/17 (40) 100   


 


12/8/18 08:39  69 12     30


 


12/8/18 08:38  74  98/13    


 


12/8/18 08:37    89/16    


 


12/8/18 08:00  74      


 


12/8/18 08:00        30


 


12/8/18 08:00      Mechanical Ventilator  





      Mechanical Ventilator  





      Mechanical Ventilator  





      Mechanical Ventilator  


 


12/8/18 08:00 99.7 67 12 98/14 (42) 100   


 


12/8/18 07:00 97.3 66 12 89/16 (40) 100   


 


12/8/18 06:56  67 12     30


 


12/8/18 06:00 97.5 68 10 91/17 (41) 99   


 


12/8/18 05:00 97.3 74 12 93/16 (41) 100   


 


12/8/18 04:52  70 12     30


 


12/8/18 04:06  71      


 


12/8/18 04:00        30


 


12/8/18 04:00 97.4 69 14 96/15 (42) 100   


 


12/8/18 04:00      Mechanical Ventilator  





      Mechanical Ventilator  





      Mechanical Ventilator  





      Mechanical Ventilator  


 


12/8/18 03:12  77 14     30


 


12/8/18 03:00 97.8 72 12 112/30 (57) 100   


 


12/8/18 02:00 97.6 65 12 83/14 (37) 100   


 


12/8/18 01:00  67 12 96/14 (41) 100   


 


12/8/18 01:00  67 12     30


 


12/8/18 00:00      Mechanical Ventilator  





      Mechanical Ventilator  





      Mechanical Ventilator  





      Mechanical Ventilator  


 


12/8/18 00:00  65      


 


12/8/18 00:00 97.7 68 12 97/13 (41) 100   


 


12/8/18 00:00        30


 


12/7/18 23:06  71 12     30


 


12/7/18 23:00 97.4 65 16 102/18 (46) 100   


 


12/7/18 22:00 97.5 79 18 97/18 (44) 100   


 


12/7/18 21:00 97.8 74 16 115/19 (51) 100   


 


12/7/18 20:54  66 12     30


 


12/7/18 20:26  74  115/19    


 


12/7/18 20:26    115/19    


 


12/7/18 20:00      Mechanical Ventilator  





      Mechanical Ventilator  





      Mechanical Ventilator  





      Mechanical Ventilator  


 


12/7/18 20:00 97.6 71 16 113/17 (49) 100   


 


12/7/18 20:00        30


 


12/7/18 19:52  69      


 


12/7/18 19:32  84 30   Mechanical Ventilator  60


 


12/7/18 19:00 98.4 67 16 99/24 (49) 100   


 


12/7/18 18:49  78 12     30


 


12/7/18 18:00  65 16 98/22 (47) 100   


 


12/7/18 17:00  72 16 99/27 (51) 100   


 


12/7/18 16:29  68 12     30


 


12/7/18 16:00        30


 


12/7/18 16:00  66      


 


12/7/18 16:00  67 16 99/30 (53) 100   


 


12/7/18 16:00      Mechanical Ventilator  


 


12/7/18 15:00  66 16 105/33 (57) 100   


 


12/7/18 14:44  64 12     30


 


12/7/18 14:00  65 16 98/30 (52) 100   


 


12/7/18 13:00 98.7 59 16 90/25 (46) 100   


 


12/7/18 12:44  62 12     30








Height (Feet):  5


Height (Inches):  2.00


Weight (Pounds):  131


HEENT:  other - orally intubated


Respiratory/Chest:  rhonchi - bilaterally, other - on ventilator


Cardiovascular:  normal rate


Abdomen:  soft, non tender, other - NG tube feeding


Extremities:  no edema


Skin:  ulcers


Neurologic/Psychiatric:  alert, responsive





Current Medications








 Medications


  (Trade)  Dose


 Ordered  Sig/Tony


 Route


 PRN Reason  Start Time


 Stop Time Status Last Admin


Dose Admin


 


 Acetaminophen


  (Tylenol)  650 mg  Q6H  PRN


 ORAL


 Mild Pain/Temp > 100.5  12/4/18 14:15


 1/3/19 14:14  12/8/18 05:08


 


 


 Amiodarone HCl


  (Cordarone)  200 mg  EVERY 8  HOURS


 NG


   12/6/18 14:00


 1/5/19 13:59  12/8/18 05:08


 


 


 Chlorhexidine


 Gluconate


  (Juana-Hex 2%)  1 applic  DAILY@2000


 TOPIC


   11/27/18 20:00


 12/27/18 19:59  12/7/18 20:19


 


 


 Collagenase


  (Santyl)  1 applic  QHS


 TOPIC


   12/6/18 21:00


 1/5/19 20:59  12/7/18 20:27


 


 


 Epoetin Rupesh


  (Procrit (for


 ESRD on dialysis))  10,000 units  MON-WED-FRI


 SUBQ


   11/28/18 21:00


 12/28/18 20:59  12/7/18 21:28


 


 


 Heparin Sodium


  (Porcine)


  (Heparin 5000


 units/ml)  5,000 units  EVERY 8  HOURS


 SUBQ


   12/7/18 22:00


 1/6/19 21:59  12/8/18 05:10


 


 


 Meropenem 500 mg/


 Sodium Chloride  55 ml @ 


 110 mls/hr  DAILY


 IVPB


   11/28/18 12:00


 12/12/18 11:59  12/8/18 08:37


 


 


 Metoprolol


 Tartrate


  (Lopressor)  12.5 mg  Q12HR


 ORAL


   12/3/18 21:00


 1/2/19 20:59  12/8/18 08:38


 


 


 Minoxidil


  (Loniten)  5 mg  Q12HR


 NG


   12/2/18 21:00


 1/1/19 20:59  12/8/18 08:37


 


 


 Pantoprazole


  (Protonix)  40 mg  Q12HR


 IVP


   11/26/18 21:00


 12/27/18 08:59  12/8/18 08:36


 


 


 Sodium Chloride  1,000 ml @ 


 500 mls/hr  Q2H PRN


 IVLG


 sbp<90 during hd  12/7/18 07:51


 1/6/19 07:50   


 

















Luke Maynard MD Dec 8, 2018 12:26

## 2018-12-08 NOTE — NUR
RESPIRATORY NOTE: Received pt on current vent setting. SPO2 98%, HR 69. Intubated on size 
7.5 ett at 23 at the lip secured by anchor fast. Alarms are on and audible. Vent is plugged 
into red outlet. Ambu bag is at the bedside. Will continue to monitor.

## 2018-12-08 NOTE — NUR
NURSE NOTES:

Patient stable,sleeping at this moment,no s/s of pain,turned,no respiratory distress noted.

## 2018-12-08 NOTE — NUR
NURSE NOTES:

Report received from Callie LEON.  Pt sleepy, alert and oriented x3, able to follow commands.  
Pt orally intubated ETT 7.5, 23 cm at the lipline, AC 12, , 30% fiO2 and PEEP5.  NGT 
right nare with 30 cc/hr nephro running.  L AV shunt and LA midline noted.  Safety measures 
in place with bed locked and in lowest position, side rails x2 up and bed alarm activated.  
Will continue to monitor and continue plan of care.

## 2018-12-08 NOTE — PULMONOLOGY PROGRESS NOTE
Assessment/Plan


Assessment/Plan


1. Hypotension.


2. Sepsis.


3. Atrial fibrillation.


4. Bradycardia.


5. Diabetes mellitus.


6. End-stage renal disease on dialysis.


7. Left lung collapse. s/p bronchoscopy 


8. Respiratory failure, acute


9. Dysphagia.


10. Dementia.


11. Anemia


12. Pneumonia





PLAN


respiratory care noted


try to wean


monitor imaging


oxygen taper


aspiration precautions


keep negative


supportive care


will follow up and recommend


impression, plan, and exam edited and reviewed in detail


care discussed with RN





medications/laboratory data/nursing notes/ICU care reviewed in detail


note reviewed and edited


care discussed with RN and RT


ICU time spent 40  minutes





Subjective


Allergies:  


Coded Allergies:  


     No Known Allergies (Unverified , 11/26/18)


Subjective


care noted and reviewed


no distress at present


s/p bronchoscopy





Objective





Last 24 Hour Vital Signs








  Date Time  Temp Pulse Resp B/P (MAP) Pulse Ox O2 Delivery O2 Flow Rate FiO2


 


12/8/18 08:38  74  98/13    


 


12/8/18 08:37    89/16    


 


12/8/18 08:00        30


 


12/8/18 08:00      Mechanical Ventilator  





      Mechanical Ventilator  





      Mechanical Ventilator  





      Mechanical Ventilator  


 


12/8/18 07:00 97.3 66 12 89/16 (40) 100   


 


12/8/18 06:56  67 12     30


 


12/8/18 06:00 97.5 68 10 91/17 (41) 99   


 


12/8/18 05:00 97.3 74 12 93/16 (41) 100   


 


12/8/18 04:52  70 12     30


 


12/8/18 04:06  71      


 


12/8/18 04:00        30


 


12/8/18 04:00 97.4 69 14 96/15 (42) 100   


 


12/8/18 04:00      Mechanical Ventilator  





      Mechanical Ventilator  





      Mechanical Ventilator  





      Mechanical Ventilator  


 


12/8/18 03:12  77 14     30


 


12/8/18 03:00 97.8 72 12 112/30 (57) 100   


 


12/8/18 02:00 97.6 65 12 83/14 (37) 100   


 


12/8/18 01:00  67 12 96/14 (41) 100   


 


12/8/18 01:00  67 12     30


 


12/8/18 00:00      Mechanical Ventilator  





      Mechanical Ventilator  





      Mechanical Ventilator  





      Mechanical Ventilator  


 


12/8/18 00:00  65      


 


12/8/18 00:00 97.7 68 12 97/13 (41) 100   


 


12/8/18 00:00        30


 


12/7/18 23:06  71 12     30


 


12/7/18 23:00 97.4 65 16 102/18 (46) 100   


 


12/7/18 22:00 97.5 79 18 97/18 (44) 100   


 


12/7/18 21:00 97.8 74 16 115/19 (51) 100   


 


12/7/18 20:54  66 12     30


 


12/7/18 20:26  74  115/19    


 


12/7/18 20:26    115/19    


 


12/7/18 20:00      Mechanical Ventilator  





      Mechanical Ventilator  





      Mechanical Ventilator  





      Mechanical Ventilator  


 


12/7/18 20:00 97.6 71 16 113/17 (49) 100   


 


12/7/18 20:00        30


 


12/7/18 19:52  69      


 


12/7/18 19:32  84 30   Mechanical Ventilator  60


 


12/7/18 19:00 98.4 67 16 99/24 (49) 100   


 


12/7/18 18:49  78 12     30


 


12/7/18 18:00  65 16 98/22 (47) 100   


 


12/7/18 17:00  72 16 99/27 (51) 100   


 


12/7/18 16:29  68 12     30


 


12/7/18 16:00        30


 


12/7/18 16:00  66      


 


12/7/18 16:00  67 16 99/30 (53) 100   


 


12/7/18 16:00      Mechanical Ventilator  


 


12/7/18 15:00  66 16 105/33 (57) 100   


 


12/7/18 14:44  64 12     30


 


12/7/18 14:00  65 16 98/30 (52) 100   


 


12/7/18 13:00 98.7 59 16 90/25 (46) 100   


 


12/7/18 12:44  62 12     30


 


12/7/18 12:00  78 16 128/64 (85) 100   


 


12/7/18 12:00      Mechanical Ventilator  


 


12/7/18 12:00  59      


 


12/7/18 11:00  80 17 122/62 (82) 100   


 


12/7/18 10:42  66 12     30

















Intake and Output  


 


 12/7/18 12/8/18





 18:59 06:59


 


Intake Total 360 ml 2360 ml


 


Output Total  0 ml


 


Balance 360 ml 2360 ml


 


  


 


Tube Feeding 360 ml 360 ml


 


Hemodialysis  2000 ml


 


Output Urine Total  0 ml


 


# Bowel Movements  2








Objective


WDWN


NAD


intubated


aware


reduced breath sounds bilaterally without rhonchi or wheeze


[id number] without MRG


NABS nontender no HSM


no CCE


cachectic


nonfocal





Current Medications








 Medications


  (Trade)  Dose


 Ordered  Sig/Tony


 Route


 PRN Reason  Start Time


 Stop Time Status Last Admin


Dose Admin


 


 Acetaminophen


  (Tylenol)  650 mg  Q6H  PRN


 ORAL


 Mild Pain/Temp > 100.5  12/4/18 14:15


 1/3/19 14:14  12/8/18 05:08


 


 


 Amiodarone HCl


  (Cordarone)  200 mg  EVERY 8  HOURS


 NG


   12/6/18 14:00


 1/5/19 13:59  12/8/18 05:08


 


 


 Chlorhexidine


 Gluconate


  (Juana-Hex 2%)  1 applic  DAILY@2000


 TOPIC


   11/27/18 20:00


 12/27/18 19:59  12/7/18 20:19


 


 


 Collagenase


  (Santyl)  1 applic  QHS


 TOPIC


   12/6/18 21:00


 1/5/19 20:59  12/7/18 20:27


 


 


 Epoetin Rupesh


  (Procrit (for


 ESRD on dialysis))  10,000 units  MON-WED-FRI


 SUBQ


   11/28/18 21:00


 12/28/18 20:59  12/7/18 21:28


 


 


 Heparin Sodium


  (Porcine)


  (Heparin 5000


 units/ml)  5,000 units  EVERY 8  HOURS


 SUBQ


   12/7/18 22:00


 1/6/19 21:59  12/8/18 05:10


 


 


 Meropenem 500 mg/


 Sodium Chloride  55 ml @ 


 110 mls/hr  DAILY


 IVPB


   11/28/18 12:00


 12/12/18 11:59  12/8/18 08:37


 


 


 Metoprolol


 Tartrate


  (Lopressor)  12.5 mg  Q12HR


 ORAL


   12/3/18 21:00


 1/2/19 20:59  12/8/18 08:38


 


 


 Minoxidil


  (Loniten)  5 mg  Q12HR


 NG


   12/2/18 21:00


 1/1/19 20:59  12/8/18 08:37


 


 


 Pantoprazole


  (Protonix)  40 mg  Q12HR


 IVP


   11/26/18 21:00


 12/27/18 08:59  12/8/18 08:36


 


 


 Sodium Chloride  1,000 ml @ 


 500 mls/hr  Q2H PRN


 IVLG


 sbp<90 during hd  12/7/18 07:51


 1/6/19 07:50   


 

















Omar Luna MD Dec 8, 2018 10:13

## 2018-12-08 NOTE — NUR
CASE MANAGEMENT:REVIEW



12/8/18

SI: ACUTE RESPIRATORY FAILURE

SEPSIS. PNA. AFIB

99.7   67  12  98/14  100% ON VENT SUPPORT W/30% FIO2



IS: IV MEROPENEM QD

IV PROTONIX Q12

PROCRIT SQ MWF

AMIODARONE NG Q8HRS

HEPARIN SQ Q8HRS

**: ICU STATUS

DCP; PATIENT IS FROM [facility]

## 2018-12-08 NOTE — NEPHROLOGY PROGRESS NOTE
Assessment/Plan


Assessment


Seee below


Plan


MOF, improving slowly.


Sepsis due to infected diabetic ulcers. On IV Abx. Followed by ID, Vasc. Sx, 

Podiatry.


Septic Shock pressors PRN


ESRD HD q MWF 


Anemia of CKD , SAMANTHA high dose. Transfuse PRN.


Resp Failure - Vent per Pul. Trying to wean. KEVIN Cardoso.


A. 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 with LAE. LVEF 65% . Anticoagulate when 

stable. KEVIN Wade. In and out A. Fib.











If un weanable will need Trach + PEG ------> Subacute


DW pt's son @ bedside.





Subjective


Subjective


Awake + alert. No c/o





Objective


Objective





Last 24 Hour Vital Signs








  Date Time  Temp Pulse Resp B/P (MAP) Pulse Ox O2 Delivery O2 Flow Rate FiO2


 


12/8/18 08:38  74  98/13    


 


12/8/18 08:37    89/16    


 


12/8/18 08:00        30


 


12/8/18 08:00      Mechanical Ventilator  





      Mechanical Ventilator  





      Mechanical Ventilator  





      Mechanical Ventilator  


 


12/8/18 07:00 97.3 66 12 89/16 (40) 100   


 


12/8/18 06:56  67 12     30


 


12/8/18 06:00 97.5 68 10 91/17 (41) 99   


 


12/8/18 05:00 97.3 74 12 93/16 (41) 100   


 


12/8/18 04:52  70 12     30


 


12/8/18 04:06  71      


 


12/8/18 04:00        30


 


12/8/18 04:00 97.4 69 14 96/15 (42) 100   


 


12/8/18 04:00      Mechanical Ventilator  





      Mechanical Ventilator  





      Mechanical Ventilator  





      Mechanical Ventilator  


 


12/8/18 03:12  77 14     30


 


12/8/18 03:00 97.8 72 12 112/30 (57) 100   


 


12/8/18 02:00 97.6 65 12 83/14 (37) 100   


 


12/8/18 01:00  67 12 96/14 (41) 100   


 


12/8/18 01:00  67 12     30


 


12/8/18 00:00      Mechanical Ventilator  





      Mechanical Ventilator  





      Mechanical Ventilator  





      Mechanical Ventilator  


 


12/8/18 00:00  65      


 


12/8/18 00:00 97.7 68 12 97/13 (41) 100   


 


12/8/18 00:00        30


 


12/7/18 23:06  71 12     30


 


12/7/18 23:00 97.4 65 16 102/18 (46) 100   


 


12/7/18 22:00 97.5 79 18 97/18 (44) 100   


 


12/7/18 21:00 97.8 74 16 115/19 (51) 100   


 


12/7/18 20:54  66 12     30


 


12/7/18 20:26  74  115/19    


 


12/7/18 20:26    115/19    


 


12/7/18 20:00      Mechanical Ventilator  





      Mechanical Ventilator  





      Mechanical Ventilator  





      Mechanical Ventilator  


 


12/7/18 20:00 97.6 71 16 113/17 (49) 100   


 


12/7/18 20:00        30


 


12/7/18 19:52  69      


 


12/7/18 19:32  84 30   Mechanical Ventilator  60


 


12/7/18 19:00 98.4 67 16 99/24 (49) 100   


 


12/7/18 18:49  78 12     30


 


12/7/18 18:00  65 16 98/22 (47) 100   


 


12/7/18 17:00  72 16 99/27 (51) 100   


 


12/7/18 16:29  68 12     30


 


12/7/18 16:00        30


 


12/7/18 16:00  66      


 


12/7/18 16:00  67 16 99/30 (53) 100   


 


12/7/18 16:00      Mechanical Ventilator  


 


12/7/18 15:00  66 16 105/33 (57) 100   


 


12/7/18 14:44  64 12     30


 


12/7/18 14:00  65 16 98/30 (52) 100   


 


12/7/18 13:00 98.7 59 16 90/25 (46) 100   


 


12/7/18 12:44  62 12     30


 


12/7/18 12:00  78 16 128/64 (85) 100   


 


12/7/18 12:00      Mechanical Ventilator  


 


12/7/18 12:00  59      


 


12/7/18 11:00  80 17 122/62 (82) 100   


 


12/7/18 10:42  66 12     30

















Intake and Output  


 


 12/7/18 12/8/18





 19:00 07:00


 


Intake Total 360 ml 2360 ml


 


Output Total  0 ml


 


Balance 360 ml 2360 ml


 


  


 


Tube Feeding 360 ml 360 ml


 


Hemodialysis  2000 ml


 


Output Urine Total  0 ml


 


# Bowel Movements  2








Height (Feet):  5


Height (Inches):  2.00


Weight (Pounds):  131


Objective


Intubated, On vent.


BP measured more accurately with a smaller cuff placed on rt. FA.


Cv IRR/IRR


Lungs B shai.


Abd SNT. BS +


E Rt. foot diabetic ulcers with pus











Gavino Daigle MD Dec 8, 2018 10:38

## 2018-12-08 NOTE — NUR
NURSE NOTES:

Patient open eyes, followed commands, on ETT to vent ac12/tv500/fio2 30%/peep5, o2 
saturation 97% noted, NGT intact and patent, ongoing Nepro at 30ml/hr, residue 20ml noted, 
abdomen soft, no bowel movement, AV shunt to left upper arm, palpable thrill, bruit, Mid 
line to right upper arm, intact and patent, on p200 bed, kept hob over 30 degree, provided 
call light within reach, will continue to monitor.

## 2018-12-08 NOTE — NUR
RESPIRATORY NOTE:

received pt on vent, intubated with 7.5 ETT placed at 23 at the lip. ett secured via anchor 
fast with no redness around facial area. vent settings are as followed with alarms on and 
audible. vent plugged into red outlet with ambu bag at bedside. will cont to monitor.

## 2018-12-09 VITALS — SYSTOLIC BLOOD PRESSURE: 140 MMHG | DIASTOLIC BLOOD PRESSURE: 22 MMHG

## 2018-12-09 VITALS — SYSTOLIC BLOOD PRESSURE: 113 MMHG | DIASTOLIC BLOOD PRESSURE: 18 MMHG

## 2018-12-09 VITALS — DIASTOLIC BLOOD PRESSURE: 19 MMHG | SYSTOLIC BLOOD PRESSURE: 113 MMHG

## 2018-12-09 VITALS — DIASTOLIC BLOOD PRESSURE: 26 MMHG | SYSTOLIC BLOOD PRESSURE: 134 MMHG

## 2018-12-09 VITALS — DIASTOLIC BLOOD PRESSURE: 41 MMHG | SYSTOLIC BLOOD PRESSURE: 183 MMHG

## 2018-12-09 VITALS — DIASTOLIC BLOOD PRESSURE: 43 MMHG | SYSTOLIC BLOOD PRESSURE: 189 MMHG

## 2018-12-09 VITALS — SYSTOLIC BLOOD PRESSURE: 184 MMHG | DIASTOLIC BLOOD PRESSURE: 82 MMHG

## 2018-12-09 VITALS — DIASTOLIC BLOOD PRESSURE: 18 MMHG | SYSTOLIC BLOOD PRESSURE: 110 MMHG

## 2018-12-09 VITALS — DIASTOLIC BLOOD PRESSURE: 33 MMHG | SYSTOLIC BLOOD PRESSURE: 172 MMHG

## 2018-12-09 VITALS — DIASTOLIC BLOOD PRESSURE: 31 MMHG | SYSTOLIC BLOOD PRESSURE: 136 MMHG

## 2018-12-09 VITALS — SYSTOLIC BLOOD PRESSURE: 108 MMHG | DIASTOLIC BLOOD PRESSURE: 18 MMHG

## 2018-12-09 VITALS — DIASTOLIC BLOOD PRESSURE: 18 MMHG | SYSTOLIC BLOOD PRESSURE: 126 MMHG

## 2018-12-09 VITALS — SYSTOLIC BLOOD PRESSURE: 111 MMHG | DIASTOLIC BLOOD PRESSURE: 13 MMHG

## 2018-12-09 VITALS — DIASTOLIC BLOOD PRESSURE: 10 MMHG | SYSTOLIC BLOOD PRESSURE: 122 MMHG

## 2018-12-09 VITALS — SYSTOLIC BLOOD PRESSURE: 145 MMHG | DIASTOLIC BLOOD PRESSURE: 37 MMHG

## 2018-12-09 VITALS — DIASTOLIC BLOOD PRESSURE: 15 MMHG | SYSTOLIC BLOOD PRESSURE: 124 MMHG

## 2018-12-09 VITALS — SYSTOLIC BLOOD PRESSURE: 189 MMHG | DIASTOLIC BLOOD PRESSURE: 47 MMHG

## 2018-12-09 VITALS — DIASTOLIC BLOOD PRESSURE: 19 MMHG | SYSTOLIC BLOOD PRESSURE: 103 MMHG

## 2018-12-09 VITALS — SYSTOLIC BLOOD PRESSURE: 114 MMHG | DIASTOLIC BLOOD PRESSURE: 22 MMHG

## 2018-12-09 VITALS — SYSTOLIC BLOOD PRESSURE: 165 MMHG | DIASTOLIC BLOOD PRESSURE: 38 MMHG

## 2018-12-09 VITALS — SYSTOLIC BLOOD PRESSURE: 151 MMHG | DIASTOLIC BLOOD PRESSURE: 36 MMHG

## 2018-12-09 VITALS — DIASTOLIC BLOOD PRESSURE: 28 MMHG | SYSTOLIC BLOOD PRESSURE: 142 MMHG

## 2018-12-09 VITALS — SYSTOLIC BLOOD PRESSURE: 124 MMHG | DIASTOLIC BLOOD PRESSURE: 17 MMHG

## 2018-12-09 LAB
ADD MANUAL DIFF: NO
ANION GAP SERPL CALC-SCNC: 6 MMOL/L (ref 5–15)
BASOPHILS NFR BLD AUTO: 0.5 % (ref 0–2)
BUN SERPL-MCNC: 41 MG/DL (ref 7–18)
CALCIUM SERPL-MCNC: 8.8 MG/DL (ref 8.5–10.1)
CHLORIDE SERPL-SCNC: 100 MMOL/L (ref 98–107)
CO2 SERPL-SCNC: 34 MMOL/L (ref 21–32)
CREAT SERPL-MCNC: 3.1 MG/DL (ref 0.55–1.3)
EOSINOPHIL NFR BLD AUTO: 1.5 % (ref 0–3)
ERYTHROCYTE [DISTWIDTH] IN BLOOD BY AUTOMATED COUNT: 18.8 % (ref 11.6–14.8)
HCT VFR BLD CALC: 28.3 % (ref 37–47)
HGB BLD-MCNC: 8.8 G/DL (ref 12–16)
LYMPHOCYTES NFR BLD AUTO: 9.9 % (ref 20–45)
MCV RBC AUTO: 92 FL (ref 80–99)
MONOCYTES NFR BLD AUTO: 7.2 % (ref 1–10)
NEUTROPHILS NFR BLD AUTO: 80.9 % (ref 45–75)
PHOSPHATE SERPL-MCNC: 2.4 MG/DL (ref 2.5–4.9)
PLATELET # BLD: 207 K/UL (ref 150–450)
POTASSIUM SERPL-SCNC: 2.8 MMOL/L (ref 3.5–5.1)
RBC # BLD AUTO: 3.06 M/UL (ref 4.2–5.4)
SODIUM SERPL-SCNC: 140 MMOL/L (ref 136–145)
WBC # BLD AUTO: 7.7 K/UL (ref 4.8–10.8)

## 2018-12-09 RX ADMIN — PANTOPRAZOLE SODIUM SCH MG: 40 INJECTION, POWDER, FOR SOLUTION INTRAVENOUS at 20:26

## 2018-12-09 RX ADMIN — MINOXIDIL SCH MG: 2.5 TABLET ORAL at 20:25

## 2018-12-09 RX ADMIN — HEPARIN SODIUM SCH UNITS: 5000 INJECTION INTRAVENOUS; SUBCUTANEOUS at 14:20

## 2018-12-09 RX ADMIN — METOPROLOL TARTRATE SCH MG: 25 TABLET, FILM COATED ORAL at 08:46

## 2018-12-09 RX ADMIN — AMIODARONE HYDROCHLORIDE SCH MG: 200 TABLET ORAL at 08:45

## 2018-12-09 RX ADMIN — LABETALOL HCL SCH MG: 200 TABLET, FILM COATED ORAL at 10:02

## 2018-12-09 RX ADMIN — HEPARIN SODIUM SCH UNITS: 5000 INJECTION INTRAVENOUS; SUBCUTANEOUS at 05:35

## 2018-12-09 RX ADMIN — CHLORHEXIDINE GLUCONATE SCH APPLIC: 213 SOLUTION TOPICAL at 20:25

## 2018-12-09 RX ADMIN — MEROPENEM SCH MLS/HR: 500 INJECTION INTRAVENOUS at 08:45

## 2018-12-09 RX ADMIN — MINOXIDIL SCH MG: 2.5 TABLET ORAL at 08:45

## 2018-12-09 RX ADMIN — HEPARIN SODIUM SCH UNITS: 5000 INJECTION INTRAVENOUS; SUBCUTANEOUS at 22:00

## 2018-12-09 RX ADMIN — VITAMIN D, TAB 1000IU (100/BT) SCH INTLU: 25 TAB at 10:25

## 2018-12-09 RX ADMIN — AMIODARONE HYDROCHLORIDE SCH MG: 200 TABLET ORAL at 20:25

## 2018-12-09 RX ADMIN — LABETALOL HCL SCH MG: 200 TABLET, FILM COATED ORAL at 20:26

## 2018-12-09 RX ADMIN — PANTOPRAZOLE SODIUM SCH MG: 40 INJECTION, POWDER, FOR SOLUTION INTRAVENOUS at 08:45

## 2018-12-09 NOTE — NUR
NURSE NOTES:

Dr Luna notified of self extubation, post ABG levels.   said ok to leave pt on 
non-rebreather.  Will continue to monitor.

## 2018-12-09 NOTE — NUR
CASE MANAGEMENT:REVIEW



12/9/18

SI: ACUTE RESPIRATORY FAILURE..INTUBATED

SEPSIS. PNA. AFIB

98.2   76  13  124/15  95% ON VENT SUPPORT W/30% FIO2

PCO2+57.2   HCO3+34.7 K-2.8  BUN+41  CR+3.1  PHOS-2.4  H/H-8.8/28.3



IS: IV K-PHOS X1

IV MEROPENEM QD

LABETALOL NG Q12

AMIODARONE NG Q8HRS

HEPARIN SQ Q8HRS

MINOXIDIL NG Q12

PROCRIT SQ MWF

IV PROTONIX Q12

**: ICU STATUS

DCP; PATIENT IS FROM [facility]

## 2018-12-09 NOTE — CARDIOLOGY PROGRESS NOTE
Assessment/Plan


Problem List:  


(1) Paroxysmal A-fib


(2) Bradycardia


(3) Sepsis


(4) Hypotension


(5) Pneumonia


Status:  stable, progressing


Status Narrative


Pt was being weaned from vent today, but self-extubated this am.


She is maintaining adequate oxygen sats so far, on FM oxygen


She is normotensive, off pressors.


She had an episode of AF lasting a few minutes and became hypertensive, shortly 

after extubated. AF reverted back to SR spontaneously. 


Transient bradycardia  to 40 bpm  after given labetalol 200 mg for hypertension.


Assessment/Plan


Continue supplemental o2 per FM.  Followup CXR,


 ABG w/ adequate po2, elev pco2, but nl ph.


Will continue amiodarone 200 mg bid per ng tube, as pt still w/ brief episodes 

of AF.  May need to dec labetalol or change to other agent ( ? hydralazine) if 

sustained bradycardia.  


HD per Dr. Daigle.





Subjective


ROS Limited/Unobtainable:  No


Subjective


Cardiology for Dr. Wade





Pt self-extubated this am. She c/o dyspnea. Currently on FM oxygen





Objective





Last 24 Hour Vital Signs








  Date Time  Temp Pulse Resp B/P (MAP) Pulse Ox O2 Delivery O2 Flow Rate FiO2


 


12/9/18 13:00  79 18 140/22 (61) 100   


 


12/9/18 12:00        30


 


12/9/18 12:00      Mechanical Ventilator  





      Mechanical Ventilator  





      Mechanical Ventilator  





      Mechanical Ventilator  


 


12/9/18 12:00 98.0 84 25 142/28 (66) 95   


 


12/9/18 12:00  73      


 


12/9/18 11:00  70 20 122/10 (47) 100   


 


12/9/18 10:02  116  202/65    


 


12/9/18 10:00  72 23 111/13 (45) 100   


 


12/9/18 09:06      Non-Rebreather 15.0 100


 


12/9/18 09:06     98 Non-Rebreather 15.0 100


 


12/9/18 09:05      Non-Rebreather 15.0 100


 


12/9/18 09:00  126 25 189/43 (91) 98   


 


12/9/18 08:48     95   


 


12/9/18 08:46  76  124/15    


 


12/9/18 08:45    124/15    


 


12/9/18 08:31  90 23     30


 


12/9/18 08:00      Mechanical Ventilator  





      Mechanical Ventilator  





      Mechanical Ventilator  





      Mechanical Ventilator  


 


12/9/18 08:00        30


 


12/9/18 08:00 98.2 76 13 124/15 (51) 95   


 


12/9/18 08:00  103      


 


12/9/18 07:00  73 13 124/17 (52) 97   


 


12/9/18 06:50  75 14     30


 


12/9/18 06:00  74 14 126/18 (54) 97   


 


12/9/18 05:00  67 12 113/19 (50) 98   


 


12/9/18 04:55  66 12     30


 


12/9/18 04:00      Mechanical Ventilator  





      Mechanical Ventilator  





      Mechanical Ventilator  





      Mechanical Ventilator  


 


12/9/18 04:00        30


 


12/9/18 04:00 97.9 70 12 113/18 (49) 98   


 


12/9/18 03:21  76      


 


12/9/18 03:00  76 13 103/19 (47) 96   


 


12/9/18 02:50  82 14     30


 


12/9/18 02:00  76 15 114/22 (52) 96   


 


12/9/18 01:00  79 14 110/18 (48) 96   


 


12/9/18 00:37  74 14     30


 


12/9/18 00:00      Mechanical Ventilator  





      Mechanical Ventilator  





      Mechanical Ventilator  





      Mechanical Ventilator  


 


12/9/18 00:00        30


 


12/9/18 00:00 97.6 76 15 110/18 (48) 96   


 


12/8/18 23:33  78      


 


12/8/18 23:00  79 13 112/21 (51) 96   


 


12/8/18 22:58  69 14     30


 


12/8/18 22:00  76 13 115/21 (52) 97   


 


12/8/18 21:12    106/17    


 


12/8/18 21:11  67  106/17    


 


12/8/18 21:00  68 12 106/17 (46) 99   


 


12/8/18 20:56  68 18     30


 


12/8/18 20:00  67      


 


12/8/18 20:00        30


 


12/8/18 20:00 98.7 67 12 94/20 (44) 98   


 


12/8/18 20:00      Mechanical Ventilator  





      Mechanical Ventilator  





      Mechanical Ventilator  





      Mechanical Ventilator  


 


12/8/18 19:12  69 12     30


 


12/8/18 19:00  67 12 100/17 (44) 97   


 


12/8/18 18:00  74 13 103/91 (95) 98   


 


12/8/18 17:25  66 13     30


 


12/8/18 17:01  66 12 85/21 (42) 99   


 


12/8/18 16:00  67      


 


12/8/18 16:00 99.0 65 12 100/20 (46) 98   


 


12/8/18 16:00      Mechanical Ventilator  





      Mechanical Ventilator  





      Mechanical Ventilator  





      Mechanical Ventilator  


 


12/8/18 16:00        30


 


12/8/18 15:25  67 12     30


 


12/8/18 15:00  65 12 98/18 (44) 99   


 


12/8/18 14:00  64 13 88/22 (44) 100   








General Appearance:  WD/WN, no apparent distress, alert


EENT:  PERRL/EOMI


Neck:  supple, no JVD


Rhythm:  NSR


Cardiovascular:  normal rate, regular rhythm, no gallop/murmur


Respiratory/Chest:  no respiratory distress, other - fairly clear bilaterally


Abdomen:  non tender, soft


Extremities:  no swelling, other - R leg dressing











Intake and Output  


 


 12/8/18 12/9/18





 19:00 07:00


 


Intake Total 415 ml 410 ml


 


Output Total 0 ml 1 ml


 


Balance 415 ml 409 ml


 


  


 


IV Total 55 ml 


 


Tube Feeding 360 ml 360 ml


 


Other  50 ml


 


Output Urine Total 0 ml 1 ml


 


# Bowel Movements  2











Laboratory Tests








Test


  12/9/18


04:50 12/9/18


09:38


 


White Blood Count


  7.7 K/UL


(4.8-10.8) 


 


 


Red Blood Count


  3.06 M/UL


(4.20-5.40)  L 


 


 


Hemoglobin


  8.8 G/DL


(12.0-16.0)  L 


 


 


Hematocrit


  28.3 %


(37.0-47.0)  L 


 


 


Mean Corpuscular Volume 92 FL (80-99)   


 


Mean Corpuscular Hemoglobin


  28.7 PG


(27.0-31.0) 


 


 


Mean Corpuscular Hemoglobin


Concent 31.0 G/DL


(32.0-36.0)  L 


 


 


Red Cell Distribution Width


  18.8 %


(11.6-14.8)  H 


 


 


Platelet Count


  207 K/UL


(150-450) 


 


 


Mean Platelet Volume


  6.2 FL


(6.5-10.1)  L 


 


 


Neutrophils (%) (Auto)


  80.9 %


(45.0-75.0)  H 


 


 


Lymphocytes (%) (Auto)


  9.9 %


(20.0-45.0)  L 


 


 


Monocytes (%) (Auto)


  7.2 %


(1.0-10.0) 


 


 


Eosinophils (%) (Auto)


  1.5 %


(0.0-3.0) 


 


 


Basophils (%) (Auto)


  0.5 %


(0.0-2.0) 


 


 


Sodium Level


  140 MMOL/L


(136-145) 


 


 


Potassium Level


  2.8 MMOL/L


(3.5-5.1)  L 


 


 


Chloride Level


  100 MMOL/L


() 


 


 


Carbon Dioxide Level


  34 MMOL/L


(21-32)  H 


 


 


Anion Gap


  6 mmol/L


(5-15) 


 


 


Blood Urea Nitrogen


  41 mg/dL


(7-18)  H 


 


 


Creatinine


  3.1 MG/DL


(0.55-1.30)  H 


 


 


Estimat Glomerular Filtration


Rate  mL/min (>60)  


  


 


 


Glucose Level


  121 MG/DL


()  H 


 


 


Calcium Level


  8.8 MG/DL


(8.5-10.1) 


 


 


Phosphorus Level


  2.4 MG/DL


(2.5-4.9)  L 


 


 


Magnesium Level


  2.3 MG/DL


(1.8-2.4) 


 


 


Arterial Blood pH


  


  7.400


(7.350-7.450)


 


Arterial Blood Partial


Pressure CO2 


  57.2 mmHg


(35.0-45.0)  *H


 


Arterial Blood Partial


Pressure O2 


  99.5 mmHg


(75.0-100.0)


 


Arterial Blood HCO3


  


  34.7 mmol/L


(22.0-26.0)  H


 


Arterial Blood Oxygen


Saturation 


  97.0 %


()


 


Arterial Blood Base Excess  8.3 (-2-2)  H


 


Arcadio Test  Positive  

















Alexandra Acosta MD Dec 9, 2018 14:06

## 2018-12-09 NOTE — NUR
NURSE NOTES:

Dressing changed on right leg.  Pictures taken.  Son at bedside.  Will continue to monitor.

## 2018-12-09 NOTE — NEPHROLOGY PROGRESS NOTE
Assessment/Plan


Assessment


Seee below


Plan


MOF, improving slowly. Extubated


Sepsis due to infected diabetic ulcers. On IV Abx. Followed by ID, Vasc. Sx, 

Podiatry.





ESRD HD q MWF 


Anemia of CKD , SAMANTHA high dose. Transfuse PRN.





A. 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 with LAE. LVEF 65% . Anticoagulate when 

stable. DW Dr. Wade. In and out A. Fib.











See orders.





Subjective


Subjective


Self extubated. Anxious but doing well!





Objective


Objective





Last 24 Hour Vital Signs








  Date Time  Temp Pulse Resp B/P (MAP) Pulse Ox O2 Delivery O2 Flow Rate FiO2


 


12/9/18 08:48     95   


 


12/9/18 08:46  76  124/15    


 


12/9/18 08:45    124/15    


 


12/9/18 08:31  90 23     30


 


12/9/18 08:00      Mechanical Ventilator  





      Mechanical Ventilator  





      Mechanical Ventilator  





      Mechanical Ventilator  


 


12/9/18 08:00        30


 


12/9/18 08:00 98.2 76 13 124/15 (51) 95   


 


12/9/18 07:00  73 13 124/17 (52) 97   


 


12/9/18 06:50  75 14     30


 


12/9/18 06:00  74 14 126/18 (54) 97   


 


12/9/18 05:00  67 12 113/19 (50) 98   


 


12/9/18 04:55  66 12     30


 


12/9/18 04:00      Mechanical Ventilator  





      Mechanical Ventilator  





      Mechanical Ventilator  





      Mechanical Ventilator  


 


12/9/18 04:00        30


 


12/9/18 04:00 97.9 70 12 113/18 (49) 98   


 


12/9/18 03:21  76      


 


12/9/18 03:00  76 13 103/19 (47) 96   


 


12/9/18 02:50  82 14     30


 


12/9/18 02:00  76 15 114/22 (52) 96   


 


12/9/18 01:00  79 14 110/18 (48) 96   


 


12/9/18 00:37  74 14     30


 


12/9/18 00:00      Mechanical Ventilator  





      Mechanical Ventilator  





      Mechanical Ventilator  





      Mechanical Ventilator  


 


12/9/18 00:00        30


 


12/9/18 00:00 97.6 76 15 110/18 (48) 96   


 


12/8/18 23:33  78      


 


12/8/18 23:00  79 13 112/21 (51) 96   


 


12/8/18 22:58  69 14     30


 


12/8/18 22:00  76 13 115/21 (52) 97   


 


12/8/18 21:12    106/17    


 


12/8/18 21:11  67  106/17    


 


12/8/18 21:00  68 12 106/17 (46) 99   


 


12/8/18 20:56  68 18     30


 


12/8/18 20:00  67      


 


12/8/18 20:00        30


 


12/8/18 20:00 98.7 67 12 94/20 (44) 98   


 


12/8/18 20:00      Mechanical Ventilator  





      Mechanical Ventilator  





      Mechanical Ventilator  





      Mechanical Ventilator  


 


12/8/18 19:12  69 12     30


 


12/8/18 19:00  67 12 100/17 (44) 97   


 


12/8/18 18:00  74 13 103/91 (95) 98   


 


12/8/18 17:25  66 13     30


 


12/8/18 17:01  66 12 85/21 (42) 99   


 


12/8/18 16:00  67      


 


12/8/18 16:00 99.0 65 12 100/20 (46) 98   


 


12/8/18 16:00      Mechanical Ventilator  





      Mechanical Ventilator  





      Mechanical Ventilator  





      Mechanical Ventilator  


 


12/8/18 16:00        30


 


12/8/18 15:25  67 12     30


 


12/8/18 15:00  65 12 98/18 (44) 99   


 


12/8/18 14:00  64 13 88/22 (44) 100   


 


12/8/18 13:25  64 12     30


 


12/8/18 13:00  65 12 93/25 (47) 100   


 


12/8/18 12:00  72      


 


12/8/18 12:00        30


 


12/8/18 12:00      Mechanical Ventilator  





      Mechanical Ventilator  





      Mechanical Ventilator  





      Mechanical Ventilator  


 


12/8/18 12:00 99.4 68 12 116/22 (53) 100   


 


12/8/18 11:30  77 15 94/77 (83) 99   


 


12/8/18 11:00  66 13 105/14 (44) 99   


 


12/8/18 10:43  66 12     30


 


12/8/18 10:00  63 10 99/14 (42) 100   

















Intake and Output  


 


 12/8/18 12/9/18





 19:00 07:00


 


Intake Total 415 ml 410 ml


 


Output Total 0 ml 1 ml


 


Balance 415 ml 409 ml


 


  


 


IV Total 55 ml 


 


Tube Feeding 360 ml 360 ml


 


Other  50 ml


 


Output Urine Total 0 ml 1 ml


 


# Bowel Movements  2








Laboratory Tests


12/9/18 04:50: 


White Blood Count 7.7, Red Blood Count 3.06L, Hemoglobin 8.8L, Hematocrit 28.3L

, Mean Corpuscular Volume 92, Mean Corpuscular Hemoglobin 28.7, Mean 

Corpuscular Hemoglobin Concent 31.0L, Red Cell Distribution Width 18.8H, 

Platelet Count 207, Mean Platelet Volume 6.2L, Neutrophils (%) (Auto) 80.9H, 

Lymphocytes (%) (Auto) 9.9L, Monocytes (%) (Auto) 7.2, Eosinophils (%) (Auto) 

1.5, Basophils (%) (Auto) 0.5, Sodium Level 140, Potassium Level 2.8L, Chloride 

Level 100, Carbon Dioxide Level 34H, Anion Gap 6, Blood Urea Nitrogen 41H, 

Creatinine 3.1H, Estimat Glomerular Filtration Rate , Glucose Level 121H, 

Calcium Level 8.8, Phosphorus Level 2.4L, Magnesium Level 2.3


Height (Feet):  5


Height (Inches):  2.00


Weight (Pounds):  120


Objective


Extubated. /70


BP measured more accurately with a smaller cuff placed on rt. FA.


Cv IRR/IRR


Lungs B ronchi.


Abd SNT. BS +


E Rt. foot diabetic ulcers











Gavino Daigle MD Dec 9, 2018 09:40

## 2018-12-09 NOTE — NUR
NURSE NOTES:

Repositioned patient, provided oral care and suctioned. Patient remains on non-rebreather 
mask. VS have been stable, no acute distress. Will continue to monitor.

## 2018-12-09 NOTE — NUR
RESPIRATORY NOTE:





RT was called to the unit, per RN patient had self extubated. Arrived to find patient fully 
extubated with ETT tube in hand. Suctioned mouth without incident and placed patient on 
Non-Rebreather 100% at 15L. There was no stridor noted. Patient was agitated and non 
compliant however no respiratory distress noted. Will follow with ABG. Patient maintaining 
sats. Will continue to monitor.

## 2018-12-09 NOTE — NUR
NURSE NOTES:

Patient repositioned and provided oral care, released from the restraints to provided 
passive range of motion and then reapplied, due to observing removing devices. Patient 
remains on Non-rebreather mask at 15L/100%O2. Saturating 100%, When removed from 
non-rebreather mask patient begins to desaturate quickly. No acute distress at this time, 
will continue to monitor patients progress.

## 2018-12-09 NOTE — NUR
NURSE NOTES:

Pt self extubated, pt pulling on NGT.  Got order for bilateral wrist restraints.  Applied 
100% non-rebreather, O2 saturation 100%.  Pt agitated and talkative.  Dr Daigle here to 
see pt.  Electrolyte orders put in.  Will continue to monitor.

## 2018-12-09 NOTE — NUR
NURSE NOTES:

Patient received from Nevaeh RN. Patient noted to be awake, verbal at times and confused at 
times. Patient was extubated today around 0900 and since then has had Non-rebreather mask at 
15L/min...100% O2. Patient noted to have VALE midline and a right AV shunt with brute and 
thrill noted on auscultation. BP now is 175/48, 100% SpO2, HR 80SR, and RR of 18. 
temperature is 98.6F. Patient noted to have several wounds. P200 mattress noted. L NGT 
running Npero at 30ml/hr. Patient is running NS TKO at midline. NAD at this time, patient 
seems to be relaxed and content, bilateral writs restraints also noted, without any skin 
issues on wrist, and pulse were also noted.

## 2018-12-09 NOTE — PULMONOLOGY PROGRESS NOTE
Assessment/Plan


Assessment/Plan


1. Hypotension.


2. Sepsis.


3. Atrial fibrillation.


4. Bradycardia.


5. Diabetes mellitus.


6. End-stage renal disease on dialysis.


7. Left lung collapse. s/p bronchoscopy 


8. Respiratory failure, acute


9. Dysphagia.


10. Dementia.


11. Anemia


12. Pneumonia





PLAN


respiratory care noted


try to wean; orders written


monitor imaging for change


oxygen taper and monitor needs


aspiration precautions


keep negative


supportive care


will follow up and recommend for change


impression, plan, and exam edited and reviewed in detail


care discussed with RN





medications/laboratory data/nursing notes/ICU care reviewed in detail


note reviewed and edited


care discussed with RN and RT


ICU time spent 38  minutes





Subjective


ROS Limited/Unobtainable:  Yes


Allergies:  


Coded Allergies:  


     No Known Allergies (Unverified , 11/26/18)


Subjective


care noted and reviewed


no distress at present


events reviewed





Objective





Last 24 Hour Vital Signs








  Date Time  Temp Pulse Resp B/P (MAP) Pulse Ox O2 Delivery O2 Flow Rate FiO2


 


12/9/18 08:46  76  124/15    


 


12/9/18 08:45    124/15    


 


12/9/18 08:00      Mechanical Ventilator  





      Mechanical Ventilator  





      Mechanical Ventilator  





      Mechanical Ventilator  


 


12/9/18 08:00        30


 


12/9/18 08:00 98.2 76 13 124/15 (51) 95   


 


12/9/18 07:00  73 13 124/17 (52) 97   


 


12/9/18 06:50  75 14     30


 


12/9/18 06:00  74 14 126/18 (54) 97   


 


12/9/18 05:00  67 12 113/19 (50) 98   


 


12/9/18 04:55  66 12     30


 


12/9/18 04:00      Mechanical Ventilator  





      Mechanical Ventilator  





      Mechanical Ventilator  





      Mechanical Ventilator  


 


12/9/18 04:00        30


 


12/9/18 04:00 97.9 70 12 113/18 (49) 98   


 


12/9/18 03:21  76      


 


12/9/18 03:00  76 13 103/19 (47) 96   


 


12/9/18 02:50  82 14     30


 


12/9/18 02:00  76 15 114/22 (52) 96   


 


12/9/18 01:00  79 14 110/18 (48) 96   


 


12/9/18 00:37  74 14     30


 


12/9/18 00:00      Mechanical Ventilator  





      Mechanical Ventilator  





      Mechanical Ventilator  





      Mechanical Ventilator  


 


12/9/18 00:00        30


 


12/9/18 00:00 97.6 76 15 110/18 (48) 96   


 


12/8/18 23:33  78      


 


12/8/18 23:00  79 13 112/21 (51) 96   


 


12/8/18 22:58  69 14     30


 


12/8/18 22:00  76 13 115/21 (52) 97   


 


12/8/18 21:12    106/17    


 


12/8/18 21:11  67  106/17    


 


12/8/18 21:00  68 12 106/17 (46) 99   


 


12/8/18 20:56  68 18     30


 


12/8/18 20:00  67      


 


12/8/18 20:00        30


 


12/8/18 20:00 98.7 67 12 94/20 (44) 98   


 


12/8/18 20:00      Mechanical Ventilator  





      Mechanical Ventilator  





      Mechanical Ventilator  





      Mechanical Ventilator  


 


12/8/18 19:12  69 12     30


 


12/8/18 19:00  67 12 100/17 (44) 97   


 


12/8/18 18:00  74 13 103/91 (95) 98   


 


12/8/18 17:25  66 13     30


 


12/8/18 17:01  66 12 85/21 (42) 99   


 


12/8/18 16:00  67      


 


12/8/18 16:00 99.0 65 12 100/20 (46) 98   


 


12/8/18 16:00      Mechanical Ventilator  





      Mechanical Ventilator  





      Mechanical Ventilator  





      Mechanical Ventilator  


 


12/8/18 16:00        30


 


12/8/18 15:25  67 12     30


 


12/8/18 15:00  65 12 98/18 (44) 99   


 


12/8/18 14:00  64 13 88/22 (44) 100   


 


12/8/18 13:25  64 12     30


 


12/8/18 13:00  65 12 93/25 (47) 100   


 


12/8/18 12:00  72      


 


12/8/18 12:00        30


 


12/8/18 12:00      Mechanical Ventilator  





      Mechanical Ventilator  





      Mechanical Ventilator  





      Mechanical Ventilator  


 


12/8/18 12:00 99.4 68 12 116/22 (53) 100   


 


12/8/18 11:30  77 15 94/77 (83) 99   


 


12/8/18 11:00  66 13 105/14 (44) 99   


 


12/8/18 10:43  66 12     30


 


12/8/18 10:00  63 10 99/14 (42) 100   


 


12/8/18 09:00  65 7 87/17 (40) 100   

















Intake and Output  


 


 12/8/18 12/9/18





 19:00 07:00


 


Intake Total 415 ml 410 ml


 


Output Total 0 ml 1 ml


 


Balance 415 ml 409 ml


 


  


 


IV Total 55 ml 


 


Tube Feeding 360 ml 360 ml


 


Other  50 ml


 


Output Urine Total 0 ml 1 ml


 


# Bowel Movements  2








Objective


WDWN


NAD


intubated


aware


reduced breath sounds bilaterally without rhonchi or wheeze


[id number] without MRG


NABS nontender no HSM


no CCE


cachectic


nonfocal


Laboratory Tests


12/9/18 04:50: 


White Blood Count 7.7, Red Blood Count 3.06L, Hemoglobin 8.8L, Hematocrit 28.3L

, Mean Corpuscular Volume 92, Mean Corpuscular Hemoglobin 28.7, Mean 

Corpuscular Hemoglobin Concent 31.0L, Red Cell Distribution Width 18.8H, 

Platelet Count 207, Mean Platelet Volume 6.2L, Neutrophils (%) (Auto) 80.9H, 

Lymphocytes (%) (Auto) 9.9L, Monocytes (%) (Auto) 7.2, Eosinophils (%) (Auto) 

1.5, Basophils (%) (Auto) 0.5, Sodium Level 140, Potassium Level 2.8L, Chloride 

Level 100, Carbon Dioxide Level 34H, Anion Gap 6, Blood Urea Nitrogen 41H, 

Creatinine 3.1H, Estimat Glomerular Filtration Rate , Glucose Level 121H, 

Calcium Level 8.8, Phosphorus Level 2.4L, Magnesium Level 2.3





Current Medications








 Medications


  (Trade)  Dose


 Ordered  Sig/Tony


 Route


 PRN Reason  Start Time


 Stop Time Status Last Admin


Dose Admin


 


 Acetaminophen


  (Tylenol)  650 mg  Q6H  PRN


 ORAL


 Mild Pain/Temp > 100.5  12/4/18 14:15


 1/3/19 14:14  12/8/18 05:08


 


 


 Amiodarone HCl


  (Cordarone)  200 mg  EVERY 12  HOURS


 NG


   12/8/18 21:00


 1/5/19 13:59  12/9/18 08:45


 


 


 Chlorhexidine


 Gluconate


  (Juana-Hex 2%)  1 applic  DAILY@2000


 TOPIC


   11/27/18 20:00


 12/27/18 19:59  12/8/18 19:42


 


 


 Collagenase


  (Santyl)  1 applic  QHS


 TOPIC


   12/6/18 21:00


 1/5/19 20:59  12/8/18 21:11


 


 


 Epoetin Rupesh


  (Procrit (for


 ESRD on dialysis))  10,000 units  MON-WED-FRI


 SUBQ


   11/28/18 21:00


 12/28/18 20:59  12/7/18 21:28


 


 


 Heparin Sodium


  (Porcine)


  (Heparin 5000


 units/ml)  5,000 units  EVERY 8  HOURS


 SUBQ


   12/7/18 22:00


 1/6/19 21:59  12/9/18 05:35


 


 


 Meropenem 500 mg/


 Sodium Chloride  55 ml @ 


 110 mls/hr  DAILY


 IVPB


   11/28/18 12:00


 12/12/18 11:59  12/9/18 08:45


 


 


 Metoprolol


 Tartrate


  (Lopressor)  12.5 mg  Q12HR


 ORAL


   12/3/18 21:00


 1/2/19 20:59  12/9/18 08:46


 


 


 Minoxidil


  (Loniten)  5 mg  Q12HR


 NG


   12/2/18 21:00


 1/1/19 20:59  12/9/18 08:45


 


 


 Pantoprazole


  (Protonix)  40 mg  Q12HR


 IVP


   11/26/18 21:00


 12/27/18 08:59  12/9/18 08:45


 


 


 Sodium Chloride  1,000 ml @ 


 500 mls/hr  Q2H PRN


 IVLG


 sbp<90 during hd  12/7/18 07:51


 1/6/19 07:50   


 

















Omar Luna MD Dec 9, 2018 08:47

## 2018-12-09 NOTE — GENERAL SURGERY PROGRESS NOTE
General Surgery-Progress Note


Subjective


Procedure Performed


left subclavian central venous catheter insertion


Additional Comments


self extubated.  okay right now but desaturates quickly without O2





Objective





Last 24 Hour Vital Signs








  Date Time  Temp Pulse Resp B/P (MAP) Pulse Ox O2 Delivery O2 Flow Rate FiO2


 


12/9/18 14:00  78 22 136/31 (66) 100   


 


12/9/18 13:00  79 18 140/22 (61) 100   


 


12/9/18 12:00        30


 


12/9/18 12:00      Mechanical Ventilator  





      Mechanical Ventilator  





      Mechanical Ventilator  





      Mechanical Ventilator  


 


12/9/18 12:00 98.0 84 25 142/28 (66) 95   


 


12/9/18 12:00  73      


 


12/9/18 11:00  70 20 122/10 (47) 100   


 


12/9/18 10:02  116  202/65    


 


12/9/18 10:00  72 23 111/13 (45) 100   


 


12/9/18 09:06      Non-Rebreather 15.0 100


 


12/9/18 09:06     98 Non-Rebreather 15.0 100


 


12/9/18 09:05      Non-Rebreather 15.0 100


 


12/9/18 09:00  126 25 189/43 (91) 98   


 


12/9/18 08:48     95   


 


12/9/18 08:46  76  124/15    


 


12/9/18 08:45    124/15    


 


12/9/18 08:31  90 23     30


 


12/9/18 08:00      Mechanical Ventilator  





      Mechanical Ventilator  





      Mechanical Ventilator  





      Mechanical Ventilator  


 


12/9/18 08:00        30


 


12/9/18 08:00 98.2 76 13 124/15 (51) 95   


 


12/9/18 08:00  103      


 


12/9/18 07:00  73 13 124/17 (52) 97   


 


12/9/18 06:50  75 14     30


 


12/9/18 06:00  74 14 126/18 (54) 97   


 


12/9/18 05:00  67 12 113/19 (50) 98   


 


12/9/18 04:55  66 12     30


 


12/9/18 04:00      Mechanical Ventilator  





      Mechanical Ventilator  





      Mechanical Ventilator  





      Mechanical Ventilator  


 


12/9/18 04:00        30


 


12/9/18 04:00 97.9 70 12 113/18 (49) 98   


 


12/9/18 03:21  76      


 


12/9/18 03:00  76 13 103/19 (47) 96   


 


12/9/18 02:50  82 14     30


 


12/9/18 02:00  76 15 114/22 (52) 96   


 


12/9/18 01:00  79 14 110/18 (48) 96   


 


12/9/18 00:37  74 14     30


 


12/9/18 00:00      Mechanical Ventilator  





      Mechanical Ventilator  





      Mechanical Ventilator  





      Mechanical Ventilator  


 


12/9/18 00:00        30


 


12/9/18 00:00 97.6 76 15 110/18 (48) 96   


 


12/8/18 23:33  78      


 


12/8/18 23:00  79 13 112/21 (51) 96   


 


12/8/18 22:58  69 14     30


 


12/8/18 22:00  76 13 115/21 (52) 97   


 


12/8/18 21:12    106/17    


 


12/8/18 21:11  67  106/17    


 


12/8/18 21:00  68 12 106/17 (46) 99   


 


12/8/18 20:56  68 18     30


 


12/8/18 20:00  67      


 


12/8/18 20:00        30


 


12/8/18 20:00 98.7 67 12 94/20 (44) 98   


 


12/8/18 20:00      Mechanical Ventilator  





      Mechanical Ventilator  





      Mechanical Ventilator  





      Mechanical Ventilator  


 


12/8/18 19:12  69 12     30


 


12/8/18 19:00  67 12 100/17 (44) 97   


 


12/8/18 18:00  74 13 103/91 (95) 98   


 


12/8/18 17:25  66 13     30


 


12/8/18 17:01  66 12 85/21 (42) 99   


 


12/8/18 16:00  67      


 


12/8/18 16:00 99.0 65 12 100/20 (46) 98   


 


12/8/18 16:00      Mechanical Ventilator  





      Mechanical Ventilator  





      Mechanical Ventilator  





      Mechanical Ventilator  


 


12/8/18 16:00        30


 


12/8/18 15:25  67 12     30








I&O











Intake and Output  


 


 12/8/18 12/9/18





 18:59 06:59


 


Intake Total 415 ml 410 ml


 


Output Total 0 ml 1 ml


 


Balance 415 ml 409 ml


 


  


 


IV Total 55 ml 


 


Tube Feeding 360 ml 360 ml


 


Other  50 ml


 


Output Urine Total 0 ml 1 ml


 


# Bowel Movements  2








Dressing:  saturated


Wound:  other


Drains:  other


Cardiovascular:  RSR


Respiratory:  decreased breath sounds


Abdomen:  soft, non-tender, present bowel sounds


Extremities:  other





Laboratory Tests








Test


  12/9/18


04:50 12/9/18


09:38


 


White Blood Count


  7.7 K/UL


(4.8-10.8) 


 


 


Red Blood Count


  3.06 M/UL


(4.20-5.40)  L 


 


 


Hemoglobin


  8.8 G/DL


(12.0-16.0)  L 


 


 


Hematocrit


  28.3 %


(37.0-47.0)  L 


 


 


Mean Corpuscular Volume 92 FL (80-99)   


 


Mean Corpuscular Hemoglobin


  28.7 PG


(27.0-31.0) 


 


 


Mean Corpuscular Hemoglobin


Concent 31.0 G/DL


(32.0-36.0)  L 


 


 


Red Cell Distribution Width


  18.8 %


(11.6-14.8)  H 


 


 


Platelet Count


  207 K/UL


(150-450) 


 


 


Mean Platelet Volume


  6.2 FL


(6.5-10.1)  L 


 


 


Neutrophils (%) (Auto)


  80.9 %


(45.0-75.0)  H 


 


 


Lymphocytes (%) (Auto)


  9.9 %


(20.0-45.0)  L 


 


 


Monocytes (%) (Auto)


  7.2 %


(1.0-10.0) 


 


 


Eosinophils (%) (Auto)


  1.5 %


(0.0-3.0) 


 


 


Basophils (%) (Auto)


  0.5 %


(0.0-2.0) 


 


 


Sodium Level


  140 MMOL/L


(136-145) 


 


 


Potassium Level


  2.8 MMOL/L


(3.5-5.1)  L 


 


 


Chloride Level


  100 MMOL/L


() 


 


 


Carbon Dioxide Level


  34 MMOL/L


(21-32)  H 


 


 


Anion Gap


  6 mmol/L


(5-15) 


 


 


Blood Urea Nitrogen


  41 mg/dL


(7-18)  H 


 


 


Creatinine


  3.1 MG/DL


(0.55-1.30)  H 


 


 


Estimat Glomerular Filtration


Rate  mL/min (>60)  


  


 


 


Glucose Level


  121 MG/DL


()  H 


 


 


Calcium Level


  8.8 MG/DL


(8.5-10.1) 


 


 


Phosphorus Level


  2.4 MG/DL


(2.5-4.9)  L 


 


 


Magnesium Level


  2.3 MG/DL


(1.8-2.4) 


 


 


Arterial Blood pH


  


  7.400


(7.350-7.450)


 


Arterial Blood Partial


Pressure CO2 


  57.2 mmHg


(35.0-45.0)  *H


 


Arterial Blood Partial


Pressure O2 


  99.5 mmHg


(75.0-100.0)


 


Arterial Blood HCO3


  


  34.7 mmol/L


(22.0-26.0)  H


 


Arterial Blood Oxygen


Saturation 


  97.0 %


()


 


Arterial Blood Base Excess  8.3 (-2-2)  H


 


Arcadio Test  Positive  











Plan


Problems:  


(1) Sepsis


Assessment & Plan:  acute decompensation 


hypotensive


bradycardic


respiratory decompensation requiring intubation and vent support - failed 

extubation 





self extubated this AM


will monitor


supplemental O2


may need re-intubation 


cont current ICU care and management


may need trach if unable to wean off went with history of failed extubation 


thank you


will follow with recs 





(2) Hypotension


(3) Bradycardia


(4) Altered mental status


(5) Decubital ulcer


Assessment & Plan:  Patient with multiple pressure injuries where have been 

present since admission. 


Full thickness pressure injury to sacrum (L)(L)2.4cm x (W)4.4cm x (D)0.9cm, 

undermining 12-12 with tunneling 9-10 by 11.3cm. 


In close proximity to sacral wound on L buttocks purple ,indurated area with 

small area of slough measuring (L)0.6cm x (W)0.5cm noted. Arched surgical scar 

noted to L buttocks at site of tunneling. 


Resolving pressure injury noted to R ischium (L)3cm x (W)2cm. Wound bed 

epithelialized with maroon discoloration without induration periwound. 


Closed area with white pocket and purple margins noted to distal,paz R tibia 

(L)6cm x (W)2.7cm


Full thickness wound noted to distal/medial RLE (L)6.2cm x (W)3.8cm .Wound 

noted to have 95% mixed slough /necrosis ,marginal erythema. Wound malodorous.


Wound posterior R tibia (L)4.7cm x (W) 3cm ,100% necrotic with marginal 

erythema. Dry eschar with marginal erythema 


(L)0.5cm x (W)0.7cm noted to medial R heel.Non-blanchable erythema noted to 

medial L malleolus. 


Dry brown eschar noted to medial L 1st malleolus (L)1.4cm x (W)1cm. 


L heel and lateral aspect boggy with non-blanchable erythema. 





Tx.Plan:


Cleanse sacral wound with Saline.Loose pack with Hydrogel impregnated packing 

strip (Attention to tunneled area at 9-10).Cover with Optifoam drsg Daily and 

prn.


           


Reposition at least every 2hours or as tolerated.


           


Air Fluidized mattress.


           


Off-load heels with pillow.





Appreciate Podiatry and Vascular input





Plan for anigo late by Vascular 














Bob Lewis Dec 9, 2018 15:07

## 2018-12-09 NOTE — NUR
NURSE NOTES:

Dr Gordon here to see pt.  Discussed HR and BP post self extubation.  Will continue to 
monitor.

## 2018-12-09 NOTE — NUR
RESPIRATORY NOTE:



Initiated weaning per Dr's orders. Patient vitals are stable and is tolerating well. RN 
notified.

## 2018-12-09 NOTE — NUR
NURSE NOTES:

Pt awoke from her nap.  SBP in 190s.  Dr Gordon called for prn meds.  Will continue to 
monitor.

## 2018-12-09 NOTE — NUR
RESPIRATORY NOTE:



Patient received mechanically ventilated on  with current ordered vent settings. 
Patient is orally intubated with ETT tube 7.5 with 23cm at the lip line and it is secured 
with an anchor fast. Bilateral diminished breath sounds were present upon auscultation and 
small amount of thin/ clear secretions were suctioned without incident. Vent alarms are 
functional and audible. There is an ambu bag available at the bedside and the vent is 
connected to a red outlet. Patient appears comfortable at this time. Will continue to 
monitor.

## 2018-12-09 NOTE — NUR
NURSE NOTES:

Report received from Rey RN.  Pt awake, alert and oriented x3, able to follow commands.  
Pt orally intubated ETT 7.5, 23 cm at the lipline, AC 12, , 30% fiO2 and PEEP5.  NGT 
right nare with 30 cc/hr nephro running.  L AV shunt and LA midline noted.  RT in the room 
checking vent settings.  Safety measures in place with bed locked and in lowest position, 
side rails x2 up and bed alarm activated.  Will continue to monitor and continue plan of 
care.

## 2018-12-10 VITALS — SYSTOLIC BLOOD PRESSURE: 168 MMHG | DIASTOLIC BLOOD PRESSURE: 51 MMHG

## 2018-12-10 VITALS — DIASTOLIC BLOOD PRESSURE: 23 MMHG | SYSTOLIC BLOOD PRESSURE: 134 MMHG

## 2018-12-10 VITALS — DIASTOLIC BLOOD PRESSURE: 17 MMHG | SYSTOLIC BLOOD PRESSURE: 102 MMHG

## 2018-12-10 VITALS — DIASTOLIC BLOOD PRESSURE: 34 MMHG | SYSTOLIC BLOOD PRESSURE: 100 MMHG

## 2018-12-10 VITALS — SYSTOLIC BLOOD PRESSURE: 160 MMHG | DIASTOLIC BLOOD PRESSURE: 36 MMHG

## 2018-12-10 VITALS — DIASTOLIC BLOOD PRESSURE: 18 MMHG | SYSTOLIC BLOOD PRESSURE: 112 MMHG

## 2018-12-10 VITALS — DIASTOLIC BLOOD PRESSURE: 18 MMHG | SYSTOLIC BLOOD PRESSURE: 113 MMHG

## 2018-12-10 VITALS — DIASTOLIC BLOOD PRESSURE: 39 MMHG | SYSTOLIC BLOOD PRESSURE: 158 MMHG

## 2018-12-10 VITALS — DIASTOLIC BLOOD PRESSURE: 41 MMHG | SYSTOLIC BLOOD PRESSURE: 116 MMHG

## 2018-12-10 VITALS — DIASTOLIC BLOOD PRESSURE: 17 MMHG | SYSTOLIC BLOOD PRESSURE: 94 MMHG

## 2018-12-10 VITALS — DIASTOLIC BLOOD PRESSURE: 22 MMHG | SYSTOLIC BLOOD PRESSURE: 129 MMHG

## 2018-12-10 VITALS — DIASTOLIC BLOOD PRESSURE: 21 MMHG | SYSTOLIC BLOOD PRESSURE: 110 MMHG

## 2018-12-10 VITALS — SYSTOLIC BLOOD PRESSURE: 103 MMHG | DIASTOLIC BLOOD PRESSURE: 15 MMHG

## 2018-12-10 VITALS — SYSTOLIC BLOOD PRESSURE: 129 MMHG | DIASTOLIC BLOOD PRESSURE: 30 MMHG

## 2018-12-10 VITALS — DIASTOLIC BLOOD PRESSURE: 17 MMHG | SYSTOLIC BLOOD PRESSURE: 95 MMHG

## 2018-12-10 VITALS — SYSTOLIC BLOOD PRESSURE: 110 MMHG | DIASTOLIC BLOOD PRESSURE: 18 MMHG

## 2018-12-10 VITALS — DIASTOLIC BLOOD PRESSURE: 40 MMHG | SYSTOLIC BLOOD PRESSURE: 165 MMHG

## 2018-12-10 VITALS — DIASTOLIC BLOOD PRESSURE: 27 MMHG | SYSTOLIC BLOOD PRESSURE: 105 MMHG

## 2018-12-10 VITALS — SYSTOLIC BLOOD PRESSURE: 154 MMHG | DIASTOLIC BLOOD PRESSURE: 41 MMHG

## 2018-12-10 VITALS — SYSTOLIC BLOOD PRESSURE: 104 MMHG | DIASTOLIC BLOOD PRESSURE: 27 MMHG

## 2018-12-10 VITALS — SYSTOLIC BLOOD PRESSURE: 113 MMHG | DIASTOLIC BLOOD PRESSURE: 19 MMHG

## 2018-12-10 VITALS — SYSTOLIC BLOOD PRESSURE: 138 MMHG | DIASTOLIC BLOOD PRESSURE: 19 MMHG

## 2018-12-10 VITALS — SYSTOLIC BLOOD PRESSURE: 106 MMHG | DIASTOLIC BLOOD PRESSURE: 20 MMHG

## 2018-12-10 LAB
ANION GAP SERPL CALC-SCNC: 8 MMOL/L (ref 5–15)
BUN SERPL-MCNC: 50 MG/DL (ref 7–18)
CALCIUM SERPL-MCNC: 8.7 MG/DL (ref 8.5–10.1)
CHLORIDE SERPL-SCNC: 101 MMOL/L (ref 98–107)
CO2 SERPL-SCNC: 29 MMOL/L (ref 21–32)
CREAT SERPL-MCNC: 3.3 MG/DL (ref 0.55–1.3)
POTASSIUM SERPL-SCNC: 4.4 MMOL/L (ref 3.5–5.1)
SODIUM SERPL-SCNC: 138 MMOL/L (ref 136–145)

## 2018-12-10 PROCEDURE — 5A12012 PERFORMANCE OF CARDIAC OUTPUT, SINGLE, MANUAL: ICD-10-PCS

## 2018-12-10 RX ADMIN — LABETALOL HCL SCH MG: 200 TABLET, FILM COATED ORAL at 09:00

## 2018-12-10 RX ADMIN — CHLORHEXIDINE GLUCONATE SCH APPLIC: 213 SOLUTION TOPICAL at 20:00

## 2018-12-10 RX ADMIN — MEROPENEM SCH MLS/HR: 500 INJECTION INTRAVENOUS at 10:04

## 2018-12-10 RX ADMIN — AMIODARONE HYDROCHLORIDE SCH MG: 200 TABLET ORAL at 21:00

## 2018-12-10 RX ADMIN — MINOXIDIL SCH MG: 2.5 TABLET ORAL at 09:00

## 2018-12-10 RX ADMIN — VITAMIN D, TAB 1000IU (100/BT) SCH INTLU: 25 TAB at 10:12

## 2018-12-10 RX ADMIN — MINOXIDIL SCH MG: 2.5 TABLET ORAL at 21:00

## 2018-12-10 RX ADMIN — PANTOPRAZOLE SODIUM SCH MG: 40 INJECTION, POWDER, FOR SOLUTION INTRAVENOUS at 10:03

## 2018-12-10 RX ADMIN — AMIODARONE HYDROCHLORIDE SCH MG: 200 TABLET ORAL at 09:00

## 2018-12-10 RX ADMIN — HEPARIN SODIUM SCH UNITS: 5000 INJECTION INTRAVENOUS; SUBCUTANEOUS at 15:08

## 2018-12-10 RX ADMIN — ERYTHROPOIETIN SCH UNITS: 20000 INJECTION, SOLUTION INTRAVENOUS; SUBCUTANEOUS at 21:08

## 2018-12-10 RX ADMIN — HEPARIN SODIUM SCH UNITS: 5000 INJECTION INTRAVENOUS; SUBCUTANEOUS at 06:04

## 2018-12-10 RX ADMIN — HEPARIN SODIUM SCH UNITS: 5000 INJECTION INTRAVENOUS; SUBCUTANEOUS at 22:00

## 2018-12-10 RX ADMIN — PANTOPRAZOLE SODIUM SCH MG: 40 INJECTION, POWDER, FOR SOLUTION INTRAVENOUS at 21:07

## 2018-12-10 NOTE — NUR
NURSE NOTES:

Patient received from Echo LEON. Patient is s/p cardiac arrest today around 0930. Patient is 
awake and responding by nodding head to simple commands. Patient has been reintubated today 
with ETT size 7.5, 23cm at lip line, AC 12, 500tv, 40% FiO2, and peep of 5. Patient has 
Nepro at 30ml/hr running via left nare NGT. Patient noted to have several wounds. Current 
Vitals are as follows: /17, HR 93 SR, Spo2 100%, RR 14-16 and temperature of 98.6F. 
Patient has L arm Av-shunt covered in dressing. L arm has edema more than Right side. 
Patient has Right upper arm single lumen mid-line, TKO via line. Isolation noted. Patient is 
on P200 mattress. Patient is s/p HD today around 1200 with 2000ml/out. 

-------------------------------------------------------------------------------

Addendum: 12/10/18 at 1955 by RAYMUNDO STEWART RN

-------------------------------------------------------------------------------

Correction: Regarding HD output, not 2000ml but actually 2500ml.

## 2018-12-10 NOTE — NUR
CASE MANAGEMENT: REVIEW





SI: A-FIB . SEPSIS . ESRD ON HD MWF . LEFT LUNG COLLAPSE

S/P ORAL INTUBATION 

BRONCHOSCOPY AND LAVAGE 11/27 

T 96.0 HR 86 RR 22 BP 94/17 % VENTURI MASK 12.0 FIO2 55

H/H 8.8/28.3 BUN 50 CR 3.3 

ABG: PH 7.481 PCO2 44.2 PO2 443.0 HCO3 32.3 







IS: MEROPENEM IV QD

AMIODARONE NG Q12HR

LABETALOL GT Q12HR

HEPARIN SQ Q8HR

EPOETIN SQ MWF 

PROTONIX IV Q12HR 

NGT FEEDING NEPRO @10ML/HR 





***ICU STATUS***

DCP: PATIENT IS FROM [facility]

## 2018-12-10 NOTE — NUR
NURSE NOTES:

called DR Daigle,to inform re pt's status and Code Blue,awaiting return of call. family 
member called  and spoke with CY Denis,inform re pt's status and code 
blue,verbalized understanding.

## 2018-12-10 NOTE — GENERAL SURGERY PROGRESS NOTE
General Surgery-Progress Note


Subjective


Procedure Performed


left subclavian central venous catheter insertion


Additional Comments


required re-intubation this AM.





Objective





Last 24 Hour Vital Signs








  Date Time  Temp Pulse Resp B/P (MAP) Pulse Ox O2 Delivery O2 Flow Rate FiO2


 


12/10/18 14:35  86 13     50


 


12/10/18 14:00  84 18 105/27 (53) 100   


 


12/10/18 13:24  78 15     50


 


12/10/18 13:00  92 17 104/22 (49) 100   


 


12/10/18 13:00  84 18 105/27 (53) 100   


 


12/10/18 12:00  72      


 


12/10/18 12:00      Mechanical Ventilator  





      Mechanical Ventilator  





      Mechanical Ventilator  





      Mechanical Ventilator  


 


12/10/18 12:00 95.9 71 12 95/17 (43) 100   


 


12/10/18 11:15        50


 


12/10/18 11:00  73 12 94/17 (42) 100   


 


12/10/18 10:37  77 12     100


 


12/10/18 10:14  117 12     100


 


12/10/18 10:00  79 12 103/15 (44) 100   


 


12/10/18 09:30        100


 


12/10/18 09:02  86 22 129/30 (63) 100   


 


12/10/18 09:00  78  109/15    


 


12/10/18 09:00    109/15    


 


12/10/18 08:00      Venturi Mask 12.0 





      Venturi Mask 15.0 





      Venturi Mask 12.0 





      Venturi Mask  


 


12/10/18 08:00  85      


 


12/10/18 08:00 96.0 85 23 129/22 (57) 99   


 


12/10/18 08:00       12.0 55


 


12/10/18 07:00  87 24 116/41 (66) 100   


 


12/10/18 06:00  69 24 134/23 (60) 100   


 


12/10/18 05:00 99.1 83 20 160/42 (81) 98   


 


12/10/18 05:00  85 23 149/36 (73) 100   


 


12/10/18 04:00  76      


 


12/10/18 04:00       12.0 55


 


12/10/18 04:00      Venturi Mask 12.0 





      Venturi Mask 15.0 





      Venturi Mask 12.0 





      Venturi Mask  


 


12/10/18 03:00  83 23 158/39 (78) 100   


 


12/10/18 02:00  81 20 154/41 (78) 100   


 


12/10/18 01:00  82 22 165/40 (81) 96   


 


12/10/18 00:00      Venturi Mask 12.0 





      Venturi Mask 15.0 





      Venturi Mask 12.0 





      Venturi Mask  


 


12/10/18 00:00 98.5 78 20 168/51 (90) 95   


 


12/10/18 00:00  78      


 


12/10/18 00:00       12.0 55


 


12/9/18 23:00  72 24 145/37 (73) 96   


 


12/9/18 22:00  67 17 134/26 (62) 100   


 


12/9/18 21:00  64 23 151/36 (74) 100   


 


12/9/18 20:26  86  190/40    


 


12/9/18 20:25    190/40    


 


12/9/18 20:00  66      


 


12/9/18 20:00       15.0 


 


12/9/18 20:00 97.9 66 20 165/38 (80) 100   


 


12/9/18 20:00      Non-Rebreather 15.0 





      Non-Rebreather 15.0 





      Non-Rebreather 15.0 





      Non-Rebreather  


 


12/9/18 19:34     100 Non-Rebreather 15.0 100


 


12/9/18 19:34      Non-Rebreather 15.0 100


 


12/9/18 19:00  84 18 183/41 (88) 100   


 


12/9/18 18:03    188/51    


 


12/9/18 18:00  82 20 184/82 (116) 100   


 


12/9/18 17:00  79 20 189/47 (94) 100   


 


12/9/18 16:00  64      


 


12/9/18 16:00       15.0 


 


12/9/18 16:00 97.0 76 20 172/33 (79) 100   


 


12/9/18 16:00      Non-Rebreather 15.0 





      Mechanical Ventilator 15.0 





      Non-Rebreather 15.0 





      Non-Rebreather  








I&O











Intake and Output  


 


 12/9/18 12/10/18





 19:00 07:00


 


Intake Total 360 ml 410 ml


 


Output Total 0 ml 0 ml


 


Balance 360 ml 410 ml


 


  


 


Free Water  50 ml


 


Tube Feeding 360 ml 360 ml


 


Output Urine Total 0 ml 0 ml


 


# Bowel Movements 3 








Dressing:  other


Wound:  other


Drains:  other


Cardiovascular:  RSR


Respiratory:  decreased breath sounds


Abdomen:  soft, non-tender, present bowel sounds


Extremities:  edema, no cyanosis, other





Laboratory Tests








Test


  12/9/18


22:57 12/10/18


08:53 12/10/18


11:10


 


Arterial Blood pH


  7.413


(7.350-7.450) 


  7.481


(7.350-7.450)


 


Arterial Blood Partial


Pressure CO2 53.3 mmHg


(35.0-45.0)  H 


  44.2 mmHg


(35.0-45.0)


 


Arterial Blood Partial


Pressure O2 71.6 mmHg


(75.0-100.0)  L 


  443.0 mmHg


(75.0-100.0)  H


 


Arterial Blood HCO3


  33.3 mmol/L


(22.0-26.0)  H 


  32.3 mmol/L


(22.0-26.0)  H


 


Arterial Blood Oxygen


Saturation 93.1 %


()  L 


  99.6 %


()


 


Arterial Blood Base Excess 7.5 (-2-2)  H  7.9 (-2-2)  H


 


Arcadio Test Positive    Positive  


 


Sodium Level


  


  138 MMOL/L


(136-145) 


 


 


Potassium Level


  


  4.4 MMOL/L


(3.5-5.1)  # 


 


 


Chloride Level


  


  101 MMOL/L


() 


 


 


Carbon Dioxide Level


  


  29 MMOL/L


(21-32) 


 


 


Anion Gap


  


  8 mmol/L


(5-15) 


 


 


Blood Urea Nitrogen


  


  50 mg/dL


(7-18)  H 


 


 


Creatinine


  


  3.3 MG/DL


(0.55-1.30)  H 


 


 


Estimat Glomerular Filtration


Rate 


   mL/min (>60)  


  


 


 


Glucose Level


  


  166 MG/DL


()  H 


 


 


Calcium Level


  


  8.7 MG/DL


(8.5-10.1) 


 











Plan


Problems:  


(1) Sepsis


Assessment & Plan:  acute decompensation 


hypotensive


bradycardic


respiratory decompensation requiring intubation and vent support - failed 

extubation 





Re-intubated this AM


will monitor


cont current ICU care and management


may need trach if unable to wean off went with history of failed extubation 


thank you


will follow with recs 





(2) Hypotension


(3) Bradycardia


(4) Altered mental status


(5) Decubital ulcer


Assessment & Plan:  Patient with multiple pressure injuries where have been 

present since admission. 


Full thickness pressure injury to sacrum (L)(L)2.4cm x (W)4.4cm x (D)0.9cm, 

undermining 12-12 with tunneling 9-10 by 11.3cm. 


In close proximity to sacral wound on L buttocks purple ,indurated area with 

small area of slough measuring (L)0.6cm x (W)0.5cm noted. Arched surgical scar 

noted to L buttocks at site of tunneling. 


Resolving pressure injury noted to R ischium (L)3cm x (W)2cm. Wound bed 

epithelialized with maroon discoloration without induration periwound. 


Closed area with white pocket and purple margins noted to distal,paz R tibia 

(L)6cm x (W)2.7cm


Full thickness wound noted to distal/medial RLE (L)6.2cm x (W)3.8cm .Wound 

noted to have 95% mixed slough /necrosis ,marginal erythema. Wound malodorous.


Wound posterior R tibia (L)4.7cm x (W) 3cm ,100% necrotic with marginal 

erythema. Dry eschar with marginal erythema 


(L)0.5cm x (W)0.7cm noted to medial R heel.Non-blanchable erythema noted to 

medial L malleolus. 


Dry brown eschar noted to medial L 1st malleolus (L)1.4cm x (W)1cm. 


L heel and lateral aspect boggy with non-blanchable erythema. 





Tx.Plan:


Cleanse sacral wound with Saline.Loose pack with Hydrogel impregnated packing 

strip (Attention to tunneled area at 9-10).Cover with Optifoam drsg Daily and 

prn.


           


Reposition at least every 2hours or as tolerated.


           


Air Fluidized mattress.


           


Off-load heels with pillow.





Appreciate Podiatry and Vascular input





Plan for anigo late by Vascular 














Bob Lewis Dec 10, 2018 15:30

## 2018-12-10 NOTE — NUR
NURSE NOTES:

Code Blue called,pt went bradycardeic HR 20-39/min,and went asystole.CPR initiated and  
successful.At 0930 pt was intubated,ETT  7.5 ,22 cm lip and eventually connected to 
ventilator with settings AC 12,,FiO2 100% and Peep 5.Pt tolerated settings no further 
resp distress presented.

## 2018-12-10 NOTE — NUR
NURSE NOTES:

Patient remains on Venturi mask 12L/55%, Patient provided oral care. New Feeding and tubing 
hung. Vitals remains stable, will continue to monitor.

## 2018-12-10 NOTE — NUR
NURSE NOTES:

Patent repositioned, suctioned and provided oral care, no acute distress at this time. NAD, 
Patient cleaned, and new dressings applied.

## 2018-12-10 NOTE — NUR
NURSE NOTES:

Ongoing Hemodialysis done took out 2.5 L,tolerated well no distress presented,pt resting 
quietly in bed ,POA at bedside.

## 2018-12-10 NOTE — DIAGNOSTIC IMAGING REPORT
Indication: Abnormal breath sounds

 

Comparison:  12/9/2018

 

A single view chest radiograph was obtained.

 

Findings:

 

Complete opacification of the left hemithorax has resolved. There is a left pleural

effusion noted at the base. There is a right pleural effusion as well. Moderate

pulmonary edema also noted. There is a stable positioning of the NG tube, short right

upper extremity PICC line and endotracheal tubes.

 

IMPRESSION:

 

Improved aeration of the left lung. Mild to moderate bilateral pleural effusions

currently demonstrated.

 

CHF

## 2018-12-10 NOTE — NEPHROLOGY PROGRESS NOTE
Assessment/Plan


Assessment


Seee below


Plan


MOF, improving slowly. Extubated


Sepsis due to infected diabetic ulcers. On IV Abx. Followed by ID, Vasc. Sx, 

Podiatry.





ESRD HD q MWF 


Anemia of CKD , SAMANTHA high dose. Transfuse PRN.





A. 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 with LAE. LVEF 65% . Anticoagulate when 

stable. DW Dr. Wade. In and out A. Fib.











See orders.





Subjective


Subjective


Self extubated. Anxious but doing well!





Objective


Objective





Last 24 Hour Vital Signs








  Date Time  Temp Pulse Resp B/P (MAP) Pulse Ox O2 Delivery O2 Flow Rate FiO2


 


12/10/18 07:00  87 24 116/41 (66) 100   


 


12/10/18 06:00  69 24 134/23 (60) 100   


 


12/10/18 05:00 99.1 83 20 160/42 (81) 98   


 


12/10/18 05:00  85 23 149/36 (73) 100   


 


12/10/18 04:00  76      


 


12/10/18 04:00       12.0 55


 


12/10/18 04:00      Venturi Mask 12.0 





      Venturi Mask 15.0 





      Venturi Mask 12.0 





      Venturi Mask  


 


12/10/18 03:00  83 23 158/39 (78) 100   


 


12/10/18 02:00  81 20 154/41 (78) 100   


 


12/10/18 01:00  82 22 165/40 (81) 96   


 


12/10/18 00:00      Venturi Mask 12.0 





      Venturi Mask 15.0 





      Venturi Mask 12.0 





      Venturi Mask  


 


12/10/18 00:00 98.5 78 20 168/51 (90) 95   


 


12/10/18 00:00  78      


 


12/10/18 00:00       12.0 55


 


12/9/18 23:00  72 24 145/37 (73) 96   


 


12/9/18 22:00  67 17 134/26 (62) 100   


 


12/9/18 21:00  64 23 151/36 (74) 100   


 


12/9/18 20:26  86  190/40    


 


12/9/18 20:25    190/40    


 


12/9/18 20:00  66      


 


12/9/18 20:00       15.0 


 


12/9/18 20:00 97.9 66 20 165/38 (80) 100   


 


12/9/18 20:00      Non-Rebreather 15.0 





      Non-Rebreather 15.0 





      Non-Rebreather 15.0 





      Non-Rebreather  


 


12/9/18 19:34     100 Non-Rebreather 15.0 100


 


12/9/18 19:34      Non-Rebreather 15.0 100


 


12/9/18 19:00  84 18 183/41 (88) 100   


 


12/9/18 18:03    188/51    


 


12/9/18 18:00  82 20 184/82 (116) 100   


 


12/9/18 17:00  79 20 189/47 (94) 100   


 


12/9/18 16:00  64      


 


12/9/18 16:00       15.0 


 


12/9/18 16:00 97.0 76 20 172/33 (79) 100   


 


12/9/18 16:00      Non-Rebreather 15.0 





      Mechanical Ventilator 15.0 





      Non-Rebreather 15.0 





      Non-Rebreather  


 


12/9/18 15:00  67 19 108/18 (48) 100   


 


12/9/18 14:00  78 22 136/31 (66) 100   


 


12/9/18 13:00  79 18 140/22 (61) 100   


 


12/9/18 12:00       15.0 


 


12/9/18 12:00      Non-Rebreather 15.0 





      Non-Rebreather 15.0 





      Non-Rebreather 15.0 





      Non-Rebreather 15.0 


 


12/9/18 12:00 98.0 84 25 142/28 (66) 95   


 


12/9/18 12:00  73      


 


12/9/18 11:00  70 20 122/10 (47) 100   


 


12/9/18 10:02  116  202/65    


 


12/9/18 10:00  72 23 111/13 (45) 100   


 


12/9/18 09:06      Non-Rebreather 15.0 100


 


12/9/18 09:06     98 Non-Rebreather 15.0 100


 


12/9/18 09:05      Non-Rebreather 15.0 100


 


12/9/18 09:00  126 25 189/43 (91) 98   


 


12/9/18 08:48     95   


 


12/9/18 08:46  76  124/15    


 


12/9/18 08:45    124/15    


 


12/9/18 08:31  90 23     30


 


12/9/18 08:00      Mechanical Ventilator  





      Mechanical Ventilator  





      Mechanical Ventilator  





      Mechanical Ventilator  


 


12/9/18 08:00        30


 


12/9/18 08:00 98.2 76 13 124/15 (51) 95   


 


12/9/18 08:00  103      

















Intake and Output  


 


 12/9/18 12/10/18





 19:00 07:00


 


Intake Total 360 ml 410 ml


 


Output Total 0 ml 0 ml


 


Balance 360 ml 410 ml


 


  


 


Free Water  50 ml


 


Tube Feeding 360 ml 360 ml


 


Output Urine Total 0 ml 0 ml


 


# Bowel Movements 3 








Laboratory Tests


12/9/18 09:38: 


Arterial Blood pH 7.400, Arterial Blood Partial Pressure CO2 57.2*H, Arterial 

Blood Partial Pressure O2 99.5, Arterial Blood HCO3 34.7H, Arterial Blood 

Oxygen Saturation 97.0, Arterial Blood Base Excess 8.3H, Arcadio Test Positive


12/9/18 22:57: 


Arterial Blood pH 7.413, Arterial Blood Partial Pressure CO2 53.3H, Arterial 

Blood Partial Pressure O2 71.6L, Arterial Blood HCO3 33.3H, Arterial Blood 

Oxygen Saturation 93.1L, Arterial Blood Base Excess 7.5H, Arcadio Test Positive


Height (Feet):  5


Height (Inches):  2.00


Weight (Pounds):  123


Objective


Extubated. /70


BP measured more accurately with a smaller cuff placed on rt. FA.


Cv IRR/IRR


Lungs B ronchi.


Abd SNT. BS +


E Rt. foot diabetic ulcers











Gavino Daigle MD Dec 10, 2018 07:43

## 2018-12-10 NOTE — NUR
RD ASSESSMENT & RECOMMENDATIONS

SEE CARE ACTIVITY FOR COMPLETE ASSESSMENT



DAILY ESTIMATED NEEDS:

Needs based on Critical care+ Advanced Wounds + ESRD/ 60kg 

22-30  kcals/kg 

1663-7980  total kcals

1.5-2.0  g protein/kg

90-120g  g total protein 

per MD  mL/kg

 total fluid mLs



NUTRITION DIAGNOSIS:

* Swallowing difficulty R/T respiratory status as evidenced by pt s/p self

extubation, s/p code bluce, now reintubated, TF held at this time.

(UPDATED)

* Increased kcal/prot needs R/T wound healing as evidenced by pt admitted

w/ multiple wounds including stage 4 sacral wound, refer to WC eval.

* Altered nutrition related lab values R/T ESRD dx, clinical condition as

evidenced by elev creat (4.6-> 3.3, low Na (130-> wnl), elev BNP >55278,

low BP, pressor held at this time.





CURRENT TF:Nepro @30ml/hr -HELD AT THIS TIME  





PO DIET RECOMMENDATIONS:

SLP evaluation post extubation, prior to PO diet -> CCHO MED, RENAL 



ENTERAL NUTRITION RECOMMENDATIONS:

Nepro @ 35ml/hr x 24 hrs + Prosource 1pkt TID  to provide 840ml, 1512kcal, 68g + 33g prot, 
611ml free water 



** WITH HEMODYNAMIC STABILITY **

1. Increase goal rate to 35ml/hr x 24 hrs

2. Add Prosource 1 pkt TID to meet increased prot needs



*****WITHOUT HEMODYNAMIC STABILITY, REC TROPHIC FEEDS OF NEPRO @ 10ML/HR X 24 HRS*****





ADDITIONAL RECOMMENDATIONS:

* Calibrated bedscale wt for accurate CBW (pt w/ added air release mattress 

* Monitor HD stability-  s/p code blue on 12/10, re-intubated.  

* Wound healing-  Nephrovite x 1, Scotty 1pkt BID 

* Monitor Renal fxn and lytes 

* SSI w/ TF- h/o DM 

.

## 2018-12-10 NOTE — CARDIOLOGY PROGRESS NOTE
Assessment/Plan


Assessment/Plan


1. Hypotension, possibly sepsis versus volume.


2. Paroxysmal episodes of atrial fibrillation history.


3. Bradycardia secondary to medications, amiodarone possibly.


4. Diabetes mellitus.


5. End-stage renal disease, on hemodialysis.


6. Lung collapse.


7. Respiratory failure, status post intubation.


8. Dysphagia.


9. History of dementia.


10. Pulm htn 


11.  Pleural effusion 


12. cardaic arrest 





in and out afib amido now 200 tid  


tele reviewed had edouard arrest per rn non eo her cardaic med were given today 

butper rn last ntei she woudsl desaturate when she attempted to remove face eliud 

on several occasioan lst nteie 


cpr today for 10 min per rn 


stool ob neg x2 


amiod bid will hodl for 1-2 doses 


dc labetolol for now if need hydralaize to be used





Subjective


ROS Limited/Unobtainable:  Yes


Subjective


on the vent not verba





Objective





Last 24 Hour Vital Signs








  Date Time  Temp Pulse Resp B/P (MAP) Pulse Ox O2 Delivery O2 Flow Rate FiO2


 


12/10/18 19:20  94 16     40


 


12/10/18 19:07  94 15 113/18 (49) 100   


 


12/10/18 19:00  94 15 113/18 (49) 100   


 


12/10/18 18:00  91 14 112/18 (49) 100   


 


12/10/18 17:19  90 14     40


 


12/10/18 17:10 98.4 87 13 110/18 (48) 100   


 


12/10/18 16:00  87 14 110/21 (50) 100   


 


12/10/18 16:00        50


 


12/10/18 16:00      Mechanical Ventilator  





      Mechanical Ventilator  





      Mechanical Ventilator  





      Mechanical Ventilator  


 


12/10/18 16:00  86      


 


12/10/18 15:00  84 13 108/20 (49) 100   


 


12/10/18 15:00  84 13 106/14 (44) 100   


 


12/10/18 14:35  86 13     50


 


12/10/18 14:00  84 18 105/27 (53) 100   


 


12/10/18 13:24  78 15     50


 


12/10/18 13:00  92 17 104/22 (49) 100   


 


12/10/18 13:00  84 18 105/27 (53) 100   


 


12/10/18 12:00  72      


 


12/10/18 12:00      Mechanical Ventilator  





      Mechanical Ventilator  





      Mechanical Ventilator  





      Mechanical Ventilator  


 


12/10/18 12:00        50


 


12/10/18 12:00 95.9 71 12 95/17 (43) 100   


 


12/10/18 11:15        50


 


12/10/18 11:00  73 12 94/17 (42) 100   


 


12/10/18 10:37  77 12     100


 


12/10/18 10:14  117 12     100


 


12/10/18 10:00  79 12 103/15 (44) 100   


 


12/10/18 09:30        100


 


12/10/18 09:02  86 22 129/30 (63) 100   


 


12/10/18 09:00  78  109/15    


 


12/10/18 09:00    109/15    


 


12/10/18 08:00      Venturi Mask 12.0 





      Venturi Mask 15.0 





      Venturi Mask 12.0 





      Venturi Mask  


 


12/10/18 08:00  85      


 


12/10/18 08:00 96.0 85 23 129/22 (57) 99   


 


12/10/18 08:00       12.0 55


 


12/10/18 07:00  87 24 116/41 (66) 100   


 


12/10/18 06:00  69 24 134/23 (60) 100   


 


12/10/18 05:00 99.1 83 20 160/42 (81) 98   


 


12/10/18 05:00  85 23 149/36 (73) 100   


 


12/10/18 04:00  76      


 


12/10/18 04:00       12.0 55


 


12/10/18 04:00      Venturi Mask 12.0 





      Venturi Mask 15.0 





      Venturi Mask 12.0 





      Venturi Mask  


 


12/10/18 03:00  83 23 158/39 (78) 100   


 


12/10/18 02:00  81 20 154/41 (78) 100   


 


12/10/18 01:00  82 22 165/40 (81) 96   


 


12/10/18 00:00      Venturi Mask 12.0 





      Venturi Mask 15.0 





      Venturi Mask 12.0 





      Venturi Mask  


 


12/10/18 00:00 98.5 78 20 168/51 (90) 95   


 


12/10/18 00:00  78      


 


12/10/18 00:00       12.0 55


 


12/9/18 23:00  72 24 145/37 (73) 96   


 


12/9/18 22:00  67 17 134/26 (62) 100   


 


12/9/18 21:00  64 23 151/36 (74) 100   


 


12/9/18 20:26  86  190/40    








General Appearance:  other - reposnive to pain full stimuli


Neck:  supple


Cardiovascular:  normal rate, other - chest wall tenderness to palp


Respiratory/Chest:  lungs clear


Abdomen:  normal bowel sounds, non tender, soft


Extremities:  no swelling











Intake and Output  


 


 12/9/18 12/10/18





 18:59 06:59


 


Intake Total 360 ml 410 ml


 


Output Total 0 ml 0 ml


 


Balance 360 ml 410 ml


 


  


 


Free Water  50 ml


 


Tube Feeding 360 ml 360 ml


 


Output Urine Total 0 ml 0 ml


 


# Bowel Movements 3 











Laboratory Tests








Test


  12/9/18


22:57 12/10/18


08:53 12/10/18


11:10


 


Arterial Blood pH


  7.413


(7.350-7.450) 


  7.481


(7.350-7.450)


 


Arterial Blood Partial


Pressure CO2 53.3 mmHg


(35.0-45.0)  H 


  44.2 mmHg


(35.0-45.0)


 


Arterial Blood Partial


Pressure O2 71.6 mmHg


(75.0-100.0)  L 


  443.0 mmHg


(75.0-100.0)  H


 


Arterial Blood HCO3


  33.3 mmol/L


(22.0-26.0)  H 


  32.3 mmol/L


(22.0-26.0)  H


 


Arterial Blood Oxygen


Saturation 93.1 %


()  L 


  99.6 %


()


 


Arterial Blood Base Excess 7.5 (-2-2)  H  7.9 (-2-2)  H


 


Arcadio Test Positive    Positive  


 


Sodium Level


  


  138 MMOL/L


(136-145) 


 


 


Potassium Level


  


  4.4 MMOL/L


(3.5-5.1)  # 


 


 


Chloride Level


  


  101 MMOL/L


() 


 


 


Carbon Dioxide Level


  


  29 MMOL/L


(21-32) 


 


 


Anion Gap


  


  8 mmol/L


(5-15) 


 


 


Blood Urea Nitrogen


  


  50 mg/dL


(7-18)  H 


 


 


Creatinine


  


  3.3 MG/DL


(0.55-1.30)  H 


 


 


Estimat Glomerular Filtration


Rate 


   mL/min (>60)  


  


 


 


Glucose Level


  


  166 MG/DL


()  H 


 


 


Calcium Level


  


  8.7 MG/DL


(8.5-10.1) 


 

















Mariusz Wade MD Dec 10, 2018 20:30

## 2018-12-10 NOTE — NUR
NURSE NOTES:

Report received from Andrei COLLIER RN.Pt resting quietly in bed awake,noted no resp distress on 
Venturi mask 55% Fio2 12L,SR on the monitor NGTF Nepro at 30 ml/hr in placed per 
auscultation,,no residual noted ,Pt anuric,HD pt,with  AV Shunt to VALE,and IV MIDLINE to ASHISH 
intact,skin warm and dry with generalized edema ,bilat upper and lower extremities,with 
bilat wrist restraints in placed,HOB elevated,SR up x2 bed locked in lowest position,will 
continue with plan of care.

## 2018-12-10 NOTE — NUR
NURSE NOTES:

Patient had bowel movement, normal-soft in texture. Dr. Becerra at bedside assessing 
patient. Changed patients dressings and repositioned patient. Patient had temperature of 
100.3F, cooling measure was provided. Oral care was also given. Patients blood pressure 
shows isolated diastolic failure with current BP if 138/19, has been trending this way 

since s/p arrest earlier today. Cardiologist is aware. Will continue to monitor.

## 2018-12-10 NOTE — DIAGNOSTIC IMAGING REPORT
Indication: Dyspnea

 

Comparison:  12/4/2018

 

A single view chest radiograph was obtained.

 

Findings:

 

Complete opacification of the left hemithorax currently demonstrated. Evidence of a

right pleural effusion with the fluid extending into the minor and major fissures.

Nasogastric tube in good position. Patient has been extubated. Mild congestion

demonstrated. A short right-sided PICC line is present. Bones are osteopenic.

 

IMPRESSION:

 

Interval increase left pleural effusion likely accounting for complete opacification

of the left hemithorax.

 

Suspected moderate right pleural effusion and congestive heart failure.

## 2018-12-10 NOTE — NUR
NURSE NOTES:

With ongoing Hemodialysis at bedside,no distress noted,V/S stable.Pt's CY Yang,at bedside.

## 2018-12-10 NOTE — EMERGENCY ROOM REPORT
History of Present Illness


General


Chief Complaint:  Altered Mental Status


Source:  Family Member





Present Illness


Allergies:  


Coded Allergies:  


     No Known Allergies (Unverified , 11/26/18)





Patient History


Last Menstrual Period:  unk


Pregnant Now:  No





Nursing Documentation-PMH


Hx Cardiac Problems:  Yes - a-fib, HTN


Hx Hypertension:  Yes


Hx COPD:  Yes


Hx Diabetes:  Yes - esrd


Hx Cancer:  Yes


Hx Gastrointestinal Problems:  Yes - Gall bladder removed, GERD


Hx Dialysis:  Yes


Hx Neurological Problems:  Yes - dementia


Hx Cerebrovascular Accident:  No


Hx Dementia:  Yes





Physical Exam





Vital Signs








  Date Time  Temp Pulse Resp B/P (MAP) Pulse Ox O2 Delivery O2 Flow Rate FiO2


 


12/6/18 07:00  70 14 104/80 (88) 100   


 


12/6/18 07:06        30


 


12/6/18 08:00 98.2       


 


12/6/18 08:00      Mechanical Ventilator  





      Mechanical Ventilator  


 


12/9/18 09:05       15.0 











Procedures


Critical Care Time


Critical Care Time


i.  I feel this is a highly complex case requiring extensive working including 

EKG/Rhythm strip, Xray/CT/US, Blood/urine lab work, repeat exams while in ED, 


and administration of strong opiates/narcotics for pain control, admission to 

hospital or close patient follow up.  





Total time: 30 min bedside evaluation and treatment excludes procedures (EKG). 


Reason for critical care: asystole, cardiac arrest. 


Possible complications: hypotension, hypertension, MI, shock, arrhythmias, 

metabolic acidosis, end organ damage, respiratory failure. 


Interventions: ACLS, compressions, epi, calcium, bicarbonate, intubation


Course: Patient became bradycardic and then asystole.  Respiratory distress.  

Chest compressions started.  Given epinephrine.  End-stage renal disease on 

dialysis.  Given calcium and bicarbonate.  Patient intubated.  Patient regained 

pulses


Consultations: nursing staff, EMS, family 


Performed by: Dr Fam


Tolerated well condition = critical





j.  because of unstable vital signs this patient had a condition that could 

potentially threaten life or limb.  I feel this is a critical patient who 

required my full attention while patient was considered critical.  Total 

Critical Care Time excluding procedures was greater than 35 minutes





CPR/Code Blue


CPR/Code Blue Narrative


see code blue sheet for full narrative





Medical Decision Making


Diagnostic Impression:  


 Primary Impression:  


 Altered mental status


 Additional Impressions:  


 Pyelonephritis


 Pneumonia


ER Course


I was called to the ICU to this CODE BLUE.  Patient became bradycardic and then 

in asystole.  Chest compressions started.  Given epinephrine 1 prior to my 

arrival.  History of end-stage renal disease on dialysis.  Given calcium and 

bicarbonate.  Chest compressions continued.  Patient intubated.  Patient 

regained pulses.





Last Vital Signs








  Date Time  Temp Pulse Resp B/P (MAP) Pulse Ox O2 Delivery O2 Flow Rate FiO2


 


12/10/18 14:35  86 13     50


 


12/10/18 14:00    105/27 (53) 100   


 


12/10/18 12:00      Mechanical Ventilator  





      Mechanical Ventilator  





      Mechanical Ventilator  





      Mechanical Ventilator  


 


12/10/18 12:00 95.9       


 


12/10/18 08:00       12.0 





       15.0 





       12.0 








Status:  improved


Disposition:  ADMITTED AS INPATIENT


Condition:  Critical


Referrals:  


NON PHYSICIAN (PCP)











Zion Fam MD Dec 10, 2018 15:19

## 2018-12-10 NOTE — NUR
NURSE NOTES:

Patient repositioned, and suctioned. Current HR is 85 SR and BP is 115/17, patient is awake 
and semi-lethargic at times. Will continue to monitor.

## 2018-12-10 NOTE — NUR
NURSE NOTES:

Patient placed on venturi mask 55% Fio2, 12L/O2. Patients saturating above 95%. F/u post 
extubation ABG was done, see logs for results. Vitals has been stable, RR ranging from 
16-24. No acute distress at this time. Will continue to monitor.

## 2018-12-10 NOTE — NUR
Intubated Patient with 7.5 ETT at 22cm at the lip. Placed on ACVC 12, , FIO2 100%, 
PEEP +5. Diminished rhonchi heard bilaterally. SX thin white clear secretions. Patient 
obtunded. Alarms on and audible. Vent plugged into red outlet. Will continue to monitor.

## 2018-12-10 NOTE — PULMONOLOGY PROGRESS NOTE
Assessment/Plan


Assessment/Plan


IMPRESSION:


1. Hypotension.


2. Sepsis.


3. Atrial fibrillation.


4. Bradycardia.


5. Diabetes mellitus.


6. End-stage renal disease on dialysis.


7. Left lung collapse. Resolved


8. Respiratory failure. Extubated


9. Dysphagia.


10. Dementia.


11. Anemia


12. Pneumonia





DISCUSSION:


1. Continue medications.


2. CXR improved


3. status post bronchoscopy


4. Central access


5. Use broad-spectrum antibiotics.  


6. continue weaning attempts


CXR improved








  ______________________________________________


  Quentin Cardoso M.D.





Subjective


Interval Events:  Extubated


Constitutional:  Reports: no symptoms


HEENT:  Repors: no symptoms


Respiratory:  Reports: no symptoms


Cardiovascular:  Reports: no symptoms


Gastrointestinal/Abdominal:  Reports: no symptoms


Genitourinary:  Reports: no symptoms


Allergies:  


Coded Allergies:  


     No Known Allergies (Unverified , 11/26/18)





Objective





Last 24 Hour Vital Signs








  Date Time  Temp Pulse Resp B/P (MAP) Pulse Ox O2 Delivery O2 Flow Rate FiO2


 


12/10/18 09:02  86 22 129/30 (63) 100   


 


12/10/18 08:00  85      


 


12/10/18 08:00 96.0 85 23 129/22 (57) 99   


 


12/10/18 08:00       12.0 55


 


12/10/18 07:00  87 24 116/41 (66) 100   


 


12/10/18 06:00  69 24 134/23 (60) 100   


 


12/10/18 05:00 99.1 83 20 160/42 (81) 98   


 


12/10/18 05:00  85 23 149/36 (73) 100   


 


12/10/18 04:00  76      


 


12/10/18 04:00       12.0 55


 


12/10/18 04:00      Venturi Mask 12.0 





      Venturi Mask 15.0 





      Venturi Mask 12.0 





      Venturi Mask  


 


12/10/18 03:00  83 23 158/39 (78) 100   


 


12/10/18 02:00  81 20 154/41 (78) 100   


 


12/10/18 01:00  82 22 165/40 (81) 96   


 


12/10/18 00:00      Venturi Mask 12.0 





      Venturi Mask 15.0 





      Venturi Mask 12.0 





      Venturi Mask  


 


12/10/18 00:00 98.5 78 20 168/51 (90) 95   


 


12/10/18 00:00  78      


 


12/10/18 00:00       12.0 55


 


12/9/18 23:00  72 24 145/37 (73) 96   


 


12/9/18 22:00  67 17 134/26 (62) 100   


 


12/9/18 21:00  64 23 151/36 (74) 100   


 


12/9/18 20:26  86  190/40    


 


12/9/18 20:25    190/40    


 


12/9/18 20:00  66      


 


12/9/18 20:00       15.0 


 


12/9/18 20:00 97.9 66 20 165/38 (80) 100   


 


12/9/18 20:00      Non-Rebreather 15.0 





      Non-Rebreather 15.0 





      Non-Rebreather 15.0 





      Non-Rebreather  


 


12/9/18 19:34     100 Non-Rebreather 15.0 100


 


12/9/18 19:34      Non-Rebreather 15.0 100


 


12/9/18 19:00  84 18 183/41 (88) 100   


 


12/9/18 18:03    188/51    


 


12/9/18 18:00  82 20 184/82 (116) 100   


 


12/9/18 17:00  79 20 189/47 (94) 100   


 


12/9/18 16:00  64      


 


12/9/18 16:00       15.0 


 


12/9/18 16:00 97.0 76 20 172/33 (79) 100   


 


12/9/18 16:00      Non-Rebreather 15.0 





      Mechanical Ventilator 15.0 





      Non-Rebreather 15.0 





      Non-Rebreather  


 


12/9/18 15:00  67 19 108/18 (48) 100   


 


12/9/18 14:00  78 22 136/31 (66) 100   


 


12/9/18 13:00  79 18 140/22 (61) 100   


 


12/9/18 12:00       15.0 


 


12/9/18 12:00      Non-Rebreather 15.0 





      Non-Rebreather 15.0 





      Non-Rebreather 15.0 





      Non-Rebreather 15.0 


 


12/9/18 12:00 98.0 84 25 142/28 (66) 95   


 


12/9/18 12:00  73      


 


12/9/18 11:00  70 20 122/10 (47) 100   


 


12/9/18 10:02  116  202/65    


 


12/9/18 10:00  72 23 111/13 (45) 100   

















Intake and Output  


 


 12/9/18 12/10/18





 19:00 07:00


 


Intake Total 360 ml 410 ml


 


Output Total 0 ml 0 ml


 


Balance 360 ml 410 ml


 


  


 


Free Water  50 ml


 


Tube Feeding 360 ml 360 ml


 


Output Urine Total 0 ml 0 ml


 


# Bowel Movements 3 








General Appearance:  no acute distress


HEENT:  normocephalic


Respiratory/Chest:  chest wall non-tender, lungs clear


Cardiovascular:  normal peripheral pulses, normal rate


Abdomen:  normal bowel sounds


Laboratory Tests


12/9/18 09:38: 


Arterial Blood pH 7.400, Arterial Blood Partial Pressure CO2 57.2*H, Arterial 

Blood Partial Pressure O2 99.5, Arterial Blood HCO3 34.7H, Arterial Blood 

Oxygen Saturation 97.0, Arterial Blood Base Excess 8.3H, Arcadio Test Positive


12/9/18 22:57: 


Arterial Blood pH 7.413, Arterial Blood Partial Pressure CO2 53.3H, Arterial 

Blood Partial Pressure O2 71.6L, Arterial Blood HCO3 33.3H, Arterial Blood 

Oxygen Saturation 93.1L, Arterial Blood Base Excess 7.5H, Arcadio Test Positive


12/10/18 08:53: 


Sodium Level [Pending], Potassium Level [Pending], Chloride Level [Pending], 

Carbon Dioxide Level [Pending], Blood Urea Nitrogen [Pending], Creatinine [

Pending], Estimat Glomerular Filtration Rate [Pending], Glucose Level [Pending]

, Calcium Level [Pending]





Current Medications








 Medications


  (Trade)  Dose


 Ordered  Sig/Tony


 Route


 PRN Reason  Start Time


 Stop Time Status Last Admin


Dose Admin


 


 Acetaminophen


  (Tylenol)  650 mg  Q6H  PRN


 ORAL


 Mild Pain/Temp > 100.5  12/4/18 14:15


 1/3/19 14:14  12/8/18 05:08


 


 


 Amiodarone HCl


  (Cordarone)  200 mg  EVERY 12  HOURS


 NG


   12/8/18 21:00


 1/5/19 13:59  12/9/18 20:25


 


 


 Chlorhexidine


 Gluconate


  (Juana-Hex 2%)  1 applic  DAILY@2000


 TOPIC


   11/27/18 20:00


 12/27/18 19:59  12/9/18 20:25


 


 


 Collagenase


  (Santyl)  1 applic  QHS


 TOPIC


   12/6/18 21:00


 1/5/19 20:59  12/9/18 20:26


 


 


 Epoetin Rupesh


  (Procrit (for


 ESRD on dialysis))  10,000 units  MON-WED-FRI


 SUBQ


   11/28/18 21:00


 12/28/18 20:59  12/7/18 21:28


 


 


 Heparin Sodium


  (Porcine)


  (Heparin 5000


 units/ml)  5,000 units  EVERY 8  HOURS


 SUBQ


   12/7/18 22:00


 1/6/19 21:59  12/10/18 06:04


 


 


 Labetalol HCl


  (Normodyne)  200 mg  Q12HR


 GT


   12/9/18 09:30


 1/8/19 09:29  12/9/18 20:26


 


 


 Meropenem 500 mg/


 Sodium Chloride  55 ml @ 


 110 mls/hr  DAILY


 IVPB


   11/28/18 12:00


 12/12/18 11:59  12/9/18 08:45


 


 


 Minoxidil


  (Loniten)  5 mg  Q12HR


 NG


   12/2/18 21:00


 1/1/19 20:59  12/9/18 20:25


 


 


 Pantoprazole


  (Protonix)  40 mg  Q12HR


 IVP


   11/26/18 21:00


 12/27/18 08:59  12/9/18 20:26


 


 


 Sodium Chloride  1,000 ml @ 


 500 mls/hr  Q2H PRN


 IVLG


 sbp<90 during hd  12/7/18 07:51


 1/6/19 07:50   


 


 


 Vitamin D


  (Vitamin D)  1,000 intlu  DAILY


 GT


   12/9/18 10:00


 1/8/19 09:59  12/9/18 10:25


 

















Quentin Cardoso MD Dec 10, 2018 09:13

## 2018-12-11 VITALS — SYSTOLIC BLOOD PRESSURE: 142 MMHG | DIASTOLIC BLOOD PRESSURE: 17 MMHG

## 2018-12-11 VITALS — SYSTOLIC BLOOD PRESSURE: 130 MMHG | DIASTOLIC BLOOD PRESSURE: 25 MMHG

## 2018-12-11 VITALS — DIASTOLIC BLOOD PRESSURE: 24 MMHG | SYSTOLIC BLOOD PRESSURE: 120 MMHG

## 2018-12-11 VITALS — SYSTOLIC BLOOD PRESSURE: 110 MMHG | DIASTOLIC BLOOD PRESSURE: 22 MMHG

## 2018-12-11 VITALS — DIASTOLIC BLOOD PRESSURE: 23 MMHG | SYSTOLIC BLOOD PRESSURE: 126 MMHG

## 2018-12-11 VITALS — SYSTOLIC BLOOD PRESSURE: 101 MMHG | DIASTOLIC BLOOD PRESSURE: 24 MMHG

## 2018-12-11 VITALS — DIASTOLIC BLOOD PRESSURE: 34 MMHG | SYSTOLIC BLOOD PRESSURE: 137 MMHG

## 2018-12-11 VITALS — DIASTOLIC BLOOD PRESSURE: 24 MMHG | SYSTOLIC BLOOD PRESSURE: 113 MMHG

## 2018-12-11 VITALS — DIASTOLIC BLOOD PRESSURE: 23 MMHG | SYSTOLIC BLOOD PRESSURE: 137 MMHG

## 2018-12-11 VITALS — DIASTOLIC BLOOD PRESSURE: 30 MMHG | SYSTOLIC BLOOD PRESSURE: 137 MMHG

## 2018-12-11 VITALS — SYSTOLIC BLOOD PRESSURE: 132 MMHG | DIASTOLIC BLOOD PRESSURE: 28 MMHG

## 2018-12-11 VITALS — DIASTOLIC BLOOD PRESSURE: 19 MMHG | SYSTOLIC BLOOD PRESSURE: 132 MMHG

## 2018-12-11 VITALS — DIASTOLIC BLOOD PRESSURE: 17 MMHG | SYSTOLIC BLOOD PRESSURE: 124 MMHG

## 2018-12-11 VITALS — DIASTOLIC BLOOD PRESSURE: 29 MMHG | SYSTOLIC BLOOD PRESSURE: 142 MMHG

## 2018-12-11 VITALS — SYSTOLIC BLOOD PRESSURE: 123 MMHG | DIASTOLIC BLOOD PRESSURE: 22 MMHG

## 2018-12-11 VITALS — DIASTOLIC BLOOD PRESSURE: 24 MMHG | SYSTOLIC BLOOD PRESSURE: 116 MMHG

## 2018-12-11 VITALS — DIASTOLIC BLOOD PRESSURE: 31 MMHG | SYSTOLIC BLOOD PRESSURE: 140 MMHG

## 2018-12-11 VITALS — SYSTOLIC BLOOD PRESSURE: 145 MMHG | DIASTOLIC BLOOD PRESSURE: 44 MMHG

## 2018-12-11 VITALS — DIASTOLIC BLOOD PRESSURE: 39 MMHG | SYSTOLIC BLOOD PRESSURE: 133 MMHG

## 2018-12-11 VITALS — DIASTOLIC BLOOD PRESSURE: 24 MMHG | SYSTOLIC BLOOD PRESSURE: 125 MMHG

## 2018-12-11 VITALS — SYSTOLIC BLOOD PRESSURE: 133 MMHG | DIASTOLIC BLOOD PRESSURE: 14 MMHG

## 2018-12-11 VITALS — DIASTOLIC BLOOD PRESSURE: 39 MMHG | SYSTOLIC BLOOD PRESSURE: 115 MMHG

## 2018-12-11 VITALS — SYSTOLIC BLOOD PRESSURE: 121 MMHG | DIASTOLIC BLOOD PRESSURE: 20 MMHG

## 2018-12-11 VITALS — SYSTOLIC BLOOD PRESSURE: 124 MMHG | DIASTOLIC BLOOD PRESSURE: 29 MMHG

## 2018-12-11 RX ADMIN — MINOXIDIL SCH MG: 2.5 TABLET ORAL at 21:00

## 2018-12-11 RX ADMIN — HEPARIN SODIUM SCH UNITS: 5000 INJECTION INTRAVENOUS; SUBCUTANEOUS at 05:47

## 2018-12-11 RX ADMIN — HEPARIN SODIUM SCH UNITS: 5000 INJECTION INTRAVENOUS; SUBCUTANEOUS at 21:48

## 2018-12-11 RX ADMIN — VITAMIN D, TAB 1000IU (100/BT) SCH INTLU: 25 TAB at 08:47

## 2018-12-11 RX ADMIN — CHLORHEXIDINE GLUCONATE SCH APPLIC: 213 SOLUTION TOPICAL at 20:00

## 2018-12-11 RX ADMIN — MINOXIDIL SCH MG: 2.5 TABLET ORAL at 10:33

## 2018-12-11 RX ADMIN — AMIODARONE HYDROCHLORIDE SCH MG: 200 TABLET ORAL at 08:47

## 2018-12-11 RX ADMIN — PANTOPRAZOLE SODIUM SCH MG: 40 INJECTION, POWDER, FOR SOLUTION INTRAVENOUS at 08:47

## 2018-12-11 RX ADMIN — PANTOPRAZOLE SODIUM SCH MG: 40 INJECTION, POWDER, FOR SOLUTION INTRAVENOUS at 21:00

## 2018-12-11 RX ADMIN — MEROPENEM SCH MLS/HR: 500 INJECTION INTRAVENOUS at 08:48

## 2018-12-11 RX ADMIN — HEPARIN SODIUM SCH UNITS: 5000 INJECTION INTRAVENOUS; SUBCUTANEOUS at 14:26

## 2018-12-11 RX ADMIN — AMIODARONE HYDROCHLORIDE SCH MG: 200 TABLET ORAL at 21:00

## 2018-12-11 NOTE — NUR
NURSE NOTES:

Patient repositioned and provided oral care. Patient will go in and out of afib. With the 
highest reading of 130. Currently HR is 112 Afib with a few PAC's. Patient is awake and able 
to nod to simple commands. NAD at this time.

## 2018-12-11 NOTE — NUR
NURSE NOTES:

Dobutamine turned off/hold. 

-------------------------------------------------------------------------------

Addendum: 12/12/18 at 0631 by RAYMUNDO STEWART RN

-------------------------------------------------------------------------------

Wrong patient entry, ignore this note.

## 2018-12-11 NOTE — NUR
CASE MANAGEMENT: REVIEW





SI: SEPSIS . ESRD ON HD . RIGHT FOOT DIABETIC ULCER . ORALLY INTUBATED 

AV FISTULA 12/11

BRONCHOSCOPY AND LAVAGE 11/27 

T 99.9   RR 14 /17 % MECH VENT FIO2 40









IS: MEROPENEM IV QD

AMIODARONE NG Q12HR

LABETALOL GT Q12HR

HEPARIN SQ Q8HR

EPOETIN SQ MWF 

PROTONIX IV Q12HR 

NGT FEEDING NEPRO @10ML/HR 

HEMODIALYSIS PRN 



***ICU STATUS***

DCP: PATIENT IS FROM [facility]

## 2018-12-11 NOTE — CARDIOLOGY PROGRESS NOTE
Assessment/Plan


Assessment/Plan


1. Hypotension, possibly sepsis versus volume.


2. Paroxysmal episodes of atrial fibrillation history.


3. Bradycardia secondary to medications, amiodarone possibly.


4. Diabetes mellitus.


5. End-stage renal disease, on hemodialysis.


6. Lung collapse.


7. Respiratory failure, status post intubation.


8. Dysphagia.


9. History of dementia.


10. Pulm htn 


11.  Pleural effusion 


12. cardaic arrest


13. chest wall pain post cpr  





in and out afib amido now 200 bid  on hold


stool ob neg x2 


amiod bid


off labetolol for now if need hydralaize to be used 


vent supprot 


wean as tolerted may need trachand peg 


d/w rn





Subjective


ROS Limited/Unobtainable:  Yes


Cardiovascular:  Reports: chest pain


Respiratory:  Reports: shortness of breath


Gastrointestinal/Abdominal:  Reports: abdominal pain


Genitourinary:  Denies: burning


Subjective


on the vent not verba





Objective





Last 24 Hour Vital Signs








  Date Time  Temp Pulse Resp B/P (MAP) Pulse Ox O2 Delivery O2 Flow Rate FiO2


 


12/11/18 14:00 97.5 77 14 132/28 (62) 100   


 


12/11/18 13:08  62 15     40


 


12/11/18 13:00  77 13 133/39 (70) 100   


 


12/11/18 12:00        40


 


12/11/18 12:00  76      


 


12/11/18 12:00  71 15 125/24 (57) 100   


 


12/11/18 12:00      Mechanical Ventilator  





      Mechanical Ventilator  





      Mechanical Ventilator  





      Mechanical Ventilator  


 


12/11/18 11:00  74 17 110/22 (51) 100   


 


12/11/18 10:58  73 18     40


 


12/11/18 10:33    129/26    


 


12/11/18 10:00 99.1 80 13 124/29 (60) 100   


 


12/11/18 09:00  89 14 145/44 (77) 100   


 


12/11/18 08:40  88 14     40


 


12/11/18 08:00        40


 


12/11/18 08:00  87      


 


12/11/18 08:00 99.9 88 18 121/20 (53) 100   


 


12/11/18 08:00      Mechanical Ventilator  





      Mechanical Ventilator  





      Mechanical Ventilator  





      Mechanical Ventilator  


 


12/11/18 08:00  87      


 


12/11/18 07:00  118 17 120/24 (56) 100   


 


12/11/18 06:46  120 17     40


 


12/11/18 06:18 99.4       


 


12/11/18 06:00  108 15 113/24 (53) 100   


 


12/11/18 05:00  118 16 126/23 (57) 100   


 


12/11/18 04:40  103 12     40


 


12/11/18 04:00        40


 


12/11/18 04:00      Mechanical Ventilator  





      Mechanical Ventilator  





      Mechanical Ventilator  





      Mechanical Ventilator  


 


12/11/18 04:00 99.6 89 18 142/17 (58) 100   


 


12/11/18 04:00  89      


 


12/11/18 03:30  75 12     40


 


12/11/18 03:00  86 17 132/19 (56) 100   


 


12/11/18 02:00  90 23 137/23 (61) 100   


 


12/11/18 01:34  91 14     40


 


12/11/18 01:00  89 19 133/14 (53) 100   


 


12/11/18 00:00        40


 


12/11/18 00:00 99.3 121 14 115/39 (64) 100   


 


12/11/18 00:00      Mechanical Ventilator  





      Mechanical Ventilator  





      Mechanical Ventilator  





      Mechanical Ventilator  


 


12/11/18 00:00  130      


 


12/10/18 23:00  118 15 100/34 (56) 100   


 


12/10/18 23:00  112 14     40


 


12/10/18 22:00  91 14 138/19 (58) 100   


 


12/10/18 21:08  80 13     40


 


12/10/18 21:00    102/17    


 


12/10/18 21:00  79 12 113/19 (50) 100   


 


12/10/18 20:00        40


 


12/10/18 20:00      Mechanical Ventilator  





      Mechanical Ventilator  





      Mechanical Ventilator  





      Mechanical Ventilator  


 


12/10/18 20:00  90      


 


12/10/18 20:00 100.3 85 12 102/17 (45) 100   


 


12/10/18 19:20  94 16     40


 


12/10/18 19:07  94 15 113/18 (49) 100   


 


12/10/18 19:00  94 15 113/18 (49) 100   


 


12/10/18 18:00  91 14 112/18 (49) 100   


 


12/10/18 17:19  90 14     40


 


12/10/18 17:10 98.4 87 13 110/18 (48) 100   


 


12/10/18 16:00  87 14 110/21 (50) 100   


 


12/10/18 16:00        50


 


12/10/18 16:00      Mechanical Ventilator  





      Mechanical Ventilator  





      Mechanical Ventilator  





      Mechanical Ventilator  


 


12/10/18 16:00  86      


 


12/10/18 15:00  84 13 108/20 (49) 100   


 


12/10/18 15:00  84 13 106/14 (44) 100   








General Appearance:  alert, on vent, patient on isolation


Neck:  supple


Cardiovascular:  normal rate, other


Respiratory/Chest:  lungs clear - ant


Abdomen:  normal bowel sounds, non tender, soft


Extremities:  no swelling











Intake and Output  


 


 12/10/18 12/11/18





 19:00 07:00


 


Intake Total 515 ml 390 ml


 


Output Total 2501 ml 0 ml


 


Balance -1986 ml 390 ml


 


  


 


Free Water 100 ml 


 


IV Total 55 ml 


 


Tube Feeding 360 ml 390 ml


 


Output Urine Total 0 ml 0 ml


 


Stool Total 1 ml 


 


Hemodialysis UF 2500 ml 


 


# Bowel Movements 1 2

















Mariusz Wade MD Dec 11, 2018 14:43

## 2018-12-11 NOTE — NEPHROLOGY PROGRESS NOTE
Assessment/Plan


Assessment


Seee below


Plan


MOF stable.


Sepsis due to infected diabetic ulcers. On IV Abx. Followed by ID, Vasc. Sx, 

Podiatry.





ESRD HD q MWF 


Anemia of CKD , SAMANTHA high dose. Transfuse PRN.





A. 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 with LAE. LVEF 65% . Anticoagulate when 

stable. DW Dr. Wade. In and out A. Fib.





If unweanable, may neead Trac+PEg.





See orders.





Subjective


Subjective


Reintubated.





Objective


Objective





Last 24 Hour Vital Signs








  Date Time  Temp Pulse Resp B/P (MAP) Pulse Ox O2 Delivery O2 Flow Rate FiO2


 


12/11/18 07:00  118 17 120/24 (56) 100   


 


12/11/18 06:46  120 17     40


 


12/11/18 06:18 99.4       


 


12/11/18 06:00  108 15 113/24 (53) 100   


 


12/11/18 05:00  118 16 126/23 (57) 100   


 


12/11/18 04:40  103 12     40


 


12/11/18 04:00        40


 


12/11/18 04:00      Mechanical Ventilator  





      Mechanical Ventilator  





      Mechanical Ventilator  





      Mechanical Ventilator  


 


12/11/18 04:00 99.6 89 18 142/17 (58) 100   


 


12/11/18 04:00  89      


 


12/11/18 03:30  75 12     40


 


12/11/18 03:00  86 17 132/19 (56) 100   


 


12/11/18 02:00  90 23 137/23 (61) 100   


 


12/11/18 01:34  91 14     40


 


12/11/18 01:00  89 19 133/14 (53) 100   


 


12/11/18 00:00        40


 


12/11/18 00:00 99.3 121 14 115/39 (64) 100   


 


12/11/18 00:00      Mechanical Ventilator  





      Mechanical Ventilator  





      Mechanical Ventilator  





      Mechanical Ventilator  


 


12/11/18 00:00  130      


 


12/10/18 23:00  118 15 100/34 (56) 100   


 


12/10/18 23:00  112 14     40


 


12/10/18 22:00  91 14 138/19 (58) 100   


 


12/10/18 21:08  80 13     40


 


12/10/18 21:00    102/17    


 


12/10/18 21:00  79 12 113/19 (50) 100   


 


12/10/18 20:00        40


 


12/10/18 20:00      Mechanical Ventilator  





      Mechanical Ventilator  





      Mechanical Ventilator  





      Mechanical Ventilator  


 


12/10/18 20:00  90      


 


12/10/18 20:00 100.3 85 12 102/17 (45) 100   


 


12/10/18 19:20  94 16     40


 


12/10/18 19:07  94 15 113/18 (49) 100   


 


12/10/18 19:00  94 15 113/18 (49) 100   


 


12/10/18 18:00  91 14 112/18 (49) 100   


 


12/10/18 17:19  90 14     40


 


12/10/18 17:10 98.4 87 13 110/18 (48) 100   


 


12/10/18 16:00  87 14 110/21 (50) 100   


 


12/10/18 16:00        50


 


12/10/18 16:00      Mechanical Ventilator  





      Mechanical Ventilator  





      Mechanical Ventilator  





      Mechanical Ventilator  


 


12/10/18 16:00  86      


 


12/10/18 15:00  84 13 108/20 (49) 100   


 


12/10/18 15:00  84 13 106/14 (44) 100   


 


12/10/18 14:35  86 13     50


 


12/10/18 14:00  84 18 105/27 (53) 100   


 


12/10/18 13:24  78 15     50


 


12/10/18 13:00  92 17 104/22 (49) 100   


 


12/10/18 13:00  84 18 105/27 (53) 100   


 


12/10/18 12:00  72      


 


12/10/18 12:00      Mechanical Ventilator  





      Mechanical Ventilator  





      Mechanical Ventilator  





      Mechanical Ventilator  


 


12/10/18 12:00        50


 


12/10/18 12:00 95.9 71 12 95/17 (43) 100   


 


12/10/18 11:15        50


 


12/10/18 11:00  73 12 94/17 (42) 100   


 


12/10/18 10:37  77 12     100


 


12/10/18 10:14  117 12     100


 


12/10/18 10:00  79 12 103/15 (44) 100   


 


12/10/18 09:30        100


 


12/10/18 09:02  86 22 129/30 (63) 100   


 


12/10/18 09:00  78  109/15    


 


12/10/18 09:00    109/15    


 


12/10/18 08:00      Venturi Mask 12.0 





      Venturi Mask 15.0 





      Venturi Mask 12.0 





      Venturi Mask  


 


12/10/18 08:00  85      


 


12/10/18 08:00 96.0 85 23 129/22 (57) 99   


 


12/10/18 08:00       12.0 55

















Intake and Output  


 


 12/10/18 12/11/18





 19:00 07:00


 


Intake Total 515 ml 390 ml


 


Output Total 2501 ml 0 ml


 


Balance -1986 ml 390 ml


 


  


 


Free Water 100 ml 


 


IV Total 55 ml 


 


Tube Feeding 360 ml 390 ml


 


Output Urine Total 0 ml 0 ml


 


Stool Total 1 ml 


 


Hemodialysis UF 2500 ml 


 


# Bowel Movements 1 2








Laboratory Tests


12/10/18 08:53: 


Sodium Level 138, Potassium Level 4.4#, Chloride Level 101, Carbon Dioxide 

Level 29, Anion Gap 8, Blood Urea Nitrogen 50H, Creatinine 3.3H, Estimat 

Glomerular Filtration Rate , Glucose Level 166H, Calcium Level 8.7


12/10/18 11:10: 


Arterial Blood pH 7.481H, Arterial Blood Partial Pressure CO2 44.2, Arterial 

Blood Partial Pressure O2 443.0H, Arterial Blood HCO3 32.3H, Arterial Blood 

Oxygen Saturation 99.6, Arterial Blood Base Excess 7.9H, Arcadio Test Positive


Height (Feet):  5


Height (Inches):  2.00


Weight (Pounds):  125


Objective


Reintubated





Cv IRR/IRR


Lungs B shai.


Abd SNT. BS +


E Rt. foot diabetic ulcers











Gavino Daigle MD Dec 11, 2018 07:48

## 2018-12-11 NOTE — NUR
NURSE NOTES:

Patient was repositioned, kept clean and dry. No changes in condition. patient resting in 
bed comfortably. VSS

## 2018-12-11 NOTE — NUR
NURSE NOTES:

Patient noted with Afib with HR 100s. Patient awake, opens eyes spontaneously. pt's son at 
bedside. On bilateral wrist soft restraints. No skin breakdown noted.

## 2018-12-11 NOTE — NUR
NURSE NOTES:

Patient repositioned, morning meds given. Patient goes in and out of Afib,now she is SR. NAD 
at this time.

## 2018-12-11 NOTE — PULMONOLOGY PROGRESS NOTE
Assessment/Plan


Assessment/Plan


IMPRESSION:


1. Hypotension.


2. Sepsis.


3. Atrial fibrillation.


4. Bradycardia.


5. Diabetes mellitus.


6. End-stage renal disease on dialysis.


7. Left lung collapse. Resolved


8. Respiratory failure. now reintubated


9. Dysphagia.


10. Dementia.


11. Anemia


12. Pneumonia





DISCUSSION:


1. Continue medications.


2. CXR improved


3. status post bronchoscopy


4. Central access


5. Use broad-spectrum antibiotics.  


6. May require neurology opinion


7.  May require trach











  ______________________________________________


  Quentin Cardoso M.D.





Subjective


Interval Events:  Re-intubated now.  Poorly responsive.


Constitutional:  Reports: no symptoms


HEENT:  Repors: no symptoms


Respiratory:  Reports: no symptoms


Cardiovascular:  Reports: no symptoms


Gastrointestinal/Abdominal:  Reports: no symptoms


Genitourinary:  Reports: no symptoms


Neurologic:  Reports: no symptoms


Allergies:  


Coded Allergies:  


     No Known Allergies (Unverified , 11/26/18)





Objective





Last 24 Hour Vital Signs








  Date Time  Temp Pulse Resp B/P (MAP) Pulse Ox O2 Delivery O2 Flow Rate FiO2


 


12/11/18 15:17  78 13     40


 


12/11/18 14:00 97.5 77 14 132/28 (62) 100   


 


12/11/18 13:08  62 15     40


 


12/11/18 13:00  77 13 133/39 (70) 100   


 


12/11/18 12:00        40


 


12/11/18 12:00  76      


 


12/11/18 12:00  71 15 125/24 (57) 100   


 


12/11/18 12:00      Mechanical Ventilator  





      Mechanical Ventilator  





      Mechanical Ventilator  





      Mechanical Ventilator  


 


12/11/18 11:00  74 17 110/22 (51) 100   


 


12/11/18 10:58  73 18     40


 


12/11/18 10:33    129/26    


 


12/11/18 10:00 99.1 80 13 124/29 (60) 100   


 


12/11/18 09:00  89 14 145/44 (77) 100   


 


12/11/18 08:40  88 14     40


 


12/11/18 08:00        40


 


12/11/18 08:00  87      


 


12/11/18 08:00 99.9 88 18 121/20 (53) 100   


 


12/11/18 08:00      Mechanical Ventilator  





      Mechanical Ventilator  





      Mechanical Ventilator  





      Mechanical Ventilator  


 


12/11/18 08:00  87      


 


12/11/18 07:00  118 17 120/24 (56) 100   


 


12/11/18 06:46  120 17     40


 


12/11/18 06:18 99.4       


 


12/11/18 06:00  108 15 113/24 (53) 100   


 


12/11/18 05:00  118 16 126/23 (57) 100   


 


12/11/18 04:40  103 12     40


 


12/11/18 04:00        40


 


12/11/18 04:00      Mechanical Ventilator  





      Mechanical Ventilator  





      Mechanical Ventilator  





      Mechanical Ventilator  


 


12/11/18 04:00 99.6 89 18 142/17 (58) 100   


 


12/11/18 04:00  89      


 


12/11/18 03:30  75 12     40


 


12/11/18 03:00  86 17 132/19 (56) 100   


 


12/11/18 02:00  90 23 137/23 (61) 100   


 


12/11/18 01:34  91 14     40


 


12/11/18 01:00  89 19 133/14 (53) 100   


 


12/11/18 00:00        40


 


12/11/18 00:00 99.3 121 14 115/39 (64) 100   


 


12/11/18 00:00      Mechanical Ventilator  





      Mechanical Ventilator  





      Mechanical Ventilator  





      Mechanical Ventilator  


 


12/11/18 00:00  130      


 


12/10/18 23:00  118 15 100/34 (56) 100   


 


12/10/18 23:00  112 14     40


 


12/10/18 22:00  91 14 138/19 (58) 100   


 


12/10/18 21:08  80 13     40


 


12/10/18 21:00    102/17    


 


12/10/18 21:00  79 12 113/19 (50) 100   


 


12/10/18 20:00        40


 


12/10/18 20:00      Mechanical Ventilator  





      Mechanical Ventilator  





      Mechanical Ventilator  





      Mechanical Ventilator  


 


12/10/18 20:00  90      


 


12/10/18 20:00 100.3 85 12 102/17 (45) 100   


 


12/10/18 19:20  94 16     40


 


12/10/18 19:07  94 15 113/18 (49) 100   


 


12/10/18 19:00  94 15 113/18 (49) 100   


 


12/10/18 18:00  91 14 112/18 (49) 100   


 


12/10/18 17:19  90 14     40


 


12/10/18 17:10 98.4 87 13 110/18 (48) 100   


 


12/10/18 16:00  87 14 110/21 (50) 100   


 


12/10/18 16:00        50


 


12/10/18 16:00      Mechanical Ventilator  





      Mechanical Ventilator  





      Mechanical Ventilator  





      Mechanical Ventilator  


 


12/10/18 16:00  86      

















Intake and Output  


 


 12/10/18 12/11/18





 19:00 07:00


 


Intake Total 515 ml 390 ml


 


Output Total 2501 ml 0 ml


 


Balance -1986 ml 390 ml


 


  


 


Free Water 100 ml 


 


IV Total 55 ml 


 


Tube Feeding 360 ml 390 ml


 


Output Urine Total 0 ml 0 ml


 


Stool Total 1 ml 


 


Hemodialysis UF 2500 ml 


 


# Bowel Movements 1 2








General Appearance:  no acute distress


HEENT:  normocephalic


Respiratory/Chest:  chest wall non-tender, lungs clear


Cardiovascular:  normal peripheral pulses, normal rate


Abdomen:  normal bowel sounds





Current Medications








 Medications


  (Trade)  Dose


 Ordered  Sig/Tony


 Route


 PRN Reason  Start Time


 Stop Time Status Last Admin


Dose Admin


 


 Acetaminophen


  (Tylenol)  650 mg  Q6H  PRN


 ORAL


 Mild Pain/Temp > 100.5  12/4/18 14:15


 1/3/19 14:14  12/11/18 05:48


 


 


 Amiodarone HCl


  (Cordarone)  200 mg  EVERY 12  HOURS


 NG


   12/8/18 21:00


 1/5/19 13:59  12/11/18 08:47


 


 


 Chlorhexidine


 Gluconate


  (Juana-Hex 2%)  1 applic  DAILY@2000


 TOPIC


   11/27/18 20:00


 12/27/18 19:59  12/10/18 20:00


 


 


 Collagenase


  (Santyl)  1 applic  QHS


 TOPIC


   12/6/18 21:00


 1/5/19 20:59  12/10/18 21:08


 


 


 Epoetin Rupesh


  (Procrit (for


 ESRD on dialysis))  10,000 units  MON-WED-FRI


 SUBQ


   11/28/18 21:00


 12/28/18 20:59  12/10/18 21:08


 


 


 Heparin Sodium


  (Porcine)


  (Heparin 5000


 units/ml)  5,000 units  EVERY 8  HOURS


 SUBQ


   12/7/18 22:00


 1/6/19 21:59  12/11/18 14:26


 


 


 Heparin Sodium


  (Porcine)


  (Heparin Sod


 1000 units/ml


 10ml)  2,000 unit  ONCE  PRN


 IV


 for dialysis  12/12/18 08:00


 12/12/18 23:59   


 


 


 Hydralazine HCl


  (Apresoline)  10 mg  Q4H  PRN


 IV


 sbp greater than 145  12/10/18 20:30


 1/9/19 20:29   


 


 


 Meropenem 500 mg/


 Sodium Chloride  55 ml @ 


 110 mls/hr  DAILY


 IVPB


   12/11/18 09:00


 12/25/18 08:59  12/11/18 08:48


 


 


 Minoxidil


  (Loniten)  5 mg  Q12HR


 NG


   12/2/18 21:00


 1/1/19 20:59  12/11/18 10:33


 


 


 Pantoprazole


  (Protonix)  40 mg  Q12HR


 IVP


   11/26/18 21:00


 12/27/18 08:59  12/11/18 08:47


 


 


 Sodium Chloride  1,000 ml @ 


 500 mls/hr  Q2H PRN


 IVLG


 sbp<90 during hd  12/12/18 06:00


 12/12/18 23:59   


 


 


 Vitamin D


  (Vitamin D)  1,000 intlu  DAILY


 GT


   12/9/18 10:00


 1/8/19 09:59  12/11/18 08:47


 

















Quentin Cardoso MD Dec 11, 2018 15:23

## 2018-12-11 NOTE — NUR
NURSE NOTES:

Patients wound dressings have been changed, Santyl was applied. Clean new linen was also 
applied. New feeding bottle hung and tubing changed. Oral hygiene was performed. Cooling 
bath was given due in part of low grade fever of 99.6F, other vitals remains stable, still 
in sinus rhythm.

## 2018-12-11 NOTE — NUR
NURSE NOTES:

Patient repositioned and provided oral care. Vitals remains stable /22, HR 76 SR, SpO2 
100%. Patient sleeping and is content. Midline dressing changed.

## 2018-12-11 NOTE — NUR
NURSE NOTES:

Patient repositioned, suctioned and oral care performed. Patient converted back to Sinus 
Rhythm, Patient HR now is 89 SR, BP is 136/33, SpO2 is 100%, same ventilator settings. 
Patient is experiencing a bit if low grade fever of 99.7F. Cooling measure are ongoing.

## 2018-12-11 NOTE — NUR
NURSE NOTES:

Patient's son at bedside. No signs of acute respiratory distress. Calm and comfortable. No 
complains of pain.

## 2018-12-11 NOTE — NUR
NURSE NOTES:

Spoke with Karla at [facility] for HD 12/12/18. Nurse to call back to confirm.

## 2018-12-11 NOTE — NUR
***INSURANCE***



ALL CLINICALS AND REVIEWS FAXED TO 





[facility]

REPORTED TO: NATALEE TORO#: [id number]

: MARSHA TORRES#[phone]

F#: [phone]

## 2018-12-11 NOTE — NUR
NURSE NOTES:

Received patient from CAROLYN Forbes. Patient awake, able to follow simple commends. Patient is 
orally intubated, ETT size 7.5, 23cm at lip line, Vent settings: AC 12, , 40% FiO2, 
PEEP 5, saturating 100%. Left nare NGT intact, running Nepro at 30ml/hr. HOB elevated. Right 
upper arm midline intact and patent. Dressing to left upper arm AV shunt intact, clean and 
dry. Patient on p200 mattress. Patient on bilateral wrist soft restraints. Skin intact, 
pulses present. Bed in lowest position. Call light within reach. Will continue to monitor.

## 2018-12-11 NOTE — NUR
HAND-OFF: 

Report given to Mendoza Hinkle RN. Patient remains stable, no acute distress, Vitals stable.

## 2018-12-11 NOTE — NUR
NURSE NOTES:

Received patient from Ms. Frank LEON and Ms. Hinkle RN. Patient awake, able to follow simple 
commends. Patient is orally intubated, ETT size 7.5, 23cm at lip line, Vent settings: AC 12, 
, 40% FiO2, PEEP 5, saturating 100%. Left nare NGT intact, running Nepro at 30ml/hr. 
HOB elevated. Right upper arm midline intact and patent. Dressing to left upper arm AV shunt 
intact, clean and dry. Patient on p200 mattress. Patient on bilateral wrist soft restraints. 
Skin intact, pulses present. Bed in lowest position. Current BP is 128/27, HR is 81 SR, 
Temperature is 98.9F and RR ranging 14-18. Call light within reach. Will continue to 
monitor.

## 2018-12-11 NOTE — NUR
NURSE NOTES:

Patient was repositioned and provided with oral hygiene. Temperature is 98.8F taken from 
auxiliary, will monitor temperature closely. Vitals have been stable so far with hr in the 
80s SR. BP is 140/31 and RR ranging around 12-14. Patient is awake and watching TV, able to 
nod her head for yes or no replies. NAD at this time, will continue to monitor.

## 2018-12-12 VITALS — SYSTOLIC BLOOD PRESSURE: 118 MMHG | DIASTOLIC BLOOD PRESSURE: 12 MMHG

## 2018-12-12 VITALS — DIASTOLIC BLOOD PRESSURE: 56 MMHG | SYSTOLIC BLOOD PRESSURE: 127 MMHG

## 2018-12-12 VITALS — SYSTOLIC BLOOD PRESSURE: 118 MMHG | DIASTOLIC BLOOD PRESSURE: 27 MMHG

## 2018-12-12 VITALS — DIASTOLIC BLOOD PRESSURE: 13 MMHG | SYSTOLIC BLOOD PRESSURE: 116 MMHG

## 2018-12-12 VITALS — DIASTOLIC BLOOD PRESSURE: 20 MMHG | SYSTOLIC BLOOD PRESSURE: 129 MMHG

## 2018-12-12 VITALS — SYSTOLIC BLOOD PRESSURE: 108 MMHG | DIASTOLIC BLOOD PRESSURE: 23 MMHG

## 2018-12-12 VITALS — SYSTOLIC BLOOD PRESSURE: 113 MMHG | DIASTOLIC BLOOD PRESSURE: 21 MMHG

## 2018-12-12 VITALS — DIASTOLIC BLOOD PRESSURE: 26 MMHG | SYSTOLIC BLOOD PRESSURE: 119 MMHG

## 2018-12-12 VITALS — SYSTOLIC BLOOD PRESSURE: 107 MMHG | DIASTOLIC BLOOD PRESSURE: 16 MMHG

## 2018-12-12 VITALS — DIASTOLIC BLOOD PRESSURE: 40 MMHG | SYSTOLIC BLOOD PRESSURE: 120 MMHG

## 2018-12-12 VITALS — SYSTOLIC BLOOD PRESSURE: 100 MMHG | DIASTOLIC BLOOD PRESSURE: 17 MMHG

## 2018-12-12 VITALS — DIASTOLIC BLOOD PRESSURE: 134 MMHG | SYSTOLIC BLOOD PRESSURE: 146 MMHG

## 2018-12-12 VITALS — DIASTOLIC BLOOD PRESSURE: 34 MMHG | SYSTOLIC BLOOD PRESSURE: 122 MMHG

## 2018-12-12 VITALS — SYSTOLIC BLOOD PRESSURE: 155 MMHG | DIASTOLIC BLOOD PRESSURE: 28 MMHG

## 2018-12-12 VITALS — DIASTOLIC BLOOD PRESSURE: 14 MMHG | SYSTOLIC BLOOD PRESSURE: 100 MMHG

## 2018-12-12 VITALS — DIASTOLIC BLOOD PRESSURE: 28 MMHG | SYSTOLIC BLOOD PRESSURE: 116 MMHG

## 2018-12-12 VITALS — DIASTOLIC BLOOD PRESSURE: 19 MMHG | SYSTOLIC BLOOD PRESSURE: 111 MMHG

## 2018-12-12 VITALS — SYSTOLIC BLOOD PRESSURE: 122 MMHG | DIASTOLIC BLOOD PRESSURE: 20 MMHG

## 2018-12-12 VITALS — SYSTOLIC BLOOD PRESSURE: 119 MMHG | DIASTOLIC BLOOD PRESSURE: 15 MMHG

## 2018-12-12 VITALS — DIASTOLIC BLOOD PRESSURE: 27 MMHG | SYSTOLIC BLOOD PRESSURE: 136 MMHG

## 2018-12-12 VITALS — DIASTOLIC BLOOD PRESSURE: 16 MMHG | SYSTOLIC BLOOD PRESSURE: 120 MMHG

## 2018-12-12 VITALS — SYSTOLIC BLOOD PRESSURE: 125 MMHG | DIASTOLIC BLOOD PRESSURE: 19 MMHG

## 2018-12-12 LAB
ADD MANUAL DIFF: NO
ALBUMIN SERPL-MCNC: 1.4 G/DL (ref 3.4–5)
ALBUMIN/GLOB SERPL: 0.3 {RATIO} (ref 1–2.7)
ALP SERPL-CCNC: 101 U/L (ref 46–116)
ALT SERPL-CCNC: 9 U/L (ref 12–78)
ANION GAP SERPL CALC-SCNC: 5 MMOL/L (ref 5–15)
AST SERPL-CCNC: 18 U/L (ref 15–37)
BASOPHILS NFR BLD AUTO: 0.6 % (ref 0–2)
BILIRUB SERPL-MCNC: 0.4 MG/DL (ref 0.2–1)
BUN SERPL-MCNC: 39 MG/DL (ref 7–18)
CALCIUM SERPL-MCNC: 8.9 MG/DL (ref 8.5–10.1)
CHLORIDE SERPL-SCNC: 104 MMOL/L (ref 98–107)
CO2 SERPL-SCNC: 33 MMOL/L (ref 21–32)
CREAT SERPL-MCNC: 3 MG/DL (ref 0.55–1.3)
EOSINOPHIL NFR BLD AUTO: 1.4 % (ref 0–3)
ERYTHROCYTE [DISTWIDTH] IN BLOOD BY AUTOMATED COUNT: 19.1 % (ref 11.6–14.8)
GLOBULIN SER-MCNC: 5 G/DL
HCT VFR BLD CALC: 27.3 % (ref 37–47)
HGB BLD-MCNC: 8.4 G/DL (ref 12–16)
LYMPHOCYTES NFR BLD AUTO: 8.6 % (ref 20–45)
MCV RBC AUTO: 92 FL (ref 80–99)
MONOCYTES NFR BLD AUTO: 10 % (ref 1–10)
NEUTROPHILS NFR BLD AUTO: 79.5 % (ref 45–75)
PLATELET # BLD: 198 K/UL (ref 150–450)
POTASSIUM SERPL-SCNC: 3.4 MMOL/L (ref 3.5–5.1)
RBC # BLD AUTO: 2.98 M/UL (ref 4.2–5.4)
SODIUM SERPL-SCNC: 142 MMOL/L (ref 136–145)
WBC # BLD AUTO: 10.6 K/UL (ref 4.8–10.8)

## 2018-12-12 RX ADMIN — AMIODARONE HYDROCHLORIDE SCH MG: 200 TABLET ORAL at 21:28

## 2018-12-12 RX ADMIN — HEPARIN SODIUM SCH UNITS: 5000 INJECTION INTRAVENOUS; SUBCUTANEOUS at 06:26

## 2018-12-12 RX ADMIN — PANTOPRAZOLE SODIUM SCH MG: 40 INJECTION, POWDER, FOR SOLUTION INTRAVENOUS at 09:27

## 2018-12-12 RX ADMIN — VITAMIN D, TAB 1000IU (100/BT) SCH INTLU: 25 TAB at 09:25

## 2018-12-12 RX ADMIN — CHLORHEXIDINE GLUCONATE SCH APPLIC: 213 SOLUTION TOPICAL at 20:00

## 2018-12-12 RX ADMIN — HEPARIN SODIUM SCH UNITS: 5000 INJECTION INTRAVENOUS; SUBCUTANEOUS at 13:19

## 2018-12-12 RX ADMIN — HEPARIN SODIUM SCH UNITS: 5000 INJECTION INTRAVENOUS; SUBCUTANEOUS at 21:29

## 2018-12-12 RX ADMIN — ERYTHROPOIETIN SCH UNITS: 20000 INJECTION, SOLUTION INTRAVENOUS; SUBCUTANEOUS at 21:29

## 2018-12-12 RX ADMIN — MINOXIDIL SCH MG: 2.5 TABLET ORAL at 21:29

## 2018-12-12 RX ADMIN — AMIODARONE HYDROCHLORIDE SCH MG: 200 TABLET ORAL at 09:26

## 2018-12-12 RX ADMIN — MINOXIDIL SCH MG: 2.5 TABLET ORAL at 09:29

## 2018-12-12 RX ADMIN — PANTOPRAZOLE SODIUM SCH MG: 40 INJECTION, POWDER, FOR SOLUTION INTRAVENOUS at 21:28

## 2018-12-12 RX ADMIN — MEROPENEM SCH MLS/HR: 500 INJECTION INTRAVENOUS at 09:27

## 2018-12-12 NOTE — NUR
NURSE NOTES:

Patient repositioned and suctioned. Vitals remains stable.  NAD at this time. Will continue 
to monitor.

## 2018-12-12 NOTE — NUR
NURSE NOTES:

Patient repositioned, suctioned, and provided oral care. No acute distress at this time, 
patient doing well off the pressors. BP normotensive.

## 2018-12-12 NOTE — NUR
***INSURANCE***





UPDATED CLINICALS AND REVIEW FAXED TO 





[facility]#: [id number]

NCM: JOHN TORRES#[phone]

F#: [phone]

## 2018-12-12 NOTE — NUR
NURSE NOTES:

Patient repositioned and suctioned,  patient remains normotensive, no acute distress at this 
time. Patient is warm to touch but does not reflect fever at this time.

## 2018-12-12 NOTE — NUR
NURSE NOTES:

Left nare NGT was clogged, unable to flush. Stopped tube feeding. Removed NGT. Inserted OGT. 
Patient tolerated well. KUB was ordered for confirmation of placement.

## 2018-12-12 NOTE — NUR
NURSE NOTES:

Patient changed, diarrhea stools noted. Rectal tube put in place, collected stool sample for 
c-diff and sent to lab. New sacral dressings applied. Patient suctioned and provided oral 
care and repositioned.

## 2018-12-12 NOTE — CARDIOLOGY PROGRESS NOTE
Assessment/Plan


Assessment/Plan


1. Hypotension, possibly sepsis versus volume.


2. Paroxysmal episodes of atrial fibrillation history.


3. Bradycardia secondary to medications, amiodarone possibly.


4. Diabetes mellitus.


5. End-stage renal disease, on hemodialysis.


6. Lung collapse.


7. Respiratory failure, status post intubation.


8. Dysphagia.


9. History of dementia.


10. Pulm htn 


11.  Pleural effusion 


12. cardaic arrest


13. chest wall pain post cpr  





in and out afib amido now 200 bid  on hold


stool ob neg x2 


amiod bid


off labetolol for now if need hydralaize to be used 


vent supprot 


wean as tolerted may need trachand peg 


tele review ed agian





Subjective


ROS Limited/Unobtainable:  Yes


Subjective


on the vent not verba





Objective





Last 24 Hour Vital Signs








  Date Time  Temp Pulse Resp B/P (MAP) Pulse Ox O2 Delivery O2 Flow Rate FiO2


 


12/12/18 21:29    125/11    


 


12/12/18 21:01  80 15     30


 


12/12/18 20:00        40


 


12/12/18 20:00      Mechanical Ventilator  





      Mechanical Ventilator  





      Mechanical Ventilator  





      Mechanical Ventilator  


 


12/12/18 20:00  80      


 


12/12/18 18:56  76 12     30


 


12/12/18 18:00  72 12 100/17 (44) 98   


 


12/12/18 17:00  73 12 107/16 (46) 100   


 


12/12/18 16:54  79 12     30


 


12/12/18 16:00 98.6 76 14 122/20 (54) 100   


 


12/12/18 16:00  72      


 


12/12/18 16:00      Mechanical Ventilator  





      Mechanical Ventilator  





      Mechanical Ventilator  





      Mechanical Ventilator  


 


12/12/18 16:00        40


 


12/12/18 15:00  74 14 116/13 (47) 100   


 


12/12/18 14:54  73 16     30


 


12/12/18 14:00  78 16 113/21 (51) 99   


 


12/12/18 13:20  94 15     30


 


12/12/18 13:00  80 14 146/134 (138) 100   


 


12/12/18 12:00      Mechanical Ventilator  





      Mechanical Ventilator  





      Mechanical Ventilator  





      Mechanical Ventilator  


 


12/12/18 12:00 98.4 82 13 116/28 (57) 99   


 


12/12/18 12:00  85      


 


12/12/18 12:00        40


 


12/12/18 11:30        40


 


12/12/18 11:00  80 21 118/12 (47) 99   


 


12/12/18 10:58  80 19     30


 


12/12/18 10:00  98 35 155/28 (70) 100   


 


12/12/18 09:30     100   


 


12/12/18 09:30        35


 


12/12/18 09:30  80 24     35


 


12/12/18 09:29    120/16    


 


12/12/18 09:00  79 15 118/27 (57) 100   


 


12/12/18 08:50  80 13     40


 


12/12/18 08:00      Mechanical Ventilator  





      Mechanical Ventilator  





      Mechanical Ventilator  





      Mechanical Ventilator  


 


12/12/18 08:00  84      


 


12/12/18 08:00        40


 


12/12/18 08:00 98.9 82 17 118/12 (47) 100   


 


12/12/18 07:15  81 13     40


 


12/12/18 07:00  91 20 119/26 (57) 100   


 


12/12/18 06:00  120 12 127/56 (79) 100   


 


12/12/18 05:08  103 13     40


 


12/12/18 05:00  87 12 111/19 (49) 100   


 


12/12/18 04:00        40


 


12/12/18 04:00 99.0 117 14 120/40 (66) 100   


 


12/12/18 04:00      Mechanical Ventilator  





      Mechanical Ventilator  





      Mechanical Ventilator  





      Mechanical Ventilator  


 


12/12/18 04:00  113      


 


12/12/18 03:00  70 14 107/16 (46) 100   


 


12/12/18 02:54  71 12     40


 


12/12/18 02:00  72 15 119/15 (49) 100   


 


12/12/18 01:30  73 12     40


 


12/12/18 01:00  69 12 108/23 (51) 100   


 


12/12/18 00:00      Mechanical Ventilator  





      Mechanical Ventilator  





      Mechanical Ventilator  





      Mechanical Ventilator  


 


12/12/18 00:00 99.6 102 15 136/27 (63) 100   


 


12/11/18 23:21  72 14     40


 


12/11/18 23:00  75 15 130/25 (60) 100   








General Appearance:  no apparent distress, alert, on vent, patient on isolation











Intake and Output  


 


 12/11/18 12/12/18





 19:00 07:00


 


Intake Total 525 ml 360 ml


 


Output Total 0 ml 0 ml


 


Balance 525 ml 360 ml


 


  


 


Free Water 110 ml 


 


IV Total 55 ml 


 


Tube Feeding 360 ml 360 ml


 


Output Urine Total 0 ml 0 ml


 


# Bowel Movements 1 1











Laboratory Tests








Test


  12/12/18


04:15


 


White Blood Count


  10.6 K/UL


(4.8-10.8)


 


Red Blood Count


  2.98 M/UL


(4.20-5.40)  L


 


Hemoglobin


  8.4 G/DL


(12.0-16.0)  L


 


Hematocrit


  27.3 %


(37.0-47.0)  L


 


Mean Corpuscular Volume 92 FL (80-99)  


 


Mean Corpuscular Hemoglobin


  28.3 PG


(27.0-31.0)


 


Mean Corpuscular Hemoglobin


Concent 30.9 G/DL


(32.0-36.0)  L


 


Red Cell Distribution Width


  19.1 %


(11.6-14.8)  H


 


Platelet Count


  198 K/UL


(150-450)


 


Mean Platelet Volume


  7.6 FL


(6.5-10.1)


 


Neutrophils (%) (Auto)


  79.5 %


(45.0-75.0)  H


 


Lymphocytes (%) (Auto)


  8.6 %


(20.0-45.0)  L


 


Monocytes (%) (Auto)


  10.0 %


(1.0-10.0)


 


Eosinophils (%) (Auto)


  1.4 %


(0.0-3.0)


 


Basophils (%) (Auto)


  0.6 %


(0.0-2.0)


 


Sodium Level


  142 MMOL/L


(136-145)


 


Potassium Level


  3.4 MMOL/L


(3.5-5.1)  L


 


Chloride Level


  104 MMOL/L


()


 


Carbon Dioxide Level


  33 MMOL/L


(21-32)  H


 


Anion Gap


  5 mmol/L


(5-15)


 


Blood Urea Nitrogen


  39 mg/dL


(7-18)  H


 


Creatinine


  3.0 MG/DL


(0.55-1.30)  H


 


Estimat Glomerular Filtration


Rate  mL/min (>60)  


 


 


Glucose Level


  126 MG/DL


()  H


 


Calcium Level


  8.9 MG/DL


(8.5-10.1)


 


Total Bilirubin


  0.4 MG/DL


(0.2-1.0)


 


Aspartate Amino Transf


(AST/SGOT) 18 U/L (15-37)


 


 


Alanine Aminotransferase


(ALT/SGPT) 9 U/L (12-78)


L


 


Alkaline Phosphatase


  101 U/L


()


 


Total Protein


  6.4 G/DL


(6.4-8.2)


 


Albumin


  1.4 G/DL


(3.4-5.0)  L


 


Globulin 5.0 g/dL  


 


Albumin/Globulin Ratio


  0.3 (1.0-2.7)


L

















Mariusz Wade MD Dec 12, 2018 22:18

## 2018-12-12 NOTE — DIAGNOSTIC IMAGING REPORT
Indication: Post nasogastric tube placement

 

Technique: One view of the body. Upper abdomen

 

Comparison: none

 

Findings: There is a nasogastric tube in place, tip projected at the level of the

gastric body. Numerous surgical clips are seen in the epigastric region. There are

extensive arterial calcifications. The lung bases demonstrate pleural fluid on the

left.

 

Impression: Satisfactory nasogastric intubation

 

Other findings as described

## 2018-12-12 NOTE — NUR
NURSE NOTES:

Patient on weaning trial as ordered. Patient tolerating well. No acute distress noted.

## 2018-12-12 NOTE — NUR
NURSE NOTES:

Patient repositioned and cleaned and suctioned. HD nurse at bedside setting up dialyzer and 
other equipment. Patient continues to remain stable.

## 2018-12-12 NOTE — NUR
NURSE NOTES:

Received patient from Ms. Frank LEON and Ms. Manan RN. Patient asleep, resting in bed 
comfortably. Patient is orally intubated, ETT size 7.5, 23cm at lip line, Vent settings: AC 
12, , 40% FiO2, PEEP 5, saturating 100%. Left nare NGT intact, running Nepro at 
30ml/hr. HOB elevated. Right upper arm midline intact and patent. left upper arm AV shunt 
noted. S/p dialysis today with 3000ml/out, Patient on p200 mattress. Patient on bilateral 
wrist soft restraints. Skin intact, pulses present. Bed in lowest position. Call light 
within reach. Will continue to monitor.

## 2018-12-12 NOTE — NUR
RESPIRATORY NOTE: Put pt back to AC mode with the same previous settings per pt requested. 
Pt is stable, no SOB or distress noted. RN notified. Will continue to monitor and sxn as 
needed.

## 2018-12-12 NOTE — NUR
CASE MANAGEMENT: REVIEW





SI: SEPSIS . ESRD ON HD . RIGHT FOOT DIABETIC ULCER . ORALLY INTUBATED 

AV FISTULA 12/11

BRONCHOSCOPY AND LAVAGE 11/27 

T 98.4 HR 82 RR 13 /28 SAT 99% MECH VENT FIO2 40

H/H 8.4/27.3 K 3.4 BUN 39 CR 3.0 









IS: MEROPENEM IV QD

AMIODARONE NG Q12HR

LABETALOL GT Q12HR

HEPARIN SQ Q8HR

EPOETIN SQ MWF 

PROTONIX IV Q12HR 

NGT FEEDING NEPRO @10ML/HR 

HEMODIALYSIS PRN 



***ICU STATUS***

DCP: PATIENT IS FROM [facility]



PLAN:

WEANING DETERMINATION

## 2018-12-12 NOTE — NUR
NURSE NOTES:

Podiatrist, Dr. العراقي, in facility to assess the patient. Wound dressing changed as 
ordered. Patient tolerated well.

## 2018-12-12 NOTE — NUR
RESPIRATORY NOTE: Received pt on current vent settings. Intubated with 7.5 size ett at 22 at 
the lip secured by anchor fast. SPO2 100%, HR 76. Bilateral rhonchi breath sounds upon 
auscultation. Alarms are on and audible. Vent is plugged into red outlet. Ambu bag is at the 
bedside. Will continue to monitor.

## 2018-12-12 NOTE — NEPHROLOGY PROGRESS NOTE
Assessment/Plan


Assessment


Seee below


Plan


MOF stable.


Sepsis due to infected diabetic ulcers. On IV Abx. Followed by ID, Vasc. Sx, 

Podiatry.





ESRD HD q MWF 


Anemia of CKD , SAMANTHA high dose. Transfuse PRN.





A. 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 with LAE. LVEF 65% . Anticoagulate when 

stable. DW Dr. Wade. In and out A. Fib.





If unweanable, may need Trac+PEg.





See orders.





Subjective


Subjective


Reintubated.





Objective


Objective





Last 24 Hour Vital Signs








  Date Time  Temp Pulse Resp B/P (MAP) Pulse Ox O2 Delivery O2 Flow Rate FiO2


 


12/12/18 06:00  120 12 127/56 (79) 100   


 


12/12/18 05:08  103 13     40


 


12/12/18 05:00  87 12 111/19 (49) 100   


 


12/12/18 04:00        40


 


12/12/18 04:00 99.0 117 14 120/40 (66) 100   


 


12/12/18 04:00      Mechanical Ventilator  





      Mechanical Ventilator  





      Mechanical Ventilator  





      Mechanical Ventilator  


 


12/12/18 04:00  113      


 


12/12/18 03:00  70 14 107/16 (46) 100   


 


12/12/18 02:54  71 12     40


 


12/12/18 02:00  72 15 119/15 (49) 100   


 


12/12/18 01:30  73 12     40


 


12/12/18 01:00  69 12 108/23 (51) 100   


 


12/12/18 00:00      Mechanical Ventilator  





      Mechanical Ventilator  





      Mechanical Ventilator  





      Mechanical Ventilator  


 


12/12/18 00:00 99.6 102 15 136/27 (63) 100   


 


12/11/18 23:21  72 14     40


 


12/11/18 23:00  75 15 130/25 (60) 100   


 


12/11/18 22:00  75 15 124/17 (52) 100   


 


12/11/18 21:18  69 12     40


 


12/11/18 21:00    140/31    


 


12/11/18 21:00  69 12 123/22 (55) 100   


 


12/11/18 20:00        40


 


12/11/18 20:00 98.8 72 12 140/31 (67) 100   


 


12/11/18 20:00  81      


 


12/11/18 20:00      Mechanical Ventilator  





      Mechanical Ventilator  





      Mechanical Ventilator  





      Mechanical Ventilator  


 


12/11/18 19:01  71 12     40


 


12/11/18 19:00  82 16 116/24 (54) 100   


 


12/11/18 18:00 97.6 75 15 142/29 (66) 100   


 


12/11/18 17:21  74 12     40


 


12/11/18 17:00  75 12 101/24 (49) 100   


 


12/11/18 16:00  112      


 


12/11/18 16:00  105 16 137/34 (68) 100   


 


12/11/18 16:00        40


 


12/11/18 16:00      Mechanical Ventilator  





      Mechanical Ventilator  





      Mechanical Ventilator  





      Mechanical Ventilator  


 


12/11/18 15:17  78 13     40


 


12/11/18 15:00  79 14 137/30 (65) 100   


 


12/11/18 14:00 97.5 77 14 132/28 (62) 100   


 


12/11/18 13:08  62 15     40


 


12/11/18 13:00  77 13 133/39 (70) 100   


 


12/11/18 12:00        40


 


12/11/18 12:00  76      


 


12/11/18 12:00  71 15 125/24 (57) 100   


 


12/11/18 12:00      Mechanical Ventilator  





      Mechanical Ventilator  





      Mechanical Ventilator  





      Mechanical Ventilator  


 


12/11/18 11:00  74 17 110/22 (51) 100   


 


12/11/18 10:58  73 18     40


 


12/11/18 10:33    129/26    


 


12/11/18 10:00 99.1 80 13 124/29 (60) 100   


 


12/11/18 09:00  89 14 145/44 (77) 100   


 


12/11/18 08:40  88 14     40


 


12/11/18 08:00        40


 


12/11/18 08:00  87      


 


12/11/18 08:00 99.9 88 18 121/20 (53) 100   


 


12/11/18 08:00      Mechanical Ventilator  





      Mechanical Ventilator  





      Mechanical Ventilator  





      Mechanical Ventilator  


 


12/11/18 08:00  87      

















Intake and Output  


 


 12/11/18 12/12/18





 19:00 07:00


 


Intake Total 525 ml 330 ml


 


Output Total 0 ml 0 ml


 


Balance 525 ml 330 ml


 


  


 


Free Water 110 ml 


 


IV Total 55 ml 


 


Tube Feeding 360 ml 330 ml


 


Output Urine Total 0 ml 0 ml


 


# Bowel Movements 1 1








Laboratory Tests


12/12/18 04:15: 


White Blood Count 10.6, Red Blood Count 2.98L, Hemoglobin 8.4L, Hematocrit 27.3L

, Mean Corpuscular Volume 92, Mean Corpuscular Hemoglobin 28.3, Mean 

Corpuscular Hemoglobin Concent 30.9L, Red Cell Distribution Width 19.1H, 

Platelet Count 198, Mean Platelet Volume 7.6, Neutrophils (%) (Auto) 79.5H, 

Lymphocytes (%) (Auto) 8.6L, Monocytes (%) (Auto) 10.0, Eosinophils (%) (Auto) 

1.4, Basophils (%) (Auto) 0.6, Sodium Level 142, Potassium Level 3.4L, Chloride 

Level 104, Carbon Dioxide Level 33H, Anion Gap 5, Blood Urea Nitrogen 39H, 

Creatinine 3.0H, Estimat Glomerular Filtration Rate , Glucose Level 126H, 

Calcium Level 8.9, Total Bilirubin 0.4, Aspartate Amino Transf (AST/SGOT) 18, 

Alanine Aminotransferase (ALT/SGPT) 9L, Alkaline Phosphatase 101, Total Protein 

6.4, Albumin 1.4L, Globulin 5.0, Albumin/Globulin Ratio 0.3L


Height (Feet):  5


Height (Inches):  2.00


Weight (Pounds):  123


Objective


Reintubated





Cv IRR/IRR


Lungs B shai.


Abd SNT. BS +


E Rt. foot diabetic ulcers











Gavino Daigle MD Dec 12, 2018 07:43

## 2018-12-12 NOTE — INFECTIOUS DISEASES PROG NOTE
Assessment/Plan


Assessment/Plan


antibiotics : meropenem





A


1. VRE | e.coli UTI


2. shock resolved


3. leucocytosis resolved


4. respiratory failure


5. renal failure


6. decubitus ulcers


7. nasal MRSA colonization


8. rectal VRE colonization


9. pleural effusion





P


1. continue meropenem


2. will follow up cultures





Subjective


ROS Limited/Unobtainable:  Yes


Allergies:  


Coded Allergies:  


     No Known Allergies (Unverified , 11/26/18)





Objective


Vital Signs





Last 24 Hour Vital Signs








  Date Time  Temp Pulse Resp B/P (MAP) Pulse Ox O2 Delivery O2 Flow Rate FiO2


 


12/12/18 11:00  80 21 118/12 (47) 99   


 


12/12/18 10:58  80 19     30


 


12/12/18 10:00  98 35 155/28 (70) 100   


 


12/12/18 09:30     100   


 


12/12/18 09:30  80 24     35


 


12/12/18 09:29    120/16    


 


12/12/18 09:00  79 15 118/27 (57) 100   


 


12/12/18 08:50  80 13     40


 


12/12/18 08:00      Mechanical Ventilator  





      Mechanical Ventilator  





      Mechanical Ventilator  





      Mechanical Ventilator  


 


12/12/18 08:00  84      


 


12/12/18 08:00        40


 


12/12/18 08:00 98.9 82 17 118/12 (47) 100   


 


12/12/18 07:15  81 13     40


 


12/12/18 07:00  91 20 119/26 (57) 100   


 


12/12/18 06:00  120 12 127/56 (79) 100   


 


12/12/18 05:08  103 13     40


 


12/12/18 05:00  87 12 111/19 (49) 100   


 


12/12/18 04:00        40


 


12/12/18 04:00 99.0 117 14 120/40 (66) 100   


 


12/12/18 04:00      Mechanical Ventilator  





      Mechanical Ventilator  





      Mechanical Ventilator  





      Mechanical Ventilator  


 


12/12/18 04:00  113      


 


12/12/18 03:00  70 14 107/16 (46) 100   


 


12/12/18 02:54  71 12     40


 


12/12/18 02:00  72 15 119/15 (49) 100   


 


12/12/18 01:30  73 12     40


 


12/12/18 01:00  69 12 108/23 (51) 100   


 


12/12/18 00:00      Mechanical Ventilator  





      Mechanical Ventilator  





      Mechanical Ventilator  





      Mechanical Ventilator  


 


12/12/18 00:00 99.6 102 15 136/27 (63) 100   


 


12/11/18 23:21  72 14     40


 


12/11/18 23:00  75 15 130/25 (60) 100   


 


12/11/18 22:00  75 15 124/17 (52) 100   


 


12/11/18 21:18  69 12     40


 


12/11/18 21:00    140/31    


 


12/11/18 21:00  69 12 123/22 (55) 100   


 


12/11/18 20:00        40


 


12/11/18 20:00 98.8 72 12 140/31 (67) 100   


 


12/11/18 20:00  81      


 


12/11/18 20:00      Mechanical Ventilator  





      Mechanical Ventilator  





      Mechanical Ventilator  





      Mechanical Ventilator  


 


12/11/18 19:01  71 12     40


 


12/11/18 19:00  82 16 116/24 (54) 100   


 


12/11/18 18:00 97.6 75 15 142/29 (66) 100   


 


12/11/18 17:21  74 12     40


 


12/11/18 17:00  75 12 101/24 (49) 100   


 


12/11/18 16:00  112      


 


12/11/18 16:00  105 16 137/34 (68) 100   


 


12/11/18 16:00        40


 


12/11/18 16:00      Mechanical Ventilator  





      Mechanical Ventilator  





      Mechanical Ventilator  





      Mechanical Ventilator  


 


12/11/18 15:17  78 13     40


 


12/11/18 15:00  79 14 137/30 (65) 100   


 


12/11/18 14:00 97.5 77 14 132/28 (62) 100   


 


12/11/18 13:08  62 15     40


 


12/11/18 13:00  77 13 133/39 (70) 100   


 


12/11/18 12:00        40


 


12/11/18 12:00  76      


 


12/11/18 12:00  71 15 125/24 (57) 100   


 


12/11/18 12:00      Mechanical Ventilator  





      Mechanical Ventilator  





      Mechanical Ventilator  





      Mechanical Ventilator  








Height (Feet):  5


Height (Inches):  2.00


Weight (Pounds):  123


HEENT:  other - intubated


Respiratory/Chest:  lungs clear


Cardiovascular:  normal rate, regular rhythm, no gallop/murmur


Abdomen:  soft, non tender


Extremities:  no edema, other - right arm PICC





Laboratory Tests








Test


  12/12/18


04:15


 


White Blood Count


  10.6 K/UL


(4.8-10.8)


 


Red Blood Count


  2.98 M/UL


(4.20-5.40)  L


 


Hemoglobin


  8.4 G/DL


(12.0-16.0)  L


 


Hematocrit


  27.3 %


(37.0-47.0)  L


 


Mean Corpuscular Volume 92 FL (80-99)  


 


Mean Corpuscular Hemoglobin


  28.3 PG


(27.0-31.0)


 


Mean Corpuscular Hemoglobin


Concent 30.9 G/DL


(32.0-36.0)  L


 


Red Cell Distribution Width


  19.1 %


(11.6-14.8)  H


 


Platelet Count


  198 K/UL


(150-450)


 


Mean Platelet Volume


  7.6 FL


(6.5-10.1)


 


Neutrophils (%) (Auto)


  79.5 %


(45.0-75.0)  H


 


Lymphocytes (%) (Auto)


  8.6 %


(20.0-45.0)  L


 


Monocytes (%) (Auto)


  10.0 %


(1.0-10.0)


 


Eosinophils (%) (Auto)


  1.4 %


(0.0-3.0)


 


Basophils (%) (Auto)


  0.6 %


(0.0-2.0)


 


Sodium Level


  142 MMOL/L


(136-145)


 


Potassium Level


  3.4 MMOL/L


(3.5-5.1)  L


 


Chloride Level


  104 MMOL/L


()


 


Carbon Dioxide Level


  33 MMOL/L


(21-32)  H


 


Anion Gap


  5 mmol/L


(5-15)


 


Blood Urea Nitrogen


  39 mg/dL


(7-18)  H


 


Creatinine


  3.0 MG/DL


(0.55-1.30)  H


 


Estimat Glomerular Filtration


Rate  mL/min (>60)  


 


 


Glucose Level


  126 MG/DL


()  H


 


Calcium Level


  8.9 MG/DL


(8.5-10.1)


 


Total Bilirubin


  0.4 MG/DL


(0.2-1.0)


 


Aspartate Amino Transf


(AST/SGOT) 18 U/L (15-37)


 


 


Alanine Aminotransferase


(ALT/SGPT) 9 U/L (12-78)


L


 


Alkaline Phosphatase


  101 U/L


()


 


Total Protein


  6.4 G/DL


(6.4-8.2)


 


Albumin


  1.4 G/DL


(3.4-5.0)  L


 


Globulin 5.0 g/dL  


 


Albumin/Globulin Ratio


  0.3 (1.0-2.7)


L











Current Medications








 Medications


  (Trade)  Dose


 Ordered  Sig/Tony


 Route


 PRN Reason  Start Time


 Stop Time Status Last Admin


Dose Admin


 


 Acetaminophen


  (Tylenol)  650 mg  Q6H  PRN


 ORAL


 Mild Pain/Temp > 100.5  12/4/18 14:15


 1/3/19 14:14  12/11/18 05:48


 


 


 Amiodarone HCl


  (Cordarone)  200 mg  EVERY 12  HOURS


 NG


   12/8/18 21:00


 1/5/19 13:59  12/12/18 09:26


 


 


 Chlorhexidine


 Gluconate


  (Juana-Hex 2%)  1 applic  DAILY@2000


 TOPIC


   11/27/18 20:00


 12/27/18 19:59  12/11/18 20:00


 


 


 Collagenase


  (Santyl)  1 applic  QHS


 TOPIC


   12/6/18 21:00


 1/5/19 20:59  12/11/18 21:00


 


 


 Epoetin Rupesh


  (Procrit (for


 ESRD on dialysis))  10,000 units  MON-WED-FRI


 SUBQ


   11/28/18 21:00


 12/28/18 20:59  12/10/18 21:08


 


 


 Heparin Sodium


  (Porcine)


  (Heparin 5000


 units/ml)  5,000 units  EVERY 8  HOURS


 SUBQ


   12/7/18 22:00


 1/6/19 21:59  12/12/18 06:26


 


 


 Heparin Sodium


  (Porcine)


  (Heparin Sod


 1000 units/ml


 10ml)  2,000 unit  ONCE  PRN


 IV


 for dialysis  12/12/18 08:00


 12/12/18 23:59   


 


 


 Hydralazine HCl


  (Apresoline)  10 mg  Q4H  PRN


 IV


 sbp greater than 145  12/10/18 20:30


 1/9/19 20:29   


 


 


 Meropenem 500 mg/


 Sodium Chloride  55 ml @ 


 110 mls/hr  DAILY


 IVPB


   12/11/18 09:00


 12/25/18 08:59  12/12/18 09:27


 


 


 Minoxidil


  (Loniten)  5 mg  Q12HR


 NG


   12/2/18 21:00


 1/1/19 20:59  12/12/18 09:29


 


 


 Pantoprazole


  (Protonix)  40 mg  Q12HR


 IVP


   11/26/18 21:00


 12/27/18 08:59  12/12/18 09:27


 


 


 Sodium Chloride  1,000 ml @ 


 500 mls/hr  Q2H PRN


 IVLG


 sbp<90 during hd  12/12/18 06:00


 12/12/18 23:59   


 


 


 Vitamin D


  (Vitamin D)  1,000 intlu  DAILY


 GT


   12/9/18 10:00


 1/8/19 09:59  12/12/18 09:25


 

















Chase Santillan MD Dec 12, 2018 11:20

## 2018-12-12 NOTE — PULMONOLOGY PROGRESS NOTE
Assessment/Plan


Assessment/Plan


IMPRESSION:


1. Hypotension.


2. Sepsis.


3. Atrial fibrillation.


4. Bradycardia.


5. Diabetes mellitus.


6. End-stage renal disease on dialysis.


7. Left lung collapse. Resolved


8. Respiratory failure. now reintubated


9. Dysphagia.


10. Dementia.


11. Anemia


12. Pneumonia





DISCUSSION:


1. Continue medications.


2. CXR improved


3. status post bronchoscopy


4. Central access


5. Use broad-spectrum antibiotics.  


6. May require neurology opinion


7.  May require trach











  ______________________________________________


  Quentin Cardoso M.D.





Subjective


Interval Events:  None new; remains intubated


Constitutional:  Reports: no symptoms


HEENT:  Repors: no symptoms


Respiratory:  Reports: no symptoms


Cardiovascular:  Reports: no symptoms


Gastrointestinal/Abdominal:  Reports: no symptoms


Genitourinary:  Reports: no symptoms


Allergies:  


Coded Allergies:  


     No Known Allergies (Unverified , 11/26/18)





Objective





Last 24 Hour Vital Signs








  Date Time  Temp Pulse Resp B/P (MAP) Pulse Ox O2 Delivery O2 Flow Rate FiO2


 


12/12/18 09:30     100   


 


12/12/18 09:30  80 24     35


 


12/12/18 09:29    120/16    


 


12/12/18 09:00  79 15 118/27 (57) 100   


 


12/12/18 08:50  80 13     40


 


12/12/18 08:00      Mechanical Ventilator  





      Mechanical Ventilator  





      Mechanical Ventilator  





      Mechanical Ventilator  


 


12/12/18 08:00  84      


 


12/12/18 08:00        40


 


12/12/18 08:00 98.9 82 17 118/12 (47) 100   


 


12/12/18 07:15  81 13     40


 


12/12/18 07:00  91 20 119/26 (57) 100   


 


12/12/18 06:00  120 12 127/56 (79) 100   


 


12/12/18 05:08  103 13     40


 


12/12/18 05:00  87 12 111/19 (49) 100   


 


12/12/18 04:00        40


 


12/12/18 04:00 99.0 117 14 120/40 (66) 100   


 


12/12/18 04:00      Mechanical Ventilator  





      Mechanical Ventilator  





      Mechanical Ventilator  





      Mechanical Ventilator  


 


12/12/18 04:00  113      


 


12/12/18 03:00  70 14 107/16 (46) 100   


 


12/12/18 02:54  71 12     40


 


12/12/18 02:00  72 15 119/15 (49) 100   


 


12/12/18 01:30  73 12     40


 


12/12/18 01:00  69 12 108/23 (51) 100   


 


12/12/18 00:00      Mechanical Ventilator  





      Mechanical Ventilator  





      Mechanical Ventilator  





      Mechanical Ventilator  


 


12/12/18 00:00 99.6 102 15 136/27 (63) 100   


 


12/11/18 23:21  72 14     40


 


12/11/18 23:00  75 15 130/25 (60) 100   


 


12/11/18 22:00  75 15 124/17 (52) 100   


 


12/11/18 21:18  69 12     40


 


12/11/18 21:00    140/31    


 


12/11/18 21:00  69 12 123/22 (55) 100   


 


12/11/18 20:00        40


 


12/11/18 20:00 98.8 72 12 140/31 (67) 100   


 


12/11/18 20:00  81      


 


12/11/18 20:00      Mechanical Ventilator  





      Mechanical Ventilator  





      Mechanical Ventilator  





      Mechanical Ventilator  


 


12/11/18 19:01  71 12     40


 


12/11/18 19:00  82 16 116/24 (54) 100   


 


12/11/18 18:00 97.6 75 15 142/29 (66) 100   


 


12/11/18 17:21  74 12     40


 


12/11/18 17:00  75 12 101/24 (49) 100   


 


12/11/18 16:00  112      


 


12/11/18 16:00  105 16 137/34 (68) 100   


 


12/11/18 16:00        40


 


12/11/18 16:00      Mechanical Ventilator  





      Mechanical Ventilator  





      Mechanical Ventilator  





      Mechanical Ventilator  


 


12/11/18 15:17  78 13     40


 


12/11/18 15:00  79 14 137/30 (65) 100   


 


12/11/18 14:00 97.5 77 14 132/28 (62) 100   


 


12/11/18 13:08  62 15     40


 


12/11/18 13:00  77 13 133/39 (70) 100   


 


12/11/18 12:00        40


 


12/11/18 12:00  76      


 


12/11/18 12:00  71 15 125/24 (57) 100   


 


12/11/18 12:00      Mechanical Ventilator  





      Mechanical Ventilator  





      Mechanical Ventilator  





      Mechanical Ventilator  


 


12/11/18 11:00  74 17 110/22 (51) 100   


 


12/11/18 10:58  73 18     40


 


12/11/18 10:33    129/26    


 


12/11/18 10:00 99.1 80 13 124/29 (60) 100   

















Intake and Output  


 


 12/11/18 12/12/18





 19:00 07:00


 


Intake Total 525 ml 360 ml


 


Output Total 0 ml 0 ml


 


Balance 525 ml 360 ml


 


  


 


Free Water 110 ml 


 


IV Total 55 ml 


 


Tube Feeding 360 ml 360 ml


 


Output Urine Total 0 ml 0 ml


 


# Bowel Movements 1 1








General Appearance:  no acute distress


HEENT:  normocephalic


Respiratory/Chest:  chest wall non-tender, lungs clear


Cardiovascular:  normal peripheral pulses, normal rate


Abdomen:  normal bowel sounds, soft, non tender


Laboratory Tests


12/12/18 04:15: 


White Blood Count 10.6, Red Blood Count 2.98L, Hemoglobin 8.4L, Hematocrit 27.3L

, Mean Corpuscular Volume 92, Mean Corpuscular Hemoglobin 28.3, Mean 

Corpuscular Hemoglobin Concent 30.9L, Red Cell Distribution Width 19.1H, 

Platelet Count 198, Mean Platelet Volume 7.6, Neutrophils (%) (Auto) 79.5H, 

Lymphocytes (%) (Auto) 8.6L, Monocytes (%) (Auto) 10.0, Eosinophils (%) (Auto) 

1.4, Basophils (%) (Auto) 0.6, Sodium Level 142, Potassium Level 3.4L, Chloride 

Level 104, Carbon Dioxide Level 33H, Anion Gap 5, Blood Urea Nitrogen 39H, 

Creatinine 3.0H, Estimat Glomerular Filtration Rate , Glucose Level 126H, 

Calcium Level 8.9, Total Bilirubin 0.4, Aspartate Amino Transf (AST/SGOT) 18, 

Alanine Aminotransferase (ALT/SGPT) 9L, Alkaline Phosphatase 101, Total Protein 

6.4, Albumin 1.4L, Globulin 5.0, Albumin/Globulin Ratio 0.3L





Current Medications








 Medications


  (Trade)  Dose


 Ordered  Sig/Tony


 Route


 PRN Reason  Start Time


 Stop Time Status Last Admin


Dose Admin


 


 Acetaminophen


  (Tylenol)  650 mg  Q6H  PRN


 ORAL


 Mild Pain/Temp > 100.5  12/4/18 14:15


 1/3/19 14:14  12/11/18 05:48


 


 


 Amiodarone HCl


  (Cordarone)  200 mg  EVERY 12  HOURS


 NG


   12/8/18 21:00


 1/5/19 13:59  12/12/18 09:26


 


 


 Chlorhexidine


 Gluconate


  (Juana-Hex 2%)  1 applic  DAILY@2000


 TOPIC


   11/27/18 20:00


 12/27/18 19:59  12/11/18 20:00


 


 


 Collagenase


  (Santyl)  1 applic  QHS


 TOPIC


   12/6/18 21:00


 1/5/19 20:59  12/11/18 21:00


 


 


 Epoetin Rupesh


  (Procrit (for


 ESRD on dialysis))  10,000 units  MON-WED-FRI


 SUBQ


   11/28/18 21:00


 12/28/18 20:59  12/10/18 21:08


 


 


 Heparin Sodium


  (Porcine)


  (Heparin 5000


 units/ml)  5,000 units  EVERY 8  HOURS


 SUBQ


   12/7/18 22:00


 1/6/19 21:59  12/12/18 06:26


 


 


 Heparin Sodium


  (Porcine)


  (Heparin Sod


 1000 units/ml


 10ml)  2,000 unit  ONCE  PRN


 IV


 for dialysis  12/12/18 08:00


 12/12/18 23:59   


 


 


 Hydralazine HCl


  (Apresoline)  10 mg  Q4H  PRN


 IV


 sbp greater than 145  12/10/18 20:30


 1/9/19 20:29   


 


 


 Meropenem 500 mg/


 Sodium Chloride  55 ml @ 


 110 mls/hr  DAILY


 IVPB


   12/11/18 09:00


 12/25/18 08:59  12/12/18 09:27


 


 


 Minoxidil


  (Loniten)  5 mg  Q12HR


 NG


   12/2/18 21:00


 1/1/19 20:59  12/12/18 09:29


 


 


 Pantoprazole


  (Protonix)  40 mg  Q12HR


 IVP


   11/26/18 21:00


 12/27/18 08:59  12/12/18 09:27


 


 


 Sodium Chloride  1,000 ml @ 


 500 mls/hr  Q2H PRN


 IVLG


 sbp<90 during hd  12/12/18 06:00


 12/12/18 23:59   


 


 


 Vitamin D


  (Vitamin D)  1,000 intlu  DAILY


 GT


   12/9/18 10:00


 1/8/19 09:59  12/12/18 09:25


 

















Quentin Cardoso MD Dec 12, 2018 09:55

## 2018-12-12 NOTE — VASCULAR SURGERY PROGRESS NOTE
Subjective


Subjective


All noted





Reintubated again for resp failure


Awake


No complaints





Objective


Objective





Last 24 Hour Vital Signs








  Date Time  Temp Pulse Resp B/P (MAP) Pulse Ox O2 Delivery O2 Flow Rate FiO2


 


12/12/18 16:54  79 12     30


 


12/12/18 16:00 98.6 76 14 122/20 (54) 100   


 


12/12/18 16:00  72      


 


12/12/18 16:00      Mechanical Ventilator  





      Mechanical Ventilator  





      Mechanical Ventilator  





      Mechanical Ventilator  


 


12/12/18 16:00        40


 


12/12/18 15:00  74 14 116/13 (47) 100   


 


12/12/18 14:54  73 16     30


 


12/12/18 14:00  78 16 113/21 (51) 99   


 


12/12/18 13:20  94 15     30


 


12/12/18 13:00  80 14 146/134 (138) 100   


 


12/12/18 12:00      Mechanical Ventilator  





      Mechanical Ventilator  





      Mechanical Ventilator  





      Mechanical Ventilator  


 


12/12/18 12:00 98.4 82 13 116/28 (57) 99   


 


12/12/18 12:00  85      


 


12/12/18 12:00        40


 


12/12/18 11:30        40


 


12/12/18 11:00  80 21 118/12 (47) 99   


 


12/12/18 10:58  80 19     30


 


12/12/18 10:00  98 35 155/28 (70) 100   


 


12/12/18 09:30     100   


 


12/12/18 09:30        35


 


12/12/18 09:30  80 24     35


 


12/12/18 09:29    120/16    


 


12/12/18 09:00  79 15 118/27 (57) 100   


 


12/12/18 08:50  80 13     40


 


12/12/18 08:00      Mechanical Ventilator  





      Mechanical Ventilator  





      Mechanical Ventilator  





      Mechanical Ventilator  


 


12/12/18 08:00  84      


 


12/12/18 08:00        40


 


12/12/18 08:00 98.9 82 17 118/12 (47) 100   


 


12/12/18 07:15  81 13     40


 


12/12/18 07:00  91 20 119/26 (57) 100   


 


12/12/18 06:00  120 12 127/56 (79) 100   


 


12/12/18 05:08  103 13     40


 


12/12/18 05:00  87 12 111/19 (49) 100   


 


12/12/18 04:00        40


 


12/12/18 04:00 99.0 117 14 120/40 (66) 100   


 


12/12/18 04:00      Mechanical Ventilator  





      Mechanical Ventilator  





      Mechanical Ventilator  





      Mechanical Ventilator  


 


12/12/18 04:00  113      


 


12/12/18 03:00  70 14 107/16 (46) 100   


 


12/12/18 02:54  71 12     40


 


12/12/18 02:00  72 15 119/15 (49) 100   


 


12/12/18 01:30  73 12     40


 


12/12/18 01:00  69 12 108/23 (51) 100   


 


12/12/18 00:00      Mechanical Ventilator  





      Mechanical Ventilator  





      Mechanical Ventilator  





      Mechanical Ventilator  


 


12/12/18 00:00 99.6 102 15 136/27 (63) 100   


 


12/11/18 23:21  72 14     40


 


12/11/18 23:00  75 15 130/25 (60) 100   


 


12/11/18 22:00  75 15 124/17 (52) 100   


 


12/11/18 21:18  69 12     40


 


12/11/18 21:00    140/31    


 


12/11/18 21:00  69 12 123/22 (55) 100   


 


12/11/18 20:00        40


 


12/11/18 20:00 98.8 72 12 140/31 (67) 100   


 


12/11/18 20:00  81      


 


12/11/18 20:00      Mechanical Ventilator  





      Mechanical Ventilator  





      Mechanical Ventilator  





      Mechanical Ventilator  


 


12/11/18 19:01  71 12     40


 


12/11/18 19:00  82 16 116/24 (54) 100   


 


12/11/18 18:00 97.6 75 15 142/29 (66) 100   

















Intake and Output  


 


 12/11/18 12/12/18





 19:00 07:00


 


Intake Total 525 ml 360 ml


 


Output Total 0 ml 0 ml


 


Balance 525 ml 360 ml


 


  


 


Free Water 110 ml 


 


IV Total 55 ml 


 


Tube Feeding 360 ml 360 ml


 


Output Urine Total 0 ml 0 ml


 


# Bowel Movements 1 1











Laboratory Tests








Test


  12/12/18


04:15


 


White Blood Count


  10.6 K/UL


(4.8-10.8)


 


Red Blood Count


  2.98 M/UL


(4.20-5.40)  L


 


Hemoglobin


  8.4 G/DL


(12.0-16.0)  L


 


Hematocrit


  27.3 %


(37.0-47.0)  L


 


Mean Corpuscular Volume 92 FL (80-99)  


 


Mean Corpuscular Hemoglobin


  28.3 PG


(27.0-31.0)


 


Mean Corpuscular Hemoglobin


Concent 30.9 G/DL


(32.0-36.0)  L


 


Red Cell Distribution Width


  19.1 %


(11.6-14.8)  H


 


Platelet Count


  198 K/UL


(150-450)


 


Mean Platelet Volume


  7.6 FL


(6.5-10.1)


 


Neutrophils (%) (Auto)


  79.5 %


(45.0-75.0)  H


 


Lymphocytes (%) (Auto)


  8.6 %


(20.0-45.0)  L


 


Monocytes (%) (Auto)


  10.0 %


(1.0-10.0)


 


Eosinophils (%) (Auto)


  1.4 %


(0.0-3.0)


 


Basophils (%) (Auto)


  0.6 %


(0.0-2.0)


 


Sodium Level


  142 MMOL/L


(136-145)


 


Potassium Level


  3.4 MMOL/L


(3.5-5.1)  L


 


Chloride Level


  104 MMOL/L


()


 


Carbon Dioxide Level


  33 MMOL/L


(21-32)  H


 


Anion Gap


  5 mmol/L


(5-15)


 


Blood Urea Nitrogen


  39 mg/dL


(7-18)  H


 


Creatinine


  3.0 MG/DL


(0.55-1.30)  H


 


Estimat Glomerular Filtration


Rate  mL/min (>60)  


 


 


Glucose Level


  126 MG/DL


()  H


 


Calcium Level


  8.9 MG/DL


(8.5-10.1)


 


Total Bilirubin


  0.4 MG/DL


(0.2-1.0)


 


Aspartate Amino Transf


(AST/SGOT) 18 U/L (15-37)


 


 


Alanine Aminotransferase


(ALT/SGPT) 9 U/L (12-78)


L


 


Alkaline Phosphatase


  101 U/L


()


 


Total Protein


  6.4 G/DL


(6.4-8.2)


 


Albumin


  1.4 G/DL


(3.4-5.0)  L


 


Globulin 5.0 g/dL  


 


Albumin/Globulin Ratio


  0.3 (1.0-2.7)


L








Height (Feet):  5


Height (Inches):  2.00


Weight (Pounds):  123


Objective


On vent


Awake alert and follows simple commands


cvs rrr


lungs rhonchi


abd soft nontender


Left arm av shunt + thrill


2+ femorals 2+ popliteal palpable pulses


Absent DP PT pulses


Bilateral foot wounds right ankle wound deep





Assessment/Plan


Assessment


Recurrent resp failure on vent now


Sepsis/ pneumonia on abx


Arrhythmia


ESRD on HD


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


DM


Anemia


Plan


Trach and PEG feeding asap once ok with pulm/GI


Anticoagulate for arrhythmia


Abx per ID


Off load legs-keep over 2 pillows with air mattress bed


Will need selective right leg angiogram to assess for percutaneous 

revascularization


High risk of worsening wound necrosis osteo limb loss specially right ankle 

wound


Continue local wound care per podiatry & general surgery











Himanshu Spencer MD Dec 12, 2018 17:39

## 2018-12-12 NOTE — PODIATRIC PROGRESS NOTE
Assessment/Plan


Patient


Juliana Vogt is a 78 year old female who was admitted on Nov 26, 2018 at 10

:32 with


Assessment/Plan


Assessment/Plan


Patient


Juliana Vogt is a 78 year old female who was admitted on Nov 26, 2018 at 10

:32 with





Problems:  


(1) ESRD (end stage renal disease)


(2) Decubital ulcer


(3) Pneumonia


(4) Pyelonephritis


(5) Altered mental status


(6) B/L leg ulcers 








Assessment/Plan


- pt seen and evaluated with nursing staff. 


- chart reviewed. 


- Temp, 98.6 


- WBC, 10.6


- Cont daily wound care: BID: irrigate with NS, apply santyl, xerofrom and DSD. 


- Pt awaiting vasc procedure. 


- Cont ABx. 


- Pod will cont to monitor.





Subjective


ROS Limited/Unobtainable:  Yes


Allergies:  


Coded Allergies:  


     No Known Allergies (Unverified , 11/26/18)


Subjective


- Pt re-intubated at bed side. Appears NAD.





Objective


Exam





Last 24 Hour Vital Signs








  Date Time  Temp Pulse Resp B/P (MAP) Pulse Ox O2 Delivery O2 Flow Rate FiO2


 


12/12/18 17:00  73 12 107/16 (46) 100   


 


12/12/18 16:54  79 12     30


 


12/12/18 16:00 98.6 76 14 122/20 (54) 100   


 


12/12/18 16:00  72      


 


12/12/18 16:00      Mechanical Ventilator  





      Mechanical Ventilator  





      Mechanical Ventilator  





      Mechanical Ventilator  


 


12/12/18 16:00        40


 


12/12/18 15:00  74 14 116/13 (47) 100   


 


12/12/18 14:54  73 16     30


 


12/12/18 14:00  78 16 113/21 (51) 99   


 


12/12/18 13:20  94 15     30


 


12/12/18 13:00  80 14 146/134 (138) 100   


 


12/12/18 12:00      Mechanical Ventilator  





      Mechanical Ventilator  





      Mechanical Ventilator  





      Mechanical Ventilator  


 


12/12/18 12:00 98.4 82 13 116/28 (57) 99   


 


12/12/18 12:00  85      


 


12/12/18 12:00        40


 


12/12/18 11:30        40


 


12/12/18 11:00  80 21 118/12 (47) 99   


 


12/12/18 10:58  80 19     30


 


12/12/18 10:00  98 35 155/28 (70) 100   


 


12/12/18 09:30     100   


 


12/12/18 09:30        35


 


12/12/18 09:30  80 24     35


 


12/12/18 09:29    120/16    


 


12/12/18 09:00  79 15 118/27 (57) 100   


 


12/12/18 08:50  80 13     40


 


12/12/18 08:00      Mechanical Ventilator  





      Mechanical Ventilator  





      Mechanical Ventilator  





      Mechanical Ventilator  


 


12/12/18 08:00  84      


 


12/12/18 08:00        40


 


12/12/18 08:00 98.9 82 17 118/12 (47) 100   


 


12/12/18 07:15  81 13     40


 


12/12/18 07:00  91 20 119/26 (57) 100   


 


12/12/18 06:00  120 12 127/56 (79) 100   


 


12/12/18 05:08  103 13     40


 


12/12/18 05:00  87 12 111/19 (49) 100   


 


12/12/18 04:00        40


 


12/12/18 04:00 99.0 117 14 120/40 (66) 100   


 


12/12/18 04:00      Mechanical Ventilator  





      Mechanical Ventilator  





      Mechanical Ventilator  





      Mechanical Ventilator  


 


12/12/18 04:00  113      


 


12/12/18 03:00  70 14 107/16 (46) 100   


 


12/12/18 02:54  71 12     40


 


12/12/18 02:00  72 15 119/15 (49) 100   


 


12/12/18 01:30  73 12     40


 


12/12/18 01:00  69 12 108/23 (51) 100   


 


12/12/18 00:00      Mechanical Ventilator  





      Mechanical Ventilator  





      Mechanical Ventilator  





      Mechanical Ventilator  


 


12/12/18 00:00 99.6 102 15 136/27 (63) 100   


 


12/11/18 23:21  72 14     40


 


12/11/18 23:00  75 15 130/25 (60) 100   


 


12/11/18 22:00  75 15 124/17 (52) 100   


 


12/11/18 21:18  69 12     40


 


12/11/18 21:00    140/31    


 


12/11/18 21:00  69 12 123/22 (55) 100   


 


12/11/18 20:00        40


 


12/11/18 20:00 98.8 72 12 140/31 (67) 100   


 


12/11/18 20:00  81      


 


12/11/18 20:00      Mechanical Ventilator  





      Mechanical Ventilator  





      Mechanical Ventilator  





      Mechanical Ventilator  


 


12/11/18 19:01  71 12     40


 


12/11/18 19:00  82 16 116/24 (54) 100   


 


12/11/18 18:00 97.6 75 15 142/29 (66) 100   











Laboratory Tests








Test


  12/12/18


04:15


 


White Blood Count


  10.6 K/UL


(4.8-10.8)


 


Red Blood Count


  2.98 M/UL


(4.20-5.40)  L


 


Hemoglobin


  8.4 G/DL


(12.0-16.0)  L


 


Hematocrit


  27.3 %


(37.0-47.0)  L


 


Mean Corpuscular Volume 92 FL (80-99)  


 


Mean Corpuscular Hemoglobin


  28.3 PG


(27.0-31.0)


 


Mean Corpuscular Hemoglobin


Concent 30.9 G/DL


(32.0-36.0)  L


 


Red Cell Distribution Width


  19.1 %


(11.6-14.8)  H


 


Platelet Count


  198 K/UL


(150-450)


 


Mean Platelet Volume


  7.6 FL


(6.5-10.1)


 


Neutrophils (%) (Auto)


  79.5 %


(45.0-75.0)  H


 


Lymphocytes (%) (Auto)


  8.6 %


(20.0-45.0)  L


 


Monocytes (%) (Auto)


  10.0 %


(1.0-10.0)


 


Eosinophils (%) (Auto)


  1.4 %


(0.0-3.0)


 


Basophils (%) (Auto)


  0.6 %


(0.0-2.0)


 


Sodium Level


  142 MMOL/L


(136-145)


 


Potassium Level


  3.4 MMOL/L


(3.5-5.1)  L


 


Chloride Level


  104 MMOL/L


()


 


Carbon Dioxide Level


  33 MMOL/L


(21-32)  H


 


Anion Gap


  5 mmol/L


(5-15)


 


Blood Urea Nitrogen


  39 mg/dL


(7-18)  H


 


Creatinine


  3.0 MG/DL


(0.55-1.30)  H


 


Estimat Glomerular Filtration


Rate  mL/min (>60)  


 


 


Glucose Level


  126 MG/DL


()  H


 


Calcium Level


  8.9 MG/DL


(8.5-10.1)


 


Total Bilirubin


  0.4 MG/DL


(0.2-1.0)


 


Aspartate Amino Transf


(AST/SGOT) 18 U/L (15-37)


 


 


Alanine Aminotransferase


(ALT/SGPT) 9 U/L (12-78)


L


 


Alkaline Phosphatase


  101 U/L


()


 


Total Protein


  6.4 G/DL


(6.4-8.2)


 


Albumin


  1.4 G/DL


(3.4-5.0)  L


 


Globulin 5.0 g/dL  


 


Albumin/Globulin Ratio


  0.3 (1.0-2.7)


L











Microbiology








 Date/Time


Source Procedure


Growth Status


 


 


 11/26/18 08:45


Blood Blood Culture - Final


NO GROWTH AFTER 5 DAYS Complete





 11/28/18 11:40


Other(Specify in comment) Gram Stain - Final Complete


 


 11/28/18 11:40 Wound Culture - Final


Pseudomonas Aeruginosa


Candida Albicans Complete





 12/4/18 17:30


Sputum Gram Stain - Final Complete


 


 12/4/18 17:30 Sputum Culture - Final


Candida Albicans Complete


 


 11/26/18 08:30


Urine,Clean Catch Urine Culture - Final


Escherichia Coli - Esbl


Enterococcus Faecium - Vre Complete





 11/28/18 11:40


Sacral Wound Gram Stain - Final Complete


 


 11/28/18 11:40 Wound Culture - Final


Staphylococcus Aureus - Mrsa


Candida Albicans Complete











Neurological


Neurological Narrative





Derm: Focused LE PE 


RLE: Multiple ulcerations noted. 


- Anterior shin: (-) PTB with fibrotic base. (-) active purulent drainage or 

acute SOI.  Alia-wound margins erythematous. 


- Posterior mid-tibia: (-) PTB with fibrotic base. (-) active purulent drainage 

or acute SOI.  Alia-wound margins erythematous. 


- Medial ankle:  (+) PTB with fibrotic base. (-) active purulent drainage or 

acute SOI.  Alia-wound margins erythematous. 








LLE: 


medial ulceration at heel noted: eschar noted, no acute SOI. No active 

purulence noted. Alia-wound margins WNL. 


Hallux: dorsal-medial superficial abraision. no acute SOI. No active purulence 

noted. Alia-wound margins WNL.











Freddie العراقي DPM Dec 12, 2018 18:00

## 2018-12-12 NOTE — NUR
NURSE NOTES:

Patient repositioned, suctioned and provided oral care, vitals remains stable, patient does 
go into afib every now and then but converts back to Sinus Rhythm.

## 2018-12-12 NOTE — NUR
NURSE NOTES:WOUND CARE FOLLOW-UP NOTES: Dark non-blanchable erythema noted to entire 
buttocks and both ischial regions(L)11.5cm x (W)15cm with full thickness ulcer noted to 
Sacrum (L)3.4cm x W(4cm x (D)0.9cm,undermining 12-12with tunneling into L upper to L lower 
buttocks 7-10 by 14cm at 10o'clock.L buttocks fluctuant with purple area noted in close 
proximity to sacral wound.Historical arched surgical scar noted to L buttocks at area of 
tunneling. Wound cleansed with Saline .Loosely packed with Hydrogel impregnated 
gauze.Cavilon skin Barrier wipe applied periwound .#2 Optifoam drsgs applied to cover entire 
buttocks. Shearing noted to L ischium .Triad paste applied and covered with Optifoam drsg. 
Pt positioned with pillows and has an Air fluidized mattress on her bed.Both feet are 
off-loaded with pillows.



Tx.Plan:Cleanse Sacral wound with Saline.Loosely pack with Hydrogel impregnated Packing 
strips (Attention to tunneling L   

           Upper to L lower Buttocks).Apply Cavilon Skin Barrier periwound.Cover with #2 
Optifoam Drsgs Daily and prn.

           Apply Triad Paste to L Ischium.Cover with Optifoam drsg .Change Daily and prn.

           Reposition at least every 2hours or as tolerated.

           Air Fluidized mattress.

## 2018-12-12 NOTE — NUR
****INSURANCE



ALL CLINICALS AND REVIEWS FAXED TO 





[facility]



TRACK#: [id number]

NCM: JOHN TORRES#[phone]

F#: [phone]

## 2018-12-12 NOTE — NUR
NURSE NOTES:

Received patient from CAROLYN Forbes. Patient asleep, resting in bed comfortably. Patient is 
orally intubated, ETT size 7.5, 23cm at lip line, Vent settings: AC 12, , 40% FiO2, 
PEEP 5, saturating 100%. Left nare NGT intact, running Nepro at 30ml/hr. HOB elevated. Right 
upper arm midline intact and patent. left upper arm AV shunt noted. Hemodialysis ongoing. 
Dialysis nurse at bedside. Patient on p200 mattress. Patient on bilateral wrist soft 
restraints. Skin intact, pulses present. Bed in lowest position. Call light within reach. 
Will continue to monitor.

## 2018-12-13 VITALS — DIASTOLIC BLOOD PRESSURE: 35 MMHG | SYSTOLIC BLOOD PRESSURE: 129 MMHG

## 2018-12-13 VITALS — DIASTOLIC BLOOD PRESSURE: 22 MMHG | SYSTOLIC BLOOD PRESSURE: 125 MMHG

## 2018-12-13 VITALS — DIASTOLIC BLOOD PRESSURE: 29 MMHG | SYSTOLIC BLOOD PRESSURE: 104 MMHG

## 2018-12-13 VITALS — DIASTOLIC BLOOD PRESSURE: 37 MMHG | SYSTOLIC BLOOD PRESSURE: 126 MMHG

## 2018-12-13 VITALS — SYSTOLIC BLOOD PRESSURE: 106 MMHG | DIASTOLIC BLOOD PRESSURE: 40 MMHG

## 2018-12-13 VITALS — DIASTOLIC BLOOD PRESSURE: 29 MMHG | SYSTOLIC BLOOD PRESSURE: 110 MMHG

## 2018-12-13 VITALS — SYSTOLIC BLOOD PRESSURE: 133 MMHG | DIASTOLIC BLOOD PRESSURE: 19 MMHG

## 2018-12-13 VITALS — SYSTOLIC BLOOD PRESSURE: 100 MMHG | DIASTOLIC BLOOD PRESSURE: 28 MMHG

## 2018-12-13 VITALS — SYSTOLIC BLOOD PRESSURE: 135 MMHG | DIASTOLIC BLOOD PRESSURE: 20 MMHG

## 2018-12-13 VITALS — DIASTOLIC BLOOD PRESSURE: 34 MMHG | SYSTOLIC BLOOD PRESSURE: 135 MMHG

## 2018-12-13 VITALS — DIASTOLIC BLOOD PRESSURE: 20 MMHG | SYSTOLIC BLOOD PRESSURE: 125 MMHG

## 2018-12-13 VITALS — SYSTOLIC BLOOD PRESSURE: 113 MMHG | DIASTOLIC BLOOD PRESSURE: 37 MMHG

## 2018-12-13 VITALS — DIASTOLIC BLOOD PRESSURE: 27 MMHG | SYSTOLIC BLOOD PRESSURE: 135 MMHG

## 2018-12-13 VITALS — SYSTOLIC BLOOD PRESSURE: 107 MMHG | DIASTOLIC BLOOD PRESSURE: 25 MMHG

## 2018-12-13 VITALS — DIASTOLIC BLOOD PRESSURE: 23 MMHG | SYSTOLIC BLOOD PRESSURE: 109 MMHG

## 2018-12-13 VITALS — SYSTOLIC BLOOD PRESSURE: 112 MMHG | DIASTOLIC BLOOD PRESSURE: 36 MMHG

## 2018-12-13 VITALS — SYSTOLIC BLOOD PRESSURE: 123 MMHG | DIASTOLIC BLOOD PRESSURE: 40 MMHG

## 2018-12-13 VITALS — DIASTOLIC BLOOD PRESSURE: 35 MMHG | SYSTOLIC BLOOD PRESSURE: 124 MMHG

## 2018-12-13 VITALS — DIASTOLIC BLOOD PRESSURE: 34 MMHG | SYSTOLIC BLOOD PRESSURE: 126 MMHG

## 2018-12-13 VITALS — SYSTOLIC BLOOD PRESSURE: 113 MMHG | DIASTOLIC BLOOD PRESSURE: 33 MMHG

## 2018-12-13 VITALS — DIASTOLIC BLOOD PRESSURE: 31 MMHG | SYSTOLIC BLOOD PRESSURE: 98 MMHG

## 2018-12-13 VITALS — DIASTOLIC BLOOD PRESSURE: 39 MMHG | SYSTOLIC BLOOD PRESSURE: 114 MMHG

## 2018-12-13 VITALS — DIASTOLIC BLOOD PRESSURE: 40 MMHG | SYSTOLIC BLOOD PRESSURE: 125 MMHG

## 2018-12-13 VITALS — SYSTOLIC BLOOD PRESSURE: 120 MMHG | DIASTOLIC BLOOD PRESSURE: 34 MMHG

## 2018-12-13 LAB
ADD MANUAL DIFF: NO
ALBUMIN SERPL-MCNC: 1.5 G/DL (ref 3.4–5)
ALBUMIN/GLOB SERPL: 0.3 {RATIO} (ref 1–2.7)
ALP SERPL-CCNC: 98 U/L (ref 46–116)
ALT SERPL-CCNC: 8 U/L (ref 12–78)
ANION GAP SERPL CALC-SCNC: 6 MMOL/L (ref 5–15)
AST SERPL-CCNC: 7 U/L (ref 15–37)
BASOPHILS NFR BLD AUTO: 0.5 % (ref 0–2)
BILIRUB SERPL-MCNC: 0.4 MG/DL (ref 0.2–1)
BUN SERPL-MCNC: 36 MG/DL (ref 7–18)
CALCIUM SERPL-MCNC: 8.6 MG/DL (ref 8.5–10.1)
CHLORIDE SERPL-SCNC: 102 MMOL/L (ref 98–107)
CO2 SERPL-SCNC: 33 MMOL/L (ref 21–32)
CREAT SERPL-MCNC: 3 MG/DL (ref 0.55–1.3)
EOSINOPHIL NFR BLD AUTO: 1 % (ref 0–3)
ERYTHROCYTE [DISTWIDTH] IN BLOOD BY AUTOMATED COUNT: 18.8 % (ref 11.6–14.8)
GLOBULIN SER-MCNC: 5.3 G/DL
HCT VFR BLD CALC: 27.9 % (ref 37–47)
HGB BLD-MCNC: 8.7 G/DL (ref 12–16)
LYMPHOCYTES NFR BLD AUTO: 7.3 % (ref 20–45)
MCV RBC AUTO: 90 FL (ref 80–99)
MONOCYTES NFR BLD AUTO: 7.9 % (ref 1–10)
NEUTROPHILS NFR BLD AUTO: 83.2 % (ref 45–75)
PHOSPHATE SERPL-MCNC: 3 MG/DL (ref 2.5–4.9)
PLATELET # BLD: 202 K/UL (ref 150–450)
POTASSIUM SERPL-SCNC: 3.2 MMOL/L (ref 3.5–5.1)
RBC # BLD AUTO: 3.08 M/UL (ref 4.2–5.4)
SODIUM SERPL-SCNC: 141 MMOL/L (ref 136–145)
WBC # BLD AUTO: 11.5 K/UL (ref 4.8–10.8)

## 2018-12-13 RX ADMIN — HEPARIN SODIUM SCH UNITS: 5000 INJECTION INTRAVENOUS; SUBCUTANEOUS at 13:56

## 2018-12-13 RX ADMIN — MEROPENEM SCH MLS/HR: 500 INJECTION INTRAVENOUS at 08:47

## 2018-12-13 RX ADMIN — HEPARIN SODIUM SCH UNITS: 5000 INJECTION INTRAVENOUS; SUBCUTANEOUS at 23:08

## 2018-12-13 RX ADMIN — PANTOPRAZOLE SODIUM SCH MG: 40 INJECTION, POWDER, FOR SOLUTION INTRAVENOUS at 08:47

## 2018-12-13 RX ADMIN — VITAMIN D, TAB 1000IU (100/BT) SCH INTLU: 25 TAB at 08:47

## 2018-12-13 RX ADMIN — AMIODARONE HYDROCHLORIDE SCH MG: 200 TABLET ORAL at 08:48

## 2018-12-13 RX ADMIN — MINOXIDIL SCH MG: 2.5 TABLET ORAL at 08:48

## 2018-12-13 RX ADMIN — AMIODARONE HYDROCHLORIDE SCH MG: 200 TABLET ORAL at 20:41

## 2018-12-13 RX ADMIN — MINOXIDIL SCH MG: 2.5 TABLET ORAL at 20:42

## 2018-12-13 RX ADMIN — PANTOPRAZOLE SODIUM SCH MG: 40 INJECTION, POWDER, FOR SOLUTION INTRAVENOUS at 20:41

## 2018-12-13 RX ADMIN — SODIUM CHLORIDE SCH MLS/HR: 0.9 INJECTION INTRAVENOUS at 20:41

## 2018-12-13 RX ADMIN — HEPARIN SODIUM SCH UNITS: 5000 INJECTION INTRAVENOUS; SUBCUTANEOUS at 06:00

## 2018-12-13 RX ADMIN — CHLORHEXIDINE GLUCONATE SCH APPLIC: 213 SOLUTION TOPICAL at 19:51

## 2018-12-13 NOTE — NUR
NURSE NOTES: Dr. Cardoso returned phone call. Dr. Cardoso was informed of the ABG results, 
vent settings has now been changed to AC 8, . Respiratory therapist informed. Will 
continue to monitor patient and follow plan of care.

## 2018-12-13 NOTE — NUR
NURSE NOTES:

Patient repositioned and pillows adjusted, no fever, no acute events, will continue to 
monitor.

## 2018-12-13 NOTE — NUR
RESPIRATORY NOTE:

Received pt on AC 8, 450VT, 30%, PEEP +5. Pt intubated w/ ETT 7.5 @ 22cm lipline, secured 
by anchorfast. Pt is alert/awake, follows commands. B/S moy, rhonchi/diminished, sxn minimal 
amounts of thin, clear/white secretions. Vent plugged into red outlet, ambubag at bedside. 
Pt denies SOB/chest pain at this time & is resting comfortably on current settings. Will 
continue to monitor pt.

## 2018-12-13 NOTE — NUR
NURSE NOTES: Received patient from CAROLYN Yan. Patient received in bed alert, able to 
follow commands. Patient is sinus rhythm on the monitor at this time, however patient has a 
history of a-fib. Patient is intubated ETT 7.5, , Fio2 30%, peep 5, 02 saturation 
100%. Patient bilateral lung sounds are diminished in all lobes. Patient secretions are 
scant clear and thick. Patient has a OGT Nepro at 30ml, no residual noted, placement checked 
and verified with bubbling sounds heard auscultation. Abdomen is soft and non tender. 
Patient denies pain at this time. No acute distress noted. Patient has a rectal tube noted. 
Patient is anuric. Hemodialysis is scheduled for today. Patient has a right upper arm 
midline, dressing is dry and intact. Left AV shunt is thrill and bruit present, dressing is 
dry and intact. Right leg ulcers dressing changed and santyl applied.  Bilateral wrist 
restraints noted, skin is intact and pulses present. Head of bed is at 30 degrees, bed is 
locked and in the lowest position, bed alarm is on. Will continue to monitor patient and 
follow plan of care.

## 2018-12-13 NOTE — NEPHROLOGY PROGRESS NOTE
Assessment/Plan


Assessment


Seee below


Plan


MOF stable.


Sepsis due to infected diabetic ulcers. On IV Abx. Followed by ID, Vasc. Sx, 

Podiatry.





ESRD HD q MWF 


Anemia of CKD , SAMANTHA high dose. Transfuse PRN.





A. 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 with LAE. LVEF 65% . Anticoagulate when 

stable. DW Dr. Wade. In and out A. Fib.





If unweanable, may need Trac+PEg.





See orders.





Subjective


Subjective


Reintubated.





Objective


Objective





Last 24 Hour Vital Signs








  Date Time  Temp Pulse Resp B/P (MAP) Pulse Ox O2 Delivery O2 Flow Rate FiO2


 


12/13/18 07:25  78 15     30


 


12/13/18 07:00  87 17 125/20 (55) 100   


 


12/13/18 06:00  79 17 125/40 (68) 100   


 


12/13/18 05:05  81 12     30


 


12/13/18 05:00  80 17 126/37 (66) 100   


 


12/13/18 04:00 97.3 81 14 129/35 (66) 100   


 


12/13/18 04:00        30


 


12/13/18 04:00  112      


 


12/13/18 04:00      Mechanical Ventilator  





      Mechanical Ventilator  





      Mechanical Ventilator  





      Mechanical Ventilator  


 


12/13/18 03:00  117 16 106/40 (62) 100   


 


12/13/18 02:59  123 18     30


 


12/13/18 02:00  130 18 123/40 (67) 100   


 


12/13/18 01:27  105 17     30


 


12/13/18 01:00  123 20 133/19 (57) 100   


 


12/13/18 00:00  78      


 


12/13/18 00:00      Mechanical Ventilator  





      Mechanical Ventilator  





      Mechanical Ventilator  





      Mechanical Ventilator  


 


12/13/18 00:00        30


 


12/13/18 00:00 98.4 83 14 135/27 (63) 100   


 


12/12/18 23:00  81 15 100/14 (42) 100   


 


12/12/18 22:59  77 14     30


 


12/12/18 22:00  78 14 120/16 (50) 100   


 


12/12/18 21:29    125/11    


 


12/12/18 21:01  80 15     30


 


12/12/18 21:00  80 14 125/19 (54) 99   


 


12/12/18 20:00        30


 


12/12/18 20:00      Mechanical Ventilator  





      Mechanical Ventilator  





      Mechanical Ventilator  





      Mechanical Ventilator  


 


12/12/18 20:00 99.4 84 14 129/20 (56) 99   


 


12/12/18 20:00  80      


 


12/12/18 19:00  64 15 122/34 (63) 100   


 


12/12/18 18:56  76 12     30


 


12/12/18 18:00  72 12 100/17 (44) 98   


 


12/12/18 17:00  73 12 107/16 (46) 100   


 


12/12/18 16:54  79 12     30


 


12/12/18 16:00 98.6 76 14 122/20 (54) 100   


 


12/12/18 16:00  72      


 


12/12/18 16:00      Mechanical Ventilator  





      Mechanical Ventilator  





      Mechanical Ventilator  





      Mechanical Ventilator  


 


12/12/18 16:00        40


 


12/12/18 15:00  74 14 116/13 (47) 100   


 


12/12/18 14:54  73 16     30


 


12/12/18 14:00  78 16 113/21 (51) 99   


 


12/12/18 13:20  94 15     30


 


12/12/18 13:00  80 14 146/134 (138) 100   


 


12/12/18 12:00      Mechanical Ventilator  





      Mechanical Ventilator  





      Mechanical Ventilator  





      Mechanical Ventilator  


 


12/12/18 12:00 98.4 82 13 116/28 (57) 99   


 


12/12/18 12:00  85      


 


12/12/18 12:00        40


 


12/12/18 11:30        40


 


12/12/18 11:00  80 21 118/12 (47) 99   


 


12/12/18 10:58  80 19     30


 


12/12/18 10:00  98 35 155/28 (70) 100   


 


12/12/18 09:30     100   


 


12/12/18 09:30        35


 


12/12/18 09:30  80 24     35


 


12/12/18 09:29    120/16    


 


12/12/18 09:00  79 15 118/27 (57) 100   


 


12/12/18 08:50  80 13     40

















Intake and Output  


 


 12/12/18 12/13/18





 19:00 07:00


 


Intake Total 400 ml 360 ml


 


Output Total 3000 ml 0 ml


 


Balance -2600 ml 360 ml


 


  


 


Intake Oral 0 ml 


 


Free Water 50 ml 


 


IV Total 110 ml 


 


Tube Feeding 240 ml 360 ml


 


Output Urine Total 0 ml 0 ml


 


Hemodialysis UF 3000 ml 


 


# Voids 1 


 


# Bowel Movements 2 1








Height (Feet):  5


Height (Inches):  2.00


Weight (Pounds):  126


Objective


Reintubated





Cv IRR/IRR


Lungs B shai.


Abd SNT. BS +


E Rt. foot diabetic ulcers











Gavino Daigle MD Dec 13, 2018 08:12

## 2018-12-13 NOTE — NUR
NURSE NOTES: Called and left Dr. Daigle a message in regards to the potassium 3.2 and to 
inform him that the hemodialysis order that he put in for today has not yet been completed 
and that [facility] has been called and we are awaiting a call back from [facility]. Will continue to 
monitor patient and follow plan of care.

## 2018-12-13 NOTE — NUR
NURSE NOTES:

Recvd.on a vent.orally intubated,See settigs.Lungs few scatt Rh.Diminished BS at 
bases.P.Ox-%.suctioned,NS Lavaged.Pos.chg.Confused,and 
Disoriented.Re-oriented,Re-assurred.Bila.soft wrist restraints on prev.self-injury.NGT 
Feeding in progress.Anuric.HD Pt.AVF (L) upperarm pos.thrill and Bruit.Tony. for poss.HD am.

## 2018-12-13 NOTE — NUR
NURSE NOTES:

Patient repositioned and pillows adjusted, no fever, no acute events, will continue to 
monitor. Patient cleaned.

## 2018-12-13 NOTE — NUR
Social Service Note



Patient continues to require medical intervention.  Patient was reintubated and is slowing 
having weaning trials.  Possible trach and gtube needed pending MD recommendations.  Patient 
with HD.  Son visits often and provided updated information regarding patient's treatment 
plan of care.  Son [place] however if patient requires a trach 
placement will be limited due to trach and dialysis.  Will continue to monitor and assist as 
needed.

## 2018-12-13 NOTE — NUR
NURSE NOTES: Patient repositioned, patient allowed to oral suction only around the mouth, 
Patient is sleeping, no acute distress noted, patient is connected to the cardiac monitor 
A-fib on the monitor . Patient bilateral arms are elevated with pillows, bilateral 
legs are elevated, both feet are floating. Head of bed is in the lowest position, bed alarms 
are on, 3 bed side rails are elevated. Will continue to monitor patient and follow plans of 
care.

## 2018-12-13 NOTE — INFECTIOUS DISEASES PROG NOTE
Assessment/Plan


Assessment/Plan


A:


Sepsis


UTI


Cellulitis of R leg, osteomyelitis


Hypercapnic respiratory failure


ESRD on HD


DM


Anemia


Dementia


PVD


R pleural effusion


Mucous plug


P;


Continue  Meropenem





Subjective


ROS Limited/Unobtainable:  Yes


Constitutional:  Reports: no symptoms


Cardiovascular:  Reports: other - failed weaning


Gastrointestinal/Abdominal:  Reports: diarrhea


Allergies:  


Coded Allergies:  


     No Known Allergies (Unverified , 11/26/18)





Objective


Vital Signs





Last 24 Hour Vital Signs








  Date Time  Temp Pulse Resp B/P (MAP) Pulse Ox O2 Delivery O2 Flow Rate FiO2


 


12/13/18 09:00  75 17 135/34 (67) 100   


 


12/13/18 08:50  80 15     30


 


12/13/18 08:48    121/12    


 


12/13/18 08:00        30


 


12/13/18 08:00 98.8 81 14 135/20 (58) 100   


 


12/13/18 08:00  64      


 


12/13/18 08:00      Mechanical Ventilator  





      Mechanical Ventilator  





      Mechanical Ventilator  


 


12/13/18 07:25  78 15     30


 


12/13/18 07:00  87 17 125/20 (55) 100   


 


12/13/18 06:00  79 17 125/40 (68) 100   


 


12/13/18 05:05  81 12     30


 


12/13/18 05:00  80 17 126/37 (66) 100   


 


12/13/18 04:00 97.3 81 14 129/35 (66) 100   


 


12/13/18 04:00        30


 


12/13/18 04:00  112      


 


12/13/18 04:00      Mechanical Ventilator  





      Mechanical Ventilator  





      Mechanical Ventilator  





      Mechanical Ventilator  


 


12/13/18 03:00  117 16 106/40 (62) 100   


 


12/13/18 02:59  123 18     30


 


12/13/18 02:00  130 18 123/40 (67) 100   


 


12/13/18 01:27  105 17     30


 


12/13/18 01:00  123 20 133/19 (57) 100   


 


12/13/18 00:00  78      


 


12/13/18 00:00      Mechanical Ventilator  





      Mechanical Ventilator  





      Mechanical Ventilator  





      Mechanical Ventilator  


 


12/13/18 00:00        30


 


12/13/18 00:00 98.4 83 14 135/27 (63) 100   


 


12/12/18 23:00  81 15 100/14 (42) 100   


 


12/12/18 22:59  77 14     30


 


12/12/18 22:00  78 14 120/16 (50) 100   


 


12/12/18 21:29    125/11    


 


12/12/18 21:01  80 15     30


 


12/12/18 21:00  80 14 125/19 (54) 99   


 


12/12/18 20:00        30


 


12/12/18 20:00      Mechanical Ventilator  





      Mechanical Ventilator  





      Mechanical Ventilator  





      Mechanical Ventilator  


 


12/12/18 20:00 99.4 84 14 129/20 (56) 99   


 


12/12/18 20:00  80      


 


12/12/18 19:00  64 15 122/34 (63) 100   


 


12/12/18 18:56  76 12     30


 


12/12/18 18:00  72 12 100/17 (44) 98   


 


12/12/18 17:00  73 12 107/16 (46) 100   


 


12/12/18 16:54  79 12     30


 


12/12/18 16:00 98.6 76 14 122/20 (54) 100   


 


12/12/18 16:00  72      


 


12/12/18 16:00      Mechanical Ventilator  





      Mechanical Ventilator  





      Mechanical Ventilator  





      Mechanical Ventilator  


 


12/12/18 16:00        40


 


12/12/18 15:00  74 14 116/13 (47) 100   


 


12/12/18 14:54  73 16     30


 


12/12/18 14:00  78 16 113/21 (51) 99   


 


12/12/18 13:20  94 15     30


 


12/12/18 13:00  80 14 146/134 (138) 100   


 


12/12/18 12:00      Mechanical Ventilator  





      Mechanical Ventilator  





      Mechanical Ventilator  





      Mechanical Ventilator  


 


12/12/18 12:00 98.4 82 13 116/28 (57) 99   


 


12/12/18 12:00  85      


 


12/12/18 12:00        40








Height (Feet):  5


Height (Inches):  2.00


Weight (Pounds):  126


HEENT:  other - orally intubated


Respiratory/Chest:  lungs clear, other - on ventilator


Cardiovascular:  normal rate, other - R arm PICC line


Abdomen:  soft, non tender


Extremities:  no edema


Skin:  ulcers


Neurologic/Psychiatric:  alert, responsive





Microbiology








 Date/Time


Source Procedure


Growth Status


 


 


 12/12/18 20:00


Stool Clostridium difficile Toxin Assay - Final Complete











Laboratory Tests








Test


  12/13/18


08:10 12/13/18


08:45


 


White Blood Count


  11.5 K/UL


(4.8-10.8)  H 


 


 


Red Blood Count


  3.08 M/UL


(4.20-5.40)  L 


 


 


Hemoglobin


  8.7 G/DL


(12.0-16.0)  L 


 


 


Hematocrit


  27.9 %


(37.0-47.0)  L 


 


 


Mean Corpuscular Volume 90 FL (80-99)   


 


Mean Corpuscular Hemoglobin


  28.3 PG


(27.0-31.0) 


 


 


Mean Corpuscular Hemoglobin


Concent 31.3 G/DL


(32.0-36.0)  L 


 


 


Red Cell Distribution Width


  18.8 %


(11.6-14.8)  H 


 


 


Platelet Count


  202 K/UL


(150-450) 


 


 


Mean Platelet Volume


  7.4 FL


(6.5-10.1) 


 


 


Neutrophils (%) (Auto)


  83.2 %


(45.0-75.0)  H 


 


 


Lymphocytes (%) (Auto)


  7.3 %


(20.0-45.0)  L 


 


 


Monocytes (%) (Auto)


  7.9 %


(1.0-10.0) 


 


 


Eosinophils (%) (Auto)


  1.0 %


(0.0-3.0) 


 


 


Basophils (%) (Auto)


  0.5 %


(0.0-2.0) 


 


 


Sodium Level


  141 MMOL/L


(136-145) 


 


 


Potassium Level


  3.2 MMOL/L


(3.5-5.1)  L 


 


 


Chloride Level


  102 MMOL/L


() 


 


 


Carbon Dioxide Level


  33 MMOL/L


(21-32)  H 


 


 


Anion Gap


  6 mmol/L


(5-15) 


 


 


Blood Urea Nitrogen


  36 mg/dL


(7-18)  H 


 


 


Creatinine


  3.0 MG/DL


(0.55-1.30)  H 


 


 


Estimat Glomerular Filtration


Rate  mL/min (>60)  


  


 


 


Glucose Level


  154 MG/DL


()  H 


 


 


Calcium Level


  8.6 MG/DL


(8.5-10.1) 


 


 


Phosphorus Level


  3.0 MG/DL


(2.5-4.9) 


 


 


Magnesium Level


  2.1 MG/DL


(1.8-2.4) 


 


 


Total Bilirubin


  0.4 MG/DL


(0.2-1.0) 


 


 


Aspartate Amino Transf


(AST/SGOT) 7 U/L (15-37)


L 


 


 


Alanine Aminotransferase


(ALT/SGPT) 8 U/L (12-78)


L 


 


 


Alkaline Phosphatase


  98 U/L


() 


 


 


Total Protein


  6.8 G/DL


(6.4-8.2) 


 


 


Albumin


  1.5 G/DL


(3.4-5.0)  L 


 


 


Globulin 5.3 g/dL   


 


Albumin/Globulin Ratio


  0.3 (1.0-2.7)


L 


 


 


Arterial Blood pH


  


  7.566


(7.350-7.450)


 


Arterial Blood Partial


Pressure CO2 


  34.3 mmHg


(35.0-45.0)  L


 


Arterial Blood Partial


Pressure O2 


  72.5 mmHg


(75.0-100.0)  L


 


Arterial Blood HCO3


  


  30.4 mmol/L


(22.0-26.0)  H


 


Arterial Blood Oxygen


Saturation 


  95.0 %


()


 


Arterial Blood Base Excess  8.0 (-2-2)  H


 


[facility] Test  Positive  











Current Medications








 Medications


  (Trade)  Dose


 Ordered  Sig/Tony


 Route


 PRN Reason  Start Time


 Stop Time Status Last Admin


Dose Admin


 


 Acetaminophen


  (Tylenol)  650 mg  Q6H  PRN


 ORAL


 Mild Pain/Temp > 100.5  12/4/18 14:15


 1/3/19 14:14  12/12/18 13:15


 


 


 Amiodarone HCl


  (Cordarone)  200 mg  EVERY 12  HOURS


 NG


   12/8/18 21:00


 1/5/19 13:59  12/13/18 08:48


 


 


 Chlorhexidine


 Gluconate


  (Juana-Hex 2%)  1 applic  DAILY@2000


 TOPIC


   11/27/18 20:00


 12/27/18 19:59  12/12/18 20:00


 


 


 Collagenase


  (Santyl)  1 applic  Q12HR


 TOPIC


   12/12/18 21:00


 1/5/19 20:59  12/13/18 11:02


 


 


 Epoetin Rupesh


  (Procrit (for


 ESRD on dialysis))  10,000 units  MON-WED-FRI


 SUBQ


   11/28/18 21:00


 12/28/18 20:59  12/12/18 21:29


 


 


 Heparin Sodium


  (Porcine)


  (Heparin 5000


 units/ml)  5,000 units  EVERY 8  HOURS


 SUBQ


   12/7/18 22:00


 1/6/19 21:59  12/13/18 06:00


 


 


 Hydralazine HCl


  (Apresoline)  10 mg  Q4H  PRN


 IV


 sbp greater than 145  12/10/18 20:30


 1/9/19 20:29   


 


 


 Iron Sucrose 100


 mg/Sodium Chloride  60 ml @ 


 240 mls/hr  BEDTIME


 IV


   12/13/18 21:00


 12/17/18 21:14   


 


 


 Meropenem 500 mg/


 Sodium Chloride  55 ml @ 


 110 mls/hr  DAILY


 IVPB


   12/11/18 09:00


 12/25/18 08:59  12/13/18 08:47


 


 


 Minoxidil


  (Loniten)  5 mg  Q12HR


 NG


   12/2/18 21:00


 1/1/19 20:59  12/13/18 08:48


 


 


 Pantoprazole


  (Protonix)  40 mg  Q12HR


 IVP


   11/26/18 21:00


 12/27/18 08:59  12/13/18 08:47


 


 


 Vitamin D


  (Vitamin D)  1,000 intlu  DAILY


 GT


   12/9/18 10:00


 1/8/19 09:59  12/13/18 08:47


 

















Luke Maynard MD Dec 13, 2018 11:47

## 2018-12-13 NOTE — NUR
RESPIRATORY NOTE: Received pt stable on AC/VC 12rr, 500vt, +5, 30% vent settings. Pt 
endotracheal tube is patent and secured. No resp distress noted. Vent alarms are on and 
audible. Vent is plugged into red outlet. Will Monitor pt progress.

## 2018-12-13 NOTE — PULMONOLOGY PROGRESS NOTE
Assessment/Plan


Assessment/Plan


IMPRESSION:


1. Hypotension.


2. Sepsis.


3. Atrial fibrillation.


4. Bradycardia.


5. Diabetes mellitus.


6. End-stage renal disease on dialysis.


7. Left lung collapse. Resolved


8. Respiratory failure. now reintubated


9. Dysphagia.


10. Dementia.


11. Anemia


12. Pneumonia





DISCUSSION:


1. Continue medications.


2. CXR improved


3. status post bronchoscopy


4. Central access


5. Use broad-spectrum antibiotics.  


6. May require neurology opinion


7.  May require trach


8.  Continue weaning attempts











  ______________________________________________


  Quentin Cardoso M.D.





Subjective


Interval Events:  None new


Constitutional:  Reports: no symptoms


HEENT:  Repors: no symptoms


Respiratory:  Reports: no symptoms


Cardiovascular:  Reports: no symptoms


Gastrointestinal/Abdominal:  Reports: no symptoms


Genitourinary:  Reports: no symptoms


Allergies:  


Coded Allergies:  


     No Known Allergies (Unverified , 11/26/18)





Objective





Last 24 Hour Vital Signs








  Date Time  Temp Pulse Resp B/P (MAP) Pulse Ox O2 Delivery O2 Flow Rate FiO2


 


12/13/18 07:25  78 15     30


 


12/13/18 07:00  87 17 125/20 (55) 100   


 


12/13/18 06:00  79 17 125/40 (68) 100   


 


12/13/18 05:05  81 12     30


 


12/13/18 05:00  80 17 126/37 (66) 100   


 


12/13/18 04:00 97.3 81 14 129/35 (66) 100   


 


12/13/18 04:00        30


 


12/13/18 04:00  112      


 


12/13/18 04:00      Mechanical Ventilator  





      Mechanical Ventilator  





      Mechanical Ventilator  





      Mechanical Ventilator  


 


12/13/18 03:00  117 16 106/40 (62) 100   


 


12/13/18 02:59  123 18     30


 


12/13/18 02:00  130 18 123/40 (67) 100   


 


12/13/18 01:27  105 17     30


 


12/13/18 01:00  123 20 133/19 (57) 100   


 


12/13/18 00:00  78      


 


12/13/18 00:00      Mechanical Ventilator  





      Mechanical Ventilator  





      Mechanical Ventilator  





      Mechanical Ventilator  


 


12/13/18 00:00        30


 


12/13/18 00:00 98.4 83 14 135/27 (63) 100   


 


12/12/18 23:00  81 15 100/14 (42) 100   


 


12/12/18 22:59  77 14     30


 


12/12/18 22:00  78 14 120/16 (50) 100   


 


12/12/18 21:29    125/11    


 


12/12/18 21:01  80 15     30


 


12/12/18 21:00  80 14 125/19 (54) 99   


 


12/12/18 20:00        30


 


12/12/18 20:00      Mechanical Ventilator  





      Mechanical Ventilator  





      Mechanical Ventilator  





      Mechanical Ventilator  


 


12/12/18 20:00 99.4 84 14 129/20 (56) 99   


 


12/12/18 20:00  80      


 


12/12/18 19:00  64 15 122/34 (63) 100   


 


12/12/18 18:56  76 12     30


 


12/12/18 18:00  72 12 100/17 (44) 98   


 


12/12/18 17:00  73 12 107/16 (46) 100   


 


12/12/18 16:54  79 12     30


 


12/12/18 16:00 98.6 76 14 122/20 (54) 100   


 


12/12/18 16:00  72      


 


12/12/18 16:00      Mechanical Ventilator  





      Mechanical Ventilator  





      Mechanical Ventilator  





      Mechanical Ventilator  


 


12/12/18 16:00        40


 


12/12/18 15:00  74 14 116/13 (47) 100   


 


12/12/18 14:54  73 16     30


 


12/12/18 14:00  78 16 113/21 (51) 99   


 


12/12/18 13:20  94 15     30


 


12/12/18 13:00  80 14 146/134 (138) 100   


 


12/12/18 12:00      Mechanical Ventilator  





      Mechanical Ventilator  





      Mechanical Ventilator  





      Mechanical Ventilator  


 


12/12/18 12:00 98.4 82 13 116/28 (57) 99   


 


12/12/18 12:00  85      


 


12/12/18 12:00        40


 


12/12/18 11:30        40


 


12/12/18 11:00  80 21 118/12 (47) 99   


 


12/12/18 10:58  80 19     30


 


12/12/18 10:00  98 35 155/28 (70) 100   


 


12/12/18 09:30     100   


 


12/12/18 09:30        35


 


12/12/18 09:30  80 24     35


 


12/12/18 09:29    120/16    


 


12/12/18 09:00  79 15 118/27 (57) 100   


 


12/12/18 08:50  80 13     40

















Intake and Output  


 


 12/12/18 12/13/18





 19:00 07:00


 


Intake Total 400 ml 360 ml


 


Output Total 3000 ml 0 ml


 


Balance -2600 ml 360 ml


 


  


 


Intake Oral 0 ml 


 


Free Water 50 ml 


 


IV Total 110 ml 


 


Tube Feeding 240 ml 360 ml


 


Output Urine Total 0 ml 0 ml


 


Hemodialysis UF 3000 ml 


 


# Voids 1 


 


# Bowel Movements 2 1








General Appearance:  no acute distress


HEENT:  normocephalic


Respiratory/Chest:  chest wall non-tender, lungs clear


Cardiovascular:  normal peripheral pulses, normal rate


Abdomen:  normal bowel sounds





Current Medications








 Medications


  (Trade)  Dose


 Ordered  Sig/Tony


 Route


 PRN Reason  Start Time


 Stop Time Status Last Admin


Dose Admin


 


 Acetaminophen


  (Tylenol)  650 mg  Q6H  PRN


 ORAL


 Mild Pain/Temp > 100.5  12/4/18 14:15


 1/3/19 14:14  12/12/18 13:15


 


 


 Amiodarone HCl


  (Cordarone)  200 mg  EVERY 12  HOURS


 NG


   12/8/18 21:00


 1/5/19 13:59  12/12/18 21:28


 


 


 Chlorhexidine


 Gluconate


  (Juana-Hex 2%)  1 applic  DAILY@2000


 TOPIC


   11/27/18 20:00


 12/27/18 19:59  12/12/18 20:00


 


 


 Collagenase


  (Santyl)  1 applic  Q12HR


 TOPIC


   12/12/18 21:00


 1/5/19 20:59  12/12/18 21:29


 


 


 Epoetin Rupesh


  (Procrit (for


 ESRD on dialysis))  10,000 units  MON-WED-FRI


 SUBQ


   11/28/18 21:00


 12/28/18 20:59  12/12/18 21:29


 


 


 Heparin Sodium


  (Porcine)


  (Heparin 5000


 units/ml)  5,000 units  EVERY 8  HOURS


 SUBQ


   12/7/18 22:00


 1/6/19 21:59  12/13/18 06:00


 


 


 Hydralazine HCl


  (Apresoline)  10 mg  Q4H  PRN


 IV


 sbp greater than 145  12/10/18 20:30


 1/9/19 20:29   


 


 


 Meropenem 500 mg/


 Sodium Chloride  55 ml @ 


 110 mls/hr  DAILY


 IVPB


   12/11/18 09:00


 12/25/18 08:59  12/12/18 09:27


 


 


 Minoxidil


  (Loniten)  5 mg  Q12HR


 NG


   12/2/18 21:00


 1/1/19 20:59  12/12/18 21:29


 


 


 Pantoprazole


  (Protonix)  40 mg  Q12HR


 IVP


   11/26/18 21:00


 12/27/18 08:59  12/12/18 21:28


 


 


 Vitamin D


  (Vitamin D)  1,000 intlu  DAILY


 GT


   12/9/18 10:00


 1/8/19 09:59  12/12/18 09:25


 

















Quentin Cardoso MD Dec 13, 2018 08:15

## 2018-12-13 NOTE — NUR
NURSE NOTES: Called Dr. Cardoso at [phone] and left message in regards to ABG. Awaiting 
call back.

## 2018-12-13 NOTE — NUR
NURSE NOTES: Patient repositioned, patient is sleeping, family at bedside. Patient is 
tolerating the vent setting change so far, 02 saturation 100%, RR 13. Patient is sinus 
rhythm on the cardiac rhythm 72. No acute distress noted. Patient denies pain at this time. 
Head of bed is at 30 degrees, bed is lcoked and in the lowest position, bed alarm is on, 3 
side rails are up. Will continue patient and follow plan of care.

## 2018-12-13 NOTE — NUR
CASE MANAGEMENT: REVIEW





SI: CELLULITIS OF RIGHT LEG . OSTEOMYELITIS . RESP FAILURE INTUBATED . ESRD ON HD

T 98.8 HR 81 RR 14 /20 % MECH VENT FIO2 30

ABG: PH 7.566 PCO2 34.2 PO2 72.5 HCO3 30.4 







IS: VENOFER IV QHS

MEROPENEM IV QD

AMIODARONE NG Q12HR

PROCRIT SQ MWF 

PROTONIX IV Q12HR

NGT FEEDING NEPRO @10ML/HR GOAL RATE 30ML/HR 



***ICU STATUS***

DCP: PATIENT IS FROM [facility]

## 2018-12-13 NOTE — CARDIOLOGY PROGRESS NOTE
Assessment/Plan


Assessment/Plan


1. Hypotension, possibly sepsis versus volume.


2. Paroxysmal episodes of atrial fibrillation history.


3. Bradycardia secondary to medications, amiodarone possibly.


4. Diabetes mellitus.


5. End-stage renal disease, on hemodialysis.


6. Lung collapse.


7. Respiratory failure, status post intubation.


8. Dysphagia.


9. History of dementia.


10. Pulm htn 


11.  Pleural effusion 


12. cardaic arrest


13. chest wall pain post cpr  





in and out afib amido now 200 bid  on hold


stool ob neg x2 


amiod bid


off labetolol for now if need hydralaize to be used 


vent supprot 


wean as tolerted may need trachand peg 


tele review ed agian





Subjective


ROS Limited/Unobtainable:  Yes


Subjective


on the vent not verba





Objective





Last 24 Hour Vital Signs








  Date Time  Temp Pulse Resp B/P (MAP) Pulse Ox O2 Delivery O2 Flow Rate FiO2


 


12/13/18 21:00  75 14 104/29 (54) 100   


 


12/13/18 20:51  75 12     30


 


12/13/18 20:42    95/28    


 


12/13/18 20:00        30


 


12/13/18 20:00 98.7 88 16 124/35 (64) 98   


 


12/13/18 20:00      Mechanical Ventilator  





      Mechanical Ventilator  





      Mechanical Ventilator  


 


12/13/18 20:00  88      


 


12/13/18 19:00  89 18 126/34 (64) 99   


 


12/13/18 18:48  91 20     30


 


12/13/18 18:00  72 15 112/36 (61) 100   


 


12/13/18 17:05  72 16     30


 


12/13/18 17:00 99.4 73 16 113/33 (59) 100   


 


12/13/18 16:00  76      


 


12/13/18 16:00  74 14 120/34 (62) 100   


 


12/13/18 16:00      Mechanical Ventilator  





      Mechanical Ventilator  





      Mechanical Ventilator  


 


12/13/18 15:13  71 12     30


 


12/13/18 15:00        30


 


12/13/18 15:00  72 12 110/29 (56) 100   


 


12/13/18 14:00  102 12 98/31 (53) 100   


 


12/13/18 13:00  75 12 100/28 (52) 100   


 


12/13/18 12:00  75 12 114/39 (64) 100   


 


12/13/18 12:00        30


 


12/13/18 12:00  76      


 


12/13/18 12:00      Mechanical Ventilator  





      Mechanical Ventilator  





      Mechanical Ventilator  


 


12/13/18 11:10  77 12     30


 


12/13/18 11:00  76 13 113/37 (62) 100   


 


12/13/18 10:00  76 14 125/22 (56) 100   


 


12/13/18 09:00  75 17 135/34 (67) 100   


 


12/13/18 08:50  80 15     30


 


12/13/18 08:48    121/12    


 


12/13/18 08:00        30


 


12/13/18 08:00 98.8 81 14 135/20 (58) 100   


 


12/13/18 08:00  64      


 


12/13/18 08:00      Mechanical Ventilator  





      Mechanical Ventilator  





      Mechanical Ventilator  


 


12/13/18 07:25  78 15     30


 


12/13/18 07:00  87 17 125/20 (55) 100   


 


12/13/18 06:00  79 17 125/40 (68) 100   


 


12/13/18 05:05  81 12     30


 


12/13/18 05:00  80 17 126/37 (66) 100   


 


12/13/18 04:00 97.3 81 14 129/35 (66) 100   


 


12/13/18 04:00        30


 


12/13/18 04:00  112      


 


12/13/18 04:00      Mechanical Ventilator  





      Mechanical Ventilator  





      Mechanical Ventilator  





      Mechanical Ventilator  


 


12/13/18 03:00  117 16 106/40 (62) 100   


 


12/13/18 02:59  123 18     30


 


12/13/18 02:00  130 18 123/40 (67) 100   


 


12/13/18 01:27  105 17     30


 


12/13/18 01:00  123 20 133/19 (57) 100   


 


12/13/18 00:00  78      


 


12/13/18 00:00      Mechanical Ventilator  





      Mechanical Ventilator  





      Mechanical Ventilator  





      Mechanical Ventilator  


 


12/13/18 00:00        30


 


12/13/18 00:00 98.4 83 14 135/27 (63) 100   


 


12/12/18 23:00  81 15 100/14 (42) 100   


 


12/12/18 22:59  77 14     30


 


12/12/18 22:00  78 14 120/16 (50) 100   








General Appearance:  no apparent distress, on vent, patient on isolation











Intake and Output  


 


 12/12/18 12/13/18





 19:00 07:00


 


Intake Total 400 ml 360 ml


 


Output Total 3000 ml 0 ml


 


Balance -2600 ml 360 ml


 


  


 


Intake Oral 0 ml 


 


Free Water 50 ml 


 


IV Total 110 ml 


 


Tube Feeding 240 ml 360 ml


 


Output Urine Total 0 ml 0 ml


 


Hemodialysis UF 3000 ml 


 


# Voids 1 


 


# Bowel Movements 2 1











Laboratory Tests








Test


  12/13/18


08:10 12/13/18


08:45


 


White Blood Count


  11.5 K/UL


(4.8-10.8)  H 


 


 


Red Blood Count


  3.08 M/UL


(4.20-5.40)  L 


 


 


Hemoglobin


  8.7 G/DL


(12.0-16.0)  L 


 


 


Hematocrit


  27.9 %


(37.0-47.0)  L 


 


 


Mean Corpuscular Volume 90 FL (80-99)   


 


Mean Corpuscular Hemoglobin


  28.3 PG


(27.0-31.0) 


 


 


Mean Corpuscular Hemoglobin


Concent 31.3 G/DL


(32.0-36.0)  L 


 


 


Red Cell Distribution Width


  18.8 %


(11.6-14.8)  H 


 


 


Platelet Count


  202 K/UL


(150-450) 


 


 


Mean Platelet Volume


  7.4 FL


(6.5-10.1) 


 


 


Neutrophils (%) (Auto)


  83.2 %


(45.0-75.0)  H 


 


 


Lymphocytes (%) (Auto)


  7.3 %


(20.0-45.0)  L 


 


 


Monocytes (%) (Auto)


  7.9 %


(1.0-10.0) 


 


 


Eosinophils (%) (Auto)


  1.0 %


(0.0-3.0) 


 


 


Basophils (%) (Auto)


  0.5 %


(0.0-2.0) 


 


 


Sodium Level


  141 MMOL/L


(136-145) 


 


 


Potassium Level


  3.2 MMOL/L


(3.5-5.1)  L 


 


 


Chloride Level


  102 MMOL/L


() 


 


 


Carbon Dioxide Level


  33 MMOL/L


(21-32)  H 


 


 


Anion Gap


  6 mmol/L


(5-15) 


 


 


Blood Urea Nitrogen


  36 mg/dL


(7-18)  H 


 


 


Creatinine


  3.0 MG/DL


(0.55-1.30)  H 


 


 


Estimat Glomerular Filtration


Rate  mL/min (>60)  


  


 


 


Glucose Level


  154 MG/DL


()  H 


 


 


Calcium Level


  8.6 MG/DL


(8.5-10.1) 


 


 


Phosphorus Level


  3.0 MG/DL


(2.5-4.9) 


 


 


Magnesium Level


  2.1 MG/DL


(1.8-2.4) 


 


 


Total Bilirubin


  0.4 MG/DL


(0.2-1.0) 


 


 


Aspartate Amino Transf


(AST/SGOT) 7 U/L (15-37)


L 


 


 


Alanine Aminotransferase


(ALT/SGPT) 8 U/L (12-78)


L 


 


 


Alkaline Phosphatase


  98 U/L


() 


 


 


Total Protein


  6.8 G/DL


(6.4-8.2) 


 


 


Albumin


  1.5 G/DL


(3.4-5.0)  L 


 


 


Globulin 5.3 g/dL   


 


Albumin/Globulin Ratio


  0.3 (1.0-2.7)


L 


 


 


Arterial Blood pH


  


  7.566


(7.350-7.450)


 


Arterial Blood Partial


Pressure CO2 


  34.3 mmHg


(35.0-45.0)  L


 


Arterial Blood Partial


Pressure O2 


  72.5 mmHg


(75.0-100.0)  L


 


Arterial Blood HCO3


  


  30.4 mmol/L


(22.0-26.0)  H


 


Arterial Blood Oxygen


Saturation 


  95.0 %


()


 


Arterial Blood Base Excess  8.0 (-2-2)  H


 


Arcadio Test  Positive  











Microbiology








 Date/Time


Source Procedure


Growth Status


 


 


 12/12/18 20:00


Stool Clostridium difficile Toxin Assay - Final Complete

















Mariusz Wade MD Dec 13, 2018 21:38

## 2018-12-13 NOTE — NUR
RD ASSESSMENT & RECOMMENDATIONS

SEE CARE ACTIVITY FOR COMPLETE ASSESSMENT



DAILY ESTIMATED NEEDS:

Needs based on Critical care+ Advanced Wounds + ESRD/ 60kg 

22-30  kcals/kg 

[id number]  total kcals

1.5-2.0  g protein/kg

90-120g  g total protein 

per MD  mL/kg

 total fluid mLs



NUTRITION DIAGNOSIS:

* Swallowing difficulty R/T respiratory status as evidenced by pt s/p self

extubation, s/p code bluce, now reintubated, on OGT feeding.

* Increased kcal/prot needs R/T wound healing as evidenced by pt admitted

w/ multiple wounds including stage 4 sacral wound, refer to  eval.

* Altered nutrition related lab values R/T ESRD dx, clinical condition as

evidenced by elev creat (4.6-> 3.30 low Na (130-> wnl), elev BNP >63875,

low BP, off pressor at this time.





CURRENT TF:Nepro @30ml/hr  





PO DIET RECOMMENDATIONS:

SLP evaluation post extubation, prior to PO diet -> [facility] MED, RENAL 





ENTERAL NUTRITION RECOMMENDATIONS:

Nepro @ 35ml/hr x 24 hrs + Prosource 1pkt TID  to provide 840ml, 1512kcal, 68g + 33g prot, 
611ml free water 



** WITH HEMODYNAMIC STABILITY **

1. Increase goal rate to 35ml/hr x 24 hrs

2. Add Prosource 1 pkt TID to meet increased prot needs



*****WITHOUT HEMODYNAMIC STABILITY, REC TROPHIC FEEDS OF NEPRO @ 10ML/HR X24 HRS*****







ADDITIONAL RECOMMENDATIONS:

* Calibrated bedscale wt for accurate CBW (pt w/ added air release mattress 

* Monitor HD stability-  s/p code blue on 12/10, re-intubated.  

* Wound healing-  Nephrovite x 1, Scotty 1pkt BID 

* Monitor Renal fxn and lytes 

* SSI w/ TF- h/o DM 

.

## 2018-12-13 NOTE — NUR
NURSE NOTES: Called [facility] to inform them that no one ever showed up for the patient for 
dialysis. [facility] will call back. Will continue to monitor patient and follow plan of care.

## 2018-12-13 NOTE — NUR
NURSE NOTES: Patient repositioned, patient received oral care, patient suctioned with thick 
clear secretion. Patient is resistive to suctioning and oral care by keeping her mouth shut, 
patient is oriented and explained the benefit of oral care. Patient was not able to tolerate 
morning weaning this morning. Cdiff is negative. Head of bed is at 30 degrees, bed is locked 
and in the lowest position, 3 side rails are up. Bilateral arms are elevated with pillows, 
bilateral legs are elevated with pillows. Will continue to monitor patient and follow plan 
of care.

## 2018-12-13 NOTE — NUR
NURSE NOTES: patient repositioned, patient received oral care and a little suctioning with 
her son at bedside encouraging her. Patients vent settings have been changed to AC 8, TV 
450, patient is tolerating well 02 saturation 100%. No acute distress noted. Patient is on 
feeding via OGT Nepro 1.8 at 30ml/hr, no residual. ABG scheduled for tomorrow. Restraints 
removed for repositioning and for hygiene care, skin remains intact at the wrist and pulses 
are present. Head of bed is at 30 degrees, bed is locked and alarm is on, 3 side rails are 
up. Will continue to monitor patients and follow plan of care.

## 2018-12-13 NOTE — NUR
***INSURANCE***





UPDATED CLINICALS AND REVIEW FAXED TO 





[facility]#: [id number]

NCM: JOHN TORRES#[phone]

F#: [phone]

## 2018-12-13 NOTE — NUR
NURSE NOTES:

Patient repositioned, rectal tube is patent and intact. Complete oral care performed. Vitals 
remains stable. Will continue to monitor.

## 2018-12-14 VITALS — SYSTOLIC BLOOD PRESSURE: 110 MMHG | DIASTOLIC BLOOD PRESSURE: 33 MMHG

## 2018-12-14 VITALS — DIASTOLIC BLOOD PRESSURE: 44 MMHG | SYSTOLIC BLOOD PRESSURE: 102 MMHG

## 2018-12-14 VITALS — DIASTOLIC BLOOD PRESSURE: 16 MMHG | SYSTOLIC BLOOD PRESSURE: 98 MMHG

## 2018-12-14 VITALS — DIASTOLIC BLOOD PRESSURE: 33 MMHG | SYSTOLIC BLOOD PRESSURE: 92 MMHG

## 2018-12-14 VITALS — SYSTOLIC BLOOD PRESSURE: 120 MMHG | DIASTOLIC BLOOD PRESSURE: 42 MMHG

## 2018-12-14 VITALS — SYSTOLIC BLOOD PRESSURE: 117 MMHG | DIASTOLIC BLOOD PRESSURE: 39 MMHG

## 2018-12-14 VITALS — DIASTOLIC BLOOD PRESSURE: 28 MMHG | SYSTOLIC BLOOD PRESSURE: 101 MMHG

## 2018-12-14 VITALS — SYSTOLIC BLOOD PRESSURE: 130 MMHG | DIASTOLIC BLOOD PRESSURE: 16 MMHG

## 2018-12-14 VITALS — DIASTOLIC BLOOD PRESSURE: 37 MMHG | SYSTOLIC BLOOD PRESSURE: 124 MMHG

## 2018-12-14 VITALS — SYSTOLIC BLOOD PRESSURE: 106 MMHG | DIASTOLIC BLOOD PRESSURE: 28 MMHG

## 2018-12-14 VITALS — DIASTOLIC BLOOD PRESSURE: 31 MMHG | SYSTOLIC BLOOD PRESSURE: 93 MMHG

## 2018-12-14 VITALS — SYSTOLIC BLOOD PRESSURE: 107 MMHG | DIASTOLIC BLOOD PRESSURE: 47 MMHG

## 2018-12-14 VITALS — DIASTOLIC BLOOD PRESSURE: 34 MMHG | SYSTOLIC BLOOD PRESSURE: 115 MMHG

## 2018-12-14 VITALS — SYSTOLIC BLOOD PRESSURE: 118 MMHG | DIASTOLIC BLOOD PRESSURE: 49 MMHG

## 2018-12-14 VITALS — DIASTOLIC BLOOD PRESSURE: 30 MMHG | SYSTOLIC BLOOD PRESSURE: 113 MMHG

## 2018-12-14 VITALS — DIASTOLIC BLOOD PRESSURE: 43 MMHG | SYSTOLIC BLOOD PRESSURE: 125 MMHG

## 2018-12-14 VITALS — DIASTOLIC BLOOD PRESSURE: 26 MMHG | SYSTOLIC BLOOD PRESSURE: 108 MMHG

## 2018-12-14 VITALS — SYSTOLIC BLOOD PRESSURE: 133 MMHG | DIASTOLIC BLOOD PRESSURE: 46 MMHG

## 2018-12-14 VITALS — DIASTOLIC BLOOD PRESSURE: 11 MMHG | SYSTOLIC BLOOD PRESSURE: 123 MMHG

## 2018-12-14 VITALS — SYSTOLIC BLOOD PRESSURE: 107 MMHG | DIASTOLIC BLOOD PRESSURE: 24 MMHG

## 2018-12-14 VITALS — SYSTOLIC BLOOD PRESSURE: 104 MMHG | DIASTOLIC BLOOD PRESSURE: 34 MMHG

## 2018-12-14 VITALS — DIASTOLIC BLOOD PRESSURE: 32 MMHG | SYSTOLIC BLOOD PRESSURE: 109 MMHG

## 2018-12-14 RX ADMIN — HEPARIN SODIUM SCH UNITS: 5000 INJECTION INTRAVENOUS; SUBCUTANEOUS at 06:05

## 2018-12-14 RX ADMIN — AMIODARONE HYDROCHLORIDE SCH MG: 200 TABLET ORAL at 08:19

## 2018-12-14 RX ADMIN — VITAMIN D, TAB 1000IU (100/BT) SCH INTLU: 25 TAB at 08:18

## 2018-12-14 RX ADMIN — MINOXIDIL SCH MG: 2.5 TABLET ORAL at 08:19

## 2018-12-14 RX ADMIN — PANTOPRAZOLE SODIUM SCH MG: 40 INJECTION, POWDER, FOR SOLUTION INTRAVENOUS at 08:18

## 2018-12-14 RX ADMIN — PANTOPRAZOLE SODIUM SCH MG: 40 INJECTION, POWDER, FOR SOLUTION INTRAVENOUS at 20:57

## 2018-12-14 RX ADMIN — CHLORHEXIDINE GLUCONATE SCH APPLIC: 213 SOLUTION TOPICAL at 20:56

## 2018-12-14 RX ADMIN — HEPARIN SODIUM SCH UNITS: 5000 INJECTION INTRAVENOUS; SUBCUTANEOUS at 14:00

## 2018-12-14 RX ADMIN — ERYTHROPOIETIN SCH UNITS: 20000 INJECTION, SOLUTION INTRAVENOUS; SUBCUTANEOUS at 20:58

## 2018-12-14 RX ADMIN — MEROPENEM SCH MLS/HR: 500 INJECTION INTRAVENOUS at 08:23

## 2018-12-14 RX ADMIN — SODIUM CHLORIDE SCH MLS/HR: 0.9 INJECTION INTRAVENOUS at 20:57

## 2018-12-14 RX ADMIN — HEPARIN SODIUM SCH UNITS: 5000 INJECTION INTRAVENOUS; SUBCUTANEOUS at 21:57

## 2018-12-14 RX ADMIN — AMIODARONE HYDROCHLORIDE SCH MG: 200 TABLET ORAL at 20:57

## 2018-12-14 RX ADMIN — MINOXIDIL SCH MG: 2.5 TABLET ORAL at 20:57

## 2018-12-14 NOTE — NUR
NURSE NOTES: Received patient from [facility] Hanks. Received patient in bed sleeping, patient is 
connected to vent ETT 7.5, 22cm at the left lip line, vent settings are AC 8, Tv 450, peep 
5, fio2 30%, 02 saturation 100%. Patient is connected to the cardiac monitor patient is 
currently sinus rhythm on the monitor HR 70. Bilateral lung sound diminished in all lobes 
upon auscultation. Patient suctioned with scant clear thick secretion.Patient abdomen is 
soft and non tender, patient OGT patent, flushed no residual, running Nepro 30ml/hr, bowel 
sounds are hypoactive. Patient has a rectal tube small dark stool seen in tube, Patient is 
anuric.  Patient has a left AV shunt thrill felt upon palpation and bruit heard upon 
auscultation. Patient has a right upper arm midline asymptomatic, patent, running TKO, 
dressing is dry and in tact. Bilateral arms 3+ indentation noted, Bilateral 1+ indentation 
noted. Patient has a wounds on left lower and right lower legs dressing are all dry and 
intact. No acute distress noted. Head of bed is at 30 degrees, bed is locked and in the 
lowest position, 3 side rails are up, patient has an p200 mattress. Will continue to monitor 
patient and follow plan of care.

## 2018-12-14 NOTE — NUR
CASE MANAGEMENT: REVIEW





SI: CELLULITIS OF RIGHT LEG . OSTEOMYELITIS . RESP FAILURE INTUBATED . ESRD ON HD

T 98.3  RR 14 BP 92/33 SAT 99% MECH VENT FIO2 45 

ABG: PH 7.539 PCO2 33.5 PO2 121.6 HCO3 27.9 







IS: VENOFER IV QHS

MEROPENEM IV QD

AMIODARONE NG Q12HR

PROCRIT SQ MWF 

PROTONIX IV Q12HR

NGT FEEDING NEPRO @10ML/HR GOAL RATE 30ML/HR 



***ICU STATUS***

DCP: PATIENT IS FROM [facility]

## 2018-12-14 NOTE — NUR
RESPIRATORY NOTE:

Received pt on AC 8, 450VT, 30%, PEEP +5. Pt intubated w/ ETT 7.5 @ 22cm lipline, secured 
by anchorfast. Pt alert/awake, follows commands. Both hands on soft-restraints to prevent pt 
from self-extubation. B/S moy. rhonchi/diminished. SXN minimal amounts of thick/thin, 
clear/white secretions. Vent plugged into red outlet, ambubag at bedside. Pt resting 
comfortably, in no apparent distress at this time. Will continue to monitor pt.

## 2018-12-14 NOTE — NUR
NURSE NOTES: Patient repositioned, received suctioning orally with clear thick secretions, 
oral care given, patient is resistive to care, patient closes mouth to keep staff from 
suctioning or cleaning her mouth. Patient has bilateral wrist restraints skin is intact and 
pulses are prsent. Patient is alert, follow commands. Bilateral arms are elevated with 
pillows, bilateral legs are elevated with pillows, heels are floated. Head of bed is at 30 
degrees, bed is locked and in the lowest position, 3 side rails are up. No acute distress 
noted and patient denies pain. Will continue to monitor patient and follow plan of care.

## 2018-12-14 NOTE — PULMONOLOGY PROGRESS NOTE
Assessment/Plan


Assessment/Plan


1. Hypotension, resolved


2. Sepsis.


3. Atrial fibrillation, paroxysmal


4. Pneumonia


5. Diabetes mellitus.


6. End-stage renal disease on dialysis.


7. Left lung collapse. Resolved


8. Respiratory failure, now reintubated


9. Dysphagia.


10. Dementia.


11. Anemia








DISCUSSION:


1. Low grade fever


2. CXR small effsuions


3. status post bronchoscopy


4. Central access


5. Use broad-spectrum antibiotics.  


6. NOT weanable at present with spont TV <100cc


7.  May require trach





Subjective


ROS Limited/Unobtainable:  Yes


Allergies:  


Coded Allergies:  


     No Known Allergies (Unverified , 11/26/18)





Objective





Last 24 Hour Vital Signs








  Date Time  Temp Pulse Resp B/P (MAP) Pulse Ox O2 Delivery O2 Flow Rate FiO2


 


12/14/18 08:19    125/43    


 


12/14/18 07:00  72 14 125/43 (70) 100   


 


12/14/18 06:58  72 14     30


 


12/14/18 06:00  76 17 110/33 (58) 93   


 


12/14/18 05:07  74 15     30


 


12/14/18 05:00  73 16 117/39 (65) 96   


 


12/14/18 04:00 98.7 74 14 107/24 (51) 93   


 


12/14/18 04:00        30


 


12/14/18 04:00      Mechanical Ventilator  





      Mechanical Ventilator  





      Mechanical Ventilator  


 


12/14/18 04:00  74      


 


12/14/18 03:00  75 15 106/28 (54) 99   


 


12/14/18 02:57  75 15     30


 


12/14/18 02:00  74 15 108/26 (53) 94   


 


12/14/18 01:00  75 16     30


 


12/14/18 01:00  77 16 101/28 (52) 95   


 


12/14/18 00:00        30


 


12/14/18 00:00 98.9 79 17 113/30 (57) 97   


 


12/14/18 00:00  79      


 


12/14/18 00:00      Mechanical Ventilator  





      Mechanical Ventilator  





      Mechanical Ventilator  


 


12/13/18 23:11  72 13     30


 


12/13/18 23:00  74 14 107/25 (52) 99   


 


12/13/18 22:00  76 16 109/23 (51) 99   


 


12/13/18 21:00  75 14 104/29 (54) 100   


 


12/13/18 20:51  75 12     30


 


12/13/18 20:42    95/28    


 


12/13/18 20:00        30


 


12/13/18 20:00 98.7 88 16 124/35 (64) 98   


 


12/13/18 20:00      Mechanical Ventilator  





      Mechanical Ventilator  





      Mechanical Ventilator  


 


12/13/18 20:00  88      


 


12/13/18 19:00  89 18 126/34 (64) 99   


 


12/13/18 18:48  91 20     30


 


12/13/18 18:00  72 15 112/36 (61) 100   


 


12/13/18 17:05  72 16     30


 


12/13/18 17:00 99.4 73 16 113/33 (59) 100   


 


12/13/18 16:00  76      


 


12/13/18 16:00  74 14 120/34 (62) 100   


 


12/13/18 16:00      Mechanical Ventilator  





      Mechanical Ventilator  





      Mechanical Ventilator  


 


12/13/18 15:13  71 12     30


 


12/13/18 15:00        30


 


12/13/18 15:00  72 12 110/29 (56) 100   


 


12/13/18 14:00  102 12 98/31 (53) 100   


 


12/13/18 13:00  75 12 100/28 (52) 100   


 


12/13/18 12:00  75 12 114/39 (64) 100   


 


12/13/18 12:00        30


 


12/13/18 12:00  76      


 


12/13/18 12:00      Mechanical Ventilator  





      Mechanical Ventilator  





      Mechanical Ventilator  


 


12/13/18 11:10  77 12     30


 


12/13/18 11:00  76 13 113/37 (62) 100   


 


12/13/18 10:00  76 14 125/22 (56) 100   


 


12/13/18 09:00  75 17 135/34 (67) 100   


 


12/13/18 08:50  80 15     30


 


12/13/18 08:48    121/12    

















Intake and Output  


 


 12/13/18 12/14/18





 18:59 06:59


 


Intake Total 430 ml 530 ml


 


Output Total 0 ml 0 ml


 


Balance 430 ml 530 ml


 


  


 


Free Water 70 ml 110 ml


 


IV Total  60 ml


 


Tube Feeding 360 ml 360 ml


 


Output Urine Total 0 ml 0 ml


 


Stool Total  0 ml








General Appearance:  no acute distress


HEENT:  normocephalic, atraumatic


Respiratory/Chest:  lungs clear


Cardiovascular:  normal rate


Abdomen:  soft, non tender





Microbiology








 Date/Time


Source Procedure


Growth Status


 


 


 12/12/18 20:00


Stool Clostridium difficile Toxin Assay - Final Complete








Laboratory Tests


12/13/18 08:45: 


Arterial Blood pH 7.566*H, Arterial Blood Partial Pressure CO2 34.3L, Arterial 

Blood Partial Pressure O2 72.5L, Arterial Blood HCO3 30.4H, Arterial Blood 

Oxygen Saturation 95.0, Arterial Blood Base Excess 8.0H, Arcadio Test Positive


12/14/18 08:05: 


Arterial Blood pH 7.539H, Arterial Blood Partial Pressure CO2 33.5L, Arterial 

Blood Partial Pressure O2 121.6H, Arterial Blood HCO3 27.9H, Arterial Blood 

Oxygen Saturation 98.2, Arterial Blood Base Excess 5.3H, Arcadio Test N/a





Current Medications








 Medications


  (Trade)  Dose


 Ordered  Sig/Tony


 Route


 PRN Reason  Start Time


 Stop Time Status Last Admin


Dose Admin


 


 Acetaminophen


  (Tylenol)  650 mg  Q6H  PRN


 ORAL


 Mild Pain/Temp > 100.5  12/4/18 14:15


 1/3/19 14:14  12/12/18 13:15


 


 


 Amiodarone HCl


  (Cordarone)  200 mg  EVERY 12  HOURS


 NG


   12/8/18 21:00


 1/5/19 13:59  12/14/18 08:19


 


 


 Chlorhexidine


 Gluconate


  (Juana-Hex 2%)  1 applic  DAILY@2000


 TOPIC


   11/27/18 20:00


 12/27/18 19:59  12/13/18 19:51


 


 


 Collagenase


  (Santyl)  1 applic  Q12HR


 TOPIC


   12/12/18 21:00


 1/5/19 20:59  12/13/18 20:42


 


 


 Epoetin Rupesh


  (Procrit (for


 ESRD on dialysis))  10,000 units  MON-WED-FRI


 SUBQ


   11/28/18 21:00


 12/28/18 20:59  12/12/18 21:29


 


 


 Heparin Sodium


  (Porcine)


  (Heparin 5000


 units/ml)  5,000 units  EVERY 8  HOURS


 SUBQ


   12/7/18 22:00


 1/6/19 21:59  12/14/18 06:05


 


 


 Hydralazine HCl


  (Apresoline)  10 mg  Q4H  PRN


 IV


 sbp greater than 145  12/10/18 20:30


 1/9/19 20:29   


 


 


 Iron Sucrose 100


 mg/Sodium Chloride  60 ml @ 


 240 mls/hr  BEDTIME


 IV


   12/13/18 21:00


 12/17/18 21:14  12/13/18 20:41


 


 


 Meropenem 500 mg/


 Sodium Chloride  55 ml @ 


 110 mls/hr  DAILY


 IVPB


   12/11/18 09:00


 12/25/18 08:59  12/14/18 08:23


 


 


 Minoxidil


  (Loniten)  5 mg  Q12HR


 NG


   12/2/18 21:00


 1/1/19 20:59  12/14/18 08:19


 


 


 Pantoprazole


  (Protonix)  40 mg  Q12HR


 IVP


   11/26/18 21:00


 12/27/18 08:59  12/14/18 08:18


 


 


 Vitamin D


  (Vitamin D)  1,000 intlu  DAILY


 GT


   12/9/18 10:00


 1/8/19 09:59  12/14/18 08:18


 

















Dashawn Hill MD Dec 14, 2018 08:40

## 2018-12-14 NOTE — NUR
***INSURANCE***





UPDATED CLINICALS AND REVIEW FAXED TO 





[facility]#: [id number]

NCM: JOHN TORRES#[phone]

F#: [phone]

## 2018-12-14 NOTE — NUR
Attempted to wean Patient. Placed on SBT with PS +8 and PEEP +5 at 0835. Patients effort 
immediately decreased and RR increased from 17 BPM to 30BPM. Placed back on previous 
settings within a minute.

## 2018-12-14 NOTE — NUR
NURSE NOTES: Patient repositioned, vent settings are the same. No change in condition. No 
acute distress noted. Patient is currently on the cardiac monitor sinus rhythm. Bilateral 
arms are elevated with pillows, bilateral legs are elevated with pillows. Patient is 
currently being dialyzed. Will continue to monitor patient and follow plan of care.

## 2018-12-14 NOTE — NUR
NURSE NOTES:

Received pt in no acute distress. Awake, alert but non verbal. Remains orally intubated, 
sats 98% on .30 FiO2 with vent setting of AC [id number] peep 5. Secretions scant. Chest sounds 
with scattered rhonchi. Afebrile; in and out of Afib but rate well controlled. BP stable on 
right forearm. VALE AVgraft with moderately strong bruit and thrill. OGT in situ, TF with 
Nepro continues at 30ml/h with 0 residuals. Rectal tube intact. Anuric. Wound dressings dry 
an intact. PICC on ASHISH inatact with IV at TKO

## 2018-12-14 NOTE — INFECTIOUS DISEASES PROG NOTE
Assessment/Plan


Assessment/Plan


antibiotics : meropenem





A


1. VRE | e.coli UTI s/p rx


2. shock resolved


3. leucocytosis resolved


4. respiratory failure


5. renal failure


6. decubitus ulcers


7. nasal MRSA colonization


8. rectal VRE colonization


9. pleural effusion





P


1. continue meropenem


2. will follow up cultures





Subjective


ROS Limited/Unobtainable:  Yes


Allergies:  


Coded Allergies:  


     No Known Allergies (Unverified , 11/26/18)





Objective


Vital Signs





Last 24 Hour Vital Signs








  Date Time  Temp Pulse Resp B/P (MAP) Pulse Ox O2 Delivery O2 Flow Rate FiO2


 


12/14/18 10:49  75 19     30


 


12/14/18 08:37     100   


 


12/14/18 08:36  107 18     30


 


12/14/18 08:35  74 30     40


 


12/14/18 08:19    125/43    


 


12/14/18 08:00  74      


 


12/14/18 08:00      Mechanical Ventilator  





      Mechanical Ventilator  





      Mechanical Ventilator  


 


12/14/18 08:00 98.3 72 18 125/43 (70) 100   


 


12/14/18 08:00        45


 


12/14/18 07:00  72 14 125/43 (70) 100   


 


12/14/18 06:58  72 14     45


 


12/14/18 06:00  76 17 110/33 (58) 93   


 


12/14/18 05:07  74 15     30


 


12/14/18 05:00  73 16 117/39 (65) 96   


 


12/14/18 04:00 98.7 74 14 107/24 (51) 93   


 


12/14/18 04:00        30


 


12/14/18 04:00      Mechanical Ventilator  





      Mechanical Ventilator  





      Mechanical Ventilator  


 


12/14/18 04:00  74      


 


12/14/18 03:00  75 15 106/28 (54) 99   


 


12/14/18 02:57  75 15     30


 


12/14/18 02:00  74 15 108/26 (53) 94   


 


12/14/18 01:00  75 16     30


 


12/14/18 01:00  77 16 101/28 (52) 95   


 


12/14/18 00:00        30


 


12/14/18 00:00 98.9 79 17 113/30 (57) 97   


 


12/14/18 00:00  79      


 


12/14/18 00:00      Mechanical Ventilator  





      Mechanical Ventilator  





      Mechanical Ventilator  


 


12/13/18 23:11  72 13     30


 


12/13/18 23:00  74 14 107/25 (52) 99   


 


12/13/18 22:00  76 16 109/23 (51) 99   


 


12/13/18 21:00  75 14 104/29 (54) 100   


 


12/13/18 20:51  75 12     30


 


12/13/18 20:42    95/28    


 


12/13/18 20:00        30


 


12/13/18 20:00 98.7 88 16 124/35 (64) 98   


 


12/13/18 20:00      Mechanical Ventilator  





      Mechanical Ventilator  





      Mechanical Ventilator  


 


12/13/18 20:00  88      


 


12/13/18 19:00  89 18 126/34 (64) 99   


 


12/13/18 18:48  91 20     30


 


12/13/18 18:00  72 15 112/36 (61) 100   


 


12/13/18 17:05  72 16     30


 


12/13/18 17:00 99.4 73 16 113/33 (59) 100   


 


12/13/18 16:00  76      


 


12/13/18 16:00  74 14 120/34 (62) 100   


 


12/13/18 16:00      Mechanical Ventilator  





      Mechanical Ventilator  





      Mechanical Ventilator  


 


12/13/18 15:13  71 12     30


 


12/13/18 15:00        30


 


12/13/18 15:00  72 12 110/29 (56) 100   


 


12/13/18 14:00  102 12 98/31 (53) 100   


 


12/13/18 13:00  75 12 100/28 (52) 100   


 


12/13/18 12:00  75 12 114/39 (64) 100   


 


12/13/18 12:00        30


 


12/13/18 12:00  76      


 


12/13/18 12:00      Mechanical Ventilator  





      Mechanical Ventilator  





      Mechanical Ventilator  








Height (Feet):  5


Height (Inches):  2.00


Weight (Pounds):  125


HEENT:  other - intubated


Respiratory/Chest:  lungs clear


Cardiovascular:  normal rate, regular rhythm, no gallop/murmur


Abdomen:  soft, non tender


Extremities:  no edema, other - right arm PICC





Microbiology








 Date/Time


Source Procedure


Growth Status


 


 


 12/12/18 20:00


Stool Clostridium difficile Toxin Assay - Final Complete











Laboratory Tests








Test


  12/14/18


08:05


 


Arterial Blood pH


  7.539


(7.350-7.450)


 


Arterial Blood Partial


Pressure CO2 33.5 mmHg


(35.0-45.0)  L


 


Arterial Blood Partial


Pressure O2 121.6 mmHg


(75.0-100.0)  H


 


Arterial Blood HCO3


  27.9 mmol/L


(22.0-26.0)  H


 


Arterial Blood Oxygen


Saturation 98.2 %


()


 


Arterial Blood Base Excess 5.3 (-2-2)  H


 


Arcadio Test N/a  











Current Medications








 Medications


  (Trade)  Dose


 Ordered  Sig/Tony


 Route


 PRN Reason  Start Time


 Stop Time Status Last Admin


Dose Admin


 


 Acetaminophen


  (Tylenol)  650 mg  Q6H  PRN


 ORAL


 Mild Pain/Temp > 100.5  12/4/18 14:15


 1/3/19 14:14  12/12/18 13:15


 


 


 Amiodarone HCl


  (Cordarone)  200 mg  EVERY 12  HOURS


 NG


   12/8/18 21:00


 1/5/19 13:59  12/14/18 08:19


 


 


 Chlorhexidine


 Gluconate


  (Juana-Hex 2%)  1 applic  DAILY@2000


 TOPIC


   11/27/18 20:00


 12/27/18 19:59  12/13/18 19:51


 


 


 Collagenase


  (Santyl)  1 applic  Q12HR


 TOPIC


   12/12/18 21:00


 1/5/19 20:59  12/13/18 20:42


 


 


 Epoetin Rupesh


  (Procrit (for


 ESRD on dialysis))  10,000 units  MON-WED-FRI


 SUBQ


   11/28/18 21:00


 12/28/18 20:59  12/12/18 21:29


 


 


 Heparin Sodium


  (Porcine)


  (Heparin 5000


 units/ml)  5,000 units  EVERY 8  HOURS


 SUBQ


   12/7/18 22:00


 1/6/19 21:59  12/14/18 06:05


 


 


 Hydralazine HCl


  (Apresoline)  10 mg  Q4H  PRN


 IV


 sbp greater than 145  12/10/18 20:30


 1/9/19 20:29   


 


 


 Iron Sucrose 100


 mg/Sodium Chloride  60 ml @ 


 240 mls/hr  BEDTIME


 IV


   12/13/18 21:00


 12/17/18 21:14  12/13/18 20:41


 


 


 Meropenem 500 mg/


 Sodium Chloride  55 ml @ 


 110 mls/hr  DAILY


 IVPB


   12/11/18 09:00


 12/25/18 08:59  12/14/18 08:23


 


 


 Minoxidil


  (Loniten)  5 mg  Q12HR


 NG


   12/2/18 21:00


 1/1/19 20:59  12/14/18 08:19


 


 


 Pantoprazole


  (Protonix)  40 mg  Q12HR


 IVP


   11/26/18 21:00


 12/27/18 08:59  12/14/18 08:18


 


 


 Vitamin D


  (Vitamin D)  1,000 intlu  DAILY


 GT


   12/9/18 10:00


 1/8/19 09:59  12/14/18 08:18


 

















Chase Santillan MD Dec 14, 2018 11:14

## 2018-12-14 NOTE — NUR
NURSE NOTES: Patient repositioned, patient orally suctioned with thick clear secretions, 
oral care given. Patient began hemodialysis. Patient is in no cute distress, patient is 
sinus rhythm and a-fib seen on the monitor. Patient denies pain at this time. Will continue 
to monitor patient and follow plan of care.

## 2018-12-14 NOTE — NUR
Received Patient on Vent Settings of ACVC RR 8, , FIO2 30%, PEEP +5. Pt intubated with 
7.5 ETT with a  22cm lipline, secured by anchorfast. Pt is alert, awake, and follows 
commands. Bilateral rhonchi heard upon auscultation. Suction minimal amounts of thin, clear 
secretions. Vent plugged into red outlet. Ambubag at bedside.Will continue to monitor 
throughout the day.

## 2018-12-14 NOTE — NUR
NURSE NOTES: Patient repositioned, patient suctioned with clear secretion. Patient is 
sleeping, same vent settings. Patient is on bilateral wrist restraints, restraints removed 
for repositioning and for hygiene. Head of be is at 30 degrees, bed is locked and in the 
lowest position, no change in condition, no acute distress noted. Will continue to monitor 
patient and follow plan of care.

## 2018-12-14 NOTE — NUR
NURSE NOTES:

Awake and watches TV. No distress. Denies pain. In and out of Afib. Bilat soft wrist 
restraints maintained.

## 2018-12-14 NOTE — NUR
NURSE NOTES: Patient repositioned and she refused suctioning a this time. Keli is being 
encourage throughout the shift to allow us to suction and clean her mouth. Patient has been 
explained the risk and benefits of staff not cleaning her mouth. Head of bed is at 30 
degrees, bed is locked and in the lowest position, 3 side rails are up, bed alarm is on. 
Will continue to monitor patient and follow plan of care.

## 2018-12-14 NOTE — CARDIOLOGY PROGRESS NOTE
Assessment/Plan


Assessment/Plan


1. Hypotension, possibly sepsis versus volume.


2. Paroxysmal episodes of atrial fibrillation history.


3. Bradycardia secondary to medications, amiodarone possibly.


4. Diabetes mellitus.


5. End-stage renal disease, on hemodialysis.


6. Lung collapse.


7. Respiratory failure, status post intubation.


8. Dysphagia.


9. History of dementia.


10. Pulm htn 


11.  Pleural effusion 


12. cardaic arrest


13. chest wall pain post cpr  





in and out afib amido now 200 bid  on hold


stool ob neg x2 


amiod bid


off labetolol for now if need hydralaize to be used 


vent supprot 


wean as tolerted may need trachand peg 


tele review ed agian interitttent tachy





Subjective


ROS Limited/Unobtainable:  Yes


Subjective


on the vent not verba





Objective





Last 24 Hour Vital Signs








  Date Time  Temp Pulse Resp B/P (MAP) Pulse Ox O2 Delivery O2 Flow Rate FiO2


 


12/14/18 18:45  117 15     30


 


12/14/18 18:00 98.6 83 17 107/47 (67) 100   


 


12/14/18 17:05  84 18     30


 


12/14/18 17:00  90 17 118/49 (72) 100   


 


12/14/18 16:00        30


 


12/14/18 16:00  77      


 


12/14/18 16:00  83 17 120/42 (68) 99   


 


12/14/18 16:00      Mechanical Ventilator  





      Mechanical Ventilator  





      Mechanical Ventilator  


 


12/14/18 14:50  89 17     30


 


12/14/18 14:10  74 17 133/46 (75) 99   


 


12/14/18 13:00  73 18 115/34 (61) 99   


 


12/14/18 12:50  72 15     30


 


12/14/18 12:00  76      


 


12/14/18 12:00      Mechanical Ventilator  





      Mechanical Ventilator  





      Mechanical Ventilator  


 


12/14/18 12:00  75 18 124/37 (66) 99   


 


12/14/18 12:00        30


 


12/14/18 11:00  74 18 109/32 (57) 99   


 


12/14/18 10:49  75 19     30


 


12/14/18 10:00  78 18 104/34 (57) 99   


 


12/14/18 09:00  107 14 92/33 (52) 100   


 


12/14/18 08:37     100   


 


12/14/18 08:36  107 18     30


 


12/14/18 08:35  74 30     40


 


12/14/18 08:19    125/43    


 


12/14/18 08:00  74      


 


12/14/18 08:00      Mechanical Ventilator  





      Mechanical Ventilator  





      Mechanical Ventilator  


 


12/14/18 08:00 98.3 72 18 125/43 (70) 100   


 


12/14/18 08:00        45


 


12/14/18 07:00  72 14 125/43 (70) 100   


 


12/14/18 06:58  72 14     45


 


12/14/18 06:00  76 17 110/33 (58) 93   


 


12/14/18 05:07  74 15     30


 


12/14/18 05:00  73 16 117/39 (65) 96   


 


12/14/18 04:00 98.7 74 14 107/24 (51) 93   


 


12/14/18 04:00        30


 


12/14/18 04:00      Mechanical Ventilator  





      Mechanical Ventilator  





      Mechanical Ventilator  


 


12/14/18 04:00  74      


 


12/14/18 03:00  75 15 106/28 (54) 99   


 


12/14/18 02:57  75 15     30


 


12/14/18 02:00  74 15 108/26 (53) 94   


 


12/14/18 01:00  75 16     30


 


12/14/18 01:00  77 16 101/28 (52) 95   


 


12/14/18 00:00        30


 


12/14/18 00:00 98.9 79 17 113/30 (57) 97   


 


12/14/18 00:00  79      


 


12/14/18 00:00      Mechanical Ventilator  





      Mechanical Ventilator  





      Mechanical Ventilator  


 


12/13/18 23:11  72 13     30


 


12/13/18 23:00  74 14 107/25 (52) 99   


 


12/13/18 22:00  76 16 109/23 (51) 99   


 


12/13/18 21:00  75 14 104/29 (54) 100   


 


12/13/18 20:51  75 12     30


 


12/13/18 20:42    95/28    








General Appearance:  no apparent distress, on vent, patient on isolation











Intake and Output  


 


 12/13/18 12/14/18





 19:00 07:00


 


Intake Total 430 ml 530 ml


 


Output Total 0 ml 0 ml


 


Balance 430 ml 530 ml


 


  


 


Free Water 70 ml 110 ml


 


IV Total  60 ml


 


Tube Feeding 360 ml 360 ml


 


Output Urine Total 0 ml 0 ml


 


Stool Total  0 ml











Laboratory Tests








Test


  12/14/18


08:05


 


Arterial Blood pH


  7.539


(7.350-7.450)


 


Arterial Blood Partial


Pressure CO2 33.5 mmHg


(35.0-45.0)  L


 


Arterial Blood Partial


Pressure O2 121.6 mmHg


(75.0-100.0)  H


 


Arterial Blood HCO3


  27.9 mmol/L


(22.0-26.0)  H


 


Arterial Blood Oxygen


Saturation 98.2 %


()


 


Arterial Blood Base Excess 5.3 (-2-2)  H


 


[facility]io Test N/a  











Microbiology








 Date/Time


Source Procedure


Growth Status


 


 


 12/12/18 20:00


Stool Clostridium difficile Toxin Assay - Final Complete

















Mariusz Wade MD Dec 14, 2018 20:14

## 2018-12-14 NOTE — NEPHROLOGY PROGRESS NOTE
Assessment/Plan


Assessment


Seee below


Plan


MOF stable.


Sepsis due to infected diabetic ulcers. On IV Abx. Followed by ID, Vasc. Sx, 

Podiatry.





ESRD HD q MWF 


Anemia of CKD , SAMANTHA high dose. Transfuse PRN.





A. 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 with LAE. LVEF 65% . Anticoagulate when 

stable. DW Dr. Wade. In and out A. Fib.





Unweanable, may need Trac+PEG.





See orders.





Subjective


Subjective


Reintubated. Another short trial of weaning by Dr. Hill failed. HD starting.





Objective


Objective





Last 24 Hour Vital Signs








  Date Time  Temp Pulse Resp B/P (MAP) Pulse Ox O2 Delivery O2 Flow Rate FiO2


 


12/14/18 08:37     100   


 


12/14/18 08:36  107 18     30


 


12/14/18 08:35  74 30     40


 


12/14/18 08:19    125/43    


 


12/14/18 08:00      Mechanical Ventilator  





      Mechanical Ventilator  





      Mechanical Ventilator  


 


12/14/18 08:00 98.3 72 18 125/43 (70) 100   


 


12/14/18 08:00        45


 


12/14/18 07:00  72 14 125/43 (70) 100   


 


12/14/18 06:58  72 14     45


 


12/14/18 06:00  76 17 110/33 (58) 93   


 


12/14/18 05:07  74 15     30


 


12/14/18 05:00  73 16 117/39 (65) 96   


 


12/14/18 04:00 98.7 74 14 107/24 (51) 93   


 


12/14/18 04:00        30


 


12/14/18 04:00      Mechanical Ventilator  





      Mechanical Ventilator  





      Mechanical Ventilator  


 


12/14/18 04:00  74      


 


12/14/18 03:00  75 15 106/28 (54) 99   


 


12/14/18 02:57  75 15     30


 


12/14/18 02:00  74 15 108/26 (53) 94   


 


12/14/18 01:00  75 16     30


 


12/14/18 01:00  77 16 101/28 (52) 95   


 


12/14/18 00:00        30


 


12/14/18 00:00 98.9 79 17 113/30 (57) 97   


 


12/14/18 00:00  79      


 


12/14/18 00:00      Mechanical Ventilator  





      Mechanical Ventilator  





      Mechanical Ventilator  


 


12/13/18 23:11  72 13     30


 


12/13/18 23:00  74 14 107/25 (52) 99   


 


12/13/18 22:00  76 16 109/23 (51) 99   


 


12/13/18 21:00  75 14 104/29 (54) 100   


 


12/13/18 20:51  75 12     30


 


12/13/18 20:42    95/28    


 


12/13/18 20:00        30


 


12/13/18 20:00 98.7 88 16 124/35 (64) 98   


 


12/13/18 20:00      Mechanical Ventilator  





      Mechanical Ventilator  





      Mechanical Ventilator  


 


12/13/18 20:00  88      


 


12/13/18 19:00  89 18 126/34 (64) 99   


 


12/13/18 18:48  91 20     30


 


12/13/18 18:00  72 15 112/36 (61) 100   


 


12/13/18 17:05  72 16     30


 


12/13/18 17:00 99.4 73 16 113/33 (59) 100   


 


12/13/18 16:00  76      


 


12/13/18 16:00  74 14 120/34 (62) 100   


 


12/13/18 16:00      Mechanical Ventilator  





      Mechanical Ventilator  





      Mechanical Ventilator  


 


12/13/18 15:13  71 12     30


 


12/13/18 15:00        30


 


12/13/18 15:00  72 12 110/29 (56) 100   


 


12/13/18 14:00  102 12 98/31 (53) 100   


 


12/13/18 13:00  75 12 100/28 (52) 100   


 


12/13/18 12:00  75 12 114/39 (64) 100   


 


12/13/18 12:00        30


 


12/13/18 12:00  76      


 


12/13/18 12:00      Mechanical Ventilator  





      Mechanical Ventilator  





      Mechanical Ventilator  


 


12/13/18 11:10  77 12     30


 


12/13/18 11:00  76 13 113/37 (62) 100   


 


12/13/18 10:00  76 14 125/22 (56) 100   

















Intake and Output  


 


 12/13/18 12/14/18





 18:59 06:59


 


Intake Total 430 ml 530 ml


 


Output Total 0 ml 0 ml


 


Balance 430 ml 530 ml


 


  


 


Free Water 70 ml 110 ml


 


IV Total  60 ml


 


Tube Feeding 360 ml 360 ml


 


Output Urine Total 0 ml 0 ml


 


Stool Total  0 ml








Laboratory Tests


12/14/18 08:05: 


Arterial Blood pH 7.539H, Arterial Blood Partial Pressure CO2 33.5L, Arterial 

Blood Partial Pressure O2 121.6H, Arterial Blood HCO3 27.9H, Arterial Blood 

Oxygen Saturation 98.2, Arterial Blood Base Excess 5.3H, Arcadio Test N/a


Height (Feet):  5


Height (Inches):  2.00


Weight (Pounds):  125


Objective


Reintubated





Cv IRR/IRR


Lungs B shai.


Abd SNT. BS +


E Rt. foot diabetic ulcers











Gavino Daigle MD Dec 14, 2018 09:12

## 2018-12-14 NOTE — NUR
NURSE NOTES:

Sept well.Pos. chg. q2hrs.Bathe,linen chg.No distress.Suctioned freq.Sanya.Vent.settings.See 
V/S.Scope rhythm remain on Sinus.

## 2018-12-15 VITALS — DIASTOLIC BLOOD PRESSURE: 33 MMHG | SYSTOLIC BLOOD PRESSURE: 118 MMHG

## 2018-12-15 VITALS — SYSTOLIC BLOOD PRESSURE: 119 MMHG | DIASTOLIC BLOOD PRESSURE: 12 MMHG

## 2018-12-15 VITALS — DIASTOLIC BLOOD PRESSURE: 10 MMHG | SYSTOLIC BLOOD PRESSURE: 112 MMHG

## 2018-12-15 VITALS — SYSTOLIC BLOOD PRESSURE: 123 MMHG | DIASTOLIC BLOOD PRESSURE: 41 MMHG

## 2018-12-15 VITALS — SYSTOLIC BLOOD PRESSURE: 119 MMHG | DIASTOLIC BLOOD PRESSURE: 25 MMHG

## 2018-12-15 VITALS — SYSTOLIC BLOOD PRESSURE: 129 MMHG | DIASTOLIC BLOOD PRESSURE: 37 MMHG

## 2018-12-15 VITALS — SYSTOLIC BLOOD PRESSURE: 115 MMHG | DIASTOLIC BLOOD PRESSURE: 32 MMHG

## 2018-12-15 VITALS — DIASTOLIC BLOOD PRESSURE: 40 MMHG | SYSTOLIC BLOOD PRESSURE: 133 MMHG

## 2018-12-15 VITALS — DIASTOLIC BLOOD PRESSURE: 61 MMHG | SYSTOLIC BLOOD PRESSURE: 106 MMHG

## 2018-12-15 VITALS — DIASTOLIC BLOOD PRESSURE: 22 MMHG | SYSTOLIC BLOOD PRESSURE: 107 MMHG

## 2018-12-15 VITALS — SYSTOLIC BLOOD PRESSURE: 120 MMHG | DIASTOLIC BLOOD PRESSURE: 37 MMHG

## 2018-12-15 VITALS — DIASTOLIC BLOOD PRESSURE: 23 MMHG | SYSTOLIC BLOOD PRESSURE: 143 MMHG

## 2018-12-15 VITALS — SYSTOLIC BLOOD PRESSURE: 126 MMHG | DIASTOLIC BLOOD PRESSURE: 36 MMHG

## 2018-12-15 VITALS — SYSTOLIC BLOOD PRESSURE: 103 MMHG | DIASTOLIC BLOOD PRESSURE: 52 MMHG

## 2018-12-15 VITALS — SYSTOLIC BLOOD PRESSURE: 85 MMHG | DIASTOLIC BLOOD PRESSURE: 73 MMHG

## 2018-12-15 VITALS — DIASTOLIC BLOOD PRESSURE: 70 MMHG | SYSTOLIC BLOOD PRESSURE: 103 MMHG

## 2018-12-15 VITALS — SYSTOLIC BLOOD PRESSURE: 85 MMHG | DIASTOLIC BLOOD PRESSURE: 52 MMHG

## 2018-12-15 VITALS — SYSTOLIC BLOOD PRESSURE: 106 MMHG | DIASTOLIC BLOOD PRESSURE: 35 MMHG

## 2018-12-15 VITALS — SYSTOLIC BLOOD PRESSURE: 139 MMHG | DIASTOLIC BLOOD PRESSURE: 41 MMHG

## 2018-12-15 VITALS — SYSTOLIC BLOOD PRESSURE: 106 MMHG | DIASTOLIC BLOOD PRESSURE: 61 MMHG

## 2018-12-15 VITALS — SYSTOLIC BLOOD PRESSURE: 102 MMHG | DIASTOLIC BLOOD PRESSURE: 24 MMHG

## 2018-12-15 VITALS — DIASTOLIC BLOOD PRESSURE: 42 MMHG | SYSTOLIC BLOOD PRESSURE: 118 MMHG

## 2018-12-15 VITALS — SYSTOLIC BLOOD PRESSURE: 91 MMHG | DIASTOLIC BLOOD PRESSURE: 60 MMHG

## 2018-12-15 RX ADMIN — MINOXIDIL SCH MG: 2.5 TABLET ORAL at 20:37

## 2018-12-15 RX ADMIN — MINOXIDIL SCH MG: 2.5 TABLET ORAL at 08:34

## 2018-12-15 RX ADMIN — CHLORHEXIDINE GLUCONATE SCH APPLIC: 213 SOLUTION TOPICAL at 20:35

## 2018-12-15 RX ADMIN — HEPARIN SODIUM SCH UNITS: 5000 INJECTION INTRAVENOUS; SUBCUTANEOUS at 20:38

## 2018-12-15 RX ADMIN — VITAMIN D, TAB 1000IU (100/BT) SCH INTLU: 25 TAB at 08:31

## 2018-12-15 RX ADMIN — PANTOPRAZOLE SODIUM SCH MG: 40 INJECTION, POWDER, FOR SOLUTION INTRAVENOUS at 20:36

## 2018-12-15 RX ADMIN — SODIUM CHLORIDE SCH MLS/HR: 0.9 INJECTION INTRAVENOUS at 20:36

## 2018-12-15 RX ADMIN — AMIODARONE HYDROCHLORIDE SCH MG: 200 TABLET ORAL at 20:36

## 2018-12-15 RX ADMIN — PANTOPRAZOLE SODIUM SCH MG: 40 INJECTION, POWDER, FOR SOLUTION INTRAVENOUS at 08:31

## 2018-12-15 RX ADMIN — HEPARIN SODIUM SCH UNITS: 5000 INJECTION INTRAVENOUS; SUBCUTANEOUS at 06:00

## 2018-12-15 RX ADMIN — MEROPENEM SCH MLS/HR: 500 INJECTION INTRAVENOUS at 08:34

## 2018-12-15 RX ADMIN — AMIODARONE HYDROCHLORIDE SCH MG: 200 TABLET ORAL at 08:32

## 2018-12-15 NOTE — NUR
NURSE NOTES:

Pt asleep, son at bedside, turned and repositioned.HOB elevated to prevent aspiration.Kept 
clean dry and intact.

## 2018-12-15 NOTE — NUR
NURSE NOTES:

Weaning started at 0905 Cpap with PS 8. Patient tolerate well at this time.Deep breathing 
exercise provided.

## 2018-12-15 NOTE — CONSULTATION
DATE OF CONSULTATION:  12/15/2018

GASTROENTEROLOGY CONSULTATION



CHIEF COMPLAINT:  I was asked to see the patient by Dr. Gavino Daigle for

gastrostomy tube placement.



HISTORY OF PRESENT ILLNESS:  The patient is a pleasant unfortunate

78-year-old white woman, who was admitted to the hospital due to altered

mental status.  She eventually had to be intubated and she is on life

support.  She is awake, but she has failed her weaning trials, and

therefore, gastrostomy and tracheostomy has been requested.  I am unable

to get much more information since she is intubated.  Most of the

information is only available from the chart.



PAST MEDICAL HISTORY:  History of end-stage renal disease, on dialysis,

diabetes, atrial fibrillation, hypertension, anemia, dementia,

gastroesophageal reflux disease, open wound to the right lower extremity,

previous history of urinary tract infection, current respiratory

failure.



ALLERGIES:  None.



FAMILY HISTORY:  Unavailable.



SOCIAL HISTORY:  The patient is a resident of a convalescent facility.  It

is unclear whether she has a smoking or drinking history.



REVIEW OF SYSTEMS:  Otherwise negative.



PHYSICAL EXAMINATION:

GENERAL:  Pleasant, thin white woman in the ICU on the ventilator.

HEENT:  Normocephalic, atraumatic.  The patient was intubated and also an

orogastric tube was in place.

NECK:  Supple.

CHEST:  Reveals scattered rhonchi.

CARDIOVASCULAR:  Revealed regular rate.

ABDOMEN:  Soft and flat.

EXTREMITIES:  Revealed right-sided dressing.



LABORATORY AND DIAGNOSTIC DATA:  Laboratory data and imaging studies were

noted.



ASSESSMENT:  The patient has respiratory failure and failed weaning trials.

She is on a ventilator.  Will require a tracheostomy tube.  She will

therefore be a reasonable candidate for gastrostomy tube placement for

long-term enteral access.  The indications, risks, alternatives, and

complications will be explained to the responsible party and should be

agreed and this can be done early next week.  In the meantime, the

patient's tube feedings can be continued.  She also has some degree of

anemia, which is reasonably stable.  This may be due to her renal

failure.



RECOMMENDATIONS:

1. continue current meds.

2. Continue orogastric tube feeding.

3. Elevate head of bed.

4. Discussion with family.

5. Possible gastrostomy tube next week.



Thank you for asking me to participate in the care of this patient.









  ______________________________________________

  Gallo Lobo M.D.





DR:  TERI

D:  12/15/2018 12:30

T:  12/15/2018 21:13

JOB#:  [id number]

CC:



ARETHA

## 2018-12-15 NOTE — NUR
NURSE NOTES:

Received pt in no acute distress. Awake, alert, opens eyes spontaneously and watches 
TV.Denies any pain. Remains orally intubated and appears to be tolerating current vent 
parameters, fiO2. 35, saturating 100%. NSR on the scope, BP stable. OGT in place and TF with 
Nepro continues at 30ml/h with 0 residuals. Rectal tube intact. PICC on ASHISH intact with IVF 
at TKO. Left upper arm AV graft with moderately strong bruit and thrill. Anuric. Bilateral 
soft wrist restraints maintained as pt still impulsive. Wound dressings dry and intact. Will 
continue to monitor

## 2018-12-15 NOTE — NUR
NURSE NOTES:

ADLs done, pt turned and repositioned.HOB elevated to 35 degree to prevent aspiration.No 
residual noted. Pictures taken.Mouth care provided.Call light within easy reach.Kept clean 
dry and comfortable

## 2018-12-15 NOTE — NUR
NURSE NOTES:

Patient was seen by Dr Pate and heparin 5000units injection frequency changed to Q2hrs. 
Turned and repositioned.HOB elevated at 35 degree to prevent risk for aspiration.Kept clean 
dry and comfortable.

## 2018-12-15 NOTE — CARDIOLOGY PROGRESS NOTE
Assessment/Plan


Assessment/Plan


1. Hypotension, possibly sepsis versus volume.


2. Paroxysmal episodes of atrial fibrillation history.


3. Bradycardia secondary to medications, amiodarone possibly.


4. Diabetes mellitus.


5. End-stage renal disease, on hemodialysis.


6. Lung collapse.


7. Respiratory failure, status post intubation.


8. Dysphagia.


9. History of dementia.


10. Pulm htn 


11.  Pleural effusion 


12. cardaic arrest


13. chest wall pain post cpr  





in and out afib amido now 200 bid  on hold


stool ob neg x2 


amiod bid


off labetolol for now if need hydralaize to be used 


vent support 


wean as tolerated may need trach and peg 


tele reviewed agian intermittent tachy but at this time is in sinus





Subjective


ROS Limited/Unobtainable:  Yes


Subjective


on the vent not verba , denies cp or dizziness has sob





Objective





Last 24 Hour Vital Signs








  Date Time  Temp Pulse Resp B/P (MAP) Pulse Ox O2 Delivery O2 Flow Rate FiO2


 


12/15/18 10:47  91 16     45


 


12/15/18 09:05     100   


 


12/15/18 09:00  100 22 106/61 (76) 100   


 


12/15/18 08:52  90 17     45


 


12/15/18 08:34    85/52    


 


12/15/18 08:00  94      


 


12/15/18 08:00 98.8 92 20 85/52 (63) 95   


 


12/15/18 08:00      Mechanical Ventilator  





      Mechanical Ventilator  





      Mechanical Ventilator  


 


12/15/18 08:00        45


 


12/15/18 07:00  96 19 106/35 (58) 94   


 


12/15/18 06:55  93 19     45


 


12/15/18 06:00  90 25 143/23 (63) 97   


 


12/15/18 05:20  91 18     45


 


12/15/18 05:00  84 18 103/52 (69) 94   


 


12/15/18 04:00      Mechanical Ventilator  





      Mechanical Ventilator  





      Mechanical Ventilator  


 


12/15/18 04:00 98.8 85 19 107/22 (50) 99   


 


12/15/18 04:00        45


 


12/15/18 04:00  85      


 


12/15/18 03:00  86 19 112/10 (44) 93   


 


12/15/18 03:00  87 19     45


 


12/15/18 02:00  86 16 102/24 (50) 94   


 


12/15/18 01:00  87 17 120/37 (64) 95   


 


12/15/18 01:00  90 19     30


 


12/15/18 00:00      Mechanical Ventilator  





      Mechanical Ventilator  





      Mechanical Ventilator  


 


12/15/18 00:00 98.6 89 19 119/12 (47) 95   


 


12/15/18 00:00        30


 


12/15/18 00:00  88      


 


12/14/18 23:00  87 19 123/11 (48) 98   


 


12/14/18 23:00  90 18     30


 


12/14/18 22:00  87 17 130/16 (54) 99   


 


12/14/18 21:11  87 17     30


 


12/14/18 21:00  87 15 98/16 (43) 98   


 


12/14/18 20:57    93/31    


 


12/14/18 20:00        30


 


12/14/18 20:00  120 19 93/31 (51) 98   


 


12/14/18 20:00      Mechanical Ventilator  





      Mechanical Ventilator  





      Mechanical Ventilator  


 


12/14/18 20:00  120      


 


12/14/18 19:00  119 19 102/44 (63) 98   


 


12/14/18 18:45  117 15     30


 


12/14/18 18:00 98.6 83 17 107/47 (67) 100   


 


12/14/18 17:05  84 18     30


 


12/14/18 17:00  90 17 118/49 (72) 100   


 


12/14/18 16:00        30


 


12/14/18 16:00  77      


 


12/14/18 16:00  83 17 120/42 (68) 99   


 


12/14/18 16:00      Mechanical Ventilator  





      Mechanical Ventilator  





      Mechanical Ventilator  


 


12/14/18 14:50  89 17     30


 


12/14/18 14:10  74 17 133/46 (75) 99   


 


12/14/18 13:00  73 18 115/34 (61) 99   


 


12/14/18 12:50  72 15     30








General Appearance:  no apparent distress, alert, on vent, patient on isolation


Neck:  supple


Cardiovascular:  normal rate


Respiratory/Chest:  lungs clear


Abdomen:  normal bowel sounds, non tender, soft


Extremities:  no swelling











Intake and Output  


 


 12/14/18 12/15/18





 18:59 06:59


 


Intake Total 500 ml 450 ml


 


Output Total 0 ml 50 ml


 


Balance 500 ml 400 ml


 


  


 


Free Water 30 ml 


 


IV Total 110 ml 60 ml


 


Tube Feeding 360 ml 360 ml


 


Other  30 ml


 


Output Urine Total 0 ml 0 ml


 


Stool Total 0 ml 50 ml











Microbiology








 Date/Time


Source Procedure


Growth Status


 


 


 12/12/18 20:00


Stool Clostridium difficile Toxin Assay - Final Complete

















Mariusz Wade MD Dec 15, 2018 12:25

## 2018-12-15 NOTE — NUR
NURSE NOTES:

Patient placed back on AC mode due to Tachypnea at 32 after 40mn. Will continue to  monitor

## 2018-12-15 NOTE — NUR
CASE MANAGEMENT: REVIEW





SI: CELLULITIS OF RIGHT LEG . OSTEOMYELITIS . RESP FAILURE INTUBATED . ESRD ON HD

T 98.6 HR 84 RR 16 BP 85/73 SAT 95% MEC VENT FIO2 45

WBC 11.5 H/H 8.7/274.9 BUN 36 CR 3.0 







IS: VENOFER IV QHS

MEROPENEM IV QD

AMIODARONE NG Q12HR

PROCRIT SQ MWF 

PROTONIX IV Q12HR

NGT FEEDING NEPRO @10ML/HR GOAL RATE 30ML/HR 



***ICU STATUS***

DCP: PATIENT IS FROM [facility]

## 2018-12-15 NOTE — NEPHROLOGY PROGRESS NOTE
Assessment/Plan


Assessment


Seee below


Plan


MOF stable.


Sepsis due to infected diabetic ulcers. On IV Abx. Followed by ID, Vasc. Sx, 

Podiatry.





ESRD HD q MWF 


Anemia of CKD , SAMANTHA high dose. Transfuse PRN.





A. 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 with LAE. LVEF 65% . Anticoagulate when 

stable. DW Dr. Wade. In and out A. Fib.





Unweanable, may need Trac+PEG.





See orders.





Subjective


Subjective


Reintubated. Another short trial of weaning by Dr. Hill failed.





Objective


Objective





Last 24 Hour Vital Signs








  Date Time  Temp Pulse Resp B/P (MAP) Pulse Ox O2 Delivery O2 Flow Rate FiO2


 


12/15/18 10:47  91 16     45


 


12/15/18 09:05     100   


 


12/15/18 09:00  100 22 106/61 (76) 100   


 


12/15/18 08:52  90 17     45


 


12/15/18 08:34    85/52    


 


12/15/18 08:00  94      


 


12/15/18 08:00 98.8 92 20 85/52 (63) 95   


 


12/15/18 08:00      Mechanical Ventilator  





      Mechanical Ventilator  





      Mechanical Ventilator  


 


12/15/18 08:00        45


 


12/15/18 07:00  96 19 106/35 (58) 94   


 


12/15/18 06:55  93 19     45


 


12/15/18 06:00  90 25 143/23 (63) 97   


 


12/15/18 05:20  91 18     45


 


12/15/18 05:00  84 18 103/52 (69) 94   


 


12/15/18 04:00      Mechanical Ventilator  





      Mechanical Ventilator  





      Mechanical Ventilator  


 


12/15/18 04:00 98.8 85 19 107/22 (50) 99   


 


12/15/18 04:00        45


 


12/15/18 04:00  85      


 


12/15/18 03:00  86 19 112/10 (44) 93   


 


12/15/18 03:00  87 19     45


 


12/15/18 02:00  86 16 102/24 (50) 94   


 


12/15/18 01:00  87 17 120/37 (64) 95   


 


12/15/18 01:00  90 19     30


 


12/15/18 00:00      Mechanical Ventilator  





      Mechanical Ventilator  





      Mechanical Ventilator  


 


12/15/18 00:00 98.6 89 19 119/12 (47) 95   


 


12/15/18 00:00        30


 


12/15/18 00:00  88      


 


12/14/18 23:00  87 19 123/11 (48) 98   


 


12/14/18 23:00  90 18     30


 


12/14/18 22:00  87 17 130/16 (54) 99   


 


12/14/18 21:11  87 17     30


 


12/14/18 21:00  87 15 98/16 (43) 98   


 


12/14/18 20:57    93/31    


 


12/14/18 20:00        30


 


12/14/18 20:00  120 19 93/31 (51) 98   


 


12/14/18 20:00      Mechanical Ventilator  





      Mechanical Ventilator  





      Mechanical Ventilator  


 


12/14/18 20:00  120      


 


12/14/18 19:00  119 19 102/44 (63) 98   


 


12/14/18 18:45  117 15     30


 


12/14/18 18:00 98.6 83 17 107/47 (67) 100   


 


12/14/18 17:05  84 18     30


 


12/14/18 17:00  90 17 118/49 (72) 100   


 


12/14/18 16:00        30


 


12/14/18 16:00  77      


 


12/14/18 16:00  83 17 120/42 (68) 99   


 


12/14/18 16:00      Mechanical Ventilator  





      Mechanical Ventilator  





      Mechanical Ventilator  


 


12/14/18 14:50  89 17     30


 


12/14/18 14:10  74 17 133/46 (75) 99   


 


12/14/18 13:00  73 18 115/34 (61) 99   


 


12/14/18 12:50  72 15     30

















Intake and Output  


 


 12/14/18 12/15/18





 18:59 06:59


 


Intake Total 500 ml 450 ml


 


Output Total 0 ml 50 ml


 


Balance 500 ml 400 ml


 


  


 


Free Water 30 ml 


 


IV Total 110 ml 60 ml


 


Tube Feeding 360 ml 360 ml


 


Other  30 ml


 


Output Urine Total 0 ml 0 ml


 


Stool Total 0 ml 50 ml








Height (Feet):  5


Height (Inches):  2.00


Weight (Pounds):  135


Objective


Reintubated





Cv IRR/IRR


Lungs B ronchi.


Abd SNT. BS +


E Rt. foot diabetic ulcers











Gavino Daigle MD Dec 15, 2018 12:24

## 2018-12-15 NOTE — NUR
NURSE NOTES: Remains awake and alert; watches TV; HOB elevated. Bilat soft wrist restraints 
maintained; pt still impulsive. VSS.

## 2018-12-15 NOTE — NUR
NURSE NOTES:

Wound dressings changed. PICC line dressing changed. Complete bed bath, oral care and 
suctioning done. Afebrile; VSS. NSR.

## 2018-12-15 NOTE — GENERAL SURGERY PROGRESS NOTE
General Surgery-Progress Note


Subjective


Procedure Performed


left subclavian central venous catheter insertion


Additional Comments


still intubated.  awake and wants tube out.





Objective





Last 24 Hour Vital Signs








  Date Time  Temp Pulse Resp B/P (MAP) Pulse Ox O2 Delivery O2 Flow Rate FiO2


 


12/15/18 09:05     100   


 


12/15/18 09:00  100 22 106/61 (76) 100   


 


12/15/18 08:52  90 17     45


 


12/15/18 08:34    85/52    


 


12/15/18 08:00 98.8 92 20 85/52 (63) 95   


 


12/15/18 08:00      Mechanical Ventilator  





      Mechanical Ventilator  





      Mechanical Ventilator  


 


12/15/18 08:00        45


 


12/15/18 07:00  96 19 106/35 (58) 94   


 


12/15/18 06:55  93 19     45


 


12/15/18 06:00  90 25 143/23 (63) 97   


 


12/15/18 05:20  91 18     45


 


12/15/18 05:00  84 18 103/52 (69) 94   


 


12/15/18 04:00      Mechanical Ventilator  





      Mechanical Ventilator  





      Mechanical Ventilator  


 


12/15/18 04:00 98.8 85 19 107/22 (50) 99   


 


12/15/18 04:00        45


 


12/15/18 04:00  85      


 


12/15/18 03:00  86 19 112/10 (44) 93   


 


12/15/18 03:00  87 19     45


 


12/15/18 02:00  86 16 102/24 (50) 94   


 


12/15/18 01:00  87 17 120/37 (64) 95   


 


12/15/18 01:00  90 19     30


 


12/15/18 00:00      Mechanical Ventilator  





      Mechanical Ventilator  





      Mechanical Ventilator  


 


12/15/18 00:00 98.6 89 19 119/12 (47) 95   


 


12/15/18 00:00        30


 


12/15/18 00:00  88      


 


12/14/18 23:00  87 19 123/11 (48) 98   


 


12/14/18 23:00  90 18     30


 


12/14/18 22:00  87 17 130/16 (54) 99   


 


12/14/18 21:11  87 17     30


 


12/14/18 21:00  87 15 98/16 (43) 98   


 


12/14/18 20:57    93/31    


 


12/14/18 20:00        30


 


12/14/18 20:00  120 19 93/31 (51) 98   


 


12/14/18 20:00      Mechanical Ventilator  





      Mechanical Ventilator  





      Mechanical Ventilator  


 


12/14/18 20:00  120      


 


12/14/18 19:00  119 19 102/44 (63) 98   


 


12/14/18 18:45  117 15     30


 


12/14/18 18:00 98.6 83 17 107/47 (67) 100   


 


12/14/18 17:05  84 18     30


 


12/14/18 17:00  90 17 118/49 (72) 100   


 


12/14/18 16:00        30


 


12/14/18 16:00  77      


 


12/14/18 16:00  83 17 120/42 (68) 99   


 


12/14/18 16:00      Mechanical Ventilator  





      Mechanical Ventilator  





      Mechanical Ventilator  


 


12/14/18 14:50  89 17     30


 


12/14/18 14:10  74 17 133/46 (75) 99   


 


12/14/18 13:00  73 18 115/34 (61) 99   


 


12/14/18 12:50  72 15     30


 


12/14/18 12:00  76      


 


12/14/18 12:00      Mechanical Ventilator  





      Mechanical Ventilator  





      Mechanical Ventilator  


 


12/14/18 12:00  75 18 124/37 (66) 99   


 


12/14/18 12:00        30


 


12/14/18 11:00  74 18 109/32 (57) 99   


 


12/14/18 10:49  75 19     30








I&O











Intake and Output  


 


 12/14/18 12/15/18





 18:59 06:59


 


Intake Total 500 ml 450 ml


 


Output Total 0 ml 50 ml


 


Balance 500 ml 400 ml


 


  


 


Free Water 30 ml 


 


IV Total 110 ml 60 ml


 


Tube Feeding 360 ml 360 ml


 


Other  30 ml


 


Output Urine Total 0 ml 0 ml


 


Stool Total 0 ml 50 ml








Dressing:  other


Wound:  other


Drains:  other


Cardiovascular:  RSR


Respiratory:  decreased breath sounds


Abdomen:  soft, non-tender, present bowel sounds


Extremities:  other





Plan


Problems:  


(1) Sepsis


Assessment & Plan:  acute decompensation 


hypotensive


bradycardic


respiratory decompensation requiring intubation and vent support - failed 

extubation 





will monitor


cont current ICU care and management


may need trach if unable to wean off went with history of failed extubation 


   -weaning trail again today


   -will consider this week if unable to wean 


thank you


will follow with recs 





(2) Hypotension


(3) Bradycardia


(4) Altered mental status


(5) Decubital ulcer


Assessment & Plan:  Patient with multiple pressure injuries where have been 

present since admission. 


Full thickness pressure injury to sacrum (L)(L)2.4cm x (W)4.4cm x (D)0.9cm, 

undermining 12-12 with tunneling 9-10 by 11.3cm. 


In close proximity to sacral wound on L buttocks purple ,indurated area with 

small area of slough measuring (L)0.6cm x (W)0.5cm noted. Arched surgical scar 

noted to L buttocks at site of tunneling. 


Resolving pressure injury noted to R ischium (L)3cm x (W)2cm. Wound bed 

epithelialized with maroon discoloration without induration periwound. 


Closed area with white pocket and purple margins noted to distal,paz R tibia 

(L)6cm x (W)2.7cm


Full thickness wound noted to distal/medial RLE (L)6.2cm x (W)3.8cm .Wound 

noted to have 95% mixed slough /necrosis ,marginal erythema. Wound malodorous.


Wound posterior R tibia (L)4.7cm x (W) 3cm ,100% necrotic with marginal 

erythema. Dry eschar with marginal erythema 


(L)0.5cm x (W)0.7cm noted to medial R heel.Non-blanchable erythema noted to 

medial L malleolus. 


Dry brown eschar noted to medial L 1st malleolus (L)1.4cm x (W)1cm. 


L heel and lateral aspect boggy with non-blanchable erythema. 





Tx.Plan:


Cleanse sacral wound with Saline.Loose pack with Hydrogel impregnated packing 

strip (Attention to tunneled area at 9-10).Cover with Optifoam drsg Daily and 

prn.


           


Reposition at least every 2hours or as tolerated.


           


Air Fluidized mattress.


           


Off-load heels with pillow.





Appreciate Podiatry and Vascular input





Plan for anigo late by Vascular 














Bob Lewis Dec 15, 2018 10:32

## 2018-12-15 NOTE — NUR
NURSE NOTES:

Patient awake , no acute distress noted at this time.Turned and repositioned. HOB elevated 
to prevent aspiration. Will continue to monitor.

## 2018-12-15 NOTE — NUR
RESPIRATORY NOTE:



Patient received mechanically ventilated on  with current ordered vent settings. 
Patient is orally intubated with a ETT tube 7.5 with 22cm at the lip and is secured with an 
anchor fast. Bilateral coarse breath sounds were auscultated and small amount of thick 
white/yellow secretions were suctioned without incident. There is an ambu bag available at 
the bedside and the vent is connected to a red outlet. Vent alarms are functional and and 
audible. Patient appears comfortable at this time. Will continue to monitor.

## 2018-12-15 NOTE — NUR
NURSE NOTES:

Awake and afebrile at this time.Episode of restlessness. ETT in place 7.5/23 and AC 8-VT 
450-FiO2 45% -Peep 5.Noted with large amount thin clear secretion.Patient suctioned and 
mouth care done. Afib on the monitor,turned and repositioned.HOB elevated at 35 degree to 
prevent aspiration.OGT in place running Nepro at 30cc. GT placement intact with no residual 
at this time.Rectal tube draining brown stool.Wound dressing intact with no drainage. ASHISH 
picc line running TKO with no infiltration,VALE shunt thrill and bruit present. Kept clean 
dry and comfortable.Will continue to monitor.

## 2018-12-15 NOTE — NUR
NURSE NOTES:

remains awake alert but impulsive. Bilat soft wrist retraints maintained. VSS. No distress.

## 2018-12-15 NOTE — NUR
NURSE NOTES:

Pt turned and repositioned.Kept clean and dry.Call light within easy reach.Mouth care done, 
continue close monitoring.

## 2018-12-15 NOTE — NUR
RESPIRATORY NOTE: Received pt. on 840 vent. Vent settings are: A/C rate of 8, Vt 450, FI02 
35%, PEEP +5. No respiratory distress noted, SP02 @ 100%. Ambu bag @ bs. Vent plugged on red 
outlet. Will continue to monitor pt.

## 2018-12-15 NOTE — PULMONOLGY CRITICAL CARE NOTE
Critical Care - Asmt/Plan


Assessment/Plan:


Assessment/Plan


IMPRESSION:


1. Hypotension.


2. Sepsis.


3. Atrial fibrillation.


4. Bradycardia.


5. Diabetes mellitus.


6. End-stage renal disease on dialysis.


7. Left lung collapse.


8. Respiratory failure.


9. Dysphagia.


10. Dementia.


11. Anemia





DISCUSSION:


1. Continue medications.


2. as needed Levophed.


3. status post bronchoscopy


4. Central access


5. Use broad-spectrum antibiotics.  


6. continue weaning attempts














  ______________________________________________


  Quentin Cardoso M.D.





Subjective


Interval Events:  Not weaning; s/p prbc


Constitutional:  Reports: no symptoms


HEENT:  Repors: no symptoms


Cardiovascular:  Reports: no symptoms


Gastrointestinal/Abdominal:  Reports: no symptoms


Genitourinary:  Reports: no symptoms


Allergies:  


Coded Allergies:  


     No Known Allergies (Unverified , 11/26/18)





Objective





Vital Signs Noted





General Appearance:  no acute distress


HEENT:  normocephalic


Respiratory/Chest:  chest wall non-tender, lungs clear


Cardiovascular:  normal peripheral pulses, normal rate


Abdomen:  normal bowel sounds





Microbiology








 Date/Time


Source Procedure


Growth Status


 


 


 11/28/18 11:40


Other(Specify in comment) Gram Stain - Final Resulted


 


 11/28/18 11:40


Other(Specify in comment) Wound Culture - Preliminary Resulted





 11/28/18 11:40


Sacral Wound Gram Stain - Final Resulted


 


 11/28/18 11:40


Sacral Wound Wound Culture - Preliminary Resulted








Laboratory Tests


11/29/18 13:50: 


Arterial Blood pH 7.250*L, Arterial Blood Partial Pressure CO2 48.3H, Arterial 

Blood Partial Pressure O2 75.1, Arterial Blood HCO3 20.8L, Arterial Blood 

Oxygen Saturation 91.8L, Arterial Blood Base Excess -6.4L, Arcadio Test Positive


11/30/18 01:45: Stool Occult Blood [Pending]


11/30/18 04:00: 


White Blood Count 5.2, Red Blood Count 3.87L, Hemoglobin 10.4L, Hematocrit 34.4L

, Mean Corpuscular Volume 89, Mean Corpuscular Hemoglobin 27.0, Mean 

Corpuscular Hemoglobin Concent 30.3L, Red Cell Distribution Width 15.5H, 

Platelet Count 148L, Mean Platelet Volume 8.4, Neutrophils (%) (Auto) 68.0, 

Lymphocytes (%) (Auto) 19.0L, Monocytes (%) (Auto) 7.7, Eosinophils (%) (Auto) 

4.5H, Basophils (%) (Auto) 0.9, Sodium Level 130L, Potassium Level 4.0, 

Chloride Level 98, Carbon Dioxide Level 23, Anion Gap 9, Blood Urea Nitrogen 32H

, Creatinine 4.6H, Estimat Glomerular Filtration Rate , Glucose Level 110H, 

Calcium Level 8.2L, Iron Level 57, Total Bilirubin 0.4, Aspartate Amino Transf (

AST/SGOT) 9L, Alanine Aminotransferase (ALT/SGPT) 8L, Alkaline Phosphatase 141H

, Total Protein 7.3, Albumin 1.9L, Globulin 5.4, Albumin/Globulin Ratio 0.4L





Current Medications








 Medications


  (Trade)  Dose


 Ordered  Sig/Tony


 Route


 PRN Reason  Start Time


 Stop Time Status Last Admin


Dose Admin


 


 Amiodarone HCl


  (Cordarone)  200 mg  EVERY 12  HOURS


 ORAL


   11/27/18 21:00


 12/27/18 20:59  11/29/18 20:37


 


 


 Chlorhexidine


 Gluconate


  (Juana-Hex 2%)  1 applic  DAILY@2000


 TOPIC


   11/27/18 20:00


 12/27/18 19:59  11/29/18 20:01


 


 


 Epoetin Rupesh


  (Procrit (for


 ESRD on dialysis))  10,000 units  MON-WED-FRI


 SUBQ


   11/28/18 21:00


 12/28/18 20:59  11/28/18 21:16


 


 


 Heparin Sodium


  (Porcine)


  (Heparin Sod


 1000 units/ml


 10ml)  2,000 unit  ONCE  PRN


 IV


 DIALYSIS  11/29/18 09:54


 11/30/18 23:59   


 


 


 Iron Sucrose 100


 mg/Sodium Chloride  60 ml @ 


 240 mls/hr  BEDTIME


 IV


   11/28/18 21:00


 12/2/18 21:14  11/29/18 20:36


 


 


 Linezolid


  (Zyvox)  600 mg  EVERY 12  HOURS


 ORAL


   11/28/18 21:00


 12/3/18 20:59  11/29/18 20:37


 


 


 Meropenem 500 mg/


 Sodium Chloride  55 ml @ 


 110 mls/hr  DAILY


 IVPB


   11/28/18 12:00


 12/3/18 11:59  11/29/18 08:45


 


 


 Norepinephrine


 Bitartrate 4 mg/


 Dextrose  250 ml @ 0


 mls/hr  Q24H


 IV


   11/26/18 16:30


 12/26/18 16:29  11/27/18 21:28


 


 


 Pantoprazole


  (Protonix)  40 mg  Q12HR


 IVP


   11/26/18 21:00


 12/27/18 08:59  11/29/18 20:37


 


 


 Sodium Chloride  1,000 ml @ 


 500 mls/hr  Q2H PRN


 IVLG


 sbp<90 during hd  11/29/18 09:54


 11/30/18 23:59   


 











Critical Care - Objective





Last 24 Hour Vital Signs








  Date Time  Temp Pulse Resp B/P (MAP) Pulse Ox O2 Delivery O2 Flow Rate FiO2


 


12/15/18 18:00  82 16 119/25 (56) 100   


 


12/15/18 17:00  84 16 118/42 (67) 100   


 


12/15/18 16:52  82 18     35


 


12/15/18 16:00  87      


 


12/15/18 16:00        35


 


12/15/18 16:00      Mechanical Ventilator  





      Mechanical Ventilator  





      Mechanical Ventilator  


 


12/15/18 16:00 98.8 82 14 123/41 (68) 100   


 


12/15/18 15:00  84 16 123/41 (68) 100   


 


12/15/18 14:34  83 11     35


 


12/15/18 14:00  84 16 115/32 (59) 97   


 


12/15/18 13:00  83 17 103/70 (81) 100   


 


12/15/18 12:45  81 12     45


 


12/15/18 12:00      Mechanical Ventilator  





      Mechanical Ventilator  





      Mechanical Ventilator  


 


12/15/18 12:00  85      


 


12/15/18 12:00 98.6 84 16 85/73 (77) 95   


 


12/15/18 12:00        45


 


12/15/18 11:00  85 23 91/60 (70) 100   


 


12/15/18 10:47  91 16     45


 


12/15/18 10:00  97 24 106/61 (76) 100   


 


12/15/18 09:05     100   


 


12/15/18 09:00  100 22 106/61 (76) 100   


 


12/15/18 08:52  90 17     45


 


12/15/18 08:34    85/52    


 


12/15/18 08:00  94      


 


12/15/18 08:00 98.8 92 20 85/52 (63) 95   


 


12/15/18 08:00      Mechanical Ventilator  





      Mechanical Ventilator  





      Mechanical Ventilator  


 


12/15/18 08:00        45


 


12/15/18 07:00  96 19 106/35 (58) 94   


 


12/15/18 06:55  93 19     45


 


12/15/18 06:00  90 25 143/23 (63) 97   


 


12/15/18 05:20  91 18     45


 


12/15/18 05:00  84 18 103/52 (69) 94   


 


12/15/18 04:00      Mechanical Ventilator  





      Mechanical Ventilator  





      Mechanical Ventilator  


 


12/15/18 04:00 98.8 85 19 107/22 (50) 99   


 


12/15/18 04:00        45


 


12/15/18 04:00  85      


 


12/15/18 03:00  86 19 112/10 (44) 93   


 


12/15/18 03:00  87 19     45


 


12/15/18 02:00  86 16 102/24 (50) 94   


 


12/15/18 01:00  87 17 120/37 (64) 95   


 


12/15/18 01:00  90 19     30


 


12/15/18 00:00      Mechanical Ventilator  





      Mechanical Ventilator  





      Mechanical Ventilator  


 


12/15/18 00:00 98.6 89 19 119/12 (47) 95   


 


12/15/18 00:00        30


 


12/15/18 00:00  88      


 


12/14/18 23:00  87 19 123/11 (48) 98   


 


12/14/18 23:00  90 18     30


 


12/14/18 22:00  87 17 130/16 (54) 99   


 


12/14/18 21:11  87 17     30


 


12/14/18 21:00  87 15 98/16 (43) 98   


 


12/14/18 20:57    93/31    


 


12/14/18 20:00        30


 


12/14/18 20:00  120 19 93/31 (51) 98   


 


12/14/18 20:00      Mechanical Ventilator  





      Mechanical Ventilator  





      Mechanical Ventilator  


 


12/14/18 20:00  120      








Micro:





Microbiology








 Date/Time


Source Procedure


Growth Status


 


 


 12/12/18 20:00


Stool Clostridium difficile Toxin Assay - Final Complete











Critical Care - Subjective


ROS Limited/Unobtainable:  No


FI02:  35


Vent Support Breath Rate:  8


Vent Support Mode:  AC


Vent Tidal Volume:  450


Sputum Amount:  Scant


PEEP:  5.0


PIP:  24


Tube Feeding Amount:  30


I&O:











Intake and Output  


 


 12/14/18 12/15/18





 19:00 07:00


 


Intake Total 500 ml 450 ml


 


Output Total 0 ml 50 ml


 


Balance 500 ml 400 ml


 


  


 


Free Water 30 ml 


 


IV Total 110 ml 60 ml


 


Tube Feeding 360 ml 360 ml


 


Other  30 ml


 


Output Urine Total 0 ml 0 ml


 


Stool Total 0 ml 50 ml








ET-Tube:  7.5


ET Position:  22











Simon Silva MD Dec 15, 2018 19:26

## 2018-12-16 VITALS — SYSTOLIC BLOOD PRESSURE: 140 MMHG | DIASTOLIC BLOOD PRESSURE: 15 MMHG

## 2018-12-16 VITALS — SYSTOLIC BLOOD PRESSURE: 109 MMHG | DIASTOLIC BLOOD PRESSURE: 48 MMHG

## 2018-12-16 VITALS — DIASTOLIC BLOOD PRESSURE: 15 MMHG | SYSTOLIC BLOOD PRESSURE: 110 MMHG

## 2018-12-16 VITALS — DIASTOLIC BLOOD PRESSURE: 13 MMHG | SYSTOLIC BLOOD PRESSURE: 114 MMHG

## 2018-12-16 VITALS — DIASTOLIC BLOOD PRESSURE: 33 MMHG | SYSTOLIC BLOOD PRESSURE: 114 MMHG

## 2018-12-16 VITALS — SYSTOLIC BLOOD PRESSURE: 120 MMHG | DIASTOLIC BLOOD PRESSURE: 52 MMHG

## 2018-12-16 VITALS — DIASTOLIC BLOOD PRESSURE: 29 MMHG | SYSTOLIC BLOOD PRESSURE: 108 MMHG

## 2018-12-16 VITALS — DIASTOLIC BLOOD PRESSURE: 32 MMHG | SYSTOLIC BLOOD PRESSURE: 83 MMHG

## 2018-12-16 VITALS — SYSTOLIC BLOOD PRESSURE: 105 MMHG | DIASTOLIC BLOOD PRESSURE: 60 MMHG

## 2018-12-16 VITALS — SYSTOLIC BLOOD PRESSURE: 117 MMHG | DIASTOLIC BLOOD PRESSURE: 21 MMHG

## 2018-12-16 VITALS — SYSTOLIC BLOOD PRESSURE: 107 MMHG | DIASTOLIC BLOOD PRESSURE: 29 MMHG

## 2018-12-16 VITALS — SYSTOLIC BLOOD PRESSURE: 99 MMHG | DIASTOLIC BLOOD PRESSURE: 25 MMHG

## 2018-12-16 VITALS — SYSTOLIC BLOOD PRESSURE: 125 MMHG | DIASTOLIC BLOOD PRESSURE: 38 MMHG

## 2018-12-16 VITALS — SYSTOLIC BLOOD PRESSURE: 135 MMHG | DIASTOLIC BLOOD PRESSURE: 34 MMHG

## 2018-12-16 VITALS — SYSTOLIC BLOOD PRESSURE: 112 MMHG | DIASTOLIC BLOOD PRESSURE: 60 MMHG

## 2018-12-16 VITALS — DIASTOLIC BLOOD PRESSURE: 11 MMHG | SYSTOLIC BLOOD PRESSURE: 107 MMHG

## 2018-12-16 VITALS — SYSTOLIC BLOOD PRESSURE: 113 MMHG | DIASTOLIC BLOOD PRESSURE: 21 MMHG

## 2018-12-16 VITALS — SYSTOLIC BLOOD PRESSURE: 132 MMHG | DIASTOLIC BLOOD PRESSURE: 43 MMHG

## 2018-12-16 VITALS — SYSTOLIC BLOOD PRESSURE: 110 MMHG | DIASTOLIC BLOOD PRESSURE: 29 MMHG

## 2018-12-16 VITALS — DIASTOLIC BLOOD PRESSURE: 31 MMHG | SYSTOLIC BLOOD PRESSURE: 124 MMHG

## 2018-12-16 VITALS — DIASTOLIC BLOOD PRESSURE: 56 MMHG | SYSTOLIC BLOOD PRESSURE: 134 MMHG

## 2018-12-16 VITALS — SYSTOLIC BLOOD PRESSURE: 133 MMHG | DIASTOLIC BLOOD PRESSURE: 38 MMHG

## 2018-12-16 VITALS — DIASTOLIC BLOOD PRESSURE: 33 MMHG | SYSTOLIC BLOOD PRESSURE: 120 MMHG

## 2018-12-16 VITALS — DIASTOLIC BLOOD PRESSURE: 34 MMHG | SYSTOLIC BLOOD PRESSURE: 115 MMHG

## 2018-12-16 LAB
ADD MANUAL DIFF: NO
ANION GAP SERPL CALC-SCNC: 9 MMOL/L (ref 5–15)
APTT BLD: 37 SEC (ref 23–33)
BASOPHILS NFR BLD AUTO: 0.8 % (ref 0–2)
BUN SERPL-MCNC: 36 MG/DL (ref 7–18)
CALCIUM SERPL-MCNC: 8.7 MG/DL (ref 8.5–10.1)
CHLORIDE SERPL-SCNC: 101 MMOL/L (ref 98–107)
CO2 SERPL-SCNC: 32 MMOL/L (ref 21–32)
CREAT SERPL-MCNC: 2.9 MG/DL (ref 0.55–1.3)
EOSINOPHIL NFR BLD AUTO: 1.7 % (ref 0–3)
ERYTHROCYTE [DISTWIDTH] IN BLOOD BY AUTOMATED COUNT: 18.8 % (ref 11.6–14.8)
HCT VFR BLD CALC: 31 % (ref 37–47)
HGB BLD-MCNC: 9.5 G/DL (ref 12–16)
INR PPP: 0.9 (ref 0.9–1.1)
LYMPHOCYTES NFR BLD AUTO: 8.5 % (ref 20–45)
MCV RBC AUTO: 91 FL (ref 80–99)
MONOCYTES NFR BLD AUTO: 11.9 % (ref 1–10)
NEUTROPHILS NFR BLD AUTO: 77 % (ref 45–75)
PLATELET # BLD: 325 K/UL (ref 150–450)
POTASSIUM SERPL-SCNC: 3.2 MMOL/L (ref 3.5–5.1)
RBC # BLD AUTO: 3.4 M/UL (ref 4.2–5.4)
SODIUM SERPL-SCNC: 142 MMOL/L (ref 136–145)
WBC # BLD AUTO: 7.7 K/UL (ref 4.8–10.8)

## 2018-12-16 RX ADMIN — AMIODARONE HYDROCHLORIDE SCH MG: 200 TABLET ORAL at 21:13

## 2018-12-16 RX ADMIN — MINOXIDIL SCH MG: 2.5 TABLET ORAL at 21:13

## 2018-12-16 RX ADMIN — SODIUM CHLORIDE SCH MLS/HR: 0.9 INJECTION INTRAVENOUS at 21:12

## 2018-12-16 RX ADMIN — VITAMIN D, TAB 1000IU (100/BT) SCH INTLU: 25 TAB at 08:28

## 2018-12-16 RX ADMIN — PANTOPRAZOLE SODIUM SCH MG: 40 INJECTION, POWDER, FOR SOLUTION INTRAVENOUS at 21:12

## 2018-12-16 RX ADMIN — MINOXIDIL SCH MG: 2.5 TABLET ORAL at 08:31

## 2018-12-16 RX ADMIN — HEPARIN SODIUM SCH UNITS: 5000 INJECTION INTRAVENOUS; SUBCUTANEOUS at 21:00

## 2018-12-16 RX ADMIN — AMIODARONE HYDROCHLORIDE SCH MG: 200 TABLET ORAL at 08:28

## 2018-12-16 RX ADMIN — CHLORHEXIDINE GLUCONATE SCH APPLIC: 213 SOLUTION TOPICAL at 21:12

## 2018-12-16 RX ADMIN — MEROPENEM SCH MLS/HR: 500 INJECTION INTRAVENOUS at 08:45

## 2018-12-16 RX ADMIN — HEPARIN SODIUM SCH UNITS: 5000 INJECTION INTRAVENOUS; SUBCUTANEOUS at 08:35

## 2018-12-16 RX ADMIN — PANTOPRAZOLE SODIUM SCH MG: 40 INJECTION, POWDER, FOR SOLUTION INTRAVENOUS at 08:28

## 2018-12-16 NOTE — NUR
NURSE NOTES:

Converted back to NSR. Pt awake, calm and continues to be on wrist restraints. No output 
from rectal tube. Tolerating TF and current vent settings.

## 2018-12-16 NOTE — NUR
NURSE NOTES:

Mouth care done, turned and repositioned. Kept clean and dry.Still on Cpap of 8 and still 
tolerating well. Keep on close monitoring

## 2018-12-16 NOTE — NUR
NURSE NOTES:

Pt converted back to Afib at 120bpm. Awake but calm. BP stable. Afebrile. Completed bath. 
Wound dressings reinforced.

## 2018-12-16 NOTE — NEPHROLOGY PROGRESS NOTE
Assessment/Plan


Assessment


Seee below


Plan


MOF stable.


Sepsis due to infected diabetic ulcers. On IV Abx. Followed by ID, Vasc. Sx, 

Podiatry.





ESRD HD q MWF 


Anemia of CKD , SAMANTHA high dose. Transfuse PRN.





A. 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 with LAE. LVEF 65% . Anticoagulate when 

stable. DW Dr. Wade. In and out A. Fib.





Unweanable, may need Trac+PEG.





See orders.





Subjective


Subjective


On Vent. Alert.





Objective


Objective





Last 24 Hour Vital Signs








  Date Time  Temp Pulse Resp B/P (MAP) Pulse Ox O2 Delivery O2 Flow Rate FiO2


 


12/16/18 11:00  93 20 134/56 (82) 99   


 


12/16/18 10:36  94 25     35


 


12/16/18 10:00  91 20 117/21 (53) 99   


 


12/16/18 09:02     98   


 


12/16/18 09:00  93 20 120/52 (74) 100   


 


12/16/18 08:31    83/32    


 


12/16/18 08:31  85 16     35


 


12/16/18 08:00 97.8 83 19 83/32 (49) 99   


 


12/16/18 08:00        35


 


12/16/18 08:00      Mechanical Ventilator  





      Mechanical Ventilator  





      Mechanical Ventilator  


 


12/16/18 08:00  87      


 


12/16/18 07:00  82 19 107/11 (43) 99   


 


12/16/18 06:42  81 16     35


 


12/16/18 06:00  90 19 114/13 (46) 100   


 


12/16/18 05:23  106 14     35


 


12/16/18 05:00  120 17 110/15 (46) 100   


 


12/16/18 04:00  120      


 


12/16/18 04:00      Mechanical Ventilator  





      Mechanical Ventilator  





      Mechanical Ventilator  


 


12/16/18 04:00 98.0 120 19 125/38 (67) 100   


 


12/16/18 04:00        35


 


12/16/18 03:38  91 15     35


 


12/16/18 03:00  84 16 109/48 (68) 100   


 


12/16/18 02:00  87 16 133/38 (69) 100   


 


12/16/18 01:02  84 19     35


 


12/16/18 01:00  84 14 132/43 (72) 100   


 


12/16/18 00:00        35


 


12/16/18 00:00 97.9 85 16 124/31 (62) 100   


 


12/16/18 00:00  85      


 


12/16/18 00:00      Mechanical Ventilator  





      Mechanical Ventilator  





      Mechanical Ventilator  


 


12/15/18 23:08  82 15     35


 


12/15/18 23:00  83 15 129/37 (67) 100   


 


12/15/18 22:00  86 15 133/40 (71) 100   


 


12/15/18 21:16  71 14     35


 


12/15/18 21:00  85 16 126/36 (66) 100   


 


12/15/18 20:37    133/41    


 


12/15/18 20:00      Mechanical Ventilator  





      Mechanical Ventilator  





      Mechanical Ventilator  


 


12/15/18 20:00  84      


 


12/15/18 20:00        35


 


12/15/18 20:00 98.6 84 16 139/41 (73) 100   


 


12/15/18 19:29  76 13     35


 


12/15/18 19:00  82 16 118/33 (61) 100   


 


12/15/18 18:00  82 16 119/25 (56) 100   


 


12/15/18 17:00  84 16 118/42 (67) 100   


 


12/15/18 16:52  82 18     35


 


12/15/18 16:00  87      


 


12/15/18 16:00        35


 


12/15/18 16:00      Mechanical Ventilator  





      Mechanical Ventilator  





      Mechanical Ventilator  


 


12/15/18 16:00 98.8 82 14 123/41 (68) 100   


 


12/15/18 15:00  84 16 123/41 (68) 100   


 


12/15/18 14:34  83 11     35


 


12/15/18 14:00  84 16 115/32 (59) 97   


 


12/15/18 13:00  83 17 103/70 (81) 100   

















Intake and Output  


 


 12/15/18 12/16/18





 18:59 06:59


 


Intake Total 415 ml 450 ml


 


Output Total 50 ml 0 ml


 


Balance 365 ml 450 ml


 


  


 


IV Total 55 ml 60 ml


 


Tube Feeding 360 ml 360 ml


 


Other  30 ml


 


Stool Total 50 ml 0 ml








Laboratory Tests


12/16/18 05:00: 


White Blood Count 7.7, Red Blood Count 3.40L, Hemoglobin 9.5L, Hematocrit 31.0L

, Mean Corpuscular Volume 91, Mean Corpuscular Hemoglobin 28.0, Mean 

Corpuscular Hemoglobin Concent 30.7L, Red Cell Distribution Width 18.8H, 

Platelet Count 325, Mean Platelet Volume 7.2, Neutrophils (%) (Auto) 77.0H, 

Lymphocytes (%) (Auto) 8.5L, Monocytes (%) (Auto) 11.9H, Eosinophils (%) (Auto) 

1.7, Basophils (%) (Auto) 0.8, Sodium Level 142, Potassium Level 3.2L, Chloride 

Level 101, Carbon Dioxide Level 32, Anion Gap 9, Blood Urea Nitrogen 36H, 

Creatinine 2.9H, Estimat Glomerular Filtration Rate , Glucose Level 128H, 

Calcium Level 8.7


12/16/18 09:15: 


Prothrombin Time 10.0, Prothromb Time International Ratio 0.9, Activated 

Partial Thromboplast Time 37H


12/16/18 09:40: 


Arterial Blood pH 7.450, Arterial Blood Partial Pressure CO2 47.6H, Arterial 

Blood Partial Pressure O2 128.3H, Arterial Blood HCO3 32.9H, Arterial Blood 

Oxygen Saturation 98.1, Arterial Blood Base Excess 8.0H, Arcadio Test Positive


Height (Feet):  5


Height (Inches):  2.00


Weight (Pounds):  133


Objective


Intubate + on vent.


Cv IRR/IRR


Lungs B ronchi.


Abd SNT. BS +


E Rt. foot diabetic ulcers











Gavino Daigle MD Dec 16, 2018 12:46

## 2018-12-16 NOTE — NUR
NURSE NOTES:

Patient received from [facility] Thompson. VSS, Afebrile. On ETT in place 7.5/23 and AC 8--FiO2 
35% -Peep 5. With moderate amount thin clear secretion. Suctioned and mouth care done. SR on 
cardiac monitor,turned and repositioned.

HOB elevated at 35 degree to prevent aspiration. OGT in place running Nepro at 30cc. GT 
placement intact with no residual at this time. Rectal tube draining brown liquid stool. 
Wound dressing dry and intact. ASHISH midline infusing NS at TKO with no signs of infiltration. 
VALE shunt thrill and bruit present. Denies pain. No distress noted. Will continue POC.

## 2018-12-16 NOTE — NUR
NURSE NOTES:

Patient received from CAROLYN Decker.Afebrile at this time and remains on ETT in place 7.5/23 and 
AC 8--FiO2 35% -Peep 5.Still noted with moderate amount thin clear secretion.Patient 
suctioned and mouth care done. SR on the monitor,turned and repositioned.HOB elevated at 35 
degree to prevent aspiration.OGT in place running Nepro at 30cc. GT placement intact with no 
residual at this time.Rectal tube draining brown stool.Wound dressing intact with no 
drainage. ASHISH middline running TKO with no infiltration,VALE shunt thrill and bruit present. 
Kept clean dry and comfortable.Will continue to monitor.

## 2018-12-16 NOTE — NUR
RESPIRATORY NOTE:



Patient received mechanically ventilated with  and current ordered vent settings. 
Patient is orally intubated with an ETT tube seize 7.5 with 22 cm at the lip and is secured 
with an anchor fast. There is is no skin breakdown or redness noted around the placement of 
the anchor fast. Patient presents with diminished breath sounds upon auscultation and 
symmetrical chest rise. Small amount of pale yellow secretions were suctioned without 
incident. Vent is connected to a red outlet and there is an ambu bag available at the 
bedside. Vent alarms are functional and audible. Patient appears comfortable at this time. 
Will continue to monitor.

## 2018-12-16 NOTE — NUR
NURSE NOTES:

Patient turned and repositioned.HOB elevated to prevent aspiration.Kept clean and dry. Will 
continue to monitor

## 2018-12-16 NOTE — PULMONOLGY CRITICAL CARE NOTE
Critical Care - Asmt/Plan


Assessment/Plan:


Assessment/Plan


IMPRESSION:


1. Hypotension.


2. Sepsis.


3. Atrial fibrillation.


4. Bradycardia.


5. Diabetes mellitus.


6. End-stage renal disease on dialysis.


7. Left lung collapse.


8. Respiratory failure.


9. Dysphagia.


10. Dementia.


11. Anemia





DISCUSSION:


1. Continue medications.


2. as needed Levophed.


3. status post bronchoscopy


4. Central access


5. Use broad-spectrum antibiotics.  


6. continue weaning attempts














  ______________________________________________


  Quentin Cardoso M.D.





Subjective


Interval Events:  Not weaning; s/p prbc


Constitutional:  Reports: no symptoms


HEENT:  Repors: no symptoms


Cardiovascular:  Reports: no symptoms


Gastrointestinal/Abdominal:  Reports: no symptoms


Genitourinary:  Reports: no symptoms


Allergies:  


Coded Allergies:  


     No Known Allergies (Unverified , 11/26/18)





Objective





Vital Signs Noted





General Appearance:  no acute distress


HEENT:  normocephalic


Respiratory/Chest:  chest wall non-tender, lungs clear


Cardiovascular:  normal peripheral pulses, normal rate


Abdomen:  normal bowel sounds





Microbiology








 Date/Time


Source Procedure


Growth Status


 


 


 11/28/18 11:40


Other(Specify in comment) Gram Stain - Final Resulted


 


 11/28/18 11:40


Other(Specify in comment) Wound Culture - Preliminary Resulted





 11/28/18 11:40


Sacral Wound Gram Stain - Final Resulted


 


 11/28/18 11:40


Sacral Wound Wound Culture - Preliminary Resulted








Laboratory Tests


11/29/18 13:50: 


Arterial Blood pH 7.250*L, Arterial Blood Partial Pressure CO2 48.3H, Arterial 

Blood Partial Pressure O2 75.1, Arterial Blood HCO3 20.8L, Arterial Blood 

Oxygen Saturation 91.8L, Arterial Blood Base Excess -6.4L, Arcadio Test Positive


11/30/18 01:45: Stool Occult Blood [Pending]


11/30/18 04:00: 


White Blood Count 5.2, Red Blood Count 3.87L, Hemoglobin 10.4L, Hematocrit 34.4L

, Mean Corpuscular Volume 89, Mean Corpuscular Hemoglobin 27.0, Mean 

Corpuscular Hemoglobin Concent 30.3L, Red Cell Distribution Width 15.5H, 

Platelet Count 148L, Mean Platelet Volume 8.4, Neutrophils (%) (Auto) 68.0, 

Lymphocytes (%) (Auto) 19.0L, Monocytes (%) (Auto) 7.7, Eosinophils (%) (Auto) 

4.5H, Basophils (%) (Auto) 0.9, Sodium Level 130L, Potassium Level 4.0, 

Chloride Level 98, Carbon Dioxide Level 23, Anion Gap 9, Blood Urea Nitrogen 32H

, Creatinine 4.6H, Estimat Glomerular Filtration Rate , Glucose Level 110H, 

Calcium Level 8.2L, Iron Level 57, Total Bilirubin 0.4, Aspartate Amino Transf (

AST/SGOT) 9L, Alanine Aminotransferase (ALT/SGPT) 8L, Alkaline Phosphatase 141H

, Total Protein 7.3, Albumin 1.9L, Globulin 5.4, Albumin/Globulin Ratio 0.4L





Current Medications








 Medications


  (Trade)  Dose


 Ordered  Sig/Tony


 Route


 PRN Reason  Start Time


 Stop Time Status Last Admin


Dose Admin


 


 Amiodarone HCl


  (Cordarone)  200 mg  EVERY 12  HOURS


 ORAL


   11/27/18 21:00


 12/27/18 20:59  11/29/18 20:37


 


 


 Chlorhexidine


 Gluconate


  (Juana-Hex 2%)  1 applic  DAILY@2000


 TOPIC


   11/27/18 20:00


 12/27/18 19:59  11/29/18 20:01


 


 


 Epoetin Rupesh


  (Procrit (for


 ESRD on dialysis))  10,000 units  MON-WED-FRI


 SUBQ


   11/28/18 21:00


 12/28/18 20:59  11/28/18 21:16


 


 


 Heparin Sodium


  (Porcine)


  (Heparin Sod


 1000 units/ml


 10ml)  2,000 unit  ONCE  PRN


 IV


 DIALYSIS  11/29/18 09:54


 11/30/18 23:59   


 


 


 Iron Sucrose 100


 mg/Sodium Chloride  60 ml @ 


 240 mls/hr  BEDTIME


 IV


   11/28/18 21:00


 12/2/18 21:14  11/29/18 20:36


 


 


 Linezolid


  (Zyvox)  600 mg  EVERY 12  HOURS


 ORAL


   11/28/18 21:00


 12/3/18 20:59  11/29/18 20:37


 


 


 Meropenem 500 mg/


 Sodium Chloride  55 ml @ 


 110 mls/hr  DAILY


 IVPB


   11/28/18 12:00


 12/3/18 11:59  11/29/18 08:45


 


 


 Norepinephrine


 Bitartrate 4 mg/


 Dextrose  250 ml @ 0


 mls/hr  Q24H


 IV


   11/26/18 16:30


 12/26/18 16:29  11/27/18 21:28


 


 


 Pantoprazole


  (Protonix)  40 mg  Q12HR


 IVP


   11/26/18 21:00


 12/27/18 08:59  11/29/18 20:37


 


 


 Sodium Chloride  1,000 ml @ 


 500 mls/hr  Q2H PRN


 IVLG


 sbp<90 during hd  11/29/18 09:54


 11/30/18 23:59   


 











Critical Care - Objective





Last 24 Hour Vital Signs








  Date Time  Temp Pulse Resp B/P (MAP) Pulse Ox O2 Delivery O2 Flow Rate FiO2


 


12/16/18 21:13    135/34    


 


12/16/18 20:00  75      


 


12/16/18 20:00        35


 


12/16/18 20:00      Mechanical Ventilator  





      Mechanical Ventilator  





      Mechanical Ventilator  


 


12/16/18 19:59 98.4 83 18 135/34 (67) 93   


 


12/16/18 19:18  81 14     35


 


12/16/18 19:00  81 15 115/34 (61) 95   


 


12/16/18 18:00  72 14 99/25 (49) 95   


 


12/16/18 17:06  74 13     35


 


12/16/18 17:00  76 14 120/33 (62) 100   


 


12/16/18 16:00 98.4 78 19 114/33 (60) 100   


 


12/16/18 16:00        35


 


12/16/18 16:00      Mechanical Ventilator  





      Mechanical Ventilator  





      Mechanical Ventilator  


 


12/16/18 16:00  78      


 


12/16/18 15:24  113 25     35


 


12/16/18 15:00  77 14 140/15 (56) 100   


 


12/16/18 14:00  74 17 113/21 (51) 100   


 


12/16/18 13:00  73 20 105/60 (75) 99   


 


12/16/18 12:53  79 18     35


 


12/16/18 12:00 98.6 80 19 112/60 (77) 99   


 


12/16/18 12:00        35


 


12/16/18 12:00      Mechanical Ventilator  





      Mechanical Ventilator  





      Mechanical Ventilator  


 


12/16/18 12:00  90      


 


12/16/18 11:00  93 20 134/56 (82) 99   


 


12/16/18 10:36  94 25     35


 


12/16/18 10:00  91 20 117/21 (53) 99   


 


12/16/18 09:02     98   


 


12/16/18 09:00  93 20 120/52 (74) 100   


 


12/16/18 08:31    83/32    


 


12/16/18 08:31  85 16     35


 


12/16/18 08:00 97.8 83 19 83/32 (49) 99   


 


12/16/18 08:00        35


 


12/16/18 08:00      Mechanical Ventilator  





      Mechanical Ventilator  





      Mechanical Ventilator  


 


12/16/18 08:00  87      


 


12/16/18 07:00  82 19 107/11 (43) 99   


 


12/16/18 06:42  81 16     35


 


12/16/18 06:00  90 19 114/13 (46) 100   


 


12/16/18 05:23  106 14     35


 


12/16/18 05:00  120 17 110/15 (46) 100   


 


12/16/18 04:00  120      


 


12/16/18 04:00      Mechanical Ventilator  





      Mechanical Ventilator  





      Mechanical Ventilator  


 


12/16/18 04:00 98.0 120 19 125/38 (67) 100   


 


12/16/18 04:00        35


 


12/16/18 03:38  91 15     35


 


12/16/18 03:00  84 16 109/48 (68) 100   


 


12/16/18 02:00  87 16 133/38 (69) 100   


 


12/16/18 01:02  84 19     35


 


12/16/18 01:00  84 14 132/43 (72) 100   


 


12/16/18 00:00        35


 


12/16/18 00:00 97.9 85 16 124/31 (62) 100   


 


12/16/18 00:00  85      


 


12/16/18 00:00      Mechanical Ventilator  





      Mechanical Ventilator  





      Mechanical Ventilator  


 


12/15/18 23:08  82 15     35


 


12/15/18 23:00  83 15 129/37 (67) 100   


 


12/15/18 22:00  86 15 133/40 (71) 100   











Critical Care - Subjective


ROS Limited/Unobtainable:  Yes


FI02:  35


Vent Support Breath Rate:  8


Vent Support Mode:  AC


Vent Tidal Volume:  450


Sputum Amount:  Scant


PEEP:  5.0


PIP:  24


Tube Feeding Amount:  30


I&O:











Intake and Output  


 


 12/15/18 12/16/18





 19:00 07:00


 


Intake Total 415 ml 450 ml


 


Output Total 50 ml 0 ml


 


Balance 365 ml 450 ml


 


  


 


IV Total 55 ml 60 ml


 


Tube Feeding 360 ml 360 ml


 


Other  30 ml


 


Stool Total 50 ml 0 ml








ET-Tube:  7.5


ET Position:  21











Simon Silva MD Dec 16, 2018 21:47

## 2018-12-16 NOTE — NUR
NURSE NOTES:

Patient turned and repositioned.HOB elevated at 35 degree to prevent aspiration.Will 
continue to monitor.Was seen by Dr Smith, possible trach tomorrow.Will follow up with 
new orders.Remains on Cpap of 8 with no apparent distress.Kept on close monitoring.

## 2018-12-16 NOTE — PRE-PROCEDURE NOTE/ATTESTATION
Pre-Procedure Note/Attestation


Complete Prior to Procedure


Planned Procedure:  not applicable


Procedure Narrative:


tracheostomy





Indications for Procedure


Pre-Operative Diagnosis:


respiratory insufficiency requiring prolong ventilatory support





Attestation


I attest that I discussed the nature of the procedure; its benefits; risks and 

complications; and alternatives (and the risks and benefits of such alternatives

), prior to the procedure, with the patient (or the patient's legal 

representative).





I attest that, if there was a reasonable possibility of needing a blood 

transfusion, the patient (or the patient's legal representative) was given the 

[facility] standardized written summary, pursuant 

to the [place] Blood Safety Act ([place] Health and Safety Code # 1645, as 

amended).





I attest that I re-evaluated the patient just prior to the surgery and that 

there has been no change in the patient's H&P, except as documented below:











Bob Lewis Dec 16, 2018 16:59

## 2018-12-16 NOTE — NUR
NURSE NOTES:

Patient turned and repositioned.Kept clean and dry. Placed back on AC mode. Seen by Dr Silva, will follow up with new orders.Adls done,mouth care performs, HOB elevated to prevent 
aspiration.Kept clean, dry and comfortable.Call light within easy reach.

## 2018-12-16 NOTE — INFECTIOUS DISEASES PROG NOTE
Assessment/Plan


Assessment/Plan


A:


Sepsis


UTI


Cellulitis of R leg, osteomyelitis


Hypercapnic respiratory failure


ESRD on HD


DM


Anemia


Dementia


PVD


R pleural effusion


Mucous plug


s/o Cardiac arrest


P;


Continue  Meropenem





Subjective


ROS Limited/Unobtainable:  Yes


Neurologic:  Reports: confusion, other - on restraint


Musculoskeletal:  Reports: pain


Allergies:  


Coded Allergies:  


     No Known Allergies (Unverified , 11/26/18)





Objective


Vital Signs





Last 24 Hour Vital Signs








  Date Time  Temp Pulse Resp B/P (MAP) Pulse Ox O2 Delivery O2 Flow Rate FiO2


 


12/16/18 09:02     98   


 


12/16/18 08:31    83/32    


 


12/16/18 08:31  85 16     35


 


12/16/18 08:00 97.8 83 19 83/32 (49) 99   


 


12/16/18 08:00        35


 


12/16/18 08:00      Mechanical Ventilator  





      Mechanical Ventilator  





      Mechanical Ventilator  


 


12/16/18 07:00  82 19 107/11 (43) 99   


 


12/16/18 06:42  81 16     35


 


12/16/18 06:00  90 19 114/13 (46) 100   


 


12/16/18 05:23  106 14     35


 


12/16/18 05:00  120 17 110/15 (46) 100   


 


12/16/18 04:00  120      


 


12/16/18 04:00      Mechanical Ventilator  





      Mechanical Ventilator  





      Mechanical Ventilator  


 


12/16/18 04:00 98.0 120 19 125/38 (67) 100   


 


12/16/18 04:00        35


 


12/16/18 03:38  91 15     35


 


12/16/18 03:00  84 16 109/48 (68) 100   


 


12/16/18 02:00  87 16 133/38 (69) 100   


 


12/16/18 01:02  84 19     35


 


12/16/18 01:00  84 14 132/43 (72) 100   


 


12/16/18 00:00        35


 


12/16/18 00:00 97.9 85 16 124/31 (62) 100   


 


12/16/18 00:00  85      


 


12/16/18 00:00      Mechanical Ventilator  





      Mechanical Ventilator  





      Mechanical Ventilator  


 


12/15/18 23:08  82 15     35


 


12/15/18 23:00  83 15 129/37 (67) 100   


 


12/15/18 22:00  86 15 133/40 (71) 100   


 


12/15/18 21:16  71 14     35


 


12/15/18 21:00  85 16 126/36 (66) 100   


 


12/15/18 20:37    133/41    


 


12/15/18 20:00      Mechanical Ventilator  





      Mechanical Ventilator  





      Mechanical Ventilator  


 


12/15/18 20:00  84      


 


12/15/18 20:00        35


 


12/15/18 20:00 98.6 84 16 139/41 (73) 100   


 


12/15/18 19:29  76 13     35


 


12/15/18 19:00  82 16 118/33 (61) 100   


 


12/15/18 18:00  82 16 119/25 (56) 100   


 


12/15/18 17:00  84 16 118/42 (67) 100   


 


12/15/18 16:52  82 18     35


 


12/15/18 16:00  87      


 


12/15/18 16:00        35


 


12/15/18 16:00      Mechanical Ventilator  





      Mechanical Ventilator  





      Mechanical Ventilator  


 


12/15/18 16:00 98.8 82 14 123/41 (68) 100   


 


12/15/18 15:00  84 16 123/41 (68) 100   


 


12/15/18 14:34  83 11     35


 


12/15/18 14:00  84 16 115/32 (59) 97   


 


12/15/18 13:00  83 17 103/70 (81) 100   


 


12/15/18 12:45  81 12     45


 


12/15/18 12:00      Mechanical Ventilator  





      Mechanical Ventilator  





      Mechanical Ventilator  


 


12/15/18 12:00  85      


 


12/15/18 12:00 98.6 84 16 85/73 (77) 95   


 


12/15/18 12:00        45


 


12/15/18 11:00  85 23 91/60 (70) 100   


 


12/15/18 10:47  91 16     45


 


12/15/18 10:00  97 24 106/61 (76) 100   








Height (Feet):  5


Height (Inches):  2.00


Weight (Pounds):  133


HEENT:  mucous membranes moist


Respiratory/Chest:  lungs clear, other - orally intubated on ventilator


Cardiovascular:  normal rate, other - R arm PICC line


Abdomen:  soft, non tender


Extremities:  no edema


Skin:  ulcers


Neurologic/Psychiatric:  alert, responsive





Laboratory Tests








Test


  12/16/18


05:00


 


White Blood Count


  7.7 K/UL


(4.8-10.8)


 


Red Blood Count


  3.40 M/UL


(4.20-5.40)  L


 


Hemoglobin


  9.5 G/DL


(12.0-16.0)  L


 


Hematocrit


  31.0 %


(37.0-47.0)  L


 


Mean Corpuscular Volume 91 FL (80-99)  


 


Mean Corpuscular Hemoglobin


  28.0 PG


(27.0-31.0)


 


Mean Corpuscular Hemoglobin


Concent 30.7 G/DL


(32.0-36.0)  L


 


Red Cell Distribution Width


  18.8 %


(11.6-14.8)  H


 


Platelet Count


  325 K/UL


(150-450)


 


Mean Platelet Volume


  7.2 FL


(6.5-10.1)


 


Neutrophils (%) (Auto)


  77.0 %


(45.0-75.0)  H


 


Lymphocytes (%) (Auto)


  8.5 %


(20.0-45.0)  L


 


Monocytes (%) (Auto)


  11.9 %


(1.0-10.0)  H


 


Eosinophils (%) (Auto)


  1.7 %


(0.0-3.0)


 


Basophils (%) (Auto)


  0.8 %


(0.0-2.0)


 


Sodium Level


  142 MMOL/L


(136-145)


 


Potassium Level


  3.2 MMOL/L


(3.5-5.1)  L


 


Chloride Level


  101 MMOL/L


()


 


Carbon Dioxide Level


  32 MMOL/L


(21-32)


 


Anion Gap


  9 mmol/L


(5-15)


 


Blood Urea Nitrogen


  36 mg/dL


(7-18)  H


 


Creatinine


  2.9 MG/DL


(0.55-1.30)  H


 


Estimat Glomerular Filtration


Rate  mL/min (>60)  


 


 


Glucose Level


  128 MG/DL


()  H


 


Calcium Level


  8.7 MG/DL


(8.5-10.1)











Current Medications








 Medications


  (Trade)  Dose


 Ordered  Sig/Tony


 Route


 PRN Reason  Start Time


 Stop Time Status Last Admin


Dose Admin


 


 Acetaminophen


  (Tylenol)  650 mg  Q6H  PRN


 ORAL


 Mild Pain/Temp > 100.5  12/4/18 14:15


 1/3/19 14:14  12/12/18 13:15


 


 


 Amiodarone HCl


  (Cordarone)  200 mg  EVERY 12  HOURS


 NG


   12/8/18 21:00


 1/5/19 13:59  12/16/18 08:28


 


 


 Chlorhexidine


 Gluconate


  (Juana-Hex 2%)  1 applic  DAILY@2000


 TOPIC


   11/27/18 20:00


 12/27/18 19:59  12/15/18 20:35


 


 


 Collagenase


  (Santyl)  1 applic  Q12HR


 TOPIC


   12/12/18 21:00


 1/5/19 20:59  12/16/18 08:35


 


 


 Epoetin Rupesh


  (Procrit (for


 ESRD on dialysis))  10,000 units  MON-WED-FRI


 SUBQ


   11/28/18 21:00


 12/28/18 20:59  12/14/18 20:58


 


 


 Heparin Sodium


  (Porcine)


  (Heparin 5000


 units/ml)  5,000 units  EVERY 12  HOURS


 SUBQ


   12/15/18 21:00


 1/6/19 21:59  12/16/18 08:35


 


 


 Hydralazine HCl


  (Apresoline)  10 mg  Q4H  PRN


 IV


 sbp greater than 145  12/10/18 20:30


 1/9/19 20:29   


 


 


 Iron Sucrose 100


 mg/Sodium Chloride  60 ml @ 


 240 mls/hr  BEDTIME


 IV


   12/13/18 21:00


 12/17/18 21:14  12/15/18 20:36


 


 


 Meropenem 500 mg/


 Sodium Chloride  55 ml @ 


 110 mls/hr  DAILY


 IVPB


   12/11/18 09:00


 12/25/18 08:59  12/16/18 08:45


 


 


 Minoxidil


  (Loniten)  5 mg  Q12HR


 NG


   12/2/18 21:00


 1/1/19 20:59  12/15/18 20:37


 


 


 Pantoprazole


  (Protonix)  40 mg  Q12HR


 IVP


   11/26/18 21:00


 12/27/18 08:59  12/16/18 08:28


 


 


 Vitamin D


  (Vitamin D)  1,000 intlu  DAILY


 GT


   12/9/18 10:00


 1/8/19 09:59  12/16/18 08:28


 

















Luke Maynard MD Dec 16, 2018 09:24

## 2018-12-16 NOTE — GENERAL PROGRESS NOTE
Assessment/Plan


Assessment/Plan


Assessment


- Dysphagia


- Resp failure


- ESRD


- Anemia





Recommendations


- continue TF


- GT care


- eventual PEG/Trach





Subjective


Allergies:  


Coded Allergies:  


     No Known Allergies (Unverified , 11/26/18)


Subjective


Above noted


on vent, tolerating TF


d/w RN staff





Objective





Last 24 Hour Vital Signs








  Date Time  Temp Pulse Resp B/P (MAP) Pulse Ox O2 Delivery O2 Flow Rate FiO2


 


12/16/18 14:00  74 17 113/21 (51) 100   


 


12/16/18 13:00  73 20 105/60 (75) 99   


 


12/16/18 12:53  79 18     35


 


12/16/18 12:00 98.6 80 19 112/60 (77) 99   


 


12/16/18 12:00        35


 


12/16/18 12:00      Mechanical Ventilator  





      Mechanical Ventilator  





      Mechanical Ventilator  


 


12/16/18 11:00  93 20 134/56 (82) 99   


 


12/16/18 10:36  94 25     35


 


12/16/18 10:00  91 20 117/21 (53) 99   


 


12/16/18 09:02     98   


 


12/16/18 09:00  93 20 120/52 (74) 100   


 


12/16/18 08:31    83/32    


 


12/16/18 08:31  85 16     35


 


12/16/18 08:00 97.8 83 19 83/32 (49) 99   


 


12/16/18 08:00        35


 


12/16/18 08:00      Mechanical Ventilator  





      Mechanical Ventilator  





      Mechanical Ventilator  


 


12/16/18 08:00  87      


 


12/16/18 07:00  82 19 107/11 (43) 99   


 


12/16/18 06:42  81 16     35


 


12/16/18 06:00  90 19 114/13 (46) 100   


 


12/16/18 05:23  106 14     35


 


12/16/18 05:00  120 17 110/15 (46) 100   


 


12/16/18 04:00  120      


 


12/16/18 04:00      Mechanical Ventilator  





      Mechanical Ventilator  





      Mechanical Ventilator  


 


12/16/18 04:00 98.0 120 19 125/38 (67) 100   


 


12/16/18 04:00        35


 


12/16/18 03:38  91 15     35


 


12/16/18 03:00  84 16 109/48 (68) 100   


 


12/16/18 02:00  87 16 133/38 (69) 100   


 


12/16/18 01:02  84 19     35


 


12/16/18 01:00  84 14 132/43 (72) 100   


 


12/16/18 00:00        35


 


12/16/18 00:00 97.9 85 16 124/31 (62) 100   


 


12/16/18 00:00  85      


 


12/16/18 00:00      Mechanical Ventilator  





      Mechanical Ventilator  





      Mechanical Ventilator  


 


12/15/18 23:08  82 15     35


 


12/15/18 23:00  83 15 129/37 (67) 100   


 


12/15/18 22:00  86 15 133/40 (71) 100   


 


12/15/18 21:16  71 14     35


 


12/15/18 21:00  85 16 126/36 (66) 100   


 


12/15/18 20:37    133/41    


 


12/15/18 20:00      Mechanical Ventilator  





      Mechanical Ventilator  





      Mechanical Ventilator  


 


12/15/18 20:00  84      


 


12/15/18 20:00        35


 


12/15/18 20:00 98.6 84 16 139/41 (73) 100   


 


12/15/18 19:29  76 13     35


 


12/15/18 19:00  82 16 118/33 (61) 100   


 


12/15/18 18:00  82 16 119/25 (56) 100   


 


12/15/18 17:00  84 16 118/42 (67) 100   


 


12/15/18 16:52  82 18     35


 


12/15/18 16:00  87      


 


12/15/18 16:00        35


 


12/15/18 16:00      Mechanical Ventilator  





      Mechanical Ventilator  





      Mechanical Ventilator  


 


12/15/18 16:00 98.8 82 14 123/41 (68) 100   

















Intake and Output  


 


 12/15/18 12/16/18





 18:59 06:59


 


Intake Total 415 ml 450 ml


 


Output Total 50 ml 0 ml


 


Balance 365 ml 450 ml


 


  


 


IV Total 55 ml 60 ml


 


Tube Feeding 360 ml 360 ml


 


Other  30 ml


 


Stool Total 50 ml 0 ml








Laboratory Tests


12/16/18 05:00: 


White Blood Count 7.7, Red Blood Count 3.40L, Hemoglobin 9.5L, Hematocrit 31.0L

, Mean Corpuscular Volume 91, Mean Corpuscular Hemoglobin 28.0, Mean 

Corpuscular Hemoglobin Concent 30.7L, Red Cell Distribution Width 18.8H, 

Platelet Count 325, Mean Platelet Volume 7.2, Neutrophils (%) (Auto) 77.0H, 

Lymphocytes (%) (Auto) 8.5L, Monocytes (%) (Auto) 11.9H, Eosinophils (%) (Auto) 

1.7, Basophils (%) (Auto) 0.8, Sodium Level 142, Potassium Level 3.2L, Chloride 

Level 101, Carbon Dioxide Level 32, Anion Gap 9, Blood Urea Nitrogen 36H, 

Creatinine 2.9H, Estimat Glomerular Filtration Rate , Glucose Level 128H, 

Calcium Level 8.7


12/16/18 09:15: 


Prothrombin Time 10.0, Prothromb Time International Ratio 0.9, Activated 

Partial Thromboplast Time 37H


12/16/18 09:40: 


Arterial Blood pH 7.450, Arterial Blood Partial Pressure CO2 47.6H, Arterial 

Blood Partial Pressure O2 128.3H, Arterial Blood HCO3 32.9H, Arterial Blood 

Oxygen Saturation 98.1, Arterial Blood Base Excess 8.0H, Arcadio Test Positive


Height (Feet):  5


Height (Inches):  2.00


Weight (Pounds):  133


Objective


Thin WW


NCAT


supple


CTA


RR


Abd soft ND


no edema


non focal











Gallo Lobo MD Dec 16, 2018 15:14

## 2018-12-16 NOTE — CARDIOLOGY PROGRESS NOTE
Assessment/Plan


Assessment/Plan


1. Hypotension, possibly sepsis versus volume.


2. Paroxysmal episodes of atrial fibrillation history.


3. Bradycardia secondary to medications, amiodarone possibly.


4. Diabetes mellitus.


5. End-stage renal disease, on hemodialysis.


6. Lung collapse.


7. Respiratory failure, status post intubation.


8. Dysphagia.


9. History of dementia.


10. Pulm htn 


11.  Pleural effusion 


12. cardaic arrest


13. chest wall pain post cpr  





in and out afib amido now 200 bid  on hold


stool ob neg x2 


amiod bid


off labetolol for now if need hydralaize to be used 


vent support 


wean as tolerated may need trach and peg 


tele reviewed agian intermittent tachy but at this time is in sinus  


d/w son daniel blackman dorothy





Subjective


Cardiovascular:  Reports: chest pain


Respiratory:  Reports: shortness of breath


Genitourinary:  Denies: burning


Subjective


on the vent not verba , denies cp or dizziness has sob





Objective





Last 24 Hour Vital Signs








  Date Time  Temp Pulse Resp B/P (MAP) Pulse Ox O2 Delivery O2 Flow Rate FiO2


 


12/16/18 13:00  73 20 105/60 (75) 99   


 


12/16/18 12:53  79 18     35


 


12/16/18 12:00 98.6 80 19 112/60 (77) 99   


 


12/16/18 12:00        35


 


12/16/18 12:00      Mechanical Ventilator  





      Mechanical Ventilator  





      Mechanical Ventilator  


 


12/16/18 11:00  93 20 134/56 (82) 99   


 


12/16/18 10:36  94 25     35


 


12/16/18 10:00  91 20 117/21 (53) 99   


 


12/16/18 09:02     98   


 


12/16/18 09:00  93 20 120/52 (74) 100   


 


12/16/18 08:31    83/32    


 


12/16/18 08:31  85 16     35


 


12/16/18 08:00 97.8 83 19 83/32 (49) 99   


 


12/16/18 08:00        35


 


12/16/18 08:00      Mechanical Ventilator  





      Mechanical Ventilator  





      Mechanical Ventilator  


 


12/16/18 08:00  87      


 


12/16/18 07:00  82 19 107/11 (43) 99   


 


12/16/18 06:42  81 16     35


 


12/16/18 06:00  90 19 114/13 (46) 100   


 


12/16/18 05:23  106 14     35


 


12/16/18 05:00  120 17 110/15 (46) 100   


 


12/16/18 04:00  120      


 


12/16/18 04:00      Mechanical Ventilator  





      Mechanical Ventilator  





      Mechanical Ventilator  


 


12/16/18 04:00 98.0 120 19 125/38 (67) 100   


 


12/16/18 04:00        35


 


12/16/18 03:38  91 15     35


 


12/16/18 03:00  84 16 109/48 (68) 100   


 


12/16/18 02:00  87 16 133/38 (69) 100   


 


12/16/18 01:02  84 19     35


 


12/16/18 01:00  84 14 132/43 (72) 100   


 


12/16/18 00:00        35


 


12/16/18 00:00 97.9 85 16 124/31 (62) 100   


 


12/16/18 00:00  85      


 


12/16/18 00:00      Mechanical Ventilator  





      Mechanical Ventilator  





      Mechanical Ventilator  


 


12/15/18 23:08  82 15     35


 


12/15/18 23:00  83 15 129/37 (67) 100   


 


12/15/18 22:00  86 15 133/40 (71) 100   


 


12/15/18 21:16  71 14     35


 


12/15/18 21:00  85 16 126/36 (66) 100   


 


12/15/18 20:37    133/41    


 


12/15/18 20:00      Mechanical Ventilator  





      Mechanical Ventilator  





      Mechanical Ventilator  


 


12/15/18 20:00  84      


 


12/15/18 20:00        35


 


12/15/18 20:00 98.6 84 16 139/41 (73) 100   


 


12/15/18 19:29  76 13     35


 


12/15/18 19:00  82 16 118/33 (61) 100   


 


12/15/18 18:00  82 16 119/25 (56) 100   


 


12/15/18 17:00  84 16 118/42 (67) 100   


 


12/15/18 16:52  82 18     35


 


12/15/18 16:00  87      


 


12/15/18 16:00        35


 


12/15/18 16:00      Mechanical Ventilator  





      Mechanical Ventilator  





      Mechanical Ventilator  


 


12/15/18 16:00 98.8 82 14 123/41 (68) 100   


 


12/15/18 15:00  84 16 123/41 (68) 100   


 


12/15/18 14:34  83 11     35


 


12/15/18 14:00  84 16 115/32 (59) 97   








General Appearance:  no apparent distress, on vent, patient on isolation


Neck:  supple


Cardiovascular:  normal rate


Respiratory/Chest:  lungs clear, normal breath sounds


Abdomen:  normal bowel sounds, non tender, soft


Extremities:  no swelling











Intake and Output  


 


 12/15/18 12/16/18





 18:59 06:59


 


Intake Total 415 ml 450 ml


 


Output Total 50 ml 0 ml


 


Balance 365 ml 450 ml


 


  


 


IV Total 55 ml 60 ml


 


Tube Feeding 360 ml 360 ml


 


Other  30 ml


 


Stool Total 50 ml 0 ml











Laboratory Tests








Test


  12/16/18


05:00 12/16/18


09:15 12/16/18


09:40


 


White Blood Count


  7.7 K/UL


(4.8-10.8) 


  


 


 


Red Blood Count


  3.40 M/UL


(4.20-5.40)  L 


  


 


 


Hemoglobin


  9.5 G/DL


(12.0-16.0)  L 


  


 


 


Hematocrit


  31.0 %


(37.0-47.0)  L 


  


 


 


Mean Corpuscular Volume 91 FL (80-99)    


 


Mean Corpuscular Hemoglobin


  28.0 PG


(27.0-31.0) 


  


 


 


Mean Corpuscular Hemoglobin


Concent 30.7 G/DL


(32.0-36.0)  L 


  


 


 


Red Cell Distribution Width


  18.8 %


(11.6-14.8)  H 


  


 


 


Platelet Count


  325 K/UL


(150-450) 


  


 


 


Mean Platelet Volume


  7.2 FL


(6.5-10.1) 


  


 


 


Neutrophils (%) (Auto)


  77.0 %


(45.0-75.0)  H 


  


 


 


Lymphocytes (%) (Auto)


  8.5 %


(20.0-45.0)  L 


  


 


 


Monocytes (%) (Auto)


  11.9 %


(1.0-10.0)  H 


  


 


 


Eosinophils (%) (Auto)


  1.7 %


(0.0-3.0) 


  


 


 


Basophils (%) (Auto)


  0.8 %


(0.0-2.0) 


  


 


 


Sodium Level


  142 MMOL/L


(136-145) 


  


 


 


Potassium Level


  3.2 MMOL/L


(3.5-5.1)  L 


  


 


 


Chloride Level


  101 MMOL/L


() 


  


 


 


Carbon Dioxide Level


  32 MMOL/L


(21-32) 


  


 


 


Anion Gap


  9 mmol/L


(5-15) 


  


 


 


Blood Urea Nitrogen


  36 mg/dL


(7-18)  H 


  


 


 


Creatinine


  2.9 MG/DL


(0.55-1.30)  H 


  


 


 


Estimat Glomerular Filtration


Rate  mL/min (>60)  


  


  


 


 


Glucose Level


  128 MG/DL


()  H 


  


 


 


Calcium Level


  8.7 MG/DL


(8.5-10.1) 


  


 


 


Prothrombin Time


  


  10.0 SEC


(9.30-11.50) 


 


 


Prothromb Time International


Ratio 


  0.9 (0.9-1.1)  


  


 


 


Activated Partial


Thromboplast Time 


  37 SEC (23-33)


H 


 


 


Arterial Blood pH


  


  


  7.450


(7.350-7.450)


 


Arterial Blood Partial


Pressure CO2 


  


  47.6 mmHg


(35.0-45.0)  H


 


Arterial Blood Partial


Pressure O2 


  


  128.3 mmHg


(75.0-100.0)  H


 


Arterial Blood HCO3


  


  


  32.9 mmol/L


(22.0-26.0)  H


 


Arterial Blood Oxygen


Saturation 


  


  98.1 %


()


 


Arterial Blood Base Excess   8.0 (-2-2)  H


 


Arcadio Test   Positive  

















Mariusz Wade MD Dec 16, 2018 13:39

## 2018-12-16 NOTE — NUR
NURSE NOTES:

Patient placed on CPAP of 8, deep breathing exercises teaching provided.Tolerate well at 
this time

## 2018-12-16 NOTE — NUR
NURSE NOTES:

Patient turned and repositioned.Still on Cpap of 8 and tolerate well.Deep breathing exercise 
teaching provided.HOB elevated to prevent aspiration.Call light within easy reach.Will 
continue to monitor.

## 2018-12-17 VITALS — DIASTOLIC BLOOD PRESSURE: 38 MMHG | SYSTOLIC BLOOD PRESSURE: 127 MMHG

## 2018-12-17 VITALS — SYSTOLIC BLOOD PRESSURE: 132 MMHG | DIASTOLIC BLOOD PRESSURE: 28 MMHG

## 2018-12-17 VITALS — SYSTOLIC BLOOD PRESSURE: 116 MMHG | DIASTOLIC BLOOD PRESSURE: 42 MMHG

## 2018-12-17 VITALS — DIASTOLIC BLOOD PRESSURE: 20 MMHG | SYSTOLIC BLOOD PRESSURE: 118 MMHG

## 2018-12-17 VITALS — SYSTOLIC BLOOD PRESSURE: 107 MMHG | DIASTOLIC BLOOD PRESSURE: 27 MMHG

## 2018-12-17 VITALS — DIASTOLIC BLOOD PRESSURE: 14 MMHG | SYSTOLIC BLOOD PRESSURE: 105 MMHG

## 2018-12-17 VITALS — SYSTOLIC BLOOD PRESSURE: 117 MMHG | DIASTOLIC BLOOD PRESSURE: 76 MMHG

## 2018-12-17 VITALS — DIASTOLIC BLOOD PRESSURE: 16 MMHG | SYSTOLIC BLOOD PRESSURE: 109 MMHG

## 2018-12-17 VITALS — DIASTOLIC BLOOD PRESSURE: 39 MMHG | SYSTOLIC BLOOD PRESSURE: 100 MMHG

## 2018-12-17 VITALS — SYSTOLIC BLOOD PRESSURE: 138 MMHG | DIASTOLIC BLOOD PRESSURE: 35 MMHG

## 2018-12-17 VITALS — SYSTOLIC BLOOD PRESSURE: 116 MMHG | DIASTOLIC BLOOD PRESSURE: 31 MMHG

## 2018-12-17 VITALS — SYSTOLIC BLOOD PRESSURE: 122 MMHG | DIASTOLIC BLOOD PRESSURE: 23 MMHG

## 2018-12-17 VITALS — SYSTOLIC BLOOD PRESSURE: 120 MMHG | DIASTOLIC BLOOD PRESSURE: 17 MMHG

## 2018-12-17 VITALS — SYSTOLIC BLOOD PRESSURE: 118 MMHG | DIASTOLIC BLOOD PRESSURE: 31 MMHG

## 2018-12-17 VITALS — DIASTOLIC BLOOD PRESSURE: 14 MMHG | SYSTOLIC BLOOD PRESSURE: 123 MMHG

## 2018-12-17 VITALS — DIASTOLIC BLOOD PRESSURE: 17 MMHG | SYSTOLIC BLOOD PRESSURE: 101 MMHG

## 2018-12-17 VITALS — DIASTOLIC BLOOD PRESSURE: 84 MMHG | SYSTOLIC BLOOD PRESSURE: 122 MMHG

## 2018-12-17 VITALS — DIASTOLIC BLOOD PRESSURE: 22 MMHG | SYSTOLIC BLOOD PRESSURE: 124 MMHG

## 2018-12-17 VITALS — DIASTOLIC BLOOD PRESSURE: 17 MMHG | SYSTOLIC BLOOD PRESSURE: 106 MMHG

## 2018-12-17 VITALS — SYSTOLIC BLOOD PRESSURE: 134 MMHG | DIASTOLIC BLOOD PRESSURE: 34 MMHG

## 2018-12-17 VITALS — SYSTOLIC BLOOD PRESSURE: 98 MMHG | DIASTOLIC BLOOD PRESSURE: 30 MMHG

## 2018-12-17 VITALS — DIASTOLIC BLOOD PRESSURE: 36 MMHG | SYSTOLIC BLOOD PRESSURE: 120 MMHG

## 2018-12-17 VITALS — DIASTOLIC BLOOD PRESSURE: 22 MMHG | SYSTOLIC BLOOD PRESSURE: 104 MMHG

## 2018-12-17 LAB
ADD MANUAL DIFF: NO
ANION GAP SERPL CALC-SCNC: 8 MMOL/L (ref 5–15)
APTT BLD: 97 SEC (ref 23–33)
BASOPHILS NFR BLD AUTO: 1.6 % (ref 0–2)
BUN SERPL-MCNC: 45 MG/DL (ref 7–18)
CALCIUM SERPL-MCNC: 8.3 MG/DL (ref 8.5–10.1)
CHLORIDE SERPL-SCNC: 102 MMOL/L (ref 98–107)
CO2 SERPL-SCNC: 32 MMOL/L (ref 21–32)
CREAT SERPL-MCNC: 3.3 MG/DL (ref 0.55–1.3)
EOSINOPHIL NFR BLD AUTO: 2.2 % (ref 0–3)
ERYTHROCYTE [DISTWIDTH] IN BLOOD BY AUTOMATED COUNT: 19.2 % (ref 11.6–14.8)
HCT VFR BLD CALC: 28 % (ref 37–47)
HGB BLD-MCNC: 8.5 G/DL (ref 12–16)
INR PPP: 1 (ref 0.9–1.1)
LYMPHOCYTES NFR BLD AUTO: 9.9 % (ref 20–45)
MCV RBC AUTO: 92 FL (ref 80–99)
MONOCYTES NFR BLD AUTO: 9.5 % (ref 1–10)
NEUTROPHILS NFR BLD AUTO: 76.8 % (ref 45–75)
PHOSPHATE SERPL-MCNC: 4.7 MG/DL (ref 2.5–4.9)
PLATELET # BLD: 335 K/UL (ref 150–450)
POTASSIUM SERPL-SCNC: 3.3 MMOL/L (ref 3.5–5.1)
RBC # BLD AUTO: 3.04 M/UL (ref 4.2–5.4)
SODIUM SERPL-SCNC: 142 MMOL/L (ref 136–145)
WBC # BLD AUTO: 7 K/UL (ref 4.8–10.8)

## 2018-12-17 PROCEDURE — 0B110F4 BYPASS TRACHEA TO CUTANEOUS WITH TRACHEOSTOMY DEVICE, OPEN APPROACH: ICD-10-PCS

## 2018-12-17 RX ADMIN — CHLORHEXIDINE GLUCONATE SCH APPLIC: 213 SOLUTION TOPICAL at 20:59

## 2018-12-17 RX ADMIN — AMIODARONE HYDROCHLORIDE SCH MG: 200 TABLET ORAL at 08:16

## 2018-12-17 RX ADMIN — PANTOPRAZOLE SODIUM SCH MG: 40 INJECTION, POWDER, FOR SOLUTION INTRAVENOUS at 08:15

## 2018-12-17 RX ADMIN — HEPARIN SODIUM SCH UNITS: 5000 INJECTION INTRAVENOUS; SUBCUTANEOUS at 08:21

## 2018-12-17 RX ADMIN — PANTOPRAZOLE SODIUM SCH MG: 40 INJECTION, POWDER, FOR SOLUTION INTRAVENOUS at 21:00

## 2018-12-17 RX ADMIN — MINOXIDIL SCH MG: 2.5 TABLET ORAL at 08:20

## 2018-12-17 RX ADMIN — AMIODARONE HYDROCHLORIDE SCH MG: 200 TABLET ORAL at 21:00

## 2018-12-17 RX ADMIN — HEPARIN SODIUM SCH UNITS: 5000 INJECTION INTRAVENOUS; SUBCUTANEOUS at 21:02

## 2018-12-17 RX ADMIN — MINOXIDIL SCH MG: 2.5 TABLET ORAL at 21:00

## 2018-12-17 RX ADMIN — VITAMIN D, TAB 1000IU (100/BT) SCH INTLU: 25 TAB at 08:16

## 2018-12-17 RX ADMIN — SODIUM CHLORIDE SCH MLS/HR: 0.9 INJECTION INTRAVENOUS at 21:03

## 2018-12-17 RX ADMIN — MEROPENEM SCH MLS/HR: 500 INJECTION INTRAVENOUS at 08:16

## 2018-12-17 RX ADMIN — ERYTHROPOIETIN SCH UNITS: 20000 INJECTION, SOLUTION INTRAVENOUS; SUBCUTANEOUS at 21:01

## 2018-12-17 NOTE — NUR
NURSE NOTES:

VSS, Afebrile. ETT remain in place 7.5/23 and AC 8--FiO2 35% -Peep 5. SR on cardiac 
monitor. Turned and repositioned. HOB elevated at 35 degree to prevent aspiration. OGT in 
place. NPO after MN. Rectal tube draining brown liquid stool. Wound dressings dry and 
intact. ASHISH midline infusing NS at TKO with no signs of infiltration. VALE shunt thrill and 
bruit present. Denies pain. In no apparent distress. Will continue POC. For tracheostomy 
this AM. Consent signed by son.

## 2018-12-17 NOTE — GI PROGRESS NOTE
Assessment/Plan


Problems:  


(1) Encounter for PEG (percutaneous endoscopic gastrostomy)


ICD Codes:  Z43.1 - Encounter for attention to gastrostomy


SNOMED:  432596840, 296918825


(2) Severe malnutrition


ICD Codes:  E43 - Unspecified severe protein-calorie malnutrition


SNOMED:  62402893


(3) Dehydration


ICD Codes:  E86.0 - Dehydration


SNOMED:  48317942


Status:  unchanged


Status Narrative


Discussed with Dr. Lam.


Assessment/Plan


Assessment


- Dysphagia


- Resp failure


- ESRD


- Anemia





Recommendations


- continue TF


- GT care


- trach today, will follow with PEG this Wednesday





The patient was seen and examined at bedside and all new and available data was 

reviewed in the patients chart. I agree with the above findings, impression 

and plan.  (Patient seen earlier today. Signature stamp does not reflect 

patient encounter time.). - Elliott Lam MD





Subjective


Subjective


limited





Objective





Last 24 Hour Vital Signs








  Date Time  Temp Pulse Resp B/P (MAP) Pulse Ox O2 Delivery O2 Flow Rate FiO2


 


12/17/18 11:00  90 18 134/34 (67) 93   


 


12/17/18 10:43  117 21     35


 


12/17/18 10:00  93 18 123/14 (50) 93   


 


12/17/18 09:00  77 18 127/38 (67) 100   


 


12/17/18 08:51  65 14     35


 


12/17/18 08:20    122/23    


 


12/17/18 08:00  98      


 


12/17/18 08:00 98.8 68 15 122/23 (56) 100   


 


12/17/18 08:00      Mechanical Ventilator  





      Mechanical Ventilator  





      Mechanical Ventilator  


 


12/17/18 08:00        35


 


12/17/18 07:00  71 12 120/17 (51) 100   


 


12/17/18 06:50  75 15     35


 


12/17/18 06:00  75 15 124/22 (56) 99   


 


12/17/18 05:00  99 21 120/36 (64) 99   


 


12/17/18 04:40  69 15     35


 


12/17/18 04:00 98.9 70 14 116/42 (66) 100   


 


12/17/18 04:00      Mechanical Ventilator  





      Mechanical Ventilator  





      Mechanical Ventilator  


 


12/17/18 04:00        35


 


12/17/18 04:00  70      


 


12/17/18 03:00  69 14 116/31 (59) 99   


 


12/17/18 02:48  74 13     35


 


12/17/18 02:00  79 16 138/35 (69) 91   


 


12/17/18 01:19  72 22     35


 


12/17/18 01:00  74 13 118/31 (60) 99   


 


12/17/18 00:00 98.8 69 14 107/27 (53) 100   


 


12/17/18 00:00      Mechanical Ventilator  





      Mechanical Ventilator  





      Mechanical Ventilator  


 


12/17/18 00:00        35


 


12/17/18 00:00  69      


 


12/16/18 23:00  72 17 108/29 (55) 100   


 


12/16/18 23:00  73 10     35


 


12/16/18 22:00  74 16 107/29 (55) 100   


 


12/16/18 21:26  70 11     35


 


12/16/18 21:13    135/34    


 


12/16/18 21:00  74 16 110/29 (56) 99   


 


12/16/18 20:00  75      


 


12/16/18 20:00        35


 


12/16/18 20:00      Mechanical Ventilator  





      Mechanical Ventilator  





      Mechanical Ventilator  


 


12/16/18 19:59 98.4 83 18 135/34 (67) 93   


 


12/16/18 19:18  81 14     35


 


12/16/18 19:00  81 15 115/34 (61) 95   


 


12/16/18 18:00  72 14 99/25 (49) 95   


 


12/16/18 17:06  74 13     35


 


12/16/18 17:00  76 14 120/33 (62) 100   


 


12/16/18 16:00 98.4 78 19 114/33 (60) 100   


 


12/16/18 16:00        35


 


12/16/18 16:00      Mechanical Ventilator  





      Mechanical Ventilator  





      Mechanical Ventilator  


 


12/16/18 16:00  78      


 


12/16/18 15:24  113 25     35


 


12/16/18 15:00  77 14 140/15 (56) 100   


 


12/16/18 14:00  74 17 113/21 (51) 100   


 


12/16/18 13:00  73 20 105/60 (75) 99   


 


12/16/18 12:53  79 18     35


 


12/16/18 12:00 98.6 80 19 112/60 (77) 99   


 


12/16/18 12:00        35


 


12/16/18 12:00      Mechanical Ventilator  





      Mechanical Ventilator  





      Mechanical Ventilator  


 


12/16/18 12:00  90      

















Intake and Output  


 


 12/16/18 12/17/18





 19:00 07:00


 


Intake Total 415 ml 310 ml


 


Output Total 25 ml 50 ml


 


Balance 390 ml 260 ml


 


  


 


IV Total 55 ml 60 ml


 


Tube Feeding 360 ml 150 ml


 


Other  100 ml


 


Stool Total 25 ml 50 ml











Laboratory Tests








Test


  12/17/18


04:30


 


White Blood Count


  7.0 K/UL


(4.8-10.8)


 


Red Blood Count


  3.04 M/UL


(4.20-5.40)  L


 


Hemoglobin


  8.5 G/DL


(12.0-16.0)  L


 


Hematocrit


  28.0 %


(37.0-47.0)  L


 


Mean Corpuscular Volume 92 FL (80-99)  


 


Mean Corpuscular Hemoglobin


  27.8 PG


(27.0-31.0)


 


Mean Corpuscular Hemoglobin


Concent 30.2 G/DL


(32.0-36.0)  L


 


Red Cell Distribution Width


  19.2 %


(11.6-14.8)  H


 


Platelet Count


  335 K/UL


(150-450)


 


Mean Platelet Volume


  6.6 FL


(6.5-10.1)


 


Neutrophils (%) (Auto)


  76.8 %


(45.0-75.0)  H


 


Lymphocytes (%) (Auto)


  9.9 %


(20.0-45.0)  L


 


Monocytes (%) (Auto)


  9.5 %


(1.0-10.0)


 


Eosinophils (%) (Auto)


  2.2 %


(0.0-3.0)


 


Basophils (%) (Auto)


  1.6 %


(0.0-2.0)


 


Prothrombin Time


  10.2 SEC


(9.30-11.50)


 


Prothromb Time International


Ratio 1.0 (0.9-1.1)  


 


 


Activated Partial


Thromboplast Time 97 SEC (23-33)


H


 


Sodium Level


  142 MMOL/L


(136-145)


 


Potassium Level


  3.3 MMOL/L


(3.5-5.1)  L


 


Chloride Level


  102 MMOL/L


()


 


Carbon Dioxide Level


  32 MMOL/L


(21-32)


 


Anion Gap


  8 mmol/L


(5-15)


 


Blood Urea Nitrogen


  45 mg/dL


(7-18)  H


 


Creatinine


  3.3 MG/DL


(0.55-1.30)  H


 


Estimat Glomerular Filtration


Rate  mL/min (>60)  


 


 


Glucose Level


  103 MG/DL


()


 


Calcium Level


  8.3 MG/DL


(8.5-10.1)  L


 


Phosphorus Level


  4.7 MG/DL


(2.5-4.9)








Height (Feet):  5


Height (Inches):  2.00


Weight (Pounds):  133


General Appearance:  WD/WN, no apparent distress, alert


Cardiovascular:  normal rate


Respiratory/Chest:  normal breath sounds, no respiratory distress


Abdominal Exam:  normal bowel sounds, non tender, soft, other - OGT


Extremities:  non-tender











Shane Lowry NP Dec 17, 2018 11:52

## 2018-12-17 NOTE — NUR
RESPIRATORY NOTE: Received pt on ETT 7.5 @ 22cm lips line with current vent settings: 
[id number]% FiO2- peep of 5, saturates at 99%. Pt os tolerating the vent well, no SOB or 
acute resp distress. Alarms are set and audible, vent is plugged into the red outlet, ambu 
bag is at bedside. Will continue to monitor pt and suction as needed.

## 2018-12-17 NOTE — ANETHESIA PREOPERATIVE EVAL
Anesthesia Pre-op PMH/ROS


General


Date of Evaluation:  Dec 17, 2018


Time of Evaluation:  12:40


Anesthesiologist:  Dillon


ASA Score:  ASA 4


Mallampati Score


Class I : Soft palate, uvula, fauces, pillars visible


Class II: Soft palate, uvula, fauces visible


Class III: Soft palate, base of uvula visible


Class IV: Only hard plate visible


Mallampati Classification:  Class III - Intubated on vent


Surgeon:  Ngozi


Diagnosis:  Respiratory failure


Surgical Procedure:  Tracheostomy


Anesthesia History:  none


Family History:  no anesthesia problems


Allergies:  


Coded Allergies:  


     No Known Allergies (Unverified , 11/26/18)


Patient NPO?:  Yes


NPO Date:  Dec 17, 2018


NPO Time:  0000





Past Medical History


Cardiovascular:  Reports: HTN, arrhythmia - A fib.


Pulmonary:  Reports: other - respiratory failure; 


   Denies: asthma, COPD, GAYLE


Gastrointestinal/Genitourinary:  Reports: GERD, ESRD - on HD last session 12/17

; 


   Denies: CRI, other


Neurologic/Psychiatric:  Reports: dementia, depression/anxiety


Endocrine:  Reports: DM; 


   Denies: hypothyroidism, steroids, other


HEENT:  Denies: cataract (L), cataract (R), glaucoma, [place] (L), [place] (R), other


Hematology/Immune:  Reports: anemia - of chronic d-sease; 


   Denies: DVT, bleeding disorder, other


Musculoskeletal/Integumentary:  Reports: DJD, edema; 


   Denies: OA, RA, DDD, other


Other:  other - malnourished


PMH Narrative:


as above


PSxH Narrative:


see H&P





Anesthesia Pre-op Phys. Exam


Physician Exam





Last Vital Signs








  Date Time  Temp Pulse Resp B/P (MAP) Pulse Ox O2 Delivery O2 Flow Rate FiO2


 


12/17/18 12:34  80 15     35


 


12/17/18 12:00 98.4   118/20 (52) 95   


 


12/17/18 12:00      Mechanical Ventilator  





      Mechanical Ventilator  





      Mechanical Ventilator  


 


12/10/18 08:00       12.0 





       15.0 





       12.0 








Constitutional:  NAD


Neurologic:  other - unable to obtaine


Cardiovascular:  other - A fib on and off


Respiratory:  other - on vent


Gastrointestinal:  other - OG tube in





Airway Exam


Mallampati Score:  Class III


MO:  limited


Neck:  stiff


ROM:  limited


Teeth:  missing


Dentures:  no upper, no lower





Anesthesia Pre-op A/P


Labs





Hematology








Test


  12/17/18


04:30


 


White Blood Count


  7.0 K/UL


(4.8-10.8)


 


Red Blood Count


  3.04 M/UL


(4.20-5.40)  L


 


Hemoglobin


  8.5 G/DL


(12.0-16.0)  L


 


Hematocrit


  28.0 %


(37.0-47.0)  L


 


Mean Corpuscular Volume 92 FL (80-99)  


 


Mean Corpuscular Hemoglobin


  27.8 PG


(27.0-31.0)


 


Mean Corpuscular Hemoglobin


Concent 30.2 G/DL


(32.0-36.0)  L


 


Red Cell Distribution Width


  19.2 %


(11.6-14.8)  H


 


Platelet Count


  335 K/UL


(150-450)


 


Mean Platelet Volume


  6.6 FL


(6.5-10.1)


 


Neutrophils (%) (Auto)


  76.8 %


(45.0-75.0)  H


 


Lymphocytes (%) (Auto)


  9.9 %


(20.0-45.0)  L


 


Monocytes (%) (Auto)


  9.5 %


(1.0-10.0)


 


Eosinophils (%) (Auto)


  2.2 %


(0.0-3.0)


 


Basophils (%) (Auto)


  1.6 %


(0.0-2.0)








Coagulation








Test


  12/17/18


04:30


 


Prothrombin Time


  10.2 SEC


(9.30-11.50)


 


Prothromb Time International


Ratio 1.0 (0.9-1.1)  


 


 


Activated Partial


Thromboplast Time 97 SEC (23-33)


H








Chemistry








Test


  12/17/18


04:30


 


Sodium Level


  142 MMOL/L


(136-145)


 


Potassium Level


  3.3 MMOL/L


(3.5-5.1)  L


 


Chloride Level


  102 MMOL/L


()


 


Carbon Dioxide Level


  32 MMOL/L


(21-32)


 


Anion Gap


  8 mmol/L


(5-15)


 


Blood Urea Nitrogen


  45 mg/dL


(7-18)  H


 


Creatinine


  3.3 MG/DL


(0.55-1.30)  H


 


Estimat Glomerular Filtration


Rate  mL/min (>60)  


 


 


Glucose Level


  103 MG/DL


()


 


Calcium Level


  8.3 MG/DL


(8.5-10.1)  L


 


Phosphorus Level


  4.7 MG/DL


(2.5-4.9)











Risk Assessment & Plan


Assessment:


ASA 4


Plan:


GA with ETT phone consent with son for anesthesia


Status Change Before Surgery:  No





Pre-Antibiotics


Drug:  Ancef 1 gr.


Given Within 1 Hr of Incision:  Yes


Time Given:  13:20











Marcin Carranza MD Dec 17, 2018 13:58

## 2018-12-17 NOTE — NUR
NURSE NOTES:

ADLS done,pt turned and repositioned.HOB elevated to prevent aspiration.Clean dry and 
comfortable.No s/s bleeding at the tracheostomy site.Dressing dry clean and intact.Continue 
monitoring.

## 2018-12-17 NOTE — NUR
NURSE NOTES:

ADLS DONE, PATIENT KEPT CLEAN AND DRY.MOUTH CARE DONE.NO S/S BLEEDING AT THE TRACHEOSTOMY 
SITE.DRESSING CLEAN DRY AND INTACT.KEPT ON CLOSE MONITORING.HOB ELEVATED TO PREVENT 
ASPIRATION.

## 2018-12-17 NOTE — NUR
RESPIRATORY NOTE: transferred pt to surgery to get trach procedure done on 15L ambu bag 100% 
FiO2, pt is stable, no SOB or  distress, understanding the plan. Waiting for pt to come back 
to ICU.

## 2018-12-17 NOTE — CARDIOLOGY PROGRESS NOTE
Assessment/Plan


Assessment/Plan


1. Hypotension, possibly sepsis versus volume.


2. Paroxysmal episodes of atrial fibrillation history.


3. Bradycardia secondary to medications, amiodarone possibly.


4. Diabetes mellitus.


5. End-stage renal disease, on hemodialysis.


6. Lung collapse.


7. Respiratory failure, status post intubation.


8. Dysphagia.


9. History of dementia.


10. Pulm htn 


11.  Pleural effusion 


12. cardaic arrest


13. chest wall pain post cpr  





in and out afib amido now 200 bid  on hold


stool ob neg x2 


amiod bid


off labetolol for now if need hydralaize to be used 


vent support 


s/p trach today 


peg pending tomorrow 


tele reviewed agian intermittent tachy but at this time is in sinus





Subjective


ROS Limited/Unobtainable:  Yes


Subjective


on the vent not verba , denies cp or dizziness has sob





Objective





Last 24 Hour Vital Signs








  Date Time  Temp Pulse Resp B/P (MAP) Pulse Ox O2 Delivery O2 Flow Rate FiO2


 


12/17/18 18:00      Mechanical Ventilator  





      Mechanical Ventilator  





      Mechanical Ventilator  


 


12/17/18 18:00  79 19 117/76 (90) 91   


 


12/17/18 17:00  82 19 132/28 (62) 91   


 


12/17/18 16:50  85 15     35


 


12/17/18 16:00        35


 


12/17/18 16:00 98.0 79 16 122/84 (97) 95   


 


12/17/18 16:00      Mechanical Ventilator  





      Mechanical Ventilator  





      Mechanical Ventilator  


 


12/17/18 16:00  80      


 


12/17/18 15:30  73 18     35


 


12/17/18 15:00  75 18 100/39 (59) 91   


 


12/17/18 14:27  98 20  98   


 


12/17/18 14:00        35


 


12/17/18 14:00  77 20 98/30 (52) 91   


 


12/17/18 13:57  81 20     35


 


12/17/18 12:34  80 15     35


 


12/17/18 12:00 98.4 80 17 118/20 (52) 95   


 


12/17/18 12:00  79      


 


12/17/18 12:00        35


 


12/17/18 12:00      Mechanical Ventilator  





      Mechanical Ventilator  





      Mechanical Ventilator  


 


12/17/18 11:00  90 18 134/34 (67) 93   


 


12/17/18 10:43  117 21     35


 


12/17/18 10:00  93 18 123/14 (50) 93   


 


12/17/18 09:00  77 18 127/38 (67) 100   


 


12/17/18 08:51  65 14     35


 


12/17/18 08:20    122/23    


 


12/17/18 08:00  98      


 


12/17/18 08:00 98.8 68 15 122/23 (56) 100   


 


12/17/18 08:00      Mechanical Ventilator  





      Mechanical Ventilator  





      Mechanical Ventilator  


 


12/17/18 08:00        35


 


12/17/18 07:00  71 12 120/17 (51) 100   


 


12/17/18 06:50  75 15     35


 


12/17/18 06:00  75 15 124/22 (56) 99   


 


12/17/18 05:00  99 21 120/36 (64) 99   


 


12/17/18 04:40  69 15     35


 


12/17/18 04:00 98.9 70 14 116/42 (66) 100   


 


12/17/18 04:00      Mechanical Ventilator  





      Mechanical Ventilator  





      Mechanical Ventilator  


 


12/17/18 04:00        35


 


12/17/18 04:00  70      


 


12/17/18 03:00  69 14 116/31 (59) 99   


 


12/17/18 02:48  74 13     35


 


12/17/18 02:00  79 16 138/35 (69) 91   


 


12/17/18 01:19  72 22     35


 


12/17/18 01:00  74 13 118/31 (60) 99   


 


12/17/18 00:00 98.8 69 14 107/27 (53) 100   


 


12/17/18 00:00      Mechanical Ventilator  





      Mechanical Ventilator  





      Mechanical Ventilator  


 


12/17/18 00:00        35


 


12/17/18 00:00  69      


 


12/16/18 23:00  72 17 108/29 (55) 100   


 


12/16/18 23:00  73 10     35


 


12/16/18 22:00  74 16 107/29 (55) 100   


 


12/16/18 21:26  70 11     35


 


12/16/18 21:13    135/34    


 


12/16/18 21:00  74 16 110/29 (56) 99   


 


12/16/18 20:00  75      


 


12/16/18 20:00        35


 


12/16/18 20:00      Mechanical Ventilator  





      Mechanical Ventilator  





      Mechanical Ventilator  


 


12/16/18 19:59 98.4 83 18 135/34 (67) 93   


 


12/16/18 19:18  81 14     35








General Appearance:  alert


Neck:  supple


Cardiovascular:  normal rate


Respiratory/Chest:  lungs clear, normal breath sounds


Abdomen:  normal bowel sounds, non tender, soft


Extremities:  no swelling











Intake and Output  


 


 12/16/18 12/17/18





 18:59 06:59


 


Intake Total 415 ml 340 ml


 


Output Total 0 ml 75 ml


 


Balance 415 ml 265 ml


 


  


 


IV Total 55 ml 60 ml


 


Tube Feeding 360 ml 180 ml


 


Other  100 ml


 


Stool Total 0 ml 75 ml











Laboratory Tests








Test


  12/17/18


04:30


 


White Blood Count


  7.0 K/UL


(4.8-10.8)


 


Red Blood Count


  3.04 M/UL


(4.20-5.40)  L


 


Hemoglobin


  8.5 G/DL


(12.0-16.0)  L


 


Hematocrit


  28.0 %


(37.0-47.0)  L


 


Mean Corpuscular Volume 92 FL (80-99)  


 


Mean Corpuscular Hemoglobin


  27.8 PG


(27.0-31.0)


 


Mean Corpuscular Hemoglobin


Concent 30.2 G/DL


(32.0-36.0)  L


 


Red Cell Distribution Width


  19.2 %


(11.6-14.8)  H


 


Platelet Count


  335 K/UL


(150-450)


 


Mean Platelet Volume


  6.6 FL


(6.5-10.1)


 


Neutrophils (%) (Auto)


  76.8 %


(45.0-75.0)  H


 


Lymphocytes (%) (Auto)


  9.9 %


(20.0-45.0)  L


 


Monocytes (%) (Auto)


  9.5 %


(1.0-10.0)


 


Eosinophils (%) (Auto)


  2.2 %


(0.0-3.0)


 


Basophils (%) (Auto)


  1.6 %


(0.0-2.0)


 


Prothrombin Time


  10.2 SEC


(9.30-11.50)


 


Prothromb Time International


Ratio 1.0 (0.9-1.1)  


 


 


Activated Partial


Thromboplast Time 97 SEC (23-33)


H


 


Sodium Level


  142 MMOL/L


(136-145)


 


Potassium Level


  3.3 MMOL/L


(3.5-5.1)  L


 


Chloride Level


  102 MMOL/L


()


 


Carbon Dioxide Level


  32 MMOL/L


(21-32)


 


Anion Gap


  8 mmol/L


(5-15)


 


Blood Urea Nitrogen


  45 mg/dL


(7-18)  H


 


Creatinine


  3.3 MG/DL


(0.55-1.30)  H


 


Estimat Glomerular Filtration


Rate  mL/min (>60)  


 


 


Glucose Level


  103 MG/DL


()


 


Calcium Level


  8.3 MG/DL


(8.5-10.1)  L


 


Phosphorus Level


  4.7 MG/DL


(2.5-4.9)

















Mariusz Wade MD Dec 17, 2018 19:04

## 2018-12-17 NOTE — NUR
NURSE NOTES:

Seen by Dr Smith, for preop consult related to tracheostomy placement. Consent signed 
and obtained from son Simon.Will follow up with new orders.

## 2018-12-17 NOTE — NUR
NURSE NOTES:

Patient received from CAROLYN Reeves. patient asleep, no apparent distress. Preparing for 
dialysis. NPO since midnight for tracheostomy placement.On ETT in place 7.5/23 and AC 8-VT 
450-FiO2 35% -Peep 5.Mouth care done and suction applied, moderate amount thin clear 
secretion still noted.ASHISH midline running TKO with no infiltration,VALE shunt thrill and 
bruit present.HOB elevated at 35 degree to prevent aspiration.GT placement check and intact 
with no residual at this time.Rectal tube draining brown stool.Wound dressing clean dry and 
intact with no drainage. Kept clean dry and comfortable.Will continue to monitor.

## 2018-12-17 NOTE — BRIEF OPERATIVE NOTE
Immediate Post Operative Note


Operative Note


Pre-op Diagnosis:


respiratory insufficiency requiring prolong ventilatory support


Procedure:


Tracheostomy


Post-op Diagnosis:  same as pre-op


Surgeon:  marti


Anesthesiologist:  ranjith


Anesthesia:  general, local


Specimen:  none


Complications:  none


Condition:  stable


Fluids:  see records


Estimated Blood Loss:  minimal


Drains:  none


Implant(s) used?:  Bob Dos Santos Dec 17, 2018 13:31

## 2018-12-17 NOTE — NUR
CASE MANAGEMENT: REVIEW





SI: CELLULITIS OF RIGHT LEG . OSTEOMYELITIS . RESP FAILURE INTUBATED . ESRD ON HD

T 98.4 HR 79 RR 17 /20 SAT 95% MECH VENT FIO2 35

H/H 8.5/28.0 K 3.3 







IS: VENOFER IV QHS

MEROPENEM IV QD

AMIODARONE NG Q12HR

PROCRIT SQ MWF 

PROTONIX IV Q12HR

NGT FEEDING NEPRO @10ML/HR GOAL RATE 30ML/HR 



***ICU STATUS***

DCP: PATIENT IS FROM [facility]

## 2018-12-17 NOTE — PULMONOLOGY PROGRESS NOTE
Assessment/Plan


Assessment/Plan


IMPRESSION:


1. Hypotension.


2. Sepsis.


3. Atrial fibrillation.


4. Bradycardia.


5. Diabetes mellitus.


6. End-stage renal disease on dialysis.


7. Left lung collapse. Resolved


8. Respiratory failure. now reintubated


9. Dysphagia.


10. Dementia.


11. Anemia


12. Pneumonia





DISCUSSION:


1. Continue medications.


2. CXR improved


3. status post bronchoscopy


4. Central access


4.  For trach today














  ______________________________________________


  Quentin Cardoso M.D.





Subjective


Interval Events:  None new; for trach today


Constitutional:  Reports: no symptoms


HEENT:  Repors: no symptoms


Respiratory:  Reports: no symptoms


Cardiovascular:  Reports: no symptoms


Gastrointestinal/Abdominal:  Reports: no symptoms


Genitourinary:  Reports: no symptoms


Allergies:  


Coded Allergies:  


     No Known Allergies (Unverified , 11/26/18)





Objective





Last 24 Hour Vital Signs








  Date Time  Temp Pulse Resp B/P (MAP) Pulse Ox O2 Delivery O2 Flow Rate FiO2


 


12/17/18 10:00  93 18 123/14 (50) 93   


 


12/17/18 09:00  77 18 127/38 (67) 100   


 


12/17/18 08:51  65 14     35


 


12/17/18 08:20    122/23    


 


12/17/18 08:00  98      


 


12/17/18 08:00 98.8 68 15 122/23 (56) 100   


 


12/17/18 08:00      Mechanical Ventilator  





      Mechanical Ventilator  





      Mechanical Ventilator  


 


12/17/18 08:00        35


 


12/17/18 07:00  71 12 120/17 (51) 100   


 


12/17/18 06:50  75 15     35


 


12/17/18 06:00  75 15 124/22 (56) 99   


 


12/17/18 05:00  99 21 120/36 (64) 99   


 


12/17/18 04:40  69 15     35


 


12/17/18 04:00 98.9 70 14 116/42 (66) 100   


 


12/17/18 04:00      Mechanical Ventilator  





      Mechanical Ventilator  





      Mechanical Ventilator  


 


12/17/18 04:00        35


 


12/17/18 04:00  70      


 


12/17/18 03:00  69 14 116/31 (59) 99   


 


12/17/18 02:48  74 13     35


 


12/17/18 02:00  79 16 138/35 (69) 91   


 


12/17/18 01:19  72 22     35


 


12/17/18 01:00  74 13 118/31 (60) 99   


 


12/17/18 00:00 98.8 69 14 107/27 (53) 100   


 


12/17/18 00:00      Mechanical Ventilator  





      Mechanical Ventilator  





      Mechanical Ventilator  


 


12/17/18 00:00        35


 


12/17/18 00:00  69      


 


12/16/18 23:00  72 17 108/29 (55) 100   


 


12/16/18 23:00  73 10     35


 


12/16/18 22:00  74 16 107/29 (55) 100   


 


12/16/18 21:26  70 11     35


 


12/16/18 21:13    135/34    


 


12/16/18 21:00  74 16 110/29 (56) 99   


 


12/16/18 20:00  75      


 


12/16/18 20:00        35


 


12/16/18 20:00      Mechanical Ventilator  





      Mechanical Ventilator  





      Mechanical Ventilator  


 


12/16/18 19:59 98.4 83 18 135/34 (67) 93   


 


12/16/18 19:18  81 14     35


 


12/16/18 19:00  81 15 115/34 (61) 95   


 


12/16/18 18:00  72 14 99/25 (49) 95   


 


12/16/18 17:06  74 13     35


 


12/16/18 17:00  76 14 120/33 (62) 100   


 


12/16/18 16:00 98.4 78 19 114/33 (60) 100   


 


12/16/18 16:00        35


 


12/16/18 16:00      Mechanical Ventilator  





      Mechanical Ventilator  





      Mechanical Ventilator  


 


12/16/18 16:00  78      


 


12/16/18 15:24  113 25     35


 


12/16/18 15:00  77 14 140/15 (56) 100   


 


12/16/18 14:00  74 17 113/21 (51) 100   


 


12/16/18 13:00  73 20 105/60 (75) 99   


 


12/16/18 12:53  79 18     35


 


12/16/18 12:00 98.6 80 19 112/60 (77) 99   


 


12/16/18 12:00        35


 


12/16/18 12:00      Mechanical Ventilator  





      Mechanical Ventilator  





      Mechanical Ventilator  


 


12/16/18 12:00  90      


 


12/16/18 11:00  93 20 134/56 (82) 99   


 


12/16/18 10:36  94 25     35

















Intake and Output  


 


 12/16/18 12/17/18





 19:00 07:00


 


Intake Total 415 ml 310 ml


 


Output Total 25 ml 50 ml


 


Balance 390 ml 260 ml


 


  


 


IV Total 55 ml 60 ml


 


Tube Feeding 360 ml 150 ml


 


Other  100 ml


 


Stool Total 25 ml 50 ml








General Appearance:  no acute distress


HEENT:  normocephalic


Respiratory/Chest:  chest wall non-tender, lungs clear


Cardiovascular:  normal peripheral pulses, normal rate


Abdomen:  normal bowel sounds, soft, non tender


Laboratory Tests


12/17/18 04:30: 


White Blood Count 7.0, Red Blood Count 3.04L, Hemoglobin 8.5L, Hematocrit 28.0L

, Mean Corpuscular Volume 92, Mean Corpuscular Hemoglobin 27.8, Mean 

Corpuscular Hemoglobin Concent 30.2L, Red Cell Distribution Width 19.2H, 

Platelet Count 335, Mean Platelet Volume 6.6, Neutrophils (%) (Auto) 76.8H, 

Lymphocytes (%) (Auto) 9.9L, Monocytes (%) (Auto) 9.5, Eosinophils (%) (Auto) 

2.2, Basophils (%) (Auto) 1.6, Prothrombin Time 10.2, Prothromb Time 

International Ratio 1.0, Activated Partial Thromboplast Time 97H, Sodium Level 

142, Potassium Level 3.3L, Chloride Level 102, Carbon Dioxide Level 32, Anion 

Gap 8, Blood Urea Nitrogen 45H, Creatinine 3.3H, Estimat Glomerular Filtration 

Rate , Glucose Level 103, Calcium Level 8.3L, Phosphorus Level 4.7





Current Medications








 Medications


  (Trade)  Dose


 Ordered  Sig/Tony


 Route


 PRN Reason  Start Time


 Stop Time Status Last Admin


Dose Admin


 


 Acetaminophen


  (Tylenol)  650 mg  Q6H  PRN


 ORAL


 Mild Pain/Temp > 100.5  12/4/18 14:15


 1/3/19 14:14  12/12/18 13:15


 


 


 Amiodarone HCl


  (Cordarone)  200 mg  EVERY 12  HOURS


 NG


   12/8/18 21:00


 1/5/19 13:59  12/17/18 08:16


 


 


 Chlorhexidine


 Gluconate


  (Juana-Hex 2%)  1 applic  DAILY@2000


 TOPIC


   11/27/18 20:00


 12/27/18 19:59  12/16/18 21:12


 


 


 Collagenase


  (Santyl)  1 applic  Q12HR


 TOPIC


   12/12/18 21:00


 1/5/19 20:59  12/17/18 08:20


 


 


 Epoetin Rupesh


  (Procrit (for


 ESRD on dialysis))  10,000 units  MON-WED-FRI


 SUBQ


   11/28/18 21:00


 12/28/18 20:59  12/14/18 20:58


 


 


 Heparin Sodium


  (Porcine)


  (Heparin 5000


 units/ml)  5,000 units  EVERY 12  HOURS


 SUBQ


   12/15/18 21:00


 1/6/19 21:59  12/16/18 08:35


 


 


 Heparin Sodium


  (Porcine)


  (Heparin Sod


 1000 units/ml


 10ml)  2,000 unit  ONCE  PRN


 IV


 DIALYSIS  12/16/18 12:51


 12/17/18 23:59   


 


 


 Iron Sucrose 100


 mg/Sodium Chloride  60 ml @ 


 240 mls/hr  BEDTIME


 IV


   12/13/18 21:00


 12/17/18 21:14  12/16/18 21:12


 


 


 Meropenem 500 mg/


 Sodium Chloride  55 ml @ 


 110 mls/hr  DAILY


 IVPB


   12/11/18 09:00


 12/25/18 08:59  12/17/18 08:16


 


 


 Minoxidil


  (Loniten)  5 mg  Q12HR


 NG


   12/2/18 21:00


 1/1/19 20:59  12/16/18 21:13


 


 


 Pantoprazole


  (Protonix)  40 mg  Q12HR


 IVP


   11/26/18 21:00


 12/27/18 08:59  12/17/18 08:15


 


 


 Sodium Chloride  1,000 ml @ 


 500 mls/hr  Q2H PRN


 IVLG


 sbp<90 during hd  12/16/18 12:48


 12/17/18 23:59   


 


 


 Vitamin D


  (Vitamin D)  1,000 intlu  DAILY


 GT


   12/9/18 10:00


 1/8/19 09:59  12/17/18 08:16


 

















Quentin Cardoso MD Dec 17, 2018 10:23

## 2018-12-17 NOTE — NEPHROLOGY PROGRESS NOTE
Assessment/Plan


Assessment


Seee below


Plan


MOF stable.


Sepsis due to infected diabetic ulcers. On IV Abx. Followed by ID, Vasc. Sx, 

Podiatry.





ESRD HD q MWF 


Anemia of CKD , SAMANTHA high dose. Transfuse PRN.





A. 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 with LAE. LVEF 65% . Anticoagulate when 

stable. DW Dr. Wade. In and out A. Fib.





Post trach. For PEG tomorrow.


Referred to myesha.


See orders.





Subjective


Subjective


On Vent. Alert. Post Trach. Had HD UF 2.5 L.





Objective


Objective





Last 24 Hour Vital Signs








  Date Time  Temp Pulse Resp B/P (MAP) Pulse Ox O2 Delivery O2 Flow Rate FiO2


 


12/17/18 14:27  98 20  98   


 


12/17/18 14:00        35


 


12/17/18 14:00  77 20 98/30 (52) 91   


 


12/17/18 13:57  81 20     35


 


12/17/18 12:34  80 15     35


 


12/17/18 12:00 98.4 80 17 118/20 (52) 95   


 


12/17/18 12:00  79      


 


12/17/18 12:00        35


 


12/17/18 12:00      Mechanical Ventilator  





      Mechanical Ventilator  





      Mechanical Ventilator  


 


12/17/18 11:00  90 18 134/34 (67) 93   


 


12/17/18 10:43  117 21     35


 


12/17/18 10:00  93 18 123/14 (50) 93   


 


12/17/18 09:00  77 18 127/38 (67) 100   


 


12/17/18 08:51  65 14     35


 


12/17/18 08:20    122/23    


 


12/17/18 08:00  98      


 


12/17/18 08:00 98.8 68 15 122/23 (56) 100   


 


12/17/18 08:00      Mechanical Ventilator  





      Mechanical Ventilator  





      Mechanical Ventilator  


 


12/17/18 08:00        35


 


12/17/18 07:00  71 12 120/17 (51) 100   


 


12/17/18 06:50  75 15     35


 


12/17/18 06:00  75 15 124/22 (56) 99   


 


12/17/18 05:00  99 21 120/36 (64) 99   


 


12/17/18 04:40  69 15     35


 


12/17/18 04:00 98.9 70 14 116/42 (66) 100   


 


12/17/18 04:00      Mechanical Ventilator  





      Mechanical Ventilator  





      Mechanical Ventilator  


 


12/17/18 04:00        35


 


12/17/18 04:00  70      


 


12/17/18 03:00  69 14 116/31 (59) 99   


 


12/17/18 02:48  74 13     35


 


12/17/18 02:00  79 16 138/35 (69) 91   


 


12/17/18 01:19  72 22     35


 


12/17/18 01:00  74 13 118/31 (60) 99   


 


12/17/18 00:00 98.8 69 14 107/27 (53) 100   


 


12/17/18 00:00      Mechanical Ventilator  





      Mechanical Ventilator  





      Mechanical Ventilator  


 


12/17/18 00:00        35


 


12/17/18 00:00  69      


 


12/16/18 23:00  72 17 108/29 (55) 100   


 


12/16/18 23:00  73 10     35


 


12/16/18 22:00  74 16 107/29 (55) 100   


 


12/16/18 21:26  70 11     35


 


12/16/18 21:13    135/34    


 


12/16/18 21:00  74 16 110/29 (56) 99   


 


12/16/18 20:00  75      


 


12/16/18 20:00        35


 


12/16/18 20:00      Mechanical Ventilator  





      Mechanical Ventilator  





      Mechanical Ventilator  


 


12/16/18 19:59 98.4 83 18 135/34 (67) 93   


 


12/16/18 19:18  81 14     35


 


12/16/18 19:00  81 15 115/34 (61) 95   


 


12/16/18 18:00  72 14 99/25 (49) 95   


 


12/16/18 17:06  74 13     35


 


12/16/18 17:00  76 14 120/33 (62) 100   


 


12/16/18 16:00 98.4 78 19 114/33 (60) 100   


 


12/16/18 16:00        35


 


12/16/18 16:00      Mechanical Ventilator  





      Mechanical Ventilator  





      Mechanical Ventilator  


 


12/16/18 16:00  78      

















Intake and Output  


 


 12/16/18 12/17/18





 18:59 06:59


 


Intake Total 415 ml 340 ml


 


Output Total 0 ml 75 ml


 


Balance 415 ml 265 ml


 


  


 


IV Total 55 ml 60 ml


 


Tube Feeding 360 ml 180 ml


 


Other  100 ml


 


Stool Total 0 ml 75 ml








Laboratory Tests


12/17/18 04:30: 


White Blood Count 7.0, Red Blood Count 3.04L, Hemoglobin 8.5L, Hematocrit 28.0L

, Mean Corpuscular Volume 92, Mean Corpuscular Hemoglobin 27.8, Mean 

Corpuscular Hemoglobin Concent 30.2L, Red Cell Distribution Width 19.2H, 

Platelet Count 335, Mean Platelet Volume 6.6, Neutrophils (%) (Auto) 76.8H, 

Lymphocytes (%) (Auto) 9.9L, Monocytes (%) (Auto) 9.5, Eosinophils (%) (Auto) 

2.2, Basophils (%) (Auto) 1.6, Prothrombin Time 10.2, Prothromb Time 

International Ratio 1.0, Activated Partial Thromboplast Time 97H, Sodium Level 

142, Potassium Level 3.3L, Chloride Level 102, Carbon Dioxide Level 32, Anion 

Gap 8, Blood Urea Nitrogen 45H, Creatinine 3.3H, Estimat Glomerular Filtration 

Rate , Glucose Level 103, Calcium Level 8.3L, Phosphorus Level 4.7


Height (Feet):  5


Height (Inches):  2.00


Weight (Pounds):  133


Objective


On vent.


Trach clean.


Cv IRR/IRR


Lungs B ronchi.


Abd SNT. BS +


E Rt. foot diabetic ulcers











Gavino Daigle MD Dec 17, 2018 15:33

## 2018-12-17 NOTE — OPERATIVE NOTE - DICTATED
DATE OF OPERATION:  12/17/2018

PREOPERATIVE DIAGNOSIS:  Respiratory insufficiency requiring prolonged

ventilatory support.



POSTOPERATIVE DIAGNOSIS:  Respiratory insufficiency requiring prolonged

ventilatory support.



OPERATION PERFORMED:  Tracheostomy.



ATTENDING SURGEON:  Bob Lewis M.D.



ASSISTANT:  None.



ANESTHESIOLOGIST:  Marcin Carranza M.D.



ANESTHESIA:  General GTA.



ESTIMATED BLOOD LOSS:  Minimal.



IV FLUIDS:  Please see anesthesia records.



COMPLICATIONS:  None.



DRAINS:  None.



IMPLANTS:  An #8 Romanian Shiley tracheostomy tube.



COUNT:  Sponge and needle count correct x2.



WOUND CLASSIFICATION:  Class I.



ANTIBIOTICS:  The patient is on scheduled IV antibiotics.



INDICATIONS FOR PROCEDURE:  This is a 78-year-old female who has been in

the intensive care unit at [facility] on ventilatory support

for some time now.  The patient has had two prior failed extubations and

continues to fail weaning trials.  Surgery was indicated and recommended.

Risks, benefits, and alternatives were discussed with the patient and

family in detail and consent was obtained for surgery, which was performed

on 12/17/2018.



OPERATIVE NOTE:  The patient was taken to the operating room directly from

the intensive care unit and made comfortable.  All bony prominences were

well padded.  Prior to entering the operating room, the patient already

had an ET tube in place as well as an OG tube, Pabon catheter, and

appropriate lines.  The patient was already on IV antibiotics.

Preoperative time-out was taken identifying the patient, procedure,

operative staff, and surgical staff.  General anesthesia was induced and

the patient was made comfortable.  A shoulder roll was placed.  The neck

was hyperextended.  The neck was prepped and draped in standard surgical

fashion.  Local anesthetic was infiltrated in the proposed skin incision.

A small skin incision was made 2 fingerbreadths above the sternal notch.

All landmarks were identified prior to our incision.  Incision was made

using a fresh #15 scalpel and carried down through the subcutaneous tissue

to the tracheal fascia with blunt dissection and minimal electrocautery.

Once the trachea was identified, the cricoid cartilage, tracheal notch,

and first and second tracheal rings were noted.  The first and second

tracheal rings were exposed and once completely exposed, a Carmen flap was

made in the second tracheal ring.  Following this, the ET tube was clearly

identified and with the assistance of the anesthesiologist in the team, a

tracheal hook was placed for stabilization and the ET tube was slowly

withdrawn until the tip was seen.  At this time, once the hook was seen

above the level of the flap, an 8-Romanian Shiley tracheostomy tube was

inserted under direct visualization without complication.  The balloon was

insufflated and the patient was placed onto the ventilator using the

tracheostomy after cannula was inserted.  Good end-tidal CO2 was noted as

well as good volumes.  The ET tube was withdrawn along with the OG tube.

An NG tube was then placed.  Respiratory secretions were cleared.  The

balloon of the tracheostomy was insufflated and the skin was

reapproximated using 4-0 Monocryl interrupted sutures and tied to the

trachea.  At this time, dressings were applied and we began the conclusion

of the procedure.  The patient tolerated the procedure well and was taken

directly to the intensive care unit in stable condition.









  ______________________________________________

  Bob Lewis M.D.





DR:  KATHERINE

D:  12/17/2018 13:38

T:  12/17/2018 18:50

JOB#:  [id number]

CC:

## 2018-12-17 NOTE — INFECTIOUS DISEASES PROG NOTE
Assessment/Plan


Assessment/Plan


antibiotics : meropenem





A


1. VRE | e.coli UTI s/p rx


2. shock resolved


3. leucocytosis resolved


4. respiratory failure


5. renal failure


6. decubitus ulcers


7. nasal MRSA colonization


8. rectal VRE colonization


9. pleural effusion





P


1. continue meropenem


2. will follow up cultures





Subjective


ROS Limited/Unobtainable:  Yes


Allergies:  


Coded Allergies:  


     No Known Allergies (Unverified , 11/26/18)





Objective


Vital Signs





Last 24 Hour Vital Signs








  Date Time  Temp Pulse Resp B/P (MAP) Pulse Ox O2 Delivery O2 Flow Rate FiO2


 


12/17/18 11:00  90 18 134/34 (67) 93   


 


12/17/18 10:43  117 21     35


 


12/17/18 10:00  93 18 123/14 (50) 93   


 


12/17/18 09:00  77 18 127/38 (67) 100   


 


12/17/18 08:51  65 14     35


 


12/17/18 08:20    122/23    


 


12/17/18 08:00  98      


 


12/17/18 08:00 98.8 68 15 122/23 (56) 100   


 


12/17/18 08:00      Mechanical Ventilator  





      Mechanical Ventilator  





      Mechanical Ventilator  


 


12/17/18 08:00        35


 


12/17/18 07:00  71 12 120/17 (51) 100   


 


12/17/18 06:50  75 15     35


 


12/17/18 06:00  75 15 124/22 (56) 99   


 


12/17/18 05:00  99 21 120/36 (64) 99   


 


12/17/18 04:40  69 15     35


 


12/17/18 04:00 98.9 70 14 116/42 (66) 100   


 


12/17/18 04:00      Mechanical Ventilator  





      Mechanical Ventilator  





      Mechanical Ventilator  


 


12/17/18 04:00        35


 


12/17/18 04:00  70      


 


12/17/18 03:00  69 14 116/31 (59) 99   


 


12/17/18 02:48  74 13     35


 


12/17/18 02:00  79 16 138/35 (69) 91   


 


12/17/18 01:19  72 22     35


 


12/17/18 01:00  74 13 118/31 (60) 99   


 


12/17/18 00:00 98.8 69 14 107/27 (53) 100   


 


12/17/18 00:00      Mechanical Ventilator  





      Mechanical Ventilator  





      Mechanical Ventilator  


 


12/17/18 00:00        35


 


12/17/18 00:00  69      


 


12/16/18 23:00  72 17 108/29 (55) 100   


 


12/16/18 23:00  73 10     35


 


12/16/18 22:00  74 16 107/29 (55) 100   


 


12/16/18 21:26  70 11     35


 


12/16/18 21:13    135/34    


 


12/16/18 21:00  74 16 110/29 (56) 99   


 


12/16/18 20:00  75      


 


12/16/18 20:00        35


 


12/16/18 20:00      Mechanical Ventilator  





      Mechanical Ventilator  





      Mechanical Ventilator  


 


12/16/18 19:59 98.4 83 18 135/34 (67) 93   


 


12/16/18 19:18  81 14     35


 


12/16/18 19:00  81 15 115/34 (61) 95   


 


12/16/18 18:00  72 14 99/25 (49) 95   


 


12/16/18 17:06  74 13     35


 


12/16/18 17:00  76 14 120/33 (62) 100   


 


12/16/18 16:00 98.4 78 19 114/33 (60) 100   


 


12/16/18 16:00        35


 


12/16/18 16:00      Mechanical Ventilator  





      Mechanical Ventilator  





      Mechanical Ventilator  


 


12/16/18 16:00  78      


 


12/16/18 15:24  113 25     35


 


12/16/18 15:00  77 14 140/15 (56) 100   


 


12/16/18 14:00  74 17 113/21 (51) 100   


 


12/16/18 13:00  73 20 105/60 (75) 99   


 


12/16/18 12:53  79 18     35


 


12/16/18 12:00 98.6 80 19 112/60 (77) 99   


 


12/16/18 12:00        35


 


12/16/18 12:00      Mechanical Ventilator  





      Mechanical Ventilator  





      Mechanical Ventilator  


 


12/16/18 12:00  90      








Height (Feet):  5


Height (Inches):  2.00


Weight (Pounds):  133


Respiratory/Chest:  lungs clear


Cardiovascular:  normal rate, regular rhythm, no gallop/murmur


Abdomen:  soft, non tender


Extremities:  no edema, other - PICC line





Laboratory Tests








Test


  12/17/18


04:30


 


White Blood Count


  7.0 K/UL


(4.8-10.8)


 


Red Blood Count


  3.04 M/UL


(4.20-5.40)  L


 


Hemoglobin


  8.5 G/DL


(12.0-16.0)  L


 


Hematocrit


  28.0 %


(37.0-47.0)  L


 


Mean Corpuscular Volume 92 FL (80-99)  


 


Mean Corpuscular Hemoglobin


  27.8 PG


(27.0-31.0)


 


Mean Corpuscular Hemoglobin


Concent 30.2 G/DL


(32.0-36.0)  L


 


Red Cell Distribution Width


  19.2 %


(11.6-14.8)  H


 


Platelet Count


  335 K/UL


(150-450)


 


Mean Platelet Volume


  6.6 FL


(6.5-10.1)


 


Neutrophils (%) (Auto)


  76.8 %


(45.0-75.0)  H


 


Lymphocytes (%) (Auto)


  9.9 %


(20.0-45.0)  L


 


Monocytes (%) (Auto)


  9.5 %


(1.0-10.0)


 


Eosinophils (%) (Auto)


  2.2 %


(0.0-3.0)


 


Basophils (%) (Auto)


  1.6 %


(0.0-2.0)


 


Prothrombin Time


  10.2 SEC


(9.30-11.50)


 


Prothromb Time International


Ratio 1.0 (0.9-1.1)  


 


 


Activated Partial


Thromboplast Time 97 SEC (23-33)


H


 


Sodium Level


  142 MMOL/L


(136-145)


 


Potassium Level


  3.3 MMOL/L


(3.5-5.1)  L


 


Chloride Level


  102 MMOL/L


()


 


Carbon Dioxide Level


  32 MMOL/L


(21-32)


 


Anion Gap


  8 mmol/L


(5-15)


 


Blood Urea Nitrogen


  45 mg/dL


(7-18)  H


 


Creatinine


  3.3 MG/DL


(0.55-1.30)  H


 


Estimat Glomerular Filtration


Rate  mL/min (>60)  


 


 


Glucose Level


  103 MG/DL


()


 


Calcium Level


  8.3 MG/DL


(8.5-10.1)  L


 


Phosphorus Level


  4.7 MG/DL


(2.5-4.9)











Current Medications








 Medications


  (Trade)  Dose


 Ordered  Sig/Tony


 Route


 PRN Reason  Start Time


 Stop Time Status Last Admin


Dose Admin


 


 Acetaminophen


  (Tylenol)  650 mg  Q6H  PRN


 ORAL


 Mild Pain/Temp > 100.5  12/4/18 14:15


 1/3/19 14:14  12/12/18 13:15


 


 


 Amiodarone HCl


  (Cordarone)  200 mg  EVERY 12  HOURS


 NG


   12/8/18 21:00


 1/5/19 13:59  12/17/18 08:16


 


 


 Chlorhexidine


 Gluconate


  (Juana-Hex 2%)  1 applic  DAILY@2000


 TOPIC


   11/27/18 20:00


 12/27/18 19:59  12/16/18 21:12


 


 


 Collagenase


  (Santyl)  1 applic  Q12HR


 TOPIC


   12/12/18 21:00


 1/5/19 20:59  12/17/18 08:20


 


 


 Epoetin Rupesh


  (Procrit (for


 ESRD on dialysis))  10,000 units  MON-WED-FRI


 SUBQ


   11/28/18 21:00


 12/28/18 20:59  12/14/18 20:58


 


 


 Heparin Sodium


  (Porcine)


  (Heparin 5000


 units/ml)  5,000 units  EVERY 12  HOURS


 SUBQ


   12/15/18 21:00


 1/6/19 21:59  12/16/18 08:35


 


 


 Heparin Sodium


  (Porcine)


  (Heparin Sod


 1000 units/ml


 10ml)  2,000 unit  ONCE  PRN


 IV


 DIALYSIS  12/16/18 12:51


 12/17/18 23:59   


 


 


 Iron Sucrose 100


 mg/Sodium Chloride  60 ml @ 


 240 mls/hr  BEDTIME


 IV


   12/13/18 21:00


 12/17/18 21:14  12/16/18 21:12


 


 


 Meropenem 500 mg/


 Sodium Chloride  55 ml @ 


 110 mls/hr  DAILY


 IVPB


   12/11/18 09:00


 12/25/18 08:59  12/17/18 08:16


 


 


 Minoxidil


  (Loniten)  5 mg  Q12HR


 NG


   12/2/18 21:00


 1/1/19 20:59  12/16/18 21:13


 


 


 Pantoprazole


  (Protonix)  40 mg  Q12HR


 IVP


   11/26/18 21:00


 12/27/18 08:59  12/17/18 08:15


 


 


 Sodium Chloride  1,000 ml @ 


 500 mls/hr  Q2H PRN


 IVLG


 sbp<90 during hd  12/16/18 12:48


 12/17/18 23:59   


 


 


 Vitamin D


  (Vitamin D)  1,000 intlu  DAILY


 GT


   12/9/18 10:00


 1/8/19 09:59  12/17/18 08:16


 

















Chase Santillan MD Dec 17, 2018 11:28

## 2018-12-17 NOTE — IMMEDIATE POST-OP EVALUATION
Immediate Post-Op Evalulation


Immediate Post-Op Evalulation


Procedure:  Tracheostomy


Date of Evaluation:  Dec 17, 2018


Time of Evaluation:  13:54


IV Fluids:  300


Blood Products:  none


Estimated Blood Loss:  min


Urinary Output:  none


Blood Pressure Systolic:  103


Blood Pressure Diastolic:  56


Pulse Rate:  98


Respiratory Rate:  20


O2 Sat by Pulse Oximetry:  98


Temperature (Fahrenheit):  97.6


Pain Score (1-10):  1


Nausea:  No


Vomiting:  No


Complications


none


Patient Status:  no response, ventilated, none


Hydration Status:  adequate











Marcin Carranza MD Dec 17, 2018 14:27

## 2018-12-17 NOTE — NUR
NURSE NOTES:

Patient taken down for tracheostomy placement accompanied .RT and MD present during transfer

## 2018-12-17 NOTE — PODIATRIC PROGRESS NOTE
Assessment/Plan


Patient


Juliana Vogt is a 78 year old female who was admitted on Nov 26, 2018 at 10

:32 with





Subjective


Allergies:  


Coded Allergies:  


     No Known Allergies (Unverified , 11/26/18)


Subjective


- Brief Podiatry Progress Note:





- Attempted to see pt at bedside, pt was not present undergoing procedure.





Objective


Exam





Last 24 Hour Vital Signs








  Date Time  Temp Pulse Resp B/P (MAP) Pulse Ox O2 Delivery O2 Flow Rate FiO2


 


12/17/18 12:34  80 15     35


 


12/17/18 12:00 98.4 80 17 118/20 (52) 95   


 


12/17/18 12:00  79      


 


12/17/18 12:00        35


 


12/17/18 12:00      Mechanical Ventilator  





      Mechanical Ventilator  





      Mechanical Ventilator  


 


12/17/18 11:00  90 18 134/34 (67) 93   


 


12/17/18 10:43  117 21     35


 


12/17/18 10:00  93 18 123/14 (50) 93   


 


12/17/18 09:00  77 18 127/38 (67) 100   


 


12/17/18 08:51  65 14     35


 


12/17/18 08:20    122/23    


 


12/17/18 08:00  98      


 


12/17/18 08:00 98.8 68 15 122/23 (56) 100   


 


12/17/18 08:00      Mechanical Ventilator  





      Mechanical Ventilator  





      Mechanical Ventilator  


 


12/17/18 08:00        35


 


12/17/18 07:00  71 12 120/17 (51) 100   


 


12/17/18 06:50  75 15     35


 


12/17/18 06:00  75 15 124/22 (56) 99   


 


12/17/18 05:00  99 21 120/36 (64) 99   


 


12/17/18 04:40  69 15     35


 


12/17/18 04:00 98.9 70 14 116/42 (66) 100   


 


12/17/18 04:00      Mechanical Ventilator  





      Mechanical Ventilator  





      Mechanical Ventilator  


 


12/17/18 04:00        35


 


12/17/18 04:00  70      


 


12/17/18 03:00  69 14 116/31 (59) 99   


 


12/17/18 02:48  74 13     35


 


12/17/18 02:00  79 16 138/35 (69) 91   


 


12/17/18 01:19  72 22     35


 


12/17/18 01:00  74 13 118/31 (60) 99   


 


12/17/18 00:00 98.8 69 14 107/27 (53) 100   


 


12/17/18 00:00      Mechanical Ventilator  





      Mechanical Ventilator  





      Mechanical Ventilator  


 


12/17/18 00:00        35


 


12/17/18 00:00  69      


 


12/16/18 23:00  72 17 108/29 (55) 100   


 


12/16/18 23:00  73 10     35


 


12/16/18 22:00  74 16 107/29 (55) 100   


 


12/16/18 21:26  70 11     35


 


12/16/18 21:13    135/34    


 


12/16/18 21:00  74 16 110/29 (56) 99   


 


12/16/18 20:00  75      


 


12/16/18 20:00        35


 


12/16/18 20:00      Mechanical Ventilator  





      Mechanical Ventilator  





      Mechanical Ventilator  


 


12/16/18 19:59 98.4 83 18 135/34 (67) 93   


 


12/16/18 19:18  81 14     35


 


12/16/18 19:00  81 15 115/34 (61) 95   


 


12/16/18 18:00  72 14 99/25 (49) 95   


 


12/16/18 17:06  74 13     35


 


12/16/18 17:00  76 14 120/33 (62) 100   


 


12/16/18 16:00 98.4 78 19 114/33 (60) 100   


 


12/16/18 16:00        35


 


12/16/18 16:00      Mechanical Ventilator  





      Mechanical Ventilator  





      Mechanical Ventilator  


 


12/16/18 16:00  78      


 


12/16/18 15:24  113 25     35


 


12/16/18 15:00  77 14 140/15 (56) 100   


 


12/16/18 14:00  74 17 113/21 (51) 100   











Laboratory Tests








Test


  12/17/18


04:30


 


White Blood Count


  7.0 K/UL


(4.8-10.8)


 


Red Blood Count


  3.04 M/UL


(4.20-5.40)  L


 


Hemoglobin


  8.5 G/DL


(12.0-16.0)  L


 


Hematocrit


  28.0 %


(37.0-47.0)  L


 


Mean Corpuscular Volume 92 FL (80-99)  


 


Mean Corpuscular Hemoglobin


  27.8 PG


(27.0-31.0)


 


Mean Corpuscular Hemoglobin


Concent 30.2 G/DL


(32.0-36.0)  L


 


Red Cell Distribution Width


  19.2 %


(11.6-14.8)  H


 


Platelet Count


  335 K/UL


(150-450)


 


Mean Platelet Volume


  6.6 FL


(6.5-10.1)


 


Neutrophils (%) (Auto)


  76.8 %


(45.0-75.0)  H


 


Lymphocytes (%) (Auto)


  9.9 %


(20.0-45.0)  L


 


Monocytes (%) (Auto)


  9.5 %


(1.0-10.0)


 


Eosinophils (%) (Auto)


  2.2 %


(0.0-3.0)


 


Basophils (%) (Auto)


  1.6 %


(0.0-2.0)


 


Prothrombin Time


  10.2 SEC


(9.30-11.50)


 


Prothromb Time International


Ratio 1.0 (0.9-1.1)  


 


 


Activated Partial


Thromboplast Time 97 SEC (23-33)


H


 


Sodium Level


  142 MMOL/L


(136-145)


 


Potassium Level


  3.3 MMOL/L


(3.5-5.1)  L


 


Chloride Level


  102 MMOL/L


()


 


Carbon Dioxide Level


  32 MMOL/L


(21-32)


 


Anion Gap


  8 mmol/L


(5-15)


 


Blood Urea Nitrogen


  45 mg/dL


(7-18)  H


 


Creatinine


  3.3 MG/DL


(0.55-1.30)  H


 


Estimat Glomerular Filtration


Rate  mL/min (>60)  


 


 


Glucose Level


  103 MG/DL


()


 


Calcium Level


  8.3 MG/DL


(8.5-10.1)  L


 


Phosphorus Level


  4.7 MG/DL


(2.5-4.9)











Microbiology








 Date/Time


Source Procedure


Growth Status


 


 


 11/26/18 08:45


Blood Blood Culture - Final


NO GROWTH AFTER 5 DAYS Complete





 11/28/18 11:40


Other(Specify in comment) Gram Stain - Final Complete


 


 11/28/18 11:40 Wound Culture - Final


Pseudomonas Aeruginosa


Candida Albicans Complete





 12/4/18 17:30


Sputum Gram Stain - Final Complete


 


 12/4/18 17:30 Sputum Culture - Final


Candida Albicans Complete


 


 12/12/18 20:00


Stool Clostridium difficile Toxin Assay - Final Complete


 


 11/26/18 08:30


Urine,Clean Catch Urine Culture - Final


Escherichia Coli - Esbl


Enterococcus Faecium - Vre Complete





 11/28/18 11:40


Sacral Wound Gram Stain - Final Complete


 


 11/28/18 11:40 Wound Culture - Final


Staphylococcus Aureus - Mrsa


Candida Albicans Complete

















Freddie العراقي DPM Dec 17, 2018 13:20

## 2018-12-17 NOTE — NUR
NURSE NOTES:

Recieved pr in no apparent distress. Awake, alert,  oriented to self follow commands but 
impulsive. S/P Tracheostomy done today with Preciousley #8; site shows no signs of bleeding, no 
drainage. Vent dependent, with settings of AC8  fiO2 .35 peep5, saturating 100%. 
Secretions scanty, but breath sounds diminished with scattered rhonchi. NGT to right nare in 
place, currently clamped; NPO. PICC on ASHISH intact with IVF at TKO. VALE AVgraft with 
moderately strong bruit and thrill. Rectal tube intact; Wound dressings dry and intact. Will 
closely monitor for bleeding and arrythmias

## 2018-12-17 NOTE — NUR
RD ASSESSMENT & RECOMMENDATIONS

SEE CARE ACTIVITY FOR COMPLETE ASSESSMENT



DAILY ESTIMATED NEEDS:

Needs based on Critical care + Advanced Wounds + ESRD/ 60kg 

22-30  kcals/kg 

6068-0407  total kcals

1.5-2.0  g protein/kg

90-120g  g total protein 

Fluid per MD, on HD 



NUTRITION DIAGNOSIS:

* Swallowing difficulty R/T respiratory status as evidenced by pt s/p self

extubation, s/p code blue, now reintubated w/ pending trach placement, on

OGT feeding.

* Increased kcal/prot needs R/T wound healing as evidenced by pt admitted

w/ multiple wounds including stage 4 sacral wound, refer to WC eval.

* Altered nutrition related lab values R/T ESRD dx, clinical condition as

evidenced by elev creat (4.6-> 3.3 low Na (130-> wnl), elev BNP >16596,

low BP, off pressor at this time.





CURRENT TF: Nepro @30ml/hr - HELD FOR TRACH  

 



ENTERAL NUTRITION RECOMMENDATIONS:

Nepro @ 35ml/hr x 24 hrs + Prosource 1pkt TID  to provide 840ml, 1512kcal, 68g + 33g prot, 
611ml free water 



1. As able restart NEPRO @25ml for 6 hrs, 

   advance to goal rate to 35ml/hr x 24 hrs

2. Add Prosource 1 pkt TID to meet increased prot needs







ADDITIONAL RECOMMENDATIONS:

* Calibrated bedscale wt for accurate CBW (pt w/ added air release mattress) 

* Monitor HD stability 

* Wound healing-  Nephrovite x 1, Scotty 1pkt BID 

* Monitor Renal fxn and lytes 

* SSI w/ TF- h/o DM 

.

## 2018-12-17 NOTE — NUR
NURSE NOTES:

Patient returned back from Tracheostomy at 1350, tolerated well the procedure and vital 
signs stable at this time. HOB elevated , NGT inserted on the right nare.Placement checked 
and intact. Kept on close monitoring

-------------------------------------------------------------------------------

Addendum: 12/17/18 at 1749 by Sosa Mejia RN

-------------------------------------------------------------------------------

Son Simon made aware pt returned from Tracheostomy placement and she is stable at this time.

## 2018-12-18 VITALS — DIASTOLIC BLOOD PRESSURE: 40 MMHG | SYSTOLIC BLOOD PRESSURE: 90 MMHG

## 2018-12-18 VITALS — SYSTOLIC BLOOD PRESSURE: 135 MMHG | DIASTOLIC BLOOD PRESSURE: 34 MMHG

## 2018-12-18 VITALS — SYSTOLIC BLOOD PRESSURE: 100 MMHG | DIASTOLIC BLOOD PRESSURE: 19 MMHG

## 2018-12-18 VITALS — SYSTOLIC BLOOD PRESSURE: 102 MMHG | DIASTOLIC BLOOD PRESSURE: 11 MMHG

## 2018-12-18 VITALS — SYSTOLIC BLOOD PRESSURE: 118 MMHG | DIASTOLIC BLOOD PRESSURE: 23 MMHG

## 2018-12-18 VITALS — SYSTOLIC BLOOD PRESSURE: 101 MMHG | DIASTOLIC BLOOD PRESSURE: 17 MMHG

## 2018-12-18 VITALS — DIASTOLIC BLOOD PRESSURE: 39 MMHG | SYSTOLIC BLOOD PRESSURE: 103 MMHG

## 2018-12-18 VITALS — DIASTOLIC BLOOD PRESSURE: 23 MMHG | SYSTOLIC BLOOD PRESSURE: 102 MMHG

## 2018-12-18 VITALS — SYSTOLIC BLOOD PRESSURE: 104 MMHG | DIASTOLIC BLOOD PRESSURE: 12 MMHG

## 2018-12-18 VITALS — DIASTOLIC BLOOD PRESSURE: 29 MMHG | SYSTOLIC BLOOD PRESSURE: 88 MMHG

## 2018-12-18 VITALS — DIASTOLIC BLOOD PRESSURE: 15 MMHG | SYSTOLIC BLOOD PRESSURE: 103 MMHG

## 2018-12-18 VITALS — DIASTOLIC BLOOD PRESSURE: 77 MMHG | SYSTOLIC BLOOD PRESSURE: 111 MMHG

## 2018-12-18 VITALS — DIASTOLIC BLOOD PRESSURE: 23 MMHG | SYSTOLIC BLOOD PRESSURE: 116 MMHG

## 2018-12-18 VITALS — DIASTOLIC BLOOD PRESSURE: 30 MMHG | SYSTOLIC BLOOD PRESSURE: 103 MMHG

## 2018-12-18 VITALS — DIASTOLIC BLOOD PRESSURE: 15 MMHG | SYSTOLIC BLOOD PRESSURE: 105 MMHG

## 2018-12-18 VITALS — DIASTOLIC BLOOD PRESSURE: 24 MMHG | SYSTOLIC BLOOD PRESSURE: 94 MMHG

## 2018-12-18 VITALS — SYSTOLIC BLOOD PRESSURE: 92 MMHG | DIASTOLIC BLOOD PRESSURE: 23 MMHG

## 2018-12-18 VITALS — SYSTOLIC BLOOD PRESSURE: 102 MMHG | DIASTOLIC BLOOD PRESSURE: 42 MMHG

## 2018-12-18 VITALS — SYSTOLIC BLOOD PRESSURE: 110 MMHG | DIASTOLIC BLOOD PRESSURE: 21 MMHG

## 2018-12-18 VITALS — SYSTOLIC BLOOD PRESSURE: 107 MMHG | DIASTOLIC BLOOD PRESSURE: 12 MMHG

## 2018-12-18 VITALS — DIASTOLIC BLOOD PRESSURE: 19 MMHG | SYSTOLIC BLOOD PRESSURE: 101 MMHG

## 2018-12-18 VITALS — SYSTOLIC BLOOD PRESSURE: 110 MMHG | DIASTOLIC BLOOD PRESSURE: 14 MMHG

## 2018-12-18 LAB
ADD MANUAL DIFF: NO
ANION GAP SERPL CALC-SCNC: 7 MMOL/L (ref 5–15)
APTT BLD: 37 SEC (ref 23–33)
BASOPHILS NFR BLD AUTO: 1.3 % (ref 0–2)
BUN SERPL-MCNC: 24 MG/DL (ref 7–18)
CALCIUM SERPL-MCNC: 8 MG/DL (ref 8.5–10.1)
CHLORIDE SERPL-SCNC: 101 MMOL/L (ref 98–107)
CO2 SERPL-SCNC: 31 MMOL/L (ref 21–32)
CREAT SERPL-MCNC: 2.5 MG/DL (ref 0.55–1.3)
EOSINOPHIL NFR BLD AUTO: 1.2 % (ref 0–3)
ERYTHROCYTE [DISTWIDTH] IN BLOOD BY AUTOMATED COUNT: 18.3 % (ref 11.6–14.8)
HCT VFR BLD CALC: 29.2 % (ref 37–47)
HGB BLD-MCNC: 8.9 G/DL (ref 12–16)
INR PPP: 1 (ref 0.9–1.1)
LYMPHOCYTES NFR BLD AUTO: 8.4 % (ref 20–45)
MCV RBC AUTO: 92 FL (ref 80–99)
MONOCYTES NFR BLD AUTO: 8.5 % (ref 1–10)
NEUTROPHILS NFR BLD AUTO: 80.6 % (ref 45–75)
PLATELET # BLD: 367 K/UL (ref 150–450)
POTASSIUM SERPL-SCNC: 3.6 MMOL/L (ref 3.5–5.1)
RBC # BLD AUTO: 3.16 M/UL (ref 4.2–5.4)
SODIUM SERPL-SCNC: 139 MMOL/L (ref 136–145)
WBC # BLD AUTO: 8.5 K/UL (ref 4.8–10.8)

## 2018-12-18 RX ADMIN — CHLORHEXIDINE GLUCONATE SCH APPLIC: 213 SOLUTION TOPICAL at 21:40

## 2018-12-18 RX ADMIN — MEROPENEM SCH MLS/HR: 500 INJECTION INTRAVENOUS at 09:53

## 2018-12-18 RX ADMIN — MINOXIDIL SCH MG: 2.5 TABLET ORAL at 21:00

## 2018-12-18 RX ADMIN — PANTOPRAZOLE SODIUM SCH MG: 40 INJECTION, POWDER, FOR SOLUTION INTRAVENOUS at 21:40

## 2018-12-18 RX ADMIN — HEPARIN SODIUM SCH UNITS: 5000 INJECTION INTRAVENOUS; SUBCUTANEOUS at 21:00

## 2018-12-18 RX ADMIN — AMIODARONE HYDROCHLORIDE SCH MG: 200 TABLET ORAL at 09:52

## 2018-12-18 RX ADMIN — MINOXIDIL SCH MG: 2.5 TABLET ORAL at 09:00

## 2018-12-18 RX ADMIN — PANTOPRAZOLE SODIUM SCH MG: 40 INJECTION, POWDER, FOR SOLUTION INTRAVENOUS at 09:52

## 2018-12-18 RX ADMIN — HEPARIN SODIUM SCH UNITS: 5000 INJECTION INTRAVENOUS; SUBCUTANEOUS at 09:00

## 2018-12-18 RX ADMIN — IPRATROPIUM BROMIDE AND ALBUTEROL SULFATE PRN ML: .5; 3 SOLUTION RESPIRATORY (INHALATION) at 21:33

## 2018-12-18 RX ADMIN — VITAMIN D, TAB 1000IU (100/BT) SCH INTLU: 25 TAB at 09:00

## 2018-12-18 RX ADMIN — AMIODARONE HYDROCHLORIDE SCH MG: 200 TABLET ORAL at 21:41

## 2018-12-18 NOTE — NUR
NURSE NOTES:

Received patient from CAROLYN Mtaute. Patient is awake, alert and oriented, follows commands, 
impulsive at times. On bilateral wrist soft restraints. No skin break noted. S/p 
Tracheostomy with Shiley #8; no signs of bleeding, no drainage. Vent settings: AC 8, , 
FiO2 35, PEEP 5, saturating 99%. SR on monitor. Right nare NGT intact, NPO for possible PEG 
placement. Right upper arm single lumen midline intact, dressing intact, clean and dry. Left 
upper arm graft with bruit and thrill. Wound dressings intact, clean and dry. On P200 
mattress. Rectal tube intact. Call light within reach. Bed in lowest position. Call light 
within reach.

## 2018-12-18 NOTE — NUR
NURSE NOTES:

Sleeping with HOB elevated. Remains NPO, for PEG placement in AM. Trach site shows no signs 
of  bleeding. VSS. Still in and out of afib

## 2018-12-18 NOTE — NUR
NURSE NOTES:

patient pulled out NGT, reinserted NGT to right nare, taped at 60cm. pt tolerated well. KUB 
ordered to confirm placement.

## 2018-12-18 NOTE — DIAGNOSTIC IMAGING REPORT
Indication: Post nasogastric tube placement

 

Technique: One view of the upper abdomen

 

Comparison: 12/12/2028

 

Findings: There is a nasogastric tube demonstrated, tip projected level gastric body.

Visualized bowel gas appears unremarkable. Since of surgical clips are seen in the

epigastric region. There is evidence of bilateral pleural fluid and a tracheostomy

 

Impression: Satisfactory nasogastric intubation

 

Other findings as noted

## 2018-12-18 NOTE — NUR
NURSE NOTES:

Pt's rectal tube was out. Pt had diarrhea x1, pt was cleaned, kept clean and dry. Wound 
dressings were soiled. changed dressing. Patient tolerated well. pt's son at bedside.

## 2018-12-18 NOTE — NUR
NURSE NOTES:

Resumed tube feeding, nepro @15cc/hr via right NGT per order. no residual. pt's son at 
bedside.

## 2018-12-18 NOTE — PULMONOLOGY PROGRESS NOTE
Assessment/Plan


Assessment/Plan


IMPRESSION:


1. Hypotension.


2. Sepsis.


3. Atrial fibrillation.


4. Bradycardia.


5. Diabetes mellitus.


6. End-stage renal disease on dialysis.


7. Left lung collapse. Resolved


8. Respiratory failure. now status post tracheostomy


9. Dysphagia.


10. Dementia.


11. Anemia


12. Pneumonia





DISCUSSION:


1. Continue medications.


2. CXR improved


3. status post tracheostomy


4. Central access


4.  For G-tube today











  ______________________________________________


  Quentin Cardoso M.D.





Subjective


Interval Events:  status post tracheostomy.  For G-tube today.


Constitutional:  Reports: no symptoms


HEENT:  Repors: no symptoms


Respiratory:  Reports: no symptoms


Cardiovascular:  Reports: no symptoms


Gastrointestinal/Abdominal:  Reports: no symptoms


Genitourinary:  Reports: no symptoms


Neurologic:  Reports: no symptoms


Allergies:  


Coded Allergies:  


     No Known Allergies (Unverified , 11/26/18)





Objective





Last 24 Hour Vital Signs








  Date Time  Temp Pulse Resp B/P (MAP) Pulse Ox O2 Delivery O2 Flow Rate FiO2


 


12/18/18 13:00  81 20 94/24 (47) 100   


 


12/18/18 12:57        35


 


12/18/18 12:55  78 16     35


 


12/18/18 12:10  84      


 


12/18/18 12:00 98.8 85 18 118/23 (54) 100   


 


12/18/18 12:00      Mechanical Ventilator  





      Mechanical Ventilator  





      Mechanical Ventilator  


 


12/18/18 11:30        100


 


12/18/18 11:25  88 25     100


 


12/18/18 11:00  111 20 135/34 (67) 100   


 


12/18/18 10:00  83 17 111/77 (88) 100   


 


12/18/18 09:00  87 18 105/15 (45) 94   


 


12/18/18 08:49  85 18     35


 


12/18/18 08:23  85      


 


12/18/18 08:00      Mechanical Ventilator  





      Mechanical Ventilator  





      Mechanical Ventilator  


 


12/18/18 08:00 98.3 85 18 104/12 (42) 94   


 


12/18/18 08:00        35


 


12/18/18 07:25  84 16     35


 


12/18/18 07:04  89 20 116/23 (54) 95   


 


12/18/18 06:28  85 12     


 


12/18/18 06:07  85 12 92/23 (46) 99   


 


12/18/18 05:03  98 13     35


 


12/18/18 05:00  90 20 88/29 (48) 93   


 


12/18/18 04:00        35


 


12/18/18 04:00      Mechanical Ventilator  





      Mechanical Ventilator  





      Mechanical Ventilator  


 


12/18/18 04:00 98.9 78 16 102/11 (41) 94   


 


12/18/18 04:00  78      


 


12/18/18 03:00  81 18 107/12 (43) 96   


 


12/18/18 02:55  84 20     35


 


12/18/18 02:00  84 18 102/23 (49) 94   


 


12/18/18 01:08  85 16     35


 


12/18/18 01:00  84 16 102/42 (62) 99   


 


12/18/18 00:00      Mechanical Ventilator  





      Mechanical Ventilator  





      Mechanical Ventilator  


 


12/18/18 00:00 97.9 88 21 103/30 (54) 93   


 


12/17/18 23:10  118 16     35


 


12/17/18 23:00  111 22 106/17 (46) 95   


 


12/17/18 22:00  83 14 105/14 (44) 97   


 


12/17/18 21:00    109/16    


 


12/17/18 21:00  104 14 104/22 (49) 95   


 


12/17/18 20:35  78 15     35


 


12/17/18 20:05 98.1 78 13 109/16 (47) 94   


 


12/17/18 20:00      Mechanical Ventilator  





      Mechanical Ventilator  





      Mechanical Ventilator  


 


12/17/18 20:00  78      


 


12/17/18 20:00        35


 


12/17/18 19:11  81 17     35


 


12/17/18 19:00  74 12 101/17 (45) 93   


 


12/17/18 18:00      Mechanical Ventilator  





      Mechanical Ventilator  





      Mechanical Ventilator  


 


12/17/18 18:00  79 19 117/76 (90) 91   


 


12/17/18 17:00  82 19 132/28 (62) 91   


 


12/17/18 16:50  85 15     35


 


12/17/18 16:00        35


 


12/17/18 16:00 98.0 79 16 122/84 (97) 95   


 


12/17/18 16:00      Mechanical Ventilator  





      Mechanical Ventilator  





      Mechanical Ventilator  


 


12/17/18 16:00  80      


 


12/17/18 15:30  73 18     35


 


12/17/18 15:00  75 18 100/39 (59) 91   


 


12/17/18 14:27  98 20  98   


 


12/17/18 14:00        35


 


12/17/18 14:00  77 20 98/30 (52) 91   


 


12/17/18 13:57  81 20     35

















Intake and Output  


 


 12/17/18 12/18/18





 19:00 07:00


 


Intake Total 2555 ml 90 ml


 


Output Total 30 ml 30 ml


 


Balance 2525 ml 60 ml


 


  


 


Intake Oral  0 ml


 


IV Total 55 ml 60 ml


 


Hemodialysis 2500 ml 


 


Other  30 ml


 


Stool Total 30 ml 30 ml








General Appearance:  no acute distress


HEENT:  normocephalic, status post trach


Respiratory/Chest:  chest wall non-tender, lungs clear


Cardiovascular:  normal peripheral pulses, normal rate


Abdomen:  normal bowel sounds, soft, non tender


Laboratory Tests


12/18/18 05:00: 


White Blood Count 8.5, Red Blood Count 3.16L, Hemoglobin 8.9L, Hematocrit 29.2L

, Mean Corpuscular Volume 92, Mean Corpuscular Hemoglobin 28.3, Mean 

Corpuscular Hemoglobin Concent 30.6L, Red Cell Distribution Width 18.3H, 

Platelet Count 367, Mean Platelet Volume 6.6, Neutrophils (%) (Auto) 80.6H, 

Lymphocytes (%) (Auto) 8.4L, Monocytes (%) (Auto) 8.5, Eosinophils (%) (Auto) 

1.2, Basophils (%) (Auto) 1.3, Prothrombin Time 10.4, Prothromb Time 

International Ratio 1.0, Activated Partial Thromboplast Time 37H, Sodium Level 

139, Potassium Level 3.6, Chloride Level 101, Carbon Dioxide Level 31, Anion 

Gap 7, Blood Urea Nitrogen 24H, Creatinine 2.5H, Estimat Glomerular Filtration 

Rate , Glucose Level 69L, Calcium Level 8.0L





Current Medications








 Medications


  (Trade)  Dose


 Ordered  Sig/Tony


 Route


 PRN Reason  Start Time


 Stop Time Status Last Admin


Dose Admin


 


 Acetaminophen


  (Tylenol)  650 mg  Q6H  PRN


 ORAL


 Mild Pain/Temp > 100.5  12/4/18 14:15


 1/3/19 14:14  12/18/18 12:36


 


 


 Amiodarone HCl


  (Cordarone)  200 mg  EVERY 12  HOURS


 NG


   12/8/18 21:00


 1/5/19 13:59  12/18/18 09:52


 


 


 Chlorhexidine


 Gluconate


  (Juana-Hex 2%)  1 applic  DAILY@2000


 TOPIC


   11/27/18 20:00


 12/27/18 19:59  12/17/18 20:59


 


 


 Collagenase


  (Santyl)  1 applic  Q12HR


 TOPIC


   12/12/18 21:00


 1/5/19 20:59  12/18/18 09:53


 


 


 Epoetin Rupesh


  (Procrit (for


 ESRD on dialysis))  10,000 units  MON-WED-FRI


 SUBQ


   11/28/18 21:00


 12/28/18 20:59  12/17/18 21:01


 


 


 Heparin Sodium


  (Porcine)


  (Heparin 5000


 units/ml)  5,000 units  EVERY 12  HOURS


 SUBQ


   12/15/18 21:00


 1/6/19 21:59  12/17/18 21:02


 


 


 Meropenem 500 mg/


 Sodium Chloride  55 ml @ 


 110 mls/hr  DAILY


 IVPB


   12/11/18 09:00


 12/25/18 08:59  12/18/18 09:53


 


 


 Minoxidil


  (Loniten)  5 mg  Q12HR


 NG


   12/2/18 21:00


 1/1/19 20:59  12/17/18 21:00


 


 


 Pantoprazole


  (Protonix)  40 mg  Q12HR


 IVP


   11/26/18 21:00


 12/27/18 08:59  12/18/18 09:52


 


 


 Vitamin D


  (Vitamin D)  1,000 intlu  DAILY


 GT


   12/9/18 10:00


 1/8/19 09:59  12/17/18 08:16


 

















Quentin Cardoso MD Dec 18, 2018 13:09

## 2018-12-18 NOTE — GENERAL PROGRESS NOTE
Progress Note


Progress Note


surgery:





trach clean and functional


good volumes


trach tie in place


sutures noted


wound c/d/i


change dressings prn


thank you











Bob Lewis Dec 18, 2018 12:44

## 2018-12-18 NOTE — GI PROGRESS NOTE
Assessment/Plan


Problems:  


(1) Encounter for PEG (percutaneous endoscopic gastrostomy)


ICD Codes:  Z43.1 - Encounter for attention to gastrostomy


SNOMED:  242522666, 328713032


(2) Severe malnutrition


ICD Codes:  E43 - Unspecified severe protein-calorie malnutrition


SNOMED:  15342039


(3) Dehydration


ICD Codes:  E86.0 - Dehydration


SNOMED:  66216505


Status:  unchanged


Status Narrative


Discussed with Dr. Lam


Assessment/Plan


Assessment


- Dysphagia


- Resp failure


- ESRD


- Anemia





Recommendations


Patient scheduled for PEG tomorrow


-Continue tube feedings, n.p.o. at midnight.


-Hold all blood thinners tonight


We will follow with additional recommendations post procedure





The patient was seen and examined at bedside and all new and available data was 

reviewed in the patients chart. I agree with the above findings, impression 

and plan.  (Patient seen earlier today. Signature stamp does not reflect 

patient encounter time.). - Elliott Lam MD





Subjective


Subjective


limited





Objective





Last 24 Hour Vital Signs








  Date Time  Temp Pulse Resp B/P (MAP) Pulse Ox O2 Delivery O2 Flow Rate FiO2


 


12/18/18 14:53  84 19     35


 


12/18/18 14:00  79 14 110/21 (50) 100   


 


12/18/18 13:00  81 20 94/24 (47) 100   


 


12/18/18 12:57        35


 


12/18/18 12:55  78 16     35


 


12/18/18 12:10  84      


 


12/18/18 12:00 98.8 85 18 118/23 (54) 100   


 


12/18/18 12:00      Mechanical Ventilator  





      Mechanical Ventilator  





      Mechanical Ventilator  


 


12/18/18 11:30        100


 


12/18/18 11:25  88 25     100


 


12/18/18 11:00  111 20 135/34 (67) 100   


 


12/18/18 10:00  83 17 111/77 (88) 100   


 


12/18/18 09:00  87 18 105/15 (45) 94   


 


12/18/18 08:49  85 18     35


 


12/18/18 08:23  85      


 


12/18/18 08:00      Mechanical Ventilator  





      Mechanical Ventilator  





      Mechanical Ventilator  


 


12/18/18 08:00 98.3 85 18 104/12 (42) 94   


 


12/18/18 08:00        35


 


12/18/18 07:25  84 16     35


 


12/18/18 07:04  89 20 116/23 (54) 95   


 


12/18/18 06:28  85 12     


 


12/18/18 06:07  85 12 92/23 (46) 99   


 


12/18/18 05:03  98 13     35


 


12/18/18 05:00  90 20 88/29 (48) 93   


 


12/18/18 04:00        35


 


12/18/18 04:00      Mechanical Ventilator  





      Mechanical Ventilator  





      Mechanical Ventilator  


 


12/18/18 04:00 98.9 78 16 102/11 (41) 94   


 


12/18/18 04:00  78      


 


12/18/18 03:00  81 18 107/12 (43) 96   


 


12/18/18 02:55  84 20     35


 


12/18/18 02:00  84 18 102/23 (49) 94   


 


12/18/18 01:08  85 16     35


 


12/18/18 01:00  84 16 102/42 (62) 99   


 


12/18/18 00:00      Mechanical Ventilator  





      Mechanical Ventilator  





      Mechanical Ventilator  


 


12/18/18 00:00 97.9 88 21 103/30 (54) 93   


 


12/17/18 23:10  118 16     35


 


12/17/18 23:00  111 22 106/17 (46) 95   


 


12/17/18 22:00  83 14 105/14 (44) 97   


 


12/17/18 21:00    109/16    


 


12/17/18 21:00  104 14 104/22 (49) 95   


 


12/17/18 20:35  78 15     35


 


12/17/18 20:05 98.1 78 13 109/16 (47) 94   


 


12/17/18 20:00      Mechanical Ventilator  





      Mechanical Ventilator  





      Mechanical Ventilator  


 


12/17/18 20:00  78      


 


12/17/18 20:00        35


 


12/17/18 19:11  81 17     35


 


12/17/18 19:00  74 12 101/17 (45) 93   


 


12/17/18 18:00      Mechanical Ventilator  





      Mechanical Ventilator  





      Mechanical Ventilator  


 


12/17/18 18:00  79 19 117/76 (90) 91   


 


12/17/18 17:00  82 19 132/28 (62) 91   


 


12/17/18 16:50  85 15     35


 


12/17/18 16:00        35


 


12/17/18 16:00 98.0 79 16 122/84 (97) 95   


 


12/17/18 16:00      Mechanical Ventilator  





      Mechanical Ventilator  





      Mechanical Ventilator  


 


12/17/18 16:00  80      


 


12/17/18 15:30  73 18     35

















Intake and Output  


 


 12/17/18 12/18/18





 19:00 07:00


 


Intake Total 2555 ml 90 ml


 


Output Total 30 ml 30 ml


 


Balance 2525 ml 60 ml


 


  


 


Intake Oral  0 ml


 


IV Total 55 ml 60 ml


 


Hemodialysis 2500 ml 


 


Other  30 ml


 


Stool Total 30 ml 30 ml











Laboratory Tests








Test


  12/18/18


05:00


 


White Blood Count


  8.5 K/UL


(4.8-10.8)


 


Red Blood Count


  3.16 M/UL


(4.20-5.40)  L


 


Hemoglobin


  8.9 G/DL


(12.0-16.0)  L


 


Hematocrit


  29.2 %


(37.0-47.0)  L


 


Mean Corpuscular Volume 92 FL (80-99)  


 


Mean Corpuscular Hemoglobin


  28.3 PG


(27.0-31.0)


 


Mean Corpuscular Hemoglobin


Concent 30.6 G/DL


(32.0-36.0)  L


 


Red Cell Distribution Width


  18.3 %


(11.6-14.8)  H


 


Platelet Count


  367 K/UL


(150-450)


 


Mean Platelet Volume


  6.6 FL


(6.5-10.1)


 


Neutrophils (%) (Auto)


  80.6 %


(45.0-75.0)  H


 


Lymphocytes (%) (Auto)


  8.4 %


(20.0-45.0)  L


 


Monocytes (%) (Auto)


  8.5 %


(1.0-10.0)


 


Eosinophils (%) (Auto)


  1.2 %


(0.0-3.0)


 


Basophils (%) (Auto)


  1.3 %


(0.0-2.0)


 


Prothrombin Time


  10.4 SEC


(9.30-11.50)


 


Prothromb Time International


Ratio 1.0 (0.9-1.1)  


 


 


Activated Partial


Thromboplast Time 37 SEC (23-33)


H


 


Sodium Level


  139 MMOL/L


(136-145)


 


Potassium Level


  3.6 MMOL/L


(3.5-5.1)


 


Chloride Level


  101 MMOL/L


()


 


Carbon Dioxide Level


  31 MMOL/L


(21-32)


 


Anion Gap


  7 mmol/L


(5-15)


 


Blood Urea Nitrogen


  24 mg/dL


(7-18)  H


 


Creatinine


  2.5 MG/DL


(0.55-1.30)  H


 


Estimat Glomerular Filtration


Rate  mL/min (>60)  


 


 


Glucose Level


  69 MG/DL


()  L


 


Calcium Level


  8.0 MG/DL


(8.5-10.1)  L








Height (Feet):  5


Height (Inches):  2.00


Weight (Pounds):  134


General Appearance:  WD/WN, no apparent distress, alert


Cardiovascular:  normal rate


Respiratory/Chest:  normal breath sounds, no respiratory distress


Abdominal Exam:  normal bowel sounds, non tender, soft


Extremities:  normal range of motion, non-tender











Shane Lowry NP Dec 18, 2018 15:21

## 2018-12-18 NOTE — INFECTIOUS DISEASES PROG NOTE
Assessment/Plan


Assessment/Plan


A:


Sepsis


UTI


Cellulitis of R leg, osteomyelitis


Hypercapnic respiratory failure


ESRD on HD


DM


Anemia


Dementia


PVD


R pleural effusion


Mucous plug


s/o Cardiac arrest


Stage  IV sacral ulcer


P;


Continue  Meropenem





Subjective


ROS Limited/Unobtainable:  Yes


Respiratory:  Reports: other - had tracheostomy yesterday


Genitourinary:  Reports: other - removed NG tube


Musculoskeletal:  Reports: pain


Allergies:  


Coded Allergies:  


     No Known Allergies (Unverified , 11/26/18)





Objective


Vital Signs





Last 24 Hour Vital Signs








  Date Time  Temp Pulse Resp B/P (MAP) Pulse Ox O2 Delivery O2 Flow Rate FiO2


 


12/18/18 10:00  83 17 111/77 (88) 100   


 


12/18/18 09:00  87 18 105/15 (45) 94   


 


12/18/18 08:49  85 18     35


 


12/18/18 08:23  85      


 


12/18/18 08:00 98.3 85 18 104/12 (42) 94   


 


12/18/18 07:25  84 16     35


 


12/18/18 07:04  89 20 116/23 (54) 95   


 


12/18/18 06:28  85 12     


 


12/18/18 06:07  85 12 92/23 (46) 99   


 


12/18/18 05:03  98 13     35


 


12/18/18 05:00  90 20 88/29 (48) 93   


 


12/18/18 04:00        35


 


12/18/18 04:00      Mechanical Ventilator  





      Mechanical Ventilator  





      Mechanical Ventilator  


 


12/18/18 04:00 98.9 78 16 102/11 (41) 94   


 


12/18/18 04:00  78      


 


12/18/18 03:00  81 18 107/12 (43) 96   


 


12/18/18 02:55  84 20     35


 


12/18/18 02:00  84 18 102/23 (49) 94   


 


12/18/18 01:08  85 16     35


 


12/18/18 01:00  84 16 102/42 (62) 99   


 


12/18/18 00:00      Mechanical Ventilator  





      Mechanical Ventilator  





      Mechanical Ventilator  


 


12/18/18 00:00 97.9 88 21 103/30 (54) 93   


 


12/17/18 23:10  118 16     35


 


12/17/18 23:00  111 22 106/17 (46) 95   


 


12/17/18 22:00  83 14 105/14 (44) 97   


 


12/17/18 21:00    109/16    


 


12/17/18 21:00  104 14 104/22 (49) 95   


 


12/17/18 20:35  78 15     35


 


12/17/18 20:05 98.1 78 13 109/16 (47) 94   


 


12/17/18 20:00      Mechanical Ventilator  





      Mechanical Ventilator  





      Mechanical Ventilator  


 


12/17/18 20:00  78      


 


12/17/18 20:00        35


 


12/17/18 19:11  81 17     35


 


12/17/18 19:00  74 12 101/17 (45) 93   


 


12/17/18 18:00      Mechanical Ventilator  





      Mechanical Ventilator  





      Mechanical Ventilator  


 


12/17/18 18:00  79 19 117/76 (90) 91   


 


12/17/18 17:00  82 19 132/28 (62) 91   


 


12/17/18 16:50  85 15     35


 


12/17/18 16:00        35


 


12/17/18 16:00 98.0 79 16 122/84 (97) 95   


 


12/17/18 16:00      Mechanical Ventilator  





      Mechanical Ventilator  





      Mechanical Ventilator  


 


12/17/18 16:00  80      


 


12/17/18 15:30  73 18     35


 


12/17/18 15:00  75 18 100/39 (59) 91   


 


12/17/18 14:27  98 20  98   


 


12/17/18 14:00        35


 


12/17/18 14:00  77 20 98/30 (52) 91   


 


12/17/18 13:57  81 20     35


 


12/17/18 12:34  80 15     35


 


12/17/18 12:00 98.4 80 17 118/20 (52) 95   


 


12/17/18 12:00  79      


 


12/17/18 12:00        35


 


12/17/18 12:00      Mechanical Ventilator  





      Mechanical Ventilator  





      Mechanical Ventilator  








Height (Feet):  5


Height (Inches):  2.00


Weight (Pounds):  134


General Appearance:  no acute distress


HEENT:  status post trach


Respiratory/Chest:  lungs clear, other - on ventilator


Cardiovascular:  normal rate, other - R arm PICC line


Abdomen:  soft, non tender, other - rectal tube


Extremities:  no edema


Skin:  ulcers


Neurologic/Psychiatric:  alert, responsive


Musculoskeletal:  atrophy





Laboratory Tests








Test


  12/18/18


05:00


 


White Blood Count


  8.5 K/UL


(4.8-10.8)


 


Red Blood Count


  3.16 M/UL


(4.20-5.40)  L


 


Hemoglobin


  8.9 G/DL


(12.0-16.0)  L


 


Hematocrit


  29.2 %


(37.0-47.0)  L


 


Mean Corpuscular Volume 92 FL (80-99)  


 


Mean Corpuscular Hemoglobin


  28.3 PG


(27.0-31.0)


 


Mean Corpuscular Hemoglobin


Concent 30.6 G/DL


(32.0-36.0)  L


 


Red Cell Distribution Width


  18.3 %


(11.6-14.8)  H


 


Platelet Count


  367 K/UL


(150-450)


 


Mean Platelet Volume


  6.6 FL


(6.5-10.1)


 


Neutrophils (%) (Auto)


  80.6 %


(45.0-75.0)  H


 


Lymphocytes (%) (Auto)


  8.4 %


(20.0-45.0)  L


 


Monocytes (%) (Auto)


  8.5 %


(1.0-10.0)


 


Eosinophils (%) (Auto)


  1.2 %


(0.0-3.0)


 


Basophils (%) (Auto)


  1.3 %


(0.0-2.0)


 


Prothrombin Time


  10.4 SEC


(9.30-11.50)


 


Prothromb Time International


Ratio 1.0 (0.9-1.1)  


 


 


Activated Partial


Thromboplast Time 37 SEC (23-33)


H


 


Sodium Level


  139 MMOL/L


(136-145)


 


Potassium Level


  3.6 MMOL/L


(3.5-5.1)


 


Chloride Level


  101 MMOL/L


()


 


Carbon Dioxide Level


  31 MMOL/L


(21-32)


 


Anion Gap


  7 mmol/L


(5-15)


 


Blood Urea Nitrogen


  24 mg/dL


(7-18)  H


 


Creatinine


  2.5 MG/DL


(0.55-1.30)  H


 


Estimat Glomerular Filtration


Rate  mL/min (>60)  


 


 


Glucose Level


  69 MG/DL


()  L


 


Calcium Level


  8.0 MG/DL


(8.5-10.1)  L











Current Medications








 Medications


  (Trade)  Dose


 Ordered  Sig/Tony


 Route


 PRN Reason  Start Time


 Stop Time Status Last Admin


Dose Admin


 


 Acetaminophen


  (Tylenol)  650 mg  Q6H  PRN


 ORAL


 Mild Pain/Temp > 100.5  12/4/18 14:15


 1/3/19 14:14  12/12/18 13:15


 


 


 Amiodarone HCl


  (Cordarone)  200 mg  EVERY 12  HOURS


 NG


   12/8/18 21:00


 1/5/19 13:59  12/18/18 09:52


 


 


 Chlorhexidine


 Gluconate


  (Juana-Hex 2%)  1 applic  DAILY@2000


 TOPIC


   11/27/18 20:00


 12/27/18 19:59  12/17/18 20:59


 


 


 Collagenase


  (Santyl)  1 applic  Q12HR


 TOPIC


   12/12/18 21:00


 1/5/19 20:59  12/18/18 09:53


 


 


 Epoetin Rupesh


  (Procrit (for


 ESRD on dialysis))  10,000 units  MON-WED-FRI


 SUBQ


   11/28/18 21:00


 12/28/18 20:59  12/17/18 21:01


 


 


 Heparin Sodium


  (Porcine)


  (Heparin 5000


 units/ml)  5,000 units  EVERY 12  HOURS


 SUBQ


   12/15/18 21:00


 1/6/19 21:59  12/17/18 21:02


 


 


 Meropenem 500 mg/


 Sodium Chloride  55 ml @ 


 110 mls/hr  DAILY


 IVPB


   12/11/18 09:00


 12/25/18 08:59  12/18/18 09:53


 


 


 Minoxidil


  (Loniten)  5 mg  Q12HR


 NG


   12/2/18 21:00


 1/1/19 20:59  12/17/18 21:00


 


 


 Pantoprazole


  (Protonix)  40 mg  Q12HR


 IVP


   11/26/18 21:00


 12/27/18 08:59  12/18/18 09:52


 


 


 Vitamin D


  (Vitamin D)  1,000 intlu  DAILY


 GT


   12/9/18 10:00


 1/8/19 09:59  12/17/18 08:16


 

















Luke Maynard MD Dec 18, 2018 11:09

## 2018-12-18 NOTE — NEPHROLOGY PROGRESS NOTE
Assessment/Plan


Assessment


Seee below


Plan


MOF resolving.


Sepsis due to infected diabetic ulcers. On IV Abx. Followed by ID, Vasc. Sx, 

Podiatry. Resolving.





ESRD HD q MWF 


Anemia of CKD , SAMANTHA high dose. Transfuse PRN.





A. 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 with LAE. LVEF 65% . Anticoagulate when 

stable. DW Dr. Wade. In and out A. Fib.





Post trach. For PEG tomorrow.


Referred to [place].


See orders.





Subjective


Subjective


On Vent. Alert. Post Trach. Son @ bedside.





Objective


Objective





Last 24 Hour Vital Signs








  Date Time  Temp Pulse Resp B/P (MAP) Pulse Ox O2 Delivery O2 Flow Rate FiO2


 


12/18/18 14:00  79 14 110/21 (50) 100   


 


12/18/18 13:00  81 20 94/24 (47) 100   


 


12/18/18 12:57        35


 


12/18/18 12:55  78 16     35


 


12/18/18 12:10  84      


 


12/18/18 12:00 98.8 85 18 118/23 (54) 100   


 


12/18/18 12:00      Mechanical Ventilator  





      Mechanical Ventilator  





      Mechanical Ventilator  


 


12/18/18 11:30        100


 


12/18/18 11:25  88 25     100


 


12/18/18 11:00  111 20 135/34 (67) 100   


 


12/18/18 10:00  83 17 111/77 (88) 100   


 


12/18/18 09:00  87 18 105/15 (45) 94   


 


12/18/18 08:49  85 18     35


 


12/18/18 08:23  85      


 


12/18/18 08:00      Mechanical Ventilator  





      Mechanical Ventilator  





      Mechanical Ventilator  


 


12/18/18 08:00 98.3 85 18 104/12 (42) 94   


 


12/18/18 08:00        35


 


12/18/18 07:25  84 16     35


 


12/18/18 07:04  89 20 116/23 (54) 95   


 


12/18/18 06:28  85 12     


 


12/18/18 06:07  85 12 92/23 (46) 99   


 


12/18/18 05:03  98 13     35


 


12/18/18 05:00  90 20 88/29 (48) 93   


 


12/18/18 04:00        35


 


12/18/18 04:00      Mechanical Ventilator  





      Mechanical Ventilator  





      Mechanical Ventilator  


 


12/18/18 04:00 98.9 78 16 102/11 (41) 94   


 


12/18/18 04:00  78      


 


12/18/18 03:00  81 18 107/12 (43) 96   


 


12/18/18 02:55  84 20     35


 


12/18/18 02:00  84 18 102/23 (49) 94   


 


12/18/18 01:08  85 16     35


 


12/18/18 01:00  84 16 102/42 (62) 99   


 


12/18/18 00:00      Mechanical Ventilator  





      Mechanical Ventilator  





      Mechanical Ventilator  


 


12/18/18 00:00 97.9 88 21 103/30 (54) 93   


 


12/17/18 23:10  118 16     35


 


12/17/18 23:00  111 22 106/17 (46) 95   


 


12/17/18 22:00  83 14 105/14 (44) 97   


 


12/17/18 21:00    109/16    


 


12/17/18 21:00  104 14 104/22 (49) 95   


 


12/17/18 20:35  78 15     35


 


12/17/18 20:05 98.1 78 13 109/16 (47) 94   


 


12/17/18 20:00      Mechanical Ventilator  





      Mechanical Ventilator  





      Mechanical Ventilator  


 


12/17/18 20:00  78      


 


12/17/18 20:00        35


 


12/17/18 19:11  81 17     35


 


12/17/18 19:00  74 12 101/17 (45) 93   


 


12/17/18 18:00      Mechanical Ventilator  





      Mechanical Ventilator  





      Mechanical Ventilator  


 


12/17/18 18:00  79 19 117/76 (90) 91   


 


12/17/18 17:00  82 19 132/28 (62) 91   


 


12/17/18 16:50  85 15     35


 


12/17/18 16:00        35


 


12/17/18 16:00 98.0 79 16 122/84 (97) 95   


 


12/17/18 16:00      Mechanical Ventilator  





      Mechanical Ventilator  





      Mechanical Ventilator  


 


12/17/18 16:00  80      


 


12/17/18 15:30  73 18     35


 


12/17/18 15:00  75 18 100/39 (59) 91   

















Intake and Output  


 


 12/17/18 12/18/18





 19:00 07:00


 


Intake Total 2555 ml 90 ml


 


Output Total 30 ml 30 ml


 


Balance 2525 ml 60 ml


 


  


 


Intake Oral  0 ml


 


IV Total 55 ml 60 ml


 


Hemodialysis 2500 ml 


 


Other  30 ml


 


Stool Total 30 ml 30 ml








Laboratory Tests


12/18/18 05:00: 


White Blood Count 8.5, Red Blood Count 3.16L, Hemoglobin 8.9L, Hematocrit 29.2L

, Mean Corpuscular Volume 92, Mean Corpuscular Hemoglobin 28.3, Mean 

Corpuscular Hemoglobin Concent 30.6L, Red Cell Distribution Width 18.3H, 

Platelet Count 367, Mean Platelet Volume 6.6, Neutrophils (%) (Auto) 80.6H, 

Lymphocytes (%) (Auto) 8.4L, Monocytes (%) (Auto) 8.5, Eosinophils (%) (Auto) 

1.2, Basophils (%) (Auto) 1.3, Prothrombin Time 10.4, Prothromb Time 

International Ratio 1.0, Activated Partial Thromboplast Time 37H, Sodium Level 

139, Potassium Level 3.6, Chloride Level 101, Carbon Dioxide Level 31, Anion 

Gap 7, Blood Urea Nitrogen 24H, Creatinine 2.5H, Estimat Glomerular Filtration 

Rate , Glucose Level 69L, Calcium Level 8.0L


Height (Feet):  5


Height (Inches):  2.00


Weight (Pounds):  134


Objective


On vent.


Trach clean.


Cv IRR/IRR


Lungs B ronchi.


Abd SNT. BS +


E Rt. foot diabetic ulcers











Gavino Daigle MD Dec 18, 2018 14:38

## 2018-12-18 NOTE — NUR
NURSE NOTES:

Sacral wound dressing was changed by wound care nurse. kept pt clean and dry. patient was 
repositioned. Patient desaturated to 80% after wound care. FiO2 increased to 100% by 
respiratory therapist. Currently saturating at 100%.

## 2018-12-18 NOTE — NUR
Received Patient on Vent Settings of ACVC RR 8, , FIO2 30%, PEEP +5. Patient has 
Shiley 8 tracheostomy tube that is secure and patent. Slight bleeding noted because fresh 
trache. Pt is alert, awake, and follows commands. Bilateral rhonchi heard upon auscultation. 
Suction minimal amounts of thin, clear secretions. Vent plugged into red outlet. Ambubag at 
bedside.Will continue to monitor throughout the day.

## 2018-12-18 NOTE — NUR
NURSE NOTES:

Endorsement received from Summer tilley RN. Patient awake, able to communicated by nodding or 
shaking head. Trache to vent Shiley 8.0 AC 8 Vt 450 PEEP 5 35% FiO2. NGT at right nare, 
ongoing feeding Nepro 15ml/hr, goal of 30 ml/hr. No residual. Placement rechecked per 
auscultation. Right upper arm midline. On p200 mattress. Bilateral soft wrist restraints 
present. Skin warm and dry. Head of bed elevated. Bed locked and in low position.

## 2018-12-18 NOTE — NUR
NURSE NOTES:

Awake, alert, no distress. Complete bed bath done. Wound dressings reinforced. Small amt of 
liquid stools via rectal tube.. Afebrile. Denies pain. Trach site intact; no bleeding

## 2018-12-19 VITALS — SYSTOLIC BLOOD PRESSURE: 120 MMHG | DIASTOLIC BLOOD PRESSURE: 19 MMHG

## 2018-12-19 VITALS — DIASTOLIC BLOOD PRESSURE: 35 MMHG | SYSTOLIC BLOOD PRESSURE: 112 MMHG

## 2018-12-19 VITALS — DIASTOLIC BLOOD PRESSURE: 19 MMHG | SYSTOLIC BLOOD PRESSURE: 116 MMHG

## 2018-12-19 VITALS — DIASTOLIC BLOOD PRESSURE: 12 MMHG | SYSTOLIC BLOOD PRESSURE: 107 MMHG

## 2018-12-19 VITALS — SYSTOLIC BLOOD PRESSURE: 109 MMHG | DIASTOLIC BLOOD PRESSURE: 22 MMHG

## 2018-12-19 VITALS — DIASTOLIC BLOOD PRESSURE: 25 MMHG | SYSTOLIC BLOOD PRESSURE: 105 MMHG

## 2018-12-19 VITALS — DIASTOLIC BLOOD PRESSURE: 19 MMHG | SYSTOLIC BLOOD PRESSURE: 115 MMHG

## 2018-12-19 VITALS — DIASTOLIC BLOOD PRESSURE: 50 MMHG | SYSTOLIC BLOOD PRESSURE: 90 MMHG

## 2018-12-19 VITALS — SYSTOLIC BLOOD PRESSURE: 119 MMHG | DIASTOLIC BLOOD PRESSURE: 19 MMHG

## 2018-12-19 VITALS — SYSTOLIC BLOOD PRESSURE: 110 MMHG | DIASTOLIC BLOOD PRESSURE: 40 MMHG

## 2018-12-19 VITALS — SYSTOLIC BLOOD PRESSURE: 136 MMHG | DIASTOLIC BLOOD PRESSURE: 18 MMHG

## 2018-12-19 VITALS — DIASTOLIC BLOOD PRESSURE: 19 MMHG | SYSTOLIC BLOOD PRESSURE: 136 MMHG

## 2018-12-19 VITALS — DIASTOLIC BLOOD PRESSURE: 38 MMHG | SYSTOLIC BLOOD PRESSURE: 115 MMHG

## 2018-12-19 VITALS — SYSTOLIC BLOOD PRESSURE: 102 MMHG | DIASTOLIC BLOOD PRESSURE: 33 MMHG

## 2018-12-19 VITALS — SYSTOLIC BLOOD PRESSURE: 107 MMHG | DIASTOLIC BLOOD PRESSURE: 25 MMHG

## 2018-12-19 VITALS — SYSTOLIC BLOOD PRESSURE: 105 MMHG | DIASTOLIC BLOOD PRESSURE: 25 MMHG

## 2018-12-19 VITALS — SYSTOLIC BLOOD PRESSURE: 100 MMHG | DIASTOLIC BLOOD PRESSURE: 33 MMHG

## 2018-12-19 VITALS — SYSTOLIC BLOOD PRESSURE: 110 MMHG | DIASTOLIC BLOOD PRESSURE: 30 MMHG

## 2018-12-19 VITALS — SYSTOLIC BLOOD PRESSURE: 136 MMHG | DIASTOLIC BLOOD PRESSURE: 51 MMHG

## 2018-12-19 VITALS — DIASTOLIC BLOOD PRESSURE: 40 MMHG | SYSTOLIC BLOOD PRESSURE: 109 MMHG

## 2018-12-19 VITALS — DIASTOLIC BLOOD PRESSURE: 18 MMHG | SYSTOLIC BLOOD PRESSURE: 118 MMHG

## 2018-12-19 VITALS — DIASTOLIC BLOOD PRESSURE: 41 MMHG | SYSTOLIC BLOOD PRESSURE: 95 MMHG

## 2018-12-19 LAB
ADD MANUAL DIFF: NO
ALBUMIN SERPL-MCNC: 1.6 G/DL (ref 3.4–5)
ALBUMIN/GLOB SERPL: 0.3 {RATIO} (ref 1–2.7)
ALP SERPL-CCNC: 101 U/L (ref 46–116)
ALT SERPL-CCNC: 9 U/L (ref 12–78)
ANION GAP SERPL CALC-SCNC: 14 MMOL/L (ref 5–15)
APTT BLD: 36 SEC (ref 23–33)
AST SERPL-CCNC: 18 U/L (ref 15–37)
BASOPHILS NFR BLD AUTO: 0.7 % (ref 0–2)
BILIRUB SERPL-MCNC: 0.4 MG/DL (ref 0.2–1)
BUN SERPL-MCNC: 30 MG/DL (ref 7–18)
CALCIUM SERPL-MCNC: 8 MG/DL (ref 8.5–10.1)
CHLORIDE SERPL-SCNC: 101 MMOL/L (ref 98–107)
CO2 SERPL-SCNC: 26 MMOL/L (ref 21–32)
CREAT SERPL-MCNC: 3 MG/DL (ref 0.55–1.3)
EOSINOPHIL NFR BLD AUTO: 0.5 % (ref 0–3)
ERYTHROCYTE [DISTWIDTH] IN BLOOD BY AUTOMATED COUNT: 18.9 % (ref 11.6–14.8)
GLOBULIN SER-MCNC: 5.5 G/DL
HCT VFR BLD CALC: 28.7 % (ref 37–47)
HGB BLD-MCNC: 8.8 G/DL (ref 12–16)
INR PPP: 1 (ref 0.9–1.1)
LYMPHOCYTES NFR BLD AUTO: 4.8 % (ref 20–45)
MCV RBC AUTO: 96 FL (ref 80–99)
MONOCYTES NFR BLD AUTO: 9.8 % (ref 1–10)
NEUTROPHILS NFR BLD AUTO: 84.1 % (ref 45–75)
PLATELET # BLD: 451 K/UL (ref 150–450)
POTASSIUM SERPL-SCNC: 3.2 MMOL/L (ref 3.5–5.1)
RBC # BLD AUTO: 3 M/UL (ref 4.2–5.4)
SODIUM SERPL-SCNC: 141 MMOL/L (ref 136–145)
WBC # BLD AUTO: 12.6 K/UL (ref 4.8–10.8)

## 2018-12-19 PROCEDURE — 0DH63UZ INSERTION OF FEEDING DEVICE INTO STOMACH, PERCUTANEOUS APPROACH: ICD-10-PCS

## 2018-12-19 RX ADMIN — AMIODARONE HYDROCHLORIDE SCH MG: 200 TABLET ORAL at 07:51

## 2018-12-19 RX ADMIN — CHLORHEXIDINE GLUCONATE SCH APPLIC: 213 SOLUTION TOPICAL at 20:00

## 2018-12-19 RX ADMIN — MINOXIDIL SCH MG: 2.5 TABLET ORAL at 07:51

## 2018-12-19 RX ADMIN — AMIODARONE HYDROCHLORIDE SCH MG: 200 TABLET ORAL at 21:01

## 2018-12-19 RX ADMIN — PANTOPRAZOLE SODIUM SCH MG: 40 INJECTION, POWDER, FOR SOLUTION INTRAVENOUS at 21:01

## 2018-12-19 RX ADMIN — DEXTROSE AND SODIUM CHLORIDE SCH MLS/HR: 5; .45 INJECTION, SOLUTION INTRAVENOUS at 09:00

## 2018-12-19 RX ADMIN — MEROPENEM SCH MLS/HR: 500 INJECTION INTRAVENOUS at 07:52

## 2018-12-19 RX ADMIN — VITAMIN D, TAB 1000IU (100/BT) SCH INTLU: 25 TAB at 07:50

## 2018-12-19 RX ADMIN — ERYTHROPOIETIN SCH UNITS: 20000 INJECTION, SOLUTION INTRAVENOUS; SUBCUTANEOUS at 21:01

## 2018-12-19 RX ADMIN — PANTOPRAZOLE SODIUM SCH MG: 40 INJECTION, POWDER, FOR SOLUTION INTRAVENOUS at 07:51

## 2018-12-19 RX ADMIN — HEPARIN SODIUM SCH UNITS: 5000 INJECTION INTRAVENOUS; SUBCUTANEOUS at 21:02

## 2018-12-19 RX ADMIN — HEPARIN SODIUM SCH UNITS: 5000 INJECTION INTRAVENOUS; SUBCUTANEOUS at 07:52

## 2018-12-19 RX ADMIN — MINOXIDIL SCH MG: 2.5 TABLET ORAL at 21:01

## 2018-12-19 NOTE — CARDIOLOGY PROGRESS NOTE
Assessment/Plan


Assessment/Plan


1. Hypotension, possibly sepsis versus volume.


2. Paroxysmal episodes of atrial fibrillation history.


3. Bradycardia secondary to medications, amiodarone possibly.


4. Diabetes mellitus.


5. End-stage renal disease, on hemodialysis.


6. Lung collapse.


7. Respiratory failure, status post intubation.


8. Dysphagia.


9. History of dementia.


10. Pulm htn 


11.  Pleural effusion 


12. cardaic arrest


13. chest wall pain post cpr  





in and out afib amido now 200 bid  on hold


stool ob neg x2 


amiod bid


off labetolol for now if need hydralaize to be used 


vent support 


s/p trach


peg done 


tele reviewed agian intermittent tachy but at this time is in sinus





Subjective


ROS Limited/Unobtainable:  Yes


Subjective


on the vent not verba , denies cp or dizziness has sob





Objective





Last 24 Hour Vital Signs








  Date Time  Temp Pulse Resp B/P (MAP) Pulse Ox O2 Delivery O2 Flow Rate FiO2


 


12/19/18 19:00  91 16 109/22 (51) 96   


 


12/19/18 18:52  80 19     35


 


12/19/18 18:00  92 15 115/38 (63) 96   


 


12/19/18 17:12  79 14     35


 


12/19/18 17:00  95 15 110/40 (63) 96   


 


12/19/18 16:00      Trach Collar  





      Trach Collar  





      Mechanical Ventilator  


 


12/19/18 16:00 98.1 98 16 105/25 (51) 100   


 


12/19/18 16:00  92      


 


12/19/18 16:00        35


 


12/19/18 15:00  95 15 109/40 (63) 96   


 


12/19/18 14:00  90 15 112/35 (60) 97   


 


12/19/18 13:05  112 20     35


 


12/19/18 13:00  95 15 100/33 (55) 95   


 


12/19/18 12:00 98.1 100 16 107/25 (52) 100   


 


12/19/18 12:00  89      


 


12/19/18 12:00      Trach Collar  





      Trach Collar  





      Mechanical Ventilator  


 


12/19/18 12:00        35


 


12/19/18 11:25  81 19     35


 


12/19/18 11:23  86 11  100   


 


12/19/18 11:00  99 15 110/30 (56) 95   


 


12/19/18 10:00  95 15 102/33 (56) 95   


 


12/19/18 09:00  96 15 107/12 (43) 95   


 


12/19/18 08:58  102 21     35


 


12/19/18 08:00 98.1 100 16 105/25 (51) 100   


 


12/19/18 08:00  101      


 


12/19/18 08:00        35


 


12/19/18 08:00      Mechanical Ventilator  





      Mechanical Ventilator  





      Mechanical Ventilator  


 


12/19/18 07:16  104 20     35


 


12/19/18 07:00  90 15 110/30 (56) 95   


 


12/19/18 06:00  90 15 107/12 (43) 95   


 


12/19/18 05:01  91 17     35


 


12/19/18 05:00  103 19 95/41 (59) 100   


 


12/19/18 04:00 98.1 101 16 118/18 (51) 100   


 


12/19/18 04:00        35


 


12/19/18 04:00      Mechanical Ventilator  





      Mechanical Ventilator  





      Mechanical Ventilator  


 


12/19/18 04:00  105      


 


12/19/18 03:00  87 16 116/19 (51) 100   


 


12/19/18 02:54  86 14     35


 


12/19/18 02:00  90 15 115/19 (51) 98   


 


12/19/18 01:00  92 14 90/50 (63) 96   


 


12/19/18 00:49  90 20     35


 


12/19/18 00:00  88      


 


12/19/18 00:00  99 19 136/51 (79) 95   


 


12/19/18 00:00      Mechanical Ventilator  





      Mechanical Ventilator  





      Mechanical Ventilator  


 


12/19/18 00:00        35


 


12/18/18 23:23  92 21     35


 


12/18/18 23:00  90 18 101/17 (45) 96   


 


12/18/18 22:00  95 20 90/40 (57) 96   


 


12/18/18 21:48  94 18  96 Mechanical Ventilator  35


 


12/18/18 21:37  96 20  93 Mechanical Ventilator  35


 


12/18/18 21:24  91 21     35


 


12/18/18 21:00    90/19    


 


12/18/18 21:00  91 19 100/19 (46) 96   


 


12/18/18 20:00 98.0 89 17 101/17 (45) 98   


 


12/18/18 20:00  77      


 


12/18/18 20:00      Mechanical Ventilator  





      Mechanical Ventilator  





      Mechanical Ventilator  


 


12/18/18 20:00        35








General Appearance:  no apparent distress, on vent, patient on isolation


Neck:  no JVD


Cardiovascular:  normal rate


Respiratory/Chest:  lungs clear, normal breath sounds


Abdomen:  soft


Extremities:  moderate edema











Intake and Output  


 


 12/18/18 12/19/18





 19:00 07:00


 


Intake Total 160 ml 135 ml


 


Output Total 1 ml 


 


Balance 159 ml 135 ml


 


  


 


Intake Oral 0 ml 


 


Free Water 30 ml 60 ml


 


IV Total 55 ml 


 


Tube Feeding 75 ml 75 ml


 


Stool Total 1 ml 


 


# Bowel Movements 1 1











Laboratory Tests








Test


  12/19/18


04:00


 


White Blood Count


  12.6 K/UL


(4.8-10.8)  H


 


Red Blood Count


  3.00 M/UL


(4.20-5.40)  L


 


Hemoglobin


  8.8 G/DL


(12.0-16.0)  L


 


Hematocrit


  28.7 %


(37.0-47.0)  L


 


Mean Corpuscular Volume 96 FL (80-99)  


 


Mean Corpuscular Hemoglobin


  29.5 PG


(27.0-31.0)


 


Mean Corpuscular Hemoglobin


Concent 30.8 G/DL


(32.0-36.0)  L


 


Red Cell Distribution Width


  18.9 %


(11.6-14.8)  H


 


Platelet Count


  451 K/UL


(150-450)  H


 


Mean Platelet Volume


  6.7 FL


(6.5-10.1)


 


Neutrophils (%) (Auto)


  84.1 %


(45.0-75.0)  H


 


Lymphocytes (%) (Auto)


  4.8 %


(20.0-45.0)  L


 


Monocytes (%) (Auto)


  9.8 %


(1.0-10.0)


 


Eosinophils (%) (Auto)


  0.5 %


(0.0-3.0)


 


Basophils (%) (Auto)


  0.7 %


(0.0-2.0)


 


Prothrombin Time


  10.7 SEC


(9.30-11.50)


 


Prothromb Time International


Ratio 1.0 (0.9-1.1)  


 


 


Activated Partial


Thromboplast Time 36 SEC (23-33)


H


 


Sodium Level


  141 MMOL/L


(136-145)


 


Potassium Level


  3.2 MMOL/L


(3.5-5.1)  L


 


Chloride Level


  101 MMOL/L


()


 


Carbon Dioxide Level


  26 MMOL/L


(21-32)


 


Anion Gap


  14 mmol/L


(5-15)


 


Blood Urea Nitrogen


  30 mg/dL


(7-18)  H


 


Creatinine


  3.0 MG/DL


(0.55-1.30)  H


 


Estimat Glomerular Filtration


Rate  mL/min (>60)  


 


 


Glucose Level


  68 MG/DL


()  L


 


Calcium Level


  8.0 MG/DL


(8.5-10.1)  L


 


Total Bilirubin


  0.4 MG/DL


(0.2-1.0)


 


Aspartate Amino Transf


(AST/SGOT) 18 U/L (15-37)


 


 


Alanine Aminotransferase


(ALT/SGPT) 9 U/L (12-78)


L


 


Alkaline Phosphatase


  101 U/L


()


 


Total Protein


  7.1 G/DL


(6.4-8.2)


 


Albumin


  1.6 G/DL


(3.4-5.0)  L


 


Globulin 5.5 g/dL  


 


Albumin/Globulin Ratio


  0.3 (1.0-2.7)


L

















Mariusz Wdae MD Dec 19, 2018 19:08

## 2018-12-19 NOTE — NUR
NURSE NOTES:

Turned, repositioned patient, oral care provided. No s/s of acute distress. will continue 
plan of care.

## 2018-12-19 NOTE — NUR
RESPIRATORY NOTE: Received pt on trache size 8 Shiley, cuffed with current vent settings: AC 
8- 450ml-35%-peep of 5, saturates at 98%., tachycardia , RR 20bpm. Pt is stable, no 
SOB or acute resp distress noted. Alarms are set and audible, vent is plugged into the red 
outlet, and  ambu bag is at bedside. Will continue to monitor pt.

## 2018-12-19 NOTE — NUR
NURSE NOTES:

Patient passed liquid brown stool. Rectal tube inserted. Bed bath, change of dressings, oral 
care done.

## 2018-12-19 NOTE — NEPHROLOGY PROGRESS NOTE
Assessment/Plan


Assessment


Seee below


Plan


MOF resolving.


Sepsis due to infected diabetic ulcers. On IV Abx. Followed by ID, Vasc. Sx, 

Podiatry. Resolving.





ESRD HD q MWF 


Anemia of CKD , SAMANTHA high dose. Transfuse PRN.





A. 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 with LAE. LVEF 65% . Anticoagulate when 

stable. DW Dr. Wade. In and out A. Fib.





Post trach. For PEG now.


Referred to [facility].


See orders.


Son informed me his consent for PEG.





Subjective


Subjective


On Vent. Alert. Post Trach. Having PEG.





Objective


Objective





Last 24 Hour Vital Signs








  Date Time  Temp Pulse Resp B/P (MAP) Pulse Ox O2 Delivery O2 Flow Rate FiO2


 


12/19/18 07:16  104 20     35


 


12/19/18 06:00  90 15 107/12 (43) 95   


 


12/19/18 05:01  91 17     35


 


12/19/18 05:00  103 19 95/41 (59) 100   


 


12/19/18 04:00 98.1 101 16 118/18 (51) 100   


 


12/19/18 04:00        35


 


12/19/18 04:00      Mechanical Ventilator  





      Mechanical Ventilator  





      Mechanical Ventilator  


 


12/19/18 04:00  105      


 


12/19/18 03:00  87 16 116/19 (51) 100   


 


12/19/18 02:54  86 14     35


 


12/19/18 02:00  90 15 115/19 (51) 98   


 


12/19/18 01:00  92 14 90/50 (63) 96   


 


12/19/18 00:49  90 20     35


 


12/19/18 00:00  88      


 


12/19/18 00:00  99 19 136/51 (79) 95   


 


12/19/18 00:00      Mechanical Ventilator  





      Mechanical Ventilator  





      Mechanical Ventilator  


 


12/19/18 00:00        35


 


12/18/18 23:23  92 21     35


 


12/18/18 23:00  90 18 101/17 (45) 96   


 


12/18/18 22:00  95 20 90/40 (57) 96   


 


12/18/18 21:48  94 18  96 Mechanical Ventilator  35


 


12/18/18 21:37  96 20  93 Mechanical Ventilator  35


 


12/18/18 21:24  91 21     35


 


12/18/18 21:00    90/19    


 


12/18/18 21:00  91 19 100/19 (46) 96   


 


12/18/18 20:00 98.0 89 17 101/17 (45) 98   


 


12/18/18 20:00  77      


 


12/18/18 20:00      Mechanical Ventilator  





      Mechanical Ventilator  





      Mechanical Ventilator  


 


12/18/18 20:00        35


 


12/18/18 19:05  92 19     35


 


12/18/18 19:00  92 15 101/17 (45) 96   


 


12/18/18 18:00  89 16 101/19 (46) 93   


 


12/18/18 17:30      Mechanical Ventilator  





      Mechanical Ventilator  





      Mechanical Ventilator  


 


12/18/18 17:14  82 19     35


 


12/18/18 17:00 98.4 86 16 103/15 (44) 95   


 


12/18/18 16:11  87      


 


12/18/18 16:00        35


 


12/18/18 16:00      Mechanical Ventilator  





      Mechanical Ventilator  





      Mechanical Ventilator  


 


12/18/18 16:00  89 18 103/39 (60) 95   


 


12/18/18 15:00  84 18 110/14 (46) 100   


 


12/18/18 14:53  84 19     35


 


12/18/18 14:00  79 14 110/21 (50) 100   


 


12/18/18 13:00  81 20 94/24 (47) 100   


 


12/18/18 12:57        35


 


12/18/18 12:55  78 16     35


 


12/18/18 12:10  84      


 


12/18/18 12:00 98.8 85 18 118/23 (54) 100   


 


12/18/18 12:00      Mechanical Ventilator  





      Mechanical Ventilator  





      Mechanical Ventilator  


 


12/18/18 11:30        100


 


12/18/18 11:25  88 25     100


 


12/18/18 11:00  111 20 135/34 (67) 100   


 


12/18/18 10:00  83 17 111/77 (88) 100   


 


12/18/18 09:00  87 18 105/15 (45) 94   

















Intake and Output  


 


 12/18/18 12/19/18





 19:00 07:00


 


Intake Total 160 ml 135 ml


 


Output Total 1 ml 


 


Balance 159 ml 135 ml


 


  


 


Intake Oral 0 ml 


 


Free Water 30 ml 60 ml


 


IV Total 55 ml 


 


Tube Feeding 75 ml 75 ml


 


Stool Total 1 ml 


 


# Bowel Movements 1 1








Laboratory Tests


12/19/18 04:00: 


White Blood Count 12.6H, Red Blood Count 3.00L, Hemoglobin 8.8L, Hematocrit 

28.7L, Mean Corpuscular Volume 96, Mean Corpuscular Hemoglobin 29.5, Mean 

Corpuscular Hemoglobin Concent 30.8L, Red Cell Distribution Width 18.9H, 

Platelet Count 451H, Mean Platelet Volume 6.7, Neutrophils (%) (Auto) 84.1H, 

Lymphocytes (%) (Auto) 4.8L, Monocytes (%) (Auto) 9.8, Eosinophils (%) (Auto) 

0.5, Basophils (%) (Auto) 0.7, Prothrombin Time 10.7, Prothromb Time 

International Ratio 1.0, Activated Partial Thromboplast Time 36H, Sodium Level 

141, Potassium Level 3.2L, Chloride Level 101, Carbon Dioxide Level 26, Anion 

Gap 14, Blood Urea Nitrogen 30H, Creatinine 3.0H, Estimat Glomerular Filtration 

Rate , Glucose Level 68L, Calcium Level 8.0L, Total Bilirubin 0.4, Aspartate 

Amino Transf (AST/SGOT) 18, Alanine Aminotransferase (ALT/SGPT) 9L, Alkaline 

Phosphatase 101, Total Protein 7.1, Albumin 1.6L, Globulin 5.5, Albumin/

Globulin Ratio 0.3L


Height (Feet):  5


Height (Inches):  2.00


Weight (Pounds):  110


Objective


On vent.


Trach clean.


Cv IRR/IRR


Lungs B ronchi.


Abd SNT. BS +


E Rt. foot diabetic ulcers











Gavino Daigle MD Dec 19, 2018 08:57

## 2018-12-19 NOTE — PRE-PROCEDURE NOTE/ATTESTATION
Pre-Procedure Note/Attestation


Complete Prior to Procedure


Planned Procedure:  not applicable


Procedure Narrative:


egd/peg





Indications for Procedure


Pre-Operative Diagnosis:


dysphagia





Attestation


I attest that I discussed the nature of the procedure; its benefits; risks and 

complications; and alternatives (and the risks and benefits of such alternatives

), prior to the procedure, with the patient (or the patient's legal 

representative).





I attest that, if there was a reasonable possibility of needing a blood 

transfusion, the patient (or the patient's legal representative) was given the 

[facility] standardized written summary, pursuant 

to the [employer] [place] Blood Safety Act ([place] Health and Safety Code # 1645, as 

amended).





I attest that I re-evaluated the patient just prior to the surgery and that 

there has been no change in the patient's H&P, except as documented below:











Elliott Lam MD Dec 19, 2018 08:52

## 2018-12-19 NOTE — ANETHESIA PREOPERATIVE EVAL
Anesthesia Pre-op PMH/ROS


General


Date of Evaluation:  Dec 19, 2018


Time of Evaluation:  06:33


Anesthesiologist:  [place]


ASA Score:  ASA 4


Mallampati Score


Class I : Soft palate, uvula, fauces, pillars visible


Class II: Soft palate, uvula, fauces visible


Class III: Soft palate, base of uvula visible


Class IV: Only hard plate visible


Mallampati Classification:  Class III - Intubated on vent


Surgeon:  echo


Diagnosis:  dysphagia, severe malnutrition


Surgical Procedure:  peg placement


Anesthesia History:  none


Social History:  smoking - nonsmoker


Family History:  no anesthesia problems


Allergies:  


Coded Allergies:  


     No Known Allergies (Unverified , 11/26/18)


Medications:  see eMAR


Patient NPO?:  Yes


NPO Date:  Dec 17, 2018


NPO Time:  0000





Past Medical History


Cardiovascular:  Reports: HTN, arrhythmia


Pulmonary:  Reports: COPD, other - bipap


Gastrointestinal/Genitourinary:  Reports: GERD, ESRD, other - pyelonephritis, 

nephrectomy, uti,


Neurologic/Psychiatric:  Reports: dementia, depression/anxiety, other - ams


Endocrine:  Reports: DM


Hematology/Immune:  Reports: other - sepsis, cancer


Musculoskeletal/Integumentary:  Reports: other - bilateral knee sx


PSxH Narrative:


tracheostomy, nephrectomy, cholecystectomy, bilateral knee sx





Anesthesia Pre-op Phys. Exam


Physician Exam





Last Vital Signs








  Date Time  Temp Pulse Resp B/P (MAP) Pulse Ox O2 Delivery O2 Flow Rate FiO2


 


12/19/18 06:00  90 15 107/12 (43) 95   


 


12/19/18 05:01        35


 


12/19/18 04:00 98.1       


 


12/19/18 04:00      Mechanical Ventilator  





      Mechanical Ventilator  





      Mechanical Ventilator  


 


12/10/18 08:00       12.0 





       15.0 





       12.0 








Constitutional:  NAD


Neurologic:  CN 2-12 intact


Cardiovascular:  other - tracheostomy, ventilator dependent


Respiratory:  CTA


Gastrointestinal:  S/NT/ND





Airway Exam


Mallampati Score:  Class III


MO:  limited


Neck:  stiff tracheostomy


TMD:  2fb


ROM:  limited


Teeth:  missing





Anesthesia Pre-op A/P


Labs





Hematology








Test


  12/19/18


04:00


 


White Blood Count


  12.6 K/UL


(4.8-10.8)  H


 


Red Blood Count


  3.00 M/UL


(4.20-5.40)  L


 


Hemoglobin


  8.8 G/DL


(12.0-16.0)  L


 


Hematocrit


  28.7 %


(37.0-47.0)  L


 


Mean Corpuscular Volume 96 FL (80-99)  


 


Mean Corpuscular Hemoglobin


  29.5 PG


(27.0-31.0)


 


Mean Corpuscular Hemoglobin


Concent 30.8 G/DL


(32.0-36.0)  L


 


Red Cell Distribution Width


  18.9 %


(11.6-14.8)  H


 


Platelet Count


  451 K/UL


(150-450)  H


 


Mean Platelet Volume


  6.7 FL


(6.5-10.1)


 


Neutrophils (%) (Auto)


  84.1 %


(45.0-75.0)  H


 


Lymphocytes (%) (Auto)


  4.8 %


(20.0-45.0)  L


 


Monocytes (%) (Auto)


  9.8 %


(1.0-10.0)


 


Eosinophils (%) (Auto)


  0.5 %


(0.0-3.0)


 


Basophils (%) (Auto)


  0.7 %


(0.0-2.0)








Coagulation








Test


  12/19/18


04:00


 


Prothrombin Time


  10.7 SEC


(9.30-11.50)


 


Prothromb Time International


Ratio 1.0 (0.9-1.1)  


 


 


Activated Partial


Thromboplast Time 36 SEC (23-33)


H








Chemistry








Test


  12/19/18


04:00


 


Sodium Level Pending  


 


Potassium Level Pending  


 


Chloride Level Pending  


 


Carbon Dioxide Level Pending  


 


Blood Urea Nitrogen Pending  


 


Creatinine Pending  


 


Estimat Glomerular Filtration


Rate Pending  


 


 


Glucose Level Pending  


 


Calcium Level Pending  


 


Total Bilirubin Pending  


 


Aspartate Amino Transf


(AST/SGOT) Pending  


 


 


Alanine Aminotransferase


(ALT/SGPT) Pending  


 


 


Alkaline Phosphatase Pending  


 


Total Protein Pending  


 


Albumin Pending  


 


Globulin Pending  











Risk Assessment & Plan


Assessment:


asa4


Plan:


mac


Status Change Before Surgery:  No





Pre-Antibiotics


Drug:  Eileen Richardson MD Dec 19, 2018 06:40

## 2018-12-19 NOTE — NEPHROLOGY PROGRESS NOTE
Assessment/Plan


Assessment


Seee below


Plan


MOF resolving.


Sepsis due to infected diabetic ulcers. On IV Abx. Followed by ID, Vasc. Sx, 

Podiatry. Resolving.





ESRD HD q MWF 


Anemia of CKD , SAMANTHA high dose. Transfuse PRN.





A. 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 with LAE. LVEF 65% . Anticoagulate when 

stable. DW Dr. Wade. In and out A. Fib.





Post trach. For PEG now.


Referred to [facility].


See orders.





Subjective


Subjective


On Vent. Alert. Post Trach. Having PEG.





Objective


Objective





Last 24 Hour Vital Signs








  Date Time  Temp Pulse Resp B/P (MAP) Pulse Ox O2 Delivery O2 Flow Rate FiO2


 


12/19/18 07:16  104 20     35


 


12/19/18 06:00  90 15 107/12 (43) 95   


 


12/19/18 05:01  91 17     35


 


12/19/18 05:00  103 19 95/41 (59) 100   


 


12/19/18 04:00 98.1 101 16 118/18 (51) 100   


 


12/19/18 04:00        35


 


12/19/18 04:00      Mechanical Ventilator  





      Mechanical Ventilator  





      Mechanical Ventilator  


 


12/19/18 04:00  105      


 


12/19/18 03:00  87 16 116/19 (51) 100   


 


12/19/18 02:54  86 14     35


 


12/19/18 02:00  90 15 115/19 (51) 98   


 


12/19/18 01:00  92 14 90/50 (63) 96   


 


12/19/18 00:49  90 20     35


 


12/19/18 00:00  88      


 


12/19/18 00:00  99 19 136/51 (79) 95   


 


12/19/18 00:00      Mechanical Ventilator  





      Mechanical Ventilator  





      Mechanical Ventilator  


 


12/19/18 00:00        35


 


12/18/18 23:23  92 21     35


 


12/18/18 23:00  90 18 101/17 (45) 96   


 


12/18/18 22:00  95 20 90/40 (57) 96   


 


12/18/18 21:48  94 18  96 Mechanical Ventilator  35


 


12/18/18 21:37  96 20  93 Mechanical Ventilator  35


 


12/18/18 21:24  91 21     35


 


12/18/18 21:00    90/19    


 


12/18/18 21:00  91 19 100/19 (46) 96   


 


12/18/18 20:00 98.0 89 17 101/17 (45) 98   


 


12/18/18 20:00  77      


 


12/18/18 20:00      Mechanical Ventilator  





      Mechanical Ventilator  





      Mechanical Ventilator  


 


12/18/18 20:00        35


 


12/18/18 19:05  92 19     35


 


12/18/18 19:00  92 15 101/17 (45) 96   


 


12/18/18 18:00  89 16 101/19 (46) 93   


 


12/18/18 17:30      Mechanical Ventilator  





      Mechanical Ventilator  





      Mechanical Ventilator  


 


12/18/18 17:14  82 19     35


 


12/18/18 17:00 98.4 86 16 103/15 (44) 95   


 


12/18/18 16:11  87      


 


12/18/18 16:00        35


 


12/18/18 16:00      Mechanical Ventilator  





      Mechanical Ventilator  





      Mechanical Ventilator  


 


12/18/18 16:00  89 18 103/39 (60) 95   


 


12/18/18 15:00  84 18 110/14 (46) 100   


 


12/18/18 14:53  84 19     35


 


12/18/18 14:00  79 14 110/21 (50) 100   


 


12/18/18 13:00  81 20 94/24 (47) 100   


 


12/18/18 12:57        35


 


12/18/18 12:55  78 16     35


 


12/18/18 12:10  84      


 


12/18/18 12:00 98.8 85 18 118/23 (54) 100   


 


12/18/18 12:00      Mechanical Ventilator  





      Mechanical Ventilator  





      Mechanical Ventilator  


 


12/18/18 11:30        100


 


12/18/18 11:25  88 25     100


 


12/18/18 11:00  111 20 135/34 (67) 100   


 


12/18/18 10:00  83 17 111/77 (88) 100   


 


12/18/18 09:00  87 18 105/15 (45) 94   


 


12/18/18 08:49  85 18     35

















Intake and Output  


 


 12/18/18 12/19/18





 19:00 07:00


 


Intake Total 160 ml 135 ml


 


Output Total 1 ml 


 


Balance 159 ml 135 ml


 


  


 


Intake Oral 0 ml 


 


Free Water 30 ml 60 ml


 


IV Total 55 ml 


 


Tube Feeding 75 ml 75 ml


 


Stool Total 1 ml 


 


# Bowel Movements 1 1








Laboratory Tests


12/19/18 04:00: 


White Blood Count 12.6H, Red Blood Count 3.00L, Hemoglobin 8.8L, Hematocrit 

28.7L, Mean Corpuscular Volume 96, Mean Corpuscular Hemoglobin 29.5, Mean 

Corpuscular Hemoglobin Concent 30.8L, Red Cell Distribution Width 18.9H, 

Platelet Count 451H, Mean Platelet Volume 6.7, Neutrophils (%) (Auto) 84.1H, 

Lymphocytes (%) (Auto) 4.8L, Monocytes (%) (Auto) 9.8, Eosinophils (%) (Auto) 

0.5, Basophils (%) (Auto) 0.7, Prothrombin Time 10.7, Prothromb Time 

International Ratio 1.0, Activated Partial Thromboplast Time 36H, Sodium Level 

141, Potassium Level 3.2L, Chloride Level 101, Carbon Dioxide Level 26, Anion 

Gap 14, Blood Urea Nitrogen 30H, Creatinine 3.0H, Estimat Glomerular Filtration 

Rate , Glucose Level 68L, Calcium Level 8.0L, Total Bilirubin 0.4, Aspartate 

Amino Transf (AST/SGOT) 18, Alanine Aminotransferase (ALT/SGPT) 9L, Alkaline 

Phosphatase 101, Total Protein 7.1, Albumin 1.6L, Globulin 5.5, Albumin/

Globulin Ratio 0.3L


Height (Feet):  5


Height (Inches):  2.00


Weight (Pounds):  110


Objective


On vent.


Trach clean.


Cv IRR/IRR


Lungs B ronchi.


Abd SNT. BS +


E Rt. foot diabetic ulcers











Gavino Daigle MD Dec 19, 2018 08:46

## 2018-12-19 NOTE — NUR
NURSE NOTES:

Endorsement received from CAROLYN Perry. Patient awake, able to communicated by nodding or 
shaking head. Trache to vent Shiley 8.0 AC 8 Vt 450 PEEP 5 35% FiO2. NGT at right nare, 
ongoing feeding Nepro on hold due to scheduled PEG placement this AM. Right upper arm 
midline. On p200 mattress. Bilateral soft wrist restraints present. Skin warm and dry. Head 
of bed elevated. Bed locked and in low position. Safety measures in place. Will continue to 
monitor.

## 2018-12-19 NOTE — NUR
RESPIRATORY NOTE: Received pt on AC 8- 450ml-35%-peep of 5 with trach size 8 Shiley cuffed. 
Pt is stable, no SOB or acute resp distress noted. Alarms are set and audible, vent is 
plugged into the red outlet, and  ambu bag is at bedside. Will continue to monitor pt.

## 2018-12-19 NOTE — ENDOSCOPY PROCEDURE NOTE
Endoscopy Procedure Note


General


Indication for Procedure:  dysphagia


Procedures Performed:  EGD, PEG


Operative Findings/Diagnosis:  gastritis


Specimen:  none


Pt Tolerated Procedure Well:  Yes


Estimated Blood Loss:  none





Anesthesia


Anesthesiologist:  [place]


Anesthesia:  MAC





Inserted Devices


Implant(s) used?:  No





GI Core Measures


50 yrs or older w/o bx or poly:  Not Applicable


10yrs. F/U not recommended:  Not Applicable











Elliott Lam MD Dec 19, 2018 09:23

## 2018-12-19 NOTE — NUR
NURSE NOTES:

Endorsement received from CAROLYN Thompson. Patient asleep. Arousable by name. Trache to vent 
Shiley 8.0 AC 8 Vt 450 PEEP 5 35% FiO2. PEG in place, secured with abdominal binder. Ongoing 
feeding Nepro 15ml/hr, goal of 30 ml/hr. No residual. Right upper arm midline. Receiving D5 
1/2 NS at 30 ml/hr.  On p200 mattress. Bilateral soft wrist restraints present. Skin warm 
and dry. Head of bed elevated. Bed locked and in low position

## 2018-12-19 NOTE — NUR
CASE MANAGEMENT: REVIEW





SI: CELLULITIS OF RIGHT LEG . OSTEOMYELITIS .  ESRD ON HD

TRACHEOSTOMY 12/17

PEG PLACEMENT 12/19 

T 98.1  RR 21 /30 % MECH VENT FIO2 35 

WBC 12.6 H/H 8.8/28.7 







IS: D5 1/2 NS IVF @30ML/HR

HEPARIN SQ Q12HR

MINOXIDIL 5MG NG Q12HR

AMIODARONE NG Q12HR 

GT FEEDING NEPRO @ 40ML/HR 



***ICU STATUS***

DCP: PATIENT IS FROM [facility]

## 2018-12-19 NOTE — PULMONOLOGY PROGRESS NOTE
Assessment/Plan


Assessment/Plan


IMPRESSION:


1. Hypotension.


2. Sepsis.


3. Atrial fibrillation.


4. Bradycardia.


5. Diabetes mellitus.


6. End-stage renal disease on dialysis.


7. Left lung collapse. Resolved


8. Respiratory failure. now status post tracheostomy


9. Dysphagia.


10. Dementia.


11. Anemia


12. Pneumonia





DISCUSSION:


1. Continue medications.


2. CXR improved


3. status post tracheostomy


4. Central access


4.  S/p G-tube today











  ______________________________________________


  Quentin Cardoso M.D.





Subjective


Interval Events:  S/p PEG


Constitutional:  Reports: no symptoms


HEENT:  Repors: no symptoms


Respiratory:  Reports: no symptoms


Cardiovascular:  Reports: no symptoms


Gastrointestinal/Abdominal:  Reports: no symptoms


Genitourinary:  Reports: no symptoms


Allergies:  


Coded Allergies:  


     No Known Allergies (Unverified , 11/26/18)





Objective





Last 24 Hour Vital Signs








  Date Time  Temp Pulse Resp B/P (MAP) Pulse Ox O2 Delivery O2 Flow Rate FiO2


 


12/19/18 08:58  102 21     35


 


12/19/18 07:16  104 20     35


 


12/19/18 06:00  90 15 107/12 (43) 95   


 


12/19/18 05:01  91 17     35


 


12/19/18 05:00  103 19 95/41 (59) 100   


 


12/19/18 04:00 98.1 101 16 118/18 (51) 100   


 


12/19/18 04:00        35


 


12/19/18 04:00      Mechanical Ventilator  





      Mechanical Ventilator  





      Mechanical Ventilator  


 


12/19/18 04:00  105      


 


12/19/18 03:00  87 16 116/19 (51) 100   


 


12/19/18 02:54  86 14     35


 


12/19/18 02:00  90 15 115/19 (51) 98   


 


12/19/18 01:00  92 14 90/50 (63) 96   


 


12/19/18 00:49  90 20     35


 


12/19/18 00:00  88      


 


12/19/18 00:00  99 19 136/51 (79) 95   


 


12/19/18 00:00      Mechanical Ventilator  





      Mechanical Ventilator  





      Mechanical Ventilator  


 


12/19/18 00:00        35


 


12/18/18 23:23  92 21     35


 


12/18/18 23:00  90 18 101/17 (45) 96   


 


12/18/18 22:00  95 20 90/40 (57) 96   


 


12/18/18 21:48  94 18  96 Mechanical Ventilator  35


 


12/18/18 21:37  96 20  93 Mechanical Ventilator  35


 


12/18/18 21:24  91 21     35


 


12/18/18 21:00    90/19    


 


12/18/18 21:00  91 19 100/19 (46) 96   


 


12/18/18 20:00 98.0 89 17 101/17 (45) 98   


 


12/18/18 20:00  77      


 


12/18/18 20:00      Mechanical Ventilator  





      Mechanical Ventilator  





      Mechanical Ventilator  


 


12/18/18 20:00        35


 


12/18/18 19:05  92 19     35


 


12/18/18 19:00  92 15 101/17 (45) 96   


 


12/18/18 18:00  89 16 101/19 (46) 93   


 


12/18/18 17:30      Mechanical Ventilator  





      Mechanical Ventilator  





      Mechanical Ventilator  


 


12/18/18 17:14  82 19     35


 


12/18/18 17:00 98.4 86 16 103/15 (44) 95   


 


12/18/18 16:11  87      


 


12/18/18 16:00        35


 


12/18/18 16:00      Mechanical Ventilator  





      Mechanical Ventilator  





      Mechanical Ventilator  


 


12/18/18 16:00  89 18 103/39 (60) 95   


 


12/18/18 15:00  84 18 110/14 (46) 100   


 


12/18/18 14:53  84 19     35


 


12/18/18 14:00  79 14 110/21 (50) 100   


 


12/18/18 13:00  81 20 94/24 (47) 100   


 


12/18/18 12:57        35


 


12/18/18 12:55  78 16     35


 


12/18/18 12:10  84      


 


12/18/18 12:00 98.8 85 18 118/23 (54) 100   


 


12/18/18 12:00      Mechanical Ventilator  





      Mechanical Ventilator  





      Mechanical Ventilator  


 


12/18/18 11:30        100


 


12/18/18 11:25  88 25     100


 


12/18/18 11:00  111 20 135/34 (67) 100   


 


12/18/18 10:00  83 17 111/77 (88) 100   

















Intake and Output  


 


 12/18/18 12/19/18





 19:00 07:00


 


Intake Total 160 ml 135 ml


 


Output Total 1 ml 


 


Balance 159 ml 135 ml


 


  


 


Intake Oral 0 ml 


 


Free Water 30 ml 60 ml


 


IV Total 55 ml 


 


Tube Feeding 75 ml 75 ml


 


Stool Total 1 ml 


 


# Bowel Movements 1 1








General Appearance:  no acute distress


HEENT:  normocephalic, status post trach


Respiratory/Chest:  chest wall non-tender, lungs clear


Cardiovascular:  normal peripheral pulses, regular rhythm


Laboratory Tests


12/19/18 04:00: 


White Blood Count 12.6H, Red Blood Count 3.00L, Hemoglobin 8.8L, Hematocrit 

28.7L, Mean Corpuscular Volume 96, Mean Corpuscular Hemoglobin 29.5, Mean 

Corpuscular Hemoglobin Concent 30.8L, Red Cell Distribution Width 18.9H, 

Platelet Count 451H, Mean Platelet Volume 6.7, Neutrophils (%) (Auto) 84.1H, 

Lymphocytes (%) (Auto) 4.8L, Monocytes (%) (Auto) 9.8, Eosinophils (%) (Auto) 

0.5, Basophils (%) (Auto) 0.7, Prothrombin Time 10.7, Prothromb Time 

International Ratio 1.0, Activated Partial Thromboplast Time 36H, Sodium Level 

141, Potassium Level 3.2L, Chloride Level 101, Carbon Dioxide Level 26, Anion 

Gap 14, Blood Urea Nitrogen 30H, Creatinine 3.0H, Estimat Glomerular Filtration 

Rate , Glucose Level 68L, Calcium Level 8.0L, Total Bilirubin 0.4, Aspartate 

Amino Transf (AST/SGOT) 18, Alanine Aminotransferase (ALT/SGPT) 9L, Alkaline 

Phosphatase 101, Total Protein 7.1, Albumin 1.6L, Globulin 5.5, Albumin/

Globulin Ratio 0.3L





Current Medications








 Medications


  (Trade)  Dose


 Ordered  Sig/Tony


 Route


 PRN Reason  Start Time


 Stop Time Status Last Admin


Dose Admin


 


 Acetaminophen


  (Tylenol)  650 mg  Q6H  PRN


 ORAL


 Mild Pain/Temp > 100.5  12/4/18 14:15


 1/3/19 14:14  12/18/18 18:45


 


 


 Al Hydroxide/Mg


 Hydroxide


  (Mylanta)  15 ml  Q1H  PRN


 ORAL


 gi upset  12/19/18 06:45


 12/19/18 15:00   


 


 


 Albuterol/


 Ipratropium


  (Albuterol/


 Ipratropium)  3 ml  Q6H  PRN


 HHN


 Shortness of Breath  12/18/18 19:15


 12/23/18 19:14  12/18/18 21:33


 


 


 Amiodarone HCl


  (Cordarone)  200 mg  EVERY 12  HOURS


 NG


   12/8/18 21:00


 1/5/19 13:59  12/18/18 21:41


 


 


 Atropine Sulfate


  (Atropine)  0.5 mg  Q5M  PRN


 IV


 bpm less than 45  12/19/18 06:45


 12/19/18 15:00   


 


 


 Chlorhexidine


 Gluconate


  (Juana-Hex 2%)  1 applic  DAILY@2000


 TOPIC


   11/27/18 20:00


 12/27/18 19:59  12/18/18 21:40


 


 


 Collagenase


  (Santyl)  1 applic  Q12HR


 TOPIC


   12/12/18 21:00


 1/5/19 20:59  12/19/18 07:52


 


 


 Dextrose/Sodium


 Chloride  1,000 ml @ 


 30 mls/hr  Q24H


 IV


   12/19/18 09:00


 1/18/19 08:59   


 


 


 Diphenhydramine


 HCl


  (Benadryl)  25 mg  Q15M  PRN


 IVP


 Itching  12/19/18 06:45


 12/19/18 15:00   


 


 


 Epoetin Rupesh


  (Procrit (for


 ESRD on dialysis))  10,000 units  MON-WED-FRI


 SUBQ


   11/28/18 21:00


 12/28/18 20:59  12/17/18 21:01


 


 


 Fentanyl Citrate


  (Sublimaze 100


 mcg/2 mL)  25 mcg  Q10M  PRN


 IV


 Moderate Pain (Pain Scale 4-6)  12/19/18 06:45


 12/19/18 15:00   


 


 


 Heparin Sodium


  (Porcine)


  (Heparin 5000


 units/ml)  5,000 units  EVERY 12  HOURS


 SUBQ


   12/15/18 21:00


 1/6/19 21:59  12/17/18 21:02


 


 


 Heparin Sodium


  (Porcine)


  (Heparin Sod


 1000 units/ml


 10ml)  2,000 unit  ONCE  PRN


 IV


 FOR HD USE  12/18/18 14:45


 12/20/18 23:59   


 


 


 Hydralazine HCl


  (Apresoline)  5 mg  Q30M  PRN


 IV


 SBP>160 OR___/DBP>90 OR___  12/19/18 06:45


 12/19/18 15:00   


 


 


 Meropenem 500 mg/


 Sodium Chloride  55 ml @ 


 110 mls/hr  DAILY


 IVPB


   12/11/18 09:00


 12/25/18 08:59  12/19/18 07:52


 


 


 Midazolam HCl


  (Versed 2mg/2ml


 vial)  1 mg  Q15M  PRN


 IVP


 For Anxiety  12/19/18 06:45


 12/19/18 15:00   


 


 


 Minoxidil


  (Loniten)  5 mg  Q12HR


 NG


   12/2/18 21:00


 1/1/19 20:59  12/17/18 21:00


 


 


 Ondansetron HCl


  (Zofran)  4 mg  Q1H  PRN


 IVP


 Nausea & Vomiting  12/19/18 06:45


 12/19/18 15:00   


 


 


 Pantoprazole


  (Protonix)  40 mg  Q12HR


 IVP


   11/26/18 21:00


 12/27/18 08:59  12/18/18 21:40


 


 


 Sodium Chloride  1,000 ml @ 


 500 mls/hr  Q2H PRN


 IVLG


 sbp<90 during hd  12/18/18 14:50


 12/20/18 23:59   


 


 


 Vitamin D


  (Vitamin D)  1,000 intlu  DAILY


 GT


   12/9/18 10:00


 1/8/19 09:59  12/17/18 08:16


 

















Quentin Cardoso MD Dec 19, 2018 09:34

## 2018-12-19 NOTE — INFECTIOUS DISEASES PROG NOTE
Assessment/Plan


Assessment/Plan


antibiotics : meropenem





A


1. VRE | e.coli UTI s/p rx


2. shock resolved


3. leucocytosis resolved


4. respiratory failure s/p tracheostomy


5. renal failure


6. decubitus ulcers


7. nasal MRSA colonization


8. rectal VRE colonization


9. pleural effusion





P


1. d/c meropenem


2. observe off antibiotics





Subjective


ROS Limited/Unobtainable:  Yes


Allergies:  


Coded Allergies:  


     No Known Allergies (Unverified , 11/26/18)





Objective


Vital Signs





Last 24 Hour Vital Signs








  Date Time  Temp Pulse Resp B/P (MAP) Pulse Ox O2 Delivery O2 Flow Rate FiO2


 


12/19/18 10:00  95 15 102/33 (56) 95   


 


12/19/18 09:00  96 15 107/12 (43) 95   


 


12/19/18 08:58  102 21     35


 


12/19/18 08:00 98.1 100 16 105/25 (51) 100   


 


12/19/18 08:00  101      


 


12/19/18 08:00        35


 


12/19/18 08:00      Mechanical Ventilator  





      Mechanical Ventilator  





      Mechanical Ventilator  


 


12/19/18 07:16  104 20     35


 


12/19/18 07:00  90 15 110/30 (56) 95   


 


12/19/18 06:00  90 15 107/12 (43) 95   


 


12/19/18 05:01  91 17     35


 


12/19/18 05:00  103 19 95/41 (59) 100   


 


12/19/18 04:00 98.1 101 16 118/18 (51) 100   


 


12/19/18 04:00        35


 


12/19/18 04:00      Mechanical Ventilator  





      Mechanical Ventilator  





      Mechanical Ventilator  


 


12/19/18 04:00  105      


 


12/19/18 03:00  87 16 116/19 (51) 100   


 


12/19/18 02:54  86 14     35


 


12/19/18 02:00  90 15 115/19 (51) 98   


 


12/19/18 01:00  92 14 90/50 (63) 96   


 


12/19/18 00:49  90 20     35


 


12/19/18 00:00  88      


 


12/19/18 00:00  99 19 136/51 (79) 95   


 


12/19/18 00:00      Mechanical Ventilator  





      Mechanical Ventilator  





      Mechanical Ventilator  


 


12/19/18 00:00        35


 


12/18/18 23:23  92 21     35


 


12/18/18 23:00  90 18 101/17 (45) 96   


 


12/18/18 22:00  95 20 90/40 (57) 96   


 


12/18/18 21:48  94 18  96 Mechanical Ventilator  35


 


12/18/18 21:37  96 20  93 Mechanical Ventilator  35


 


12/18/18 21:24  91 21     35


 


12/18/18 21:00    90/19    


 


12/18/18 21:00  91 19 100/19 (46) 96   


 


12/18/18 20:00 98.0 89 17 101/17 (45) 98   


 


12/18/18 20:00  77      


 


12/18/18 20:00      Mechanical Ventilator  





      Mechanical Ventilator  





      Mechanical Ventilator  


 


12/18/18 20:00        35


 


12/18/18 19:05  92 19     35


 


12/18/18 19:00  92 15 101/17 (45) 96   


 


12/18/18 18:00  89 16 101/19 (46) 93   


 


12/18/18 17:30      Mechanical Ventilator  





      Mechanical Ventilator  





      Mechanical Ventilator  


 


12/18/18 17:14  82 19     35


 


12/18/18 17:00 98.4 86 16 103/15 (44) 95   


 


12/18/18 16:11  87      


 


12/18/18 16:00        35


 


12/18/18 16:00      Mechanical Ventilator  





      Mechanical Ventilator  





      Mechanical Ventilator  


 


12/18/18 16:00  89 18 103/39 (60) 95   


 


12/18/18 15:00  84 18 110/14 (46) 100   


 


12/18/18 14:53  84 19     35


 


12/18/18 14:00  79 14 110/21 (50) 100   


 


12/18/18 13:00  81 20 94/24 (47) 100   


 


12/18/18 12:57        35


 


12/18/18 12:55  78 16     35


 


12/18/18 12:10  84      


 


12/18/18 12:00 98.8 85 18 118/23 (54) 100   


 


12/18/18 12:00      Mechanical Ventilator  





      Mechanical Ventilator  





      Mechanical Ventilator  


 


12/18/18 11:30        100


 


12/18/18 11:25  88 25     100








Height (Feet):  5


Height (Inches):  2.00


Weight (Pounds):  110


HEENT:  status post trach


Respiratory/Chest:  lungs clear


Cardiovascular:  normal rate, regular rhythm, no gallop/murmur


Abdomen:  soft, non tender


Extremities:  no edema, other - right arm PICC





Laboratory Tests








Test


  12/19/18


04:00


 


White Blood Count


  12.6 K/UL


(4.8-10.8)  H


 


Red Blood Count


  3.00 M/UL


(4.20-5.40)  L


 


Hemoglobin


  8.8 G/DL


(12.0-16.0)  L


 


Hematocrit


  28.7 %


(37.0-47.0)  L


 


Mean Corpuscular Volume 96 FL (80-99)  


 


Mean Corpuscular Hemoglobin


  29.5 PG


(27.0-31.0)


 


Mean Corpuscular Hemoglobin


Concent 30.8 G/DL


(32.0-36.0)  L


 


Red Cell Distribution Width


  18.9 %


(11.6-14.8)  H


 


Platelet Count


  451 K/UL


(150-450)  H


 


Mean Platelet Volume


  6.7 FL


(6.5-10.1)


 


Neutrophils (%) (Auto)


  84.1 %


(45.0-75.0)  H


 


Lymphocytes (%) (Auto)


  4.8 %


(20.0-45.0)  L


 


Monocytes (%) (Auto)


  9.8 %


(1.0-10.0)


 


Eosinophils (%) (Auto)


  0.5 %


(0.0-3.0)


 


Basophils (%) (Auto)


  0.7 %


(0.0-2.0)


 


Prothrombin Time


  10.7 SEC


(9.30-11.50)


 


Prothromb Time International


Ratio 1.0 (0.9-1.1)  


 


 


Activated Partial


Thromboplast Time 36 SEC (23-33)


H


 


Sodium Level


  141 MMOL/L


(136-145)


 


Potassium Level


  3.2 MMOL/L


(3.5-5.1)  L


 


Chloride Level


  101 MMOL/L


()


 


Carbon Dioxide Level


  26 MMOL/L


(21-32)


 


Anion Gap


  14 mmol/L


(5-15)


 


Blood Urea Nitrogen


  30 mg/dL


(7-18)  H


 


Creatinine


  3.0 MG/DL


(0.55-1.30)  H


 


Estimat Glomerular Filtration


Rate  mL/min (>60)  


 


 


Glucose Level


  68 MG/DL


()  L


 


Calcium Level


  8.0 MG/DL


(8.5-10.1)  L


 


Total Bilirubin


  0.4 MG/DL


(0.2-1.0)


 


Aspartate Amino Transf


(AST/SGOT) 18 U/L (15-37)


 


 


Alanine Aminotransferase


(ALT/SGPT) 9 U/L (12-78)


L


 


Alkaline Phosphatase


  101 U/L


()


 


Total Protein


  7.1 G/DL


(6.4-8.2)


 


Albumin


  1.6 G/DL


(3.4-5.0)  L


 


Globulin 5.5 g/dL  


 


Albumin/Globulin Ratio


  0.3 (1.0-2.7)


L











Current Medications








 Medications


  (Trade)  Dose


 Ordered  Sig/Tony


 Route


 PRN Reason  Start Time


 Stop Time Status Last Admin


Dose Admin


 


 Acetaminophen


  (Tylenol)  650 mg  Q6H  PRN


 ORAL


 Mild Pain/Temp > 100.5  12/4/18 14:15


 1/3/19 14:14  12/18/18 18:45


 


 


 Al Hydroxide/Mg


 Hydroxide


  (Mylanta)  15 ml  Q1H  PRN


 ORAL


 gi upset  12/19/18 06:45


 12/19/18 15:00   


 


 


 Albuterol/


 Ipratropium


  (Albuterol/


 Ipratropium)  3 ml  Q6H  PRN


 HHN


 Shortness of Breath  12/18/18 19:15


 12/23/18 19:14  12/18/18 21:33


 


 


 Amiodarone HCl


  (Cordarone)  200 mg  EVERY 12  HOURS


 NG


   12/8/18 21:00


 1/5/19 13:59  12/18/18 21:41


 


 


 Atropine Sulfate


  (Atropine)  0.5 mg  Q5M  PRN


 IV


 bpm less than 45  12/19/18 06:45


 12/19/18 15:00   


 


 


 Chlorhexidine


 Gluconate


  (Juana-Hex 2%)  1 applic  DAILY@2000


 TOPIC


   11/27/18 20:00


 12/27/18 19:59  12/18/18 21:40


 


 


 Collagenase


  (Santyl)  1 applic  Q12HR


 TOPIC


   12/12/18 21:00


 1/5/19 20:59  12/19/18 07:52


 


 


 Dextrose/Sodium


 Chloride  1,000 ml @ 


 30 mls/hr  Q24H


 IV


   12/19/18 09:00


 1/18/19 08:59  12/19/18 09:00


 


 


 Diphenhydramine


 HCl


  (Benadryl)  25 mg  Q15M  PRN


 IVP


 Itching  12/19/18 06:45


 12/19/18 15:00   


 


 


 Epoetin Rupesh


  (Procrit (for


 ESRD on dialysis))  10,000 units  MON-WED-FRI


 SUBQ


   11/28/18 21:00


 12/28/18 20:59  12/17/18 21:01


 


 


 Fentanyl Citrate


  (Sublimaze 100


 mcg/2 mL)  25 mcg  Q10M  PRN


 IV


 Moderate Pain (Pain Scale 4-6)  12/19/18 06:45


 12/19/18 15:00   


 


 


 Heparin Sodium


  (Porcine)


  (Heparin 5000


 units/ml)  5,000 units  EVERY 12  HOURS


 SUBQ


   12/15/18 21:00


 1/6/19 21:59  12/17/18 21:02


 


 


 Heparin Sodium


  (Porcine)


  (Heparin Sod


 1000 units/ml


 10ml)  2,000 unit  ONCE  PRN


 IV


 FOR HD USE  12/18/18 14:45


 12/20/18 23:59   


 


 


 Hydralazine HCl


  (Apresoline)  5 mg  Q30M  PRN


 IV


 SBP>160 OR___/DBP>90 OR___  12/19/18 06:45


 12/19/18 15:00   


 


 


 Meropenem 500 mg/


 Sodium Chloride  55 ml @ 


 110 mls/hr  DAILY


 IVPB


   12/11/18 09:00


 12/25/18 08:59  12/19/18 07:52


 


 


 Midazolam HCl


  (Versed 2mg/2ml


 vial)  1 mg  Q15M  PRN


 IVP


 For Anxiety  12/19/18 06:45


 12/19/18 15:00   


 


 


 Minoxidil


  (Loniten)  5 mg  Q12HR


 NG


   12/2/18 21:00


 1/1/19 20:59  12/17/18 21:00


 


 


 Ondansetron HCl


  (Zofran)  4 mg  Q1H  PRN


 IVP


 Nausea & Vomiting  12/19/18 06:45


 12/19/18 15:00   


 


 


 Pantoprazole


  (Protonix)  40 mg  Q12HR


 IVP


   11/26/18 21:00


 12/27/18 08:59  12/18/18 21:40


 


 


 Sodium Chloride  1,000 ml @ 


 500 mls/hr  Q2H PRN


 IVLG


 sbp<90 during hd  12/18/18 14:50


 12/20/18 23:59   


 


 


 Vitamin D


  (Vitamin D)  1,000 intlu  DAILY


 GT


   12/9/18 10:00


 1/8/19 09:59  12/17/18 08:16


 

















Chase Santillan MD Dec 19, 2018 11:05

## 2018-12-19 NOTE — PROCEDURE NOTE
DATE OF PROCEDURE:  12/19/2018

SURGEON:  Elliott Lam M.D.



PROCEDURE:  Upper endoscopy with PEG placement.



ANESTHESIOLOGIST:  Per Dr. Daniels.



INSTRUMENT:  Olympus adult flexible upper endoscope.



INDICATION:  Dysphagia.



REASON FOR PROCEDURE:  The procedure, risks, benefits, and possible

consequences, including hemorrhage, aspiration, perforation and infection,

and alternative treatments, were explained to the patient/legal guardian

by Dr. Elliott Lam and the patient/legal guardian understood and

accepted these risks.



DESCRIPTION OF PROCEDURE:  After informed consent was obtained and the

patient was adequately sedated, Olympus upper endoscope was advanced from

the mouth into the second portion of duodenum and retroflexion was

performed in the stomach.



Then under endoscopic guidance, under sterile condition, a 20-Uzbek

pull type of G-tube was successfully placed in epigastric area.  The

distance from the tip of the tube to skin was about 2.5 cm in size. The

patient had diffuse mild gastritis.  The patient tolerated procedure very

well without any complication.



SUMMARY OF FINDINGS:

1. Status post successful PEG placement.

2. Gastritis.



RECOMMENDATIONS:

1. Abdominal binder.

2. Elevate the head of the bed at all times.

3. G-tube flush.

4. G-tube care.

5. Start tube feeding later today.

6. The patient is currently on antibiotics, we will continue.



I want to thank Dr. Daigle for this kind referral.









  ______________________________________________

  Elliott Lam M.D.





DR:  BJ

D:  12/19/2018 09:22

T:  12/19/2018 18:26

JOB#:  [id number]

CC:  Gaivno Daigle M.D.; Fax#:  [phone]

## 2018-12-20 VITALS — DIASTOLIC BLOOD PRESSURE: 21 MMHG | SYSTOLIC BLOOD PRESSURE: 135 MMHG

## 2018-12-20 VITALS — SYSTOLIC BLOOD PRESSURE: 144 MMHG | DIASTOLIC BLOOD PRESSURE: 37 MMHG

## 2018-12-20 VITALS — DIASTOLIC BLOOD PRESSURE: 18 MMHG | SYSTOLIC BLOOD PRESSURE: 122 MMHG

## 2018-12-20 VITALS — DIASTOLIC BLOOD PRESSURE: 39 MMHG | SYSTOLIC BLOOD PRESSURE: 124 MMHG

## 2018-12-20 VITALS — SYSTOLIC BLOOD PRESSURE: 109 MMHG | DIASTOLIC BLOOD PRESSURE: 26 MMHG

## 2018-12-20 VITALS — DIASTOLIC BLOOD PRESSURE: 35 MMHG | SYSTOLIC BLOOD PRESSURE: 123 MMHG

## 2018-12-20 VITALS — SYSTOLIC BLOOD PRESSURE: 116 MMHG | DIASTOLIC BLOOD PRESSURE: 18 MMHG

## 2018-12-20 VITALS — DIASTOLIC BLOOD PRESSURE: 30 MMHG | SYSTOLIC BLOOD PRESSURE: 140 MMHG

## 2018-12-20 VITALS — DIASTOLIC BLOOD PRESSURE: 59 MMHG | SYSTOLIC BLOOD PRESSURE: 112 MMHG

## 2018-12-20 VITALS — SYSTOLIC BLOOD PRESSURE: 137 MMHG | DIASTOLIC BLOOD PRESSURE: 37 MMHG

## 2018-12-20 VITALS — DIASTOLIC BLOOD PRESSURE: 28 MMHG | SYSTOLIC BLOOD PRESSURE: 114 MMHG

## 2018-12-20 VITALS — SYSTOLIC BLOOD PRESSURE: 114 MMHG | DIASTOLIC BLOOD PRESSURE: 21 MMHG

## 2018-12-20 VITALS — DIASTOLIC BLOOD PRESSURE: 30 MMHG | SYSTOLIC BLOOD PRESSURE: 111 MMHG

## 2018-12-20 VITALS — SYSTOLIC BLOOD PRESSURE: 147 MMHG | DIASTOLIC BLOOD PRESSURE: 22 MMHG

## 2018-12-20 VITALS — DIASTOLIC BLOOD PRESSURE: 10 MMHG | SYSTOLIC BLOOD PRESSURE: 120 MMHG

## 2018-12-20 VITALS — DIASTOLIC BLOOD PRESSURE: 41 MMHG | SYSTOLIC BLOOD PRESSURE: 128 MMHG

## 2018-12-20 VITALS — DIASTOLIC BLOOD PRESSURE: 28 MMHG | SYSTOLIC BLOOD PRESSURE: 105 MMHG

## 2018-12-20 VITALS — DIASTOLIC BLOOD PRESSURE: 28 MMHG | SYSTOLIC BLOOD PRESSURE: 129 MMHG

## 2018-12-20 VITALS — DIASTOLIC BLOOD PRESSURE: 21 MMHG | SYSTOLIC BLOOD PRESSURE: 110 MMHG

## 2018-12-20 VITALS — DIASTOLIC BLOOD PRESSURE: 37 MMHG | SYSTOLIC BLOOD PRESSURE: 121 MMHG

## 2018-12-20 VITALS — DIASTOLIC BLOOD PRESSURE: 44 MMHG | SYSTOLIC BLOOD PRESSURE: 109 MMHG

## 2018-12-20 VITALS — DIASTOLIC BLOOD PRESSURE: 27 MMHG | SYSTOLIC BLOOD PRESSURE: 140 MMHG

## 2018-12-20 VITALS — DIASTOLIC BLOOD PRESSURE: 39 MMHG | SYSTOLIC BLOOD PRESSURE: 138 MMHG

## 2018-12-20 VITALS — DIASTOLIC BLOOD PRESSURE: 34 MMHG | SYSTOLIC BLOOD PRESSURE: 119 MMHG

## 2018-12-20 LAB
ADD MANUAL DIFF: NO
ALBUMIN SERPL-MCNC: 1.5 G/DL (ref 3.4–5)
ALBUMIN/GLOB SERPL: 0.3 {RATIO} (ref 1–2.7)
ALP SERPL-CCNC: 110 U/L (ref 46–116)
ALT SERPL-CCNC: 7 U/L (ref 12–78)
ANION GAP SERPL CALC-SCNC: 8 MMOL/L (ref 5–15)
AST SERPL-CCNC: 15 U/L (ref 15–37)
BASOPHILS NFR BLD AUTO: 0.7 % (ref 0–2)
BILIRUB SERPL-MCNC: 0.4 MG/DL (ref 0.2–1)
BUN SERPL-MCNC: 18 MG/DL (ref 7–18)
CALCIUM SERPL-MCNC: 8.1 MG/DL (ref 8.5–10.1)
CHLORIDE SERPL-SCNC: 99 MMOL/L (ref 98–107)
CO2 SERPL-SCNC: 30 MMOL/L (ref 21–32)
CREAT SERPL-MCNC: 2.2 MG/DL (ref 0.55–1.3)
EOSINOPHIL NFR BLD AUTO: 0.7 % (ref 0–3)
ERYTHROCYTE [DISTWIDTH] IN BLOOD BY AUTOMATED COUNT: 18.7 % (ref 11.6–14.8)
GLOBULIN SER-MCNC: 5.4 G/DL
HCT VFR BLD CALC: 28.2 % (ref 37–47)
HGB BLD-MCNC: 8.7 G/DL (ref 12–16)
LYMPHOCYTES NFR BLD AUTO: 5.9 % (ref 20–45)
MCV RBC AUTO: 96 FL (ref 80–99)
MONOCYTES NFR BLD AUTO: 10.5 % (ref 1–10)
NEUTROPHILS NFR BLD AUTO: 82.2 % (ref 45–75)
PLATELET # BLD: 462 K/UL (ref 150–450)
POTASSIUM SERPL-SCNC: 2.7 MMOL/L (ref 3.5–5.1)
RBC # BLD AUTO: 2.95 M/UL (ref 4.2–5.4)
SODIUM SERPL-SCNC: 137 MMOL/L (ref 136–145)
WBC # BLD AUTO: 10.3 K/UL (ref 4.8–10.8)

## 2018-12-20 RX ADMIN — DEXTROSE AND SODIUM CHLORIDE SCH MLS/HR: 5; .45 INJECTION, SOLUTION INTRAVENOUS at 08:30

## 2018-12-20 RX ADMIN — AMIODARONE HYDROCHLORIDE SCH MG: 200 TABLET ORAL at 08:19

## 2018-12-20 RX ADMIN — PANTOPRAZOLE SODIUM SCH MG: 40 INJECTION, POWDER, FOR SOLUTION INTRAVENOUS at 21:21

## 2018-12-20 RX ADMIN — MINOXIDIL SCH MG: 2.5 TABLET ORAL at 08:20

## 2018-12-20 RX ADMIN — PANTOPRAZOLE SODIUM SCH MG: 40 INJECTION, POWDER, FOR SOLUTION INTRAVENOUS at 08:19

## 2018-12-20 RX ADMIN — MINOXIDIL SCH MG: 2.5 TABLET ORAL at 21:22

## 2018-12-20 RX ADMIN — HEPARIN SODIUM SCH UNITS: 5000 INJECTION INTRAVENOUS; SUBCUTANEOUS at 21:23

## 2018-12-20 RX ADMIN — CHLORHEXIDINE GLUCONATE SCH APPLIC: 213 SOLUTION TOPICAL at 21:21

## 2018-12-20 RX ADMIN — AMIODARONE HYDROCHLORIDE SCH MG: 200 TABLET ORAL at 21:21

## 2018-12-20 RX ADMIN — VITAMIN D, TAB 1000IU (100/BT) SCH INTLU: 25 TAB at 08:19

## 2018-12-20 RX ADMIN — HEPARIN SODIUM SCH UNITS: 5000 INJECTION INTRAVENOUS; SUBCUTANEOUS at 08:23

## 2018-12-20 NOTE — GI PROGRESS NOTE
Assessment/Plan


Problems:  


(1) Encounter for PEG (percutaneous endoscopic gastrostomy)


ICD Codes:  Z43.1 - Encounter for attention to gastrostomy


SNOMED:  997211848, 971407277


(2) Severe malnutrition


ICD Codes:  E43 - Unspecified severe protein-calorie malnutrition


SNOMED:  45117134


(3) Dehydration


ICD Codes:  E86.0 - Dehydration


SNOMED:  34893029


Status:  stable


Status Narrative


Discussed with Dr. Lam


Assessment/Plan


Assessment


- Dysphagia


- Resp failure


- ESRD


- Anemia


- Status post PEG





Recommendations


1. Abdominal binder.


2. Elevate the head of the bed at all times.


3. G-tube flush.


4. G-tube care.


5. Start tube feeding later today.


6. The patient needs a dose of antibiotics prior to this procedure.





The patient was seen and examined at bedside and all new and available data was 

reviewed in the patients chart. I agree with the above findings, impression 

and plan.  (Patient seen earlier today. Signature stamp does not reflect 

patient encounter time.). - Elliott Lam MD





Subjective


Subjective


limited





Objective





Last 24 Hour Vital Signs








  Date Time  Temp Pulse Resp B/P (MAP) Pulse Ox O2 Delivery O2 Flow Rate FiO2


 


12/20/18 12:37  76 15     40


 


12/20/18 10:33  87 16     40


 


12/20/18 10:00  87 16 110/21 (50) 100   


 


12/20/18 09:00  90 18 116/18 (50) 100   


 


12/20/18 08:42  84 15     40


 


12/20/18 08:20    140/27    


 


12/20/18 08:00      Trach Collar  





      Mechanical Ventilator  





      Mechanical Ventilator  


 


12/20/18 08:00        50


 


12/20/18 08:00 98.9 84 15 105/28 (53) 100   


 


12/20/18 07:05  81 14     50


 


12/20/18 07:00  85 17 140/27 (64) 100   


 


12/20/18 06:00  76 17 140/30 (66) 100   


 


12/20/18 05:02  76 14     35


 


12/20/18 05:00  76 17 144/37 (72) 100   


 


12/20/18 04:00 98.0 83 15 137/37 (70) 100   


 


12/20/18 04:00      Trach Collar  





      Mechanical Ventilator  





      Mechanical Ventilator  


 


12/20/18 04:00        60


 


12/20/18 04:00  86      


 


12/20/18 03:15  85 14     35


 


12/20/18 03:00  86 15 135/21 (59) 100   


 


12/20/18 02:00  112 17 109/44 (65) 100   


 


12/20/18 01:15        60


 


12/20/18 01:00  86 16 147/22 (63) 100   


 


12/20/18 01:00        50


 


12/20/18 00:46  86 16     35


 


12/20/18 00:00  81      


 


12/20/18 00:00      Trach Collar  





      Mechanical Ventilator  





      Mechanical Ventilator  


 


12/20/18 00:00 98.0 86 16 112/59 (76) 100   


 


12/20/18 00:00        35


 


12/19/18 23:00  83 16 119/19 (52) 94   


 


12/19/18 22:58  81 14     35


 


12/19/18 22:00  86 16 120/19 (52) 98   


 


12/19/18 21:01    136/19    


 


12/19/18 21:00  88 16 136/19 (58) 98   


 


12/19/18 20:54  85 18     35


 


12/19/18 20:00 98.5 84 16 136/18 (57) 97   


 


12/19/18 20:00  80      


 


12/19/18 20:00      Trach Collar  





      Mechanical Ventilator  





      Mechanical Ventilator  


 


12/19/18 20:00        35


 


12/19/18 19:00  91 16 109/22 (51) 96   


 


12/19/18 18:52  80 19     35


 


12/19/18 18:00  92 15 115/38 (63) 96   


 


12/19/18 17:12  79 14     35


 


12/19/18 17:00  95 15 110/40 (63) 96   


 


12/19/18 16:00      Trach Collar  





      Trach Collar  





      Mechanical Ventilator  


 


12/19/18 16:00 98.1 98 16 105/25 (51) 100   


 


12/19/18 16:00  92      


 


12/19/18 16:00        35


 


12/19/18 15:00  95 15 109/40 (63) 96   


 


12/19/18 14:00  90 15 112/35 (60) 97   


 


12/19/18 13:05  112 20     35


 


12/19/18 13:00  95 15 100/33 (55) 95   

















Intake and Output  


 


 12/19/18 12/20/18





 18:59 06:59


 


Intake Total 375 ml 615 ml


 


Balance 375 ml 615 ml


 


  


 


Free Water  30 ml


 


IV Total 270 ml 330 ml


 


Tube Feeding 105 ml 255 ml


 


# Bowel Movements  2











Laboratory Tests








Test


  12/20/18


04:00


 


White Blood Count


  10.3 K/UL


(4.8-10.8)


 


Red Blood Count


  2.95 M/UL


(4.20-5.40)  L


 


Hemoglobin


  8.7 G/DL


(12.0-16.0)  L


 


Hematocrit


  28.2 %


(37.0-47.0)  L


 


Mean Corpuscular Volume 96 FL (80-99)  


 


Mean Corpuscular Hemoglobin


  29.4 PG


(27.0-31.0)


 


Mean Corpuscular Hemoglobin


Concent 30.7 G/DL


(32.0-36.0)  L


 


Red Cell Distribution Width


  18.7 %


(11.6-14.8)  H


 


Platelet Count


  462 K/UL


(150-450)  H


 


Mean Platelet Volume


  6.9 FL


(6.5-10.1)


 


Neutrophils (%) (Auto)


  82.2 %


(45.0-75.0)  H


 


Lymphocytes (%) (Auto)


  5.9 %


(20.0-45.0)  L


 


Monocytes (%) (Auto)


  10.5 %


(1.0-10.0)  H


 


Eosinophils (%) (Auto)


  0.7 %


(0.0-3.0)


 


Basophils (%) (Auto)


  0.7 %


(0.0-2.0)


 


Sodium Level


  137 MMOL/L


(136-145)


 


Potassium Level


  2.7 MMOL/L


(3.5-5.1)  *L


 


Chloride Level


  99 MMOL/L


()


 


Carbon Dioxide Level


  30 MMOL/L


(21-32)


 


Anion Gap


  8 mmol/L


(5-15)


 


Blood Urea Nitrogen


  18 mg/dL


(7-18)


 


Creatinine


  2.2 MG/DL


(0.55-1.30)  H


 


Estimat Glomerular Filtration


Rate  mL/min (>60)  


 


 


Glucose Level


  101 MG/DL


()


 


Calcium Level


  8.1 MG/DL


(8.5-10.1)  L


 


Total Bilirubin


  0.4 MG/DL


(0.2-1.0)


 


Aspartate Amino Transf


(AST/SGOT) 15 U/L (15-37)


 


 


Alanine Aminotransferase


(ALT/SGPT) 7 U/L (12-78)


L


 


Alkaline Phosphatase


  110 U/L


()


 


Total Protein


  6.9 G/DL


(6.4-8.2)


 


Albumin


  1.5 G/DL


(3.4-5.0)  L


 


Globulin 5.4 g/dL  


 


Albumin/Globulin Ratio


  0.3 (1.0-2.7)


L








Height (Feet):  5


Height (Inches):  2.00


Weight (Pounds):  110


General Appearance:  alert


Cardiovascular:  normal rate


Respiratory/Chest:  no respiratory distress


Abdominal Exam:  soft, GT site - Clean dry and intact











Shane Lowry NP Dec 20, 2018 12:50

## 2018-12-20 NOTE — NUR
NURSE NOTES:

Patient maintaining [id number]% at 60% FiO2. Asleep, no signs of pain. Dressing at GT 
site dry and intact.

## 2018-12-20 NOTE — PULMONOLOGY PROGRESS NOTE
Assessment/Plan


Assessment/Plan


IMPRESSION:


1. Hypotension.


2. Sepsis.


3. Atrial fibrillation.


4. Bradycardia.


5. Diabetes mellitus.


6. End-stage renal disease on dialysis.


7. Left lung collapse. Resolved


8. Respiratory failure. now status post tracheostomy


9. Dysphagia.


10. Dementia.


11. Anemia


12. Pneumonia





DISCUSSION:


1. Continue medications.


2. CXR improved


3. status post tracheostomy


4. Central access


4.  S/p G-tube 


5. Stable to transfer to [facility]











  ______________________________________________


  Quentin Cardoso M.D.





Subjective


Interval Events:  No new events


Constitutional:  Reports: no symptoms


HEENT:  Repors: no symptoms


Respiratory:  Reports: no symptoms


Cardiovascular:  Reports: no symptoms


Gastrointestinal/Abdominal:  Reports: no symptoms


Genitourinary:  Reports: no symptoms


Allergies:  


Coded Allergies:  


     No Known Allergies (Unverified , 11/26/18)





Objective





Last 24 Hour Vital Signs








  Date Time  Temp Pulse Resp B/P (MAP) Pulse Ox O2 Delivery O2 Flow Rate FiO2


 


12/20/18 09:00  90 18 116/18 (50) 100   


 


12/20/18 08:42  84 15     40


 


12/20/18 08:20    140/27    


 


12/20/18 08:00      Trach Collar  





      Mechanical Ventilator  





      Mechanical Ventilator  


 


12/20/18 08:00        50


 


12/20/18 08:00 98.9 84 15 105/28 (53) 100   


 


12/20/18 07:05  81 14     50


 


12/20/18 07:00  85 17 140/27 (64) 100   


 


12/20/18 06:00  76 17 140/30 (66) 100   


 


12/20/18 05:02  76 14     35


 


12/20/18 05:00  76 17 144/37 (72) 100   


 


12/20/18 04:00 98.0 83 15 137/37 (70) 100   


 


12/20/18 04:00      Trach Collar  





      Mechanical Ventilator  





      Mechanical Ventilator  


 


12/20/18 04:00        60


 


12/20/18 04:00  86      


 


12/20/18 03:15  85 14     35


 


12/20/18 03:00  86 15 135/21 (59) 100   


 


12/20/18 02:00  112 17 109/44 (65) 100   


 


12/20/18 01:15        60


 


12/20/18 01:00  86 16 147/22 (63) 100   


 


12/20/18 01:00        50


 


12/20/18 00:46  86 16     35


 


12/20/18 00:00  81      


 


12/20/18 00:00      Trach Collar  





      Mechanical Ventilator  





      Mechanical Ventilator  


 


12/20/18 00:00 98.0 86 16 112/59 (76) 100   


 


12/20/18 00:00        35


 


12/19/18 23:00  83 16 119/19 (52) 94   


 


12/19/18 22:58  81 14     35


 


12/19/18 22:00  86 16 120/19 (52) 98   


 


12/19/18 21:01    136/19    


 


12/19/18 21:00  88 16 136/19 (58) 98   


 


12/19/18 20:54  85 18     35


 


12/19/18 20:00 98.5 84 16 136/18 (57) 97   


 


12/19/18 20:00  80      


 


12/19/18 20:00      Trach Collar  





      Mechanical Ventilator  





      Mechanical Ventilator  


 


12/19/18 20:00        35


 


12/19/18 19:00  91 16 109/22 (51) 96   


 


12/19/18 18:52  80 19     35


 


12/19/18 18:00  92 15 115/38 (63) 96   


 


12/19/18 17:12  79 14     35


 


12/19/18 17:00  95 15 110/40 (63) 96   


 


12/19/18 16:00      Trach Collar  





      Trach Collar  





      Mechanical Ventilator  


 


12/19/18 16:00 98.1 98 16 105/25 (51) 100   


 


12/19/18 16:00  92      


 


12/19/18 16:00        35


 


12/19/18 15:00  95 15 109/40 (63) 96   


 


12/19/18 14:00  90 15 112/35 (60) 97   


 


12/19/18 13:05  112 20     35


 


12/19/18 13:00  95 15 100/33 (55) 95   


 


12/19/18 12:00 98.1 100 16 107/25 (52) 100   


 


12/19/18 12:00  89      


 


12/19/18 12:00      Trach Collar  





      Trach Collar  





      Mechanical Ventilator  


 


12/19/18 12:00        35


 


12/19/18 11:25  81 19     35


 


12/19/18 11:23  86 11  100   


 


12/19/18 11:00  99 15 110/30 (56) 95   

















Intake and Output  


 


 12/19/18 12/20/18





 19:00 07:00


 


Intake Total 420 ml 610 ml


 


Balance 420 ml 610 ml


 


  


 


Free Water  30 ml


 


IV Total 300 ml 300 ml


 


Tube Feeding 120 ml 280 ml


 


# Bowel Movements  2








General Appearance:  no acute distress


HEENT:  normocephalic


Respiratory/Chest:  chest wall non-tender, lungs clear


Cardiovascular:  normal peripheral pulses


Abdomen:  normal bowel sounds, soft, non tender


Laboratory Tests


12/20/18 04:00: 


White Blood Count 10.3, Red Blood Count 2.95L, Hemoglobin 8.7L, Hematocrit 28.2L

, Mean Corpuscular Volume 96, Mean Corpuscular Hemoglobin 29.4, Mean 

Corpuscular Hemoglobin Concent 30.7L, Red Cell Distribution Width 18.7H, 

Platelet Count 462H, Mean Platelet Volume 6.9, Neutrophils (%) (Auto) 82.2H, 

Lymphocytes (%) (Auto) 5.9L, Monocytes (%) (Auto) 10.5H, Eosinophils (%) (Auto) 

0.7, Basophils (%) (Auto) 0.7, Sodium Level 137, Potassium Level 2.7*L, 

Chloride Level 99, Carbon Dioxide Level 30, Anion Gap 8, Blood Urea Nitrogen 18

, Creatinine 2.2H, Estimat Glomerular Filtration Rate , Glucose Level 101, 

Calcium Level 8.1L, Total Bilirubin 0.4, Aspartate Amino Transf (AST/SGOT) 15, 

Alanine Aminotransferase (ALT/SGPT) 7L, Alkaline Phosphatase 110, Total Protein 

6.9, Albumin 1.5L, Globulin 5.4, Albumin/Globulin Ratio 0.3L





Current Medications








 Medications


  (Trade)  Dose


 Ordered  Sig/Tony


 Route


 PRN Reason  Start Time


 Stop Time Status Last Admin


Dose Admin


 


 Acetaminophen


  (Tylenol)  650 mg  Q6H  PRN


 ORAL


 Mild Pain/Temp > 100.5  12/4/18 14:15


 1/3/19 14:14  12/18/18 18:45


 


 


 Albuterol/


 Ipratropium


  (Albuterol/


 Ipratropium)  3 ml  Q6H  PRN


 HHN


 Shortness of Breath  12/18/18 19:15


 12/23/18 19:14  12/18/18 21:33


 


 


 Amiodarone HCl


  (Cordarone)  200 mg  EVERY 12  HOURS


 NG


   12/8/18 21:00


 1/5/19 13:59  12/20/18 08:19


 


 


 Chlorhexidine


 Gluconate


  (Juana-Hex 2%)  1 applic  DAILY@2000


 TOPIC


   11/27/18 20:00


 12/27/18 19:59  12/19/18 20:00


 


 


 Collagenase


  (Santyl)  1 applic  Q12HR


 TOPIC


   12/12/18 21:00


 1/5/19 20:59  12/20/18 08:20


 


 


 Dextrose/Sodium


 Chloride  1,000 ml @ 


 30 mls/hr  Q24H


 IV


   12/19/18 09:00


 1/18/19 08:59  12/20/18 08:30


 


 


 Epoetin Rupesh


  (Procrit (for


 ESRD on dialysis))  10,000 units  MON-WED-FRI


 SUBQ


   11/28/18 21:00


 12/28/18 20:59  12/19/18 21:01


 


 


 Heparin Sodium


  (Porcine)


  (Heparin 5000


 units/ml)  5,000 units  EVERY 12  HOURS


 SUBQ


   12/15/18 21:00


 1/6/19 21:59  12/20/18 08:23


 


 


 Heparin Sodium


  (Porcine)


  (Heparin Sod


 1000 units/ml


 10ml)  2,000 unit  ONCE  PRN


 IV


 FOR HD USE  12/18/18 14:45


 12/20/18 23:59   


 


 


 Minoxidil


  (Loniten)  5 mg  Q12HR


 NG


   12/2/18 21:00


 1/1/19 20:59  12/20/18 08:20


 


 


 Pantoprazole


  (Protonix)  40 mg  Q12HR


 IVP


   11/26/18 21:00


 12/27/18 08:59  12/20/18 08:19


 


 


 Potassium Chloride  100 ml @ 


 100 mls/hr  Q1HR


 IVPB


   12/20/18 10:00


 12/20/18 12:59  12/20/18 10:14


 


 


 Sodium Chloride  1,000 ml @ 


 500 mls/hr  Q2H PRN


 IVLG


 sbp<90 during hd  12/18/18 14:50


 12/20/18 23:59   


 


 


 Vitamin D


  (Vitamin D)  1,000 intlu  DAILY


 GT


   12/9/18 10:00


 1/8/19 09:59  12/20/18 08:19


 

















Quentin Cardoso MD Dec 20, 2018 10:27

## 2018-12-20 NOTE — NUR
NURSE NOTES:

Discussed with Dr Mac pt potassium level of 2.7 this am.  KCL 30 meq Iv ordered.  Will 
continue to monitor.

## 2018-12-20 NOTE — NUR
NURSE NOTES:

Report received from Vicky Freitas.  Pt awake, alert, follows commands.  Pt on trache to vent 
shiley #8, AC 8, , [id number]%, saturating 100%.  Tolerating GTF with Nepro 40 cc/hr. 
Rectal tube intact.  LAV fistula noted with bruit and thrill.  ASHISH ML with d5 1/2 NS at 30 
cc/hr.  Bilateral soft wrist restraints noted.  Safety measures in place with bed locked and 
in lowest position, side rails x3 up and call light within reach.  Will continue to monitor 
and continue plan of care.

## 2018-12-20 NOTE — NUR
NURSE NOTES:

Dr Maynard here to see pt.  No new orders.  No acute distress.  Will continue to monitor.

## 2018-12-20 NOTE — NUR
RD ASSESSMENT & RECOMMENDATIONS

SEE CARE ACTIVITY FOR COMPLETE ASSESSMENT



DAILY ESTIMATED NEEDS:

Needs based on Critical care + Advanced Wounds + ESRD/ 60kg 

22-30  kcals/kg 

3978-7442  total kcals

1.5-2.0  g protein/kg

90-120g  g total protein 

per MD  mL/kg



NUTRITION DIAGNOSIS:

* Swallowing difficulty R/T respiratory status as evidenced by pt s/p self

extubation, s/p code blue, reintubated, now s/p trach and PEG placement.

(UPDATED)

* Increased kcal/prot needs R/T wound healing as evidenced by pt admitted

w/ multiple wounds including stage 4 sacral wound, refer to WC eval.

* Altered nutrition related lab values R/T ESRD dx, clinical condition as

evidenced by low K (2.7), episodes of hypoglycemia (68 69), elev creat

(4.6-> 2.2), low Na (130-> wnl), elev BNP >81369.





CURRENT TF: Nepro @40ml/hr  





ENTERAL NUTRITION RECOMMENDATIONS:

Nepro @40ml/hr x 24 hrs + Prosource 1pkt BID  to provide 960ml, 1728kcal, 78g + 22g prot, 
698ml free water 



1. Maintain current rate, add Prosource 1 pack BID 

    to better meet est protein needs

2. Flush per MD/ HOB over 30 degrees







ADDITIONAL RECOMMENDATIONS:

* RECALIBRATE BED SCALE POST TRACH AND PEG PROCEDURES 

* Add PROSOURCE 1 pack BID to better meet est protein needs 

* Wound healing-  Nephrovite x 1, Scotty 1pkt BID 

* Monitor for hypoglycemia 

* Monitor tolerance to current TF rate  

.

## 2018-12-20 NOTE — NUR
NURSE NOTES:

Patient desating 87-88% on 35% FiO2. RT informed, increased to 50%, still desating. Patient 
awake, no SOB. Increased to 60% c/o RT

## 2018-12-20 NOTE — CARDIOLOGY PROGRESS NOTE
Assessment/Plan


Assessment/Plan


1. Hypotension, possibly sepsis versus volume.


2. Paroxysmal episodes of atrial fibrillation history.


3. Bradycardia secondary to medications, amiodarone possibly.


4. Diabetes mellitus.


5. End-stage renal disease, on hemodialysis.


6. Lung collapse.


7. Respiratory failure, status post intubation.


8. Dysphagia.


9. History of dementia.


10. Pulm htn 


11.  Pleural effusion 


12. cardaic arrest


13. chest wall pain post cpr  





in and out afib amido now 200 bid  on hold


stool ob neg x2 


amiod bid


off labetalol for now if need hydralazine to be used 


vent support 


s/p trach


peg done 


tele reviewed again intermittent tachy but at this time is in sinus 


will start on elequis  2.5 mg bid tomorrow





Subjective


Cardiovascular:  Reports: chest pain; Denies: lightheadedness


Respiratory:  Denies: shortness of breath


Gastrointestinal/Abdominal:  Denies: abdominal pain


Genitourinary:  Denies: burning


Subjective


on the vent not verba , denies dizziness has sob





Objective





Last 24 Hour Vital Signs








  Date Time  Temp Pulse Resp B/P (MAP) Pulse Ox O2 Delivery O2 Flow Rate FiO2


 


12/20/18 18:00  90 16 138/39 (72) 96   


 


12/20/18 17:15  82 15     40


 


12/20/18 17:00  80 16 123/35 (64) 94   


 


12/20/18 16:00 99.1 82 17 128/41 (70) 100   


 


12/20/18 16:00  78      


 


12/20/18 16:00        50


 


12/20/18 16:00      Trach Collar  





      Mechanical Ventilator  





      Mechanical Ventilator  


 


12/20/18 15:00  83 16 119/34 (62) 100   


 


12/20/18 14:39  84 15     40


 


12/20/18 14:00  89 18 120/10 (46) 100   


 


12/20/18 13:00  85 15 124/39 (67) 100   


 


12/20/18 12:37  76 15     40


 


12/20/18 12:00 99.3  15 114/28 (56) 100   


 


12/20/18 12:00  85      


 


12/20/18 12:00        50


 


12/20/18 12:00      Trach Collar  





      Mechanical Ventilator  





      Mechanical Ventilator  


 


12/20/18 11:00  85 15 111/30 (57) 100   


 


12/20/18 10:33  87 16     40


 


12/20/18 10:00  87 16 110/21 (50) 100   


 


12/20/18 09:00  90 18 116/18 (50) 100   


 


12/20/18 08:42  84 15     40


 


12/20/18 08:20    140/27    


 


12/20/18 08:00      Trach Collar  





      Mechanical Ventilator  





      Mechanical Ventilator  


 


12/20/18 08:00        50


 


12/20/18 08:00  85      


 


12/20/18 08:00 98.9 84 15 105/28 (53) 100   


 


12/20/18 07:05  81 14     50


 


12/20/18 07:00  85 17 140/27 (64) 100   


 


12/20/18 06:00  76 17 140/30 (66) 100   


 


12/20/18 05:02  76 14     35


 


12/20/18 05:00  76 17 144/37 (72) 100   


 


12/20/18 04:00 98.0 83 15 137/37 (70) 100   


 


12/20/18 04:00      Trach Collar  





      Mechanical Ventilator  





      Mechanical Ventilator  


 


12/20/18 04:00        60


 


12/20/18 04:00  86      


 


12/20/18 03:15  85 14     35


 


12/20/18 03:00  86 15 135/21 (59) 100   


 


12/20/18 02:00  112 17 109/44 (65) 100   


 


12/20/18 01:15        60


 


12/20/18 01:00  86 16 147/22 (63) 100   


 


12/20/18 01:00        50


 


12/20/18 00:46  86 16     35


 


12/20/18 00:00  81      


 


12/20/18 00:00      Trach Collar  





      Mechanical Ventilator  





      Mechanical Ventilator  


 


12/20/18 00:00 98.0 86 16 112/59 (76) 100   


 


12/20/18 00:00        35


 


12/19/18 23:00  83 16 119/19 (52) 94   


 


12/19/18 22:58  81 14     35


 


12/19/18 22:00  86 16 120/19 (52) 98   


 


12/19/18 21:01    136/19    


 


12/19/18 21:00  88 16 136/19 (58) 98   


 


12/19/18 20:54  85 18     35


 


12/19/18 20:00 98.5 84 16 136/18 (57) 97   


 


12/19/18 20:00  80      


 


12/19/18 20:00      Trach Collar  





      Mechanical Ventilator  





      Mechanical Ventilator  


 


12/19/18 20:00        35


 


12/19/18 19:00  91 16 109/22 (51) 96   


 


12/19/18 18:52  80 19     35








General Appearance:  no apparent distress


Neck:  supple


Cardiovascular:  normal rate, regular rhythm


Respiratory/Chest:  lungs clear


Abdomen:  normal bowel sounds, non tender, soft


Extremities:  no swelling











Intake and Output  


 


 12/19/18 12/20/18





 19:00 07:00


 


Intake Total 420 ml 610 ml


 


Balance 420 ml 610 ml


 


  


 


Free Water  30 ml


 


IV Total 300 ml 300 ml


 


Tube Feeding 120 ml 280 ml


 


# Bowel Movements  2











Laboratory Tests








Test


  12/20/18


04:00


 


White Blood Count


  10.3 K/UL


(4.8-10.8)


 


Red Blood Count


  2.95 M/UL


(4.20-5.40)  L


 


Hemoglobin


  8.7 G/DL


(12.0-16.0)  L


 


Hematocrit


  28.2 %


(37.0-47.0)  L


 


Mean Corpuscular Volume 96 FL (80-99)  


 


Mean Corpuscular Hemoglobin


  29.4 PG


(27.0-31.0)


 


Mean Corpuscular Hemoglobin


Concent 30.7 G/DL


(32.0-36.0)  L


 


Red Cell Distribution Width


  18.7 %


(11.6-14.8)  H


 


Platelet Count


  462 K/UL


(150-450)  H


 


Mean Platelet Volume


  6.9 FL


(6.5-10.1)


 


Neutrophils (%) (Auto)


  82.2 %


(45.0-75.0)  H


 


Lymphocytes (%) (Auto)


  5.9 %


(20.0-45.0)  L


 


Monocytes (%) (Auto)


  10.5 %


(1.0-10.0)  H


 


Eosinophils (%) (Auto)


  0.7 %


(0.0-3.0)


 


Basophils (%) (Auto)


  0.7 %


(0.0-2.0)


 


Sodium Level


  137 MMOL/L


(136-145)


 


Potassium Level


  2.7 MMOL/L


(3.5-5.1)  *L


 


Chloride Level


  99 MMOL/L


()


 


Carbon Dioxide Level


  30 MMOL/L


(21-32)


 


Anion Gap


  8 mmol/L


(5-15)


 


Blood Urea Nitrogen


  18 mg/dL


(7-18)


 


Creatinine


  2.2 MG/DL


(0.55-1.30)  H


 


Estimat Glomerular Filtration


Rate  mL/min (>60)  


 


 


Glucose Level


  101 MG/DL


()


 


Calcium Level


  8.1 MG/DL


(8.5-10.1)  L


 


Total Bilirubin


  0.4 MG/DL


(0.2-1.0)


 


Aspartate Amino Transf


(AST/SGOT) 15 U/L (15-37)


 


 


Alanine Aminotransferase


(ALT/SGPT) 7 U/L (12-78)


L


 


Alkaline Phosphatase


  110 U/L


()


 


Total Protein


  6.9 G/DL


(6.4-8.2)


 


Albumin


  1.5 G/DL


(3.4-5.0)  L


 


Globulin 5.4 g/dL  


 


Albumin/Globulin Ratio


  0.3 (1.0-2.7)


L

















Mariusz Wade MD Dec 20, 2018 18:42

## 2018-12-20 NOTE — INFECTIOUS DISEASES PROG NOTE
Assessment/Plan


Assessment/Plan


A:


Sepsis treated


UTI treated


Cellulitis of R leg, osteomyelitis treated


Hypercapnic respiratory failure


ESRD on HD


DM


Anemia


Dementia


PVD


R pleural effusion


Mucous plug


s/o Cardiac arrest


Stage  IV sacral ulcer


P;


Observe off antibiotic





Subjective


ROS Limited/Unobtainable:  Yes


Gastrointestinal/Abdominal:  Reports: other - had PEG yesterday


Neurologic:  Reports: confusion, other - on restraint


Allergies:  


Coded Allergies:  


     No Known Allergies (Unverified , 11/26/18)





Objective


Vital Signs





Last 24 Hour Vital Signs








  Date Time  Temp Pulse Resp B/P (MAP) Pulse Ox O2 Delivery O2 Flow Rate FiO2


 


12/20/18 10:33  87 16     40


 


12/20/18 10:00  87 16 110/21 (50) 100   


 


12/20/18 09:00  90 18 116/18 (50) 100   


 


12/20/18 08:42  84 15     40


 


12/20/18 08:20    140/27    


 


12/20/18 08:00      Trach Collar  





      Mechanical Ventilator  





      Mechanical Ventilator  


 


12/20/18 08:00        50


 


12/20/18 08:00 98.9 84 15 105/28 (53) 100   


 


12/20/18 07:05  81 14     50


 


12/20/18 07:00  85 17 140/27 (64) 100   


 


12/20/18 06:00  76 17 140/30 (66) 100   


 


12/20/18 05:02  76 14     35


 


12/20/18 05:00  76 17 144/37 (72) 100   


 


12/20/18 04:00 98.0 83 15 137/37 (70) 100   


 


12/20/18 04:00      Trach Collar  





      Mechanical Ventilator  





      Mechanical Ventilator  


 


12/20/18 04:00        60


 


12/20/18 04:00  86      


 


12/20/18 03:15  85 14     35


 


12/20/18 03:00  86 15 135/21 (59) 100   


 


12/20/18 02:00  112 17 109/44 (65) 100   


 


12/20/18 01:15        60


 


12/20/18 01:00  86 16 147/22 (63) 100   


 


12/20/18 01:00        50


 


12/20/18 00:46  86 16     35


 


12/20/18 00:00  81      


 


12/20/18 00:00      Trach Collar  





      Mechanical Ventilator  





      Mechanical Ventilator  


 


12/20/18 00:00 98.0 86 16 112/59 (76) 100   


 


12/20/18 00:00        35


 


12/19/18 23:00  83 16 119/19 (52) 94   


 


12/19/18 22:58  81 14     35


 


12/19/18 22:00  86 16 120/19 (52) 98   


 


12/19/18 21:01    136/19    


 


12/19/18 21:00  88 16 136/19 (58) 98   


 


12/19/18 20:54  85 18     35


 


12/19/18 20:00 98.5 84 16 136/18 (57) 97   


 


12/19/18 20:00  80      


 


12/19/18 20:00      Trach Collar  





      Mechanical Ventilator  





      Mechanical Ventilator  


 


12/19/18 20:00        35


 


12/19/18 19:00  91 16 109/22 (51) 96   


 


12/19/18 18:52  80 19     35


 


12/19/18 18:00  92 15 115/38 (63) 96   


 


12/19/18 17:12  79 14     35


 


12/19/18 17:00  95 15 110/40 (63) 96   


 


12/19/18 16:00      Trach Collar  





      Trach Collar  





      Mechanical Ventilator  


 


12/19/18 16:00 98.1 98 16 105/25 (51) 100   


 


12/19/18 16:00  92      


 


12/19/18 16:00        35


 


12/19/18 15:00  95 15 109/40 (63) 96   


 


12/19/18 14:00  90 15 112/35 (60) 97   


 


12/19/18 13:05  112 20     35


 


12/19/18 13:00  95 15 100/33 (55) 95   








Height (Feet):  5


Height (Inches):  2.00


Weight (Pounds):  110


General Appearance:  no acute distress


HEENT:  status post trach


Respiratory/Chest:  lungs clear, other - on ventilator


Cardiovascular:  normal rate, other - R arm PICC line


Abdomen:  soft, non tender, other - GT feeding


Extremities:  no edema


Neurologic/Psychiatric:  disoriented





Laboratory Tests








Test


  12/20/18


04:00


 


White Blood Count


  10.3 K/UL


(4.8-10.8)


 


Red Blood Count


  2.95 M/UL


(4.20-5.40)  L


 


Hemoglobin


  8.7 G/DL


(12.0-16.0)  L


 


Hematocrit


  28.2 %


(37.0-47.0)  L


 


Mean Corpuscular Volume 96 FL (80-99)  


 


Mean Corpuscular Hemoglobin


  29.4 PG


(27.0-31.0)


 


Mean Corpuscular Hemoglobin


Concent 30.7 G/DL


(32.0-36.0)  L


 


Red Cell Distribution Width


  18.7 %


(11.6-14.8)  H


 


Platelet Count


  462 K/UL


(150-450)  H


 


Mean Platelet Volume


  6.9 FL


(6.5-10.1)


 


Neutrophils (%) (Auto)


  82.2 %


(45.0-75.0)  H


 


Lymphocytes (%) (Auto)


  5.9 %


(20.0-45.0)  L


 


Monocytes (%) (Auto)


  10.5 %


(1.0-10.0)  H


 


Eosinophils (%) (Auto)


  0.7 %


(0.0-3.0)


 


Basophils (%) (Auto)


  0.7 %


(0.0-2.0)


 


Sodium Level


  137 MMOL/L


(136-145)


 


Potassium Level


  2.7 MMOL/L


(3.5-5.1)  *L


 


Chloride Level


  99 MMOL/L


()


 


Carbon Dioxide Level


  30 MMOL/L


(21-32)


 


Anion Gap


  8 mmol/L


(5-15)


 


Blood Urea Nitrogen


  18 mg/dL


(7-18)


 


Creatinine


  2.2 MG/DL


(0.55-1.30)  H


 


Estimat Glomerular Filtration


Rate  mL/min (>60)  


 


 


Glucose Level


  101 MG/DL


()


 


Calcium Level


  8.1 MG/DL


(8.5-10.1)  L


 


Total Bilirubin


  0.4 MG/DL


(0.2-1.0)


 


Aspartate Amino Transf


(AST/SGOT) 15 U/L (15-37)


 


 


Alanine Aminotransferase


(ALT/SGPT) 7 U/L (12-78)


L


 


Alkaline Phosphatase


  110 U/L


()


 


Total Protein


  6.9 G/DL


(6.4-8.2)


 


Albumin


  1.5 G/DL


(3.4-5.0)  L


 


Globulin 5.4 g/dL  


 


Albumin/Globulin Ratio


  0.3 (1.0-2.7)


L











Current Medications








 Medications


  (Trade)  Dose


 Ordered  Sig/Tony


 Route


 PRN Reason  Start Time


 Stop Time Status Last Admin


Dose Admin


 


 Acetaminophen


  (Tylenol)  650 mg  Q6H  PRN


 ORAL


 Mild Pain/Temp > 100.5  12/4/18 14:15


 1/3/19 14:14  12/18/18 18:45


 


 


 Albuterol/


 Ipratropium


  (Albuterol/


 Ipratropium)  3 ml  Q6H  PRN


 HHN


 Shortness of Breath  12/18/18 19:15


 12/23/18 19:14  12/18/18 21:33


 


 


 Amiodarone HCl


  (Cordarone)  200 mg  EVERY 12  HOURS


 NG


   12/8/18 21:00


 1/5/19 13:59  12/20/18 08:19


 


 


 Chlorhexidine


 Gluconate


  (Juana-Hex 2%)  1 applic  DAILY@2000


 TOPIC


   11/27/18 20:00


 12/27/18 19:59  12/19/18 20:00


 


 


 Collagenase


  (Santyl)  1 applic  Q12HR


 TOPIC


   12/12/18 21:00


 1/5/19 20:59  12/20/18 08:20


 


 


 Dextrose/Sodium


 Chloride  1,000 ml @ 


 30 mls/hr  Q24H


 IV


   12/19/18 09:00


 1/18/19 08:59  12/20/18 08:30


 


 


 Epoetin Rupesh


  (Procrit (for


 ESRD on dialysis))  10,000 units  MON-WED-FRI


 SUBQ


   11/28/18 21:00


 12/28/18 20:59  12/19/18 21:01


 


 


 Heparin Sodium


  (Porcine)


  (Heparin 5000


 units/ml)  5,000 units  EVERY 12  HOURS


 SUBQ


   12/15/18 21:00


 1/6/19 21:59  12/20/18 08:23


 


 


 Heparin Sodium


  (Porcine)


  (Heparin Sod


 1000 units/ml


 10ml)  2,000 unit  ONCE  PRN


 IV


 FOR HD USE  12/18/18 14:45


 12/20/18 23:59   


 


 


 Minoxidil


  (Loniten)  5 mg  Q12HR


 NG


   12/2/18 21:00


 1/1/19 20:59  12/20/18 08:20


 


 


 Pantoprazole


  (Protonix)  40 mg  Q12HR


 IVP


   11/26/18 21:00


 12/27/18 08:59  12/20/18 08:19


 


 


 Potassium Chloride  100 ml @ 


 100 mls/hr  Q1HR


 IVPB


   12/20/18 10:00


 12/20/18 12:59  12/20/18 11:53


 


 


 Sodium Chloride  1,000 ml @ 


 500 mls/hr  Q2H PRN


 IVLG


 sbp<90 during hd  12/18/18 14:50


 12/20/18 23:59   


 


 


 Vitamin D


  (Vitamin D)  1,000 intlu  DAILY


 GT


   12/9/18 10:00


 1/8/19 09:59  12/20/18 08:19


 

















Luke Maynard MD Dec 20, 2018 12:06

## 2018-12-20 NOTE — NEPHROLOGY PROGRESS NOTE
Assessment/Plan


Assessment


Seee below


Plan


MOF resolving.


Sepsis due to infected diabetic ulcers. On IV Abx. Followed by ID, Vasc. Sx, 

Podiatry. Resolving.





ESRD HD q MWF 


Anemia of CKD , SAMANTHA high dose. Transfuse PRN.





A. 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 with LAE. LVEF 65% . Anticoagulate when 

stable. KEVIN Wade. In and out A. Fib.





Post trach + PEG .


Referred to [facility].


See orders.


DW pt's son. Needs Vascular W/U.





Subjective


Subjective


On Vent. Alert. Post Trach and PEG.





Objective


Objective





Last 24 Hour Vital Signs








  Date Time  Temp Pulse Resp B/P (MAP) Pulse Ox O2 Delivery O2 Flow Rate FiO2


 


12/20/18 14:39  84 15     40


 


12/20/18 14:00  89 18 120/10 (46) 100   


 


12/20/18 13:00  85 15 124/39 (67) 100   


 


12/20/18 12:37  76 15     40


 


12/20/18 12:00 99.3  15 114/28 (56) 100   


 


12/20/18 12:00  85      


 


12/20/18 12:00        50


 


12/20/18 12:00      Trach Collar  





      Mechanical Ventilator  





      Mechanical Ventilator  


 


12/20/18 11:00  85 15 111/30 (57) 100   


 


12/20/18 10:33  87 16     40


 


12/20/18 10:00  87 16 110/21 (50) 100   


 


12/20/18 09:00  90 18 116/18 (50) 100   


 


12/20/18 08:42  84 15     40


 


12/20/18 08:20    140/27    


 


12/20/18 08:00      Trach Collar  





      Mechanical Ventilator  





      Mechanical Ventilator  


 


12/20/18 08:00        50


 


12/20/18 08:00  85      


 


12/20/18 08:00 98.9 84 15 105/28 (53) 100   


 


12/20/18 07:05  81 14     50


 


12/20/18 07:00  85 17 140/27 (64) 100   


 


12/20/18 06:00  76 17 140/30 (66) 100   


 


12/20/18 05:02  76 14     35


 


12/20/18 05:00  76 17 144/37 (72) 100   


 


12/20/18 04:00 98.0 83 15 137/37 (70) 100   


 


12/20/18 04:00      Trach Collar  





      Mechanical Ventilator  





      Mechanical Ventilator  


 


12/20/18 04:00        60


 


12/20/18 04:00  86      


 


12/20/18 03:15  85 14     35


 


12/20/18 03:00  86 15 135/21 (59) 100   


 


12/20/18 02:00  112 17 109/44 (65) 100   


 


12/20/18 01:15        60


 


12/20/18 01:00  86 16 147/22 (63) 100   


 


12/20/18 01:00        50


 


12/20/18 00:46  86 16     35


 


12/20/18 00:00  81      


 


12/20/18 00:00      Trach Collar  





      Mechanical Ventilator  





      Mechanical Ventilator  


 


12/20/18 00:00 98.0 86 16 112/59 (76) 100   


 


12/20/18 00:00        35


 


12/19/18 23:00  83 16 119/19 (52) 94   


 


12/19/18 22:58  81 14     35


 


12/19/18 22:00  86 16 120/19 (52) 98   


 


12/19/18 21:01    136/19    


 


12/19/18 21:00  88 16 136/19 (58) 98   


 


12/19/18 20:54  85 18     35


 


12/19/18 20:00 98.5 84 16 136/18 (57) 97   


 


12/19/18 20:00  80      


 


12/19/18 20:00      Trach Collar  





      Mechanical Ventilator  





      Mechanical Ventilator  


 


12/19/18 20:00        35


 


12/19/18 19:00  91 16 109/22 (51) 96   


 


12/19/18 18:52  80 19     35


 


12/19/18 18:00  92 15 115/38 (63) 96   


 


12/19/18 17:12  79 14     35


 


12/19/18 17:00  95 15 110/40 (63) 96   


 


12/19/18 16:00      Trach Collar  





      Trach Collar  





      Mechanical Ventilator  


 


12/19/18 16:00 98.1 98 16 105/25 (51) 100   


 


12/19/18 16:00  92      


 


12/19/18 16:00        35

















Intake and Output  


 


 12/19/18 12/20/18





 19:00 07:00


 


Intake Total 420 ml 610 ml


 


Balance 420 ml 610 ml


 


  


 


Free Water  30 ml


 


IV Total 300 ml 300 ml


 


Tube Feeding 120 ml 280 ml


 


# Bowel Movements  2








Laboratory Tests


12/20/18 04:00: 


White Blood Count 10.3, Red Blood Count 2.95L, Hemoglobin 8.7L, Hematocrit 28.2L

, Mean Corpuscular Volume 96, Mean Corpuscular Hemoglobin 29.4, Mean 

Corpuscular Hemoglobin Concent 30.7L, Red Cell Distribution Width 18.7H, 

Platelet Count 462H, Mean Platelet Volume 6.9, Neutrophils (%) (Auto) 82.2H, 

Lymphocytes (%) (Auto) 5.9L, Monocytes (%) (Auto) 10.5H, Eosinophils (%) (Auto) 

0.7, Basophils (%) (Auto) 0.7, Sodium Level 137, Potassium Level 2.7*L, 

Chloride Level 99, Carbon Dioxide Level 30, Anion Gap 8, Blood Urea Nitrogen 18

, Creatinine 2.2H, Estimat Glomerular Filtration Rate , Glucose Level 101, 

Calcium Level 8.1L, Total Bilirubin 0.4, Aspartate Amino Transf (AST/SGOT) 15, 

Alanine Aminotransferase (ALT/SGPT) 7L, Alkaline Phosphatase 110, Total Protein 

6.9, Albumin 1.5L, Globulin 5.4, Albumin/Globulin Ratio 0.3L


Height (Feet):  5


Height (Inches):  2.00


Weight (Pounds):  110


Objective


On vent.


Trach clean.


Cv IRR/IRR


Lungs B ronchi.


Abd SNT. BS + New PEG.


E Rt. foot diabetic ulcers











Gavino Daigle MD Dec 20, 2018 15:01

## 2018-12-20 NOTE — NUR
NURSE NOTES:

Endorsement received from CAROLYN Gaffney. Patient awake and oriented x 3. Able to communicate 
by nodding, or shaking head Trache to vent Shiley 8.0 AC 8 Vt 450 PEEP 5 50% FiO2. PEG in 
place, secured with abdominal binder. Dressing dry and intact. Ongoing feeding Nepro 
40ml/hr. No residual. Right upper arm midline. Receiving D5 1/2 NS at 30 ml/hr.  On P200 
mattress. Bilateral soft wrist restraints present. Head of bed elevated. Bed locked and in 
low position.

## 2018-12-20 NOTE — NUR
CASE MANAGEMENT: REVIEW





SI: CELLULITIS OF RIGHT LEG . OSTEOMYELITIS .  ESRD ON HD

TRACHEOSTOMY 12/17

PEG PLACEMENT 12/19 

T 98.9 HR 84 RR 15 /28 % MECH VENT FIO2 60

H/H 8.7/28.2 K 2.7 CR 2.2 







IS: KCl IVF @100ML/HR

D5 1/2 NS IVF @30ML/HR

AMIODARONE GT Q12HR 

GT FEEDING NEPRO @ 40ML/HR



***ICU STATUS***

DCP: PATIENT IS FROM [facility]

## 2018-12-20 NOTE — NUR
RESPIRATORY NOTE:

received pt trached; tonja 8, secured via trach tie/guard and on vent with current 
settings. pt is alert, oriented with no resp distress at this time. vent alarms are on and 
audible and plugged into red outlet. ambu bag at bedside and will cont to monitor.

## 2018-12-20 NOTE — NUR
NURSE NOTES:

Spoke to Louise at [facility] regarding HD order for tomorrow.  Will continue to monitor.

## 2018-12-21 VITALS — SYSTOLIC BLOOD PRESSURE: 108 MMHG | DIASTOLIC BLOOD PRESSURE: 16 MMHG

## 2018-12-21 VITALS — DIASTOLIC BLOOD PRESSURE: 28 MMHG | SYSTOLIC BLOOD PRESSURE: 95 MMHG

## 2018-12-21 VITALS — SYSTOLIC BLOOD PRESSURE: 127 MMHG | DIASTOLIC BLOOD PRESSURE: 73 MMHG

## 2018-12-21 VITALS — DIASTOLIC BLOOD PRESSURE: 54 MMHG | SYSTOLIC BLOOD PRESSURE: 117 MMHG

## 2018-12-21 VITALS — DIASTOLIC BLOOD PRESSURE: 27 MMHG | SYSTOLIC BLOOD PRESSURE: 126 MMHG

## 2018-12-21 VITALS — SYSTOLIC BLOOD PRESSURE: 110 MMHG | DIASTOLIC BLOOD PRESSURE: 37 MMHG

## 2018-12-21 VITALS — DIASTOLIC BLOOD PRESSURE: 35 MMHG | SYSTOLIC BLOOD PRESSURE: 130 MMHG

## 2018-12-21 VITALS — SYSTOLIC BLOOD PRESSURE: 75 MMHG | DIASTOLIC BLOOD PRESSURE: 17 MMHG

## 2018-12-21 VITALS — DIASTOLIC BLOOD PRESSURE: 73 MMHG | SYSTOLIC BLOOD PRESSURE: 123 MMHG

## 2018-12-21 VITALS — DIASTOLIC BLOOD PRESSURE: 20 MMHG | SYSTOLIC BLOOD PRESSURE: 75 MMHG

## 2018-12-21 VITALS — SYSTOLIC BLOOD PRESSURE: 121 MMHG | DIASTOLIC BLOOD PRESSURE: 29 MMHG

## 2018-12-21 VITALS — SYSTOLIC BLOOD PRESSURE: 81 MMHG | DIASTOLIC BLOOD PRESSURE: 17 MMHG

## 2018-12-21 VITALS — SYSTOLIC BLOOD PRESSURE: 113 MMHG | DIASTOLIC BLOOD PRESSURE: 23 MMHG

## 2018-12-21 VITALS — SYSTOLIC BLOOD PRESSURE: 128 MMHG | DIASTOLIC BLOOD PRESSURE: 35 MMHG

## 2018-12-21 VITALS — DIASTOLIC BLOOD PRESSURE: 12 MMHG | SYSTOLIC BLOOD PRESSURE: 84 MMHG

## 2018-12-21 VITALS — SYSTOLIC BLOOD PRESSURE: 120 MMHG | DIASTOLIC BLOOD PRESSURE: 13 MMHG

## 2018-12-21 VITALS — SYSTOLIC BLOOD PRESSURE: 112 MMHG | DIASTOLIC BLOOD PRESSURE: 21 MMHG

## 2018-12-21 VITALS — DIASTOLIC BLOOD PRESSURE: 90 MMHG | SYSTOLIC BLOOD PRESSURE: 142 MMHG

## 2018-12-21 VITALS — DIASTOLIC BLOOD PRESSURE: 37 MMHG | SYSTOLIC BLOOD PRESSURE: 110 MMHG

## 2018-12-21 VITALS — SYSTOLIC BLOOD PRESSURE: 101 MMHG | DIASTOLIC BLOOD PRESSURE: 13 MMHG

## 2018-12-21 VITALS — DIASTOLIC BLOOD PRESSURE: 141 MMHG | SYSTOLIC BLOOD PRESSURE: 176 MMHG

## 2018-12-21 VITALS — SYSTOLIC BLOOD PRESSURE: 80 MMHG | DIASTOLIC BLOOD PRESSURE: 44 MMHG

## 2018-12-21 VITALS — DIASTOLIC BLOOD PRESSURE: 16 MMHG | SYSTOLIC BLOOD PRESSURE: 120 MMHG

## 2018-12-21 VITALS — DIASTOLIC BLOOD PRESSURE: 39 MMHG | SYSTOLIC BLOOD PRESSURE: 89 MMHG

## 2018-12-21 LAB
ADD MANUAL DIFF: NO
ADD MANUAL DIFF: NO
ALBUMIN SERPL-MCNC: 1.5 G/DL (ref 3.4–5)
ALBUMIN/GLOB SERPL: 0.3 {RATIO} (ref 1–2.7)
ALP SERPL-CCNC: 137 U/L (ref 46–116)
ALT SERPL-CCNC: < 6 U/L (ref 12–78)
ANION GAP SERPL CALC-SCNC: 9 MMOL/L (ref 5–15)
AST SERPL-CCNC: 16 U/L (ref 15–37)
BASOPHILS NFR BLD AUTO: 0.8 % (ref 0–2)
BASOPHILS NFR BLD AUTO: 0.8 % (ref 0–2)
BILIRUB SERPL-MCNC: 0.3 MG/DL (ref 0.2–1)
BUN SERPL-MCNC: 24 MG/DL (ref 7–18)
CALCIUM SERPL-MCNC: 8.5 MG/DL (ref 8.5–10.1)
CHLORIDE SERPL-SCNC: 98 MMOL/L (ref 98–107)
CO2 SERPL-SCNC: 29 MMOL/L (ref 21–32)
CREAT SERPL-MCNC: 2.7 MG/DL (ref 0.55–1.3)
EOSINOPHIL NFR BLD AUTO: 0.6 % (ref 0–3)
EOSINOPHIL NFR BLD AUTO: 1.1 % (ref 0–3)
ERYTHROCYTE [DISTWIDTH] IN BLOOD BY AUTOMATED COUNT: 18.5 % (ref 11.6–14.8)
ERYTHROCYTE [DISTWIDTH] IN BLOOD BY AUTOMATED COUNT: 18.9 % (ref 11.6–14.8)
GLOBULIN SER-MCNC: 5.3 G/DL
HCT VFR BLD CALC: 29.7 % (ref 37–47)
HCT VFR BLD CALC: 29.9 % (ref 37–47)
HGB BLD-MCNC: 9.1 G/DL (ref 12–16)
HGB BLD-MCNC: 9.1 G/DL (ref 12–16)
LYMPHOCYTES NFR BLD AUTO: 4 % (ref 20–45)
LYMPHOCYTES NFR BLD AUTO: 5.1 % (ref 20–45)
MCV RBC AUTO: 96 FL (ref 80–99)
MCV RBC AUTO: 96 FL (ref 80–99)
MONOCYTES NFR BLD AUTO: 8.1 % (ref 1–10)
MONOCYTES NFR BLD AUTO: 9.3 % (ref 1–10)
NEUTROPHILS NFR BLD AUTO: 83.7 % (ref 45–75)
NEUTROPHILS NFR BLD AUTO: 86.5 % (ref 45–75)
PLATELET # BLD: 474 K/UL (ref 150–450)
PLATELET # BLD: 495 K/UL (ref 150–450)
POTASSIUM SERPL-SCNC: 3 MMOL/L (ref 3.5–5.1)
RBC # BLD AUTO: 3.11 M/UL (ref 4.2–5.4)
RBC # BLD AUTO: 3.12 M/UL (ref 4.2–5.4)
SODIUM SERPL-SCNC: 136 MMOL/L (ref 136–145)
WBC # BLD AUTO: 10.2 K/UL (ref 4.8–10.8)
WBC # BLD AUTO: 13 K/UL (ref 4.8–10.8)

## 2018-12-21 RX ADMIN — VITAMIN D, TAB 1000IU (100/BT) SCH INTLU: 25 TAB at 08:05

## 2018-12-21 RX ADMIN — IPRATROPIUM BROMIDE AND ALBUTEROL SULFATE PRN ML: .5; 3 SOLUTION RESPIRATORY (INHALATION) at 10:27

## 2018-12-21 RX ADMIN — AMIODARONE HYDROCHLORIDE SCH MG: 200 TABLET ORAL at 20:30

## 2018-12-21 RX ADMIN — MINOXIDIL SCH MG: 2.5 TABLET ORAL at 08:05

## 2018-12-21 RX ADMIN — CHLORHEXIDINE GLUCONATE SCH APPLIC: 213 SOLUTION TOPICAL at 20:27

## 2018-12-21 RX ADMIN — MINOXIDIL SCH MG: 2.5 TABLET ORAL at 20:29

## 2018-12-21 RX ADMIN — DEXTROSE AND SODIUM CHLORIDE SCH MLS/HR: 5; .45 INJECTION, SOLUTION INTRAVENOUS at 08:06

## 2018-12-21 RX ADMIN — AMIODARONE HYDROCHLORIDE SCH MG: 200 TABLET ORAL at 08:04

## 2018-12-21 RX ADMIN — APIXABAN SCH MG: 2.5 TABLET, FILM COATED ORAL at 18:51

## 2018-12-21 RX ADMIN — PANTOPRAZOLE SODIUM SCH MG: 40 INJECTION, POWDER, FOR SOLUTION INTRAVENOUS at 20:27

## 2018-12-21 RX ADMIN — PANTOPRAZOLE SODIUM SCH MG: 40 INJECTION, POWDER, FOR SOLUTION INTRAVENOUS at 08:05

## 2018-12-21 RX ADMIN — ERYTHROPOIETIN SCH UNITS: 20000 INJECTION, SOLUTION INTRAVENOUS; SUBCUTANEOUS at 20:29

## 2018-12-21 NOTE — NUR
NURSE NOTES: Patient has began dialysis. Patient has received medications and will continue 
to monitor patient and follow plan of care.

## 2018-12-21 NOTE — NUR
RESPIRATORY NOTE:

Received pt on AC 8, 450VT, 40%, PEEP +5. Pt trch-dependent w/ a cuffed, Shiley 8 tube. Pt 
alert/awake/follows commands. B/S moy. ronchi/diminished, sxn minimal amounts of thick/thin, 
white secretions. Both hands on soft restraints as pt still has a tendency to pull out her 
IVs/omer/ekg cords. Vent plugged into red outlet, ambubag at bedside. Pt denies SOB/chest 
pain at this time, tolerating current settings. Will continue plan of care.

## 2018-12-21 NOTE — NUR
NURSE NOTES: Patient repositioned, patient denies pain at this time, patient is alert, 
family is at bed side. No change in condition. Head of bed is at bedside and in the lowest 
position, 3 side rails are up, bed alarm s on, call light is within reach. Will continue to 
monitor patient and follow plan of care.

## 2018-12-21 NOTE — NUR
NURSE NOTES: Received patient from [facility]. Received patient in bed alert X3, patient is 
trach ( trach inserted 12/17) to vent, vent settings AC 8, , Fio2 40%, P5. 02 
saturation 98%. Bilateral lungs are diminished upon auscultation. Patient is connected to 
the cardiac monitor showing sinus rhythm at this moment. Patient abdomen is soft and non 
tender, patient has  g-tube dressing is dry and intact, no residual noted, Patient has 
feeding running Nepro 40ml. Patient has a rectal tube with dark brown stool noted.  Patient 
has dialysis scheduled for today, av shunt is in the left arm thrill felt upon palpation and 
bruit heard upon auscultation. patient has bilateral leg ulcers, sacral wound noted, 
dressing is dry an intact. patient has a right upper arm midline running at  D5 0.45 
30ml/hr. Patient has bilateral wrist restraints. Head of bed is at 30 degrees, bed is locked 
and in the lowest position, 3 side rails are up, bed is locked and in the lowest position. 
Will continue to monitor patient and follow plan of care.

## 2018-12-21 NOTE — NEPHROLOGY PROGRESS NOTE
Assessment/Plan


Assessment


Seee below


Plan


MOF resolving.





ESRD HD q MWF 


Anemia of CKD , SAMANTHA high dose. Transfuse PRN.





A. 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 with LAE. LVEF 65% . Anticoagulate when 

stable. KEVIN Wade. In and out A. Fib.





Post trach + PEG .


Referred to [facility].


See orders.


DW pt's son. Needs extensive Vascular W/U.





Subjective


Subjective


On Vent. Alert. Post Trach and PEG.


HD starting.





Objective


Objective





Last 24 Hour Vital Signs








  Date Time  Temp Pulse Resp B/P (MAP) Pulse Ox O2 Delivery O2 Flow Rate FiO2


 


12/21/18 08:05    119/22    


 


12/21/18 06:44  89 14     40


 


12/21/18 06:00  88 19 108/16 (46) 99   


 


12/21/18 05:26  98 18     40


 


12/21/18 05:00  113 19 112/21 (51) 92   


 


12/21/18 04:00      Trach Collar  





      Mechanical Ventilator  





      Mechanical Ventilator  


 


12/21/18 04:00 98.5 81 16 128/35 (66) 98   


 


12/21/18 04:00        40


 


12/21/18 04:00  81      


 


12/21/18 03:13  82 16     40


 


12/21/18 03:00  81 16 130/35 (66) 98   


 


12/21/18 02:00  77 17 120/13 (48) 98   


 


12/21/18 01:05  70 12     40


 


12/21/18 01:00  86 17 120/16 (50) 98   


 


12/21/18 00:00      Trach Collar  





      Mechanical Ventilator  





      Mechanical Ventilator  


 


12/21/18 00:00 98.7 77 17 113/23 (53) 98   


 


12/21/18 00:00  80      


 


12/21/18 00:00        40


 


12/20/18 23:00  86 17 114/21 (52) 98   


 


12/20/18 22:57  83 17     40


 


12/20/18 22:00  88 17 109/26 (53) 98   


 


12/20/18 21:26  79 16     40


 


12/20/18 21:22    121/37    


 


12/20/18 21:00        40


 


12/20/18 21:00  85 16 121/37 (65) 99   


 


12/20/18 20:00        50


 


12/20/18 20:00  79      


 


12/20/18 20:00      Trach Collar  





      Mechanical Ventilator  





      Mechanical Ventilator  


 


12/20/18 20:00 98.5 80 14 129/28 (61) 99   


 


12/20/18 19:10  83 15     40


 


12/20/18 19:00  81 15 122/18 (52) 99   


 


12/20/18 18:00  90 16 138/39 (72) 96   


 


12/20/18 17:15  82 15     40


 


12/20/18 17:00  80 16 123/35 (64) 94   


 


12/20/18 16:00 99.1 82 17 128/41 (70) 100   


 


12/20/18 16:00  78      


 


12/20/18 16:00        50


 


12/20/18 16:00      Trach Collar  





      Mechanical Ventilator  





      Mechanical Ventilator  


 


12/20/18 15:00  83 16 119/34 (62) 100   


 


12/20/18 14:39  84 15     40


 


12/20/18 14:00  89 18 120/10 (46) 100   


 


12/20/18 13:00  85 15 124/39 (67) 100   


 


12/20/18 12:37  76 15     40


 


12/20/18 12:00 99.3  15 114/28 (56) 100   


 


12/20/18 12:00  85      


 


12/20/18 12:00        50


 


12/20/18 12:00      Trach Collar  





      Mechanical Ventilator  





      Mechanical Ventilator  


 


12/20/18 11:00  85 15 111/30 (57) 100   


 


12/20/18 10:33  87 16     40


 


12/20/18 10:00  87 16 110/21 (50) 100   


 


12/20/18 09:00  90 18 116/18 (50) 100   


 


12/20/18 08:42  84 15     40

















Intake and Output  


 


 12/20/18 12/21/18





 19:00 07:00


 


Intake Total 510 ml 860 ml


 


Balance 510 ml 860 ml


 


  


 


Free Water  30 ml


 


IV Total 30 ml 330 ml


 


Tube Feeding 480 ml 440 ml


 


Other  60 ml


 


# Bowel Movements 3 3








Laboratory Tests


12/21/18 04:00: 


White Blood Count 10.2, Red Blood Count 3.12L, Hemoglobin 9.1L, Hematocrit 29.9L

, Mean Corpuscular Volume 96, Mean Corpuscular Hemoglobin 29.3, Mean 

Corpuscular Hemoglobin Concent 30.5L, Red Cell Distribution Width 18.9H, 

Platelet Count 495H, Mean Platelet Volume 6.2L, Neutrophils (%) (Auto) 83.7H, 

Lymphocytes (%) (Auto) 5.1L, Monocytes (%) (Auto) 9.3, Eosinophils (%) (Auto) 

1.1, Basophils (%) (Auto) 0.8, Sodium Level 136, Potassium Level 3.0L, Chloride 

Level 98, Carbon Dioxide Level 29, Anion Gap 9, Blood Urea Nitrogen 24H, 

Creatinine 2.7H, Estimat Glomerular Filtration Rate , Glucose Level 121H, 

Calcium Level 8.5, Total Bilirubin 0.3, Aspartate Amino Transf (AST/SGOT) 16, 

Alanine Aminotransferase (ALT/SGPT) < 6L, Alkaline Phosphatase 137H, Total 

Protein 6.8, Albumin 1.5L, Globulin 5.3, Albumin/Globulin Ratio 0.3L


Height (Feet):  5


Height (Inches):  2.00


Weight (Pounds):  110


Objective


On vent.


Trach clean.


Cv IRR/IRR


Lungs B ronchi.


Abd SNT. BS + New PEG.


E Rt. foot diabetic ulcers











Gavino Daigle MD Dec 21, 2018 08:35

## 2018-12-21 NOTE — PULMONOLOGY PROGRESS NOTE
Assessment/Plan


Assessment/Plan


IMPRESSION:


1. Hypotension.


2. Sepsis.


3. Atrial fibrillation.


4. Bradycardia.


5. Diabetes mellitus.


6. End-stage renal disease on dialysis.


7. Left lung collapse. Resolved


8. Respiratory failure. now status post tracheostomy


9. Dysphagia.


10. Dementia.


11. Anemia


12. Pneumonia





DISCUSSION:


1. Continue medications.


2. CXR improved


3. status post tracheostomy


4. Central access


4.  S/p G-tube 


5. Stable to transfer to [facility]











  ______________________________________________


  Quentin Cardoso M.D.





Subjective


Interval Events:  On vent; s/p trach; Fi)2 40%


Constitutional:  Reports: no symptoms


HEENT:  Repors: no symptoms


Respiratory:  Reports: no symptoms


Cardiovascular:  Reports: no symptoms


Gastrointestinal/Abdominal:  Reports: no symptoms


Genitourinary:  Reports: no symptoms


Allergies:  


Coded Allergies:  


     No Known Allergies (Unverified , 11/26/18)





Objective





Last 24 Hour Vital Signs








  Date Time  Temp Pulse Resp B/P (MAP) Pulse Ox O2 Delivery O2 Flow Rate FiO2


 


12/21/18 08:46  88 17     40


 


12/21/18 08:05    119/22    


 


12/21/18 06:44  89 14     40


 


12/21/18 06:00  88 19 108/16 (46) 99   


 


12/21/18 05:26  98 18     40


 


12/21/18 05:00  113 19 112/21 (51) 92   


 


12/21/18 04:00      Trach Collar  





      Mechanical Ventilator  





      Mechanical Ventilator  


 


12/21/18 04:00 98.5 81 16 128/35 (66) 98   


 


12/21/18 04:00        40


 


12/21/18 04:00  81      


 


12/21/18 03:13  82 16     40


 


12/21/18 03:00  81 16 130/35 (66) 98   


 


12/21/18 02:00  77 17 120/13 (48) 98   


 


12/21/18 01:05  70 12     40


 


12/21/18 01:00  86 17 120/16 (50) 98   


 


12/21/18 00:00      Trach Collar  





      Mechanical Ventilator  





      Mechanical Ventilator  


 


12/21/18 00:00 98.7 77 17 113/23 (53) 98   


 


12/21/18 00:00  80      


 


12/21/18 00:00        40


 


12/20/18 23:00  86 17 114/21 (52) 98   


 


12/20/18 22:57  83 17     40


 


12/20/18 22:00  88 17 109/26 (53) 98   


 


12/20/18 21:26  79 16     40


 


12/20/18 21:22    121/37    


 


12/20/18 21:00        40


 


12/20/18 21:00  85 16 121/37 (65) 99   


 


12/20/18 20:00        50


 


12/20/18 20:00  79      


 


12/20/18 20:00      Trach Collar  





      Mechanical Ventilator  





      Mechanical Ventilator  


 


12/20/18 20:00 98.5 80 14 129/28 (61) 99   


 


12/20/18 19:10  83 15     40


 


12/20/18 19:00  81 15 122/18 (52) 99   


 


12/20/18 18:00  90 16 138/39 (72) 96   


 


12/20/18 17:15  82 15     40


 


12/20/18 17:00  80 16 123/35 (64) 94   


 


12/20/18 16:00 99.1 82 17 128/41 (70) 100   


 


12/20/18 16:00  78      


 


12/20/18 16:00        50


 


12/20/18 16:00      Trach Collar  





      Mechanical Ventilator  





      Mechanical Ventilator  


 


12/20/18 15:00  83 16 119/34 (62) 100   


 


12/20/18 14:39  84 15     40


 


12/20/18 14:00  89 18 120/10 (46) 100   


 


12/20/18 13:00  85 15 124/39 (67) 100   


 


12/20/18 12:37  76 15     40


 


12/20/18 12:00 99.3  15 114/28 (56) 100   


 


12/20/18 12:00  85      


 


12/20/18 12:00        50


 


12/20/18 12:00      Trach Collar  





      Mechanical Ventilator  





      Mechanical Ventilator  


 


12/20/18 11:00  85 15 111/30 (57) 100   


 


12/20/18 10:33  87 16     40


 


12/20/18 10:00  87 16 110/21 (50) 100   


 


12/20/18 09:00  90 18 116/18 (50) 100   

















Intake and Output  


 


 12/20/18 12/21/18





 19:00 07:00


 


Intake Total 510 ml 860 ml


 


Balance 510 ml 860 ml


 


  


 


Free Water  30 ml


 


IV Total 30 ml 330 ml


 


Tube Feeding 480 ml 440 ml


 


Other  60 ml


 


# Bowel Movements 3 3








General Appearance:  no acute distress


HEENT:  normocephalic, status post trach


Respiratory/Chest:  chest wall non-tender, lungs clear


Cardiovascular:  normal rate


Abdomen:  normal bowel sounds


Laboratory Tests


12/21/18 04:00: 


White Blood Count 10.2, Red Blood Count 3.12L, Hemoglobin 9.1L, Hematocrit 29.9L

, Mean Corpuscular Volume 96, Mean Corpuscular Hemoglobin 29.3, Mean 

Corpuscular Hemoglobin Concent 30.5L, Red Cell Distribution Width 18.9H, 

Platelet Count 495H, Mean Platelet Volume 6.2L, Neutrophils (%) (Auto) 83.7H, 

Lymphocytes (%) (Auto) 5.1L, Monocytes (%) (Auto) 9.3, Eosinophils (%) (Auto) 

1.1, Basophils (%) (Auto) 0.8, Sodium Level 136, Potassium Level 3.0L, Chloride 

Level 98, Carbon Dioxide Level 29, Anion Gap 9, Blood Urea Nitrogen 24H, 

Creatinine 2.7H, Estimat Glomerular Filtration Rate , Glucose Level 121H, 

Calcium Level 8.5, Total Bilirubin 0.3, Aspartate Amino Transf (AST/SGOT) 16, 

Alanine Aminotransferase (ALT/SGPT) < 6L, Alkaline Phosphatase 137H, Total 

Protein 6.8, Albumin 1.5L, Globulin 5.3, Albumin/Globulin Ratio 0.3L





Current Medications








 Medications


  (Trade)  Dose


 Ordered  Sig/Tony


 Route


 PRN Reason  Start Time


 Stop Time Status Last Admin


Dose Admin


 


 Acetaminophen


  (Tylenol)  650 mg  Q6H  PRN


 ORAL


 Mild Pain/Temp > 100.5  12/4/18 14:15


 1/3/19 14:14  12/18/18 18:45


 


 


 Albuterol/


 Ipratropium


  (Albuterol/


 Ipratropium)  3 ml  Q6H  PRN


 HHN


 Shortness of Breath  12/18/18 19:15


 12/23/18 19:14  12/18/18 21:33


 


 


 Amiodarone HCl


  (Cordarone)  200 mg  EVERY 12  HOURS


 NG


   12/8/18 21:00


 1/5/19 13:59  12/21/18 08:04


 


 


 Apixaban


  (Eliquis)  2.5 mg  BID


 ORAL


   12/21/18 09:00


 1/20/19 08:59  12/21/18 08:10


 


 


 Chlorhexidine


 Gluconate


  (Juana-Hex 2%)  1 applic  DAILY@2000


 TOPIC


   11/27/18 20:00


 12/27/18 19:59  12/20/18 21:21


 


 


 Collagenase


  (Santyl)  1 applic  Q12HR


 TOPIC


   12/12/18 21:00


 1/5/19 20:59  12/21/18 08:16


 


 


 Dextrose/Sodium


 Chloride  1,000 ml @ 


 30 mls/hr  Q24H


 IV


   12/19/18 09:00


 1/18/19 08:59  12/21/18 08:06


 


 


 Epoetin Rupesh


  (Procrit (for


 ESRD on dialysis))  10,000 units  MON-WED-FRI


 SUBQ


   11/28/18 21:00


 12/28/18 20:59  12/19/18 21:01


 


 


 Heparin Sodium


  (Porcine)


  (Heparin Sod


 1000 units/ml


 10ml)  2,000 unit  ONCE  PRN


 IV


 dialysis use  12/21/18 06:00


 12/21/18 21:00   


 


 


 Minoxidil


  (Loniten)  5 mg  Q12HR


 NG


   12/2/18 21:00


 1/1/19 20:59  12/21/18 08:05


 


 


 Pantoprazole


  (Protonix)  40 mg  Q12HR


 IVP


   11/26/18 21:00


 12/27/18 08:59  12/21/18 08:05


 


 


 Sodium Chloride  1,000 ml @ 


 500 mls/hr  Q2H PRN


 IVLG


 sbp<90 during hd  12/21/18 06:00


 12/21/18 21:00   


 


 


 Vitamin D


  (Vitamin D)  1,000 intlu  DAILY


 GT


   12/9/18 10:00


 1/8/19 09:59  12/21/18 08:05


 

















Quentin Cardoso MD Dec 21, 2018 08:50

## 2018-12-21 NOTE — NUR
NURSE NOTES: Patient repositioned, patient is alert watching tv, patient denies pain. 
Patient is watching television. Head of bd is at 30 degrees, bed is locked and in the lowest 
position. Will continue to monitor patient and follow plan of care.

## 2018-12-21 NOTE — GI PROGRESS NOTE
Assessment/Plan


Problems:  


(1) Encounter for PEG (percutaneous endoscopic gastrostomy)


ICD Codes:  Z43.1 - Encounter for attention to gastrostomy


SNOMED:  583428363, 247403765


(2) Severe malnutrition


ICD Codes:  E43 - Unspecified severe protein-calorie malnutrition


SNOMED:  86832608


(3) Dehydration


ICD Codes:  E86.0 - Dehydration


SNOMED:  24972124


Status:  stable


Status Narrative


Discussed with Dr. Lam


Assessment/Plan


Assessment


- Dysphagia


- Resp failure


- ESRD


- Anemia


- Status post PEG





Recommendations


1. Abdominal binder.


2. Elevate the head of the bed at all times.


3. G-tube flush.


4. G-tube care.


5. Start tube feeding later today per RD to goal


Monitor H&H, PRN transfusions


PPI


Electrolyte correction


Follow-up labs





The patient was seen and examined at bedside and all new and available data was 

reviewed in the patients chart. I agree with the above findings, impression 

and plan.  (Patient seen earlier today. Signature stamp does not reflect 

patient encounter time.). - Elliott Lam MD





Subjective


Subjective


limited





Objective





Last 24 Hour Vital Signs








  Date Time  Temp Pulse Resp B/P (MAP) Pulse Ox O2 Delivery O2 Flow Rate FiO2


 


12/21/18 12:43  138 23     40


 


12/21/18 12:00      Mechanical Ventilator  





      Mechanical Ventilator  


 


12/21/18 12:00        40


 


12/21/18 12:00  110 20 142/90 (107) 96   


 


12/21/18 11:00  107 20 127/73 (91) 97   


 


12/21/18 10:57  107 17  98 Mechanical Ventilator  35


 


12/21/18 10:30  100 16  99 Mechanical Ventilator  40


 


12/21/18 10:27  90 19     40


 


12/21/18 10:00  98 20 117/54 (75) 98   


 


12/21/18 09:00  95 19 126/27 (60) 100   


 


12/21/18 08:46  88 17     40


 


12/21/18 08:05    119/22    


 


12/21/18 08:00  88 19 123/73 (90) 99   


 


12/21/18 08:00      Mechanical Ventilator  





      Mechanical Ventilator  


 


12/21/18 08:00        40


 


12/21/18 08:00  89      


 


12/21/18 07:00 99.4 90 16 84/12 (36) 100   


 


12/21/18 06:44  89 14     40


 


12/21/18 06:00  88 19 108/16 (46) 99   


 


12/21/18 05:26  98 18     40


 


12/21/18 05:00  113 19 112/21 (51) 92   


 


12/21/18 04:00      Trach Collar  





      Mechanical Ventilator  





      Mechanical Ventilator  


 


12/21/18 04:00 98.5 81 16 128/35 (66) 98   


 


12/21/18 04:00        40


 


12/21/18 04:00  81      


 


12/21/18 03:13  82 16     40


 


12/21/18 03:00  81 16 130/35 (66) 98   


 


12/21/18 02:00  77 17 120/13 (48) 98   


 


12/21/18 01:05  70 12     40


 


12/21/18 01:00  86 17 120/16 (50) 98   


 


12/21/18 00:00      Trach Collar  





      Mechanical Ventilator  





      Mechanical Ventilator  


 


12/21/18 00:00 98.7 77 17 113/23 (53) 98   


 


12/21/18 00:00  80      


 


12/21/18 00:00        40


 


12/20/18 23:00  86 17 114/21 (52) 98   


 


12/20/18 22:57  83 17     40


 


12/20/18 22:00  88 17 109/26 (53) 98   


 


12/20/18 21:26  79 16     40


 


12/20/18 21:22    121/37    


 


12/20/18 21:00        40


 


12/20/18 21:00  85 16 121/37 (65) 99   


 


12/20/18 20:00        50


 


12/20/18 20:00  79      


 


12/20/18 20:00      Trach Collar  





      Mechanical Ventilator  





      Mechanical Ventilator  


 


12/20/18 20:00 98.5 80 14 129/28 (61) 99   


 


12/20/18 19:10  83 15     40


 


12/20/18 19:00  81 15 122/18 (52) 99   


 


12/20/18 18:00  90 16 138/39 (72) 96   


 


12/20/18 17:15  82 15     40


 


12/20/18 17:00  80 16 123/35 (64) 94   


 


12/20/18 16:00 99.1 82 17 128/41 (70) 100   


 


12/20/18 16:00  78      


 


12/20/18 16:00        50


 


12/20/18 16:00      Trach Collar  





      Mechanical Ventilator  





      Mechanical Ventilator  

















Intake and Output  


 


 12/20/18 12/21/18





 19:00 07:00


 


Intake Total 510 ml 930 ml


 


Balance 510 ml 930 ml


 


  


 


Free Water  60 ml


 


IV Total 30 ml 330 ml


 


Tube Feeding 480 ml 480 ml


 


Other  60 ml


 


# Bowel Movements 3 3











Laboratory Tests








Test


  12/21/18


04:00


 


White Blood Count


  10.2 K/UL


(4.8-10.8)


 


Red Blood Count


  3.12 M/UL


(4.20-5.40)  L


 


Hemoglobin


  9.1 G/DL


(12.0-16.0)  L


 


Hematocrit


  29.9 %


(37.0-47.0)  L


 


Mean Corpuscular Volume 96 FL (80-99)  


 


Mean Corpuscular Hemoglobin


  29.3 PG


(27.0-31.0)


 


Mean Corpuscular Hemoglobin


Concent 30.5 G/DL


(32.0-36.0)  L


 


Red Cell Distribution Width


  18.9 %


(11.6-14.8)  H


 


Platelet Count


  495 K/UL


(150-450)  H


 


Mean Platelet Volume


  6.2 FL


(6.5-10.1)  L


 


Neutrophils (%) (Auto)


  83.7 %


(45.0-75.0)  H


 


Lymphocytes (%) (Auto)


  5.1 %


(20.0-45.0)  L


 


Monocytes (%) (Auto)


  9.3 %


(1.0-10.0)


 


Eosinophils (%) (Auto)


  1.1 %


(0.0-3.0)


 


Basophils (%) (Auto)


  0.8 %


(0.0-2.0)


 


Sodium Level


  136 MMOL/L


(136-145)


 


Potassium Level


  3.0 MMOL/L


(3.5-5.1)  L


 


Chloride Level


  98 MMOL/L


()


 


Carbon Dioxide Level


  29 MMOL/L


(21-32)


 


Anion Gap


  9 mmol/L


(5-15)


 


Blood Urea Nitrogen


  24 mg/dL


(7-18)  H


 


Creatinine


  2.7 MG/DL


(0.55-1.30)  H


 


Estimat Glomerular Filtration


Rate  mL/min (>60)  


 


 


Glucose Level


  121 MG/DL


()  H


 


Calcium Level


  8.5 MG/DL


(8.5-10.1)


 


Total Bilirubin


  0.3 MG/DL


(0.2-1.0)


 


Aspartate Amino Transf


(AST/SGOT) 16 U/L (15-37)


 


 


Alanine Aminotransferase


(ALT/SGPT) < 6 U/L


(12-78)  L


 


Alkaline Phosphatase


  137 U/L


()  H


 


Total Protein


  6.8 G/DL


(6.4-8.2)


 


Albumin


  1.5 G/DL


(3.4-5.0)  L


 


Globulin 5.3 g/dL  


 


Albumin/Globulin Ratio


  0.3 (1.0-2.7)


L








Height (Feet):  5


Height (Inches):  2.00


Weight (Pounds):  110


General Appearance:  WD/WN, no apparent distress, alert


Cardiovascular:  normal rate


Respiratory/Chest:  normal breath sounds, no respiratory distress, other - 

Tracheostomy


Abdominal Exam:  normal bowel sounds, non tender, soft, GT site - Clean dry and 

intact


Extremities:  non-tender











Shane Lowry NP Dec 21, 2018 15:12

## 2018-12-21 NOTE — NUR
RESPIRATORY NOTE:



Patient received on current prescribed vent settings of AC  +5 40%. Anbu bag at 
bedside and ventilator plugged into red outlet. Patient found alert and awake and under no 
notable distress. Patient appears stable at this time, and will continue to monitor.

## 2018-12-21 NOTE — NUR
NURSE NOTES: Patient is still being dialyzed. Patient is alert mouthing words, patient 
denies pain at this time, patient is watching television. No acute distress noted. Patient 
denies pain. No change in condition. Will continue to monitor patient and follow plan of 
care.

## 2018-12-21 NOTE — NUR
NURSE NOTES:

Received pt from CAROLYN Arnett. patient awake in bed, able to follow simple commands and 
shake head for yes or no. Shiley 8, AC 8/ , FIO2 40%, PEEP 5, SPO2 100%. Ariel. soft 
wrist restraints in place, skin intact. GT running Nephro @40cc/hr, no residual, HOB 30 
degrees, abd binder. abdominal distention on right lower quadrant noted.  x3 wound on right 
leg, right heel, left heel and sacral dressing c/d/i. On p200 mattress. FC in place, pt is 
anuric. Rectal tube draining loose brown stool to gravity. Pt is SR and occasional A-fib on 
cardiac monitor. LAV shunt noted with positive thrill and bruit. ASHISH PICC running TKO, 
dressing change done 12/15. No s/sx of acute distress noted at this time. Will continue plan 
of care.

## 2018-12-21 NOTE — NUR
NURSE NOTES: Patient completed dialysis, 2500ml removed, patient is stable. /73. No 
acute distress noted. Patient denies pain. Head of bed is at 30 degrees, bed is locked and 
in the lowest position, 3 side rails are up. Will continue to monitor patient and follow 
plan of care.

## 2018-12-21 NOTE — NUR
NURSE NOTES: Patient repositioned, patient linen changed and rectal tube is in place and 
patent. No acute distress, noted 02 saturation 98%. Head of bed is at 30 degrees, bed is 
locked and in the lowest position, 3 side rails are up, bed alarm is on, call light is 
within reach. Will continue to monitor patient and follow plan of care.

## 2018-12-22 VITALS — DIASTOLIC BLOOD PRESSURE: 15 MMHG | SYSTOLIC BLOOD PRESSURE: 104 MMHG

## 2018-12-22 VITALS — SYSTOLIC BLOOD PRESSURE: 124 MMHG | DIASTOLIC BLOOD PRESSURE: 28 MMHG

## 2018-12-22 VITALS — SYSTOLIC BLOOD PRESSURE: 122 MMHG | DIASTOLIC BLOOD PRESSURE: 19 MMHG

## 2018-12-22 VITALS — DIASTOLIC BLOOD PRESSURE: 38 MMHG | SYSTOLIC BLOOD PRESSURE: 96 MMHG

## 2018-12-22 VITALS — SYSTOLIC BLOOD PRESSURE: 105 MMHG | DIASTOLIC BLOOD PRESSURE: 12 MMHG

## 2018-12-22 VITALS — DIASTOLIC BLOOD PRESSURE: 80 MMHG | SYSTOLIC BLOOD PRESSURE: 109 MMHG

## 2018-12-22 VITALS — SYSTOLIC BLOOD PRESSURE: 120 MMHG | DIASTOLIC BLOOD PRESSURE: 47 MMHG

## 2018-12-22 VITALS — DIASTOLIC BLOOD PRESSURE: 79 MMHG | SYSTOLIC BLOOD PRESSURE: 102 MMHG

## 2018-12-22 VITALS — DIASTOLIC BLOOD PRESSURE: 21 MMHG | SYSTOLIC BLOOD PRESSURE: 95 MMHG

## 2018-12-22 VITALS — SYSTOLIC BLOOD PRESSURE: 104 MMHG | DIASTOLIC BLOOD PRESSURE: 21 MMHG

## 2018-12-22 VITALS — DIASTOLIC BLOOD PRESSURE: 25 MMHG | SYSTOLIC BLOOD PRESSURE: 70 MMHG

## 2018-12-22 VITALS — DIASTOLIC BLOOD PRESSURE: 79 MMHG | SYSTOLIC BLOOD PRESSURE: 98 MMHG

## 2018-12-22 VITALS — SYSTOLIC BLOOD PRESSURE: 95 MMHG | DIASTOLIC BLOOD PRESSURE: 70 MMHG

## 2018-12-22 VITALS — DIASTOLIC BLOOD PRESSURE: 29 MMHG | SYSTOLIC BLOOD PRESSURE: 114 MMHG

## 2018-12-22 VITALS — DIASTOLIC BLOOD PRESSURE: 28 MMHG | SYSTOLIC BLOOD PRESSURE: 124 MMHG

## 2018-12-22 VITALS — DIASTOLIC BLOOD PRESSURE: 47 MMHG | SYSTOLIC BLOOD PRESSURE: 112 MMHG

## 2018-12-22 VITALS — SYSTOLIC BLOOD PRESSURE: 111 MMHG | DIASTOLIC BLOOD PRESSURE: 90 MMHG

## 2018-12-22 VITALS — DIASTOLIC BLOOD PRESSURE: 30 MMHG | SYSTOLIC BLOOD PRESSURE: 115 MMHG

## 2018-12-22 VITALS — DIASTOLIC BLOOD PRESSURE: 40 MMHG | SYSTOLIC BLOOD PRESSURE: 101 MMHG

## 2018-12-22 VITALS — DIASTOLIC BLOOD PRESSURE: 12 MMHG | SYSTOLIC BLOOD PRESSURE: 124 MMHG

## 2018-12-22 VITALS — DIASTOLIC BLOOD PRESSURE: 15 MMHG | SYSTOLIC BLOOD PRESSURE: 111 MMHG

## 2018-12-22 VITALS — SYSTOLIC BLOOD PRESSURE: 123 MMHG | DIASTOLIC BLOOD PRESSURE: 11 MMHG

## 2018-12-22 VITALS — DIASTOLIC BLOOD PRESSURE: 33 MMHG | SYSTOLIC BLOOD PRESSURE: 115 MMHG

## 2018-12-22 VITALS — SYSTOLIC BLOOD PRESSURE: 121 MMHG | DIASTOLIC BLOOD PRESSURE: 28 MMHG

## 2018-12-22 LAB
ADD MANUAL DIFF: NO
ALBUMIN SERPL-MCNC: 1.5 G/DL (ref 3.4–5)
ALBUMIN/GLOB SERPL: 0.3 {RATIO} (ref 1–2.7)
ALP SERPL-CCNC: 177 U/L (ref 46–116)
ALT SERPL-CCNC: 8 U/L (ref 12–78)
ANION GAP SERPL CALC-SCNC: 8 MMOL/L (ref 5–15)
AST SERPL-CCNC: 16 U/L (ref 15–37)
BASOPHILS NFR BLD AUTO: 1 % (ref 0–2)
BILIRUB SERPL-MCNC: 0.3 MG/DL (ref 0.2–1)
BUN SERPL-MCNC: 25 MG/DL (ref 7–18)
CALCIUM SERPL-MCNC: 8.7 MG/DL (ref 8.5–10.1)
CHLORIDE SERPL-SCNC: 101 MMOL/L (ref 98–107)
CO2 SERPL-SCNC: 29 MMOL/L (ref 21–32)
CREAT SERPL-MCNC: 2.4 MG/DL (ref 0.55–1.3)
EOSINOPHIL NFR BLD AUTO: 1.4 % (ref 0–3)
ERYTHROCYTE [DISTWIDTH] IN BLOOD BY AUTOMATED COUNT: 18.8 % (ref 11.6–14.8)
GLOBULIN SER-MCNC: 4.4 G/DL
HCT VFR BLD CALC: 27.5 % (ref 37–47)
HGB BLD-MCNC: 8.2 G/DL (ref 12–16)
LYMPHOCYTES NFR BLD AUTO: 7 % (ref 20–45)
MCV RBC AUTO: 96 FL (ref 80–99)
MONOCYTES NFR BLD AUTO: 9.3 % (ref 1–10)
NEUTROPHILS NFR BLD AUTO: 81.3 % (ref 45–75)
PLATELET # BLD: 416 K/UL (ref 150–450)
POTASSIUM SERPL-SCNC: 3.2 MMOL/L (ref 3.5–5.1)
RBC # BLD AUTO: 2.86 M/UL (ref 4.2–5.4)
SODIUM SERPL-SCNC: 138 MMOL/L (ref 136–145)
WBC # BLD AUTO: 10 K/UL (ref 4.8–10.8)

## 2018-12-22 RX ADMIN — CHLORHEXIDINE GLUCONATE SCH APPLIC: 213 SOLUTION TOPICAL at 19:16

## 2018-12-22 RX ADMIN — PANTOPRAZOLE SODIUM SCH MG: 40 INJECTION, POWDER, FOR SOLUTION INTRAVENOUS at 08:19

## 2018-12-22 RX ADMIN — ACETAMINOPHEN PRN MG: 160 SOLUTION ORAL at 11:20

## 2018-12-22 RX ADMIN — APIXABAN SCH MG: 2.5 TABLET, FILM COATED ORAL at 17:26

## 2018-12-22 RX ADMIN — AMIODARONE HYDROCHLORIDE SCH MG: 200 TABLET ORAL at 20:37

## 2018-12-22 RX ADMIN — ACETAMINOPHEN PRN MG: 160 SOLUTION ORAL at 18:41

## 2018-12-22 RX ADMIN — APIXABAN SCH MG: 2.5 TABLET, FILM COATED ORAL at 08:20

## 2018-12-22 RX ADMIN — AMIODARONE HYDROCHLORIDE SCH MG: 200 TABLET ORAL at 08:20

## 2018-12-22 RX ADMIN — MINOXIDIL SCH MG: 2.5 TABLET ORAL at 20:37

## 2018-12-22 RX ADMIN — MINOXIDIL SCH MG: 2.5 TABLET ORAL at 08:20

## 2018-12-22 RX ADMIN — VITAMIN D, TAB 1000IU (100/BT) SCH INTLU: 25 TAB at 08:20

## 2018-12-22 RX ADMIN — PANTOPRAZOLE SODIUM SCH MG: 40 INJECTION, POWDER, FOR SOLUTION INTRAVENOUS at 20:37

## 2018-12-22 NOTE — PULMONOLOGY PROGRESS NOTE
Assessment/Plan


Assessment/Plan


IMPRESSION:


1. Hypotension. Resolved.


2. Sepsis.


3. Atrial fibrillation.


4. Bradycardia.


5. Diabetes mellitus.


6. End-stage renal disease on dialysis.


7. Left lung collapse. Resolved


8. Respiratory failure. now status post tracheostomy


9. Dysphagia.


10. Dementia.


11. Anemia


12. Pneumonia





DISCUSSION:


1. Continue medications.


2. CXR improved


3. Status post tracheostomy


4. S/p G-tube 


5. Stable to transfer to [facility]











  ______________________________________________


  Quentin Cardoso M.D.





Subjective


Interval Events:  No new events


Constitutional:  Reports: no symptoms


HEENT:  Repors: no symptoms


Respiratory:  Reports: no symptoms


Cardiovascular:  Reports: no symptoms


Gastrointestinal/Abdominal:  Reports: no symptoms


Genitourinary:  Reports: no symptoms


Neurologic:  Reports: no symptoms


Allergies:  


Coded Allergies:  


     No Known Allergies (Unverified , 11/26/18)





Objective





Last 24 Hour Vital Signs








  Date Time  Temp Pulse Resp B/P (MAP) Pulse Ox O2 Delivery O2 Flow Rate FiO2


 


12/22/18 08:44  87 15     40


 


12/22/18 08:20    95/70    


 


12/22/18 07:00  85 15 111/90 (97) 100   


 


12/22/18 06:55  82 14     40


 


12/22/18 06:00  81 14 115/33 (60) 100   


 


12/22/18 05:21  79 16     40


 


12/22/18 05:00  81 17 122/19 (53) 90   


 


12/22/18 04:00 99.2 82 17 96/38 (57) 100   


 


12/22/18 04:00  86      


 


12/22/18 04:00      Mechanical Ventilator  





      Mechanical Ventilator  


 


12/22/18 04:00        40


 


12/22/18 03:00  84 17 101/40 (60) 100   


 


12/22/18 03:00  85 16     40


 


12/22/18 02:00  86 18 70/25 (40) 100   


 


12/22/18 01:10  89 20     40


 


12/22/18 01:00  88 16 114/29 (57) 100   


 


12/22/18 00:00        40


 


12/22/18 00:00 98.7 88 17 123/11 (48) 100   


 


12/22/18 00:00  78      


 


12/22/18 00:00      Mechanical Ventilator  





      Mechanical Ventilator  


 


12/21/18 23:00  88 16 121/29 (59) 100   


 


12/21/18 22:50  94 18     40


 


12/21/18 22:00  87 18 110/37 (61) 100   


 


12/21/18 21:00  86 18 75/17 (36) 100   


 


12/21/18 21:00  85 17     40


 


12/21/18 20:29    91/56    


 


12/21/18 20:00      Mechanical Ventilator  





      Mechanical Ventilator  


 


12/21/18 20:00        40


 


12/21/18 20:00  91 19 81/17 (38) 99   


 


12/21/18 20:00  83      


 


12/21/18 19:00 99.2 93 19 176/141 (153) 100   


 


12/21/18 18:55  91 18     40


 


12/21/18 18:00  93 19 110/37 (61) 100   


 


12/21/18 17:07  88 18     40


 


12/21/18 17:00 99.4 90 20 75/20 (38) 87   


 


12/21/18 16:00        40


 


12/21/18 16:00  90 18 101/13 (42) 100   


 


12/21/18 16:00  83      


 


12/21/18 16:00      Mechanical Ventilator  





      Mechanical Ventilator  


 


12/21/18 15:00  100 20 80/44 (56) 98   


 


12/21/18 14:39  99 21     40


 


12/21/18 14:00  107 20 95/28 (50) 98   


 


12/21/18 13:00  109 19 89/39 (56) 97   


 


12/21/18 12:43  138 23     40


 


12/21/18 12:00      Mechanical Ventilator  





      Mechanical Ventilator  


 


12/21/18 12:00        40


 


12/21/18 12:00  106      


 


12/21/18 12:00  110 20 142/90 (107) 96   


 


12/21/18 11:00  107 20 127/73 (91) 97   


 


12/21/18 10:57  107 17  98 Mechanical Ventilator  35


 


12/21/18 10:30  100 16  99 Mechanical Ventilator  40


 


12/21/18 10:27  90 19     40


 


12/21/18 10:00  98 20 117/54 (75) 98   

















Intake and Output  


 


 12/21/18 12/22/18





 18:59 06:59


 


Intake Total 730 ml 540 ml


 


Output Total 2500 ml 


 


Balance -1770 ml 540 ml


 


  


 


Free Water 60 ml 60 ml


 


IV Total 150 ml 


 


Tube Feeding 520 ml 480 ml


 


Hemodialysis UF 2500 ml 


 


# Bowel Movements 3 3








General Appearance:  no acute distress


HEENT:  status post trach


Respiratory/Chest:  chest wall non-tender, lungs clear


Cardiovascular:  normal peripheral pulses, normal rate


Abdomen:  normal bowel sounds


Laboratory Tests


12/21/18 15:20: 


White Blood Count 13.0H, Red Blood Count 3.11L, Hemoglobin 9.1L, Hematocrit 

29.7L, Mean Corpuscular Volume 96, Mean Corpuscular Hemoglobin 29.4, Mean 

Corpuscular Hemoglobin Concent 30.8L, Red Cell Distribution Width 18.5H, 

Platelet Count 474H, Mean Platelet Volume 6.4L, Neutrophils (%) (Auto) 86.5H, 

Lymphocytes (%) (Auto) 4.0L, Monocytes (%) (Auto) 8.1, Eosinophils (%) (Auto) 

0.6, Basophils (%) (Auto) 0.8, Phosphorus Level 2.8





Current Medications








 Medications


  (Trade)  Dose


 Ordered  Sig/Tony


 Route


 PRN Reason  Start Time


 Stop Time Status Last Admin


Dose Admin


 


 Acetaminophen


  (Tylenol)  650 mg  Q6H  PRN


 GT


 Mild Pain/Temp > 100.5  12/21/18 16:00


 1/20/19 15:59   


 


 


 Albuterol/


 Ipratropium


  (Albuterol/


 Ipratropium)  3 ml  Q6H  PRN


 HHN


 Shortness of Breath  12/18/18 19:15


 12/23/18 19:14  12/21/18 10:27


 


 


 Amiodarone HCl


  (Cordarone)  200 mg  EVERY 12  HOURS


 NG


   12/8/18 21:00


 1/5/19 13:59  12/22/18 08:20


 


 


 Apixaban


  (Eliquis)  2.5 mg  BID


 NG


   12/21/18 18:00


 1/20/19 08:59  12/22/18 08:20


 


 


 Chlorhexidine


 Gluconate


  (Juana-Hex 2%)  1 applic  DAILY@2000


 TOPIC


   11/27/18 20:00


 12/27/18 19:59  12/21/18 20:27


 


 


 Collagenase


  (Santyl)  1 applic  Q12HR


 TOPIC


   12/12/18 21:00


 1/5/19 20:59  12/22/18 08:21


 


 


 Epoetin Rupesh


  (Procrit (for


 ESRD on dialysis))  10,000 units  MON-WED-FRI


 SUBQ


   11/28/18 21:00


 12/28/18 20:59  12/21/18 20:29


 


 


 Minoxidil


  (Loniten)  5 mg  Q12HR


 NG


   12/2/18 21:00


 1/1/19 20:59  12/21/18 20:29


 


 


 Pantoprazole


  (Protonix)  40 mg  Q12HR


 IVP


   11/26/18 21:00


 12/27/18 08:59  12/22/18 08:19


 


 


 Vitamin D


  (Vitamin D)  1,000 intlu  DAILY


 GT


   12/9/18 10:00


 1/8/19 09:59  12/22/18 08:20


 

















Quentin Cardoso MD Dec 22, 2018 09:17

## 2018-12-22 NOTE — NUR
NURSE NOTES:

Repositioned patient and changed dressing on right leg and sacral area. Tolerated well. 
Rectal tube draining well. VSS.

## 2018-12-22 NOTE — GENERAL PROGRESS NOTE
Assessment/Plan


Assessment/Plan


Assessment


- Dysphagia


- Resp failure


- ESRD


- Anemia


- Status post PEG





Recommendations


1. Abdominal binder.


2. Elevate the head of the bed at all times.


3. G-tube flush.


4. G-tube care.


5. Start tube feeding later today per RD to goal


Monitor H&H, PRN transfusions


PPI


Electrolyte correction


Follow-up labs





Subjective


ROS Limited/Unobtainable:  No


Allergies:  


Coded Allergies:  


     No Known Allergies (Unverified , 11/26/18)





Objective





Last 24 Hour Vital Signs








  Date Time  Temp Pulse Resp B/P (MAP) Pulse Ox O2 Delivery O2 Flow Rate FiO2


 


12/22/18 09:00  87 16 124/28 (60) 100   


 


12/22/18 08:44  87 15     40


 


12/22/18 08:20    95/70    


 


12/22/18 08:00      Mechanical Ventilator  





      Mechanical Ventilator  


 


12/22/18 08:00        40


 


12/22/18 08:00  85 16 95/70 (78) 100   


 


12/22/18 07:00  85 15 111/90 (97) 100   


 


12/22/18 06:55  82 14     40


 


12/22/18 06:00  81 14 115/33 (60) 100   


 


12/22/18 05:21  79 16     40


 


12/22/18 05:00  81 17 122/19 (53) 90   


 


12/22/18 04:00 99.2 82 17 96/38 (57) 100   


 


12/22/18 04:00  86      


 


12/22/18 04:00      Mechanical Ventilator  





      Mechanical Ventilator  


 


12/22/18 04:00        40


 


12/22/18 03:00  84 17 101/40 (60) 100   


 


12/22/18 03:00  85 16     40


 


12/22/18 02:00  86 18 70/25 (40) 100   


 


12/22/18 01:10  89 20     40


 


12/22/18 01:00  88 16 114/29 (57) 100   


 


12/22/18 00:00        40


 


12/22/18 00:00 98.7 88 17 123/11 (48) 100   


 


12/22/18 00:00  78      


 


12/22/18 00:00      Mechanical Ventilator  





      Mechanical Ventilator  


 


12/21/18 23:00  88 16 121/29 (59) 100   


 


12/21/18 22:50  94 18     40


 


12/21/18 22:00  87 18 110/37 (61) 100   


 


12/21/18 21:00  86 18 75/17 (36) 100   


 


12/21/18 21:00  85 17     40


 


12/21/18 20:29    91/56    


 


12/21/18 20:00      Mechanical Ventilator  





      Mechanical Ventilator  


 


12/21/18 20:00        40


 


12/21/18 20:00  91 19 81/17 (38) 99   


 


12/21/18 20:00  83      


 


12/21/18 19:00 99.2 93 19 176/141 (153) 100   


 


12/21/18 18:55  91 18     40


 


12/21/18 18:00  93 19 110/37 (61) 100   


 


12/21/18 17:07  88 18     40


 


12/21/18 17:00 99.4 90 20 75/20 (38) 87   


 


12/21/18 16:00        40


 


12/21/18 16:00  90 18 101/13 (42) 100   


 


12/21/18 16:00  83      


 


12/21/18 16:00      Mechanical Ventilator  





      Mechanical Ventilator  


 


12/21/18 15:00  100 20 80/44 (56) 98   


 


12/21/18 14:39  99 21     40


 


12/21/18 14:00  107 20 95/28 (50) 98   


 


12/21/18 13:00  109 19 89/39 (56) 97   


 


12/21/18 12:43  138 23     40


 


12/21/18 12:00      Mechanical Ventilator  





      Mechanical Ventilator  


 


12/21/18 12:00        40


 


12/21/18 12:00  106      


 


12/21/18 12:00  110 20 142/90 (107) 96   


 


12/21/18 11:00  107 20 127/73 (91) 97   


 


12/21/18 10:57  107 17  98 Mechanical Ventilator  35


 


12/21/18 10:30  100 16  99 Mechanical Ventilator  40


 


12/21/18 10:27  90 19     40


 


12/21/18 10:00  98 20 117/54 (75) 98   

















Intake and Output  


 


 12/21/18 12/22/18





 18:59 06:59


 


Intake Total 730 ml 540 ml


 


Output Total 2500 ml 


 


Balance -1770 ml 540 ml


 


  


 


Free Water 60 ml 60 ml


 


IV Total 150 ml 


 


Tube Feeding 520 ml 480 ml


 


Hemodialysis UF 2500 ml 


 


# Bowel Movements 3 3








Laboratory Tests


12/21/18 15:20: 


White Blood Count 13.0H, Red Blood Count 3.11L, Hemoglobin 9.1L, Hematocrit 

29.7L, Mean Corpuscular Volume 96, Mean Corpuscular Hemoglobin 29.4, Mean 

Corpuscular Hemoglobin Concent 30.8L, Red Cell Distribution Width 18.5H, 

Platelet Count 474H, Mean Platelet Volume 6.4L, Neutrophils (%) (Auto) 86.5H, 

Lymphocytes (%) (Auto) 4.0L, Monocytes (%) (Auto) 8.1, Eosinophils (%) (Auto) 

0.6, Basophils (%) (Auto) 0.8, Phosphorus Level 2.8


Height (Feet):  5


Height (Inches):  2.00


Weight (Pounds):  108


General Appearance:  no apparent distress


EENT:  normal ENT inspection


Neck:  supple


Cardiovascular:  normal rate


Respiratory/Chest:  decreased breath sounds


Abdomen:  normal bowel sounds, non tender, soft


Extremities:  non-tender











Elliott Lam MD Dec 22, 2018 09:35

## 2018-12-22 NOTE — NUR
NURSE NOTES:



Report received from Jen Segal RN. Pt is awake, alert and oriented x2-3. On bilateral 
soft restraints. Sinus rhythm on cardiac monitor in 80's. AC 8, , FiO2 40%, P 5. No 
respiratory distress noted. O2 sat 100%. G-tube in place receiving Nepro at 40cc/hr. No 
residual noted. Rectal tube in place draining to gravity. Right UA midline patent and 
asymptomatic. Left AV shunt for HD noted. Bed in lowest position. Side rails up x3. Will 
resume plan of care.

## 2018-12-22 NOTE — NUR
Received Patient on Vent Settings of ACVC RR 8, , FIO2 40%, PEEP +5. Patient has 
Shiley 8 tracheostomy tube that is secure and patent. Pt is alert, awake, and follows 
commands. Bilateral rhonchi heard upon auscultation. Suction minimal amounts of thin, clear 
secretions. Vent plugged into red outlet. Ambubag at bedside. Will continue to monitor 
throughout the day.

## 2018-12-22 NOTE — NUR
NURSE NOTES:

CHG bath given. Central line dressing on ASHISH midline changed. Repositioned and tolerated 
well.

## 2018-12-22 NOTE — NUR
NURSE NOTES:

Pt sleeping,Denies any pain or discomfort at this time. Turned and repositioned for comfort. 
Will continue to monitor

## 2018-12-22 NOTE — NUR
NURSE NOTES:



Wound care pictures done and uploaded. Dressing changed. Turned and repositioned pt. VSS.

## 2018-12-22 NOTE — NUR
NURSE NOTES:

Received Patient asleep, arousable to verbal and tactile stimuli. Able to communicate by 
nodding, or shaking head Trache to vent AC 8 Vt 450 PEEP 5 50% FiO2. PEG in place, secured 
with abdominal binder. Dressing dry and intact. Feeding Nepro 40ml/hr. No residual. Right 
upper arm midline. Bilateral soft wrist restraints on at this time, pt reaches for trach 
site. Head of bed elevated. Bed locked and in low position. On contact precautions. Will 
continue to monitor

## 2018-12-22 NOTE — NEPHROLOGY PROGRESS NOTE
Assessment/Plan


Assessment


Seee below


Plan


MOF resolving.





ESRD HD q MWF 


Anemia of CKD , SAMANTHA high dose. Transfuse PRN.





A. 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 with LAE. LVEF 65% . Anticoagulate when 

stable. KEVIN Wade. In and out A. Fib.





Post trach + PEG .


Referred to [facility].


See orders.


DW pt's son. Needs extensive Vascular W/U.





Subjective


Subjective


On Vent. Alert. Post Trach and PEG.





Objective


Objective





Last 24 Hour Vital Signs








  Date Time  Temp Pulse Resp B/P (MAP) Pulse Ox O2 Delivery O2 Flow Rate FiO2


 


12/22/18 12:00      Mechanical Ventilator  





      Mechanical Ventilator  


 


12/22/18 12:00        40


 


12/22/18 12:00 98.2 105 17 102/79 (87) 100   


 


12/22/18 11:57  86      


 


12/22/18 11:00  87 21 98/79 (85) 100   


 


12/22/18 10:55  85 13     40


 


12/22/18 10:00  84 13 124/28 (60) 100   


 


12/22/18 09:00  87 16 124/28 (60) 100   


 


12/22/18 08:44  87 15     40


 


12/22/18 08:20    95/70    


 


12/22/18 08:00      Mechanical Ventilator  





      Mechanical Ventilator  


 


12/22/18 08:00        40


 


12/22/18 08:00 98.5 85 16 95/70 (78) 100   


 


12/22/18 07:38  82      


 


12/22/18 07:00  85 15 111/90 (97) 100   


 


12/22/18 06:55  82 14     40


 


12/22/18 06:00  81 14 115/33 (60) 100   


 


12/22/18 05:21  79 16     40


 


12/22/18 05:00  81 17 122/19 (53) 90   


 


12/22/18 04:00 99.2 82 17 96/38 (57) 100   


 


12/22/18 04:00  86      


 


12/22/18 04:00      Mechanical Ventilator  





      Mechanical Ventilator  


 


12/22/18 04:00        40


 


12/22/18 03:00  84 17 101/40 (60) 100   


 


12/22/18 03:00  85 16     40


 


12/22/18 02:00  86 18 70/25 (40) 100   


 


12/22/18 01:10  89 20     40


 


12/22/18 01:00  88 16 114/29 (57) 100   


 


12/22/18 00:00        40


 


12/22/18 00:00 98.7 88 17 123/11 (48) 100   


 


12/22/18 00:00  78      


 


12/22/18 00:00      Mechanical Ventilator  





      Mechanical Ventilator  


 


12/21/18 23:00  88 16 121/29 (59) 100   


 


12/21/18 22:50  94 18     40


 


12/21/18 22:00  87 18 110/37 (61) 100   


 


12/21/18 21:00  86 18 75/17 (36) 100   


 


12/21/18 21:00  85 17     40


 


12/21/18 20:29    91/56    


 


12/21/18 20:00      Mechanical Ventilator  





      Mechanical Ventilator  


 


12/21/18 20:00        40


 


12/21/18 20:00  91 19 81/17 (38) 99   


 


12/21/18 20:00  83      


 


12/21/18 19:00 99.2 93 19 176/141 (153) 100   


 


12/21/18 18:55  91 18     40


 


12/21/18 18:00  93 19 110/37 (61) 100   


 


12/21/18 17:07  88 18     40


 


12/21/18 17:00 99.4 90 20 75/20 (38) 87   


 


12/21/18 16:00        40


 


12/21/18 16:00  90 18 101/13 (42) 100   


 


12/21/18 16:00  83      


 


12/21/18 16:00      Mechanical Ventilator  





      Mechanical Ventilator  


 


12/21/18 15:00  100 20 80/44 (56) 98   


 


12/21/18 14:39  99 21     40


 


12/21/18 14:00  107 20 95/28 (50) 98   


 


12/21/18 13:00  109 19 89/39 (56) 97   

















Intake and Output  


 


 12/21/18 12/22/18





 19:00 07:00


 


Intake Total 700 ml 540 ml


 


Output Total 2500 ml 


 


Balance -1800 ml 540 ml


 


  


 


Free Water 30 ml 60 ml


 


IV Total 150 ml 


 


Tube Feeding 520 ml 480 ml


 


Hemodialysis UF 2500 ml 


 


# Bowel Movements 3 3








Laboratory Tests


12/21/18 15:20: 


White Blood Count 13.0H, Red Blood Count 3.11L, Hemoglobin 9.1L, Hematocrit 

29.7L, Mean Corpuscular Volume 96, Mean Corpuscular Hemoglobin 29.4, Mean 

Corpuscular Hemoglobin Concent 30.8L, Red Cell Distribution Width 18.5H, 

Platelet Count 474H, Mean Platelet Volume 6.4L, Neutrophils (%) (Auto) 86.5H, 

Lymphocytes (%) (Auto) 4.0L, Monocytes (%) (Auto) 8.1, Eosinophils (%) (Auto) 

0.6, Basophils (%) (Auto) 0.8, Phosphorus Level 2.8


Height (Feet):  5


Height (Inches):  2.00


Weight (Pounds):  108


Objective


On vent.


Trach clean.


Cv IRR/IRR


Lungs B ronchi.


Abd SNT. BS + New PEG.


E Rt. foot diabetic ulcers











Gavino Daigle MD Dec 22, 2018 12:44

## 2018-12-22 NOTE — NUR
NURSE NOTES:



Attended all needs. Pt is alert and awake, watching TV in bed. Afebrile. /80. Will 
continue to monitor.

## 2018-12-22 NOTE — CARDIOLOGY PROGRESS NOTE
Assessment/Plan


Assessment/Plan


1. Hypotension, possibly sepsis versus volume.


2. Paroxysmal episodes of atrial fibrillation history.


3. Bradycardia secondary to medications, amiodarone possibly.


4. Diabetes mellitus.


5. End-stage renal disease, on hemodialysis.


6. Lung collapse.


7. Respiratory failure, status post intubation.


8. Dysphagia.


9. History of dementia.


10. Pulm htn 


11.  Pleural effusion 


12. cardaic arrest


13. chest wall pain post cpr  





in and out afib amido now 200 bid  on hold


stool ob neg x2 


amiod bid


off labetalol for now if needed hydralazine to be used 


vent support 


s/p trach


peg done 


tele reviewed again intermittent tachy but at this time is in sinus 


on elequis  2.5 mg bid for stroke prevention





Subjective


Cardiovascular:  Reports: chest pain; Denies: lightheadedness


Respiratory:  Denies: shortness of breath


Gastrointestinal/Abdominal:  Denies: abdominal pain


Genitourinary:  Denies: burning


Subjective


on the vent not verba , denies dizziness has cp whe ndeep breath post cpr last 

week





Objective





Last 24 Hour Vital Signs








  Date Time  Temp Pulse Resp B/P (MAP) Pulse Ox O2 Delivery O2 Flow Rate FiO2


 


12/22/18 11:00  87 21 98/79 (85) 100   


 


12/22/18 10:55  85 13     40


 


12/22/18 10:00  84 13 124/28 (60) 100   


 


12/22/18 09:00  87 16 124/28 (60) 100   


 


12/22/18 08:44  87 15     40


 


12/22/18 08:20    95/70    


 


12/22/18 08:00      Mechanical Ventilator  





      Mechanical Ventilator  


 


12/22/18 08:00        40


 


12/22/18 08:00  85 16 95/70 (78) 100   


 


12/22/18 07:00  85 15 111/90 (97) 100   


 


12/22/18 06:55  82 14     40


 


12/22/18 06:00  81 14 115/33 (60) 100   


 


12/22/18 05:21  79 16     40


 


12/22/18 05:00  81 17 122/19 (53) 90   


 


12/22/18 04:00 99.2 82 17 96/38 (57) 100   


 


12/22/18 04:00  86      


 


12/22/18 04:00      Mechanical Ventilator  





      Mechanical Ventilator  


 


12/22/18 04:00        40


 


12/22/18 03:00  84 17 101/40 (60) 100   


 


12/22/18 03:00  85 16     40


 


12/22/18 02:00  86 18 70/25 (40) 100   


 


12/22/18 01:10  89 20     40


 


12/22/18 01:00  88 16 114/29 (57) 100   


 


12/22/18 00:00        40


 


12/22/18 00:00 98.7 88 17 123/11 (48) 100   


 


12/22/18 00:00  78      


 


12/22/18 00:00      Mechanical Ventilator  





      Mechanical Ventilator  


 


12/21/18 23:00  88 16 121/29 (59) 100   


 


12/21/18 22:50  94 18     40


 


12/21/18 22:00  87 18 110/37 (61) 100   


 


12/21/18 21:00  86 18 75/17 (36) 100   


 


12/21/18 21:00  85 17     40


 


12/21/18 20:29    91/56    


 


12/21/18 20:00      Mechanical Ventilator  





      Mechanical Ventilator  


 


12/21/18 20:00        40


 


12/21/18 20:00  91 19 81/17 (38) 99   


 


12/21/18 20:00  83      


 


12/21/18 19:00 99.2 93 19 176/141 (153) 100   


 


12/21/18 18:55  91 18     40


 


12/21/18 18:00  93 19 110/37 (61) 100   


 


12/21/18 17:07  88 18     40


 


12/21/18 17:00 99.4 90 20 75/20 (38) 87   


 


12/21/18 16:00        40


 


12/21/18 16:00  90 18 101/13 (42) 100   


 


12/21/18 16:00  83      


 


12/21/18 16:00      Mechanical Ventilator  





      Mechanical Ventilator  


 


12/21/18 15:00  100 20 80/44 (56) 98   


 


12/21/18 14:39  99 21     40


 


12/21/18 14:00  107 20 95/28 (50) 98   


 


12/21/18 13:00  109 19 89/39 (56) 97   


 


12/21/18 12:43  138 23     40


 


12/21/18 12:00      Mechanical Ventilator  





      Mechanical Ventilator  


 


12/21/18 12:00        40


 


12/21/18 12:00  106      


 


12/21/18 12:00  110 20 142/90 (107) 96   








General Appearance:  no apparent distress, alert


Neck:  no JVD


Cardiovascular:  normal rate, other - cjhest wall tenderness


Respiratory/Chest:  lungs clear


Abdomen:  normal bowel sounds, non tender, soft


Extremities:  no swelling











Intake and Output  


 


 12/21/18 12/22/18





 19:00 07:00


 


Intake Total 700 ml 540 ml


 


Output Total 2500 ml 


 


Balance -1800 ml 540 ml


 


  


 


Free Water 30 ml 60 ml


 


IV Total 150 ml 


 


Tube Feeding 520 ml 480 ml


 


Hemodialysis UF 2500 ml 


 


# Bowel Movements 3 3











Laboratory Tests








Test


  12/21/18


15:20


 


White Blood Count


  13.0 K/UL


(4.8-10.8)  H


 


Red Blood Count


  3.11 M/UL


(4.20-5.40)  L


 


Hemoglobin


  9.1 G/DL


(12.0-16.0)  L


 


Hematocrit


  29.7 %


(37.0-47.0)  L


 


Mean Corpuscular Volume 96 FL (80-99)  


 


Mean Corpuscular Hemoglobin


  29.4 PG


(27.0-31.0)


 


Mean Corpuscular Hemoglobin


Concent 30.8 G/DL


(32.0-36.0)  L


 


Red Cell Distribution Width


  18.5 %


(11.6-14.8)  H


 


Platelet Count


  474 K/UL


(150-450)  H


 


Mean Platelet Volume


  6.4 FL


(6.5-10.1)  L


 


Neutrophils (%) (Auto)


  86.5 %


(45.0-75.0)  H


 


Lymphocytes (%) (Auto)


  4.0 %


(20.0-45.0)  L


 


Monocytes (%) (Auto)


  8.1 %


(1.0-10.0)


 


Eosinophils (%) (Auto)


  0.6 %


(0.0-3.0)


 


Basophils (%) (Auto)


  0.8 %


(0.0-2.0)


 


Phosphorus Level


  2.8 MG/DL


(2.5-4.9)

















Mariusz Wade MD Dec 22, 2018 11:22

## 2018-12-22 NOTE — NUR
NURSE NOTES:

Pt sleeping, arousable to verbal and tactile stimuli. continues on bilateral soft wrist 
restraints at this time. released but patient continues to reach for Trach. Will continue to 
monitor

## 2018-12-22 NOTE — NUR
NURSE NOTES:



Notified Dr Daigle regarding lab results from today. No new orders. Order for nasal spray 
and eye drop received, noted, and carried out.

## 2018-12-22 NOTE — NUR
NURSE NOTES:



Son at bedside. Attended all needs. Drain sponge changed on trach site. G-tube dressing 
changed. Lab drawing blood at bedside.

## 2018-12-22 NOTE — NUR
NURSE NOTES:

Received pt in bed with eyes open. Sinus rhythm on the monitor with HR 74. Trach to vent on 
AC 8, Vt 450, p 5, Fio2 40%. Right upper arm midline, TKO. GT feeding Nepro @ 40 cc/hr with 
no residual noted at this. Recal tube draining by gravity. Bilateral lower extremities noted 
to to be covered with dressings dry and intact. On contact precautions, bed in lowest, side 
rails upx3. No signs of distress noted. Will continue to monitor.

## 2018-12-22 NOTE — INFECTIOUS DISEASES PROG NOTE
Assessment/Plan


Assessment/Plan


antibiotics : none





A


1. VRE | e.coli UTI s/p rx


2. shock resolved


3. leucocytosis 


4. respiratory failure s/p tracheostomy


5. renal failure


6. decubitus ulcers


7. nasal MRSA colonization


8. rectal VRE colonization


9. pleural effusion





P


1. observe off antibiotics





Subjective


ROS Limited/Unobtainable:  Yes


Allergies:  


Coded Allergies:  


     No Known Allergies (Unverified , 11/26/18)





Objective


Vital Signs





Last 24 Hour Vital Signs








  Date Time  Temp Pulse Resp B/P (MAP) Pulse Ox O2 Delivery O2 Flow Rate FiO2


 


12/22/18 10:00  84 13 124/28 (60) 100   


 


12/22/18 09:00  87 16 124/28 (60) 100   


 


12/22/18 08:44  87 15     40


 


12/22/18 08:20    95/70    


 


12/22/18 08:00      Mechanical Ventilator  





      Mechanical Ventilator  


 


12/22/18 08:00        40


 


12/22/18 08:00  85 16 95/70 (78) 100   


 


12/22/18 07:00  85 15 111/90 (97) 100   


 


12/22/18 06:55  82 14     40


 


12/22/18 06:00  81 14 115/33 (60) 100   


 


12/22/18 05:21  79 16     40


 


12/22/18 05:00  81 17 122/19 (53) 90   


 


12/22/18 04:00 99.2 82 17 96/38 (57) 100   


 


12/22/18 04:00  86      


 


12/22/18 04:00      Mechanical Ventilator  





      Mechanical Ventilator  


 


12/22/18 04:00        40


 


12/22/18 03:00  84 17 101/40 (60) 100   


 


12/22/18 03:00  85 16     40


 


12/22/18 02:00  86 18 70/25 (40) 100   


 


12/22/18 01:10  89 20     40


 


12/22/18 01:00  88 16 114/29 (57) 100   


 


12/22/18 00:00        40


 


12/22/18 00:00 98.7 88 17 123/11 (48) 100   


 


12/22/18 00:00  78      


 


12/22/18 00:00      Mechanical Ventilator  





      Mechanical Ventilator  


 


12/21/18 23:00  88 16 121/29 (59) 100   


 


12/21/18 22:50  94 18     40


 


12/21/18 22:00  87 18 110/37 (61) 100   


 


12/21/18 21:00  86 18 75/17 (36) 100   


 


12/21/18 21:00  85 17     40


 


12/21/18 20:29    91/56    


 


12/21/18 20:00      Mechanical Ventilator  





      Mechanical Ventilator  


 


12/21/18 20:00        40


 


12/21/18 20:00  91 19 81/17 (38) 99   


 


12/21/18 20:00  83      


 


12/21/18 19:00 99.2 93 19 176/141 (153) 100   


 


12/21/18 18:55  91 18     40


 


12/21/18 18:00  93 19 110/37 (61) 100   


 


12/21/18 17:07  88 18     40


 


12/21/18 17:00 99.4 90 20 75/20 (38) 87   


 


12/21/18 16:00        40


 


12/21/18 16:00  90 18 101/13 (42) 100   


 


12/21/18 16:00  83      


 


12/21/18 16:00      Mechanical Ventilator  





      Mechanical Ventilator  


 


12/21/18 15:00  100 20 80/44 (56) 98   


 


12/21/18 14:39  99 21     40


 


12/21/18 14:00  107 20 95/28 (50) 98   


 


12/21/18 13:00  109 19 89/39 (56) 97   


 


12/21/18 12:43  138 23     40


 


12/21/18 12:00      Mechanical Ventilator  





      Mechanical Ventilator  


 


12/21/18 12:00        40


 


12/21/18 12:00  106      


 


12/21/18 12:00  110 20 142/90 (107) 96   


 


12/21/18 11:00  107 20 127/73 (91) 97   


 


12/21/18 10:57  107 17  98 Mechanical Ventilator  35








Height (Feet):  5


Height (Inches):  2.00


Weight (Pounds):  108


HEENT:  status post trach


Respiratory/Chest:  lungs clear


Cardiovascular:  normal rate, regular rhythm, no gallop/murmur


Abdomen:  soft, non tender, other - GT


Extremities:  no edema, other - right arm PICC





Laboratory Tests








Test


  12/21/18


15:20


 


White Blood Count


  13.0 K/UL


(4.8-10.8)  H


 


Red Blood Count


  3.11 M/UL


(4.20-5.40)  L


 


Hemoglobin


  9.1 G/DL


(12.0-16.0)  L


 


Hematocrit


  29.7 %


(37.0-47.0)  L


 


Mean Corpuscular Volume 96 FL (80-99)  


 


Mean Corpuscular Hemoglobin


  29.4 PG


(27.0-31.0)


 


Mean Corpuscular Hemoglobin


Concent 30.8 G/DL


(32.0-36.0)  L


 


Red Cell Distribution Width


  18.5 %


(11.6-14.8)  H


 


Platelet Count


  474 K/UL


(150-450)  H


 


Mean Platelet Volume


  6.4 FL


(6.5-10.1)  L


 


Neutrophils (%) (Auto)


  86.5 %


(45.0-75.0)  H


 


Lymphocytes (%) (Auto)


  4.0 %


(20.0-45.0)  L


 


Monocytes (%) (Auto)


  8.1 %


(1.0-10.0)


 


Eosinophils (%) (Auto)


  0.6 %


(0.0-3.0)


 


Basophils (%) (Auto)


  0.8 %


(0.0-2.0)


 


Phosphorus Level


  2.8 MG/DL


(2.5-4.9)











Current Medications








 Medications


  (Trade)  Dose


 Ordered  Sig/Tony


 Route


 PRN Reason  Start Time


 Stop Time Status Last Admin


Dose Admin


 


 Acetaminophen


  (Tylenol)  650 mg  Q6H  PRN


 GT


 Mild Pain/Temp > 100.5  12/21/18 16:00


 1/20/19 15:59   


 


 


 Albuterol/


 Ipratropium


  (Albuterol/


 Ipratropium)  3 ml  Q6H  PRN


 HHN


 Shortness of Breath  12/18/18 19:15


 12/23/18 19:14  12/21/18 10:27


 


 


 Amiodarone HCl


  (Cordarone)  200 mg  EVERY 12  HOURS


 NG


   12/8/18 21:00


 1/5/19 13:59  12/22/18 08:20


 


 


 Apixaban


  (Eliquis)  2.5 mg  BID


 NG


   12/21/18 18:00


 1/20/19 08:59  12/22/18 08:20


 


 


 Chlorhexidine


 Gluconate


  (Juana-Hex 2%)  1 applic  DAILY@2000


 TOPIC


   11/27/18 20:00


 12/27/18 19:59  12/21/18 20:27


 


 


 Collagenase


  (Santyl)  1 applic  Q12HR


 TOPIC


   12/12/18 21:00


 1/5/19 20:59  12/22/18 08:21


 


 


 Epoetin Rupesh


  (Procrit (for


 ESRD on dialysis))  10,000 units  MON-WED-FRI


 SUBQ


   11/28/18 21:00


 12/28/18 20:59  12/21/18 20:29


 


 


 Minoxidil


  (Loniten)  5 mg  Q12HR


 NG


   12/2/18 21:00


 1/1/19 20:59  12/21/18 20:29


 


 


 Pantoprazole


  (Protonix)  40 mg  Q12HR


 IVP


   11/26/18 21:00


 12/27/18 08:59  12/22/18 08:19


 


 


 Vitamin D


  (Vitamin D)  1,000 intlu  DAILY


 GT


   12/9/18 10:00


 1/8/19 09:59  12/22/18 08:20


 

















Chase Santillan MD Dec 22, 2018 10:38

## 2018-12-23 VITALS — DIASTOLIC BLOOD PRESSURE: 26 MMHG | SYSTOLIC BLOOD PRESSURE: 149 MMHG

## 2018-12-23 VITALS — DIASTOLIC BLOOD PRESSURE: 61 MMHG | SYSTOLIC BLOOD PRESSURE: 101 MMHG

## 2018-12-23 VITALS — SYSTOLIC BLOOD PRESSURE: 118 MMHG | DIASTOLIC BLOOD PRESSURE: 20 MMHG

## 2018-12-23 VITALS — DIASTOLIC BLOOD PRESSURE: 17 MMHG | SYSTOLIC BLOOD PRESSURE: 96 MMHG

## 2018-12-23 VITALS — SYSTOLIC BLOOD PRESSURE: 130 MMHG | DIASTOLIC BLOOD PRESSURE: 93 MMHG

## 2018-12-23 VITALS — SYSTOLIC BLOOD PRESSURE: 92 MMHG | DIASTOLIC BLOOD PRESSURE: 21 MMHG

## 2018-12-23 VITALS — SYSTOLIC BLOOD PRESSURE: 144 MMHG | DIASTOLIC BLOOD PRESSURE: 18 MMHG

## 2018-12-23 VITALS — SYSTOLIC BLOOD PRESSURE: 95 MMHG | DIASTOLIC BLOOD PRESSURE: 28 MMHG

## 2018-12-23 VITALS — DIASTOLIC BLOOD PRESSURE: 24 MMHG | SYSTOLIC BLOOD PRESSURE: 126 MMHG

## 2018-12-23 VITALS — DIASTOLIC BLOOD PRESSURE: 10 MMHG | SYSTOLIC BLOOD PRESSURE: 126 MMHG

## 2018-12-23 VITALS — DIASTOLIC BLOOD PRESSURE: 25 MMHG | SYSTOLIC BLOOD PRESSURE: 118 MMHG

## 2018-12-23 VITALS — SYSTOLIC BLOOD PRESSURE: 140 MMHG | DIASTOLIC BLOOD PRESSURE: 29 MMHG

## 2018-12-23 VITALS — DIASTOLIC BLOOD PRESSURE: 15 MMHG | SYSTOLIC BLOOD PRESSURE: 92 MMHG

## 2018-12-23 VITALS — DIASTOLIC BLOOD PRESSURE: 26 MMHG | SYSTOLIC BLOOD PRESSURE: 137 MMHG

## 2018-12-23 VITALS — DIASTOLIC BLOOD PRESSURE: 73 MMHG | SYSTOLIC BLOOD PRESSURE: 106 MMHG

## 2018-12-23 VITALS — DIASTOLIC BLOOD PRESSURE: 22 MMHG | SYSTOLIC BLOOD PRESSURE: 131 MMHG

## 2018-12-23 RX ADMIN — AMIODARONE HYDROCHLORIDE SCH MG: 200 TABLET ORAL at 08:26

## 2018-12-23 RX ADMIN — APIXABAN SCH MG: 2.5 TABLET, FILM COATED ORAL at 17:57

## 2018-12-23 RX ADMIN — ACETAMINOPHEN PRN MG: 160 SOLUTION ORAL at 16:04

## 2018-12-23 RX ADMIN — PANTOPRAZOLE SODIUM SCH MG: 40 INJECTION, POWDER, FOR SOLUTION INTRAVENOUS at 08:26

## 2018-12-23 RX ADMIN — AMIODARONE HYDROCHLORIDE SCH MG: 200 TABLET ORAL at 21:17

## 2018-12-23 RX ADMIN — VITAMIN D, TAB 1000IU (100/BT) SCH INTLU: 25 TAB at 08:26

## 2018-12-23 RX ADMIN — PANTOPRAZOLE SODIUM SCH MG: 40 INJECTION, POWDER, FOR SOLUTION INTRAVENOUS at 21:16

## 2018-12-23 RX ADMIN — MINOXIDIL SCH MG: 2.5 TABLET ORAL at 21:18

## 2018-12-23 RX ADMIN — APIXABAN SCH MG: 2.5 TABLET, FILM COATED ORAL at 08:26

## 2018-12-23 RX ADMIN — CHLORHEXIDINE GLUCONATE SCH APPLIC: 213 SOLUTION TOPICAL at 20:18

## 2018-12-23 RX ADMIN — MINOXIDIL SCH MG: 2.5 TABLET ORAL at 08:26

## 2018-12-23 NOTE — NUR
NURSE NOTES:

CHG bath given at this time. Pt turned and repositioned. Oral care provided. Rectal tube 
continues to drain by gravity. No signs of distress noted. Will continue with plan of care.

## 2018-12-23 NOTE — NEPHROLOGY PROGRESS NOTE
Assessment/Plan


Assessment


Seee below


Plan


MOF resolving.





ESRD HD q MWF 


Anemia of CKD , SAMANTHA high dose. Transfuse PRN.





A. 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 with LAE. LVEF 65% . Anticoagulate when 

stable. KEVIN Wade. In and out A. Fib.





Post trach + PEG .


Referred to [facility].


See orders.


DW pt's son. Needs extensive Vascular W/U.





Subjective


Subjective


On Vent. Alert. Post Trach and PEG.





Objective


Objective





Last 24 Hour Vital Signs








  Date Time  Temp Pulse Resp B/P (MAP) Pulse Ox O2 Delivery O2 Flow Rate FiO2


 


12/23/18 11:00  74 15 126/10 (48) 97   


 


12/23/18 10:45  76 16     40


 


12/23/18 10:00  73 16 131/22 (58) 100   


 


12/23/18 09:13  108 18     40


 


12/23/18 09:00 97.8 78 14 126/24 (58) 100   


 


12/23/18 08:02  87      


 


12/23/18 08:00  87 18 144/18 (60) 100   


 


12/23/18 08:00      Mechanical Ventilator  





      Mechanical Ventilator  


 


12/23/18 08:00        40


 


12/23/18 07:14  88 15     40


 


12/23/18 07:00  86 15 137/26 (63) 100   


 


12/23/18 06:00  104 14 118/25 (56) 100   


 


12/23/18 05:00  78 15 140/29 (66) 100   


 


12/23/18 04:54  79 15     40


 


12/23/18 04:00  69      


 


12/23/18 04:00 98.2 79 14 149/26 (67) 100   


 


12/23/18 04:00      Mechanical Ventilator  





      Mechanical Ventilator  


 


12/23/18 04:00        40


 


12/23/18 03:18  69 11     40


 


12/23/18 03:00  68 12 92/21 (44) 100   


 


12/23/18 02:00  78 17 95/28 (50) 100   


 


12/23/18 01:01  72 13     40


 


12/23/18 01:00  77 16 92/15 (40) 100   


 


12/23/18 00:00 98.4 67 16 96/17 (43) 100   


 


12/23/18 00:00        40


 


12/23/18 00:00      Mechanical Ventilator  





      Mechanical Ventilator  


 


12/23/18 00:00  67      


 


12/22/18 23:06  68 13     40


 


12/22/18 23:00  68 13 104/15 (44) 100   


 


12/22/18 22:00  79 15 124/12 (49) 100   


 


12/22/18 21:00  74 15 95/21 (45) 100   


 


12/22/18 20:57  74 13     40


 


12/22/18 20:37    111/15    


 


12/22/18 20:00 98.3 72 12 111/15 (47) 100   


 


12/22/18 20:00      Mechanical Ventilator  





      Mechanical Ventilator  


 


12/22/18 20:00        40


 


12/22/18 20:00  71      


 


12/22/18 19:00  75 15 105/12 (43) 100   


 


12/22/18 18:56  81 16     40


 


12/22/18 18:00  79 15 104/21 (48) 100   


 


12/22/18 17:05  80 15     40


 


12/22/18 17:00  82 16 112/47 (68) 100   


 


12/22/18 16:02  83      


 


12/22/18 16:00        40


 


12/22/18 16:00 98.4 81 16 121/28 (59) 100   


 


12/22/18 16:00      Mechanical Ventilator  





      Mechanical Ventilator  


 


12/22/18 15:00  89 17 115/30 (58) 100   


 


12/22/18 14:42  111 19     40


 


12/22/18 14:00  108 14 120/47 (71) 100   


 


12/22/18 13:01  120 12     40


 


12/22/18 13:00  108 15 109/80 (90) 100   

















Intake and Output  


 


 12/22/18 12/23/18





 18:59 06:59


 


Intake Total 630 ml 630 ml


 


Output Total  70 ml


 


Balance 630 ml 560 ml


 


  


 


Free Water 50 ml 50 ml


 


Tube Feeding 480 ml 480 ml


 


Other 100 ml 100 ml


 


Stool Total  70 ml


 


# Voids 50 


 


# Bowel Movements 3 3








Laboratory Tests


12/22/18 17:20: 


White Blood Count 10.0, Red Blood Count 2.86L, Hemoglobin 8.2L, Hematocrit 27.5L

, Mean Corpuscular Volume 96, Mean Corpuscular Hemoglobin 28.8, Mean 

Corpuscular Hemoglobin Concent 29.9L, Red Cell Distribution Width 18.8H, 

Platelet Count 416, Mean Platelet Volume 5.9L, Neutrophils (%) (Auto) 81.3H, 

Lymphocytes (%) (Auto) 7.0L, Monocytes (%) (Auto) 9.3, Eosinophils (%) (Auto) 

1.4, Basophils (%) (Auto) 1.0, Sodium Level 138, Potassium Level 3.2L, Chloride 

Level 101, Carbon Dioxide Level 29, Anion Gap 8, Blood Urea Nitrogen 25H, 

Creatinine 2.4H, Estimat Glomerular Filtration Rate , Glucose Level 127H, 

Calcium Level 8.7, Total Bilirubin 0.3, Aspartate Amino Transf (AST/SGOT) 16, 

Alanine Aminotransferase (ALT/SGPT) 8L, Alkaline Phosphatase 177H, Total 

Protein 5.9L, Albumin 1.5L, Globulin 4.4, Albumin/Globulin Ratio 0.3L


Height (Feet):  5


Height (Inches):  2.00


Weight (Pounds):  107


Objective


On vent.


Trach clean.


Cv IRR/IRR


Lungs B ronchi.


Abd SNT. BS + New PEG.


E Rt. foot diabetic ulcers











Gavino Daigle MD Dec 23, 2018 12:07

## 2018-12-23 NOTE — GENERAL PROGRESS NOTE
Assessment/Plan


Assessment/Plan


Assessment


- Dysphagia


- Resp failure


- ESRD


- Anemia


- Status post PEG





Recommendations


1. Abdominal binder.


2. Elevate the head of the bed at all times.


3. G-tube flush.


4. G-tube care.


5. Start tube feeding later today per RD to goal


Monitor H&H, PRN transfusions


PPI


Electrolyte correction


Follow-up labs





Subjective


ROS Limited/Unobtainable:  No


Allergies:  


Coded Allergies:  


     No Known Allergies (Unverified , 11/26/18)





Objective





Last 24 Hour Vital Signs








  Date Time  Temp Pulse Resp B/P (MAP) Pulse Ox O2 Delivery O2 Flow Rate FiO2


 


12/23/18 09:13  108 18     40


 


12/23/18 09:00 97.8 78 14 126/24 (58) 100   


 


12/23/18 08:02  87      


 


12/23/18 08:00  87 18 144/18 (60) 100   


 


12/23/18 08:00      Mechanical Ventilator  





      Mechanical Ventilator  


 


12/23/18 08:00        40


 


12/23/18 07:14  88 15     40


 


12/23/18 07:00  86 15 137/26 (63) 100   


 


12/23/18 06:00  104 14 118/25 (56) 100   


 


12/23/18 05:00  78 15 140/29 (66) 100   


 


12/23/18 04:54  79 15     40


 


12/23/18 04:00  69      


 


12/23/18 04:00 98.2 79 14 149/26 (67) 100   


 


12/23/18 04:00      Mechanical Ventilator  





      Mechanical Ventilator  


 


12/23/18 04:00        40


 


12/23/18 03:18  69 11     40


 


12/23/18 03:00  68 12 92/21 (44) 100   


 


12/23/18 02:00  78 17 95/28 (50) 100   


 


12/23/18 01:01  72 13     40


 


12/23/18 01:00  77 16 92/15 (40) 100   


 


12/23/18 00:00 98.4 67 16 96/17 (43) 100   


 


12/23/18 00:00        40


 


12/23/18 00:00      Mechanical Ventilator  





      Mechanical Ventilator  


 


12/23/18 00:00  67      


 


12/22/18 23:06  68 13     40


 


12/22/18 23:00  68 13 104/15 (44) 100   


 


12/22/18 22:00  79 15 124/12 (49) 100   


 


12/22/18 21:00  74 15 95/21 (45) 100   


 


12/22/18 20:57  74 13     40


 


12/22/18 20:37    111/15    


 


12/22/18 20:00 98.3 72 12 111/15 (47) 100   


 


12/22/18 20:00      Mechanical Ventilator  





      Mechanical Ventilator  


 


12/22/18 20:00        40


 


12/22/18 20:00  71      


 


12/22/18 19:00  75 15 105/12 (43) 100   


 


12/22/18 18:56  81 16     40


 


12/22/18 18:00  79 15 104/21 (48) 100   


 


12/22/18 17:05  80 15     40


 


12/22/18 17:00  82 16 112/47 (68) 100   


 


12/22/18 16:02  83      


 


12/22/18 16:00        40


 


12/22/18 16:00 98.4 81 16 121/28 (59) 100   


 


12/22/18 16:00      Mechanical Ventilator  





      Mechanical Ventilator  


 


12/22/18 15:00  89 17 115/30 (58) 100   


 


12/22/18 14:42  111 19     40


 


12/22/18 14:00  108 14 120/47 (71) 100   


 


12/22/18 13:01  120 12     40


 


12/22/18 13:00  108 15 109/80 (90) 100   


 


12/22/18 12:00      Mechanical Ventilator  





      Mechanical Ventilator  


 


12/22/18 12:00        40


 


12/22/18 12:00 98.2 105 17 102/79 (87) 100   


 


12/22/18 11:57  86      


 


12/22/18 11:00  87 21 98/79 (85) 100   


 


12/22/18 10:55  85 13     40


 


12/22/18 10:00  84 13 124/28 (60) 100   

















Intake and Output  


 


 12/22/18 12/23/18





 18:59 06:59


 


Intake Total 630 ml 630 ml


 


Output Total  70 ml


 


Balance 630 ml 560 ml


 


  


 


Free Water 50 ml 50 ml


 


Tube Feeding 480 ml 480 ml


 


Other 100 ml 100 ml


 


Stool Total  70 ml


 


# Voids 50 


 


# Bowel Movements 3 3








Laboratory Tests


12/22/18 17:20: 


White Blood Count 10.0, Red Blood Count 2.86L, Hemoglobin 8.2L, Hematocrit 27.5L

, Mean Corpuscular Volume 96, Mean Corpuscular Hemoglobin 28.8, Mean 

Corpuscular Hemoglobin Concent 29.9L, Red Cell Distribution Width 18.8H, 

Platelet Count 416, Mean Platelet Volume 5.9L, Neutrophils (%) (Auto) 81.3H, 

Lymphocytes (%) (Auto) 7.0L, Monocytes (%) (Auto) 9.3, Eosinophils (%) (Auto) 

1.4, Basophils (%) (Auto) 1.0, Sodium Level 138, Potassium Level 3.2L, Chloride 

Level 101, Carbon Dioxide Level 29, Anion Gap 8, Blood Urea Nitrogen 25H, 

Creatinine 2.4H, Estimat Glomerular Filtration Rate , Glucose Level 127H, 

Calcium Level 8.7, Total Bilirubin 0.3, Aspartate Amino Transf (AST/SGOT) 16, 

Alanine Aminotransferase (ALT/SGPT) 8L, Alkaline Phosphatase 177H, Total 

Protein 5.9L, Albumin 1.5L, Globulin 4.4, Albumin/Globulin Ratio 0.3L


Height (Feet):  5


Height (Inches):  2.00


Weight (Pounds):  107


General Appearance:  lethargic


EENT:  normal ENT inspection


Neck:  supple


Cardiovascular:  normal rate


Respiratory/Chest:  decreased breath sounds


Abdomen:  normal bowel sounds, non tender, soft


Extremities:  non-tender











Elliott Lam MD Dec 23, 2018 09:53

## 2018-12-23 NOTE — NUR
NURSE NOTES:

Pt sleeping at this time. Bilateral soft wrist restraints continue in place. sinus rhythm on 
the monitor. No signs of distress noted. Will continue to monitor.

## 2018-12-23 NOTE — NUR
NURSE NOTES:



Order to transfer pt to [facility] from Dr Daigle received, noted, and carried out. Notified 
Supervisor.

## 2018-12-23 NOTE — NUR
RD ASSESSMENT & RECOMMENDATIONS

SEE CARE ACTIVITY FOR COMPLETE ASSESSMENT



DAILY ESTIMATED NEEDS:

Needs based on Critical care + Advanced Wounds + ESRD/ 60kg 

22-30  kcals/kg 

[id number]  total kcals

1.5-2.0  g protein/kg

90-120g  g total protein 

Fluid per MD, on HD 





NUTRITION DIAGNOSIS:

* Swallowing difficulty R/T respiratory status as evidenced by pt s/p self

extubation, s/p code blue, reintubated, now s/p trach and PEG placement.

(UPDATED)

* Increased kcal/prot needs R/T wound healing as evidenced by pt admitted

w/ multiple wounds including stage 4 sacral wound, refer to WC eval.

* Altered nutrition related lab values R/T ESRD dx, clinical condition as

evidenced by low K (2.7), episodes of hypoglycemia (68 69-> now resolved),

elev creat (4.6-> 2.4), low Na (130-> wnl), elev BNP >18135,

low BP, off pressor at this time.





CURRENT TF:Nepro @40ml/hr + PS BID   







ENTERAL NUTRITION RECOMMENDATIONS:

Nepro @40ml/hr x 24 hrs + Prosource 1pkt BID  

to provide 960ml, 1728kcal, 78g + 22g prot, 698ml free water 



1. Maintain current rate, add Prosource 1 pack BID 

    to better meet est protein needs

2. Flush per MD/ HOB over 30 degrees







ADDITIONAL RECOMMENDATIONS:

* RECALIBRATE BED SCALE POST TRACH AND PEG PROCEDURES 

* Add PROSOURCE 1 pack BID to better meet est protein needs 

* Wound healing- Nephrovite x 1, Scotty 1pkt BID 

* Monitor for hypoglycemia 

* Monitor tolerance to current TF rate  

.

## 2018-12-23 NOTE — NUR
NURSE NOTES:

Pt sleeping at this time. Oral care provided. Pt noted to look tired and simple nodding. 
Continues on soft wrist restraints att this time. Will continue to monitor

## 2018-12-23 NOTE — NUR
TRANSFER TO FLOOR:

Patient transferred to [address] [place] [address].  Report given to CAROLYN Bowie.  No belonging. Medications 
given to CAROLYN Bowie. Son present at the time of transfer.

## 2018-12-23 NOTE — PULMONOLOGY PROGRESS NOTE
Assessment/Plan


Assessment/Plan


IMPRESSION:


1. Hypotension. Resolved.


2. Sepsis.


3. Atrial fibrillation.


4. Bradycardia.


5. Diabetes mellitus.


6. End-stage renal disease on dialysis.


7. Left lung collapse. Resolved


8. Respiratory failure. now status post tracheostomy


9. Dysphagia.


10. Dementia.


11. Anemia


12. Pneumonia





DISCUSSION:


1. Continue medications.


2. CXR improved


3. Status post tracheostomy


4. S/p G-tube 


5. Stable to transfer to [facility]











  ______________________________________________


  Quentin Cardoso M.D.





Subjective


Interval Events:  None reported


Constitutional:  Reports: no symptoms


HEENT:  Repors: no symptoms


Respiratory:  Reports: no symptoms


Cardiovascular:  Reports: no symptoms


Gastrointestinal/Abdominal:  Reports: no symptoms


Genitourinary:  Reports: no symptoms


Neurologic:  Reports: no symptoms


Psychiatric:  Reports: no symptoms


Allergies:  


Coded Allergies:  


     No Known Allergies (Unverified , 11/26/18)





Objective





Last 24 Hour Vital Signs








  Date Time  Temp Pulse Resp B/P (MAP) Pulse Ox O2 Delivery O2 Flow Rate FiO2


 


12/23/18 07:14  88 15     40


 


12/23/18 07:00  86 15 137/26 (63) 100   


 


12/23/18 06:00  104 14 118/25 (56) 100   


 


12/23/18 05:00  78 15 140/29 (66) 100   


 


12/23/18 04:54  79 15     40


 


12/23/18 04:00  69      


 


12/23/18 04:00 98.2 79 14 149/26 (67) 100   


 


12/23/18 04:00      Mechanical Ventilator  





      Mechanical Ventilator  


 


12/23/18 04:00        40


 


12/23/18 03:18  69 11     40


 


12/23/18 03:00  68 12 92/21 (44) 100   


 


12/23/18 02:00  78 17 95/28 (50) 100   


 


12/23/18 01:01  72 13     40


 


12/23/18 01:00  77 16 92/15 (40) 100   


 


12/23/18 00:00 98.4 67 16 96/17 (43) 100   


 


12/23/18 00:00        40


 


12/23/18 00:00      Mechanical Ventilator  





      Mechanical Ventilator  


 


12/23/18 00:00  67      


 


12/22/18 23:06  68 13     40


 


12/22/18 23:00  68 13 104/15 (44) 100   


 


12/22/18 22:00  79 15 124/12 (49) 100   


 


12/22/18 21:00  74 15 95/21 (45) 100   


 


12/22/18 20:57  74 13     40


 


12/22/18 20:37    111/15    


 


12/22/18 20:00 98.3 72 12 111/15 (47) 100   


 


12/22/18 20:00      Mechanical Ventilator  





      Mechanical Ventilator  


 


12/22/18 20:00        40


 


12/22/18 20:00  71      


 


12/22/18 19:00  75 15 105/12 (43) 100   


 


12/22/18 18:56  81 16     40


 


12/22/18 18:00  79 15 104/21 (48) 100   


 


12/22/18 17:05  80 15     40


 


12/22/18 17:00  82 16 112/47 (68) 100   


 


12/22/18 16:02  83      


 


12/22/18 16:00        40


 


12/22/18 16:00 98.4 81 16 121/28 (59) 100   


 


12/22/18 16:00      Mechanical Ventilator  





      Mechanical Ventilator  


 


12/22/18 15:00  89 17 115/30 (58) 100   


 


12/22/18 14:42  111 19     40


 


12/22/18 14:00  108 14 120/47 (71) 100   


 


12/22/18 13:01  120 12     40


 


12/22/18 13:00  108 15 109/80 (90) 100   


 


12/22/18 12:00      Mechanical Ventilator  





      Mechanical Ventilator  


 


12/22/18 12:00        40


 


12/22/18 12:00 98.2 105 17 102/79 (87) 100   


 


12/22/18 11:57  86      


 


12/22/18 11:00  87 21 98/79 (85) 100   


 


12/22/18 10:55  85 13     40


 


12/22/18 10:00  84 13 124/28 (60) 100   


 


12/22/18 09:00  87 16 124/28 (60) 100   


 


12/22/18 08:44  87 15     40


 


12/22/18 08:20    95/70    


 


12/22/18 08:00      Mechanical Ventilator  





      Mechanical Ventilator  


 


12/22/18 08:00        40


 


12/22/18 08:00 98.5 85 16 95/70 (78) 100   

















Intake and Output  


 


 12/22/18 12/23/18





 18:59 06:59


 


Intake Total 630 ml 630 ml


 


Output Total  70 ml


 


Balance 630 ml 560 ml


 


  


 


Free Water 50 ml 50 ml


 


Tube Feeding 480 ml 480 ml


 


Other 100 ml 100 ml


 


Stool Total  70 ml


 


# Voids 50 


 


# Bowel Movements 3 3








General Appearance:  no acute distress


HEENT:  normocephalic, status post trach


Respiratory/Chest:  chest wall non-tender, decreased breath sounds


Cardiovascular:  normal peripheral pulses, normal rate


Abdomen:  normal bowel sounds, soft, non tender


Laboratory Tests


12/22/18 17:20: 


White Blood Count 10.0, Red Blood Count 2.86L, Hemoglobin 8.2L, Hematocrit 27.5L

, Mean Corpuscular Volume 96, Mean Corpuscular Hemoglobin 28.8, Mean 

Corpuscular Hemoglobin Concent 29.9L, Red Cell Distribution Width 18.8H, 

Platelet Count 416, Mean Platelet Volume 5.9L, Neutrophils (%) (Auto) 81.3H, 

Lymphocytes (%) (Auto) 7.0L, Monocytes (%) (Auto) 9.3, Eosinophils (%) (Auto) 

1.4, Basophils (%) (Auto) 1.0, Sodium Level 138, Potassium Level 3.2L, Chloride 

Level 101, Carbon Dioxide Level 29, Anion Gap 8, Blood Urea Nitrogen 25H, 

Creatinine 2.4H, Estimat Glomerular Filtration Rate , Glucose Level 127H, 

Calcium Level 8.7, Total Bilirubin 0.3, Aspartate Amino Transf (AST/SGOT) 16, 

Alanine Aminotransferase (ALT/SGPT) 8L, Alkaline Phosphatase 177H, Total 

Protein 5.9L, Albumin 1.5L, Globulin 4.4, Albumin/Globulin Ratio 0.3L





Current Medications








 Medications


  (Trade)  Dose


 Ordered  Sig/Tony


 Route


 PRN Reason  Start Time


 Stop Time Status Last Admin


Dose Admin


 


 Acetaminophen


  (Tylenol)  650 mg  Q6H  PRN


 GT


 Mild Pain/Temp > 100.5  12/21/18 16:00


 1/20/19 15:59  12/22/18 18:41


 


 


 Albuterol/


 Ipratropium


  (Albuterol/


 Ipratropium)  3 ml  Q6H  PRN


 HHN


 Shortness of Breath  12/18/18 19:15


 12/23/18 19:14  12/21/18 10:27


 


 


 Amiodarone HCl


  (Cordarone)  200 mg  EVERY 12  HOURS


 NG


   12/8/18 21:00


 1/5/19 13:59  12/22/18 20:37


 


 


 Apixaban


  (Eliquis)  2.5 mg  BID


 NG


   12/21/18 18:00


 1/20/19 08:59  12/22/18 17:26


 


 


 Artificial Tears


  (Akwa-Tears)  1 drop  Q4H  PRN


 BOTH EYES


 Dry Eyes  12/22/18 18:30


 1/21/19 18:29   


 


 


 Chlorhexidine


 Gluconate


  (Juana-Hex 2%)  1 applic  DAILY@2000


 TOPIC


   11/27/18 20:00


 12/27/18 19:59  12/22/18 19:16


 


 


 Collagenase


  (Santyl)  1 applic  Q12HR


 TOPIC


   12/12/18 21:00


 1/5/19 20:59  12/22/18 20:37


 


 


 Epoetin Rupesh


  (Procrit (for


 ESRD on dialysis))  10,000 units  MON-WED-FRI


 SUBQ


   11/28/18 21:00


 12/28/18 20:59  12/21/18 20:29


 


 


 Minoxidil


  (Loniten)  5 mg  Q12HR


 NG


   12/2/18 21:00


 1/1/19 20:59  12/22/18 20:37


 


 


 Pantoprazole


  (Protonix)  40 mg  Q12HR


 IVP


   11/26/18 21:00


 12/27/18 08:59  12/22/18 20:37


 


 


 Sodium Chloride


  (Ocean Nasal


 Spray)  1 spray  Q4H  PRN


 NASAL


 dry nose  12/22/18 18:30


 1/21/19 18:29   


 


 


 Vitamin D


  (Vitamin D)  1,000 intlu  DAILY


 GT


   12/9/18 10:00


 1/8/19 09:59  12/22/18 08:20


 

















Quentin Cardoso MD Dec 23, 2018 07:50

## 2018-12-23 NOTE — INFECTIOUS DISEASES PROG NOTE
Assessment/Plan


Assessment/Plan


antibiotics : none





A


1. VRE | e.coli UTI s/p rx


2. shock resolved


3. leucocytosis 


4. respiratory failure s/p tracheostomy


5. renal failure


6. decubitus ulcers


7. nasal MRSA colonization


8. rectal VRE colonization


9. pleural effusion





P


1. observe off antibiotics





Subjective


ROS Limited/Unobtainable:  Yes


Allergies:  


Coded Allergies:  


     No Known Allergies (Unverified , 11/26/18)





Objective


Vital Signs





Last 24 Hour Vital Signs








  Date Time  Temp Pulse Resp B/P (MAP) Pulse Ox O2 Delivery O2 Flow Rate FiO2


 


12/23/18 10:00  73 16 131/22 (58) 100   


 


12/23/18 09:13  108 18     40


 


12/23/18 09:00 97.8 78 14 126/24 (58) 100   


 


12/23/18 08:02  87      


 


12/23/18 08:00  87 18 144/18 (60) 100   


 


12/23/18 08:00      Mechanical Ventilator  





      Mechanical Ventilator  


 


12/23/18 08:00        40


 


12/23/18 07:14  88 15     40


 


12/23/18 07:00  86 15 137/26 (63) 100   


 


12/23/18 06:00  104 14 118/25 (56) 100   


 


12/23/18 05:00  78 15 140/29 (66) 100   


 


12/23/18 04:54  79 15     40


 


12/23/18 04:00  69      


 


12/23/18 04:00 98.2 79 14 149/26 (67) 100   


 


12/23/18 04:00      Mechanical Ventilator  





      Mechanical Ventilator  


 


12/23/18 04:00        40


 


12/23/18 03:18  69 11     40


 


12/23/18 03:00  68 12 92/21 (44) 100   


 


12/23/18 02:00  78 17 95/28 (50) 100   


 


12/23/18 01:01  72 13     40


 


12/23/18 01:00  77 16 92/15 (40) 100   


 


12/23/18 00:00 98.4 67 16 96/17 (43) 100   


 


12/23/18 00:00        40


 


12/23/18 00:00      Mechanical Ventilator  





      Mechanical Ventilator  


 


12/23/18 00:00  67      


 


12/22/18 23:06  68 13     40


 


12/22/18 23:00  68 13 104/15 (44) 100   


 


12/22/18 22:00  79 15 124/12 (49) 100   


 


12/22/18 21:00  74 15 95/21 (45) 100   


 


12/22/18 20:57  74 13     40


 


12/22/18 20:37    111/15    


 


12/22/18 20:00 98.3 72 12 111/15 (47) 100   


 


12/22/18 20:00      Mechanical Ventilator  





      Mechanical Ventilator  


 


12/22/18 20:00        40


 


12/22/18 20:00  71      


 


12/22/18 19:00  75 15 105/12 (43) 100   


 


12/22/18 18:56  81 16     40


 


12/22/18 18:00  79 15 104/21 (48) 100   


 


12/22/18 17:05  80 15     40


 


12/22/18 17:00  82 16 112/47 (68) 100   


 


12/22/18 16:02  83      


 


12/22/18 16:00        40


 


12/22/18 16:00 98.4 81 16 121/28 (59) 100   


 


12/22/18 16:00      Mechanical Ventilator  





      Mechanical Ventilator  


 


12/22/18 15:00  89 17 115/30 (58) 100   


 


12/22/18 14:42  111 19     40


 


12/22/18 14:00  108 14 120/47 (71) 100   


 


12/22/18 13:01  120 12     40


 


12/22/18 13:00  108 15 109/80 (90) 100   


 


12/22/18 12:00      Mechanical Ventilator  





      Mechanical Ventilator  


 


12/22/18 12:00        40


 


12/22/18 12:00 98.2 105 17 102/79 (87) 100   


 


12/22/18 11:57  86      








Height (Feet):  5


Height (Inches):  2.00


Weight (Pounds):  107


HEENT:  status post trach


Respiratory/Chest:  lungs clear


Cardiovascular:  normal rate, regular rhythm, no gallop/murmur


Abdomen:  soft, non tender, other - GT


Extremities:  no edema, other - right arm PICC





Laboratory Tests








Test


  12/22/18


17:20


 


White Blood Count


  10.0 K/UL


(4.8-10.8)


 


Red Blood Count


  2.86 M/UL


(4.20-5.40)  L


 


Hemoglobin


  8.2 G/DL


(12.0-16.0)  L


 


Hematocrit


  27.5 %


(37.0-47.0)  L


 


Mean Corpuscular Volume 96 FL (80-99)  


 


Mean Corpuscular Hemoglobin


  28.8 PG


(27.0-31.0)


 


Mean Corpuscular Hemoglobin


Concent 29.9 G/DL


(32.0-36.0)  L


 


Red Cell Distribution Width


  18.8 %


(11.6-14.8)  H


 


Platelet Count


  416 K/UL


(150-450)


 


Mean Platelet Volume


  5.9 FL


(6.5-10.1)  L


 


Neutrophils (%) (Auto)


  81.3 %


(45.0-75.0)  H


 


Lymphocytes (%) (Auto)


  7.0 %


(20.0-45.0)  L


 


Monocytes (%) (Auto)


  9.3 %


(1.0-10.0)


 


Eosinophils (%) (Auto)


  1.4 %


(0.0-3.0)


 


Basophils (%) (Auto)


  1.0 %


(0.0-2.0)


 


Sodium Level


  138 MMOL/L


(136-145)


 


Potassium Level


  3.2 MMOL/L


(3.5-5.1)  L


 


Chloride Level


  101 MMOL/L


()


 


Carbon Dioxide Level


  29 MMOL/L


(21-32)


 


Anion Gap


  8 mmol/L


(5-15)


 


Blood Urea Nitrogen


  25 mg/dL


(7-18)  H


 


Creatinine


  2.4 MG/DL


(0.55-1.30)  H


 


Estimat Glomerular Filtration


Rate  mL/min (>60)  


 


 


Glucose Level


  127 MG/DL


()  H


 


Calcium Level


  8.7 MG/DL


(8.5-10.1)


 


Total Bilirubin


  0.3 MG/DL


(0.2-1.0)


 


Aspartate Amino Transf


(AST/SGOT) 16 U/L (15-37)


 


 


Alanine Aminotransferase


(ALT/SGPT) 8 U/L (12-78)


L


 


Alkaline Phosphatase


  177 U/L


()  H


 


Total Protein


  5.9 G/DL


(6.4-8.2)  L


 


Albumin


  1.5 G/DL


(3.4-5.0)  L


 


Globulin 4.4 g/dL  


 


Albumin/Globulin Ratio


  0.3 (1.0-2.7)


L











Current Medications








 Medications


  (Trade)  Dose


 Ordered  Sig/Tony


 Route


 PRN Reason  Start Time


 Stop Time Status Last Admin


Dose Admin


 


 Acetaminophen


  (Tylenol)  650 mg  Q6H  PRN


 GT


 Mild Pain/Temp > 100.5  12/21/18 16:00


 1/20/19 15:59  12/22/18 18:41


 


 


 Albuterol/


 Ipratropium


  (Albuterol/


 Ipratropium)  3 ml  Q6H  PRN


 HHN


 Shortness of Breath  12/18/18 19:15


 12/23/18 19:14  12/21/18 10:27


 


 


 Amiodarone HCl


  (Cordarone)  200 mg  EVERY 12  HOURS


 NG


   12/8/18 21:00


 1/5/19 13:59  12/23/18 08:26


 


 


 Apixaban


  (Eliquis)  2.5 mg  BID


 NG


   12/21/18 18:00


 1/20/19 08:59  12/23/18 08:26


 


 


 Artificial Tears


  (Akwa-Tears)  1 drop  Q4H  PRN


 BOTH EYES


 Dry Eyes  12/22/18 18:30


 1/21/19 18:29   


 


 


 Chlorhexidine


 Gluconate


  (Juana-Hex 2%)  1 applic  DAILY@2000


 TOPIC


   11/27/18 20:00


 12/27/18 19:59  12/22/18 19:16


 


 


 Collagenase


  (Santyl)  1 applic  Q12HR


 TOPIC


   12/12/18 21:00


 1/5/19 20:59  12/23/18 08:26


 


 


 Epoetin Rupesh


  (Procrit (for


 ESRD on dialysis))  10,000 units  MON-WED-FRI


 SUBQ


   11/28/18 21:00


 12/28/18 20:59  12/21/18 20:29


 


 


 Minoxidil


  (Loniten)  5 mg  Q12HR


 NG


   12/2/18 21:00


 1/1/19 20:59  12/22/18 20:37


 


 


 Pantoprazole


  (Protonix)  40 mg  Q12HR


 IVP


   11/26/18 21:00


 12/27/18 08:59  12/23/18 08:26


 


 


 Sodium Chloride


  (Ocean Nasal


 Spray)  1 spray  Q4H  PRN


 NASAL


 dry nose  12/22/18 18:30


 1/21/19 18:29   


 


 


 Vitamin D


  (Vitamin D)  1,000 intlu  DAILY


 GT


   12/9/18 10:00


 1/8/19 09:59  12/23/18 08:26


 

















Chase Santillan MD Dec 23, 2018 11:01

## 2018-12-23 NOTE — NUR
NURSE NOTES:

Bedside repot Received from Mi Yeon RN,arousable to verbal and tactile stimuli. Able to 
communicate by nodding, or shaking head. patient on vent AC 8 Vt 450 PEEP 5 40% FiO2. PEG in 
place, secured with abdominal binder. Dressing dry and intact. Feeding Nepro 40ml/hr. No 
residual. patient has  Right upper arm midline intact and patent . Bilateral soft wrist 
restraints on at this time, pt reaches for trach site. Head of bed elevated. Bed locked and 
in low position. On contact precautions. Will continue to monitor

## 2018-12-23 NOTE — CARDIOLOGY PROGRESS NOTE
Assessment/Plan


Assessment/Plan


1. Hypotension, possibly sepsis versus volume.


2. Paroxysmal episodes of atrial fibrillation history.


3. Bradycardia secondary to medications, amiodarone possibly.


4. Diabetes mellitus.


5. End-stage renal disease, on hemodialysis.


6. Lung collapse.


7. Respiratory failure, status post intubation.


8. Dysphagia.


9. History of dementia.


10. Pulm htn 


11.  Pleural effusion 


12. cardaic arrest


13. chest wall pain post cpr  





mostly in sinus now 


stool ob neg x2 


amiod bid for another 1 week 


off labetalol for now if needed hydralazine to be used 


vent support 


s/p trach


peg done 


tele reviewed again sinus 


on elequis  2.5 mg bid for stroke prevention





Subjective


Cardiovascular:  Denies: chest pain


Respiratory:  Reports: shortness of breath


Gastrointestinal/Abdominal:  Denies: abdominal pain


Genitourinary:  Denies: burning


Subjective


on the vent not verba ,





Objective





Last 24 Hour Vital Signs








  Date Time  Temp Pulse Resp B/P (MAP) Pulse Ox O2 Delivery O2 Flow Rate FiO2


 


12/23/18 12:36  85 16     40


 


12/23/18 12:00      Mechanical Ventilator  





      Mechanical Ventilator  


 


12/23/18 12:00        40


 


12/23/18 12:00 98.2 83 18 118/20 (52) 99   


 


12/23/18 11:26  82      


 


12/23/18 11:00  74 15 126/10 (48) 97   


 


12/23/18 10:45  76 16     40


 


12/23/18 10:00  73 16 131/22 (58) 100   


 


12/23/18 09:13  108 18     40


 


12/23/18 09:00 97.8 78 14 126/24 (58) 100   


 


12/23/18 08:02  87      


 


12/23/18 08:00  87 18 144/18 (60) 100   


 


12/23/18 08:00      Mechanical Ventilator  





      Mechanical Ventilator  


 


12/23/18 08:00        40


 


12/23/18 07:14  88 15     40


 


12/23/18 07:00  86 15 137/26 (63) 100   


 


12/23/18 06:00  104 14 118/25 (56) 100   


 


12/23/18 05:00  78 15 140/29 (66) 100   


 


12/23/18 04:54  79 15     40


 


12/23/18 04:00  69      


 


12/23/18 04:00 98.2 79 14 149/26 (67) 100   


 


12/23/18 04:00      Mechanical Ventilator  





      Mechanical Ventilator  


 


12/23/18 04:00        40


 


12/23/18 03:18  69 11     40


 


12/23/18 03:00  68 12 92/21 (44) 100   


 


12/23/18 02:00  78 17 95/28 (50) 100   


 


12/23/18 01:01  72 13     40


 


12/23/18 01:00  77 16 92/15 (40) 100   


 


12/23/18 00:00 98.4 67 16 96/17 (43) 100   


 


12/23/18 00:00        40


 


12/23/18 00:00      Mechanical Ventilator  





      Mechanical Ventilator  


 


12/23/18 00:00  67      


 


12/22/18 23:06  68 13     40


 


12/22/18 23:00  68 13 104/15 (44) 100   


 


12/22/18 22:00  79 15 124/12 (49) 100   


 


12/22/18 21:00  74 15 95/21 (45) 100   


 


12/22/18 20:57  74 13     40


 


12/22/18 20:37    111/15    


 


12/22/18 20:00 98.3 72 12 111/15 (47) 100   


 


12/22/18 20:00      Mechanical Ventilator  





      Mechanical Ventilator  


 


12/22/18 20:00        40


 


12/22/18 20:00  71      


 


12/22/18 19:00  75 15 105/12 (43) 100   


 


12/22/18 18:56  81 16     40


 


12/22/18 18:00  79 15 104/21 (48) 100   


 


12/22/18 17:05  80 15     40


 


12/22/18 17:00  82 16 112/47 (68) 100   


 


12/22/18 16:02  83      


 


12/22/18 16:00        40


 


12/22/18 16:00 98.4 81 16 121/28 (59) 100   


 


12/22/18 16:00      Mechanical Ventilator  





      Mechanical Ventilator  


 


12/22/18 15:00  89 17 115/30 (58) 100   


 


12/22/18 14:42  111 19     40


 


12/22/18 14:00  108 14 120/47 (71) 100   


 


12/22/18 13:01  120 12     40


 


12/22/18 13:00  108 15 109/80 (90) 100   








General Appearance:  no apparent distress, alert


Neck:  supple


Cardiovascular:  normal rate, regular rhythm


Respiratory/Chest:  lungs clear


Abdomen:  normal bowel sounds, non tender, soft


Extremities:  no swelling











Intake and Output  


 


 12/22/18 12/23/18





 18:59 06:59


 


Intake Total 630 ml 630 ml


 


Output Total  70 ml


 


Balance 630 ml 560 ml


 


  


 


Free Water 50 ml 50 ml


 


Tube Feeding 480 ml 480 ml


 


Other 100 ml 100 ml


 


Stool Total  70 ml


 


# Voids 50 


 


# Bowel Movements 3 3











Laboratory Tests








Test


  12/22/18


17:20


 


White Blood Count


  10.0 K/UL


(4.8-10.8)


 


Red Blood Count


  2.86 M/UL


(4.20-5.40)  L


 


Hemoglobin


  8.2 G/DL


(12.0-16.0)  L


 


Hematocrit


  27.5 %


(37.0-47.0)  L


 


Mean Corpuscular Volume 96 FL (80-99)  


 


Mean Corpuscular Hemoglobin


  28.8 PG


(27.0-31.0)


 


Mean Corpuscular Hemoglobin


Concent 29.9 G/DL


(32.0-36.0)  L


 


Red Cell Distribution Width


  18.8 %


(11.6-14.8)  H


 


Platelet Count


  416 K/UL


(150-450)


 


Mean Platelet Volume


  5.9 FL


(6.5-10.1)  L


 


Neutrophils (%) (Auto)


  81.3 %


(45.0-75.0)  H


 


Lymphocytes (%) (Auto)


  7.0 %


(20.0-45.0)  L


 


Monocytes (%) (Auto)


  9.3 %


(1.0-10.0)


 


Eosinophils (%) (Auto)


  1.4 %


(0.0-3.0)


 


Basophils (%) (Auto)


  1.0 %


(0.0-2.0)


 


Sodium Level


  138 MMOL/L


(136-145)


 


Potassium Level


  3.2 MMOL/L


(3.5-5.1)  L


 


Chloride Level


  101 MMOL/L


()


 


Carbon Dioxide Level


  29 MMOL/L


(21-32)


 


Anion Gap


  8 mmol/L


(5-15)


 


Blood Urea Nitrogen


  25 mg/dL


(7-18)  H


 


Creatinine


  2.4 MG/DL


(0.55-1.30)  H


 


Estimat Glomerular Filtration


Rate  mL/min (>60)  


 


 


Glucose Level


  127 MG/DL


()  H


 


Calcium Level


  8.7 MG/DL


(8.5-10.1)


 


Total Bilirubin


  0.3 MG/DL


(0.2-1.0)


 


Aspartate Amino Transf


(AST/SGOT) 16 U/L (15-37)


 


 


Alanine Aminotransferase


(ALT/SGPT) 8 U/L (12-78)


L


 


Alkaline Phosphatase


  177 U/L


()  H


 


Total Protein


  5.9 G/DL


(6.4-8.2)  L


 


Albumin


  1.5 G/DL


(3.4-5.0)  L


 


Globulin 4.4 g/dL  


 


Albumin/Globulin Ratio


  0.3 (1.0-2.7)


L

















Mariusz Wade MD Dec 23, 2018 12:59

## 2018-12-23 NOTE — NUR
NURSE NOTES:

Received Pt is resting on the bed and awake and confused. On bilateral soft restraint. 
Checked comfort and circulation. Trying to release restraint when during care bit Pt still 
trying to touch G-tube, Vent and IV area. Given verbal cueing but doesn't understanding. 
Dressing is clean and dry on Rt. upper arm id line area. Pt has multiple wounds and dressing 
is clean and dry. On Tele monitor. Lt upper arm AV shunt present of bruit and thrills. Pt 
scheduled Hemodialysis tomorrow. According to previous nurse he already called hemodialysis 
center. Trach to Vent dependent setting with AC; 8, TV: 450, P; 5, FiO2 40% with SaO2 99% 
noted. Given oral and tracheal suction. No sign of acute distress noted. On P 200 mattress. 
Changed position. Will continue to care plan.

## 2018-12-23 NOTE — NUR
RESPIRATORY NOTE:



Patient received mechanically ventilated on  with current ordered vent settings. 
Patient has trach size 8.0 Shiley cuffed that is secured with a trach tie and guard. Patient 
presents with bilateral coarse breath sounds and symmetrical chest rise. Small amount of 
yellow/ tan secretions were  suctioned without incident. There is an ambu bag available at 
the bedside and the vent is connected to a red outlet. Vent alarms are functional and 
audible. There is no shortness of breath or respiratory distress noted at this time. Will 
continue to monitor patient.

## 2018-12-24 VITALS — SYSTOLIC BLOOD PRESSURE: 113 MMHG | DIASTOLIC BLOOD PRESSURE: 21 MMHG

## 2018-12-24 VITALS — DIASTOLIC BLOOD PRESSURE: 52 MMHG | SYSTOLIC BLOOD PRESSURE: 118 MMHG

## 2018-12-24 VITALS — DIASTOLIC BLOOD PRESSURE: 55 MMHG | SYSTOLIC BLOOD PRESSURE: 116 MMHG

## 2018-12-24 VITALS — SYSTOLIC BLOOD PRESSURE: 134 MMHG | DIASTOLIC BLOOD PRESSURE: 64 MMHG

## 2018-12-24 VITALS — SYSTOLIC BLOOD PRESSURE: 119 MMHG | DIASTOLIC BLOOD PRESSURE: 50 MMHG

## 2018-12-24 VITALS — DIASTOLIC BLOOD PRESSURE: 51 MMHG | SYSTOLIC BLOOD PRESSURE: 123 MMHG

## 2018-12-24 LAB
ADD MANUAL DIFF: NO
ALBUMIN SERPL-MCNC: 1.6 G/DL (ref 3.4–5)
ALBUMIN/GLOB SERPL: 0.3 {RATIO} (ref 1–2.7)
ALP SERPL-CCNC: 201 U/L (ref 46–116)
ALT SERPL-CCNC: 9 U/L (ref 12–78)
ANION GAP SERPL CALC-SCNC: 10 MMOL/L (ref 5–15)
ANION GAP SERPL CALC-SCNC: 9 MMOL/L (ref 5–15)
AST SERPL-CCNC: 27 U/L (ref 15–37)
BASOPHILS NFR BLD AUTO: 1.2 % (ref 0–2)
BILIRUB SERPL-MCNC: 0.3 MG/DL (ref 0.2–1)
BUN SERPL-MCNC: 16 MG/DL (ref 7–18)
BUN SERPL-MCNC: 43 MG/DL (ref 7–18)
CALCIUM SERPL-MCNC: 8.8 MG/DL (ref 8.5–10.1)
CALCIUM SERPL-MCNC: 9 MG/DL (ref 8.5–10.1)
CHLORIDE SERPL-SCNC: 97 MMOL/L (ref 98–107)
CHLORIDE SERPL-SCNC: 98 MMOL/L (ref 98–107)
CO2 SERPL-SCNC: 27 MMOL/L (ref 21–32)
CO2 SERPL-SCNC: 29 MMOL/L (ref 21–32)
CREAT SERPL-MCNC: 1.4 MG/DL (ref 0.55–1.3)
CREAT SERPL-MCNC: 3 MG/DL (ref 0.55–1.3)
EOSINOPHIL NFR BLD AUTO: 1.6 % (ref 0–3)
ERYTHROCYTE [DISTWIDTH] IN BLOOD BY AUTOMATED COUNT: 18.8 % (ref 11.6–14.8)
GLOBULIN SER-MCNC: 6 G/DL
HCT VFR BLD CALC: 32.2 % (ref 37–47)
HGB BLD-MCNC: 10.1 G/DL (ref 12–16)
LYMPHOCYTES NFR BLD AUTO: 6.4 % (ref 20–45)
MCV RBC AUTO: 96 FL (ref 80–99)
MONOCYTES NFR BLD AUTO: 8 % (ref 1–10)
NEUTROPHILS NFR BLD AUTO: 82.8 % (ref 45–75)
PLATELET # BLD: 450 K/UL (ref 150–450)
POTASSIUM SERPL-SCNC: 3.2 MMOL/L (ref 3.5–5.1)
POTASSIUM SERPL-SCNC: 3.5 MMOL/L (ref 3.5–5.1)
RBC # BLD AUTO: 3.35 M/UL (ref 4.2–5.4)
SODIUM SERPL-SCNC: 134 MMOL/L (ref 136–145)
SODIUM SERPL-SCNC: 136 MMOL/L (ref 136–145)
WBC # BLD AUTO: 10 K/UL (ref 4.8–10.8)

## 2018-12-24 RX ADMIN — APIXABAN SCH MG: 2.5 TABLET, FILM COATED ORAL at 09:03

## 2018-12-24 RX ADMIN — AMIODARONE HYDROCHLORIDE SCH MG: 200 TABLET ORAL at 21:04

## 2018-12-24 RX ADMIN — PANTOPRAZOLE SODIUM SCH MG: 40 INJECTION, POWDER, FOR SOLUTION INTRAVENOUS at 09:02

## 2018-12-24 RX ADMIN — AMIODARONE HYDROCHLORIDE SCH MG: 200 TABLET ORAL at 09:03

## 2018-12-24 RX ADMIN — ERYTHROPOIETIN SCH UNITS: 20000 INJECTION, SOLUTION INTRAVENOUS; SUBCUTANEOUS at 21:04

## 2018-12-24 RX ADMIN — APIXABAN SCH MG: 2.5 TABLET, FILM COATED ORAL at 18:09

## 2018-12-24 RX ADMIN — MINOXIDIL SCH MG: 2.5 TABLET ORAL at 09:03

## 2018-12-24 RX ADMIN — MINOXIDIL SCH MG: 2.5 TABLET ORAL at 21:00

## 2018-12-24 RX ADMIN — CHLORHEXIDINE GLUCONATE SCH APPLIC: 213 SOLUTION TOPICAL at 20:06

## 2018-12-24 RX ADMIN — VITAMIN D, TAB 1000IU (100/BT) SCH INTLU: 25 TAB at 09:03

## 2018-12-24 NOTE — NUR
NURSE NOTES:

Report received from CAROLYN Villatoro. Observed patient in bed. Open eyes spontaneously but 
non-verbal. No s/s of pain at this time. On ventilator with previous setting and tolerated 
well. GT site intact with ongoing feeding. HOB elevated. Rectal tube intact and draining 
well. IVF running at prescribed rate. Bed in lowest position. Call light within reach. Will 
continue to monitor.

## 2018-12-24 NOTE — NUR
RESPIRATORY NOTE: Received pt. on 840 vent. Vent settings are: A/C rate of 8, Vt 450, FI02 
40%, PEEP +5. No respiratory distress noted, Sp02 @ 97%. Ambu bag @ bs. Vent plugged on red 
outlet. Will continue to monitor pt.

## 2018-12-24 NOTE — NEPHROLOGY PROGRESS NOTE
Assessment/Plan


Assessment


Seee below


Plan


MOF resolving.





ESRD HD q MWF 


Anemia of CKD , SAMANTHA high dose. Transfuse PRN.





A. 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 with LAE. LVEF 65% . On Eliquis.





Post trach + PEG .


Referred to [facility].


See orders.


DW pt's son. Needs extensive Vascular W/U.





Subjective


Subjective


Starting HD. No c/o.





Objective


Objective





Last 24 Hour Vital Signs








  Date Time  Temp Pulse Resp B/P (MAP) Pulse Ox O2 Delivery O2 Flow Rate FiO2


 


12/24/18 12:00  84      


 


12/24/18 12:00        40


 


12/24/18 12:00      Mechanical Ventilator  





      Mechanical Ventilator  


 


12/24/18 11:48 98.4 86 20 123/51 (75) 99   


 


12/24/18 11:30  79 18     40


 


12/24/18 09:09  81 19     40


 


12/24/18 09:03    134/64    


 


12/24/18 08:00      Mechanical Ventilator  





      Mechanical Ventilator  


 


12/24/18 08:00  84      


 


12/24/18 08:00 98.5 83 19 134/64 (87) 96   


 


12/24/18 08:00        40


 


12/24/18 07:56  81 19     40


 


12/24/18 04:57  80 19     40


 


12/24/18 04:00        40


 


12/24/18 04:00      Mechanical Ventilator  





      Mechanical Ventilator  


 


12/24/18 04:00 98.4 66 18 116/55 (75) 97   


 


12/24/18 04:00  68      


 


12/24/18 02:43  74 14     40


 


12/24/18 01:09  79 14     40


 


12/24/18 00:00        40


 


12/24/18 00:00      Mechanical Ventilator  





      Mechanical Ventilator  


 


12/24/18 00:00  83      


 


12/24/18 00:00 98.0 74 18 119/50 (73) 97   


 


12/23/18 23:30  81 17     40


 


12/23/18 21:28  78 17     40


 


12/23/18 21:18    130/93    


 


12/23/18 20:00  81      


 


12/23/18 20:00        40


 


12/23/18 20:00 98.6 83 18 130/93 (105) 97   


 


12/23/18 20:00      Mechanical Ventilator  





      Mechanical Ventilator  


 


12/23/18 19:31  80 17     40


 


12/23/18 16:45  83 17     40


 


12/23/18 16:00      Mechanical Ventilator  





      Mechanical Ventilator  


 


12/23/18 16:00        40


 


12/23/18 16:00  83      


 


12/23/18 15:00  85 17 101/61 (74) 99   


 


12/23/18 14:56  88 17     40

















Intake and Output  


 


 12/23/18 12/24/18





 18:59 06:59


 


Intake Total 460 ml 440 ml


 


Output Total  50 ml


 


Balance 460 ml 390 ml


 


  


 


Free Water 50 ml 


 


Tube Feeding 360 ml 440 ml


 


Other 50 ml 


 


Stool Total  50 ml


 


# Bowel Movements 3 3








Height (Feet):  5


Height (Inches):  2.00


Weight (Pounds):  111


Objective


On vent.


Trach clean.


Cv IRR/IRR


Lungs B ronchi.


Abd SNT. BS + New PEG.


E Rt. foot diabetic ulcers











Gavino Daigle MD Dec 24, 2018 14:27

## 2018-12-24 NOTE — INFECTIOUS DISEASES PROG NOTE
Assessment/Plan


Assessment/Plan


antibiotics : none





A


1. VRE | e.coli UTI s/p rx


2. shock resolved


3. leucocytosis 


4. respiratory failure s/p tracheostomy


5. renal failure


6. decubitus ulcers


7. nasal MRSA colonization


8. rectal VRE colonization


9. pleural effusion





P


1. observe off antibiotics





Subjective


ROS Limited/Unobtainable:  Yes


Allergies:  


Coded Allergies:  


     No Known Allergies (Unverified , 11/26/18)





Objective


Vital Signs





Last 24 Hour Vital Signs








  Date Time  Temp Pulse Resp B/P (MAP) Pulse Ox O2 Delivery O2 Flow Rate FiO2


 


12/24/18 15:47  81 20     40


 


12/24/18 13:30  78 19     40


 


12/24/18 12:00  84      


 


12/24/18 12:00        40


 


12/24/18 12:00      Mechanical Ventilator  





      Mechanical Ventilator  


 


12/24/18 11:48 98.4 86 20 123/51 (75) 99   


 


12/24/18 11:30  79 18     40


 


12/24/18 09:09  81 19     40


 


12/24/18 09:03    134/64    


 


12/24/18 08:00      Mechanical Ventilator  





      Mechanical Ventilator  


 


12/24/18 08:00  84      


 


12/24/18 08:00 98.5 83 19 134/64 (87) 96   


 


12/24/18 08:00        40


 


12/24/18 07:56  81 19     40


 


12/24/18 04:57  80 19     40


 


12/24/18 04:00        40


 


12/24/18 04:00      Mechanical Ventilator  





      Mechanical Ventilator  


 


12/24/18 04:00 98.4 66 18 116/55 (75) 97   


 


12/24/18 04:00  68      


 


12/24/18 02:43  74 14     40


 


12/24/18 01:09  79 14     40


 


12/24/18 00:00        40


 


12/24/18 00:00      Mechanical Ventilator  





      Mechanical Ventilator  


 


12/24/18 00:00  83      


 


12/24/18 00:00 98.0 74 18 119/50 (73) 97   


 


12/23/18 23:30  81 17     40


 


12/23/18 21:28  78 17     40


 


12/23/18 21:18    130/93    


 


12/23/18 20:00  81      


 


12/23/18 20:00        40


 


12/23/18 20:00 98.6 83 18 130/93 (105) 97   


 


12/23/18 20:00      Mechanical Ventilator  





      Mechanical Ventilator  


 


12/23/18 19:31  80 17     40


 


12/23/18 16:45  83 17     40


 


12/23/18 16:00      Mechanical Ventilator  





      Mechanical Ventilator  


 


12/23/18 16:00        40


 


12/23/18 16:00  83      








Height (Feet):  5


Height (Inches):  2.00


Weight (Pounds):  111


HEENT:  status post trach


Respiratory/Chest:  lungs clear


Cardiovascular:  normal rate, regular rhythm, no gallop/murmur


Abdomen:  soft, non tender, other - GT


Extremities:  no edema, other - right arm PICC





Laboratory Tests








Test


  12/24/18


14:40


 


Sodium Level


  134 MMOL/L


(136-145)  L


 


Potassium Level


  3.5 MMOL/L


(3.5-5.1)


 


Chloride Level


  97 MMOL/L


()  L


 


Carbon Dioxide Level


  27 MMOL/L


(21-32)


 


Anion Gap


  10 mmol/L


(5-15)


 


Blood Urea Nitrogen


  43 mg/dL


(7-18)  H


 


Creatinine


  3.0 MG/DL


(0.55-1.30)  H


 


Estimat Glomerular Filtration


Rate  mL/min (>60)  


 


 


Glucose Level


  135 MG/DL


()  H


 


Calcium Level


  9.0 MG/DL


(8.5-10.1)


 


Total Bilirubin


  0.3 MG/DL


(0.2-1.0)


 


Aspartate Amino Transf


(AST/SGOT) 27 U/L (15-37)


 


 


Alanine Aminotransferase


(ALT/SGPT) 9 U/L (12-78)


L


 


Alkaline Phosphatase


  201 U/L


()  H


 


Total Protein


  7.6 G/DL


(6.4-8.2)


 


Albumin


  1.6 G/DL


(3.4-5.0)  L


 


Globulin 6.0 g/dL  


 


Albumin/Globulin Ratio


  0.3 (1.0-2.7)


L











Current Medications








 Medications


  (Trade)  Dose


 Ordered  Sig/Tony


 Route


 PRN Reason  Start Time


 Stop Time Status Last Admin


Dose Admin


 


 Acetaminophen


  (Tylenol)  650 mg  Q6H  PRN


 GT


 Mild Pain/Temp > 100.5  12/23/18 16:00


 1/20/19 15:59  12/23/18 16:04


 


 


 Amiodarone HCl


  (Cordarone)  200 mg  EVERY 12  HOURS


 NG


   12/23/18 21:00


 1/5/19 13:59  12/24/18 09:03


 


 


 Apixaban


  (Eliquis)  2.5 mg  BID


 NG


   12/23/18 18:00


 1/20/19 08:59  12/24/18 09:03


 


 


 Artificial Tears


  (Akwa-Tears)  1 drop  Q4H  PRN


 BOTH EYES


 Dry Eyes  12/23/18 15:30


 1/21/19 15:29   


 


 


 Chlorhexidine


 Gluconate


  (Juana-Hex 2%)  1 applic  DAILY@2000


 TOPIC


   12/23/18 20:00


 12/27/18 19:59  12/23/18 20:18


 


 


 Collagenase


  (Santyl)  1 applic  Q12HR


 TOPIC


   12/23/18 21:00


 1/5/19 20:59  12/24/18 09:05


 


 


 Epoetin Rupesh


  (Procrit (for


 ESRD on dialysis))  10,000 units  MON-WED-FRI


 SUBQ


   12/24/18 21:00


 12/28/18 20:59   


 


 


 Lansoprazole


  (Prevacid)  30 mg  Q12HR


 ORAL


   12/24/18 21:00


 1/23/19 20:59   


 


 


 Minoxidil


  (Loniten)  5 mg  Q12HR


 NG


   12/23/18 21:00


 1/1/19 20:59  12/24/18 09:03


 


 


 Sodium Chloride  1,000 ml @ 


 500 mls/hr  Q2H PRN


 IVLG


 sbp<90 during hd  12/23/18 15:30


 1/22/19 15:29   


 


 


 Sodium Chloride


  (Ocean Nasal


 Spray)  1 spray  Q4H  PRN


 NASAL


 dry nose  12/23/18 14:30


 1/21/19 18:29   


 


 


 Vitamin D


  (Vitamin D)  1,000 intlu  DAILY


 GT


   12/24/18 09:00


 1/8/19 09:59  12/24/18 09:03


 

















Chase Santillan MD Dec 24, 2018 15:54

## 2018-12-24 NOTE — GENERAL PROGRESS NOTE
Assessment/Plan


Assessment/Plan


Assessment


- Dysphagia


- Resp failure


- ESRD


- Anemia


- Status post PEG





Recommendations


1. Abdominal binder.


2. Elevate the head of the bed at all times.


3. G-tube flush.


4. G-tube care.


5. Start tube feeding later today per RD to goal


Monitor H&H, PRN transfusions


PPI


Electrolyte correction


Follow-up labs





Subjective


ROS Limited/Unobtainable:  No


Allergies:  


Coded Allergies:  


     No Known Allergies (Unverified , 11/26/18)





Objective





Last 24 Hour Vital Signs








  Date Time  Temp Pulse Resp B/P (MAP) Pulse Ox O2 Delivery O2 Flow Rate FiO2


 


12/24/18 04:57  80 19     40


 


12/24/18 04:00        40


 


12/24/18 04:00      Mechanical Ventilator  





      Mechanical Ventilator  


 


12/24/18 04:00 98.4 66 18 116/55 (75) 97   


 


12/24/18 04:00  68      


 


12/24/18 02:43  74 14     40


 


12/24/18 01:09  79 14     40


 


12/24/18 00:00        40


 


12/24/18 00:00      Mechanical Ventilator  





      Mechanical Ventilator  


 


12/24/18 00:00  83      


 


12/24/18 00:00 98.0 74 18 119/50 (73) 97   


 


12/23/18 23:30  81 17     40


 


12/23/18 21:28  78 17     40


 


12/23/18 21:18    130/93    


 


12/23/18 20:00  81      


 


12/23/18 20:00        40


 


12/23/18 20:00 98.6 83 18 130/93 (105) 97   


 


12/23/18 20:00      Mechanical Ventilator  





      Mechanical Ventilator  


 


12/23/18 19:31  80 17     40


 


12/23/18 16:45  83 17     40


 


12/23/18 16:00      Mechanical Ventilator  





      Mechanical Ventilator  


 


12/23/18 16:00        40


 


12/23/18 16:00  83      


 


12/23/18 15:00  85 17 101/61 (74) 99   


 


12/23/18 14:56  88 17     40


 


12/23/18 14:00  91 17 106/73 (84) 99   


 


12/23/18 13:00  87 17 118/20 (52) 97   


 


12/23/18 12:36  85 16     40


 


12/23/18 12:00      Mechanical Ventilator  





      Mechanical Ventilator  


 


12/23/18 12:00        40


 


12/23/18 12:00 98.2 83 18 118/20 (52) 99   


 


12/23/18 11:26  82      


 


12/23/18 11:00  74 15 126/10 (48) 97   


 


12/23/18 10:45  76 16     40


 


12/23/18 10:00  73 16 131/22 (58) 100   


 


12/23/18 09:13  108 18     40


 


12/23/18 09:00 97.8 78 14 126/24 (58) 100   


 


12/23/18 08:02  87      


 


12/23/18 08:00  87 18 144/18 (60) 100   


 


12/23/18 08:00      Mechanical Ventilator  





      Mechanical Ventilator  


 


12/23/18 08:00        40


 


12/23/18 07:14  88 15     40


 


12/23/18 07:00  86 15 137/26 (63) 100   

















Intake and Output  


 


 12/23/18 12/24/18





 19:00 07:00


 


Intake Total 460 ml 400 ml


 


Output Total  50 ml


 


Balance 460 ml 350 ml


 


  


 


Free Water 50 ml 


 


Tube Feeding 360 ml 400 ml


 


Other 50 ml 


 


Stool Total  50 ml


 


# Bowel Movements 3 3








Height (Feet):  5


Height (Inches):  2.00


Weight (Pounds):  111


General Appearance:  lethargic


EENT:  normal ENT inspection


Neck:  supple


Cardiovascular:  normal rate


Respiratory/Chest:  decreased breath sounds


Abdomen:  normal bowel sounds, non tender, soft


Extremities:  non-tender











Elliott Lam MD Dec 24, 2018 06:53

## 2018-12-24 NOTE — NUR
NURSE NOTES:

Received Pt is resting on the bed and awake and confused. On bilateral soft restraint. 
Checked comfort and circulation. Trying to release restraint when during care bit Pt still 
trying to touch G-tube, Vent and IV area. Pt removed dressing on arms. Given verbal cueing 
but doesn't understanding. Dressing is clean and dry on Rt. upper arm id line area. Pt has 
multiple wounds and dressing is clean and dry. On Tele monitor. Lt upper arm AV shunt 
present of bruit and thrills. Trach to Vent dependent setting with AC; 8, TV: 450, P; 5, 
FiO2 40% with SaO2 97-98% noted. Given oral and tracheal suction. No sign of acute distress 
noted. On P 200 mattress. Noted swelling on both arms. Changed position. Placed fall 
precaution. Will continue to care plan.

## 2018-12-24 NOTE — CARDIOLOGY PROGRESS NOTE
Assessment/Plan


Assessment/Plan


1. Hypotension, possibly sepsis versus volume.


2. Paroxysmal episodes of atrial fibrillation history.


3. Bradycardia secondary to medications, amiodarone possibly.


4. Diabetes mellitus.


5. End-stage renal disease, on hemodialysis.


6. Lung collapse.


7. Respiratory failure, status post intubation.


8. Dysphagia.


9. History of dementia.


10. Pulm htn 


11.  Pleural effusion 


12. cardaic arrest


13. chest wall pain post cpr  





mostly in sinus now 


stool ob neg x2 


amiod bid unitl jan 1 then 200 mg daily 


off labetalol for now if needed hydralazine to be used 


vent support 


s/p trach


peg done 


tele reviewed again sinus 


on elequis  2.5 mg bid for stroke prevention 


no new lab siwll order for tomorrow





Subjective


Cardiovascular:  Reports: lightheadedness; Denies: chest pain


Respiratory:  Reports: shortness of breath


Gastrointestinal/Abdominal:  Denies: abdominal pain


Genitourinary:  Denies: burning


Subjective


on the vent not verba ,





Objective





Last 24 Hour Vital Signs








  Date Time  Temp Pulse Resp B/P (MAP) Pulse Ox O2 Delivery O2 Flow Rate FiO2


 


12/24/18 12:00        40


 


12/24/18 12:00      Mechanical Ventilator  





      Mechanical Ventilator  


 


12/24/18 11:48 98.4 86 20 123/51 (75) 99   


 


12/24/18 09:09  81 19     40


 


12/24/18 09:03    134/64    


 


12/24/18 08:00      Mechanical Ventilator  





      Mechanical Ventilator  


 


12/24/18 08:00  84      


 


12/24/18 08:00 98.5 83 19 134/64 (87) 96   


 


12/24/18 08:00        40


 


12/24/18 07:56  81 19     40


 


12/24/18 04:57  80 19     40


 


12/24/18 04:00        40


 


12/24/18 04:00      Mechanical Ventilator  





      Mechanical Ventilator  


 


12/24/18 04:00 98.4 66 18 116/55 (75) 97   


 


12/24/18 04:00  68      


 


12/24/18 02:43  74 14     40


 


12/24/18 01:09  79 14     40


 


12/24/18 00:00        40


 


12/24/18 00:00      Mechanical Ventilator  





      Mechanical Ventilator  


 


12/24/18 00:00  83      


 


12/24/18 00:00 98.0 74 18 119/50 (73) 97   


 


12/23/18 23:30  81 17     40


 


12/23/18 21:28  78 17     40


 


12/23/18 21:18    130/93    


 


12/23/18 20:00  81      


 


12/23/18 20:00        40


 


12/23/18 20:00 98.6 83 18 130/93 (105) 97   


 


12/23/18 20:00      Mechanical Ventilator  





      Mechanical Ventilator  


 


12/23/18 19:31  80 17     40


 


12/23/18 16:45  83 17     40


 


12/23/18 16:00      Mechanical Ventilator  





      Mechanical Ventilator  


 


12/23/18 16:00        40


 


12/23/18 16:00  83      


 


12/23/18 15:00  85 17 101/61 (74) 99   


 


12/23/18 14:56  88 17     40


 


12/23/18 14:00  91 17 106/73 (84) 99   


 


12/23/18 13:00  87 17 118/20 (52) 97   


 


12/23/18 12:36  85 16     40








General Appearance:  no apparent distress, alert, on vent, patient on isolation


Cardiovascular:  normal rate


Respiratory/Chest:  lungs clear


Abdomen:  normal bowel sounds


Extremities:  no swelling











Intake and Output  


 


 12/23/18 12/24/18





 18:59 06:59


 


Intake Total 460 ml 440 ml


 


Output Total  50 ml


 


Balance 460 ml 390 ml


 


  


 


Free Water 50 ml 


 


Tube Feeding 360 ml 440 ml


 


Other 50 ml 


 


Stool Total  50 ml


 


# Bowel Movements 3 3

















Mariusz Wade MD Dec 24, 2018 12:11

## 2018-12-24 NOTE — PULMONOLOGY PROGRESS NOTE
Assessment/Plan


Assessment/Plan


Pulmonary Progress Note 





Assessment/Plan


Assessment/Plan


IMPRESSION:


1. Hypotension. Resolved.


2. Sepsis.


3. Atrial fibrillation.


4. Bradycardia.


5. Diabetes mellitus.


6. End-stage renal disease on dialysis.


7. Left lung collapse. Resolved


8. Respiratory failure. now status post tracheostomy


9. Dysphagia.


10. Dementia.


11. Anemia


12. Pneumonia





DISCUSSION:


1. Continue medications.


2. CXR improved


3. Status post tracheostomy


4. S/p G-tube 














Subjective


Interval Events:  None reported


Constitutional:  Reports: no symptoms


HEENT:  Repors: no symptoms


Respiratory:  Reports: no symptoms


Cardiovascular:  Reports: no symptoms


Gastrointestinal/Abdominal:  Reports: no symptoms


Genitourinary:  Reports: no symptoms


Neurologic:  Reports: no symptoms


Psychiatric:  Reports: no symptoms


Allergies:  


Coded Allergies:  


     No Known Allergies (Unverified , 11/26/18)





Objective





Vital Signs Noted


General Appearance:  no acute distress


HEENT:  normocephalic, status post trach


Respiratory/Chest:  chest wall non-tender, decreased breath sounds


Cardiovascular:  normal peripheral pulses, normal rate


Abdomen:  normal bowel sounds, soft, non tender


Laboratory Tests


12/22/18 17:20: 


White Blood Count 10.0, Red Blood Count 2.86L, Hemoglobin 8.2L, Hematocrit 27.5L

, Mean Corpuscular Volume 96, Mean Corpuscular Hemoglobin 28.8, Mean 

Corpuscular Hemoglobin Concent 29.9L, Red Cell Distribution Width 18.8H, 

Platelet Count 416, Mean Platelet Volume 5.9L, Neutrophils (%) (Auto) 81.3H, 

Lymphocytes (%) (Auto) 7.0L, Monocytes (%) (Auto) 9.3, Eosinophils (%) (Auto) 

1.4, Basophils (%) (Auto) 1.0, Sodium Level 138, Potassium Level 3.2L, Chloride 

Level 101, Carbon Dioxide Level 29, Anion Gap 8, Blood Urea Nitrogen 25H, 

Creatinine 2.4H, Estimat Glomerular Filtration Rate , Glucose Level 127H, 

Calcium Level 8.7, Total Bilirubin 0.3, Aspartate Amino Transf (AST/SGOT) 16, 

Alanine Aminotransferase (ALT/SGPT) 8L, Alkaline Phosphatase 177H, Total 

Protein 5.9L, Albumin 1.5L, Globulin 4.4, Albumin/Globulin Ratio 0.3L





Current Medications








 Medications


  (Trade)  Dose


 Ordered  Sig/Tony


 Route


 PRN Reason  Start Time


 Stop Time Status Last Admin


Dose Admin


 


 Acetaminophen


  (Tylenol)  650 mg  Q6H  PRN


 GT


 Mild Pain/Temp > 100.5  12/21/18 16:00


 1/20/19 15:59  12/22/18 18:41


 


 


 Albuterol/


 Ipratropium


  (Albuterol/


 Ipratropium)  3 ml  Q6H  PRN


 HHN


 Shortness of Breath  12/18/18 19:15


 12/23/18 19:14  12/21/18 10:27


 


 


 Amiodarone HCl


  (Cordarone)  200 mg  EVERY 12  HOURS


 NG


   12/8/18 21:00


 1/5/19 13:59  12/22/18 20:37


 


 


 Apixaban


  (Eliquis)  2.5 mg  BID


 NG


   12/21/18 18:00


 1/20/19 08:59  12/22/18 17:26


 


 


 Artificial Tears


  (Akwa-Tears)  1 drop  Q4H  PRN


 BOTH EYES


 Dry Eyes  12/22/18 18:30


 1/21/19 18:29   


 


 


 Chlorhexidine


 Gluconate


  (Juana-Hex 2%)  1 applic  DAILY@2000


 TOPIC


   11/27/18 20:00


 12/27/18 19:59  12/22/18 19:16


 


 


 Collagenase


  (Santyl)  1 applic  Q12HR


 TOPIC


   12/12/18 21:00


 1/5/19 20:59  12/22/18 20:37


 


 


 Epoetin Rupesh


  (Procrit (for


 ESRD on dialysis))  10,000 units  MON-WED-FRI


 SUBQ


   11/28/18 21:00


 12/28/18 20:59  12/21/18 20:29


 


 


 Minoxidil


  (Loniten)  5 mg  Q12HR


 NG


   12/2/18 21:00


 1/1/19 20:59  12/22/18 20:37


 


 


 Pantoprazole


  (Protonix)  40 mg  Q12HR


 IVP


   11/26/18 21:00


 12/27/18 08:59  12/22/18 20:37


 


 


 Sodium Chloride


  (Ocean Nasal


 Spray)  1 spray  Q4H  PRN


 NASAL


 dry nose  12/22/18 18:30


 1/21/19 18:29   


 


 


 Vitamin D


  (Vitamin D)  1,000 intlu  DAILY


 GT


   12/9/18 10:00


 1/8/19 09:59  12/22/18 08:20


 











Subjective


ROS Limited/Unobtainable:  No


Allergies:  


Coded Allergies:  


     No Known Allergies (Unverified , 11/26/18)





Objective





Last 24 Hour Vital Signs








  Date Time  Temp Pulse Resp B/P (MAP) Pulse Ox O2 Delivery O2 Flow Rate FiO2


 


12/24/18 15:47  81 20     40


 


12/24/18 13:30  78 19     40


 


12/24/18 12:00  84      


 


12/24/18 12:00        40


 


12/24/18 12:00      Mechanical Ventilator  





      Mechanical Ventilator  


 


12/24/18 11:48 98.4 86 20 123/51 (75) 99   


 


12/24/18 11:30  79 18     40


 


12/24/18 09:09  81 19     40


 


12/24/18 09:03    134/64    


 


12/24/18 08:00      Mechanical Ventilator  





      Mechanical Ventilator  


 


12/24/18 08:00  84      


 


12/24/18 08:00 98.5 83 19 134/64 (87) 96   


 


12/24/18 08:00        40


 


12/24/18 07:56  81 19     40


 


12/24/18 04:57  80 19     40


 


12/24/18 04:00        40


 


12/24/18 04:00      Mechanical Ventilator  





      Mechanical Ventilator  


 


12/24/18 04:00 98.4 66 18 116/55 (75) 97   


 


12/24/18 04:00  68      


 


12/24/18 02:43  74 14     40


 


12/24/18 01:09  79 14     40


 


12/24/18 00:00        40


 


12/24/18 00:00      Mechanical Ventilator  





      Mechanical Ventilator  


 


12/24/18 00:00  83      


 


12/24/18 00:00 98.0 74 18 119/50 (73) 97   


 


12/23/18 23:30  81 17     40


 


12/23/18 21:28  78 17     40


 


12/23/18 21:18    130/93    


 


12/23/18 20:00  81      


 


12/23/18 20:00        40


 


12/23/18 20:00 98.6 83 18 130/93 (105) 97   


 


12/23/18 20:00      Mechanical Ventilator  





      Mechanical Ventilator  


 


12/23/18 19:31  80 17     40


 


12/23/18 16:45  83 17     40


 


12/23/18 16:00      Mechanical Ventilator  





      Mechanical Ventilator  


 


12/23/18 16:00        40


 


12/23/18 16:00  83      

















Intake and Output  


 


 12/23/18 12/24/18





 18:59 06:59


 


Intake Total 460 ml 440 ml


 


Output Total  50 ml


 


Balance 460 ml 390 ml


 


  


 


Free Water 50 ml 


 


Tube Feeding 360 ml 440 ml


 


Other 50 ml 


 


Stool Total  50 ml


 


# Bowel Movements 3 3








Laboratory Tests


12/24/18 14:40: 


Sodium Level 134L, Potassium Level 3.5, Chloride Level 97L, Carbon Dioxide 

Level 27, Anion Gap 10, Blood Urea Nitrogen 43H, Creatinine 3.0H, Estimat 

Glomerular Filtration Rate , Glucose Level 135H, Calcium Level 9.0, Total 

Bilirubin 0.3, Aspartate Amino Transf (AST/SGOT) 27, Alanine Aminotransferase (

ALT/SGPT) 9L, Alkaline Phosphatase 201H, Total Protein 7.6, Albumin 1.6L, 

Globulin 6.0, Albumin/Globulin Ratio 0.3L





Current Medications








 Medications


  (Trade)  Dose


 Ordered  Sig/Tony


 Route


 PRN Reason  Start Time


 Stop Time Status Last Admin


Dose Admin


 


 Acetaminophen


  (Tylenol)  650 mg  Q6H  PRN


 GT


 Mild Pain/Temp > 100.5  12/23/18 16:00


 1/20/19 15:59  12/23/18 16:04


 


 


 Amiodarone HCl


  (Cordarone)  200 mg  EVERY 12  HOURS


 NG


   12/23/18 21:00


 1/5/19 13:59  12/24/18 09:03


 


 


 Apixaban


  (Eliquis)  2.5 mg  BID


 NG


   12/23/18 18:00


 1/20/19 08:59  12/24/18 09:03


 


 


 Artificial Tears


  (Akwa-Tears)  1 drop  Q4H  PRN


 BOTH EYES


 Dry Eyes  12/23/18 15:30


 1/21/19 15:29   


 


 


 Chlorhexidine


 Gluconate


  (Juana-Hex 2%)  1 applic  DAILY@2000


 TOPIC


   12/23/18 20:00


 12/27/18 19:59  12/23/18 20:18


 


 


 Collagenase


  (Santyl)  1 applic  Q12HR


 TOPIC


   12/23/18 21:00


 1/5/19 20:59  12/24/18 09:05


 


 


 Epoetin Rupesh


  (Procrit (for


 ESRD on dialysis))  10,000 units  MON-WED-FRI


 SUBQ


   12/24/18 21:00


 12/28/18 20:59   


 


 


 Lansoprazole


  (Prevacid)  30 mg  Q12HR


 ORAL


   12/24/18 21:00


 1/23/19 20:59   


 


 


 Minoxidil


  (Loniten)  5 mg  Q12HR


 NG


   12/23/18 21:00


 1/1/19 20:59  12/24/18 09:03


 


 


 Sodium Chloride  1,000 ml @ 


 500 mls/hr  Q2H PRN


 IVLG


 sbp<90 during hd  12/23/18 15:30


 1/22/19 15:29   


 


 


 Sodium Chloride


  (Ocean Nasal


 Spray)  1 spray  Q4H  PRN


 NASAL


 dry nose  12/23/18 14:30


 1/21/19 18:29   


 


 


 Vitamin D


  (Vitamin D)  1,000 intlu  DAILY


 GT


   12/24/18 09:00


 1/8/19 09:59  12/24/18 09:03


 

















Simon Silva MD Dec 24, 2018 15:55

## 2018-12-24 NOTE — NUR
HAND-OFF: 

Report given to CAROLYN Shin. Pt is resting on the bed and no sign of acute distress noted.

## 2018-12-25 VITALS — SYSTOLIC BLOOD PRESSURE: 113 MMHG | DIASTOLIC BLOOD PRESSURE: 28 MMHG

## 2018-12-25 VITALS — SYSTOLIC BLOOD PRESSURE: 112 MMHG | DIASTOLIC BLOOD PRESSURE: 60 MMHG

## 2018-12-25 VITALS — DIASTOLIC BLOOD PRESSURE: 64 MMHG | SYSTOLIC BLOOD PRESSURE: 97 MMHG

## 2018-12-25 VITALS — SYSTOLIC BLOOD PRESSURE: 123 MMHG | DIASTOLIC BLOOD PRESSURE: 55 MMHG

## 2018-12-25 VITALS — SYSTOLIC BLOOD PRESSURE: 139 MMHG | DIASTOLIC BLOOD PRESSURE: 81 MMHG

## 2018-12-25 VITALS — DIASTOLIC BLOOD PRESSURE: 53 MMHG | SYSTOLIC BLOOD PRESSURE: 136 MMHG

## 2018-12-25 LAB
ADD MANUAL DIFF: NO
ALBUMIN SERPL-MCNC: 1.6 G/DL (ref 3.4–5)
ALBUMIN/GLOB SERPL: 0.3 {RATIO} (ref 1–2.7)
ALP SERPL-CCNC: 212 U/L (ref 46–116)
ALT SERPL-CCNC: 11 U/L (ref 12–78)
ANION GAP SERPL CALC-SCNC: 6 MMOL/L (ref 5–15)
AST SERPL-CCNC: 31 U/L (ref 15–37)
BASOPHILS NFR BLD AUTO: 1.7 % (ref 0–2)
BILIRUB SERPL-MCNC: 0.3 MG/DL (ref 0.2–1)
BUN SERPL-MCNC: 27 MG/DL (ref 7–18)
CALCIUM SERPL-MCNC: 8.9 MG/DL (ref 8.5–10.1)
CHLORIDE SERPL-SCNC: 98 MMOL/L (ref 98–107)
CO2 SERPL-SCNC: 31 MMOL/L (ref 21–32)
CREAT SERPL-MCNC: 2 MG/DL (ref 0.55–1.3)
EOSINOPHIL NFR BLD AUTO: 1.4 % (ref 0–3)
ERYTHROCYTE [DISTWIDTH] IN BLOOD BY AUTOMATED COUNT: 19 % (ref 11.6–14.8)
GLOBULIN SER-MCNC: 5.8 G/DL
HCT VFR BLD CALC: 29.8 % (ref 37–47)
HGB BLD-MCNC: 9.3 G/DL (ref 12–16)
LYMPHOCYTES NFR BLD AUTO: 4.8 % (ref 20–45)
MCV RBC AUTO: 96 FL (ref 80–99)
MONOCYTES NFR BLD AUTO: 10.2 % (ref 1–10)
NEUTROPHILS NFR BLD AUTO: 81.9 % (ref 45–75)
PLATELET # BLD: 430 K/UL (ref 150–450)
POTASSIUM SERPL-SCNC: 3.3 MMOL/L (ref 3.5–5.1)
RBC # BLD AUTO: 3.09 M/UL (ref 4.2–5.4)
SODIUM SERPL-SCNC: 135 MMOL/L (ref 136–145)
WBC # BLD AUTO: 9.2 K/UL (ref 4.8–10.8)

## 2018-12-25 RX ADMIN — AMIODARONE HYDROCHLORIDE SCH MG: 200 TABLET ORAL at 21:37

## 2018-12-25 RX ADMIN — CHLORHEXIDINE GLUCONATE SCH APPLIC: 213 SOLUTION TOPICAL at 20:06

## 2018-12-25 RX ADMIN — MINOXIDIL SCH MG: 2.5 TABLET ORAL at 08:32

## 2018-12-25 RX ADMIN — ACETAMINOPHEN PRN MG: 160 SOLUTION ORAL at 04:09

## 2018-12-25 RX ADMIN — MINOXIDIL SCH MG: 2.5 TABLET ORAL at 21:00

## 2018-12-25 RX ADMIN — APIXABAN SCH MG: 2.5 TABLET, FILM COATED ORAL at 08:32

## 2018-12-25 RX ADMIN — AMIODARONE HYDROCHLORIDE SCH MG: 200 TABLET ORAL at 08:32

## 2018-12-25 RX ADMIN — APIXABAN SCH MG: 2.5 TABLET, FILM COATED ORAL at 17:39

## 2018-12-25 RX ADMIN — VITAMIN D, TAB 1000IU (100/BT) SCH INTLU: 25 TAB at 08:32

## 2018-12-25 RX ADMIN — ACETAMINOPHEN PRN MG: 160 SOLUTION ORAL at 11:50

## 2018-12-25 RX ADMIN — ACETAMINOPHEN PRN MG: 160 SOLUTION ORAL at 17:43

## 2018-12-25 NOTE — CARDIOLOGY PROGRESS NOTE
Assessment/Plan


Assessment/Plan


1. Hypotension, possibly sepsis versus volume.


2. Paroxysmal episodes of atrial fibrillation history.


3. Bradycardia secondary to medications, amiodarone possibly.


4. Diabetes mellitus.


5. End-stage renal disease, on hemodialysis.


6. Lung collapse.


7. Respiratory failure, status post intubation.


8. Dysphagia.


9. History of dementia.


10. Pulm htn 


11.  Pleural effusion 


12. cardaic arrest


13. chest wall pain post cpr  





mostly in sinus now 


stool ob neg x2 


amiod bid unitl jan 1 then 200 mg daily 


off labetalol for now if needed hydralazine to be used 


vent support 


s/p trach


peg done 


tele reviewed again sinus short atirl tachy  


on elequis  2.5 mg bid for stroke prevention 


cr up little





Subjective


ROS Limited/Unobtainable:  Yes


Subjective


on the vent, sob , soreness aroudn the trach ,





Objective





Last 24 Hour Vital Signs








  Date Time  Temp Pulse Resp B/P (MAP) Pulse Ox O2 Delivery O2 Flow Rate FiO2


 


12/25/18 10:39  68 15     40


 


12/25/18 08:42  75 16     40


 


12/25/18 08:32    136/53    


 


12/25/18 08:00 97.4 76 18 136/53 (80) 100   


 


12/25/18 08:00        40


 


12/25/18 08:00      Mechanical Ventilator  





      Mechanical Ventilator  


 


12/25/18 08:00  81      


 


12/25/18 07:09  71 15     40


 


12/25/18 05:10  79 21     40


 


12/25/18 04:00  76      


 


12/25/18 04:00      Mechanical Ventilator  





      Mechanical Ventilator  


 


12/25/18 04:00        40


 


12/25/18 04:00 97.7 72 21 139/81 (100) 100   


 


12/25/18 02:32  67 21     40


 


12/25/18 01:12  79 21     40


 


12/25/18 00:00  80      


 


12/25/18 00:00 98.0 71 21 123/55 (77) 99   


 


12/25/18 00:00      Mechanical Ventilator  





      Mechanical Ventilator  


 


12/25/18 00:00        40


 


12/24/18 23:30  78 15     40


 


12/24/18 21:08  82 16     40


 


12/24/18 21:00    115/52    


 


12/24/18 20:00 97.6 73 21 118/52 (74) 99   


 


12/24/18 20:00      Mechanical Ventilator  





      Mechanical Ventilator  


 


12/24/18 20:00        40


 


12/24/18 20:00  82      


 


12/24/18 19:02  85 20     40


 


12/24/18 17:20  82 19     40


 


12/24/18 16:00 98.6 89 21 113/21 (51) 99   


 


12/24/18 16:00  92      


 


12/24/18 16:00        40


 


12/24/18 16:00      Mechanical Ventilator  





      Mechanical Ventilator  


 


12/24/18 15:47  81 20     40


 


12/24/18 13:30  78 19     40


 


12/24/18 12:00  84      


 


12/24/18 12:00        40


 


12/24/18 12:00      Mechanical Ventilator  





      Mechanical Ventilator  


 


12/24/18 11:48 98.4 86 20 123/51 (75) 99   








General Appearance:  no apparent distress, on vent, patient on isolation











Intake and Output  


 


 12/24/18 12/25/18





 18:59 06:59


 


Intake Total 770 ml 480 ml


 


Output Total 2550 ml 30 ml


 


Balance -1780 ml 450 ml


 


  


 


Free Water 290 ml 


 


Tube Feeding 480 ml 480 ml


 


Stool Total 50 ml 30 ml


 


Hemodialysis UF 2500 ml 


 


# Bowel Movements 3 3











Laboratory Tests








Test


  12/24/18


14:40 12/24/18


17:15 12/25/18


03:30


 


Sodium Level


  134 MMOL/L


(136-145)  L 136 MMOL/L


(136-145) 135 MMOL/L


(136-145)  L


 


Potassium Level


  3.5 MMOL/L


(3.5-5.1) 3.2 MMOL/L


(3.5-5.1)  L 3.3 MMOL/L


(3.5-5.1)  L


 


Chloride Level


  97 MMOL/L


()  L 98 MMOL/L


() 98 MMOL/L


()


 


Carbon Dioxide Level


  27 MMOL/L


(21-32) 29 MMOL/L


(21-32) 31 MMOL/L


(21-32)


 


Anion Gap


  10 mmol/L


(5-15) 9 mmol/L


(5-15) 6 mmol/L


(5-15)


 


Blood Urea Nitrogen


  43 mg/dL


(7-18)  H 16 mg/dL


(7-18) 27 mg/dL


(7-18)  H


 


Creatinine


  3.0 MG/DL


(0.55-1.30)  H 1.4 MG/DL


(0.55-1.30)  #H 2.0 MG/DL


(0.55-1.30)  H


 


Estimat Glomerular Filtration


Rate  mL/min (>60)  


   mL/min (>60)  


   mL/min (>60)  


 


 


Glucose Level


  135 MG/DL


()  H 114 MG/DL


()  H 127 MG/DL


()  H


 


Calcium Level


  9.0 MG/DL


(8.5-10.1) 8.8 MG/DL


(8.5-10.1) 8.9 MG/DL


(8.5-10.1)


 


Total Bilirubin


  0.3 MG/DL


(0.2-1.0) 


  0.3 MG/DL


(0.2-1.0)


 


Aspartate Amino Transf


(AST/SGOT) 27 U/L (15-37)


  


  31 U/L (15-37)


 


 


Alanine Aminotransferase


(ALT/SGPT) 9 U/L (12-78)


L 


  11 U/L (12-78)


L


 


Alkaline Phosphatase


  201 U/L


()  H 


  212 U/L


()  H


 


Total Protein


  7.6 G/DL


(6.4-8.2) 


  7.4 G/DL


(6.4-8.2)


 


Albumin


  1.6 G/DL


(3.4-5.0)  L 


  1.6 G/DL


(3.4-5.0)  L


 


Globulin 6.0 g/dL    5.8 g/dL  


 


Albumin/Globulin Ratio


  0.3 (1.0-2.7)


L 


  0.3 (1.0-2.7)


L


 


White Blood Count


  


  10.0 K/UL


(4.8-10.8) 9.2 K/UL


(4.8-10.8)


 


Red Blood Count


  


  3.35 M/UL


(4.20-5.40)  L 3.09 M/UL


(4.20-5.40)  L


 


Hemoglobin


  


  10.1 G/DL


(12.0-16.0)  L 9.3 G/DL


(12.0-16.0)  L


 


Hematocrit


  


  32.2 %


(37.0-47.0)  L 29.8 %


(37.0-47.0)  L


 


Mean Corpuscular Volume  96 FL (80-99)   96 FL (80-99)  


 


Mean Corpuscular Hemoglobin


  


  30.2 PG


(27.0-31.0) 30.2 PG


(27.0-31.0)


 


Mean Corpuscular Hemoglobin


Concent 


  31.4 G/DL


(32.0-36.0)  L 31.3 G/DL


(32.0-36.0)  L


 


Red Cell Distribution Width


  


  18.8 %


(11.6-14.8)  H 19.0 %


(11.6-14.8)  H


 


Platelet Count


  


  450 K/UL


(150-450) 430 K/UL


(150-450)


 


Mean Platelet Volume


  


  5.7 FL


(6.5-10.1)  L 5.8 FL


(6.5-10.1)  L


 


Neutrophils (%) (Auto)


  


  82.8 %


(45.0-75.0)  H 81.9 %


(45.0-75.0)  H


 


Lymphocytes (%) (Auto)


  


  6.4 %


(20.0-45.0)  L 4.8 %


(20.0-45.0)  L


 


Monocytes (%) (Auto)


  


  8.0 %


(1.0-10.0) 10.2 %


(1.0-10.0)  H


 


Eosinophils (%) (Auto)


  


  1.6 %


(0.0-3.0) 1.4 %


(0.0-3.0)


 


Basophils (%) (Auto)


  


  1.2 %


(0.0-2.0) 1.7 %


(0.0-2.0)

















Mariusz Wade MD Dec 25, 2018 11:45

## 2018-12-25 NOTE — NUR
NURSE NOTES:

Report received from CAROLYN Villatoro. Observed patient in bed sleeping. Arousable by voice. 
Non-verbal but able to make needs known. On ventilator with previous setting and tolerated 
well. Denies pain at this time. Midline intact and patent. GT site intact with ongoing 
feeding. HOB elevated. Restraint noted on bilateral wrists. Skin is intact with good 
circulation. Bed in lowest position. Call light within reach. Will continue to monitor.

## 2018-12-25 NOTE — PULMONOLOGY PROGRESS NOTE
Assessment/Plan


Assessment/Plan


Pulmonary Progress Note 





Assessment/Plan


Assessment/Plan


IMPRESSION:


1. Hypotension. Resolved.


2. Sepsis.


3. Atrial fibrillation.


4. Bradycardia.


5. Diabetes mellitus.


6. End-stage renal disease on dialysis.


7. Left lung collapse. Resolved


8. Respiratory failure. now status post tracheostomy


9. Dysphagia.


10. Dementia.


11. Anemia


12. Pneumonia





DISCUSSION:


1. Continue medications.


2. CXR improved


3. Status post tracheostomy


4. S/p G-tube 














Subjective


Interval Events:  None reported


Constitutional:  Reports: no symptoms


HEENT:  Repors: no symptoms


Respiratory:  Reports: no symptoms


Cardiovascular:  Reports: no symptoms


Gastrointestinal/Abdominal:  Reports: no symptoms


Genitourinary:  Reports: no symptoms


Neurologic:  Reports: no symptoms


Psychiatric:  Reports: no symptoms


Allergies:  


Coded Allergies:  


     No Known Allergies (Unverified , 11/26/18)





Objective





Vital Signs Noted


General Appearance:  no acute distress


HEENT:  normocephalic, status post trach


Respiratory/Chest:  chest wall non-tender, decreased breath sounds


Cardiovascular:  normal peripheral pulses, normal rate


Abdomen:  normal bowel sounds, soft, non tender


Laboratory Tests


12/22/18 17:20: 


White Blood Count 10.0, Red Blood Count 2.86L, Hemoglobin 8.2L, Hematocrit 27.5L

, Mean Corpuscular Volume 96, Mean Corpuscular Hemoglobin 28.8, Mean 

Corpuscular Hemoglobin Concent 29.9L, Red Cell Distribution Width 18.8H, 

Platelet Count 416, Mean Platelet Volume 5.9L, Neutrophils (%) (Auto) 81.3H, 

Lymphocytes (%) (Auto) 7.0L, Monocytes (%) (Auto) 9.3, Eosinophils (%) (Auto) 

1.4, Basophils (%) (Auto) 1.0, Sodium Level 138, Potassium Level 3.2L, Chloride 

Level 101, Carbon Dioxide Level 29, Anion Gap 8, Blood Urea Nitrogen 25H, 

Creatinine 2.4H, Estimat Glomerular Filtration Rate , Glucose Level 127H, 

Calcium Level 8.7, Total Bilirubin 0.3, Aspartate Amino Transf (AST/SGOT) 16, 

Alanine Aminotransferase (ALT/SGPT) 8L, Alkaline Phosphatase 177H, Total 

Protein 5.9L, Albumin 1.5L, Globulin 4.4, Albumin/Globulin Ratio 0.3L





Current Medications








 Medications


  (Trade)  Dose


 Ordered  Sig/Tony


 Route


 PRN Reason  Start Time


 Stop Time Status Last Admin


Dose Admin


 


 Acetaminophen


  (Tylenol)  650 mg  Q6H  PRN


 GT


 Mild Pain/Temp > 100.5  12/21/18 16:00


 1/20/19 15:59  12/22/18 18:41


 


 


 Albuterol/


 Ipratropium


  (Albuterol/


 Ipratropium)  3 ml  Q6H  PRN


 HHN


 Shortness of Breath  12/18/18 19:15


 12/23/18 19:14  12/21/18 10:27


 


 


 Amiodarone HCl


  (Cordarone)  200 mg  EVERY 12  HOURS


 NG


   12/8/18 21:00


 1/5/19 13:59  12/22/18 20:37


 


 


 Apixaban


  (Eliquis)  2.5 mg  BID


 NG


   12/21/18 18:00


 1/20/19 08:59  12/22/18 17:26


 


 


 Artificial Tears


  (Akwa-Tears)  1 drop  Q4H  PRN


 BOTH EYES


 Dry Eyes  12/22/18 18:30


 1/21/19 18:29   


 


 


 Chlorhexidine


 Gluconate


  (Juana-Hex 2%)  1 applic  DAILY@2000


 TOPIC


   11/27/18 20:00


 12/27/18 19:59  12/22/18 19:16


 


 


 Collagenase


  (Santyl)  1 applic  Q12HR


 TOPIC


   12/12/18 21:00


 1/5/19 20:59  12/22/18 20:37


 


 


 Epoetin Rupesh


  (Procrit (for


 ESRD on dialysis))  10,000 units  MON-WED-FRI


 SUBQ


   11/28/18 21:00


 12/28/18 20:59  12/21/18 20:29


 


 


 Minoxidil


  (Loniten)  5 mg  Q12HR


 NG


   12/2/18 21:00


 1/1/19 20:59  12/22/18 20:37


 


 


 Pantoprazole


  (Protonix)  40 mg  Q12HR


 IVP


   11/26/18 21:00


 12/27/18 08:59  12/22/18 20:37


 


 


 Sodium Chloride


  (Ocean Nasal


 Spray)  1 spray  Q4H  PRN


 NASAL


 dry nose  12/22/18 18:30


 1/21/19 18:29   


 


 


 Vitamin D


  (Vitamin D)  1,000 intlu  DAILY


 GT


   12/9/18 10:00


 1/8/19 09:59  12/22/18 08:20


 











Subjective


ROS Limited/Unobtainable:  No


Allergies:  


Coded Allergies:  


     No Known Allergies (Unverified , 11/26/18)





Objective





Last 24 Hour Vital Signs








  Date Time  Temp Pulse Resp B/P (MAP) Pulse Ox O2 Delivery O2 Flow Rate FiO2


 


12/25/18 15:23  76 22     40


 


12/25/18 12:35  63 20     40


 


12/25/18 12:00  75      


 


12/25/18 12:00 98.5 73 19 97/64 (75) 100   


 


12/25/18 12:00        40


 


12/25/18 12:00      Mechanical Ventilator  





      Mechanical Ventilator  


 


12/25/18 10:39  68 15     40


 


12/25/18 08:42  75 16     40


 


12/25/18 08:32    136/53    


 


12/25/18 08:00 97.4 76 18 136/53 (80) 100   


 


12/25/18 08:00        40


 


12/25/18 08:00      Mechanical Ventilator  





      Mechanical Ventilator  


 


12/25/18 08:00  81      


 


12/25/18 07:09  71 15     40


 


12/25/18 05:10  79 21     40


 


12/25/18 04:00  76      


 


12/25/18 04:00      Mechanical Ventilator  





      Mechanical Ventilator  


 


12/25/18 04:00        40


 


12/25/18 04:00 97.7 72 21 139/81 (100) 100   


 


12/25/18 02:32  67 21     40


 


12/25/18 01:12  79 21     40


 


12/25/18 00:00  80      


 


12/25/18 00:00 98.0 71 21 123/55 (77) 99   


 


12/25/18 00:00      Mechanical Ventilator  





      Mechanical Ventilator  


 


12/25/18 00:00        40


 


12/24/18 23:30  78 15     40


 


12/24/18 21:08  82 16     40


 


12/24/18 21:00    115/52    


 


12/24/18 20:00 97.6 73 21 118/52 (74) 99   


 


12/24/18 20:00      Mechanical Ventilator  





      Mechanical Ventilator  


 


12/24/18 20:00        40


 


12/24/18 20:00  82      


 


12/24/18 19:02  85 20     40


 


12/24/18 17:20  82 19     40


 


12/24/18 16:00 98.6 89 21 113/21 (51) 99   


 


12/24/18 16:00  92      


 


12/24/18 16:00        40


 


12/24/18 16:00      Mechanical Ventilator  





      Mechanical Ventilator  


 


12/24/18 15:47  81 20     40

















Intake and Output  


 


 12/24/18 12/25/18





 18:59 06:59


 


Intake Total 770 ml 480 ml


 


Output Total 2550 ml 30 ml


 


Balance -1780 ml 450 ml


 


  


 


Free Water 290 ml 


 


Tube Feeding 480 ml 480 ml


 


Stool Total 50 ml 30 ml


 


Hemodialysis UF 2500 ml 


 


# Bowel Movements 3 3








Laboratory Tests


12/24/18 17:15: 


White Blood Count 10.0, Red Blood Count 3.35L, Hemoglobin 10.1L, Hematocrit 

32.2L, Mean Corpuscular Volume 96, Mean Corpuscular Hemoglobin 30.2, Mean 

Corpuscular Hemoglobin Concent 31.4L, Red Cell Distribution Width 18.8H, 

Platelet Count 450, Mean Platelet Volume 5.7L, Neutrophils (%) (Auto) 82.8H, 

Lymphocytes (%) (Auto) 6.4L, Monocytes (%) (Auto) 8.0, Eosinophils (%) (Auto) 

1.6, Basophils (%) (Auto) 1.2, Sodium Level 136, Potassium Level 3.2L, Chloride 

Level 98, Carbon Dioxide Level 29, Anion Gap 9, Blood Urea Nitrogen 16, 

Creatinine 1.4#H, Estimat Glomerular Filtration Rate , Glucose Level 114H, 

Calcium Level 8.8


12/25/18 03:30: 


White Blood Count 9.2, Red Blood Count 3.09L, Hemoglobin 9.3L, Hematocrit 29.8L

, Mean Corpuscular Volume 96, Mean Corpuscular Hemoglobin 30.2, Mean 

Corpuscular Hemoglobin Concent 31.3L, Red Cell Distribution Width 19.0H, 

Platelet Count 430, Mean Platelet Volume 5.8L, Neutrophils (%) (Auto) 81.9H, 

Lymphocytes (%) (Auto) 4.8L, Monocytes (%) (Auto) 10.2H, Eosinophils (%) (Auto) 

1.4, Basophils (%) (Auto) 1.7, Sodium Level 135L, Potassium Level 3.3L, 

Chloride Level 98, Carbon Dioxide Level 31, Anion Gap 6, Blood Urea Nitrogen 27H

, Creatinine 2.0H, Estimat Glomerular Filtration Rate , Glucose Level 127H, 

Calcium Level 8.9, Total Bilirubin 0.3, Aspartate Amino Transf (AST/SGOT) 31, 

Alanine Aminotransferase (ALT/SGPT) 11L, Alkaline Phosphatase 212H, Total 

Protein 7.4, Albumin 1.6L, Globulin 5.8, Albumin/Globulin Ratio 0.3L





Current Medications








 Medications


  (Trade)  Dose


 Ordered  Sig/Tony


 Route


 PRN Reason  Start Time


 Stop Time Status Last Admin


Dose Admin


 


 Acetaminophen


  (Tylenol)  650 mg  Q6H  PRN


 GT


 Mild Pain/Temp > 100.5  12/23/18 16:00


 1/20/19 15:59  12/25/18 11:50


 


 


 Amiodarone HCl


  (Cordarone)  200 mg  EVERY 12  HOURS


 NG


   12/23/18 21:00


 1/5/19 13:59  12/25/18 08:32


 


 


 Apixaban


  (Eliquis)  2.5 mg  BID


 NG


   12/23/18 18:00


 1/20/19 08:59  12/25/18 08:32


 


 


 Artificial Tears


  (Akwa-Tears)  1 drop  Q4H  PRN


 BOTH EYES


 Dry Eyes  12/23/18 15:30


 1/21/19 15:29   


 


 


 Chlorhexidine


 Gluconate


  (Juana-Hex 2%)  1 applic  DAILY@2000


 TOPIC


   12/23/18 20:00


 12/27/18 19:59  12/24/18 20:06


 


 


 Collagenase


  (Santyl)  1 applic  Q12HR


 TOPIC


   12/23/18 21:00


 1/5/19 20:59  12/25/18 08:33


 


 


 Epoetin Rupesh


  (Procrit (for


 ESRD on dialysis))  10,000 units  MON-WED-FRI


 SUBQ


   12/24/18 21:00


 12/28/18 20:59  12/24/18 21:04


 


 


 Lansoprazole


  (Prevacid)  30 mg  Q12HR


 ORAL


   12/24/18 21:00


 1/23/19 20:59  12/25/18 08:30


 


 


 Minoxidil


  (Loniten)  5 mg  Q12HR


 NG


   12/23/18 21:00


 1/1/19 20:59  12/25/18 08:32


 


 


 Sodium Chloride  1,000 ml @ 


 500 mls/hr  Q2H PRN


 IVLG


 sbp<90 during hd  12/25/18 11:00


 12/26/18 23:59   


 


 


 Sodium Chloride


  (Ocean Nasal


 Spray)  1 spray  Q4H  PRN


 NASAL


 dry nose  12/23/18 14:30


 1/21/19 18:29  12/25/18 11:49


 


 


 Vitamin D


  (Vitamin D)  1,000 intlu  DAILY


 GT


   12/24/18 09:00


 1/8/19 09:59  12/25/18 08:32


 

















Simon Silva MD Dec 25, 2018 15:29

## 2018-12-25 NOTE — NUR
NURSE NOTES:

Received Pt is resting on the bed and awake and forgetful and calm. Dressing is clean and 
dry on Rt. upper arm mid line area. Pt has multiple wounds and dressing is clean and dry. On 
Tele monitor. Lt upper arm AV shunt present of bruit and thrills. Trach to Vent dependent 
setting with AC; 8, TV: 450, P; 5, FiO2 40% with SaO2 100% noted. Given oral and tracheal 
suction. No sign of acute distress noted. On P 200 mattress. Noted swelling on both arms. On 
rectal tube and in placed and patent. Changed position. Placed fall precaution. Will 
continue to care plan. 

-------------------------------------------------------------------------------

Addendum: 12/25/18 at 2235 by BRIGIDA NIELSEN RN RN

-------------------------------------------------------------------------------

on G-tube feeding as tolerated and no residual noted. 

-------------------------------------------------------------------------------

Addendum: 12/25/18 at 2331 by BRIGIDA NIELSEN RN RN

-------------------------------------------------------------------------------

tomorrow scheduled HD. Called HD center by previous nursed.

## 2018-12-25 NOTE — NEPHROLOGY PROGRESS NOTE
Assessment/Plan


Assessment


Seee below


Plan


MOF resolving.





ESRD HD q MWF 


Anemia of CKD , SAMANTHA high dose. Transfuse PRN.





A. 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 with LAE. LVEF 65% . On Eliquis.





Post trach + PEG .


Referred to [facility].


See orders.


DW pt's son. Needs extensive Vascular W/U.





Subjective


Subjective


 No c/o.





Objective


Objective





Last 24 Hour Vital Signs








  Date Time  Temp Pulse Resp B/P (MAP) Pulse Ox O2 Delivery O2 Flow Rate FiO2


 


12/25/18 10:39  68 15     40


 


12/25/18 08:42  75 16     40


 


12/25/18 08:32    136/53    


 


12/25/18 08:00 97.4 76 18 136/53 (80) 100   


 


12/25/18 08:00        40


 


12/25/18 08:00      Mechanical Ventilator  





      Mechanical Ventilator  


 


12/25/18 08:00  81      


 


12/25/18 07:09  71 15     40


 


12/25/18 05:10  79 21     40


 


12/25/18 04:00  76      


 


12/25/18 04:00      Mechanical Ventilator  





      Mechanical Ventilator  


 


12/25/18 04:00        40


 


12/25/18 04:00 97.7 72 21 139/81 (100) 100   


 


12/25/18 02:32  67 21     40


 


12/25/18 01:12  79 21     40


 


12/25/18 00:00  80      


 


12/25/18 00:00 98.0 71 21 123/55 (77) 99   


 


12/25/18 00:00      Mechanical Ventilator  





      Mechanical Ventilator  


 


12/25/18 00:00        40


 


12/24/18 23:30  78 15     40


 


12/24/18 21:08  82 16     40


 


12/24/18 21:00    115/52    


 


12/24/18 20:00 97.6 73 21 118/52 (74) 99   


 


12/24/18 20:00      Mechanical Ventilator  





      Mechanical Ventilator  


 


12/24/18 20:00        40


 


12/24/18 20:00  82      


 


12/24/18 19:02  85 20     40


 


12/24/18 17:20  82 19     40


 


12/24/18 16:00 98.6 89 21 113/21 (51) 99   


 


12/24/18 16:00  92      


 


12/24/18 16:00        40


 


12/24/18 16:00      Mechanical Ventilator  





      Mechanical Ventilator  


 


12/24/18 15:47  81 20     40


 


12/24/18 13:30  78 19     40


 


12/24/18 12:00  84      


 


12/24/18 12:00        40


 


12/24/18 12:00      Mechanical Ventilator  





      Mechanical Ventilator  


 


12/24/18 11:48 98.4 86 20 123/51 (75) 99   


 


12/24/18 11:30  79 18     40

















Intake and Output  


 


 12/24/18 12/25/18





 19:00 07:00


 


Intake Total 770 ml 480 ml


 


Output Total 2550 ml 30 ml


 


Balance -1780 ml 450 ml


 


  


 


Free Water 290 ml 


 


Tube Feeding 480 ml 480 ml


 


Stool Total 50 ml 30 ml


 


Hemodialysis UF 2500 ml 


 


# Bowel Movements 3 3








Laboratory Tests


12/24/18 14:40: 


Sodium Level 134L, Potassium Level 3.5, Chloride Level 97L, Carbon Dioxide 

Level 27, Anion Gap 10, Blood Urea Nitrogen 43H, Creatinine 3.0H, Estimat 

Glomerular Filtration Rate , Glucose Level 135H, Calcium Level 9.0, Total 

Bilirubin 0.3, Aspartate Amino Transf (AST/SGOT) 27, Alanine Aminotransferase (

ALT/SGPT) 9L, Alkaline Phosphatase 201H, Total Protein 7.6, Albumin 1.6L, 

Globulin 6.0, Albumin/Globulin Ratio 0.3L


12/24/18 17:15: 


Sodium Level 136, Potassium Level 3.2L, Chloride Level 98, Carbon Dioxide Level 

29, Anion Gap 9, Blood Urea Nitrogen 16, Creatinine 1.4#H, Estimat Glomerular 

Filtration Rate , Glucose Level 114H, Calcium Level 8.8, White Blood Count 10.0

, Red Blood Count 3.35L, Hemoglobin 10.1L, Hematocrit 32.2L, Mean Corpuscular 

Volume 96, Mean Corpuscular Hemoglobin 30.2, Mean Corpuscular Hemoglobin 

Concent 31.4L, Red Cell Distribution Width 18.8H, Platelet Count 450, Mean 

Platelet Volume 5.7L, Neutrophils (%) (Auto) 82.8H, Lymphocytes (%) (Auto) 6.4L

, Monocytes (%) (Auto) 8.0, Eosinophils (%) (Auto) 1.6, Basophils (%) (Auto) 1.2


12/25/18 03:30: 


Sodium Level 135L, Potassium Level 3.3L, Chloride Level 98, Carbon Dioxide 

Level 31, Anion Gap 6, Blood Urea Nitrogen 27H, Creatinine 2.0H, Estimat 

Glomerular Filtration Rate , Glucose Level 127H, Calcium Level 8.9, Total 

Bilirubin 0.3, Aspartate Amino Transf (AST/SGOT) 31, Alanine Aminotransferase (

ALT/SGPT) 11L, Alkaline Phosphatase 212H, Total Protein 7.4, Albumin 1.6L, 

Globulin 5.8, Albumin/Globulin Ratio 0.3L, White Blood Count 9.2, Red Blood 

Count 3.09L, Hemoglobin 9.3L, Hematocrit 29.8L, Mean Corpuscular Volume 96, 

Mean Corpuscular Hemoglobin 30.2, Mean Corpuscular Hemoglobin Concent 31.3L, 

Red Cell Distribution Width 19.0H, Platelet Count 430, Mean Platelet Volume 5.8L

, Neutrophils (%) (Auto) 81.9H, Lymphocytes (%) (Auto) 4.8L, Monocytes (%) (Auto

) 10.2H, Eosinophils (%) (Auto) 1.4, Basophils (%) (Auto) 1.7


Height (Feet):  5


Height (Inches):  2.00


Weight (Pounds):  110


Objective


On vent.


Trach clean.


Cv IRR/IRR


Lungs B ronchi.


Abd SNT. BS + New PEG.


E Rt. foot diabetic ulcers











Gavino Daigle MD Dec 25, 2018 11:00

## 2018-12-25 NOTE — NUR
Received Patient on Vent Settings of ACVC RR 8, , FIO2 40%, PEEP +5. Patient has 
Shiley 8 tracheostomy tube that is secure and patent. Pt is alert, awake, and follows 
commands. Bilateral rhonchi heard upon auscultation. Suction minimal amounts of thin, clear 
secretions. Vent plugged into red outlet. Will continue to monitor throughout the day.

## 2018-12-25 NOTE — GENERAL PROGRESS NOTE
Assessment/Plan


Assessment/Plan


Assessment


- Dysphagia


- Resp failure


- ESRD


- Anemia


- Status post PEG





Recommendations


1. Abdominal binder.


2. Elevate the head of the bed at all times.


3. G-tube flush.


4. G-tube care.


5. Start tube feeding later today per RD to goal


Monitor H&H, PRN transfusions


PPI


Electrolyte correction


Follow-up labs





Subjective


ROS Limited/Unobtainable:  No


Allergies:  


Coded Allergies:  


     No Known Allergies (Unverified , 11/26/18)





Objective





Last 24 Hour Vital Signs








  Date Time  Temp Pulse Resp B/P (MAP) Pulse Ox O2 Delivery O2 Flow Rate FiO2


 


12/25/18 10:39  68 15     40


 


12/25/18 08:42  75 16     40


 


12/25/18 08:32    136/53    


 


12/25/18 08:00 97.4 76 18 136/53 (80) 100   


 


12/25/18 08:00        40


 


12/25/18 08:00      Mechanical Ventilator  





      Mechanical Ventilator  


 


12/25/18 08:00  81      


 


12/25/18 07:09  71 15     40


 


12/25/18 05:10  79 21     40


 


12/25/18 04:00  76      


 


12/25/18 04:00      Mechanical Ventilator  





      Mechanical Ventilator  


 


12/25/18 04:00        40


 


12/25/18 04:00 97.7 72 21 139/81 (100) 100   


 


12/25/18 02:32  67 21     40


 


12/25/18 01:12  79 21     40


 


12/25/18 00:00  80      


 


12/25/18 00:00 98.0 71 21 123/55 (77) 99   


 


12/25/18 00:00      Mechanical Ventilator  





      Mechanical Ventilator  


 


12/25/18 00:00        40


 


12/24/18 23:30  78 15     40


 


12/24/18 21:08  82 16     40


 


12/24/18 21:00    115/52    


 


12/24/18 20:00 97.6 73 21 118/52 (74) 99   


 


12/24/18 20:00      Mechanical Ventilator  





      Mechanical Ventilator  


 


12/24/18 20:00        40


 


12/24/18 20:00  82      


 


12/24/18 19:02  85 20     40


 


12/24/18 17:20  82 19     40


 


12/24/18 16:00 98.6 89 21 113/21 (51) 99   


 


12/24/18 16:00  92      


 


12/24/18 16:00        40


 


12/24/18 16:00      Mechanical Ventilator  





      Mechanical Ventilator  


 


12/24/18 15:47  81 20     40


 


12/24/18 13:30  78 19     40


 


12/24/18 12:00  84      


 


12/24/18 12:00        40


 


12/24/18 12:00      Mechanical Ventilator  





      Mechanical Ventilator  


 


12/24/18 11:48 98.4 86 20 123/51 (75) 99   


 


12/24/18 11:30  79 18     40

















Intake and Output  


 


 12/24/18 12/25/18





 19:00 07:00


 


Intake Total 770 ml 480 ml


 


Output Total 2550 ml 30 ml


 


Balance -1780 ml 450 ml


 


  


 


Free Water 290 ml 


 


Tube Feeding 480 ml 480 ml


 


Stool Total 50 ml 30 ml


 


Hemodialysis UF 2500 ml 


 


# Bowel Movements 3 3








Laboratory Tests


12/24/18 14:40: 


Sodium Level 134L, Potassium Level 3.5, Chloride Level 97L, Carbon Dioxide 

Level 27, Anion Gap 10, Blood Urea Nitrogen 43H, Creatinine 3.0H, Estimat 

Glomerular Filtration Rate , Glucose Level 135H, Calcium Level 9.0, Total 

Bilirubin 0.3, Aspartate Amino Transf (AST/SGOT) 27, Alanine Aminotransferase (

ALT/SGPT) 9L, Alkaline Phosphatase 201H, Total Protein 7.6, Albumin 1.6L, 

Globulin 6.0, Albumin/Globulin Ratio 0.3L


12/24/18 17:15: 


Sodium Level 136, Potassium Level 3.2L, Chloride Level 98, Carbon Dioxide Level 

29, Anion Gap 9, Blood Urea Nitrogen 16, Creatinine 1.4#H, Estimat Glomerular 

Filtration Rate , Glucose Level 114H, Calcium Level 8.8, White Blood Count 10.0

, Red Blood Count 3.35L, Hemoglobin 10.1L, Hematocrit 32.2L, Mean Corpuscular 

Volume 96, Mean Corpuscular Hemoglobin 30.2, Mean Corpuscular Hemoglobin 

Concent 31.4L, Red Cell Distribution Width 18.8H, Platelet Count 450, Mean 

Platelet Volume 5.7L, Neutrophils (%) (Auto) 82.8H, Lymphocytes (%) (Auto) 6.4L

, Monocytes (%) (Auto) 8.0, Eosinophils (%) (Auto) 1.6, Basophils (%) (Auto) 1.2


12/25/18 03:30: 


Sodium Level 135L, Potassium Level 3.3L, Chloride Level 98, Carbon Dioxide 

Level 31, Anion Gap 6, Blood Urea Nitrogen 27H, Creatinine 2.0H, Estimat 

Glomerular Filtration Rate , Glucose Level 127H, Calcium Level 8.9, Total 

Bilirubin 0.3, Aspartate Amino Transf (AST/SGOT) 31, Alanine Aminotransferase (

ALT/SGPT) 11L, Alkaline Phosphatase 212H, Total Protein 7.4, Albumin 1.6L, 

Globulin 5.8, Albumin/Globulin Ratio 0.3L, White Blood Count 9.2, Red Blood 

Count 3.09L, Hemoglobin 9.3L, Hematocrit 29.8L, Mean Corpuscular Volume 96, 

Mean Corpuscular Hemoglobin 30.2, Mean Corpuscular Hemoglobin Concent 31.3L, 

Red Cell Distribution Width 19.0H, Platelet Count 430, Mean Platelet Volume 5.8L

, Neutrophils (%) (Auto) 81.9H, Lymphocytes (%) (Auto) 4.8L, Monocytes (%) (Auto

) 10.2H, Eosinophils (%) (Auto) 1.4, Basophils (%) (Auto) 1.7


Height (Feet):  5


Height (Inches):  2.00


Weight (Pounds):  110


General Appearance:  no apparent distress


EENT:  normal ENT inspection


Neck:  supple


Cardiovascular:  normal rate


Respiratory/Chest:  decreased breath sounds


Abdomen:  normal bowel sounds, non tender, soft


Extremities:  non-tender











Elliott Lam MD Dec 25, 2018 10:52

## 2018-12-26 VITALS — SYSTOLIC BLOOD PRESSURE: 120 MMHG | DIASTOLIC BLOOD PRESSURE: 56 MMHG

## 2018-12-26 VITALS — DIASTOLIC BLOOD PRESSURE: 90 MMHG | SYSTOLIC BLOOD PRESSURE: 139 MMHG

## 2018-12-26 VITALS — DIASTOLIC BLOOD PRESSURE: 24 MMHG | SYSTOLIC BLOOD PRESSURE: 104 MMHG

## 2018-12-26 VITALS — SYSTOLIC BLOOD PRESSURE: 131 MMHG | DIASTOLIC BLOOD PRESSURE: 52 MMHG

## 2018-12-26 VITALS — DIASTOLIC BLOOD PRESSURE: 50 MMHG | SYSTOLIC BLOOD PRESSURE: 117 MMHG

## 2018-12-26 VITALS — DIASTOLIC BLOOD PRESSURE: 60 MMHG | SYSTOLIC BLOOD PRESSURE: 118 MMHG

## 2018-12-26 RX ADMIN — AMIODARONE HYDROCHLORIDE SCH MG: 200 TABLET ORAL at 20:28

## 2018-12-26 RX ADMIN — APIXABAN SCH MG: 2.5 TABLET, FILM COATED ORAL at 18:00

## 2018-12-26 RX ADMIN — MINOXIDIL SCH MG: 2.5 TABLET ORAL at 09:20

## 2018-12-26 RX ADMIN — APIXABAN SCH MG: 2.5 TABLET, FILM COATED ORAL at 09:20

## 2018-12-26 RX ADMIN — ERYTHROPOIETIN SCH UNITS: 20000 INJECTION, SOLUTION INTRAVENOUS; SUBCUTANEOUS at 21:00

## 2018-12-26 RX ADMIN — ACETAMINOPHEN PRN MG: 160 SOLUTION ORAL at 18:44

## 2018-12-26 RX ADMIN — VITAMIN D, TAB 1000IU (100/BT) SCH INTLU: 25 TAB at 09:20

## 2018-12-26 RX ADMIN — CHLORHEXIDINE GLUCONATE SCH APPLIC: 213 SOLUTION TOPICAL at 20:28

## 2018-12-26 RX ADMIN — AMIODARONE HYDROCHLORIDE SCH MG: 200 TABLET ORAL at 09:20

## 2018-12-26 RX ADMIN — MINOXIDIL SCH MG: 2.5 TABLET ORAL at 20:29

## 2018-12-26 NOTE — NUR
CASE MANAGEMENT: ***DCP***NOTE***





PER MD ORDER PATIENT WAS REFERRED TO [facility] [phone] PH / [phone] FAX 



CM WILL FOLLOW UP

## 2018-12-26 NOTE — NUR
NURSE NOTES:

Patient repositioned and pillows adjusted, /24, RR18, HR 73 SR. 98.1F, cool to touch. 
Spo2 99%. Patient is calm and content. NAD at this time. Call light within reach. Suctioned 
patient and provided oral care. Will continue to monitor.

## 2018-12-26 NOTE — NUR
RESPIRATORY NOTE:



Patient received mechanically ventilated on  with current ordered vent settings. 
Patient has trach size 8.0 Shiley cuffed that that is secured with trach tie and guard. 
Trach area is clean and patent. There is bilateral coarse breath sounds present upon 
auscultation. Small amount of thick clear/white secretions were suctioned via inline suction 
system without incident. Vent alarms are functional and audible. There is an ambu bag 
available at the bedside and the vent is connected to a red outlet. There is no respiratory 
distress or shortness of breath noted at this time. Patient appears comfortable and stable. 
Will continue to monitor.

## 2018-12-26 NOTE — NUR
HAND-OFF: 

Report given to CAROLYN Jackson. Pt is resting on the bed and no sign of acuter distress noted. 
Tolerated well with current Vent setting.

## 2018-12-26 NOTE — NUR
NURSE NOTES:

Received Pt from CAROLYN Parrish. Pt is resting on the bed and awake and forgetful and calm. 
Dressing is clean and dry on Rt. upper arm mid line area. Pt has multiple wounds and 
dressing is clean and dry. On Tele monitor. Lt upper arm AV shunt present of bruit and 
thrills. Trach to Vent dependent setting with AC; 8, TV: 450, P; 5, FiO2 40% with SaO2 100% 
noted. Given oral and tracheal suction. No sign of acute distress noted. On P 200 mattress. 
Noted swelling on both arms. On rectal tube and in placed and patent. Changed position. 
Placed fall precaution. Will continue to care plan.

## 2018-12-26 NOTE — GI PROGRESS NOTE
Assessment/Plan


Problems:  


(1) Encounter for PEG (percutaneous endoscopic gastrostomy)


ICD Codes:  Z43.1 - Encounter for attention to gastrostomy


SNOMED:  381988348, 091770477


(2) Severe malnutrition


ICD Codes:  E43 - Unspecified severe protein-calorie malnutrition


SNOMED:  57041537


(3) Dehydration


ICD Codes:  E86.0 - Dehydration


SNOMED:  45491774


Status:  stable


Status Narrative


Discussed with Dr. Lam


Assessment/Plan


Assessment


- Dysphagia


- Resp failure


- ESRD


- Anemia


- Status post PEG





Recommendations


1. Abdominal binder.


2. Elevate the head of the bed at all times.


3. G-tube flush.


4. G-tube care.


5. tube feeding later today per RD to goal


Monitor H&H, PRN transfusions


PPI


Electrolyte correction


Follow-up labs





The patient was seen and examined at bedside and all new and available data was 

reviewed in the patients chart. I agree with the above findings, impression 

and plan.  (Patient seen earlier today. Signature stamp does not reflect 

patient encounter time.). - Elliott Lam MD





Subjective


Subjective


limited





Objective





Last 24 Hour Vital Signs








  Date Time  Temp Pulse Resp B/P (MAP) Pulse Ox O2 Delivery O2 Flow Rate FiO2


 


12/26/18 13:01  68      


 


12/26/18 12:47  74 16     40


 


12/26/18 10:43  73 10     40


 


12/26/18 09:20    117/50    


 


12/26/18 08:45  71 17     40


 


12/26/18 08:16  72      


 


12/26/18 08:00 98.0 77 18 120/56 (77) 100   


 


12/26/18 08:00        40


 


12/26/18 08:00      Mechanical Ventilator  





      Mechanical Ventilator  


 


12/26/18 06:57  77 15     40


 


12/26/18 05:07  72 16     40


 


12/26/18 04:00 97.2 67 18 117/50 (72) 100   


 


12/26/18 04:00  69      


 


12/26/18 04:00      Mechanical Ventilator  





      Mechanical Ventilator  


 


12/26/18 04:00        40


 


12/26/18 02:44  69 14     40


 


12/26/18 00:49  70 15     40


 


12/26/18 00:00      Mechanical Ventilator  





      Mechanical Ventilator  


 


12/26/18 00:00        40


 


12/26/18 00:00 97.5 72 18 139/90 (106) 100   


 


12/26/18 00:00  63      


 


12/25/18 22:45  71 18     40


 


12/25/18 21:17  74 16     40


 


12/25/18 21:00    110/60    


 


12/25/18 20:00        40


 


12/25/18 20:00 97.2 73 18 112/60 (77) 100   


 


12/25/18 20:00      Mechanical Ventilator  





      Mechanical Ventilator  


 


12/25/18 20:00  72      


 


12/25/18 18:49  77 12     40


 


12/25/18 17:09  88 15     40


 


12/25/18 16:00  71      


 


12/25/18 16:00 98.4 71 18 113/28 (56) 100   


 


12/25/18 16:00        40


 


12/25/18 16:00      Mechanical Ventilator  





      Mechanical Ventilator  


 


12/25/18 15:23  76 22     40

















Intake and Output  


 


 12/25/18 12/26/18





 19:00 07:00


 


Intake Total 740 ml 480 ml


 


Output Total  50 ml


 


Balance 740 ml 430 ml


 


  


 


Free Water 260 ml 


 


Tube Feeding 480 ml 480 ml


 


Stool Total  50 ml


 


# Voids 1 


 


# Bowel Movements 63 3








Height (Feet):  5


Height (Inches):  2.00


Weight (Pounds):  111


General Appearance:  no apparent distress


Cardiovascular:  normal rate


Respiratory/Chest:  no respiratory distress, other - Mechanical vent


Abdominal Exam:  normal bowel sounds, non tender, soft, GT site - Clean dry and 

intact


Extremities:  non-tender











Shane Lowry NP Dec 26, 2018 13:14

## 2018-12-26 NOTE — INFECTIOUS DISEASES PROG NOTE
Assessment/Plan


Assessment/Plan


antibiotics : none





A


1. VRE | e.coli UTI s/p rx


2. shock resolved


3. leucocytosis 


4. respiratory failure s/p tracheostomy


5. renal failure


6. decubitus ulcers


7. nasal MRSA colonization


8. rectal VRE colonization


9. pleural effusion





P


1. observe off antibiotics





Subjective


ROS Limited/Unobtainable:  Yes


Allergies:  


Coded Allergies:  


     No Known Allergies (Unverified , 11/26/18)





Objective


Vital Signs





Last 24 Hour Vital Signs








  Date Time  Temp Pulse Resp B/P (MAP) Pulse Ox O2 Delivery O2 Flow Rate FiO2


 


12/26/18 09:20    117/50    


 


12/26/18 08:45  71 17     40


 


12/26/18 08:16  72      


 


12/26/18 08:00 98.0 77 18 120/56 (77) 100   


 


12/26/18 08:00        40


 


12/26/18 08:00      Mechanical Ventilator  





      Mechanical Ventilator  


 


12/26/18 06:57  77 15     40


 


12/26/18 05:07  72 16     40


 


12/26/18 04:00 97.2 67 18 117/50 (72) 100   


 


12/26/18 04:00  69      


 


12/26/18 04:00      Mechanical Ventilator  





      Mechanical Ventilator  


 


12/26/18 04:00        40


 


12/26/18 02:44  69 14     40


 


12/26/18 00:49  70 15     40


 


12/26/18 00:00      Mechanical Ventilator  





      Mechanical Ventilator  


 


12/26/18 00:00        40


 


12/26/18 00:00 97.5 72 18 139/90 (106) 100   


 


12/26/18 00:00  63      


 


12/25/18 22:45  71 18     40


 


12/25/18 21:17  74 16     40


 


12/25/18 21:00    110/60    


 


12/25/18 20:00        40


 


12/25/18 20:00 97.2 73 18 112/60 (77) 100   


 


12/25/18 20:00      Mechanical Ventilator  





      Mechanical Ventilator  


 


12/25/18 20:00  72      


 


12/25/18 18:49  77 12     40


 


12/25/18 17:09  88 15     40


 


12/25/18 16:00  71      


 


12/25/18 16:00 98.4 71 18 113/28 (56) 100   


 


12/25/18 16:00        40


 


12/25/18 16:00      Mechanical Ventilator  





      Mechanical Ventilator  


 


12/25/18 15:23  76 22     40


 


12/25/18 12:35  63 20     40


 


12/25/18 12:00  75      


 


12/25/18 12:00 98.5 73 19 97/64 (75) 100   


 


12/25/18 12:00        40


 


12/25/18 12:00      Mechanical Ventilator  





      Mechanical Ventilator  








Height (Feet):  5


Height (Inches):  2.00


Weight (Pounds):  111


HEENT:  status post trach


Respiratory/Chest:  lungs clear


Cardiovascular:  normal rate, regular rhythm, no gallop/murmur


Abdomen:  soft, non tender, other - GT


Extremities:  no edema, other - right arm PICC





Current Medications








 Medications


  (Trade)  Dose


 Ordered  Sig/Tony


 Route


 PRN Reason  Start Time


 Stop Time Status Last Admin


Dose Admin


 


 Acetaminophen


  (Tylenol)  650 mg  Q6H  PRN


 GT


 Mild Pain/Temp > 100.5  12/23/18 16:00


 1/20/19 15:59  12/25/18 17:43


 


 


 Amiodarone HCl


  (Cordarone)  200 mg  EVERY 12  HOURS


 NG


   12/23/18 21:00


 1/5/19 13:59  12/26/18 09:20


 


 


 Apixaban


  (Eliquis)  2.5 mg  BID


 NG


   12/23/18 18:00


 1/20/19 08:59  12/26/18 09:20


 


 


 Artificial Tears


  (Akwa-Tears)  1 drop  Q4H  PRN


 BOTH EYES


 Dry Eyes  12/23/18 15:30


 1/21/19 15:29   


 


 


 Chlorhexidine


 Gluconate


  (Juana-Hex 2%)  1 applic  DAILY@2000


 TOPIC


   12/23/18 20:00


 12/27/18 19:59  12/25/18 20:06


 


 


 Collagenase


  (Santyl)  1 applic  Q12HR


 TOPIC


   12/23/18 21:00


 1/5/19 20:59  12/26/18 09:20


 


 


 Epoetin Rupesh


  (Procrit (for


 ESRD on dialysis))  10,000 units  MON-WED-FRI


 SUBQ


   12/24/18 21:00


 12/28/18 20:59  12/24/18 21:04


 


 


 Lansoprazole


  (Prevacid)  30 mg  Q12HR


 ORAL


   12/24/18 21:00


 1/23/19 20:59  12/26/18 09:20


 


 


 Minoxidil


  (Loniten)  5 mg  Q12HR


 NG


   12/23/18 21:00


 1/1/19 20:59  12/26/18 09:20


 


 


 Sodium Chloride  1,000 ml @ 


 500 mls/hr  Q2H PRN


 IVLG


 sbp<90 during hd  12/25/18 11:00


 12/26/18 23:59   


 


 


 Sodium Chloride


  (Ocean Nasal


 Spray)  1 spray  Q4H  PRN


 NASAL


 dry nose  12/23/18 14:30


 1/21/19 18:29  12/25/18 11:49


 


 


 Vitamin D


  (Vitamin D)  1,000 intlu  DAILY


 GT


   12/24/18 09:00


 1/8/19 09:59  12/26/18 09:20


 

















Chase Santillan MD Dec 26, 2018 10:53

## 2018-12-26 NOTE — NEPHROLOGY PROGRESS NOTE
Assessment/Plan


Assessment


Seee below


Plan


MOF resolving.





ESRD HD q MWF 


Anemia of CKD , SAMANTHA high dose. Transfuse PRN.





A. 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 with LAE. LVEF 65% . On Eliquis.





Post trach + PEG .


Referred to [facility].


See orders.


DW pt's son. Needs extensive Vascular W/U.





Subjective


Subjective


 No c/o.





Objective


Objective





Last 24 Hour Vital Signs








  Date Time  Temp Pulse Resp B/P (MAP) Pulse Ox O2 Delivery O2 Flow Rate FiO2


 


12/26/18 10:43  73 10     40


 


12/26/18 09:20    117/50    


 


12/26/18 08:45  71 17     40


 


12/26/18 08:16  72      


 


12/26/18 08:00 98.0 77 18 120/56 (77) 100   


 


12/26/18 08:00        40


 


12/26/18 08:00      Mechanical Ventilator  





      Mechanical Ventilator  


 


12/26/18 06:57  77 15     40


 


12/26/18 05:07  72 16     40


 


12/26/18 04:00 97.2 67 18 117/50 (72) 100   


 


12/26/18 04:00  69      


 


12/26/18 04:00      Mechanical Ventilator  





      Mechanical Ventilator  


 


12/26/18 04:00        40


 


12/26/18 02:44  69 14     40


 


12/26/18 00:49  70 15     40


 


12/26/18 00:00      Mechanical Ventilator  





      Mechanical Ventilator  


 


12/26/18 00:00        40


 


12/26/18 00:00 97.5 72 18 139/90 (106) 100   


 


12/26/18 00:00  63      


 


12/25/18 22:45  71 18     40


 


12/25/18 21:17  74 16     40


 


12/25/18 21:00    110/60    


 


12/25/18 20:00        40


 


12/25/18 20:00 97.2 73 18 112/60 (77) 100   


 


12/25/18 20:00      Mechanical Ventilator  





      Mechanical Ventilator  


 


12/25/18 20:00  72      


 


12/25/18 18:49  77 12     40


 


12/25/18 17:09  88 15     40


 


12/25/18 16:00  71      


 


12/25/18 16:00 98.4 71 18 113/28 (56) 100   


 


12/25/18 16:00        40


 


12/25/18 16:00      Mechanical Ventilator  





      Mechanical Ventilator  


 


12/25/18 15:23  76 22     40


 


12/25/18 12:35  63 20     40


 


12/25/18 12:00  75      


 


12/25/18 12:00 98.5 73 19 97/64 (75) 100   


 


12/25/18 12:00        40


 


12/25/18 12:00      Mechanical Ventilator  





      Mechanical Ventilator  

















Intake and Output  


 


 12/25/18 12/26/18





 19:00 07:00


 


Intake Total 740 ml 480 ml


 


Output Total  50 ml


 


Balance 740 ml 430 ml


 


  


 


Free Water 260 ml 


 


Tube Feeding 480 ml 480 ml


 


Stool Total  50 ml


 


# Voids 1 


 


# Bowel Movements 63 3








Height (Feet):  5


Height (Inches):  2.00


Weight (Pounds):  111


Objective


On vent.


Trach clean.


Cv IRR/IRR


Lungs B ronchi.


Abd SNT. BS + New PEG.


E Rt. foot diabetic ulcers











Gavino Daigle MD Dec 26, 2018 11:54

## 2018-12-26 NOTE — NUR
CASE MANAGEMENT: REVIEW





SI: CELLULITIS OF RIGHT LEG . OSTEOMYELITIS .  ESRD ON HD

TRACHEOSTOMY 12/17

PEG PLACEMENT 12/19 

T 98.0 HR 77 RR 18 /50 % MECH VENT FIO2 40

H/H 9.3/29.8  K 3.3 BUN 27 CR 2.0 







IS: PROCRIT SQ MWF

ELIQUIS NG BID 

AMIODARONE GT Q12HR 

GT FEEDING NEPRO @ 40ML/HR



***ICU STATUS***

DCP: PATIENT IS FROM [facility]

## 2018-12-26 NOTE — PULMONOLOGY PROGRESS NOTE
Assessment/Plan


Assessment/Plan


IMPRESSION:


1. Hypotension. Resolved.


2. Sepsis.


3. Atrial fibrillation.


4. Bradycardia.


5. Diabetes mellitus.


6. End-stage renal disease on dialysis.


7. Left lung collapse. Resolved


8. Respiratory failure. now status post tracheostomy


9. Dysphagia.


10. Dementia.


11. Anemia


12. Pneumonia





DISCUSSION:


1. Continue medications.


2. CXR improved


3. Status post tracheostomy


4. S/p G-tube 














  ______________________________________________


  Quentin Cardoso M.D.





Subjective


Interval Events:  Now in [facility]


Constitutional:  Reports: no symptoms


HEENT:  Repors: no symptoms


Respiratory:  Reports: no symptoms


Cardiovascular:  Reports: no symptoms


Gastrointestinal/Abdominal:  Reports: no symptoms


Genitourinary:  Reports: no symptoms


Neurologic:  Reports: no symptoms


Psychiatric:  Reports: no symptoms


Allergies:  


Coded Allergies:  


     No Known Allergies (Unverified , 11/26/18)





Objective





Last 24 Hour Vital Signs








  Date Time  Temp Pulse Resp B/P (MAP) Pulse Ox O2 Delivery O2 Flow Rate FiO2


 


12/26/18 13:01  68      


 


12/26/18 12:47  74 16     40


 


12/26/18 10:43  73 10     40


 


12/26/18 09:20    117/50    


 


12/26/18 08:45  71 17     40


 


12/26/18 08:16  72      


 


12/26/18 08:00 98.0 77 18 120/56 (77) 100   


 


12/26/18 08:00        40


 


12/26/18 08:00      Mechanical Ventilator  





      Mechanical Ventilator  


 


12/26/18 06:57  77 15     40


 


12/26/18 05:07  72 16     40


 


12/26/18 04:00 97.2 67 18 117/50 (72) 100   


 


12/26/18 04:00  69      


 


12/26/18 04:00      Mechanical Ventilator  





      Mechanical Ventilator  


 


12/26/18 04:00        40


 


12/26/18 02:44  69 14     40


 


12/26/18 00:49  70 15     40


 


12/26/18 00:00      Mechanical Ventilator  





      Mechanical Ventilator  


 


12/26/18 00:00        40


 


12/26/18 00:00 97.5 72 18 139/90 (106) 100   


 


12/26/18 00:00  63      


 


12/25/18 22:45  71 18     40


 


12/25/18 21:17  74 16     40


 


12/25/18 21:00    110/60    


 


12/25/18 20:00        40


 


12/25/18 20:00 97.2 73 18 112/60 (77) 100   


 


12/25/18 20:00      Mechanical Ventilator  





      Mechanical Ventilator  


 


12/25/18 20:00  72      


 


12/25/18 18:49  77 12     40


 


12/25/18 17:09  88 15     40


 


12/25/18 16:00  71      


 


12/25/18 16:00 98.4 71 18 113/28 (56) 100   


 


12/25/18 16:00        40


 


12/25/18 16:00      Mechanical Ventilator  





      Mechanical Ventilator  

















Intake and Output  


 


 12/25/18 12/26/18





 19:00 07:00


 


Intake Total 740 ml 480 ml


 


Output Total  50 ml


 


Balance 740 ml 430 ml


 


  


 


Free Water 260 ml 


 


Tube Feeding 480 ml 480 ml


 


Stool Total  50 ml


 


# Voids 1 


 


# Bowel Movements 63 3








General Appearance:  no acute distress


HEENT:  normocephalic


Respiratory/Chest:  chest wall non-tender, lungs clear


Cardiovascular:  normal peripheral pulses, normal rate


Abdomen:  normal bowel sounds, soft, non tender





Current Medications








 Medications


  (Trade)  Dose


 Ordered  Sig/Tony


 Route


 PRN Reason  Start Time


 Stop Time Status Last Admin


Dose Admin


 


 Acetaminophen


  (Tylenol)  650 mg  Q6H  PRN


 GT


 Mild Pain/Temp > 100.5  12/23/18 16:00


 1/20/19 15:59  12/25/18 17:43


 


 


 Amiodarone HCl


  (Cordarone)  200 mg  EVERY 12  HOURS


 NG


   12/23/18 21:00


 1/5/19 13:59  12/26/18 09:20


 


 


 Apixaban


  (Eliquis)  2.5 mg  BID


 NG


   12/23/18 18:00


 1/20/19 08:59  12/26/18 09:20


 


 


 Artificial Tears


  (Akwa-Tears)  1 drop  Q4H  PRN


 BOTH EYES


 Dry Eyes  12/23/18 15:30


 1/21/19 15:29   


 


 


 Chlorhexidine


 Gluconate


  (Juana-Hex 2%)  1 applic  DAILY@2000


 TOPIC


   12/23/18 20:00


 12/27/18 19:59  12/25/18 20:06


 


 


 Collagenase


  (Santyl)  1 applic  Q12HR


 TOPIC


   12/23/18 21:00


 1/5/19 20:59  12/26/18 09:20


 


 


 Epoetin Rupesh


  (Procrit (for


 ESRD on dialysis))  10,000 units  MON-WED-FRI


 SUBQ


   12/24/18 21:00


 12/28/18 20:59  12/24/18 21:04


 


 


 Lansoprazole


  (Prevacid)  30 mg  Q12HR


 ORAL


   12/24/18 21:00


 1/23/19 20:59  12/26/18 09:20


 


 


 Minoxidil


  (Loniten)  5 mg  Q12HR


 NG


   12/23/18 21:00


 1/1/19 20:59  12/26/18 09:20


 


 


 Sodium Chloride  1,000 ml @ 


 500 mls/hr  Q2H PRN


 IVLG


 sbp<90 during hd  12/26/18 12:00


 12/27/18 23:59   


 


 


 Sodium Chloride


  (Ocean Nasal


 Spray)  1 spray  Q4H  PRN


 NASAL


 dry nose  12/23/18 14:30


 1/21/19 18:29  12/25/18 11:49


 


 


 Vitamin D


  (Vitamin D)  1,000 intlu  DAILY


 GT


   12/24/18 09:00


 1/8/19 09:59  12/26/18 09:20


 

















Quentin Cardoso MD Dec 26, 2018 15:35

## 2018-12-26 NOTE — NUR
NURSE NOTES:

Received Pt from CAROLYN Villatoro. Pt is resting on the bed and awake and forgetful and calm. 
Dressing is clean and dry on Rt. upper arm mid line area. Pt has multiple wounds and 
dressing is clean and dry. On Tele monitor. Lt upper arm AV shunt present of bruit and 
thrills. Trach to Vent dependent setting with AC; 8, TV: 450, P; 5, FiO2 40% with SaO2 100% 
noted. Given oral and tracheal suction. No sign of acute distress noted. On P 200 mattress. 
Noted swelling on both arms. On rectal tube and in placed and patent. Changed position. 
Placed fall precaution. Will continue to care plan.

## 2018-12-27 VITALS — SYSTOLIC BLOOD PRESSURE: 121 MMHG | DIASTOLIC BLOOD PRESSURE: 49 MMHG

## 2018-12-27 VITALS — SYSTOLIC BLOOD PRESSURE: 100 MMHG | DIASTOLIC BLOOD PRESSURE: 36 MMHG

## 2018-12-27 VITALS — DIASTOLIC BLOOD PRESSURE: 47 MMHG | SYSTOLIC BLOOD PRESSURE: 120 MMHG

## 2018-12-27 VITALS — SYSTOLIC BLOOD PRESSURE: 91 MMHG | DIASTOLIC BLOOD PRESSURE: 22 MMHG

## 2018-12-27 VITALS — SYSTOLIC BLOOD PRESSURE: 131 MMHG | DIASTOLIC BLOOD PRESSURE: 34 MMHG

## 2018-12-27 VITALS — SYSTOLIC BLOOD PRESSURE: 89 MMHG | DIASTOLIC BLOOD PRESSURE: 26 MMHG

## 2018-12-27 LAB
ADD MANUAL DIFF: NO
ANION GAP SERPL CALC-SCNC: 10 MMOL/L (ref 5–15)
BASOPHILS NFR BLD AUTO: 1.8 % (ref 0–2)
BUN SERPL-MCNC: 30 MG/DL (ref 7–18)
CALCIUM SERPL-MCNC: 8.9 MG/DL (ref 8.5–10.1)
CHLORIDE SERPL-SCNC: 96 MMOL/L (ref 98–107)
CO2 SERPL-SCNC: 27 MMOL/L (ref 21–32)
CREAT SERPL-MCNC: 1.9 MG/DL (ref 0.55–1.3)
EOSINOPHIL NFR BLD AUTO: 1.3 % (ref 0–3)
ERYTHROCYTE [DISTWIDTH] IN BLOOD BY AUTOMATED COUNT: 18.6 % (ref 11.6–14.8)
HCT VFR BLD CALC: 29 % (ref 37–47)
HGB BLD-MCNC: 9.1 G/DL (ref 12–16)
LYMPHOCYTES NFR BLD AUTO: 5.4 % (ref 20–45)
MCV RBC AUTO: 96 FL (ref 80–99)
MONOCYTES NFR BLD AUTO: 6.7 % (ref 1–10)
NEUTROPHILS NFR BLD AUTO: 84.7 % (ref 45–75)
PLATELET # BLD: 365 K/UL (ref 150–450)
POTASSIUM SERPL-SCNC: 3.7 MMOL/L (ref 3.5–5.1)
RBC # BLD AUTO: 3 M/UL (ref 4.2–5.4)
SODIUM SERPL-SCNC: 133 MMOL/L (ref 136–145)
WBC # BLD AUTO: 11.2 K/UL (ref 4.8–10.8)

## 2018-12-27 RX ADMIN — VITAMIN D, TAB 1000IU (100/BT) SCH INTLU: 25 TAB at 08:58

## 2018-12-27 RX ADMIN — APIXABAN SCH MG: 2.5 TABLET, FILM COATED ORAL at 17:09

## 2018-12-27 RX ADMIN — LORAZEPAM PRN MG: 0.5 TABLET ORAL at 20:42

## 2018-12-27 RX ADMIN — AMIODARONE HYDROCHLORIDE SCH MG: 200 TABLET ORAL at 08:58

## 2018-12-27 RX ADMIN — MINOXIDIL SCH MG: 2.5 TABLET ORAL at 09:00

## 2018-12-27 RX ADMIN — ACETAMINOPHEN PRN MG: 160 SOLUTION ORAL at 14:29

## 2018-12-27 RX ADMIN — CHLORHEXIDINE GLUCONATE SCH APPLIC: 213 SOLUTION TOPICAL at 20:40

## 2018-12-27 RX ADMIN — MINOXIDIL SCH MG: 2.5 TABLET ORAL at 20:43

## 2018-12-27 RX ADMIN — APIXABAN SCH MG: 2.5 TABLET, FILM COATED ORAL at 08:58

## 2018-12-27 RX ADMIN — AMIODARONE HYDROCHLORIDE SCH MG: 200 TABLET ORAL at 20:43

## 2018-12-27 NOTE — NUR
NURSE NOTES:

Called back from Dr. Daigle regarding HD order for today. Dr. Daigle said dialysis order 
is for tomorrow (12/28/18) not for today. Order carried out.

## 2018-12-27 NOTE — NUR
***INSURANCE***





UPDATED CLINICALS AND REVIEW FAXED TO 





[facility]#: [id number]

NCM: JOHN TORRES#[phone]

F#: [phone]

## 2018-12-27 NOTE — NUR
NURSE NOTES:

Patient cleaned, oral care given, reality reorientation, repositioned, no distress observed, 
Vitals remains stable, no fever, no complaints of any pain. Will continue to monitor.

## 2018-12-27 NOTE — NUR
NURSE NOTES:

Repositioned patient, oral care given, pulses noted, g-tube remains intact and feeding 
running, NAD, will continue to monitor.

## 2018-12-27 NOTE — NUR
RESPIRATORY NOTE: Patient received on mechanical ventilator. Patient is trach with Shiley 
size 8, current settings are AC: 450, 8, 40%, +5. Patient has ambu bag bedside, all alarms 
are set and audible, spare trach bedside as well. Will continue to monitor patient.

## 2018-12-27 NOTE — PULMONOLOGY PROGRESS NOTE
Assessment/Plan


Assessment/Plan


IMPRESSION:


1. Hypotension. Resolved.


2. Sepsis.


3. Atrial fibrillation.


4. Bradycardia.


5. Diabetes mellitus.


6. End-stage renal disease on dialysis.


7. Left lung collapse. Resolved


8. Respiratory failure. now status post tracheostomy


9. Dysphagia.


10. Dementia.


11. Anemia


12. Pneumonia





DISCUSSION:


1. Continue medications.


2. CXR improved


3. Status post tracheostomy


4. S/p G-tube 














  ______________________________________________


  Quentin Cardoso M.D.





Subjective


Interval Events:  None reported


Constitutional:  Reports: no symptoms


HEENT:  Repors: no symptoms


Respiratory:  Reports: no symptoms


Cardiovascular:  Reports: no symptoms


Gastrointestinal/Abdominal:  Reports: no symptoms


Genitourinary:  Reports: no symptoms


Allergies:  


Coded Allergies:  


     No Known Allergies (Unverified , 11/26/18)





Objective





Last 24 Hour Vital Signs








  Date Time  Temp Pulse Resp B/P (MAP) Pulse Ox O2 Delivery O2 Flow Rate FiO2


 


12/27/18 11:17  74 14     40


 


12/27/18 09:00    121/49    


 


12/27/18 08:37  83 19     40


 


12/27/18 08:00 98.0 85 18 121/49 (73) 99   


 


12/27/18 08:00      Mechanical Ventilator  





      Mechanical Ventilator  


 


12/27/18 08:00        40


 


12/27/18 07:39  89      


 


12/27/18 06:50  88 22     40


 


12/27/18 05:30  73 19     40


 


12/27/18 04:00 99.3 62 17 91/22 (45) 100   


 


12/27/18 04:00  73      


 


12/27/18 04:00      Mechanical Ventilator  





      Mechanical Ventilator  


 


12/27/18 04:00        40


 


12/27/18 03:09  72 11     40


 


12/27/18 01:04  72 16     40


 


12/27/18 00:00 98.7 67 16 89/26 (47) 100   


 


12/27/18 00:00      Mechanical Ventilator  





      Mechanical Ventilator  


 


12/27/18 00:00        40


 


12/27/18 00:00  72      


 


12/26/18 23:35  70 12     40


 


12/26/18 21:20  76 17     40


 


12/26/18 20:29    104/24    


 


12/26/18 20:00      Mechanical Ventilator  





      Mechanical Ventilator  


 


12/26/18 20:00  76      


 


12/26/18 20:00        40


 


12/26/18 20:00 97.2 73 18 104/24 (50) 100   


 


12/26/18 19:54  71 17     40


 


12/26/18 17:17  79 13     40


 


12/26/18 16:50  78      


 


12/26/18 16:00 97.2 72 18 131/52 (78) 100   


 


12/26/18 16:00      Mechanical Ventilator  





      Mechanical Ventilator  


 


12/26/18 16:00        40


 


12/26/18 15:12  77 15     40


 


12/26/18 13:01  68      


 


12/26/18 12:47  74 16     40


 


12/26/18 12:00 98.0 75 18 118/60 (79) 100   


 


12/26/18 12:00        40


 


12/26/18 12:00      Mechanical Ventilator  





      Mechanical Ventilator  

















Intake and Output  


 


 12/26/18 12/27/18





 19:00 07:00


 


Intake Total 480 ml 560 ml


 


Output Total 2600 ml 250 ml


 


Balance -2120 ml 310 ml


 


  


 


Free Water  80 ml


 


Tube Feeding 480 ml 480 ml


 


Stool Total 100 ml 250 ml


 


Hemodialysis UF 2500 ml 


 


# Bowel Movements 3 








General Appearance:  no acute distress


HEENT:  normocephalic


Respiratory/Chest:  chest wall non-tender, lungs clear


Cardiovascular:  normal peripheral pulses, normal rate


Abdomen:  normal bowel sounds


Laboratory Tests


12/27/18 03:30: 


White Blood Count 11.2H, Red Blood Count 3.00L, Hemoglobin 9.1L, Hematocrit 

29.0L, Mean Corpuscular Volume 96, Mean Corpuscular Hemoglobin 30.3, Mean 

Corpuscular Hemoglobin Concent 31.4L, Red Cell Distribution Width 18.6H, 

Platelet Count 365, Mean Platelet Volume 6.1L, Neutrophils (%) (Auto) 84.7H, 

Lymphocytes (%) (Auto) 5.4L, Monocytes (%) (Auto) 6.7, Eosinophils (%) (Auto) 

1.3, Basophils (%) (Auto) 1.8, Sodium Level 133L, Potassium Level 3.7, Chloride 

Level 96L, Carbon Dioxide Level 27, Anion Gap 10, Blood Urea Nitrogen 30H, 

Creatinine 1.9H, Estimat Glomerular Filtration Rate , Glucose Level 117H, 

Calcium Level 8.9





Current Medications








 Medications


  (Trade)  Dose


 Ordered  Sig/Tony


 Route


 PRN Reason  Start Time


 Stop Time Status Last Admin


Dose Admin


 


 Acetaminophen


  (Tylenol)  650 mg  Q6H  PRN


 GT


 Mild Pain/Temp > 100.5  12/23/18 16:00


 1/20/19 15:59  12/26/18 18:44


 


 


 Amiodarone HCl


  (Cordarone)  200 mg  EVERY 12  HOURS


 NG


   12/23/18 21:00


 1/5/19 13:59  12/27/18 08:58


 


 


 Apixaban


  (Eliquis)  2.5 mg  BID


 NG


   12/23/18 18:00


 1/20/19 08:59  12/27/18 08:58


 


 


 Artificial Tears


  (Akwa-Tears)  1 drop  Q4H  PRN


 BOTH EYES


 Dry Eyes  12/23/18 15:30


 1/21/19 15:29   


 


 


 Chlorhexidine


 Gluconate


  (Juana-Hex 2%)  1 applic  DAILY@2000


 TOPIC


   12/23/18 20:00


 12/27/18 19:59  12/26/18 20:28


 


 


 Collagenase


  (Santyl)  1 applic  Q12HR


 TOPIC


   12/23/18 21:00


 1/5/19 20:59  12/27/18 08:59


 


 


 Epoetin Rupesh


  (Procrit (for


 ESRD on dialysis))  10,000 units  MON-WED-FRI


 SUBQ


   12/24/18 21:00


 12/28/18 20:59  12/26/18 21:00


 


 


 Lansoprazole


  (Prevacid)  30 mg  Q12HR


 ORAL


   12/24/18 21:00


 1/23/19 20:59  12/27/18 08:58


 


 


 Minoxidil


  (Loniten)  5 mg  Q12HR


 NG


   12/23/18 21:00


 1/1/19 20:59  12/27/18 09:00


 


 


 Sodium Chloride  1,000 ml @ 


 500 mls/hr  Q2H PRN


 IVLG


 sbp<90 during hd  12/26/18 12:00


 12/27/18 23:59   


 


 


 Sodium Chloride


  (Ocean Nasal


 Spray)  1 spray  Q4H  PRN


 NASAL


 dry nose  12/23/18 14:30


 1/21/19 18:29  12/25/18 11:49


 


 


 Vitamin D


  (Vitamin D)  1,000 intlu  DAILY


 GT


   12/24/18 09:00


 1/8/19 09:59  12/27/18 08:58


 


 


 Zolpidem Tartrate


  (Ambien)  5 mg  HSPRN  PRN


 ORAL


 Insomnia  12/26/18 19:15


 1/2/19 19:14  12/26/18 20:28


 

















Quentin Cardoso MD Dec 27, 2018 11:20

## 2018-12-27 NOTE — NUR
NURSE NOTES:

Repositioned and oral care provided. BP 89/26, HR 68 SR, SpO2 99%, RR 16, 98.7F. Patient 
awake and not under any acute distress. Rectal tube intact, patent.

## 2018-12-27 NOTE — GI PROGRESS NOTE
Assessment/Plan


Problems:  


(1) Encounter for PEG (percutaneous endoscopic gastrostomy)


ICD Codes:  Z43.1 - Encounter for attention to gastrostomy


SNOMED:  684548844, 165836471


(2) Severe malnutrition


ICD Codes:  E43 - Unspecified severe protein-calorie malnutrition


SNOMED:  48699567


(3) Dehydration


ICD Codes:  E86.0 - Dehydration


SNOMED:  03736188


Status:  stable, unchanged


Status Narrative


Discussed with Dr. Lam


Assessment/Plan


Assessment


- Dysphagia


- Resp failure


- ESRD


- Anemia


- Status post PEG





Recommendations


1. Abdominal binder.


2. Elevate the head of the bed at all times.


3. G-tube flush.


4. G-tube care.


5. tube feeding later today per RD to goal


Monitor H&H, PRN transfusions


PPI


Electrolyte correction


Follow-up labs





The patient was seen and examined at bedside and all new and available data was 

reviewed in the patients chart. I agree with the above findings, impression 

and plan.  (Patient seen earlier today. Signature stamp does not reflect 

patient encounter time.). - Elliott Lam MD





Subjective


Subjective


limited





Objective





Last 24 Hour Vital Signs








  Date Time  Temp Pulse Resp B/P (MAP) Pulse Ox O2 Delivery O2 Flow Rate FiO2


 


12/27/18 09:00    121/49    


 


12/27/18 08:37  83 19     40


 


12/27/18 08:00 98.0 85 18 121/49 (73) 99   


 


12/27/18 08:00      Mechanical Ventilator  





      Mechanical Ventilator  


 


12/27/18 08:00        40


 


12/27/18 07:39  89      


 


12/27/18 06:50  88 22     40


 


12/27/18 05:30  73 19     40


 


12/27/18 04:00 99.3 62 17 91/22 (45) 100   


 


12/27/18 04:00  73      


 


12/27/18 04:00      Mechanical Ventilator  





      Mechanical Ventilator  


 


12/27/18 04:00        40


 


12/27/18 03:09  72 11     40


 


12/27/18 01:04  72 16     40


 


12/27/18 00:00 98.7 67 16 89/26 (47) 100   


 


12/27/18 00:00      Mechanical Ventilator  





      Mechanical Ventilator  


 


12/27/18 00:00        40


 


12/27/18 00:00  72      


 


12/26/18 23:35  70 12     40


 


12/26/18 21:20  76 17     40


 


12/26/18 20:29    104/24    


 


12/26/18 20:00      Mechanical Ventilator  





      Mechanical Ventilator  


 


12/26/18 20:00  76      


 


12/26/18 20:00        40


 


12/26/18 20:00 97.2 73 18 104/24 (50) 100   


 


12/26/18 19:54  71 17     40


 


12/26/18 17:17  79 13     40


 


12/26/18 16:50  78      


 


12/26/18 16:00 97.2 72 18 131/52 (78) 100   


 


12/26/18 16:00      Mechanical Ventilator  





      Mechanical Ventilator  


 


12/26/18 16:00        40


 


12/26/18 15:12  77 15     40


 


12/26/18 13:01  68      


 


12/26/18 12:47  74 16     40


 


12/26/18 12:00 98.0 75 18 118/60 (79) 100   


 


12/26/18 12:00        40


 


12/26/18 12:00      Mechanical Ventilator  





      Mechanical Ventilator  

















Intake and Output  


 


 12/26/18 12/27/18





 19:00 07:00


 


Intake Total 480 ml 560 ml


 


Output Total 2600 ml 250 ml


 


Balance -2120 ml 310 ml


 


  


 


Free Water  80 ml


 


Tube Feeding 480 ml 480 ml


 


Stool Total 100 ml 250 ml


 


Hemodialysis UF 2500 ml 


 


# Bowel Movements 3 











Laboratory Tests








Test


  12/27/18


03:30


 


White Blood Count


  11.2 K/UL


(4.8-10.8)  H


 


Red Blood Count


  3.00 M/UL


(4.20-5.40)  L


 


Hemoglobin


  9.1 G/DL


(12.0-16.0)  L


 


Hematocrit


  29.0 %


(37.0-47.0)  L


 


Mean Corpuscular Volume 96 FL (80-99)  


 


Mean Corpuscular Hemoglobin


  30.3 PG


(27.0-31.0)


 


Mean Corpuscular Hemoglobin


Concent 31.4 G/DL


(32.0-36.0)  L


 


Red Cell Distribution Width


  18.6 %


(11.6-14.8)  H


 


Platelet Count


  365 K/UL


(150-450)


 


Mean Platelet Volume


  6.1 FL


(6.5-10.1)  L


 


Neutrophils (%) (Auto)


  84.7 %


(45.0-75.0)  H


 


Lymphocytes (%) (Auto)


  5.4 %


(20.0-45.0)  L


 


Monocytes (%) (Auto)


  6.7 %


(1.0-10.0)


 


Eosinophils (%) (Auto)


  1.3 %


(0.0-3.0)


 


Basophils (%) (Auto)


  1.8 %


(0.0-2.0)


 


Sodium Level


  133 MMOL/L


(136-145)  L


 


Potassium Level


  3.7 MMOL/L


(3.5-5.1)


 


Chloride Level


  96 MMOL/L


()  L


 


Carbon Dioxide Level


  27 MMOL/L


(21-32)


 


Anion Gap


  10 mmol/L


(5-15)


 


Blood Urea Nitrogen


  30 mg/dL


(7-18)  H


 


Creatinine


  1.9 MG/DL


(0.55-1.30)  H


 


Estimat Glomerular Filtration


Rate  mL/min (>60)  


 


 


Glucose Level


  117 MG/DL


()  H


 


Calcium Level


  8.9 MG/DL


(8.5-10.1)








Height (Feet):  5


Height (Inches):  2.00


Weight (Pounds):  111


General Appearance:  no apparent distress


Cardiovascular:  normal rate


Respiratory/Chest:  other - Mechanical ventilator


Abdominal Exam:  GT site - Clean dry and intact











Shane Lowry NP Dec 27, 2018 11:11

## 2018-12-27 NOTE — CARDIOLOGY PROGRESS NOTE
Assessment/Plan


Assessment/Plan


1. Hypotension, possibly sepsis versus volume.


2. Paroxysmal episodes of atrial fibrillation history.


3. Bradycardia secondary to medications, amiodarone possibly.


4. Diabetes mellitus.


5. End-stage renal disease, on hemodialysis.


6. Lung collapse.


7. Respiratory failure, status post intubation.


8. Dysphagia.


9. History of dementia.


10. Pulm htn 


11.  Pleural effusion 


12. cardaic arrest


13. chest wall pain post cpr  





mostly in sinus now 


stool ob neg x2 


amiod bid unitl jan 1 then 200 mg daily 


vent support 


s/p trach


peg done 


decreaase minoxidl and hold parameter applied 


on elequis  2.5 mg bid for stroke prevention





Subjective


Cardiovascular:  Reports: chest pain


Respiratory:  Reports: shortness of breath


Gastrointestinal/Abdominal:  Reports: abdominal pain


Genitourinary:  Denies: burning


Subjective


on the vent, sob





Objective





Last 24 Hour Vital Signs








  Date Time  Temp Pulse Resp B/P (MAP) Pulse Ox O2 Delivery O2 Flow Rate FiO2


 


12/27/18 16:40  75 18     40


 


12/27/18 16:00      Mechanical Ventilator  





      Mechanical Ventilator  


 


12/27/18 16:00        40


 


12/27/18 16:00  68      


 


12/27/18 15:41 97.6 70 14 100/36 (57) 97   


 


12/27/18 15:04  73 14     40


 


12/27/18 13:22  84 17     40


 


12/27/18 12:00  76      


 


12/27/18 12:00      Mechanical Ventilator  





      Mechanical Ventilator  


 


12/27/18 12:00        40


 


12/27/18 11:51 97.2 79 17 120/47 (71) 97   


 


12/27/18 11:17  74 14     40


 


12/27/18 09:00    121/49    


 


12/27/18 08:37  83 19     40


 


12/27/18 08:00 98.0 85 18 121/49 (73) 99   


 


12/27/18 08:00      Mechanical Ventilator  





      Mechanical Ventilator  


 


12/27/18 08:00        40


 


12/27/18 07:39  89      


 


12/27/18 06:50  88 22     40


 


12/27/18 05:30  73 19     40


 


12/27/18 04:00 99.3 62 17 91/22 (45) 100   


 


12/27/18 04:00  73      


 


12/27/18 04:00      Mechanical Ventilator  





      Mechanical Ventilator  


 


12/27/18 04:00        40


 


12/27/18 03:09  72 11     40


 


12/27/18 01:04  72 16     40


 


12/27/18 00:00 98.7 67 16 89/26 (47) 100   


 


12/27/18 00:00      Mechanical Ventilator  





      Mechanical Ventilator  


 


12/27/18 00:00        40


 


12/27/18 00:00  72      


 


12/26/18 23:35  70 12     40


 


12/26/18 21:20  76 17     40


 


12/26/18 20:29    104/24    


 


12/26/18 20:00      Mechanical Ventilator  





      Mechanical Ventilator  


 


12/26/18 20:00  76      


 


12/26/18 20:00        40


 


12/26/18 20:00 97.2 73 18 104/24 (50) 100   


 


12/26/18 19:54  71 17     40








General Appearance:  no apparent distress, alert, on vent, patient on isolation


Cardiovascular:  normal rate


Respiratory/Chest:  lungs clear


Abdomen:  normal bowel sounds, non tender, soft


Extremities:  no swelling











Intake and Output  


 


 12/26/18 12/27/18





 18:59 06:59


 


Intake Total 480 ml 530 ml


 


Output Total 2600 ml 250 ml


 


Balance -2120 ml 280 ml


 


  


 


Free Water  50 ml


 


Tube Feeding 480 ml 480 ml


 


Stool Total 100 ml 250 ml


 


Hemodialysis UF 2500 ml 


 


# Bowel Movements 3 











Laboratory Tests








Test


  12/27/18


03:30


 


White Blood Count


  11.2 K/UL


(4.8-10.8)  H


 


Red Blood Count


  3.00 M/UL


(4.20-5.40)  L


 


Hemoglobin


  9.1 G/DL


(12.0-16.0)  L


 


Hematocrit


  29.0 %


(37.0-47.0)  L


 


Mean Corpuscular Volume 96 FL (80-99)  


 


Mean Corpuscular Hemoglobin


  30.3 PG


(27.0-31.0)


 


Mean Corpuscular Hemoglobin


Concent 31.4 G/DL


(32.0-36.0)  L


 


Red Cell Distribution Width


  18.6 %


(11.6-14.8)  H


 


Platelet Count


  365 K/UL


(150-450)


 


Mean Platelet Volume


  6.1 FL


(6.5-10.1)  L


 


Neutrophils (%) (Auto)


  84.7 %


(45.0-75.0)  H


 


Lymphocytes (%) (Auto)


  5.4 %


(20.0-45.0)  L


 


Monocytes (%) (Auto)


  6.7 %


(1.0-10.0)


 


Eosinophils (%) (Auto)


  1.3 %


(0.0-3.0)


 


Basophils (%) (Auto)


  1.8 %


(0.0-2.0)


 


Sodium Level


  133 MMOL/L


(136-145)  L


 


Potassium Level


  3.7 MMOL/L


(3.5-5.1)


 


Chloride Level


  96 MMOL/L


()  L


 


Carbon Dioxide Level


  27 MMOL/L


(21-32)


 


Anion Gap


  10 mmol/L


(5-15)


 


Blood Urea Nitrogen


  30 mg/dL


(7-18)  H


 


Creatinine


  1.9 MG/DL


(0.55-1.30)  H


 


Estimat Glomerular Filtration


Rate  mL/min (>60)  


 


 


Glucose Level


  117 MG/DL


()  H


 


Calcium Level


  8.9 MG/DL


(8.5-10.1)

















Mariusz Wade MD Dec 27, 2018 19:15

## 2018-12-27 NOTE — NUR
PT. RECEIVED STABLE ON CMV WITH CURRENT SETTING. VENT CIRCUIT AND SX TUBING SECURE AND OUT 
OF THE WAY. NO SIGN OF RESPIRATORY DISTRESS NOTED AT THIS TIME. WILL CONTINUE TO MONITOR.

## 2018-12-27 NOTE — NUR
NURSE NOTES:

NO ACUTE EVENTS OVERNIGHT, BLOOD PRESSURE REMAINED STABLE, NO FEVERS, SPO2 REMAINED ABOVE 
98%, PATIENT DID WELL WITHOUT RESTRAINTS OVERNIGHT BUT NOW SEEMS TO BE DISCONNECTING TUBING 
RESULTING IN ALARMS SOUNDING OFF, PATIENT WAS WARNED NOT TO TOUCH TUBING AND EXPLAINED 
RATIONAL BEHIND IT, IF PATIENT CONTINUES TO REMOVE DEVICES THEN BILATERAL SOFT WRIST 
RESTRAINTS MAY HAVE TO BE INITIATED. WILL CHECK UP ON PATIENT BEFORE END OF SHIFT, 
ENDORSEMENT WILL BE GIVEN REGARDING THE MATTER.  

NEW FEEDING HUNG AND TUBING CHANGED. ADDED FOAM DRESSINGS ON BONY AREAS FOR SUPPORT. NO 
ACUTE DISTRESS AT THIS TIME AN NO PAIN PER FLACC SCORE RATING OF 0/10, WILL CONTINUE TO 
OBSERVE AND MONITOR PATIENTS PROGRESS.

## 2018-12-27 NOTE — NUR
NURSE NOTES:WOUND CARE FOLLOW-UP NOTES:Full thickness pressure injury involving entire 
buttocks and both ischial regions,presents as dark non-blanching erythema with induration at 
 L buttock.  Entire area measuring (L)11.5cm x (W)15cm with full thickness ulcer at sacral 
region (L)4cm x (W)3.5cm x(D)1.3cm,undermining 12-12 with tunneling 7-10 by 13cm at 
10o'clock. Second smaller area L buttocks noted in close proximity to sacral ulcer, wound 
with 90%slough ,10% erythema ,fluctuant without exudate. Sheared area L ischium 
resolving-dry skin noted.Wound cleansed with Saline. Historical arched scar noted to L 
buttocks . Tx orders addressed with  and confirmed with Md changing Tx to 
Silvasorb gel. Wound cleansed with Saline.Packing strip infused with Silvasorb gel loosely 
packed into wound .Cavilon Skin Barrier applied periwound and covered with Optifoam drsg .



Tx.Plan:Discontinue Hydrogel .

           Cleanse sacral wound with Saline. Loosely pack wound with Plain packing strip 
infused with Silvasorb gel.Cavilon  

           Skin Barrier Periwound .Cover with Optifoam drsg Every Other day and prn 
(confirmed with .)

           Air Fluidized mattress.

           Reposition at least every 2hours and prn .

           Off-load heels with pillow.

## 2018-12-27 NOTE — NEPHROLOGY PROGRESS NOTE
Assessment/Plan


Assessment


Seee below


Plan


MOF resolving.





ESRD HD q MWF 


Anemia of CKD , SAMANTHA high dose. Transfuse PRN.





A. 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 with LAE. LVEF 65% . On Eliquis.





Post trach + PEG .


Referred to [facility]. Referral not received there despite numerous requests. Pt'

s son agreeable to [facility].


See orders.


DW pt's son. Needs extensive Vascular W/U.





Subjective


Subjective


 No c/o. Per pt's son she's very anxious.





Objective


Objective





Last 24 Hour Vital Signs








  Date Time  Temp Pulse Resp B/P (MAP) Pulse Ox O2 Delivery O2 Flow Rate FiO2


 


12/27/18 16:00      Mechanical Ventilator  





      Mechanical Ventilator  


 


12/27/18 16:00        40


 


12/27/18 16:00  68      


 


12/27/18 15:41 97.6 70 14 100/36 (57) 97   


 


12/27/18 15:04  73 14     40


 


12/27/18 13:22  84 17     40


 


12/27/18 12:00  76      


 


12/27/18 12:00      Mechanical Ventilator  





      Mechanical Ventilator  


 


12/27/18 12:00        40


 


12/27/18 11:51 97.2 79 17 120/47 (71) 97   


 


12/27/18 11:17  74 14     40


 


12/27/18 09:00    121/49    


 


12/27/18 08:37  83 19     40


 


12/27/18 08:00 98.0 85 18 121/49 (73) 99   


 


12/27/18 08:00      Mechanical Ventilator  





      Mechanical Ventilator  


 


12/27/18 08:00        40


 


12/27/18 07:39  89      


 


12/27/18 06:50  88 22     40


 


12/27/18 05:30  73 19     40


 


12/27/18 04:00 99.3 62 17 91/22 (45) 100   


 


12/27/18 04:00  73      


 


12/27/18 04:00      Mechanical Ventilator  





      Mechanical Ventilator  


 


12/27/18 04:00        40


 


12/27/18 03:09  72 11     40


 


12/27/18 01:04  72 16     40


 


12/27/18 00:00 98.7 67 16 89/26 (47) 100   


 


12/27/18 00:00      Mechanical Ventilator  





      Mechanical Ventilator  


 


12/27/18 00:00        40


 


12/27/18 00:00  72      


 


12/26/18 23:35  70 12     40


 


12/26/18 21:20  76 17     40


 


12/26/18 20:29    104/24    


 


12/26/18 20:00      Mechanical Ventilator  





      Mechanical Ventilator  


 


12/26/18 20:00  76      


 


12/26/18 20:00        40


 


12/26/18 20:00 97.2 73 18 104/24 (50) 100   


 


12/26/18 19:54  71 17     40

















Intake and Output  


 


 12/26/18 12/27/18





 19:00 07:00


 


Intake Total 480 ml 560 ml


 


Output Total 2600 ml 250 ml


 


Balance -2120 ml 310 ml


 


  


 


Free Water  80 ml


 


Tube Feeding 480 ml 480 ml


 


Stool Total 100 ml 250 ml


 


Hemodialysis UF 2500 ml 


 


# Bowel Movements 3 








Laboratory Tests


12/27/18 03:30: 


White Blood Count 11.2H, Red Blood Count 3.00L, Hemoglobin 9.1L, Hematocrit 

29.0L, Mean Corpuscular Volume 96, Mean Corpuscular Hemoglobin 30.3, Mean 

Corpuscular Hemoglobin Concent 31.4L, Red Cell Distribution Width 18.6H, 

Platelet Count 365, Mean Platelet Volume 6.1L, Neutrophils (%) (Auto) 84.7H, 

Lymphocytes (%) (Auto) 5.4L, Monocytes (%) (Auto) 6.7, Eosinophils (%) (Auto) 

1.3, Basophils (%) (Auto) 1.8, Sodium Level 133L, Potassium Level 3.7, Chloride 

Level 96L, Carbon Dioxide Level 27, Anion Gap 10, Blood Urea Nitrogen 30H, 

Creatinine 1.9H, Estimat Glomerular Filtration Rate , Glucose Level 117H, 

Calcium Level 8.9


Height (Feet):  5


Height (Inches):  2.00


Weight (Pounds):  111


Objective


On vent.


Trach clean.


Cv IRR/IRR


Lungs B ronchi.


Abd SNT. BS + New PEG.


E Rt. foot diabetic ulcers











Gavino Daigle MD Dec 27, 2018 17:37

## 2018-12-27 NOTE — NUR
NURSE NOTES:

Received Pt from [facility]. Pt is resting on the bed and awake and forgetful and calm. 
Dressing is clean and dry on Rt. upper arm mid line area. Pt has multiple wounds and 
dressing is clean and dry. On Tele monitor. Lt upper arm AV shunt present of bruit and 
thrills. Trach to Vent dependent setting with AC; 8, TV: 450, P; 5, FiO2 40% with SaO2 100% 
noted. Given oral and tracheal suction. No sign of acute distress noted. On P 200 mattress. 
Noted swelling on both arms. On rectal tube and in placed and patent. Changed position. 
Placed fall precaution. Will continue to care plan.

## 2018-12-27 NOTE — NUR
CASE MANAGEMENT: REVIEW





SI: ESRD ON HD

TRACHEOSTOMY 12/17

PEG PLACEMENT 12/19 

T 97.2 HR 79 RR 17 /49 SAT 97% MECH VENT FIO2 40

WBC 11.2 H/H 9.1/29.0  BUN 30 CR 1.9 







IS: PROCRIT SQ MWF

ELIQUIS NG BID 

AMIODARONE GT Q12HR 

GT FEEDING NEPRO @ 40ML/HR



***STEP DOWN UNIT  STATUS***

DCP: PATIENT IS FROM [facility]

## 2018-12-27 NOTE — NUR
Report received from CAROLYN Forbes.  Patient seen in semi-carroll position with G-Tube feeding 
running at 40ml/Hr.  On vent with previous setting. No acute respiratory distress present at 
this time. no S/Sx of pain present. Patient is alert, responsive.  Rectal tube is place and 
is intact at this time. Midline to right upper arm is intact. Bed is in lowest position. 
Call light is within easy reach. Will continue to monitor.

## 2018-12-27 NOTE — INFECTIOUS DISEASES PROG NOTE
Assessment/Plan


Assessment/Plan


A:


Sepsis treated


UTI treated


Cellulitis of R leg, osteomyelitis treated


Hypercapnic respiratory failure


ESRD on HD


DM


Anemia


Dementia


PVD


R pleural effusion


Mucous plug


s/o Cardiac arrest


Stage  IV sacral ulcer


P;


Observe off antibiotic


Waiting for placement





Subjective


ROS Limited/Unobtainable:  Yes


Constitutional:  Reports: no symptoms


Allergies:  


Coded Allergies:  


     No Known Allergies (Unverified , 11/26/18)





Objective


Vital Signs





Last 24 Hour Vital Signs








  Date Time  Temp Pulse Resp B/P (MAP) Pulse Ox O2 Delivery O2 Flow Rate FiO2


 


12/27/18 11:17  74 14     40


 


12/27/18 09:00    121/49    


 


12/27/18 08:37  83 19     40


 


12/27/18 08:00 98.0 85 18 121/49 (73) 99   


 


12/27/18 08:00      Mechanical Ventilator  





      Mechanical Ventilator  


 


12/27/18 08:00        40


 


12/27/18 07:39  89      


 


12/27/18 06:50  88 22     40


 


12/27/18 05:30  73 19     40


 


12/27/18 04:00 99.3 62 17 91/22 (45) 100   


 


12/27/18 04:00  73      


 


12/27/18 04:00      Mechanical Ventilator  





      Mechanical Ventilator  


 


12/27/18 04:00        40


 


12/27/18 03:09  72 11     40


 


12/27/18 01:04  72 16     40


 


12/27/18 00:00 98.7 67 16 89/26 (47) 100   


 


12/27/18 00:00      Mechanical Ventilator  





      Mechanical Ventilator  


 


12/27/18 00:00        40


 


12/27/18 00:00  72      


 


12/26/18 23:35  70 12     40


 


12/26/18 21:20  76 17     40


 


12/26/18 20:29    104/24    


 


12/26/18 20:00      Mechanical Ventilator  





      Mechanical Ventilator  


 


12/26/18 20:00  76      


 


12/26/18 20:00        40


 


12/26/18 20:00 97.2 73 18 104/24 (50) 100   


 


12/26/18 19:54  71 17     40


 


12/26/18 17:17  79 13     40


 


12/26/18 16:50  78      


 


12/26/18 16:00 97.2 72 18 131/52 (78) 100   


 


12/26/18 16:00      Mechanical Ventilator  





      Mechanical Ventilator  


 


12/26/18 16:00        40


 


12/26/18 15:12  77 15     40


 


12/26/18 13:01  68      


 


12/26/18 12:47  74 16     40


 


12/26/18 12:00 98.0 75 18 118/60 (79) 100   


 


12/26/18 12:00        40


 


12/26/18 12:00      Mechanical Ventilator  





      Mechanical Ventilator  








Height (Feet):  5


Height (Inches):  2.00


Weight (Pounds):  111


General Appearance:  no acute distress


HEENT:  status post trach


Respiratory/Chest:  lungs clear, other - on ventilator


Cardiovascular:  normal rate, other - R arm PICC line


Abdomen:  soft, non tender, other - GT feeding


Extremities:  no edema


Neurologic/Psychiatric:  alert, responsive





Laboratory Tests








Test


  12/27/18


03:30


 


White Blood Count


  11.2 K/UL


(4.8-10.8)  H


 


Red Blood Count


  3.00 M/UL


(4.20-5.40)  L


 


Hemoglobin


  9.1 G/DL


(12.0-16.0)  L


 


Hematocrit


  29.0 %


(37.0-47.0)  L


 


Mean Corpuscular Volume 96 FL (80-99)  


 


Mean Corpuscular Hemoglobin


  30.3 PG


(27.0-31.0)


 


Mean Corpuscular Hemoglobin


Concent 31.4 G/DL


(32.0-36.0)  L


 


Red Cell Distribution Width


  18.6 %


(11.6-14.8)  H


 


Platelet Count


  365 K/UL


(150-450)


 


Mean Platelet Volume


  6.1 FL


(6.5-10.1)  L


 


Neutrophils (%) (Auto)


  84.7 %


(45.0-75.0)  H


 


Lymphocytes (%) (Auto)


  5.4 %


(20.0-45.0)  L


 


Monocytes (%) (Auto)


  6.7 %


(1.0-10.0)


 


Eosinophils (%) (Auto)


  1.3 %


(0.0-3.0)


 


Basophils (%) (Auto)


  1.8 %


(0.0-2.0)


 


Sodium Level


  133 MMOL/L


(136-145)  L


 


Potassium Level


  3.7 MMOL/L


(3.5-5.1)


 


Chloride Level


  96 MMOL/L


()  L


 


Carbon Dioxide Level


  27 MMOL/L


(21-32)


 


Anion Gap


  10 mmol/L


(5-15)


 


Blood Urea Nitrogen


  30 mg/dL


(7-18)  H


 


Creatinine


  1.9 MG/DL


(0.55-1.30)  H


 


Estimat Glomerular Filtration


Rate  mL/min (>60)  


 


 


Glucose Level


  117 MG/DL


()  H


 


Calcium Level


  8.9 MG/DL


(8.5-10.1)











Current Medications








 Medications


  (Trade)  Dose


 Ordered  Sig/Tony


 Route


 PRN Reason  Start Time


 Stop Time Status Last Admin


Dose Admin


 


 Acetaminophen


  (Tylenol)  650 mg  Q6H  PRN


 GT


 Mild Pain/Temp > 100.5  12/23/18 16:00


 1/20/19 15:59  12/26/18 18:44


 


 


 Amiodarone HCl


  (Cordarone)  200 mg  EVERY 12  HOURS


 NG


   12/23/18 21:00


 1/5/19 13:59  12/27/18 08:58


 


 


 Apixaban


  (Eliquis)  2.5 mg  BID


 NG


   12/23/18 18:00


 1/20/19 08:59  12/27/18 08:58


 


 


 Artificial Tears


  (Akwa-Tears)  1 drop  Q4H  PRN


 BOTH EYES


 Dry Eyes  12/23/18 15:30


 1/21/19 15:29   


 


 


 Chlorhexidine


 Gluconate


  (Juana-Hex 2%)  1 applic  DAILY@2000


 TOPIC


   12/23/18 20:00


 12/27/18 19:59  12/26/18 20:28


 


 


 Collagenase


  (Santyl)  1 applic  Q12HR


 TOPIC


   12/23/18 21:00


 1/5/19 20:59  12/27/18 08:59


 


 


 Epoetin Rupesh


  (Procrit (for


 ESRD on dialysis))  10,000 units  MON-WED-FRI


 SUBQ


   12/24/18 21:00


 12/28/18 20:59  12/26/18 21:00


 


 


 Lansoprazole


  (Prevacid)  30 mg  Q12HR


 ORAL


   12/24/18 21:00


 1/23/19 20:59  12/27/18 08:58


 


 


 Minoxidil


  (Loniten)  5 mg  Q12HR


 NG


   12/23/18 21:00


 1/1/19 20:59  12/27/18 09:00


 


 


 Sodium Chloride  1,000 ml @ 


 500 mls/hr  Q2H PRN


 IVLG


 sbp<90 during hd  12/26/18 12:00


 12/27/18 23:59   


 


 


 Sodium Chloride


  (Ocean Nasal


 Spray)  1 spray  Q4H  PRN


 NASAL


 dry nose  12/23/18 14:30


 1/21/19 18:29  12/25/18 11:49


 


 


 Vitamin D


  (Vitamin D)  1,000 intlu  DAILY


 GT


   12/24/18 09:00


 1/8/19 09:59  12/27/18 08:58


 


 


 Zolpidem Tartrate


  (Ambien)  5 mg  HSPRN  PRN


 ORAL


 Insomnia  12/26/18 19:15


 1/2/19 19:14  12/26/18 20:28


 

















Luke Maynard MD Dec 27, 2018 11:45

## 2018-12-27 NOTE — NUR
NURSE NOTES:

PATIENT CONTINUES TO REMAINS NON-COMPLIANT AFTER DISCUSSION REGARDING NOT REMOVING 
VENTILATOR DEVICES, PROVIDED TALK THERAPY, DISTRACTIONS AND OTHER DIVERSIONAL ACTIVITIES BUT 
STILL CONTINUED TO REMOVE DEVICES, RESORTED TO HAVING TO PLACE PATIENT ON BILATERAL SOFT 
WRIST RESTRAINTS FOR NO. NO SWELLING ON WRIST, PULSES NOTED AND WRIST SKIN IS INTACT PRIOR 
TO INITIATING RESTRAINTS. WILL CONTINUE TO OBSERVE PATIENT PROGRESS.

## 2018-12-28 VITALS — DIASTOLIC BLOOD PRESSURE: 74 MMHG | SYSTOLIC BLOOD PRESSURE: 157 MMHG

## 2018-12-28 VITALS — SYSTOLIC BLOOD PRESSURE: 87 MMHG | DIASTOLIC BLOOD PRESSURE: 15 MMHG

## 2018-12-28 VITALS — DIASTOLIC BLOOD PRESSURE: 21 MMHG | SYSTOLIC BLOOD PRESSURE: 110 MMHG

## 2018-12-28 VITALS — SYSTOLIC BLOOD PRESSURE: 121 MMHG | DIASTOLIC BLOOD PRESSURE: 33 MMHG

## 2018-12-28 VITALS — DIASTOLIC BLOOD PRESSURE: 62 MMHG | SYSTOLIC BLOOD PRESSURE: 153 MMHG

## 2018-12-28 VITALS — DIASTOLIC BLOOD PRESSURE: 92 MMHG | SYSTOLIC BLOOD PRESSURE: 142 MMHG

## 2018-12-28 LAB
ADD MANUAL DIFF: NO
ANION GAP SERPL CALC-SCNC: 10 MMOL/L (ref 5–15)
ANION GAP SERPL CALC-SCNC: 10 MMOL/L (ref 5–15)
BASOPHILS NFR BLD AUTO: 1.2 % (ref 0–2)
BASOPHILS NFR BLD AUTO: 1.9 % (ref 0–2)
BASOPHILS NFR BLD AUTO: 2.2 % (ref 0–2)
BUN SERPL-MCNC: 32 MG/DL (ref 7–18)
BUN SERPL-MCNC: 43 MG/DL (ref 7–18)
CALCIUM SERPL-MCNC: 9.3 MG/DL (ref 8.5–10.1)
CALCIUM SERPL-MCNC: 9.5 MG/DL (ref 8.5–10.1)
CHLORIDE SERPL-SCNC: 93 MMOL/L (ref 98–107)
CHLORIDE SERPL-SCNC: 94 MMOL/L (ref 98–107)
CO2 SERPL-SCNC: 27 MMOL/L (ref 21–32)
CO2 SERPL-SCNC: 28 MMOL/L (ref 21–32)
CREAT SERPL-MCNC: 2 MG/DL (ref 0.55–1.3)
CREAT SERPL-MCNC: 2.3 MG/DL (ref 0.55–1.3)
EOSINOPHIL NFR BLD AUTO: 1.5 % (ref 0–3)
EOSINOPHIL NFR BLD AUTO: 1.8 % (ref 0–3)
EOSINOPHIL NFR BLD AUTO: 2 % (ref 0–3)
ERYTHROCYTE [DISTWIDTH] IN BLOOD BY AUTOMATED COUNT: 18.5 % (ref 11.6–14.8)
ERYTHROCYTE [DISTWIDTH] IN BLOOD BY AUTOMATED COUNT: 18.8 % (ref 11.6–14.8)
ERYTHROCYTE [DISTWIDTH] IN BLOOD BY AUTOMATED COUNT: 19.4 % (ref 11.6–14.8)
HCT VFR BLD CALC: 29.4 % (ref 37–47)
HCT VFR BLD CALC: 32.9 % (ref 37–47)
HCT VFR BLD CALC: 34 % (ref 37–47)
HGB BLD-MCNC: 10 G/DL (ref 12–16)
HGB BLD-MCNC: 10.7 G/DL (ref 12–16)
HGB BLD-MCNC: 9.1 G/DL (ref 12–16)
LYMPHOCYTES NFR BLD AUTO: 6.2 % (ref 20–45)
LYMPHOCYTES NFR BLD AUTO: 6.6 % (ref 20–45)
LYMPHOCYTES NFR BLD AUTO: 9 % (ref 20–45)
MCV RBC AUTO: 96 FL (ref 80–99)
MCV RBC AUTO: 96 FL (ref 80–99)
MCV RBC AUTO: 97 FL (ref 80–99)
MONOCYTES NFR BLD AUTO: 11.3 % (ref 1–10)
MONOCYTES NFR BLD AUTO: 8.9 % (ref 1–10)
MONOCYTES NFR BLD AUTO: 9.6 % (ref 1–10)
NEUTROPHILS NFR BLD AUTO: 78.5 % (ref 45–75)
NEUTROPHILS NFR BLD AUTO: 78.8 % (ref 45–75)
NEUTROPHILS NFR BLD AUTO: 80.6 % (ref 45–75)
PHOSPHATE SERPL-MCNC: 3.2 MG/DL (ref 2.5–4.9)
PLATELET # BLD: 342 K/UL (ref 150–450)
PLATELET # BLD: 347 K/UL (ref 150–450)
PLATELET # BLD: 416 K/UL (ref 150–450)
POTASSIUM SERPL-SCNC: 3.5 MMOL/L (ref 3.5–5.1)
POTASSIUM SERPL-SCNC: 3.8 MMOL/L (ref 3.5–5.1)
RBC # BLD AUTO: 3.05 M/UL (ref 4.2–5.4)
RBC # BLD AUTO: 3.38 M/UL (ref 4.2–5.4)
RBC # BLD AUTO: 3.56 M/UL (ref 4.2–5.4)
SODIUM SERPL-SCNC: 130 MMOL/L (ref 136–145)
SODIUM SERPL-SCNC: 132 MMOL/L (ref 136–145)
WBC # BLD AUTO: 8.8 K/UL (ref 4.8–10.8)
WBC # BLD AUTO: 9.2 K/UL (ref 4.8–10.8)
WBC # BLD AUTO: 9.3 K/UL (ref 4.8–10.8)

## 2018-12-28 RX ADMIN — HYDROCODONE BITARTRATE AND ACETAMINOPHEN PRN TAB: 5; 325 TABLET ORAL at 20:53

## 2018-12-28 RX ADMIN — AMIODARONE HYDROCHLORIDE SCH MG: 200 TABLET ORAL at 20:50

## 2018-12-28 RX ADMIN — ACETAMINOPHEN PRN MG: 160 SOLUTION ORAL at 12:03

## 2018-12-28 RX ADMIN — ERYTHROPOIETIN SCH UNITS: 20000 INJECTION, SOLUTION INTRAVENOUS; SUBCUTANEOUS at 20:54

## 2018-12-28 RX ADMIN — VITAMIN D, TAB 1000IU (100/BT) SCH INTLU: 25 TAB at 09:31

## 2018-12-28 RX ADMIN — APIXABAN SCH MG: 2.5 TABLET, FILM COATED ORAL at 18:25

## 2018-12-28 RX ADMIN — APIXABAN SCH MG: 2.5 TABLET, FILM COATED ORAL at 09:32

## 2018-12-28 RX ADMIN — MINOXIDIL SCH MG: 2.5 TABLET ORAL at 21:12

## 2018-12-28 RX ADMIN — AMIODARONE HYDROCHLORIDE SCH MG: 200 TABLET ORAL at 09:31

## 2018-12-28 RX ADMIN — MINOXIDIL SCH MG: 2.5 TABLET ORAL at 09:32

## 2018-12-28 NOTE — NEPHROLOGY PROGRESS NOTE
Assessment/Plan


Assessment


Seee below


Plan


MOF resolving.





ESRD HD q MWF 


Anemia of CKD , SAMANTHA high dose. Transfuse PRN.





A. 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 with LAE. LVEF 65% . On Eliquis.





Post trach + PEG .


Referred to [facility]. Referral not received there despite numerous requests. Pt'

s son agreeable to [facility].


See orders.


DW pt's son. Needs extensive Vascular W/U.





Subjective


Subjective


 No c/o. Per pt's son she's very anxious.





Objective


Objective





Last 24 Hour Vital Signs








  Date Time  Temp Pulse Resp B/P (MAP) Pulse Ox O2 Delivery O2 Flow Rate FiO2


 


12/28/18 10:35  91 21     40


 


12/28/18 09:32    153/62    


 


12/28/18 09:26  83 20     40


 


12/28/18 08:14        40


 


12/28/18 08:00 96.2 82 18 153/62 (92) 96   


 


12/28/18 08:00  81      


 


12/28/18 06:57  78 15     40


 


12/28/18 05:05  70 17     40


 


12/28/18 04:00 99.1 62 15 87/15 (39) 100   


 


12/28/18 04:00      Mechanical Ventilator  





      Mechanical Ventilator  


 


12/28/18 04:00        40


 


12/28/18 04:00  70      


 


12/28/18 02:54  77 12     40


 


12/28/18 00:46  72 11     40


 


12/28/18 00:00  75      


 


12/28/18 00:00      Mechanical Ventilator  





      Mechanical Ventilator  


 


12/28/18 00:00 99.7 68 16 110/21 (50) 100   


 


12/28/18 00:00        40


 


12/27/18 23:03  77 18     40


 


12/27/18 20:55  76 15     40


 


12/27/18 20:43    135/32    


 


12/27/18 20:00        40


 


12/27/18 20:00 99.7 75 16 131/34 (66) 100   


 


12/27/18 20:00      Mechanical Ventilator  





      Mechanical Ventilator  


 


12/27/18 20:00  68      


 


12/27/18 19:08  74 15     40


 


12/27/18 16:40  75 18     40


 


12/27/18 16:00      Mechanical Ventilator  





      Mechanical Ventilator  


 


12/27/18 16:00        40


 


12/27/18 16:00  68      


 


12/27/18 15:41 97.6 70 14 100/36 (57) 97   


 


12/27/18 15:04  73 14     40


 


12/27/18 13:22  84 17     40

















Intake and Output  


 


 12/27/18 12/28/18





 19:00 07:00


 


Intake Total 740 ml 540 ml


 


Output Total 210 ml 


 


Balance 530 ml 540 ml


 


  


 


Free Water 260 ml 100 ml


 


Tube Feeding 480 ml 440 ml


 


Stool Total 210 ml 








Laboratory Tests


12/28/18 03:15: 


White Blood Count 9.2, Red Blood Count 3.05L, Hemoglobin 9.1L, Hematocrit 29.4L

, Mean Corpuscular Volume 96, Mean Corpuscular Hemoglobin 29.9, Mean 

Corpuscular Hemoglobin Concent 31.0L, Red Cell Distribution Width 19.4H, 

Platelet Count 347, Mean Platelet Volume 6.0L, Neutrophils (%) (Auto) 80.6H, 

Lymphocytes (%) (Auto) 6.6L, Monocytes (%) (Auto) 9.6, Eosinophils (%) (Auto) 

2.0, Basophils (%) (Auto) 1.2, Sodium Level 130L, Potassium Level 3.5, Chloride 

Level 93L, Carbon Dioxide Level 27, Anion Gap 10, Blood Urea Nitrogen 43H, 

Creatinine 2.3H, Estimat Glomerular Filtration Rate , Glucose Level 121H, 

Calcium Level 9.5


Height (Feet):  5


Height (Inches):  2.00


Weight (Pounds):  105


Objective


On vent.


Trach clean.


Cv IRR/IRR


Lungs B ronchi.


Abd SNT. BS + New PEG.


E Rt. foot diabetic ulcers











Gavino Daigle MD Dec 28, 2018 13:10

## 2018-12-28 NOTE — NUR
NURSE NOTES:

Spoke with Dr Daigle regarding patient complaint of headache. Patient expresses that it is 
unrelieved by single dose of tylenol. Patient to be given another one time dose of tylenol 
650mg at this time. 

-------------------------------------------------------------------------------

Addendum: 12/28/18 at 1808 by Jen Golden RN

-------------------------------------------------------------------------------

 gave order for Norco 5mg Q6H PRN if headache unrelieved by tylenol. Will follow up and 
place order if needed.

## 2018-12-28 NOTE — NUR
RD ASSESSMENT & RECOMMENDATIONS

SEE CARE ACTIVITY FOR COMPLETE ASSESSMENT



DAILY ESTIMATED NEEDS:

Needs based on Critical care + Advanced Wounds + ESRD/ 60kg 

22-30  kcals/kg 

4475-2335  total kcals

1.5-2.0  g protein/kg

90-120g  g total protein 

per MD  mL/kg

 

NUTRITION DIAGNOSIS:

* Swallowing difficulty R/T respiratory status as evidenced by pt s/p self

extubation, s/p code blue, reintubated, now s/p trach and PEG placement.

(UPDATED)

* Increased kcal/prot needs R/T wound healing as evidenced by pt admitted

w/ multiple wounds including stage 4 sacral wound, refer to WC eval.

* Altered nutrition related lab values R/T ESRD dx, clinical condition as

evidenced by low K (2.7-> wnl), episodes of hypoglycemia (68 69-> now

resolved), elev creat (4.6-> 2.3), low Na (130), elev BNP >10836,

low BP, off pressor at this time.

  



CURRENT TF:Nepro @40ml/hr + PS BID   





ENTERAL NUTRITION RECOMMENDATIONS:

Nepro @40ml/hr x 24 hrs + Prosource 1pkt BID  to provide 960ml, 1728kcal, 78g + 22g prot, 
698ml free water 



1. Maintain current rate, add Prosource 1 pack BID to better meet est

protein needs

2. Flush per MD/ HOB over 30 degrees







ADDITIONAL RECOMMENDATIONS:

* RECALIBRATE BED SCALE POST TRACH AND PEG PROCEDURES 

* Add PROSOURCE 1 pack BID to better meet est protein needs 

* Wound healing-  Nephrovite x 1, Scotty 1pkt BID 

* Monitor for hypoglycemia 

* Monitor tolerance to current TF rate  

* Monitor lytes daily w/ con't loose stool

## 2018-12-28 NOTE — NUR
RESPIRATORY NOTE: Received pt on trache Shiley cuffed 8.0 with current vent setting: AC 
8-450ml-40%FiO2- peep of 5, saturates at 99%. Patient appears alert and oriented. Patient 
stable on current setting, no acute resp distress or SOB noted at this time. Alarms are set 
and audible, vent is plugged into the red outlet, extra trache and ambu bag are at bed side. 
Will continue to monitor pt and suction as needed.

## 2018-12-28 NOTE — GI PROGRESS NOTE
Assessment/Plan


Problems:  


(1) Encounter for PEG (percutaneous endoscopic gastrostomy)


ICD Codes:  Z43.1 - Encounter for attention to gastrostomy


SNOMED:  573139242, 449184684


(2) Severe malnutrition


ICD Codes:  E43 - Unspecified severe protein-calorie malnutrition


SNOMED:  47539182


(3) Dehydration


ICD Codes:  E86.0 - Dehydration


SNOMED:  93825506


Status:  stable


Status Narrative


Discussed with Dr. Lam


Assessment/Plan


Assessment


- Dysphagia


- Resp failure


- ESRD


- Anemia


- Status post PEG





Recommendations


1. Abdominal binder.


2. Elevate the head of the bed at all times.


3. G-tube flush.


4. G-tube care.


5. tube feeding later today per RD to goal


Monitor H&H, PRN transfusions


PPI


Electrolyte correction


Follow-up labs





The patient was seen and examined at bedside and all new and available data was 

reviewed in the patients chart. I agree with the above findings, impression 

and plan.  (Patient seen earlier today. Signature stamp does not reflect 

patient encounter time.). - Elliott Lam MD





Subjective


Subjective


limited





Objective





Last 24 Hour Vital Signs








  Date Time  Temp Pulse Resp B/P (MAP) Pulse Ox O2 Delivery O2 Flow Rate FiO2


 


12/28/18 10:35  91 21     40


 


12/28/18 09:32    153/62    


 


12/28/18 09:26  83 20     40


 


12/28/18 08:14        40


 


12/28/18 08:00 96.2 82 18 153/62 (92) 96   


 


12/28/18 08:00  81      


 


12/28/18 06:57  78 15     40


 


12/28/18 05:05  70 17     40


 


12/28/18 04:00 99.1 62 15 87/15 (39) 100   


 


12/28/18 04:00      Mechanical Ventilator  





      Mechanical Ventilator  


 


12/28/18 04:00        40


 


12/28/18 04:00  70      


 


12/28/18 02:54  77 12     40


 


12/28/18 00:46  72 11     40


 


12/28/18 00:00  75      


 


12/28/18 00:00      Mechanical Ventilator  





      Mechanical Ventilator  


 


12/28/18 00:00 99.7 68 16 110/21 (50) 100   


 


12/28/18 00:00        40


 


12/27/18 23:03  77 18     40


 


12/27/18 20:55  76 15     40


 


12/27/18 20:43    135/32    


 


12/27/18 20:00        40


 


12/27/18 20:00 99.7 75 16 131/34 (66) 100   


 


12/27/18 20:00      Mechanical Ventilator  





      Mechanical Ventilator  


 


12/27/18 20:00  68      


 


12/27/18 19:08  74 15     40


 


12/27/18 16:40  75 18     40


 


12/27/18 16:00      Mechanical Ventilator  





      Mechanical Ventilator  


 


12/27/18 16:00        40


 


12/27/18 16:00  68      


 


12/27/18 15:41 97.6 70 14 100/36 (57) 97   


 


12/27/18 15:04  73 14     40


 


12/27/18 13:22  84 17     40

















Intake and Output  


 


 12/27/18 12/28/18





 19:00 07:00


 


Intake Total 740 ml 540 ml


 


Output Total 210 ml 


 


Balance 530 ml 540 ml


 


  


 


Free Water 260 ml 100 ml


 


Tube Feeding 480 ml 440 ml


 


Stool Total 210 ml 











Laboratory Tests








Test


  12/28/18


03:15


 


White Blood Count


  9.2 K/UL


(4.8-10.8)


 


Red Blood Count


  3.05 M/UL


(4.20-5.40)  L


 


Hemoglobin


  9.1 G/DL


(12.0-16.0)  L


 


Hematocrit


  29.4 %


(37.0-47.0)  L


 


Mean Corpuscular Volume 96 FL (80-99)  


 


Mean Corpuscular Hemoglobin


  29.9 PG


(27.0-31.0)


 


Mean Corpuscular Hemoglobin


Concent 31.0 G/DL


(32.0-36.0)  L


 


Red Cell Distribution Width


  19.4 %


(11.6-14.8)  H


 


Platelet Count


  347 K/UL


(150-450)


 


Mean Platelet Volume


  6.0 FL


(6.5-10.1)  L


 


Neutrophils (%) (Auto)


  80.6 %


(45.0-75.0)  H


 


Lymphocytes (%) (Auto)


  6.6 %


(20.0-45.0)  L


 


Monocytes (%) (Auto)


  9.6 %


(1.0-10.0)


 


Eosinophils (%) (Auto)


  2.0 %


(0.0-3.0)


 


Basophils (%) (Auto)


  1.2 %


(0.0-2.0)


 


Sodium Level


  130 MMOL/L


(136-145)  L


 


Potassium Level


  3.5 MMOL/L


(3.5-5.1)


 


Chloride Level


  93 MMOL/L


()  L


 


Carbon Dioxide Level


  27 MMOL/L


(21-32)


 


Anion Gap


  10 mmol/L


(5-15)


 


Blood Urea Nitrogen


  43 mg/dL


(7-18)  H


 


Creatinine


  2.3 MG/DL


(0.55-1.30)  H


 


Estimat Glomerular Filtration


Rate  mL/min (>60)  


 


 


Glucose Level


  121 MG/DL


()  H


 


Calcium Level


  9.5 MG/DL


(8.5-10.1)








Height (Feet):  5


Height (Inches):  2.00


Weight (Pounds):  105


General Appearance:  WD/WN, no apparent distress, alert


Cardiovascular:  normal rate


Respiratory/Chest:  normal breath sounds, no respiratory distress


Abdominal Exam:  normal bowel sounds, non tender, soft, GT site - Clean dry and 

intact


Extremities:  non-tender











Shane Lowry NP Dec 28, 2018 12:04

## 2018-12-28 NOTE — NUR
NURSE NOTES:

Dialysis complete at this time. 2L out. 

-------------------------------------------------------------------------------

Addendum: 12/28/18 at 1926 by Jen Golden RN

-------------------------------------------------------------------------------

VS stable at this time.

## 2018-12-28 NOTE — NUR
NURSE NOTES:

Received patient from [facility]. Patient VS stable at this time with no sign of acute 
distress. Patient denies pain or discomfort at this time. Patient appears alert and 
oriented. Patient on trach to vent at this time with setting of AC 8, , FiO2 40%, and 
PEEP 5. Patient stable on current setting. Patient  has a G tube that is patent and running 
Nepro at 40cc/hr at this time. Patient has a rectal tube for loose stool at this time. 
Rectal tube patent and draining at this time. Patient anuric at this time. Patient has a 
sacral stage 4 pressure ulcer, bilateral DTI of the heal, diabetic ulcers of the right leg, 
and is on a P200 mattress. Patient has a right upper arm midline that is patent and saline 
locked at this time. Patient scheduled for dialysis today with [facility]. [facility] has been called 
yesterday regarding treatment. Patient bed in low position with bed alarm on and call light 
in reach at this time.

## 2018-12-28 NOTE — NUR
NURSE NOTES:

NO ACUTE EVENTS OVERNIGHT. PATIENTS VITALS HAS REMAINED STABLE, NO FEVERS OR ACUTE DISTRESS 
OBSERVED. PATIENT REMAINS ON RECTAL TUBE FOR DIARRHEA, LOOSE BROWN STOOL. PATIENT PATIENT 
REPOSITIONED Q 2HRS AND ORAL CARE PERFORMED Q 4. PATIENT HAS BEEN OBSERVED TO BE COOPERATIVE 
WITHOUT THE NEED OF RESTRAINTS THEREFORE, RESTRAINTS WAS DISCONTINUED AT 0430. PATIENT SINCE 
THEN HAS NOT TRIED TO PULL ANY DEVICES. WOUND DRESSINGS WERE CHANGED ACCORDING TO MD ORDERS. 
NEPRO REMAINS RUNNING AT 40ML/HR, NEW FEED HUNG AND NEW TUBING CHANGED. IV LINES REMAINS 
PATENT, CLEAN, DRY AND ASYMPTOMATIC; SURESITE WAS CHANGED. NO NEW CHANGES, WILL CONTINUE TO 
OBSERVE AND MONITOR PATIENTS PROGRESS.

## 2018-12-28 NOTE — NUR
Received patient from [facility] Li. Observed pt lying on bed. Awake and A/Ox3-4. Pt on trach to 
vent with setting of AC8, , FiO2 40%, and PEEP 5, no distress noted at this time. pt 
has G-tube, intact and patent, running Nepro at 40cc/hr. Patient has a rectal tube, intact 
and draining well. Patient anuric at this time. Pt has a sacral stage 4 pressure ulcer, 
bilateral DTI of the heal, diabetic ulcers of the right leg, and is on a P200 mattress. 
Midline is on R UA, intact and patent. AV shunt on L UA, patent, asymptomatic. Bed in the 
lowest position. Call light within reach. Will continue to monitor.

## 2018-12-28 NOTE — NUR
NURSE NOTES:

Discontinue restraints, patient been doing good without them. Will continue to monitor.

## 2018-12-28 NOTE — PULMONOLOGY PROGRESS NOTE
Assessment/Plan


Assessment/Plan


IMPRESSION:


1. Hypotension. Resolved.


2. Sepsis.


3. Atrial fibrillation.


4. Bradycardia.


5. Diabetes mellitus.


6. End-stage renal disease on dialysis.


7. Left lung collapse. Resolved


8. Respiratory failure. now status post tracheostomy


9. Dysphagia.


10. Dementia.


11. Anemia


12. Pneumonia





DISCUSSION:


1. Continue medications.


2. CXR improved


3. Status post tracheostomy


4. S/p G-tube 


5. Dr Savage will cover me 12/29/18-1/2/19














  ______________________________________________


  Quentin Cardoso M.D.





Subjective


Interval Events:  No new events


Constitutional:  Reports: no symptoms


HEENT:  Repors: no symptoms


Respiratory:  Reports: no symptoms


Cardiovascular:  Reports: no symptoms


Gastrointestinal/Abdominal:  Reports: no symptoms


Genitourinary:  Reports: no symptoms


Neurologic:  Reports: no symptoms


Allergies:  


Coded Allergies:  


     No Known Allergies (Unverified , 11/26/18)





Objective





Last 24 Hour Vital Signs








  Date Time  Temp Pulse Resp B/P (MAP) Pulse Ox O2 Delivery O2 Flow Rate FiO2


 


12/28/18 08:14        40


 


12/28/18 06:57  78 15     40


 


12/28/18 05:05  70 17     40


 


12/28/18 04:00 99.1 62 15 87/15 (39) 100   


 


12/28/18 04:00      Mechanical Ventilator  





      Mechanical Ventilator  


 


12/28/18 04:00        40


 


12/28/18 04:00  70      


 


12/28/18 02:54  77 12     40


 


12/28/18 00:46  72 11     40


 


12/28/18 00:00  75      


 


12/28/18 00:00      Mechanical Ventilator  





      Mechanical Ventilator  


 


12/28/18 00:00 99.7 68 16 110/21 (50) 100   


 


12/28/18 00:00        40


 


12/27/18 23:03  77 18     40


 


12/27/18 20:55  76 15     40


 


12/27/18 20:43    135/32    


 


12/27/18 20:00        40


 


12/27/18 20:00 99.7 75 16 131/34 (66) 100   


 


12/27/18 20:00      Mechanical Ventilator  





      Mechanical Ventilator  


 


12/27/18 20:00  68      


 


12/27/18 19:08  74 15     40


 


12/27/18 16:40  75 18     40


 


12/27/18 16:00      Mechanical Ventilator  





      Mechanical Ventilator  


 


12/27/18 16:00        40


 


12/27/18 16:00  68      


 


12/27/18 15:41 97.6 70 14 100/36 (57) 97   


 


12/27/18 15:04  73 14     40


 


12/27/18 13:22  84 17     40


 


12/27/18 12:00  76      


 


12/27/18 12:00      Mechanical Ventilator  





      Mechanical Ventilator  


 


12/27/18 12:00        40


 


12/27/18 11:51 97.2 79 17 120/47 (71) 97   


 


12/27/18 11:17  74 14     40


 


12/27/18 09:00    121/49    

















Intake and Output  


 


 12/27/18 12/28/18





 19:00 07:00


 


Intake Total 740 ml 540 ml


 


Output Total 210 ml 


 


Balance 530 ml 540 ml


 


  


 


Free Water 260 ml 100 ml


 


Tube Feeding 480 ml 440 ml


 


Stool Total 210 ml 








General Appearance:  no acute distress


HEENT:  normocephalic, status post trach


Respiratory/Chest:  chest wall non-tender, lungs clear, normal breath sounds


Cardiovascular:  normal peripheral pulses, normal rate


Abdomen:  normal bowel sounds, soft, non tender


Laboratory Tests


12/28/18 03:15: 


White Blood Count 9.2, Red Blood Count 3.05L, Hemoglobin 9.1L, Hematocrit 29.4L

, Mean Corpuscular Volume 96, Mean Corpuscular Hemoglobin 29.9, Mean 

Corpuscular Hemoglobin Concent 31.0L, Red Cell Distribution Width 19.4H, 

Platelet Count 347, Mean Platelet Volume 6.0L, Neutrophils (%) (Auto) 80.6H, 

Lymphocytes (%) (Auto) 6.6L, Monocytes (%) (Auto) 9.6, Eosinophils (%) (Auto) 

2.0, Basophils (%) (Auto) 1.2, Sodium Level 130L, Potassium Level 3.5, Chloride 

Level 93L, Carbon Dioxide Level 27, Anion Gap 10, Blood Urea Nitrogen 43H, 

Creatinine 2.3H, Estimat Glomerular Filtration Rate , Glucose Level 121H, 

Calcium Level 9.5





Current Medications








 Medications


  (Trade)  Dose


 Ordered  Sig/Tony


 Route


 PRN Reason  Start Time


 Stop Time Status Last Admin


Dose Admin


 


 Acetaminophen


  (Tylenol)  650 mg  Q6H  PRN


 GT


 Mild Pain/Temp > 100.5  12/23/18 16:00


 1/20/19 15:59  12/27/18 14:29


 


 


 Amiodarone HCl


  (Cordarone)  200 mg  EVERY 12  HOURS


 GT


   12/27/18 21:00


 1/26/19 20:59  12/27/18 20:43


 


 


 Apixaban


  (Eliquis)  2.5 mg  BID


 GT


   12/27/18 18:00


 1/26/19 17:59  12/27/18 17:09


 


 


 Artificial Tears


  (Akwa-Tears)  1 drop  Q4H  PRN


 BOTH EYES


 Dry Eyes  12/23/18 15:30


 1/21/19 15:29   


 


 


 Collagenase


  (Santyl)  1 applic  Q12HR


 TOPIC


   12/23/18 21:00


 1/5/19 20:59  12/27/18 20:43


 


 


 Epoetin Rupesh


  (Procrit (for


 ESRD on dialysis))  10,000 units  MON-WED-FRI


 SUBQ


   12/24/18 21:00


 12/28/18 20:59  12/26/18 21:00


 


 


 Lansoprazole


  (Prevacid)  30 mg  Q12HR


 GT


   12/27/18 21:00


 1/26/19 20:59  12/27/18 20:40


 


 


 Lorazepam


  (Ativan)  0.5 mg  Q8H  PRN


 ORAL


 For Anxiety  12/27/18 17:45


 1/3/19 17:44  12/27/18 20:42


 


 


 Minoxidil


  (Loniten)  2.5 mg  Q12HR


 GT


   12/27/18 21:00


 1/26/19 20:59  12/27/18 20:43


 


 


 Sodium Chloride  1,000 ml @ 


 500 mls/hr  Q2H PRN


 IVLG


 sbp<90 during hd  12/28/18 17:38


 1/27/19 17:37   


 


 


 Sodium Chloride


  (Ocean Nasal


 Spray)  1 spray  Q4H  PRN


 NASAL


 dry nose  12/23/18 14:30


 1/21/19 18:29  12/25/18 11:49


 


 


 Vitamin D


  (Vitamin D)  1,000 intlu  DAILY


 GT


   12/24/18 09:00


 1/8/19 09:59  12/27/18 08:58


 


 


 Zolpidem Tartrate


  (Ambien)  5 mg  HSPRN  PRN


 GT


 Insomnia  12/27/18 16:45


 1/3/19 16:44   


 

















Quentin Cardoso MD Dec 28, 2018 08:43

## 2018-12-28 NOTE — NUR
CASE MANAGEMENT: REVIEW





SI: ESRD ON HD

TRACHEOSTOMY 12/17

PEG PLACEMENT 12/19 

T 96.2 HR 86 RR 18 /74 SAT 95% MECH VENT FIO2 40

H/H 10.0/32.9  BUN 43 CR 2.3 







IS: MINOXIDIL GT Q12HR 

PROCRIT SQ MWF

ELIQUIS NG BID 

AMIODARONE GT Q12HR 

GT FEEDING NEPRO @ 40ML/HR



***STEP DOWN UNIT  STATUS***

DCP: PATIENT IS FROM [facility]

## 2018-12-28 NOTE — NUR
NURSE NOTES:

Patient pulled out her dialysis access during dialysis. Patient lost about 200cc of blood. 
stat CBC ordered by doctor at this time. Doctor Oxana is aware.

## 2018-12-28 NOTE — NUR
NURSE NOTES:

Patient placed on soft wrist restraint due to her being witnessed attempting to remove her 
trach multiple times this morning. Patient instructed multiple times to leave her trach 
alone at this time, but she is confused and/or noncompliant at this time. Will attempt to 
discontinue soft wrist restraints when possible.

## 2018-12-28 NOTE — NUR
***INSURANCE***





UPDATED CLINICALS AND REVIEW FAXED TO 





[facility]#: [id number]

NCM: JOHN TORRES#[phone]

F#: [phone]

## 2018-12-28 NOTE — NUR
NURSE NOTES:

Repositioned, oral care performed, vitals remains stable, rectal tube remains patent and 
intact, loose stools continue, Suctioned patient.

## 2018-12-28 NOTE — NUR
HAND-OFF: 

Report given to CAROLYN Nolen. Patient VS stable at this time. Patient complaining of headache at 
this time. Patient given tylenol x 2 during my shift, but it has not helped. Order obtained 
from MD for Norco. Endorsed to follow up with pain medication administration.

## 2018-12-28 NOTE — NUR
RESPIRATORY NOTE:

PT. REMAINED STABLE ON CMV WITH CURRENT SETTINGS. SX PRN. VENT CIRCUIT AND SX TUBING SECURE 
AND OUT OF THE WAY.  NO S/S OF RESPIRATORY DISTRESS NOTED AT THIS TIME.

## 2018-12-28 NOTE — NUR
NURSE NOTES:

Patient complaining of headache unrelieved by the two doses of tylenol she received. Patient 
to be given Norco 5mg per doctor Daigle.

## 2018-12-28 NOTE — CARDIOLOGY PROGRESS NOTE
Assessment/Plan


Assessment/Plan


1. Hypotension, possibly sepsis versus volume.


2. Paroxysmal episodes of atrial fibrillation history.


3. Bradycardia secondary to medications, amiodarone possibly.


4. Diabetes mellitus.


5. End-stage renal disease, on hemodialysis.


6. Lung collapse.


7. Respiratory failure, status post intubation.


8. Dysphagia.


9. History of dementia.


10. Pulm htn 


11.  Pleural effusion 


12. cardaic arrest


13. chest wall pain post cpr  





mostly in sinus now 


stool ob neg x2 


amiod bid unitl jan 1 then 200 mg daily 


vent support 


s/p trach


peg done 


decreaase minoxidl and hold parameter applied 


on elequis  2.5 mg bid for stroke prevention





Subjective


ROS Limited/Unobtainable:  Yes


Subjective


on the vent, sob





Objective





Last 24 Hour Vital Signs








  Date Time  Temp Pulse Resp B/P (MAP) Pulse Ox O2 Delivery O2 Flow Rate FiO2


 


12/28/18 18:45  86 20     40


 


12/28/18 17:10  85 22     40


 


12/28/18 16:00 97.7 79 20 121/33 (62) 100   


 


12/28/18 16:00        40


 


12/28/18 16:00      Mechanical Ventilator  





      Mechanical Ventilator  


 


12/28/18 16:00  82      


 


12/28/18 14:35  83 18     40


 


12/28/18 12:42  79 17     40


 


12/28/18 12:00      Mechanical Ventilator  





      Mechanical Ventilator  


 


12/28/18 12:00        40


 


12/28/18 12:00  86      


 


12/28/18 12:00 96.2 82 18 157/74 (101) 95   


 


12/28/18 10:35  91 21     40


 


12/28/18 09:32    153/62    


 


12/28/18 09:26  83 20     40


 


12/28/18 08:14        40


 


12/28/18 08:00      Mechanical Ventilator  





      Mechanical Ventilator  


 


12/28/18 08:00 96.2 82 18 153/62 (92) 96   


 


12/28/18 08:00  81      


 


12/28/18 06:57  78 15     40


 


12/28/18 05:05  70 17     40


 


12/28/18 04:00 99.1 62 15 87/15 (39) 100   


 


12/28/18 04:00      Mechanical Ventilator  





      Mechanical Ventilator  


 


12/28/18 04:00        40


 


12/28/18 04:00  70      


 


12/28/18 02:54  77 12     40


 


12/28/18 00:46  72 11     40


 


12/28/18 00:00  75      


 


12/28/18 00:00      Mechanical Ventilator  





      Mechanical Ventilator  


 


12/28/18 00:00 99.7 68 16 110/21 (50) 100   


 


12/28/18 00:00        40


 


12/27/18 23:03  77 18     40


 


12/27/18 20:55  76 15     40


 


12/27/18 20:43    135/32    


 


12/27/18 20:00        40


 


12/27/18 20:00 99.7 75 16 131/34 (66) 100   


 


12/27/18 20:00      Mechanical Ventilator  





      Mechanical Ventilator  


 


12/27/18 20:00  68      


 


12/27/18 19:08  74 15     40








General Appearance:  alert, on vent











Intake and Output  


 


 12/27/18 12/28/18





 19:00 07:00


 


Intake Total 740 ml 580 ml


 


Output Total 210 ml 


 


Balance 530 ml 580 ml


 


  


 


Free Water 260 ml 100 ml


 


Tube Feeding 480 ml 480 ml


 


Stool Total 210 ml 











Laboratory Tests








Test


  12/28/18


03:15 12/28/18


15:25 12/28/18


17:42


 


White Blood Count


  9.2 K/UL


(4.8-10.8) 9.3 K/UL


(4.8-10.8) 8.8 K/UL


(4.8-10.8)


 


Red Blood Count


  3.05 M/UL


(4.20-5.40)  L 3.38 M/UL


(4.20-5.40)  L 3.56 M/UL


(4.20-5.40)  L


 


Hemoglobin


  9.1 G/DL


(12.0-16.0)  L 10.0 G/DL


(12.0-16.0)  L 10.7 G/DL


(12.0-16.0)  L


 


Hematocrit


  29.4 %


(37.0-47.0)  L 32.9 %


(37.0-47.0)  L 34.0 %


(37.0-47.0)  L


 


Mean Corpuscular Volume 96 FL (80-99)   97 FL (80-99)   96 FL (80-99)  


 


Mean Corpuscular Hemoglobin


  29.9 PG


(27.0-31.0) 29.7 PG


(27.0-31.0) 30.0 PG


(27.0-31.0)


 


Mean Corpuscular Hemoglobin


Concent 31.0 G/DL


(32.0-36.0)  L 30.5 G/DL


(32.0-36.0)  L 31.4 G/DL


(32.0-36.0)  L


 


Red Cell Distribution Width


  19.4 %


(11.6-14.8)  H 18.8 %


(11.6-14.8)  H 18.5 %


(11.6-14.8)  H


 


Platelet Count


  347 K/UL


(150-450) 342 K/UL


(150-450) 416 K/UL


(150-450)


 


Mean Platelet Volume


  6.0 FL


(6.5-10.1)  L 5.9 FL


(6.5-10.1)  L 6.1 FL


(6.5-10.1)  L


 


Neutrophils (%) (Auto)


  80.6 %


(45.0-75.0)  H 78.5 %


(45.0-75.0)  H 78.8 %


(45.0-75.0)  H


 


Lymphocytes (%) (Auto)


  6.6 %


(20.0-45.0)  L 6.2 %


(20.0-45.0)  L 9.0 %


(20.0-45.0)  L


 


Monocytes (%) (Auto)


  9.6 %


(1.0-10.0) 11.3 %


(1.0-10.0)  H 8.9 %


(1.0-10.0)


 


Eosinophils (%) (Auto)


  2.0 %


(0.0-3.0) 1.8 %


(0.0-3.0) 1.5 %


(0.0-3.0)


 


Basophils (%) (Auto)


  1.2 %


(0.0-2.0) 2.2 %


(0.0-2.0)  H 1.9 %


(0.0-2.0)


 


Sodium Level


  130 MMOL/L


(136-145)  L 


  132 MMOL/L


(136-145)  L


 


Potassium Level


  3.5 MMOL/L


(3.5-5.1) 


  3.8 MMOL/L


(3.5-5.1)


 


Chloride Level


  93 MMOL/L


()  L 


  94 MMOL/L


()  L


 


Carbon Dioxide Level


  27 MMOL/L


(21-32) 


  28 MMOL/L


(21-32)


 


Anion Gap


  10 mmol/L


(5-15) 


  10 mmol/L


(5-15)


 


Blood Urea Nitrogen


  43 mg/dL


(7-18)  H 


  32 mg/dL


(7-18)  H


 


Creatinine


  2.3 MG/DL


(0.55-1.30)  H 


  2.0 MG/DL


(0.55-1.30)  H


 


Estimat Glomerular Filtration


Rate  mL/min (>60)  


  


   mL/min (>60)  


 


 


Glucose Level


  121 MG/DL


()  H 


  144 MG/DL


()  H


 


Calcium Level


  9.5 MG/DL


(8.5-10.1) 


  9.3 MG/DL


(8.5-10.1)


 


Phosphorus Level


  


  


  3.2 MG/DL


(2.5-4.9)

















Mariusz Wade MD Dec 28, 2018 19:06

## 2018-12-28 NOTE — NUR
NURSE NOTES:

PRN pain med given for headache. pt lying on the bed and no acute distress noted. Will 
continue to monitor.

## 2018-12-28 NOTE — INFECTIOUS DISEASES PROG NOTE
Assessment/Plan


Assessment/Plan


A:


Sepsis treated


UTI treated


Cellulitis of R leg, osteomyelitis treated


Hypercapnic respiratory failure


ESRD on HD


DM


Anemia


Dementia


PVD


R pleural effusion


Mucous plug


s/o Cardiac arrest


Stage  IV sacral ulcer


P;


Observe off antibiotic


Waiting for placement





Subjective


ROS Limited/Unobtainable:  Yes


Constitutional:  Reports: no symptoms


Allergies:  


Coded Allergies:  


     No Known Allergies (Unverified , 11/26/18)





Objective


Vital Signs





Last 24 Hour Vital Signs








  Date Time  Temp Pulse Resp B/P (MAP) Pulse Ox O2 Delivery O2 Flow Rate FiO2


 


12/28/18 12:42  79 17     40


 


12/28/18 12:00      Mechanical Ventilator  





      Mechanical Ventilator  


 


12/28/18 10:35  91 21     40


 


12/28/18 09:32    153/62    


 


12/28/18 09:26  83 20     40


 


12/28/18 08:14        40


 


12/28/18 08:00      Mechanical Ventilator  





      Mechanical Ventilator  


 


12/28/18 08:00 96.2 82 18 153/62 (92) 96   


 


12/28/18 08:00  81      


 


12/28/18 06:57  78 15     40


 


12/28/18 05:05  70 17     40


 


12/28/18 04:00 99.1 62 15 87/15 (39) 100   


 


12/28/18 04:00      Mechanical Ventilator  





      Mechanical Ventilator  


 


12/28/18 04:00        40


 


12/28/18 04:00  70      


 


12/28/18 02:54  77 12     40


 


12/28/18 00:46  72 11     40


 


12/28/18 00:00  75      


 


12/28/18 00:00      Mechanical Ventilator  





      Mechanical Ventilator  


 


12/28/18 00:00 99.7 68 16 110/21 (50) 100   


 


12/28/18 00:00        40


 


12/27/18 23:03  77 18     40


 


12/27/18 20:55  76 15     40


 


12/27/18 20:43    135/32    


 


12/27/18 20:00        40


 


12/27/18 20:00 99.7 75 16 131/34 (66) 100   


 


12/27/18 20:00      Mechanical Ventilator  





      Mechanical Ventilator  


 


12/27/18 20:00  68      


 


12/27/18 19:08  74 15     40


 


12/27/18 16:40  75 18     40


 


12/27/18 16:00      Mechanical Ventilator  





      Mechanical Ventilator  


 


12/27/18 16:00        40


 


12/27/18 16:00  68      


 


12/27/18 15:41 97.6 70 14 100/36 (57) 97   


 


12/27/18 15:04  73 14     40








Height (Feet):  5


Height (Inches):  2.00


Weight (Pounds):  105


HEENT:  status post trach


Respiratory/Chest:  lungs clear, other - on vetilator


Cardiovascular:  normal rate, other - HD catheter


Abdomen:  soft, non tender, other - Rectal & gastric tubes


Extremities:  no edema


Neurologic/Psychiatric:  alert, responsive, disoriented





Laboratory Tests








Test


  12/28/18


03:15


 


White Blood Count


  9.2 K/UL


(4.8-10.8)


 


Red Blood Count


  3.05 M/UL


(4.20-5.40)  L


 


Hemoglobin


  9.1 G/DL


(12.0-16.0)  L


 


Hematocrit


  29.4 %


(37.0-47.0)  L


 


Mean Corpuscular Volume 96 FL (80-99)  


 


Mean Corpuscular Hemoglobin


  29.9 PG


(27.0-31.0)


 


Mean Corpuscular Hemoglobin


Concent 31.0 G/DL


(32.0-36.0)  L


 


Red Cell Distribution Width


  19.4 %


(11.6-14.8)  H


 


Platelet Count


  347 K/UL


(150-450)


 


Mean Platelet Volume


  6.0 FL


(6.5-10.1)  L


 


Neutrophils (%) (Auto)


  80.6 %


(45.0-75.0)  H


 


Lymphocytes (%) (Auto)


  6.6 %


(20.0-45.0)  L


 


Monocytes (%) (Auto)


  9.6 %


(1.0-10.0)


 


Eosinophils (%) (Auto)


  2.0 %


(0.0-3.0)


 


Basophils (%) (Auto)


  1.2 %


(0.0-2.0)


 


Sodium Level


  130 MMOL/L


(136-145)  L


 


Potassium Level


  3.5 MMOL/L


(3.5-5.1)


 


Chloride Level


  93 MMOL/L


()  L


 


Carbon Dioxide Level


  27 MMOL/L


(21-32)


 


Anion Gap


  10 mmol/L


(5-15)


 


Blood Urea Nitrogen


  43 mg/dL


(7-18)  H


 


Creatinine


  2.3 MG/DL


(0.55-1.30)  H


 


Estimat Glomerular Filtration


Rate  mL/min (>60)  


 


 


Glucose Level


  121 MG/DL


()  H


 


Calcium Level


  9.5 MG/DL


(8.5-10.1)











Current Medications








 Medications


  (Trade)  Dose


 Ordered  Sig/Tony


 Route


 PRN Reason  Start Time


 Stop Time Status Last Admin


Dose Admin


 


 Acetaminophen


  (Tylenol)  650 mg  ONCE


 GT


   12/28/18 13:23


 12/28/18 14:23   


 


 


 Acetaminophen


  (Tylenol)  650 mg  Q6H  PRN


 GT


 Mild Pain/Temp > 100.5  12/23/18 16:00


 1/20/19 15:59  12/28/18 12:03


 


 


 Amiodarone HCl


  (Cordarone)  200 mg  EVERY 12  HOURS


 GT


   12/27/18 21:00


 1/26/19 20:59  12/28/18 09:31


 


 


 Apixaban


  (Eliquis)  2.5 mg  BID


 GT


   12/27/18 18:00


 1/26/19 17:59  12/28/18 09:32


 


 


 Artificial Tears


  (Akwa-Tears)  1 drop  Q4H  PRN


 BOTH EYES


 Dry Eyes  12/23/18 15:30


 1/21/19 15:29   


 


 


 Collagenase


  (Santyl)  1 applic  Q12HR


 TOPIC


   12/23/18 21:00


 1/5/19 20:59  12/28/18 09:31


 


 


 Epoetin Rupesh


  (Procrit (for


 ESRD on dialysis))  10,000 units  MON-WED-FRI


 SUBQ


   12/24/18 21:00


 12/28/18 20:59  12/26/18 21:00


 


 


 Lansoprazole


  (Prevacid)  30 mg  Q12HR


 GT


   12/27/18 21:00


 1/26/19 20:59  12/28/18 09:32


 


 


 Lorazepam


  (Ativan)  0.5 mg  Q8H  PRN


 ORAL


 For Anxiety  12/27/18 17:45


 1/3/19 17:44  12/27/18 20:42


 


 


 Minoxidil


  (Loniten)  2.5 mg  Q12HR


 GT


   12/27/18 21:00


 1/26/19 20:59  12/28/18 09:32


 


 


 Sodium Chloride  1,000 ml @ 


 500 mls/hr  Q2H PRN


 IVLG


 sbp<90 during hd  12/28/18 17:38


 1/27/19 17:37   


 


 


 Sodium Chloride


  (Ocean Nasal


 Spray)  1 spray  Q4H  PRN


 NASAL


 dry nose  12/23/18 14:30


 1/21/19 18:29  12/25/18 11:49


 


 


 Vitamin D


  (Vitamin D)  1,000 intlu  DAILY


 GT


   12/24/18 09:00


 1/8/19 09:59  12/28/18 09:31


 


 


 Zolpidem Tartrate


  (Ambien)  5 mg  HSPRN  PRN


 GT


 Insomnia  12/27/18 16:45


 1/3/19 16:44   


 

















Luke Maynard MD Dec 28, 2018 13:40

## 2018-12-28 NOTE — NUR
NURSE NOTES:

Patient cleaned, dressings changed according to order sheet, new curex changed, patient 
repositioned, vitals remains stable, no fever, patient remains on Bilateral wrist 
restraints. Pulses noted, skin intact. Will continue to monitor.

## 2018-12-29 VITALS — SYSTOLIC BLOOD PRESSURE: 107 MMHG | DIASTOLIC BLOOD PRESSURE: 42 MMHG

## 2018-12-29 VITALS — SYSTOLIC BLOOD PRESSURE: 146 MMHG | DIASTOLIC BLOOD PRESSURE: 62 MMHG

## 2018-12-29 VITALS — DIASTOLIC BLOOD PRESSURE: 61 MMHG | SYSTOLIC BLOOD PRESSURE: 120 MMHG

## 2018-12-29 VITALS — SYSTOLIC BLOOD PRESSURE: 122 MMHG | DIASTOLIC BLOOD PRESSURE: 73 MMHG

## 2018-12-29 VITALS — DIASTOLIC BLOOD PRESSURE: 43 MMHG | SYSTOLIC BLOOD PRESSURE: 142 MMHG

## 2018-12-29 VITALS — SYSTOLIC BLOOD PRESSURE: 144 MMHG | DIASTOLIC BLOOD PRESSURE: 64 MMHG

## 2018-12-29 LAB
ADD MANUAL DIFF: NO
ANION GAP SERPL CALC-SCNC: 9 MMOL/L (ref 5–15)
BASOPHILS NFR BLD AUTO: 1.4 % (ref 0–2)
BUN SERPL-MCNC: 38 MG/DL (ref 7–18)
CALCIUM SERPL-MCNC: 9.4 MG/DL (ref 8.5–10.1)
CHLORIDE SERPL-SCNC: 95 MMOL/L (ref 98–107)
CO2 SERPL-SCNC: 27 MMOL/L (ref 21–32)
CREAT SERPL-MCNC: 2.3 MG/DL (ref 0.55–1.3)
EOSINOPHIL NFR BLD AUTO: 1.1 % (ref 0–3)
ERYTHROCYTE [DISTWIDTH] IN BLOOD BY AUTOMATED COUNT: 18.2 % (ref 11.6–14.8)
HCT VFR BLD CALC: 31.8 % (ref 37–47)
HGB BLD-MCNC: 10 G/DL (ref 12–16)
LYMPHOCYTES NFR BLD AUTO: 6.7 % (ref 20–45)
MCV RBC AUTO: 94 FL (ref 80–99)
MONOCYTES NFR BLD AUTO: 10.1 % (ref 1–10)
NEUTROPHILS NFR BLD AUTO: 80.6 % (ref 45–75)
PHOSPHATE SERPL-MCNC: 3.7 MG/DL (ref 2.5–4.9)
PLATELET # BLD: 408 K/UL (ref 150–450)
POTASSIUM SERPL-SCNC: 3.8 MMOL/L (ref 3.5–5.1)
RBC # BLD AUTO: 3.39 M/UL (ref 4.2–5.4)
SODIUM SERPL-SCNC: 131 MMOL/L (ref 136–145)
WBC # BLD AUTO: 9.1 K/UL (ref 4.8–10.8)

## 2018-12-29 RX ADMIN — MINOXIDIL SCH MG: 2.5 TABLET ORAL at 09:12

## 2018-12-29 RX ADMIN — MINOXIDIL SCH MG: 2.5 TABLET ORAL at 20:33

## 2018-12-29 RX ADMIN — AMIODARONE HYDROCHLORIDE SCH MG: 200 TABLET ORAL at 09:13

## 2018-12-29 RX ADMIN — LORAZEPAM PRN MG: 0.5 TABLET ORAL at 11:33

## 2018-12-29 RX ADMIN — APIXABAN SCH MG: 2.5 TABLET, FILM COATED ORAL at 18:05

## 2018-12-29 RX ADMIN — ZOLPIDEM TARTRATE PRN MG: 5 TABLET ORAL at 20:32

## 2018-12-29 RX ADMIN — POLYVINYL ALCOHOL PRN DROP: 14 SOLUTION/ DROPS OPHTHALMIC at 15:03

## 2018-12-29 RX ADMIN — HYDROCODONE BITARTRATE AND ACETAMINOPHEN PRN TAB: 5; 325 TABLET ORAL at 18:04

## 2018-12-29 RX ADMIN — HYDROCODONE BITARTRATE AND ACETAMINOPHEN PRN TAB: 5; 325 TABLET ORAL at 11:19

## 2018-12-29 RX ADMIN — VITAMIN D, TAB 1000IU (100/BT) SCH INTLU: 25 TAB at 09:13

## 2018-12-29 RX ADMIN — APIXABAN SCH MG: 2.5 TABLET, FILM COATED ORAL at 09:13

## 2018-12-29 RX ADMIN — AMIODARONE HYDROCHLORIDE SCH MG: 200 TABLET ORAL at 20:33

## 2018-12-29 NOTE — NUR
HAND-OFF: 

Report given to CAROLYN Nolen. Patient VS stable at this time. Patient anxious and asking for 
ativan. Patient unable to have another dose at this time. Patient also asking about sleep 
medication. Endorsed to follow up.

## 2018-12-29 NOTE — NUR
NURSE NOTES:

Paged Dr Daigle regarding Magnesium of 2.5 this morning. Awaiting call back at this time.

## 2018-12-29 NOTE — PULMONOLOGY PROGRESS NOTE
Assessment/Plan


Assessment/Plan


Pulmonary Progress Note 





Assessment/Plan


Assessment/Plan


IMPRESSION:


1. Previous Hypotension. 


2. Previous Sepsis.


3. Atrial fibrillation.


4. Bradycardia.


5. Diabetes mellitus.


6. End-stage renal disease on dialysis.


7. Left lung collapse. Resolved


8. Respiratory failure. now status post tracheostomy


9. Dysphagia.


10. Dementia.


11. Anemia


12. Pneumonia





DISCUSSION:


1. Continue medications.


2. CXR improved


3. Status post tracheostomy


4. S/p G-tube 














Subjective


Interval Events:  None reported


Constitutional:  Reports: no symptoms


HEENT:  Repors: no symptoms


Respiratory:  Reports: no symptoms


Cardiovascular:  Reports: no symptoms


Gastrointestinal/Abdominal:  Reports: no symptoms


Genitourinary:  Reports: no symptoms


Neurologic:  Reports: no symptoms


Psychiatric:  Reports: no symptoms


Allergies:  


Coded Allergies:  


     No Known Allergies (Unverified , 11/26/18)





Objective





Vital Signs Noted


General Appearance:  no acute distress


HEENT:  normocephalic, status post trach


Respiratory/Chest:  chest wall non-tender, decreased breath sounds


Cardiovascular:  normal peripheral pulses, normal rate


Abdomen:  normal bowel sounds, soft, non tender





Laboratory Tests noted





Subjective


ROS Limited/Unobtainable:  No


Allergies:  


Coded Allergies:  


     No Known Allergies (Unverified , 11/26/18)





Objective





Last 24 Hour Vital Signs








  Date Time  Temp Pulse Resp B/P (MAP) Pulse Ox O2 Delivery O2 Flow Rate FiO2


 


12/29/18 11:07  75 16     40


 


12/29/18 09:17  72 16     40


 


12/29/18 09:12    142/43    


 


12/29/18 08:00  87      


 


12/29/18 08:00 97.7 83 16 142/43 (76) 98   


 


12/29/18 06:50  74 16     40


 


12/29/18 04:52  69 14     40


 


12/29/18 04:00      Mechanical Ventilator  





      Mechanical Ventilator  


 


12/29/18 04:00 97.4 70 22 107/42 (63) 99   


 


12/29/18 04:00        40


 


12/29/18 03:40  73      


 


12/29/18 03:26  72 12     40


 


12/29/18 01:10  70 20     40


 


12/29/18 00:00  96      


 


12/29/18 00:00        40


 


12/29/18 00:00 97.7 88 21 146/62 (90) 94   


 


12/29/18 00:00      Mechanical Ventilator  





      Mechanical Ventilator  


 


12/28/18 22:47  78 19     40


 


12/28/18 21:12    142/92    


 


12/28/18 20:30  83 18     40


 


12/28/18 20:00 97.5 79 19 142/92 (109) 99   


 


12/28/18 20:00        40


 


12/28/18 20:00      Mechanical Ventilator  





      Mechanical Ventilator  


 


12/28/18 20:00  78      


 


12/28/18 18:45  86 20     40


 


12/28/18 17:10  85 22     40


 


12/28/18 16:00 97.7 79 20 121/33 (62) 100   


 


12/28/18 16:00        40


 


12/28/18 16:00      Mechanical Ventilator  





      Mechanical Ventilator  


 


12/28/18 16:00  82      


 


12/28/18 14:35  83 18     40


 


12/28/18 12:42  79 17     40

















Intake and Output  


 


 12/28/18 12/29/18





 19:00 07:00


 


Intake Total 870 ml 730 ml


 


Output Total 2300 ml 30 ml


 


Balance -1430 ml 700 ml


 


  


 


Free Water 350 ml 250 ml


 


Tube Feeding 520 ml 480 ml


 


Stool Total 100 ml 30 ml


 


Estimated Blood Loss 200 ml 


 


Other 2000 ml 








Laboratory Tests


12/28/18 15:25: 


White Blood Count 9.3, Red Blood Count 3.38L, Hemoglobin 10.0L, Hematocrit 32.9L

, Mean Corpuscular Volume 97, Mean Corpuscular Hemoglobin 29.7, Mean 

Corpuscular Hemoglobin Concent 30.5L, Red Cell Distribution Width 18.8H, 

Platelet Count 342, Mean Platelet Volume 5.9L, Neutrophils (%) (Auto) 78.5H, 

Lymphocytes (%) (Auto) 6.2L, Monocytes (%) (Auto) 11.3H, Eosinophils (%) (Auto) 

1.8, Basophils (%) (Auto) 2.2H


12/28/18 17:42: 


White Blood Count 8.8, Red Blood Count 3.56L, Hemoglobin 10.7L, Hematocrit 34.0L

, Mean Corpuscular Volume 96, Mean Corpuscular Hemoglobin 30.0, Mean 

Corpuscular Hemoglobin Concent 31.4L, Red Cell Distribution Width 18.5H, 

Platelet Count 416, Mean Platelet Volume 6.1L, Neutrophils (%) (Auto) 78.8H, 

Lymphocytes (%) (Auto) 9.0L, Monocytes (%) (Auto) 8.9, Eosinophils (%) (Auto) 

1.5, Basophils (%) (Auto) 1.9, Sodium Level 132L, Potassium Level 3.8, Chloride 

Level 94L, Carbon Dioxide Level 28, Anion Gap 10, Blood Urea Nitrogen 32H, 

Creatinine 2.0H, Estimat Glomerular Filtration Rate , Glucose Level 144H, 

Calcium Level 9.3, Phosphorus Level 3.2


12/29/18 05:30: 


White Blood Count 9.1, Red Blood Count 3.39L, Hemoglobin 10.0L, Hematocrit 31.8L

, Mean Corpuscular Volume 94, Mean Corpuscular Hemoglobin 29.6, Mean 

Corpuscular Hemoglobin Concent 31.5L, Red Cell Distribution Width 18.2H, 

Platelet Count 408, Mean Platelet Volume 6.0L, Neutrophils (%) (Auto) 80.6H, 

Lymphocytes (%) (Auto) 6.7L, Monocytes (%) (Auto) 10.1H, Eosinophils (%) (Auto) 

1.1, Basophils (%) (Auto) 1.4, Sodium Level 131L, Potassium Level 3.8, Chloride 

Level 95L, Carbon Dioxide Level 27, Anion Gap 9, Blood Urea Nitrogen 38H, 

Creatinine 2.3H, Estimat Glomerular Filtration Rate , Glucose Level 127H, 

Calcium Level 9.4, Phosphorus Level 3.7, Magnesium Level 2.5H





Current Medications








 Medications


  (Trade)  Dose


 Ordered  Sig/Tony


 Route


 PRN Reason  Start Time


 Stop Time Status Last Admin


Dose Admin


 


 Acetaminophen


  (Tylenol)  650 mg  Q6H  PRN


 GT


 Mild Pain/Temp > 100.5  12/23/18 16:00


 1/20/19 15:59  12/28/18 12:03


 


 


 Acetaminophen/


 Hydrocodone Bitart


  (Norco 5/325)  1 tab  Q6H  PRN


 GT


 For Pain  12/28/18 18:15


 1/4/19 18:14  12/29/18 11:19


 


 


 Amiodarone HCl


  (Cordarone)  200 mg  EVERY 12  HOURS


 GT


   12/27/18 21:00


 1/26/19 20:59  12/29/18 09:13


 


 


 Apixaban


  (Eliquis)  2.5 mg  BID


 GT


   12/27/18 18:00


 1/26/19 17:59  12/29/18 09:13


 


 


 Artificial Tears


  (Akwa-Tears)  1 drop  Q4H  PRN


 BOTH EYES


 Dry Eyes  12/23/18 15:30


 1/21/19 15:29   


 


 


 Collagenase


  (Santyl)  1 applic  Q12HR


 TOPIC


   12/23/18 21:00


 1/5/19 20:59  12/29/18 09:13


 


 


 Lansoprazole


  (Prevacid)  30 mg  Q12HR


 GT


   12/27/18 21:00


 1/26/19 20:59  12/29/18 09:13


 


 


 Lorazepam


  (Ativan)  0.5 mg  Q8H  PRN


 ORAL


 For Anxiety  12/27/18 17:45


 1/3/19 17:44  12/29/18 11:33


 


 


 Minoxidil


  (Loniten)  2.5 mg  Q12HR


 GT


   12/27/18 21:00


 1/26/19 20:59  12/29/18 09:12


 


 


 Sodium Chloride  1,000 ml @ 


 500 mls/hr  Q2H PRN


 IVLG


 sbp<90 during hd  12/28/18 17:38


 1/27/19 17:37   


 


 


 Sodium Chloride


  (Ocean Nasal


 Spray)  1 spray  Q4H  PRN


 NASAL


 dry nose  12/23/18 14:30


 1/21/19 18:29  12/25/18 11:49


 


 


 Vitamin D


  (Vitamin D)  1,000 intlu  DAILY


 GT


   12/24/18 09:00


 1/8/19 09:59  12/29/18 09:13


 


 


 Zolpidem Tartrate


  (Ambien)  5 mg  HSPRN  PRN


 GT


 Insomnia  12/27/18 16:45


 1/3/19 16:44   


 

















Simon Silva MD Dec 29, 2018 12:11

## 2018-12-29 NOTE — NUR
NURSE NOTES:

Received patient from CAROLYN Nolen. Patient lying in bed at this time. Patient is anxious. 
Patient's hands are cold. Patient covered with two blankets at this time. Patient does not 
have fever at this time. Patient asked if she is okay. Patient shakes head no at this time. 
Patient has history of anxiety. Patient's son should be visiting her today. This should ease 
her anxiety. Patient showing SR on the monitor at this time. Patient VS stable at this time. 
Patient has history of A.fib. Patient is on trach to ventilator with setting of AC 8, TV 
450, FiO2 40%, and PEEP 5. Patient tolerating setting with stable saturation at this time. 
Patient has G tube that is intact and patent at this time. Patient running Nepro at 40cc/hr 
at this time. Patient tolerating with minimal residual of about 40cc. Patient has a rectal 
tube that is patent and draining at this time. Patient has multiple wounds. Patient has a 
sacral stage 4 pressure ulcer. Dressing to be changed today. Patient has diabetic ulcers on 
the right leg and ankle. Dressing to be changed today. patient had bilateral heal DTI. heals 
floated at this time. Patient to be turned every 2 hours and skin monitored closely at this 
time. Will follow up. Patient has a magnesium of 2.5 this morning. Patient has a right upper 
arm midline that is patent and saline locked at this time. Patient has bilateral arm + 1 
pitting edema at this time. Patient has bilateral dry skin on lower extremities. Patient bed 
in low position with bed alarm on and call light in reach at this time.

## 2018-12-29 NOTE — NEPHROLOGY PROGRESS NOTE
Assessment/Plan


Assessment


Seee below


Plan


MOF resolving.





ESRD HD q MWF 


Anemia of CKD , SAMANTHA high dose. Transfuse PRN.





A. 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 with LAE. LVEF 65% . On Eliquis.





Post trach + PEG .


Referred to [facility]. Referral not received there despite numerous requests. Pt'

s son agreeable to [facility].


See orders.


DW pt's son. Needs extensive Vascular W/U.





Subjective


Subjective


 No c/o. Per pt's son she's very anxious.





Objective


Objective





Last 24 Hour Vital Signs








  Date Time  Temp Pulse Resp B/P (MAP) Pulse Ox O2 Delivery O2 Flow Rate FiO2


 


12/29/18 13:22  78 18     40


 


12/29/18 12:00 97.9 80 18 120/61 (80) 100   


 


12/29/18 12:00  84      


 


12/29/18 11:07  75 16     40


 


12/29/18 09:17  72 16     40


 


12/29/18 09:12    142/43    


 


12/29/18 08:00  87      


 


12/29/18 08:00 97.7 83 16 142/43 (76) 98   


 


12/29/18 06:50  74 16     40


 


12/29/18 04:52  69 14     40


 


12/29/18 04:00      Mechanical Ventilator  





      Mechanical Ventilator  


 


12/29/18 04:00 97.4 70 22 107/42 (63) 99   


 


12/29/18 04:00        40


 


12/29/18 03:40  73      


 


12/29/18 03:26  72 12     40


 


12/29/18 01:10  70 20     40


 


12/29/18 00:00  96      


 


12/29/18 00:00        40


 


12/29/18 00:00 97.7 88 21 146/62 (90) 94   


 


12/29/18 00:00      Mechanical Ventilator  





      Mechanical Ventilator  


 


12/28/18 22:47  78 19     40


 


12/28/18 21:12    142/92    


 


12/28/18 20:30  83 18     40


 


12/28/18 20:00 97.5 79 19 142/92 (109) 99   


 


12/28/18 20:00        40


 


12/28/18 20:00      Mechanical Ventilator  





      Mechanical Ventilator  


 


12/28/18 20:00  78      


 


12/28/18 18:45  86 20     40


 


12/28/18 17:10  85 22     40


 


12/28/18 16:00 97.7 79 20 121/33 (62) 100   


 


12/28/18 16:00        40


 


12/28/18 16:00      Mechanical Ventilator  





      Mechanical Ventilator  


 


12/28/18 16:00  82      

















Intake and Output  


 


 12/28/18 12/29/18





 19:00 07:00


 


Intake Total 870 ml 730 ml


 


Output Total 2300 ml 30 ml


 


Balance -1430 ml 700 ml


 


  


 


Free Water 350 ml 250 ml


 


Tube Feeding 520 ml 480 ml


 


Stool Total 100 ml 30 ml


 


Estimated Blood Loss 200 ml 


 


Other 2000 ml 








Laboratory Tests


12/28/18 15:25: 


White Blood Count 9.3, Red Blood Count 3.38L, Hemoglobin 10.0L, Hematocrit 32.9L

, Mean Corpuscular Volume 97, Mean Corpuscular Hemoglobin 29.7, Mean 

Corpuscular Hemoglobin Concent 30.5L, Red Cell Distribution Width 18.8H, 

Platelet Count 342, Mean Platelet Volume 5.9L, Neutrophils (%) (Auto) 78.5H, 

Lymphocytes (%) (Auto) 6.2L, Monocytes (%) (Auto) 11.3H, Eosinophils (%) (Auto) 

1.8, Basophils (%) (Auto) 2.2H


12/28/18 17:42: 


White Blood Count 8.8, Red Blood Count 3.56L, Hemoglobin 10.7L, Hematocrit 34.0L

, Mean Corpuscular Volume 96, Mean Corpuscular Hemoglobin 30.0, Mean 

Corpuscular Hemoglobin Concent 31.4L, Red Cell Distribution Width 18.5H, 

Platelet Count 416, Mean Platelet Volume 6.1L, Neutrophils (%) (Auto) 78.8H, 

Lymphocytes (%) (Auto) 9.0L, Monocytes (%) (Auto) 8.9, Eosinophils (%) (Auto) 

1.5, Basophils (%) (Auto) 1.9, Sodium Level 132L, Potassium Level 3.8, Chloride 

Level 94L, Carbon Dioxide Level 28, Anion Gap 10, Blood Urea Nitrogen 32H, 

Creatinine 2.0H, Estimat Glomerular Filtration Rate , Glucose Level 144H, 

Calcium Level 9.3, Phosphorus Level 3.2


12/29/18 05:30: 


White Blood Count 9.1, Red Blood Count 3.39L, Hemoglobin 10.0L, Hematocrit 31.8L

, Mean Corpuscular Volume 94, Mean Corpuscular Hemoglobin 29.6, Mean 

Corpuscular Hemoglobin Concent 31.5L, Red Cell Distribution Width 18.2H, 

Platelet Count 408, Mean Platelet Volume 6.0L, Neutrophils (%) (Auto) 80.6H, 

Lymphocytes (%) (Auto) 6.7L, Monocytes (%) (Auto) 10.1H, Eosinophils (%) (Auto) 

1.1, Basophils (%) (Auto) 1.4, Sodium Level 131L, Potassium Level 3.8, Chloride 

Level 95L, Carbon Dioxide Level 27, Anion Gap 9, Blood Urea Nitrogen 38H, 

Creatinine 2.3H, Estimat Glomerular Filtration Rate , Glucose Level 127H, 

Calcium Level 9.4, Phosphorus Level 3.7, Magnesium Level 2.5H


Height (Feet):  5


Height (Inches):  2.00


Weight (Pounds):  113


Objective


On vent.


Trach clean.


Cv IRR/IRR


Lungs B ronchi.


Abd SNT. BS + New PEG.


E Rt. foot diabetic ulcers











Gavino Daigle MD Dec 29, 2018 14:38

## 2018-12-29 NOTE — INFECTIOUS DISEASES PROG NOTE
Assessment/Plan


Assessment/Plan


antibiotics : none





A


1. VRE | e.coli UTI s/p rx


2. shock resolved


3. leucocytosis resolved


4. respiratory failure s/p tracheostomy


5. renal failure


6. decubitus ulcers


7. nasal MRSA colonization


8. rectal VRE colonization


9. pleural effusion





P


1. observe off antibiotics





Subjective


ROS Limited/Unobtainable:  Yes


Allergies:  


Coded Allergies:  


     No Known Allergies (Unverified , 11/26/18)





Objective


Vital Signs





Last 24 Hour Vital Signs








  Date Time  Temp Pulse Resp B/P (MAP) Pulse Ox O2 Delivery O2 Flow Rate FiO2


 


12/29/18 09:17  72 16     40


 


12/29/18 09:12    142/43    


 


12/29/18 08:00  87      


 


12/29/18 08:00 97.7 83 16 142/43 (76) 98   


 


12/29/18 06:50  74 16     40


 


12/29/18 04:52  69 14     40


 


12/29/18 04:00      Mechanical Ventilator  





      Mechanical Ventilator  


 


12/29/18 04:00 97.4 70 22 107/42 (63) 99   


 


12/29/18 04:00        40


 


12/29/18 03:40  73      


 


12/29/18 03:26  72 12     40


 


12/29/18 01:10  70 20     40


 


12/29/18 00:00  96      


 


12/29/18 00:00        40


 


12/29/18 00:00 97.7 88 21 146/62 (90) 94   


 


12/29/18 00:00      Mechanical Ventilator  





      Mechanical Ventilator  


 


12/28/18 22:47  78 19     40


 


12/28/18 21:12    142/92    


 


12/28/18 20:30  83 18     40


 


12/28/18 20:00 97.5 79 19 142/92 (109) 99   


 


12/28/18 20:00        40


 


12/28/18 20:00      Mechanical Ventilator  





      Mechanical Ventilator  


 


12/28/18 20:00  78      


 


12/28/18 18:45  86 20     40


 


12/28/18 17:10  85 22     40


 


12/28/18 16:00 97.7 79 20 121/33 (62) 100   


 


12/28/18 16:00        40


 


12/28/18 16:00      Mechanical Ventilator  





      Mechanical Ventilator  


 


12/28/18 16:00  82      


 


12/28/18 14:35  83 18     40


 


12/28/18 12:42  79 17     40


 


12/28/18 12:00      Mechanical Ventilator  





      Mechanical Ventilator  


 


12/28/18 12:00        40


 


12/28/18 12:00  86      


 


12/28/18 12:00 96.2 82 18 157/74 (101) 95   








Height (Feet):  5


Height (Inches):  2.00


Weight (Pounds):  113


HEENT:  status post trach


Respiratory/Chest:  lungs clear


Cardiovascular:  normal rate, regular rhythm, no gallop/murmur


Abdomen:  soft, non tender, other - GT


Extremities:  no edema, other - right arm PICC





Laboratory Tests








Test


  12/28/18


15:25 12/28/18


17:42 12/29/18


05:30


 


White Blood Count


  9.3 K/UL


(4.8-10.8) 8.8 K/UL


(4.8-10.8) 9.1 K/UL


(4.8-10.8)


 


Red Blood Count


  3.38 M/UL


(4.20-5.40)  L 3.56 M/UL


(4.20-5.40)  L 3.39 M/UL


(4.20-5.40)  L


 


Hemoglobin


  10.0 G/DL


(12.0-16.0)  L 10.7 G/DL


(12.0-16.0)  L 10.0 G/DL


(12.0-16.0)  L


 


Hematocrit


  32.9 %


(37.0-47.0)  L 34.0 %


(37.0-47.0)  L 31.8 %


(37.0-47.0)  L


 


Mean Corpuscular Volume 97 FL (80-99)   96 FL (80-99)   94 FL (80-99)  


 


Mean Corpuscular Hemoglobin


  29.7 PG


(27.0-31.0) 30.0 PG


(27.0-31.0) 29.6 PG


(27.0-31.0)


 


Mean Corpuscular Hemoglobin


Concent 30.5 G/DL


(32.0-36.0)  L 31.4 G/DL


(32.0-36.0)  L 31.5 G/DL


(32.0-36.0)  L


 


Red Cell Distribution Width


  18.8 %


(11.6-14.8)  H 18.5 %


(11.6-14.8)  H 18.2 %


(11.6-14.8)  H


 


Platelet Count


  342 K/UL


(150-450) 416 K/UL


(150-450) 408 K/UL


(150-450)


 


Mean Platelet Volume


  5.9 FL


(6.5-10.1)  L 6.1 FL


(6.5-10.1)  L 6.0 FL


(6.5-10.1)  L


 


Neutrophils (%) (Auto)


  78.5 %


(45.0-75.0)  H 78.8 %


(45.0-75.0)  H 80.6 %


(45.0-75.0)  H


 


Lymphocytes (%) (Auto)


  6.2 %


(20.0-45.0)  L 9.0 %


(20.0-45.0)  L 6.7 %


(20.0-45.0)  L


 


Monocytes (%) (Auto)


  11.3 %


(1.0-10.0)  H 8.9 %


(1.0-10.0) 10.1 %


(1.0-10.0)  H


 


Eosinophils (%) (Auto)


  1.8 %


(0.0-3.0) 1.5 %


(0.0-3.0) 1.1 %


(0.0-3.0)


 


Basophils (%) (Auto)


  2.2 %


(0.0-2.0)  H 1.9 %


(0.0-2.0) 1.4 %


(0.0-2.0)


 


Sodium Level


  


  132 MMOL/L


(136-145)  L 131 MMOL/L


(136-145)  L


 


Potassium Level


  


  3.8 MMOL/L


(3.5-5.1) 3.8 MMOL/L


(3.5-5.1)


 


Chloride Level


  


  94 MMOL/L


()  L 95 MMOL/L


()  L


 


Carbon Dioxide Level


  


  28 MMOL/L


(21-32) 27 MMOL/L


(21-32)


 


Anion Gap


  


  10 mmol/L


(5-15) 9 mmol/L


(5-15)


 


Blood Urea Nitrogen


  


  32 mg/dL


(7-18)  H 38 mg/dL


(7-18)  H


 


Creatinine


  


  2.0 MG/DL


(0.55-1.30)  H 2.3 MG/DL


(0.55-1.30)  H


 


Estimat Glomerular Filtration


Rate 


   mL/min (>60)  


   mL/min (>60)  


 


 


Glucose Level


  


  144 MG/DL


()  H 127 MG/DL


()  H


 


Calcium Level


  


  9.3 MG/DL


(8.5-10.1) 9.4 MG/DL


(8.5-10.1)


 


Phosphorus Level


  


  3.2 MG/DL


(2.5-4.9) 3.7 MG/DL


(2.5-4.9)


 


Magnesium Level


  


  


  2.5 MG/DL


(1.8-2.4)  H











Current Medications








 Medications


  (Trade)  Dose


 Ordered  Sig/Tony


 Route


 PRN Reason  Start Time


 Stop Time Status Last Admin


Dose Admin


 


 Acetaminophen


  (Tylenol)  650 mg  Q6H  PRN


 GT


 Mild Pain/Temp > 100.5  12/23/18 16:00


 1/20/19 15:59  12/28/18 12:03


 


 


 Acetaminophen/


 Hydrocodone Bitart


  (Norco 5/325)  1 tab  Q6H  PRN


 GT


 For Pain  12/28/18 18:15


 1/4/19 18:14  12/28/18 20:53


 


 


 Amiodarone HCl


  (Cordarone)  200 mg  EVERY 12  HOURS


 GT


   12/27/18 21:00


 1/26/19 20:59  12/29/18 09:13


 


 


 Apixaban


  (Eliquis)  2.5 mg  BID


 GT


   12/27/18 18:00


 1/26/19 17:59  12/29/18 09:13


 


 


 Artificial Tears


  (Akwa-Tears)  1 drop  Q4H  PRN


 BOTH EYES


 Dry Eyes  12/23/18 15:30


 1/21/19 15:29   


 


 


 Collagenase


  (Santyl)  1 applic  Q12HR


 TOPIC


   12/23/18 21:00


 1/5/19 20:59  12/29/18 09:13


 


 


 Lansoprazole


  (Prevacid)  30 mg  Q12HR


 GT


   12/27/18 21:00


 1/26/19 20:59  12/29/18 09:13


 


 


 Lorazepam


  (Ativan)  0.5 mg  Q8H  PRN


 ORAL


 For Anxiety  12/27/18 17:45


 1/3/19 17:44  12/27/18 20:42


 


 


 Minoxidil


  (Loniten)  2.5 mg  Q12HR


 GT


   12/27/18 21:00


 1/26/19 20:59  12/29/18 09:12


 


 


 Sodium Chloride  1,000 ml @ 


 500 mls/hr  Q2H PRN


 IVLG


 sbp<90 during hd  12/28/18 17:38


 1/27/19 17:37   


 


 


 Sodium Chloride


  (Ocean Nasal


 Spray)  1 spray  Q4H  PRN


 NASAL


 dry nose  12/23/18 14:30


 1/21/19 18:29  12/25/18 11:49


 


 


 Vitamin D


  (Vitamin D)  1,000 intlu  DAILY


 GT


   12/24/18 09:00


 1/8/19 09:59  12/29/18 09:13


 


 


 Zolpidem Tartrate


  (Ambien)  5 mg  HSPRN  PRN


 GT


 Insomnia  12/27/18 16:45


 1/3/19 16:44   


 

















Chase Santillan MD Dec 29, 2018 10:46

## 2018-12-29 NOTE — NUR
NURSE NOTES:

Report received from CAROLYN Li. Observed pt lying on the bed. Awake and A/O x 3-4. Pt c/o 
headache with frown and moan. Distraction measures done at this time, turn on TV, change 
position, pillow support, and turn off the light. Pt has trach on vent, shiley 8.0, Ac 8, TD 
450, PEEP 5, FIO2 40%. GT intact and patent, running Nepro at 40cc/hr. Rectal tube intact 
and draining well. Midline on ASHISH, intact and patent. L AV shunt, asymptomatic. Bed in the 
lowest position. Side rails up x3. Call light within reach. Will continue to monitor.

## 2018-12-30 VITALS — DIASTOLIC BLOOD PRESSURE: 69 MMHG | SYSTOLIC BLOOD PRESSURE: 110 MMHG

## 2018-12-30 VITALS — SYSTOLIC BLOOD PRESSURE: 136 MMHG | DIASTOLIC BLOOD PRESSURE: 73 MMHG

## 2018-12-30 VITALS — SYSTOLIC BLOOD PRESSURE: 136 MMHG | DIASTOLIC BLOOD PRESSURE: 51 MMHG

## 2018-12-30 VITALS — DIASTOLIC BLOOD PRESSURE: 44 MMHG | SYSTOLIC BLOOD PRESSURE: 121 MMHG

## 2018-12-30 VITALS — SYSTOLIC BLOOD PRESSURE: 149 MMHG | DIASTOLIC BLOOD PRESSURE: 73 MMHG

## 2018-12-30 VITALS — SYSTOLIC BLOOD PRESSURE: 141 MMHG | DIASTOLIC BLOOD PRESSURE: 76 MMHG

## 2018-12-30 RX ADMIN — LORAZEPAM PRN MG: 0.5 TABLET ORAL at 22:24

## 2018-12-30 RX ADMIN — CHLORHEXIDINE GLUCONATE SCH APPLIC: 213 SOLUTION TOPICAL at 22:16

## 2018-12-30 RX ADMIN — HYDROCODONE BITARTRATE AND ACETAMINOPHEN PRN TAB: 5; 325 TABLET ORAL at 14:03

## 2018-12-30 RX ADMIN — LORAZEPAM PRN MG: 0.5 TABLET ORAL at 14:28

## 2018-12-30 RX ADMIN — AMIODARONE HYDROCHLORIDE SCH MG: 200 TABLET ORAL at 22:17

## 2018-12-30 RX ADMIN — APIXABAN SCH MG: 2.5 TABLET, FILM COATED ORAL at 18:02

## 2018-12-30 RX ADMIN — APIXABAN SCH MG: 2.5 TABLET, FILM COATED ORAL at 10:34

## 2018-12-30 RX ADMIN — AMIODARONE HYDROCHLORIDE SCH MG: 200 TABLET ORAL at 10:34

## 2018-12-30 RX ADMIN — MINOXIDIL SCH MG: 2.5 TABLET ORAL at 10:34

## 2018-12-30 RX ADMIN — VITAMIN D, TAB 1000IU (100/BT) SCH INTLU: 25 TAB at 10:34

## 2018-12-30 RX ADMIN — MINOXIDIL SCH MG: 2.5 TABLET ORAL at 22:20

## 2018-12-30 NOTE — NUR
NURSE NOTES:



During hand-off, GT feeding canister was found on top of patient's abdomen by respiratory 
therapist and GT pump on the floor. No bruising or abrasions noted. No complaints of pain 
from patient. Patient is on BL soft wrist restraints, but has the tendency to tug on 
equipment. Dr. Jake MD made aware around 1945. Awaiting response. Will continue to 
monitor.

## 2018-12-30 NOTE — PULMONOLOGY PROGRESS NOTE
Assessment/Plan


Assessment/Plan


Pulmonary Progress Note 





Assessment/Plan


Assessment/Plan


IMPRESSION:


1. Previous Hypotension. currently stable


2. Previous Sepsis.


3. Atrial fibrillation.


4. Bradycardia.


5. Diabetes mellitus.


6. End-stage renal disease on dialysis.


7. Left lung collapse. Resolved


8. Respiratory failure. now status post tracheostomy


9. Dysphagia.


10. Dementia.


11. Anemia


12. Pneumonia





DISCUSSION:


1. Continue medications.


2. CXR improved


3. Status post tracheostomy


4. S/p G-tube 














Subjective


Interval Events:  None reported


Constitutional:  Reports: no symptoms


HEENT:  Repors: no symptoms


Respiratory:  Reports: no symptoms


Cardiovascular:  Reports: no symptoms


Gastrointestinal/Abdominal:  Reports: no symptoms


Genitourinary:  Reports: no symptoms


Neurologic:  Reports: no symptoms


Psychiatric:  Reports: no symptoms


Allergies:  


Coded Allergies:  


     No Known Allergies (Unverified , 11/26/18)





Objective





Vital Signs Noted


General Appearance:  no acute distress


HEENT:  normocephalic, status post trach


Respiratory/Chest:  chest wall non-tender, decreased breath sounds


Cardiovascular:  normal peripheral pulses, normal rate


Abdomen:  normal bowel sounds, soft, non tender





Laboratory Tests noted





Subjective


ROS Limited/Unobtainable:  No


Allergies:  


Coded Allergies:  


     No Known Allergies (Unverified , 11/26/18)





Objective





Last 24 Hour Vital Signs








  Date Time  Temp Pulse Resp B/P (MAP) Pulse Ox O2 Delivery O2 Flow Rate FiO2


 


12/30/18 12:30  79 15     40


 


12/30/18 12:00      Mechanical Ventilator  





      Mechanical Ventilator  


 


12/30/18 12:00        40


 


12/30/18 10:45  82 17     40


 


12/30/18 10:34    110/69    


 


12/30/18 08:41  86 16     40


 


12/30/18 08:00 98.2 72 16 110/69 (83) 100   


 


12/30/18 08:00        40


 


12/30/18 08:00      Mechanical Ventilator  





      Mechanical Ventilator  


 


12/30/18 07:46  73      


 


12/30/18 07:22  77 13     40


 


12/30/18 04:45  69 15     40


 


12/30/18 04:11      Mechanical Ventilator  





      Mechanical Ventilator  


 


12/30/18 04:00        40


 


12/30/18 04:00 97.9 68 16 136/51 (79) 100   


 


12/30/18 04:00  68      


 


12/30/18 03:10  66 18     40


 


12/30/18 00:54  69 18     40


 


12/30/18 00:00 97.7 71 18 149/73 (98) 100   


 


12/30/18 00:00      Mechanical Ventilator  





      Mechanical Ventilator  


 


12/29/18 23:12  75 18     40


 


12/29/18 20:50  67 18     40


 


12/29/18 20:33    144/64    


 


12/29/18 20:00  83      


 


12/29/18 20:00 98.1 83 18 144/64 (90) 95   


 


12/29/18 20:00      Mechanical Ventilator  





      Mechanical Ventilator  


 


12/29/18 20:00        40


 


12/29/18 18:32  76 18     40


 


12/29/18 17:22  83 19     40


 


12/29/18 16:00      Mechanical Ventilator  





      Mechanical Ventilator  


 


12/29/18 16:00 97.7 79 22 122/73 (89) 98   


 


12/29/18 16:00  78      


 


12/29/18 16:00        40


 


12/29/18 15:26  81 16     40


 


12/29/18 13:22  78 18     40

















Intake and Output  


 


 12/29/18 12/30/18





 19:00 07:00


 


Intake Total 610 ml 570 ml


 


Output Total  30 ml


 


Balance 610 ml 540 ml


 


  


 


Free Water 130 ml 90 ml


 


Tube Feeding 480 ml 480 ml


 


Stool Total  30 ml


 


# Voids  1











Current Medications








 Medications


  (Trade)  Dose


 Ordered  Sig/Tony


 Route


 PRN Reason  Start Time


 Stop Time Status Last Admin


Dose Admin


 


 Acetaminophen


  (Tylenol)  650 mg  Q6H  PRN


 GT


 Mild Pain/Temp > 100.5  12/23/18 16:00


 1/20/19 15:59  12/28/18 12:03


 


 


 Acetaminophen/


 Hydrocodone Bitart


  (Norco 5/325)  1 tab  Q6H  PRN


 GT


 For Pain  12/28/18 18:15


 1/4/19 18:14  12/29/18 18:04


 


 


 Amiodarone HCl


  (Cordarone)  200 mg  EVERY 12  HOURS


 GT


   12/27/18 21:00


 1/26/19 20:59  12/30/18 10:34


 


 


 Apixaban


  (Eliquis)  2.5 mg  BID


 GT


   12/27/18 18:00


 1/26/19 17:59  12/30/18 10:34


 


 


 Artificial Tears


  (Akwa-Tears)  1 drop  Q4H  PRN


 BOTH EYES


 Dry Eyes  12/23/18 15:30


 1/21/19 15:29  12/29/18 15:03


 


 


 Chlorhexidine


 Gluconate


  (Juana-Hex 2%)  1 applic  DAILY@2000


 TOPIC


   12/30/18 20:00


 1/29/19 19:59   


 


 


 Collagenase


  (Santyl)  1 applic  Q12HR


 TOPIC


   12/23/18 21:00


 1/5/19 20:59  12/29/18 20:33


 


 


 Lansoprazole


  (Prevacid)  30 mg  Q12HR


 GT


   12/27/18 21:00


 1/26/19 20:59  12/30/18 10:34


 


 


 Lorazepam


  (Ativan)  0.5 mg  Q6H  PRN


 ORAL


 For Anxiety  12/30/18 12:00


 1/6/19 11:59   


 


 


 Minoxidil


  (Loniten)  2.5 mg  Q12HR


 GT


   12/27/18 21:00


 1/26/19 20:59  12/30/18 10:34


 


 


 Sodium Chloride  1,000 ml @ 


 500 mls/hr  Q2H PRN


 IVLG


 sbp<90 during hd  12/31/18 06:00


 12/31/18 23:59   


 


 


 Sodium Chloride


  (Ocean Nasal


 Spray)  1 spray  Q4H  PRN


 NASAL


 dry nose  12/23/18 14:30


 1/21/19 18:29  12/25/18 11:49


 


 


 Vitamin D


  (Vitamin D)  1,000 intlu  DAILY


 GT


   12/24/18 09:00


 1/8/19 09:59  12/30/18 10:34


 


 


 Zolpidem Tartrate


  (Ambien)  5 mg  HSPRN  PRN


 GT


 Insomnia  12/27/18 16:45


 1/3/19 16:44  12/29/18 20:32


 

















Simon Silva MD Dec 30, 2018 13:09

## 2018-12-30 NOTE — CARDIOLOGY PROGRESS NOTE
Assessment/Plan


Problem List:  


(1) Paroxysmal A-fib


(2) Bradycardia


(3) Sepsis


(4) Hypotension


(5) Pneumonia


Status:  stable, unchanged


Status Narrative


Pt unable to be weaned from vent - s/p trach, PEG


Maintaining SR, on amiodarone


Assessment/Plan


Continue vent support.


 Amiodarone 200 mg bid - consider change to qd, maintenance, at dc





Subjective


ROS Limited/Unobtainable:  Yes


Subjective


Cardiology for Dr. Wade





Pt anxious, on vent





Objective





Last 24 Hour Vital Signs








  Date Time  Temp Pulse Resp B/P (MAP) Pulse Ox O2 Delivery O2 Flow Rate FiO2


 


12/30/18 12:30  79 15     40


 


12/30/18 12:00 98.0 73 17 121/44 (69) 98   


 


12/30/18 12:00      Mechanical Ventilator  





      Mechanical Ventilator  


 


12/30/18 12:00        40


 


12/30/18 10:45  82 17     40


 


12/30/18 10:34    110/69    


 


12/30/18 08:41  86 16     40


 


12/30/18 08:00 98.2 72 16 110/69 (83) 100   


 


12/30/18 08:00        40


 


12/30/18 08:00      Mechanical Ventilator  





      Mechanical Ventilator  


 


12/30/18 07:46  73      


 


12/30/18 07:22  77 13     40


 


12/30/18 04:45  69 15     40


 


12/30/18 04:11      Mechanical Ventilator  





      Mechanical Ventilator  


 


12/30/18 04:00        40


 


12/30/18 04:00 97.9 68 16 136/51 (79) 100   


 


12/30/18 04:00  68      


 


12/30/18 03:10  66 18     40


 


12/30/18 00:54  69 18     40


 


12/30/18 00:00 97.7 71 18 149/73 (98) 100   


 


12/30/18 00:00      Mechanical Ventilator  





      Mechanical Ventilator  


 


12/29/18 23:12  75 18     40


 


12/29/18 20:50  67 18     40


 


12/29/18 20:33    144/64    


 


12/29/18 20:00  83      


 


12/29/18 20:00 98.1 83 18 144/64 (90) 95   


 


12/29/18 20:00      Mechanical Ventilator  





      Mechanical Ventilator  


 


12/29/18 20:00        40


 


12/29/18 18:32  76 18     40


 


12/29/18 17:22  83 19     40


 


12/29/18 16:00      Mechanical Ventilator  





      Mechanical Ventilator  


 


12/29/18 16:00 97.7 79 22 122/73 (89) 98   


 


12/29/18 16:00  78      


 


12/29/18 16:00        40


 


12/29/18 15:26  81 16     40








General Appearance:  WD/WN, alert, mild distress


EENT:  PERRL/EOMI


Neck:  other - trach


Rhythm:  NSR


Cardiovascular:  normal rate, regular rhythm, no gallop/murmur


Respiratory/Chest:  rhonchi - bilaterally


Abdomen:  non tender, soft, other - + g tube


Extremities:  no swelling











Intake and Output  


 


 12/29/18 12/30/18





 19:00 07:00


 


Intake Total 610 ml 570 ml


 


Output Total  30 ml


 


Balance 610 ml 540 ml


 


  


 


Free Water 130 ml 90 ml


 


Tube Feeding 480 ml 480 ml


 


Stool Total  30 ml


 


# Voids  1

















Alexandra Acosta MD Dec 30, 2018 14:18

## 2018-12-30 NOTE — NEPHROLOGY PROGRESS NOTE
Assessment/Plan


Assessment


Seee below


Plan


MOF resolving.





ESRD HD q MWF 


Anemia of CKD , SAMANTHA high dose. Transfuse PRN.





A. 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 with LAE. LVEF 65% . On Eliquis.





Post trach + PEG .


Referred to [facility]. Referral not received there despite numerous requests. Pt'

s son agreeable to [facility].


See orders.


DW pt's son. Needs extensive Vascular W/U.








 not calling me since admission despite numerous messages.





Subjective


Subjective


 No c/o. Per pt's son she's very anxious.





Objective


Objective





Last 24 Hour Vital Signs








  Date Time  Temp Pulse Resp B/P (MAP) Pulse Ox O2 Delivery O2 Flow Rate FiO2


 


12/30/18 10:34    110/69    


 


12/30/18 08:41  86 16     40


 


12/30/18 08:00 98.2 72 16 110/69 (83) 100   


 


12/30/18 08:00        40


 


12/30/18 08:00      Mechanical Ventilator  





      Mechanical Ventilator  


 


12/30/18 07:46  73      


 


12/30/18 07:22  77 13     40


 


12/30/18 04:45  69 15     40


 


12/30/18 04:11      Mechanical Ventilator  





      Mechanical Ventilator  


 


12/30/18 04:00        40


 


12/30/18 04:00 97.9 68 16 136/51 (79) 100   


 


12/30/18 04:00  68      


 


12/30/18 03:10  66 18     40


 


12/30/18 00:54  69 18     40


 


12/30/18 00:00 97.7 71 18 149/73 (98) 100   


 


12/30/18 00:00      Mechanical Ventilator  





      Mechanical Ventilator  


 


12/29/18 23:12  75 18     40


 


12/29/18 20:50  67 18     40


 


12/29/18 20:33    144/64    


 


12/29/18 20:00  83      


 


12/29/18 20:00 98.1 83 18 144/64 (90) 95   


 


12/29/18 20:00      Mechanical Ventilator  





      Mechanical Ventilator  


 


12/29/18 20:00        40


 


12/29/18 18:32  76 18     40


 


12/29/18 17:22  83 19     40


 


12/29/18 16:00      Mechanical Ventilator  





      Mechanical Ventilator  


 


12/29/18 16:00 97.7 79 22 122/73 (89) 98   


 


12/29/18 16:00  78      


 


12/29/18 16:00        40


 


12/29/18 15:26  81 16     40


 


12/29/18 13:22  78 18     40


 


12/29/18 12:00        40


 


12/29/18 12:00 97.9 80 18 120/61 (80) 100   


 


12/29/18 12:00      Mechanical Ventilator  





      Mechanical Ventilator  


 


12/29/18 12:00  84      

















Intake and Output  


 


 12/29/18 12/30/18





 19:00 07:00


 


Intake Total 610 ml 570 ml


 


Output Total  30 ml


 


Balance 610 ml 540 ml


 


  


 


Free Water 130 ml 90 ml


 


Tube Feeding 480 ml 480 ml


 


Stool Total  30 ml


 


# Voids  1








Height (Feet):  5


Height (Inches):  2.00


Weight (Pounds):  115


Objective


On vent.


Trach clean.


Cv IRR/IRR


Lungs B ronchi.


Abd SNT. BS + New PEG.


E Rt. foot diabetic ulcers











Gavino Daigle MD Dec 30, 2018 11:52

## 2018-12-30 NOTE — NUR
RESPIRATORY NOTE:Arrived in pt's room apprx 1915 and noticed IV pole laying across pt right 
side of bed onto pt's abdomen with GT pump on floor. Pt was seemingly not harmed or in any 
distress and vent was still properly connected; adequately ventilating the pt. After 
assesing the pt and placing the IV pole back in the upright position and picking up the 
pump, I immediately went to notify the RN [Kaela]. An assesment of the pt was done and 
equipment was placed back in order by the assigned nurse.

## 2018-12-30 NOTE — NUR
RESPIRATORY NOTE:



Patient received mechanically ventilated on  with current ordered vent settings. 
Patient has trach 8.0 Shiley cuffed that is secured with trach tie and guard. Patient 
presents with bilateral coarse breath sounds and symmetrical chest rise. Small amount of 
thick clear secretions were suctioned via inline suction system without incident. There is 
an ambu bag available at the bedside and vent is connected to a red outlet. Vent alarms are 
functional and audible. Patient appears comfortable at this time. Will continue to monitor.

## 2018-12-30 NOTE — INFECTIOUS DISEASES PROG NOTE
Assessment/Plan


Assessment/Plan


A:


Sepsis treated


UTI treated


Cellulitis of R leg, osteomyelitis treated


Hypercapnic respiratory failure


ESRD on HD


DM


Anemia


Dementia


PVD


R pleural effusion


Mucous plug


s/o Cardiac arrest


Multiple Pressure ulcers


P;


Observe off antibiotic


Wound care


Waiting for placement





Subjective


ROS Limited/Unobtainable:  Yes


Neurologic:  Reports: other - on restraint


Musculoskeletal:  Reports: pain


Allergies:  


Coded Allergies:  


     No Known Allergies (Unverified , 11/26/18)





Objective


Vital Signs





Last 24 Hour Vital Signs








  Date Time  Temp Pulse Resp B/P (MAP) Pulse Ox O2 Delivery O2 Flow Rate FiO2


 


12/30/18 07:22  77 13     40


 


12/30/18 04:45  69 15     40


 


12/30/18 04:11      Mechanical Ventilator  





      Mechanical Ventilator  


 


12/30/18 04:00        40


 


12/30/18 04:00 97.9 68 16 136/51 (79) 100   


 


12/30/18 04:00  68      


 


12/30/18 03:10  66 18     40


 


12/30/18 00:54  69 18     40


 


12/30/18 00:00 97.7 71 18 149/73 (98) 100   


 


12/30/18 00:00      Mechanical Ventilator  





      Mechanical Ventilator  


 


12/29/18 23:12  75 18     40


 


12/29/18 20:50  67 18     40


 


12/29/18 20:33    144/64    


 


12/29/18 20:00  83      


 


12/29/18 20:00 98.1 83 18 144/64 (90) 95   


 


12/29/18 20:00      Mechanical Ventilator  





      Mechanical Ventilator  


 


12/29/18 20:00        40


 


12/29/18 18:32  76 18     40


 


12/29/18 17:22  83 19     40


 


12/29/18 16:00      Mechanical Ventilator  





      Mechanical Ventilator  


 


12/29/18 16:00 97.7 79 22 122/73 (89) 98   


 


12/29/18 16:00  78      


 


12/29/18 16:00        40


 


12/29/18 15:26  81 16     40


 


12/29/18 13:22  78 18     40


 


12/29/18 12:00        40


 


12/29/18 12:00 97.9 80 18 120/61 (80) 100   


 


12/29/18 12:00      Mechanical Ventilator  





      Mechanical Ventilator  


 


12/29/18 12:00  84      


 


12/29/18 11:07  75 16     40








Height (Feet):  5


Height (Inches):  2.00


Weight (Pounds):  115


HEENT:  status post trach


Respiratory/Chest:  lungs clear, other - ventilator


Cardiovascular:  normal rate, other - R arm PICC line, left arm AV shunt


Abdomen:  soft, non tender, other - GT feeding


Extremities:  no edema


Skin:  ulcers, other - sacral, bilateral feet , R leg


Neurologic/Psychiatric:  alert, responsive





Current Medications








 Medications


  (Trade)  Dose


 Ordered  Sig/Tony


 Route


 PRN Reason  Start Time


 Stop Time Status Last Admin


Dose Admin


 


 Acetaminophen


  (Tylenol)  650 mg  Q6H  PRN


 GT


 Mild Pain/Temp > 100.5  12/23/18 16:00


 1/20/19 15:59  12/28/18 12:03


 


 


 Acetaminophen/


 Hydrocodone Bitart


  (Norco 5/325)  1 tab  Q6H  PRN


 GT


 For Pain  12/28/18 18:15


 1/4/19 18:14  12/29/18 18:04


 


 


 Amiodarone HCl


  (Cordarone)  200 mg  EVERY 12  HOURS


 GT


   12/27/18 21:00


 1/26/19 20:59  12/29/18 20:33


 


 


 Apixaban


  (Eliquis)  2.5 mg  BID


 GT


   12/27/18 18:00


 1/26/19 17:59  12/29/18 18:05


 


 


 Artificial Tears


  (Akwa-Tears)  1 drop  Q4H  PRN


 BOTH EYES


 Dry Eyes  12/23/18 15:30


 1/21/19 15:29  12/29/18 15:03


 


 


 Chlorhexidine


 Gluconate


  (Juana-Hex 2%)  1 applic  DAILY@2000


 TOPIC


   12/30/18 20:00


 1/29/19 19:59   


 


 


 Collagenase


  (Santyl)  1 applic  Q12HR


 TOPIC


   12/23/18 21:00


 1/5/19 20:59  12/29/18 20:33


 


 


 Lansoprazole


  (Prevacid)  30 mg  Q12HR


 GT


   12/27/18 21:00


 1/26/19 20:59  12/29/18 20:32


 


 


 Lorazepam


  (Ativan)  0.5 mg  Q8H  PRN


 ORAL


 For Anxiety  12/27/18 17:45


 1/3/19 17:44  12/29/18 11:33


 


 


 Minoxidil


  (Loniten)  2.5 mg  Q12HR


 GT


   12/27/18 21:00


 1/26/19 20:59  12/29/18 20:33


 


 


 Sodium Chloride  1,000 ml @ 


 500 mls/hr  Q2H PRN


 IVLG


 sbp<90 during hd  12/28/18 17:38


 1/27/19 17:37   


 


 


 Sodium Chloride


  (Ocean Nasal


 Spray)  1 spray  Q4H  PRN


 NASAL


 dry nose  12/23/18 14:30


 1/21/19 18:29  12/25/18 11:49


 


 


 Vitamin D


  (Vitamin D)  1,000 intlu  DAILY


 GT


   12/24/18 09:00


 1/8/19 09:59  12/29/18 09:13


 


 


 Zolpidem Tartrate


  (Ambien)  5 mg  HSPRN  PRN


 GT


 Insomnia  12/27/18 16:45


 1/3/19 16:44  12/29/18 20:32


 

















Luke Maynard MD Dec 30, 2018 09:40

## 2018-12-30 NOTE — NUR
Received patient on current vent settings. Trach is patent and secured at midline. Trach 
care was done and pt was Sx as needed. HOB is elevated. Alarms are set and audible. NO resp 
distress noted.

## 2018-12-30 NOTE — NUR
NURSE NOTES:

Report received from CAROLYN Duran. Pt A/Ox3, Cardiac monitor shows SR. Pt trach to vent, 
w/shiley 8. Ventilator settings: AC8 , PEEP 5, FiO2: 40%. Tolerating ventilator 
settings well. Shows no signs of cardiac or respiratory distress. Pt has  GT in place with 
Nepro running at 40 ml/hr. Rectal tube present and draining well. See WCP for skin 
alterations. Pt has a ASHISH midline and a L AV shunt on her arm. Pt to have dialysis on Monday 12/31. Renee spoke with [facility], dialysis has been scheduled. Pt currently on bilateral wrist 
restraints for pulling of lines. Skin intact, no signs of swelling/bruising/irritation 
noted. Bed in lowest position, bed alarms placed. Will continue to monitor and with patients 
plan of care.

## 2018-12-30 NOTE — NUR
NURSE NOTES:



Received report from CAROLYN Ivey. Patient is awake in bed with no s/s of acute distress noted. 
Sinus rhythm on cardiac monitor. Trach-vent settings: Shiley 8, AC 8, Vt 450, PEEP 5, FiO2 
40%. Right upper arm midline TKO, patent and intact. Bed locked in lowest position with side 
rails up x3. Call light left within reach. Will continue to monitor.

## 2018-12-31 VITALS — DIASTOLIC BLOOD PRESSURE: 63 MMHG | SYSTOLIC BLOOD PRESSURE: 128 MMHG

## 2018-12-31 VITALS — DIASTOLIC BLOOD PRESSURE: 56 MMHG | SYSTOLIC BLOOD PRESSURE: 133 MMHG

## 2018-12-31 VITALS — SYSTOLIC BLOOD PRESSURE: 133 MMHG | DIASTOLIC BLOOD PRESSURE: 59 MMHG

## 2018-12-31 VITALS — DIASTOLIC BLOOD PRESSURE: 63 MMHG | SYSTOLIC BLOOD PRESSURE: 102 MMHG

## 2018-12-31 VITALS — DIASTOLIC BLOOD PRESSURE: 86 MMHG | SYSTOLIC BLOOD PRESSURE: 119 MMHG

## 2018-12-31 VITALS — DIASTOLIC BLOOD PRESSURE: 82 MMHG | SYSTOLIC BLOOD PRESSURE: 113 MMHG

## 2018-12-31 RX ADMIN — APIXABAN SCH MG: 2.5 TABLET, FILM COATED ORAL at 08:49

## 2018-12-31 RX ADMIN — CHLORHEXIDINE GLUCONATE SCH APPLIC: 213 SOLUTION TOPICAL at 20:40

## 2018-12-31 RX ADMIN — HYDROCODONE BITARTRATE AND ACETAMINOPHEN PRN TAB: 5; 325 TABLET ORAL at 15:02

## 2018-12-31 RX ADMIN — APIXABAN SCH MG: 2.5 TABLET, FILM COATED ORAL at 17:58

## 2018-12-31 RX ADMIN — AMIODARONE HYDROCHLORIDE SCH MG: 200 TABLET ORAL at 08:49

## 2018-12-31 RX ADMIN — MINOXIDIL SCH MG: 2.5 TABLET ORAL at 20:41

## 2018-12-31 RX ADMIN — VITAMIN D, TAB 1000IU (100/BT) SCH INTLU: 25 TAB at 08:49

## 2018-12-31 RX ADMIN — MINOXIDIL SCH MG: 2.5 TABLET ORAL at 08:49

## 2018-12-31 RX ADMIN — LORAZEPAM PRN MG: 0.5 TABLET ORAL at 20:41

## 2018-12-31 RX ADMIN — AMIODARONE HYDROCHLORIDE SCH MG: 200 TABLET ORAL at 20:40

## 2018-12-31 RX ADMIN — LORAZEPAM PRN MG: 0.5 TABLET ORAL at 12:46

## 2018-12-31 NOTE — NUR
NURSE NOTES:

Report received from CAROLYN Duran. Pt A/Ox3, Cardiac monitor shows SR. Pt trach to vent, 
w/shiley 8. Ventilator settings: AC8 , PEEP 5, FiO2: 40%. Tolerating ventilator 
settings well. Shows no signs of cardiac or respiratory distress. Pt has  GT in place with 
Nepro running at 40 ml/hr. Rectal tube present and draining well. See WCP for skin 
alterations. Pt has a ASHISH midline and a L AV shunt on her arm. Pt had dialysis in AM, 2.5 
liters removed.  Pt currently on bilateral wrist restraints for pulling of lines. Skin 
intact, no signs of swelling/bruising/irritation noted. Bed in lowest position, bed alarms 
placed. Call light within reach. Will continue to monitor and with patients plan of care.

## 2018-12-31 NOTE — INFECTIOUS DISEASES PROG NOTE
Assessment/Plan


Assessment/Plan


antibiotics : none





A


1. VRE | e.coli UTI s/p rx


2. shock resolved


3. leucocytosis resolved


4. respiratory failure s/p tracheostomy


5. renal failure


6. decubitus ulcers


7. nasal MRSA colonization


8. rectal VRE colonization


9. pleural effusion





P


1. observe off antibiotics





Subjective


ROS Limited/Unobtainable:  Yes


Allergies:  


Coded Allergies:  


     No Known Allergies (Unverified , 11/26/18)





Objective


Vital Signs





Last 24 Hour Vital Signs








  Date Time  Temp Pulse Resp B/P (MAP) Pulse Ox O2 Delivery O2 Flow Rate FiO2


 


12/31/18 09:00  74 14     40


 


12/31/18 08:49    133/56    


 


12/31/18 08:00 97.2 81 20 133/56 (81) 99   


 


12/31/18 08:00      Mechanical Ventilator  





      Mechanical Ventilator  


 


12/31/18 07:25  78      


 


12/31/18 07:03  66 11     40


 


12/31/18 06:00        40


 


12/31/18 05:25  70 14     40


 


12/31/18 04:00  74      


 


12/31/18 04:00 97.7 63 15 133/59 (83) 100   


 


12/31/18 04:00      Mechanical Ventilator  





      Mechanical Ventilator  


 


12/31/18 04:00        40


 


12/31/18 03:04  74 18     40


 


12/31/18 01:13  71 13     40


 


12/31/18 00:00      Mechanical Ventilator  





      Mechanical Ventilator  


 


12/31/18 00:00  68      


 


12/31/18 00:00        40


 


12/31/18 00:00 97.6 71 11 128/63 (84) 100   


 


12/30/18 23:15  66 13     40


 


12/30/18 22:20    141/59    


 


12/30/18 21:52  85 18     40


 


12/30/18 20:00 98.0 77 18 141/76 (97) 100   


 


12/30/18 20:00        40


 


12/30/18 20:00  77      


 


12/30/18 20:00      Mechanical Ventilator  





      Mechanical Ventilator  


 


12/30/18 19:18  80 19     40


 


12/30/18 16:32  72 16     40


 


12/30/18 16:00 97.8 72 18 136/73 (94) 100   


 


12/30/18 16:00      Mechanical Ventilator  





      Mechanical Ventilator  


 


12/30/18 16:00        40


 


12/30/18 16:00  71      


 


12/30/18 15:25  67 14     40


 


12/30/18 12:30  79 15     40


 


12/30/18 12:00 98.0 73 17 121/44 (69) 98   


 


12/30/18 12:00      Mechanical Ventilator  





      Mechanical Ventilator  


 


12/30/18 12:00  74      


 


12/30/18 12:00        40








Height (Feet):  5


Height (Inches):  2.00


Weight (Pounds):  112


HEENT:  status post trach


Respiratory/Chest:  lungs clear


Cardiovascular:  normal rate, regular rhythm, no gallop/murmur


Abdomen:  soft, non tender, other - GT


Extremities:  no edema, other - right arm PICC





Current Medications








 Medications


  (Trade)  Dose


 Ordered  Sig/Tony


 Route


 PRN Reason  Start Time


 Stop Time Status Last Admin


Dose Admin


 


 Acetaminophen


  (Tylenol)  650 mg  Q6H  PRN


 GT


 Mild Pain/Temp > 100.5  12/23/18 16:00


 1/20/19 15:59  12/28/18 12:03


 


 


 Acetaminophen/


 Hydrocodone Bitart


  (Norco 5/325)  1 tab  Q6H  PRN


 GT


 For Pain  12/28/18 18:15


 1/4/19 18:14  12/30/18 14:03


 


 


 Amiodarone HCl


  (Cordarone)  200 mg  EVERY 12  HOURS


 GT


   12/27/18 21:00


 1/26/19 20:59  12/31/18 08:49


 


 


 Apixaban


  (Eliquis)  2.5 mg  BID


 GT


   12/27/18 18:00


 1/26/19 17:59  12/31/18 08:49


 


 


 Artificial Tears


  (Akwa-Tears)  1 drop  Q4H  PRN


 BOTH EYES


 Dry Eyes  12/23/18 15:30


 1/21/19 15:29  12/29/18 15:03


 


 


 Chlorhexidine


 Gluconate


  (Juana-Hex 2%)  1 applic  DAILY@2000


 TOPIC


   12/30/18 20:00


 1/29/19 19:59  12/30/18 22:16


 


 


 Collagenase


  (Santyl)  1 applic  Q12HR


 TOPIC


   12/23/18 21:00


 1/5/19 20:59  12/31/18 08:51


 


 


 Lansoprazole


  (Prevacid)  30 mg  Q12HR


 GT


   12/27/18 21:00


 1/26/19 20:59  12/31/18 08:50


 


 


 Lorazepam


  (Ativan)  0.5 mg  Q6H  PRN


 ORAL


 For Anxiety  12/30/18 12:00


 1/6/19 11:59  12/30/18 22:24


 


 


 Minoxidil


  (Loniten)  2.5 mg  Q12HR


 GT


   12/27/18 21:00


 1/26/19 20:59  12/31/18 08:49


 


 


 Sodium Chloride  1,000 ml @ 


 500 mls/hr  Q2H PRN


 IVLG


 sbp<90 during hd  12/31/18 06:00


 12/31/18 23:59   


 


 


 Sodium Chloride


  (Ocean Nasal


 Spray)  1 spray  Q4H  PRN


 NASAL


 dry nose  12/23/18 14:30


 1/21/19 18:29  12/25/18 11:49


 


 


 Vitamin D


  (Vitamin D)  1,000 intlu  DAILY


 GT


   12/24/18 09:00


 1/8/19 09:59  12/31/18 08:49


 


 


 Zolpidem Tartrate


  (Ambien)  5 mg  HSPRN  PRN


 GT


 Insomnia  12/27/18 16:45


 1/3/19 16:44  12/29/18 20:32


 

















Chase Santillan MD Dec 31, 2018 11:08

## 2018-12-31 NOTE — PULMONOLOGY PROGRESS NOTE
Assessment/Plan


Assessment/Plan


1. Hypotension- improved


2. Sepsis.


3. Atrial fibrillation.


4. Bradycardia.


5. Diabetes mellitus.


6. End-stage renal disease on dialysis.


7. Left lung collapse. s/p bronchoscopy 


8. Respiratory failure, acute on chronic s/p trach


9. Dysphagia.


10. Dementia.


11. Anemia


12. Pneumonia





PLAN


respiratory care noted


full vent support


monitor imaging for change


oxygen taper and monitor needs


aspiration precautions


keep negative


supportive care for now


will follow up and recommend for change and monitor as is


impression, plan, and exam edited and reviewed in detail


care discussed with RN





medications/laboratory data/nursing notes reviewed in detail


note reviewed and edited


care discussed with RN and RT





Subjective


ROS Limited/Unobtainable:  Yes


Allergies:  


Coded Allergies:  


     No Known Allergies (Unverified , 11/26/18)


Subjective


care noted and reviewed


no distress at present


events reviewed


trach in place





Objective





Last 24 Hour Vital Signs








  Date Time  Temp Pulse Resp B/P (MAP) Pulse Ox O2 Delivery O2 Flow Rate FiO2


 


12/31/18 12:35  78 17     40


 


12/31/18 12:00        40


 


12/31/18 12:00      Mechanical Ventilator  





      Mechanical Ventilator  


 


12/31/18 12:00 97.6 68 21 102/63 (76) 98   


 


12/31/18 11:38  70      


 


12/31/18 11:15  71 17     40


 


12/31/18 09:00  74 14     40


 


12/31/18 08:49    133/56    


 


12/31/18 08:00 97.2 81 20 133/56 (81) 99   


 


12/31/18 08:00      Mechanical Ventilator  





      Mechanical Ventilator  


 


12/31/18 07:25  78      


 


12/31/18 07:03  66 11     40


 


12/31/18 06:00        40


 


12/31/18 05:25  70 14     40


 


12/31/18 04:00  74      


 


12/31/18 04:00 97.7 63 15 133/59 (83) 100   


 


12/31/18 04:00      Mechanical Ventilator  





      Mechanical Ventilator  


 


12/31/18 04:00        40


 


12/31/18 03:04  74 18     40


 


12/31/18 01:13  71 13     40


 


12/31/18 00:00      Mechanical Ventilator  





      Mechanical Ventilator  


 


12/31/18 00:00  68      


 


12/31/18 00:00        40


 


12/31/18 00:00 97.6 71 11 128/63 (84) 100   


 


12/30/18 23:15  66 13     40


 


12/30/18 22:20    141/59    


 


12/30/18 21:52  85 18     40


 


12/30/18 20:00 98.0 77 18 141/76 (97) 100   


 


12/30/18 20:00        40


 


12/30/18 20:00  77      


 


12/30/18 20:00      Mechanical Ventilator  





      Mechanical Ventilator  


 


12/30/18 19:18  80 19     40


 


12/30/18 16:32  72 16     40


 


12/30/18 16:00 97.8 72 18 136/73 (94) 100   


 


12/30/18 16:00      Mechanical Ventilator  





      Mechanical Ventilator  


 


12/30/18 16:00        40


 


12/30/18 16:00  71      


 


12/30/18 15:25  67 14     40

















Intake and Output  


 


 12/30/18 12/31/18





 19:00 07:00


 


Intake Total 570 ml 580 ml


 


Output Total 100 ml 


 


Balance 470 ml 580 ml


 


  


 


Free Water 90 ml 


 


Tube Feeding 480 ml 480 ml


 


Other  100 ml


 


Output Urine Total 50 ml 


 


Stool Total 50 ml 


 


# Voids 1 1








Objective


WDWN


NAD


intubated/trach


reduced breath sounds bilaterally without rhonchi or wheeze


[id number] without MRG


NABS nontender no HSM


no CCE


poorly responsive


Laboratory Tests


12/31/18 12:15: Activated Partial Thromboplast Time 38H





Current Medications








 Medications


  (Trade)  Dose


 Ordered  Sig/Tony


 Route


 PRN Reason  Start Time


 Stop Time Status Last Admin


Dose Admin


 


 Acetaminophen


  (Tylenol)  650 mg  Q6H  PRN


 GT


 Mild Pain/Temp > 100.5  12/23/18 16:00


 1/20/19 15:59  12/28/18 12:03


 


 


 Acetaminophen/


 Hydrocodone Bitart


  (Norco 5/325)  1 tab  Q6H  PRN


 GT


 For Pain  12/28/18 18:15


 1/4/19 18:14  12/30/18 14:03


 


 


 Amiodarone HCl


  (Cordarone)  200 mg  EVERY 12  HOURS


 GT


   12/27/18 21:00


 1/26/19 20:59  12/31/18 08:49


 


 


 Apixaban


  (Eliquis)  2.5 mg  BID


 GT


   12/27/18 18:00


 1/26/19 17:59  12/31/18 08:49


 


 


 Artificial Tears


  (Akwa-Tears)  1 drop  Q4H  PRN


 BOTH EYES


 Dry Eyes  12/23/18 15:30


 1/21/19 15:29  12/29/18 15:03


 


 


 Chlorhexidine


 Gluconate


  (Juana-Hex 2%)  1 applic  DAILY@2000


 TOPIC


   12/30/18 20:00


 1/29/19 19:59  12/30/18 22:16


 


 


 Collagenase


  (Santyl)  1 applic  Q12HR


 TOPIC


   12/23/18 21:00


 1/5/19 20:59  12/31/18 08:51


 


 


 Lansoprazole


  (Prevacid)  30 mg  Q12HR


 GT


   12/27/18 21:00


 1/26/19 20:59  12/31/18 08:50


 


 


 Lorazepam


  (Ativan)  0.5 mg  Q6H  PRN


 ORAL


 For Anxiety  12/30/18 12:00


 1/6/19 11:59  12/31/18 12:46


 


 


 Minoxidil


  (Loniten)  2.5 mg  Q12HR


 GT


   12/27/18 21:00


 1/26/19 20:59  12/31/18 08:49


 


 


 Sodium Chloride  1,000 ml @ 


 500 mls/hr  Q2H PRN


 IVLG


 sbp<90 during hd  12/31/18 06:00


 12/31/18 23:59   


 


 


 Sodium Chloride


  (Ocean Nasal


 Spray)  1 spray  Q4H  PRN


 NASAL


 dry nose  12/23/18 14:30


 1/21/19 18:29  12/25/18 11:49


 


 


 Vitamin D


  (Vitamin D)  1,000 intlu  DAILY


 GT


   12/24/18 09:00


 1/8/19 09:59  12/31/18 08:49


 


 


 Zolpidem Tartrate


  (Ambien)  5 mg  HSPRN  PRN


 GT


 Insomnia  12/27/18 16:45


 1/3/19 16:44  12/29/18 20:32


 

















Omar Luna MD Dec 31, 2018 15:03

## 2018-12-31 NOTE — CARDIOLOGY PROGRESS NOTE
Assessment/Plan


Problem List:  


(1) Paroxysmal A-fib


(2) Bradycardia


(3) Sepsis


(4) Hypotension


(5) Pneumonia


Status:  stable, unchanged


Status Narrative


Pt overall stable, s/p PEG, trach


2 1/2 L fluid removed at dialysis today.


Assessment/Plan


Continue vent support.


 Amiodarone 200 mg bid - consider change to qd, maintenance, at dc


Continue eliquis for cva prevention.


Plan for transfer to long-term care facility noted





Subjective


ROS Limited/Unobtainable:  No


Subjective


Cardiology for Dr. Wade





Pt seen post-dialysis. She is alert, on vent. c/o pain "all over."





Objective





Last 24 Hour Vital Signs








  Date Time  Temp Pulse Resp B/P (MAP) Pulse Ox O2 Delivery O2 Flow Rate FiO2


 


12/31/18 16:00        40


 


12/31/18 16:00      Mechanical Ventilator  





      Mechanical Ventilator  


 


12/31/18 16:00 98.0 73 23 119/86 (97) 99   


 


12/31/18 15:33  78      


 


12/31/18 14:58  73 16     40


 


12/31/18 12:35  78 17     40


 


12/31/18 12:00        40


 


12/31/18 12:00      Mechanical Ventilator  





      Mechanical Ventilator  


 


12/31/18 12:00 97.6 68 21 102/63 (76) 98   


 


12/31/18 11:38  70      


 


12/31/18 11:15  71 17     40


 


12/31/18 09:00  74 14     40


 


12/31/18 08:49    133/56    


 


12/31/18 08:00 97.2 81 20 133/56 (81) 99   


 


12/31/18 08:00      Mechanical Ventilator  





      Mechanical Ventilator  


 


12/31/18 07:25  78      


 


12/31/18 07:03  66 11     40


 


12/31/18 06:00        40


 


12/31/18 05:25  70 14     40


 


12/31/18 04:00  74      


 


12/31/18 04:00 97.7 63 15 133/59 (83) 100   


 


12/31/18 04:00      Mechanical Ventilator  





      Mechanical Ventilator  


 


12/31/18 04:00        40


 


12/31/18 03:04  74 18     40


 


12/31/18 01:13  71 13     40


 


12/31/18 00:00      Mechanical Ventilator  





      Mechanical Ventilator  


 


12/31/18 00:00  68      


 


12/31/18 00:00        40


 


12/31/18 00:00 97.6 71 11 128/63 (84) 100   


 


12/30/18 23:15  66 13     40


 


12/30/18 22:20    141/59    


 


12/30/18 21:52  85 18     40


 


12/30/18 20:00 98.0 77 18 141/76 (97) 100   


 


12/30/18 20:00        40


 


12/30/18 20:00  77      


 


12/30/18 20:00      Mechanical Ventilator  





      Mechanical Ventilator  


 


12/30/18 19:18  80 19     40








General Appearance:  on vent, other - chronically ill appearing


EENT:  PERRL/EOMI


Neck:  no JVD - trach , other


Rhythm:  NSR


Cardiovascular:  normal rate, regular rhythm


Respiratory/Chest:  no accessory muscle use, other - minimal rhonchi anteriorly


Abdomen:  non tender, soft, other - g tube


Extremities:  trace edema











Intake and Output  


 


 12/30/18 12/31/18





 19:00 07:00


 


Intake Total 570 ml 580 ml


 


Output Total 100 ml 


 


Balance 470 ml 580 ml


 


  


 


Free Water 90 ml 


 


Tube Feeding 480 ml 480 ml


 


Other  100 ml


 


Output Urine Total 50 ml 


 


Stool Total 50 ml 


 


# Voids 1 1











Laboratory Tests








Test


  12/31/18


12:15


 


Activated Partial


Thromboplast Time 38 SEC (23-33)


H

















Alexandra Acosta MD Dec 31, 2018 18:02

## 2018-12-31 NOTE — NUR
NURSE NOTES:



Received report from NEIL Hyde RN. Patient is awake in bed with no s/s of acute distress 
noted. Sinus rhythm on cardiac monitor. Patient is non-verbal. Trach-vent settings: Shiley 
8, AC 8, Vt 450, PEEP 5, FiO2 40%. Right upper arm midline TKO, patent and intact. Bed 
locked in lowest position with side rails up x3. Call light left within reach. Will continue 
to monitor.

## 2018-12-31 NOTE — NEPHROLOGY PROGRESS NOTE
Assessment/Plan


Assessment


Seee below


Plan





ESRD HD q MWF 


Anemia of CKD , SAMANTHA high dose. Transfuse PRN.





A. 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 with LAE. LVEF 65% . On Eliquis.





Post trach + PEG .


Referred to [facility]. Referral not received there despite numerous requests. Pt'

s son agreeable to [facility].


See orders.


DW pt's son. Needs extensive Vascular W/U.





Subjective


Subjective


 Pt. still "not happy". When observed no signs of distress.





Objective


Objective





Last 24 Hour Vital Signs








  Date Time  Temp Pulse Resp B/P (MAP) Pulse Ox O2 Delivery O2 Flow Rate FiO2


 


12/31/18 12:35  78 17     40


 


12/31/18 12:00        40


 


12/31/18 12:00      Mechanical Ventilator  





      Mechanical Ventilator  


 


12/31/18 12:00 97.6 68 21 102/63 (76) 98   


 


12/31/18 11:15  71 17     40


 


12/31/18 09:00  74 14     40


 


12/31/18 08:49    133/56    


 


12/31/18 08:00 97.2 81 20 133/56 (81) 99   


 


12/31/18 08:00      Mechanical Ventilator  





      Mechanical Ventilator  


 


12/31/18 07:25  78      


 


12/31/18 07:03  66 11     40


 


12/31/18 06:00        40


 


12/31/18 05:25  70 14     40


 


12/31/18 04:00  74      


 


12/31/18 04:00 97.7 63 15 133/59 (83) 100   


 


12/31/18 04:00      Mechanical Ventilator  





      Mechanical Ventilator  


 


12/31/18 04:00        40


 


12/31/18 03:04  74 18     40


 


12/31/18 01:13  71 13     40


 


12/31/18 00:00      Mechanical Ventilator  





      Mechanical Ventilator  


 


12/31/18 00:00  68      


 


12/31/18 00:00        40


 


12/31/18 00:00 97.6 71 11 128/63 (84) 100   


 


12/30/18 23:15  66 13     40


 


12/30/18 22:20    141/59    


 


12/30/18 21:52  85 18     40


 


12/30/18 20:00 98.0 77 18 141/76 (97) 100   


 


12/30/18 20:00        40


 


12/30/18 20:00  77      


 


12/30/18 20:00      Mechanical Ventilator  





      Mechanical Ventilator  


 


12/30/18 19:18  80 19     40


 


12/30/18 16:32  72 16     40


 


12/30/18 16:00 97.8 72 18 136/73 (94) 100   


 


12/30/18 16:00      Mechanical Ventilator  





      Mechanical Ventilator  


 


12/30/18 16:00        40


 


12/30/18 16:00  71      


 


12/30/18 15:25  67 14     40

















Intake and Output  


 


 12/30/18 12/31/18





 19:00 07:00


 


Intake Total 570 ml 540 ml


 


Output Total 100 ml 


 


Balance 470 ml 540 ml


 


  


 


Free Water 90 ml 


 


Tube Feeding 480 ml 440 ml


 


Other  100 ml


 


Output Urine Total 50 ml 


 


Stool Total 50 ml 


 


# Voids 1 1








Laboratory Tests


12/31/18 12:15: Activated Partial Thromboplast Time 38H


Height (Feet):  5


Height (Inches):  2.00


Weight (Pounds):  112


Objective


On vent.


Trach clean.


Cv IRR/IRR


Lungs B ronchi.


Abd SNT. BS +  PEG.


E Rt. foot diabetic ulcers











Gavino Daigle MD Dec 31, 2018 14:13

## 2019-01-01 VITALS — SYSTOLIC BLOOD PRESSURE: 138 MMHG | DIASTOLIC BLOOD PRESSURE: 74 MMHG

## 2019-01-01 VITALS — SYSTOLIC BLOOD PRESSURE: 114 MMHG | DIASTOLIC BLOOD PRESSURE: 68 MMHG

## 2019-01-01 VITALS — SYSTOLIC BLOOD PRESSURE: 117 MMHG | DIASTOLIC BLOOD PRESSURE: 53 MMHG

## 2019-01-01 VITALS — DIASTOLIC BLOOD PRESSURE: 52 MMHG | SYSTOLIC BLOOD PRESSURE: 143 MMHG

## 2019-01-01 VITALS — DIASTOLIC BLOOD PRESSURE: 54 MMHG | SYSTOLIC BLOOD PRESSURE: 131 MMHG

## 2019-01-01 VITALS — DIASTOLIC BLOOD PRESSURE: 68 MMHG | SYSTOLIC BLOOD PRESSURE: 114 MMHG

## 2019-01-01 VITALS — SYSTOLIC BLOOD PRESSURE: 143 MMHG | DIASTOLIC BLOOD PRESSURE: 52 MMHG

## 2019-01-01 RX ADMIN — AMIODARONE HYDROCHLORIDE SCH MG: 200 TABLET ORAL at 09:22

## 2019-01-01 RX ADMIN — MINOXIDIL SCH MG: 2.5 TABLET ORAL at 20:33

## 2019-01-01 RX ADMIN — POLYVINYL ALCOHOL PRN DROP: 14 SOLUTION/ DROPS OPHTHALMIC at 16:53

## 2019-01-01 RX ADMIN — ZOLPIDEM TARTRATE PRN MG: 5 TABLET ORAL at 01:52

## 2019-01-01 RX ADMIN — ZOLPIDEM TARTRATE PRN MG: 5 TABLET ORAL at 21:41

## 2019-01-01 RX ADMIN — APIXABAN SCH MG: 2.5 TABLET, FILM COATED ORAL at 17:55

## 2019-01-01 RX ADMIN — LORAZEPAM PRN MG: 0.5 TABLET ORAL at 09:25

## 2019-01-01 RX ADMIN — CHLORHEXIDINE GLUCONATE SCH APPLIC: 213 SOLUTION TOPICAL at 20:33

## 2019-01-01 RX ADMIN — LORAZEPAM PRN MG: 0.5 TABLET ORAL at 16:52

## 2019-01-01 RX ADMIN — HYDROCODONE BITARTRATE AND ACETAMINOPHEN PRN TAB: 5; 325 TABLET ORAL at 09:25

## 2019-01-01 RX ADMIN — MINOXIDIL SCH MG: 2.5 TABLET ORAL at 09:24

## 2019-01-01 RX ADMIN — VITAMIN D, TAB 1000IU (100/BT) SCH INTLU: 25 TAB at 09:23

## 2019-01-01 RX ADMIN — APIXABAN SCH MG: 2.5 TABLET, FILM COATED ORAL at 09:24

## 2019-01-01 RX ADMIN — HYDROCODONE BITARTRATE AND ACETAMINOPHEN PRN TAB: 5; 325 TABLET ORAL at 16:53

## 2019-01-01 NOTE — NUR
RESPIRATORY NOTE:

received pt on vent with current vent settings. pt is trached; tonja 8, secured via trach 
tie/guard. no redness visible around stoma. pt not in resp distress at this time. vent is 
plugged into red outlet with ambu bag at bedside. will cont to monitor.

## 2019-01-01 NOTE — PULMONOLOGY PROGRESS NOTE
Assessment/Plan


Assessment/Plan


1. Hypotension per history 


2. Sepsis.


3. Atrial fibrillation.


4. Bradycardia.


5. Diabetes mellitus.


6. End-stage renal disease on dialysis.


7. Left lung collapse. s/p bronchoscopy 


8. Respiratory failure, acute on chronic s/p trach


9. Dysphagia.


10. Dementia.


11. Anemia


12. Pneumonia


13. bilateral effusion





PLAN


respiratory care without change


full vent support


monitor imaging for change- ordered


oxygen taper and monitor needs


aspiration precautions


keep negative


supportive care for now


will follow up and recommend for change and monitor as is


impression, plan, and exam edited and reviewed in detail


care discussed with RN





medications/laboratory data/nursing notes reviewed in detail


note reviewed and edited


care discussed with RN and RT





Subjective


ROS Limited/Unobtainable:  Yes


Allergies:  


Coded Allergies:  


     No Known Allergies (Unverified , 11/26/18)


Subjective


care noted and reviewed


no distress at present


events reviewed and discussed


trach in place





Objective





Last 24 Hour Vital Signs








  Date Time  Temp Pulse Resp B/P (MAP) Pulse Ox O2 Delivery O2 Flow Rate FiO2


 


1/1/19 10:57  65 12     40


 


1/1/19 09:24    117/53    


 


1/1/19 09:05  68 15     40


 


1/1/19 08:17  72      


 


1/1/19 07:16  84 21     40


 


1/1/19 06:34 97.4 69 17 114/68 (83) 100   


 


1/1/19 05:05  100 16     40


 


1/1/19 04:00      Mechanical Ventilator  





      Mechanical Ventilator  


 


1/1/19 04:00        40


 


1/1/19 04:00 97.4 69 17 114/68 (83) 100   


 


1/1/19 04:00 97.4 69 17 114/68 (83) 100   


 


1/1/19 04:00  72      


 


1/1/19 03:15  76 19     40


 


1/1/19 00:32  82 18     40


 


1/1/19 00:00 97.7 81 20 138/74 (95) 99   


 


1/1/19 00:00        40


 


1/1/19 00:00      Mechanical Ventilator  





      Mechanical Ventilator  


 


1/1/19 00:00  83      


 


12/31/18 22:31  69 19     40


 


12/31/18 20:59  83 17     40


 


12/31/18 20:41    113/82    


 


12/31/18 20:00 97.3 93 20 113/82 (92) 97   


 


12/31/18 20:00  85      


 


12/31/18 20:00      Mechanical Ventilator  





      Mechanical Ventilator  


 


12/31/18 20:00        40


 


12/31/18 19:04  78 20     40


 


12/31/18 17:29  77 19     40


 


12/31/18 16:00        40


 


12/31/18 16:00      Mechanical Ventilator  





      Mechanical Ventilator  


 


12/31/18 16:00 98.0 73 23 119/86 (97) 99   


 


12/31/18 15:33  78      


 


12/31/18 14:58  73 16     40


 


12/31/18 12:35  78 17     40


 


12/31/18 12:00        40


 


12/31/18 12:00      Mechanical Ventilator  





      Mechanical Ventilator  


 


12/31/18 12:00 97.6 68 21 102/63 (76) 98   


 


12/31/18 11:38  70      


 


12/31/18 11:15  71 17     40

















Intake and Output  


 


 12/31/18 1/1/19





 18:59 06:59


 


Intake Total 570 ml 530 ml


 


Output Total 2510 ml 


 


Balance -1940 ml 530 ml


 


  


 


Tube Feeding 480 ml 480 ml


 


Other 90 ml 50 ml


 


Stool Total 10 ml 


 


Hemodialysis UF 2500 ml 


 


# Voids 1 








Objective


WDWN


NAD


intubated/trach


reduced breath sounds bilaterally without rhonchi or wheeze


[id number] without MRG


NABS nontender no HSM


no CCE


poorly responsive


Laboratory Tests


12/31/18 12:15: Activated Partial Thromboplast Time 38H





Current Medications








 Medications


  (Trade)  Dose


 Ordered  Sig/Tony


 Route


 PRN Reason  Start Time


 Stop Time Status Last Admin


Dose Admin


 


 Acetaminophen


  (Tylenol)  650 mg  Q6H  PRN


 GT


 Mild Pain/Temp > 100.5  12/23/18 16:00


 1/20/19 15:59  12/28/18 12:03


 


 


 Acetaminophen/


 Hydrocodone Bitart


  (Norco 5/325)  1 tab  Q6H  PRN


 GT


 For Pain  12/28/18 18:15


 1/4/19 18:14  1/1/19 09:25


 


 


 Amiodarone HCl


  (Cordarone)  200 mg  EVERY 12  HOURS


 GT


   12/27/18 21:00


 1/26/19 20:59  1/1/19 09:22


 


 


 Apixaban


  (Eliquis)  2.5 mg  BID


 GT


   12/27/18 18:00


 1/26/19 17:59  1/1/19 09:24


 


 


 Artificial Tears


  (Akwa-Tears)  1 drop  Q4H  PRN


 BOTH EYES


 Dry Eyes  12/23/18 15:30


 1/21/19 15:29  12/29/18 15:03


 


 


 Chlorhexidine


 Gluconate


  (Juana-Hex 2%)  1 applic  DAILY@2000


 TOPIC


   12/30/18 20:00


 1/29/19 19:59  12/31/18 20:40


 


 


 Collagenase


  (Santyl)  1 applic  Q12HR


 TOPIC


   12/23/18 21:00


 1/5/19 20:59  1/1/19 09:26


 


 


 Lansoprazole


  (Prevacid)  30 mg  Q12HR


 GT


   12/27/18 21:00


 1/26/19 20:59  1/1/19 09:23


 


 


 Lorazepam


  (Ativan)  0.5 mg  Q6H  PRN


 ORAL


 For Anxiety  12/30/18 12:00


 1/6/19 11:59  1/1/19 09:25


 


 


 Minoxidil


  (Loniten)  2.5 mg  Q12HR


 GT


   12/27/18 21:00


 1/26/19 20:59  1/1/19 09:24


 


 


 Sodium Chloride


  (Ocean Nasal


 Spray)  1 spray  Q4H  PRN


 NASAL


 dry nose  12/23/18 14:30


 1/21/19 18:29  12/25/18 11:49


 


 


 Vitamin D


  (Vitamin D)  1,000 intlu  DAILY


 GT


   12/24/18 09:00


 1/8/19 09:59  1/1/19 09:23


 


 


 Zolpidem Tartrate


  (Ambien)  5 mg  HSPRN  PRN


 GT


 Insomnia  12/27/18 16:45


 1/3/19 16:44  1/1/19 01:52


 

















Omar Luna MD Jan 1, 2019 11:09

## 2019-01-01 NOTE — NUR
HAND-OFF: 

Report given to CAROLYN Nolen. Patient VS stable at this time. Patient showing no sign of acute 
distress at this time.

## 2019-01-01 NOTE — INFECTIOUS DISEASES PROG NOTE
Assessment/Plan


Assessment/Plan


antibiotics : none





A


1. VRE | e.coli UTI s/p rx


2. shock resolved


3. leucocytosis resolved


4. respiratory failure s/p tracheostomy


5. renal failure


6. decubitus ulcers


7. nasal MRSA colonization


8. rectal VRE colonization


9. pleural effusion





P


1. observe off antibiotics





Subjective


Constitutional:  Denies: fever, chills


Respiratory:  Denies: shortness of breath, dry cough


Gastrointestinal/Abdominal:  Denies: nausea, vomiting, diarrhea


Musculoskeletal:  Reports: pain


Allergies:  


Coded Allergies:  


     No Known Allergies (Unverified , 11/26/18)





Objective


Vital Signs





Last 24 Hour Vital Signs








  Date Time  Temp Pulse Resp B/P (MAP) Pulse Ox O2 Delivery O2 Flow Rate FiO2


 


1/1/19 09:24    117/53    


 


1/1/19 09:05  68 15     40


 


1/1/19 08:17  72      


 


1/1/19 07:16  84 21     40


 


1/1/19 06:34 97.4 69 17 114/68 (83) 100   


 


1/1/19 05:05  100 16     40


 


1/1/19 04:00      Mechanical Ventilator  





      Mechanical Ventilator  


 


1/1/19 04:00        40


 


1/1/19 04:00 97.4 69 17 114/68 (83) 100   


 


1/1/19 04:00 97.4 69 17 114/68 (83) 100   


 


1/1/19 04:00  72      


 


1/1/19 03:15  76 19     40


 


1/1/19 00:32  82 18     40


 


1/1/19 00:00 97.7 81 20 138/74 (95) 99   


 


1/1/19 00:00        40


 


1/1/19 00:00      Mechanical Ventilator  





      Mechanical Ventilator  


 


1/1/19 00:00  83      


 


12/31/18 22:31  69 19     40


 


12/31/18 20:59  83 17     40


 


12/31/18 20:41    113/82    


 


12/31/18 20:00 97.3 93 20 113/82 (92) 97   


 


12/31/18 20:00  85      


 


12/31/18 20:00      Mechanical Ventilator  





      Mechanical Ventilator  


 


12/31/18 20:00        40


 


12/31/18 19:04  78 20     40


 


12/31/18 17:29  77 19     40


 


12/31/18 16:00        40


 


12/31/18 16:00      Mechanical Ventilator  





      Mechanical Ventilator  


 


12/31/18 16:00 98.0 73 23 119/86 (97) 99   


 


12/31/18 15:33  78      


 


12/31/18 14:58  73 16     40


 


12/31/18 12:35  78 17     40


 


12/31/18 12:00        40


 


12/31/18 12:00      Mechanical Ventilator  





      Mechanical Ventilator  


 


12/31/18 12:00 97.6 68 21 102/63 (76) 98   


 


12/31/18 11:38  70      


 


12/31/18 11:15  71 17     40








Height (Feet):  5


Height (Inches):  2.00


Weight (Pounds):  113


HEENT:  status post trach


Respiratory/Chest:  lungs clear


Cardiovascular:  normal rate, regular rhythm, no gallop/murmur


Abdomen:  soft, non tender, other - GT


Extremities:  no edema, other - right arm PICC





Laboratory Tests








Test


  12/31/18


12:15


 


Activated Partial


Thromboplast Time 38 SEC (23-33)


H











Current Medications








 Medications


  (Trade)  Dose


 Ordered  Sig/Tony


 Route


 PRN Reason  Start Time


 Stop Time Status Last Admin


Dose Admin


 


 Acetaminophen


  (Tylenol)  650 mg  Q6H  PRN


 GT


 Mild Pain/Temp > 100.5  12/23/18 16:00


 1/20/19 15:59  12/28/18 12:03


 


 


 Acetaminophen/


 Hydrocodone Bitart


  (Norco 5/325)  1 tab  Q6H  PRN


 GT


 For Pain  12/28/18 18:15


 1/4/19 18:14  1/1/19 09:25


 


 


 Amiodarone HCl


  (Cordarone)  200 mg  EVERY 12  HOURS


 GT


   12/27/18 21:00


 1/26/19 20:59  1/1/19 09:22


 


 


 Apixaban


  (Eliquis)  2.5 mg  BID


 GT


   12/27/18 18:00


 1/26/19 17:59  1/1/19 09:24


 


 


 Artificial Tears


  (Akwa-Tears)  1 drop  Q4H  PRN


 BOTH EYES


 Dry Eyes  12/23/18 15:30


 1/21/19 15:29  12/29/18 15:03


 


 


 Chlorhexidine


 Gluconate


  (Juana-Hex 2%)  1 applic  DAILY@2000


 TOPIC


   12/30/18 20:00


 1/29/19 19:59  12/31/18 20:40


 


 


 Collagenase


  (Santyl)  1 applic  Q12HR


 TOPIC


   12/23/18 21:00


 1/5/19 20:59  1/1/19 09:26


 


 


 Lansoprazole


  (Prevacid)  30 mg  Q12HR


 GT


   12/27/18 21:00


 1/26/19 20:59  1/1/19 09:23


 


 


 Lorazepam


  (Ativan)  0.5 mg  Q6H  PRN


 ORAL


 For Anxiety  12/30/18 12:00


 1/6/19 11:59  1/1/19 09:25


 


 


 Minoxidil


  (Loniten)  2.5 mg  Q12HR


 GT


   12/27/18 21:00


 1/26/19 20:59  1/1/19 09:24


 


 


 Sodium Chloride


  (Ocean Nasal


 Spray)  1 spray  Q4H  PRN


 NASAL


 dry nose  12/23/18 14:30


 1/21/19 18:29  12/25/18 11:49


 


 


 Vitamin D


  (Vitamin D)  1,000 intlu  DAILY


 GT


   12/24/18 09:00


 1/8/19 09:59  1/1/19 09:23


 


 


 Zolpidem Tartrate


  (Ambien)  5 mg  HSPRN  PRN


 GT


 Insomnia  12/27/18 16:45


 1/3/19 16:44  1/1/19 01:52


 

















Chase Santillan MD Jan 1, 2019 10:09

## 2019-01-01 NOTE — NUR
RESPIRATORY NOTE: Received pt on current setting. Pt has small to moderate amount of tan 
thick secretions and will sxn prn. Alarms are on and audible. Ambu bag is at the bedside. 
Vent is plugged into red outlet. Will continue to monitor pt's progress.

## 2019-01-01 NOTE — NUR
NURSE NOTES:

Received patient from CAROLYN Sherwood. Patient VS stable at this time. Patient has high anxiety at 
this time. Patient has a PRN order for ativan. Will give as needed. Patient has trach to 
ventilator with setting of AC 8, , FiO2 40%, and PEEP 5 at this time. Patient 
tolerating setting with saturation of 93% at this time. Patient has a G tube that is patent 
and asymptomatic at this time. Patient is running nepro at 40cc/hr with minimal residual. 
Patient is incontinent of urine at this time. Patient has several wounds. All dressings have 
been changed during night shift. Will monitor. Patient has a right upper arm PICC that is 
patent and saline locked at this time. patient has a left upper arm AV shunt that is patent. 
Patient has dialysis yesterday with 2.5L out. Patient bed in low position with bed alarm on 
and call light in reach at this time.

## 2019-01-01 NOTE — NUR
NURSE NOTES:

Report received from CAROLYN Li. Observed pt lying on the bed. Awake and A/O x 3-4, forgetful 
and confused time to time. Denies pain at this time. Telemonitoring: SR. Pt has trach on 
vent, shiley 8.0, Ac 8, , PEEP 5, FIO2 40%. GT intact and patent, running Nepro at 
40cc/hr. Rectal tube intact and draining well. Midline on ASHISH, intact and patent. L AV 
shunt, asymptomatic. Bed in the lowest position. Side rails up x3. Call light within reach. 
Will continue to monitor.

## 2019-01-01 NOTE — NEPHROLOGY PROGRESS NOTE
Assessment/Plan


Assessment


Seee below


Plan





ESRD HD q MWF 


Anemia of CKD , SAMANTHA high dose. Transfuse PRN.





A. 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 with LAE. LVEF 65% . On Eliquis.





Post trach + PEG .


Referred to [facility]. Referral not received there despite numerous requests. Pt'

s son agreeable to [facility].


See orders.


DW pt's son. Needs extensive Vascular W/U. 


DW 





Subjective


Subjective


 No new c/o





Objective


Objective





Last 24 Hour Vital Signs








  Date Time  Temp Pulse Resp B/P (MAP) Pulse Ox O2 Delivery O2 Flow Rate FiO2


 


1/1/19 13:43  67 19     40


 


1/1/19 12:00 97.5 61 18 131/54 (79) 100   


 


1/1/19 11:34  75      


 


1/1/19 10:57  65 12     40


 


1/1/19 09:24    117/53    


 


1/1/19 09:05  68 15     40


 


1/1/19 08:17  72      


 


1/1/19 08:00 96.8 61 16 117/53 (74) 97   


 


1/1/19 07:16  84 21     40


 


1/1/19 06:34 97.4 69 17 114/68 (83) 100   


 


1/1/19 05:05  100 16     40


 


1/1/19 04:00      Mechanical Ventilator  





      Mechanical Ventilator  


 


1/1/19 04:00        40


 


1/1/19 04:00 97.4 69 17 114/68 (83) 100   


 


1/1/19 04:00 97.4 69 17 114/68 (83) 100   


 


1/1/19 04:00  72      


 


1/1/19 03:15  76 19     40


 


1/1/19 00:32  82 18     40


 


1/1/19 00:00 97.7 81 20 138/74 (95) 99   


 


1/1/19 00:00        40


 


1/1/19 00:00      Mechanical Ventilator  





      Mechanical Ventilator  


 


1/1/19 00:00  83      


 


12/31/18 22:31  69 19     40


 


12/31/18 20:59  83 17     40


 


12/31/18 20:41    113/82    


 


12/31/18 20:00 97.3 93 20 113/82 (92) 97   


 


12/31/18 20:00  85      


 


12/31/18 20:00      Mechanical Ventilator  





      Mechanical Ventilator  


 


12/31/18 20:00        40


 


12/31/18 19:04  78 20     40


 


12/31/18 17:29  77 19     40


 


12/31/18 16:00        40


 


12/31/18 16:00      Mechanical Ventilator  





      Mechanical Ventilator  


 


12/31/18 16:00 98.0 73 23 119/86 (97) 99   


 


12/31/18 15:33  78      


 


12/31/18 14:58  73 16     40

















Intake and Output  


 


 12/31/18 1/1/19





 18:59 06:59


 


Intake Total 570 ml 530 ml


 


Output Total 2510 ml 


 


Balance -1940 ml 530 ml


 


  


 


Tube Feeding 480 ml 480 ml


 


Other 90 ml 50 ml


 


Stool Total 10 ml 


 


Hemodialysis UF 2500 ml 


 


# Voids 1 








Height (Feet):  5


Height (Inches):  2.00


Weight (Pounds):  113


Objective


On vent.


Trach clean.


Cv IRR/IRR


Lungs B ronchi.


Abd SNT. BS +  PEG.


E Rt. foot diabetic ulcers











Gavino Daigle MD Jan 1, 2019 14:35

## 2019-01-01 NOTE — CARDIOLOGY PROGRESS NOTE
Assessment/Plan


Problem List:  


(1) Paroxysmal A-fib


(2) Bradycardia


(3) Sepsis


(4) Hypotension


(5) Pneumonia


Status:  stable, progressing


Status Narrative


Pt overall stable, s/p PEG, trach


Completed iv abx course for e coli UTI. 


2 1/2 L fluid removed at dialysis on 12/31


No events overnight.


Assessment/Plan


Continue vent support. Wean as tolerated


 Amiodarone 200 mg bid - will change to qd


Continue eliquis for cva prevention.


Continue dialysis per Dr. Daigle


Plan for transfer to long-term care facility in progress





Subjective


ROS Limited/Unobtainable:  Yes


Subjective


Cardiology for Dr. Wade





Pt is sedated, on vent





Objective





Last 24 Hour Vital Signs








  Date Time  Temp Pulse Resp B/P (MAP) Pulse Ox O2 Delivery O2 Flow Rate FiO2


 


1/1/19 15:01  65 16     40


 


1/1/19 13:43  67 19     40


 


1/1/19 12:00 97.5 61 18 131/54 (79) 100   


 


1/1/19 12:00      Mechanical Ventilator  





      Mechanical Ventilator  


 


1/1/19 11:34  75      


 


1/1/19 10:57  65 12     40


 


1/1/19 09:24    117/53    


 


1/1/19 09:05  68 15     40


 


1/1/19 08:17  72      


 


1/1/19 08:00 96.8 61 16 117/53 (74) 97   


 


1/1/19 08:00      Mechanical Ventilator  





      Mechanical Ventilator  


 


1/1/19 07:16  84 21     40


 


1/1/19 06:34 97.4 69 17 114/68 (83) 100   


 


1/1/19 05:05  100 16     40


 


1/1/19 04:00      Mechanical Ventilator  





      Mechanical Ventilator  


 


1/1/19 04:00        40


 


1/1/19 04:00 97.4 69 17 114/68 (83) 100   


 


1/1/19 04:00 97.4 69 17 114/68 (83) 100   


 


1/1/19 04:00  72      


 


1/1/19 03:15  76 19     40


 


1/1/19 00:32  82 18     40


 


1/1/19 00:00 97.7 81 20 138/74 (95) 99   


 


1/1/19 00:00        40


 


1/1/19 00:00      Mechanical Ventilator  





      Mechanical Ventilator  


 


1/1/19 00:00  83      


 


12/31/18 22:31  69 19     40


 


12/31/18 20:59  83 17     40


 


12/31/18 20:41    113/82    


 


12/31/18 20:00 97.3 93 20 113/82 (92) 97   


 


12/31/18 20:00  85      


 


12/31/18 20:00      Mechanical Ventilator  





      Mechanical Ventilator  


 


12/31/18 20:00        40


 


12/31/18 19:04  78 20     40


 


12/31/18 17:29  77 19     40


 


12/31/18 16:00        40


 


12/31/18 16:00      Mechanical Ventilator  





      Mechanical Ventilator  


 


12/31/18 16:00 98.0 73 23 119/86 (97) 99   


 


12/31/18 15:33  78      








General Appearance:  WD/WN, on vent


EENT:  PERRL/EOMI


Neck:  supple, other - trach


Rhythm:  NSR


Cardiovascular:  normal rate, regular rhythm, no gallop/murmur


Respiratory/Chest:  lungs clear - clear anteriorly


Abdomen:  non tender, soft, other - + g tube


Extremities:  no swelling, other - L UE av fistula  + bruit











Intake and Output  


 


 12/31/18 1/1/19





 18:59 06:59


 


Intake Total 570 ml 530 ml


 


Output Total 2510 ml 


 


Balance -1940 ml 530 ml


 


  


 


Tube Feeding 480 ml 480 ml


 


Other 90 ml 50 ml


 


Stool Total 10 ml 


 


Hemodialysis UF 2500 ml 


 


# Voids 1 

















Alexandra Acosta MD Jan 1, 2019 15:30

## 2019-01-02 VITALS — SYSTOLIC BLOOD PRESSURE: 128 MMHG | DIASTOLIC BLOOD PRESSURE: 54 MMHG

## 2019-01-02 VITALS — SYSTOLIC BLOOD PRESSURE: 150 MMHG | DIASTOLIC BLOOD PRESSURE: 55 MMHG

## 2019-01-02 VITALS — SYSTOLIC BLOOD PRESSURE: 97 MMHG | DIASTOLIC BLOOD PRESSURE: 50 MMHG

## 2019-01-02 VITALS — DIASTOLIC BLOOD PRESSURE: 58 MMHG | SYSTOLIC BLOOD PRESSURE: 134 MMHG

## 2019-01-02 VITALS — DIASTOLIC BLOOD PRESSURE: 44 MMHG | SYSTOLIC BLOOD PRESSURE: 130 MMHG

## 2019-01-02 VITALS — DIASTOLIC BLOOD PRESSURE: 96 MMHG | SYSTOLIC BLOOD PRESSURE: 115 MMHG

## 2019-01-02 LAB
ADD MANUAL DIFF: NO
ANION GAP SERPL CALC-SCNC: 8 MMOL/L (ref 5–15)
BASOPHILS NFR BLD AUTO: 1 % (ref 0–2)
BUN SERPL-MCNC: 48 MG/DL (ref 7–18)
CALCIUM SERPL-MCNC: 9.2 MG/DL (ref 8.5–10.1)
CHLORIDE SERPL-SCNC: 96 MMOL/L (ref 98–107)
CO2 SERPL-SCNC: 26 MMOL/L (ref 21–32)
CREAT SERPL-MCNC: 2.3 MG/DL (ref 0.55–1.3)
EOSINOPHIL NFR BLD AUTO: 2.1 % (ref 0–3)
ERYTHROCYTE [DISTWIDTH] IN BLOOD BY AUTOMATED COUNT: 19.1 % (ref 11.6–14.8)
HCT VFR BLD CALC: 27 % (ref 37–47)
HGB BLD-MCNC: 8.4 G/DL (ref 12–16)
LYMPHOCYTES NFR BLD AUTO: 5.7 % (ref 20–45)
MCV RBC AUTO: 95 FL (ref 80–99)
MONOCYTES NFR BLD AUTO: 7.1 % (ref 1–10)
NEUTROPHILS NFR BLD AUTO: 83.9 % (ref 45–75)
PLATELET # BLD: 398 K/UL (ref 150–450)
POTASSIUM SERPL-SCNC: 4.2 MMOL/L (ref 3.5–5.1)
RBC # BLD AUTO: 2.83 M/UL (ref 4.2–5.4)
SODIUM SERPL-SCNC: 130 MMOL/L (ref 136–145)
WBC # BLD AUTO: 8.9 K/UL (ref 4.8–10.8)

## 2019-01-02 RX ADMIN — ZOLPIDEM TARTRATE PRN MG: 5 TABLET ORAL at 22:23

## 2019-01-02 RX ADMIN — HYDROCODONE BITARTRATE AND ACETAMINOPHEN PRN TAB: 5; 325 TABLET ORAL at 22:22

## 2019-01-02 RX ADMIN — MINOXIDIL SCH MG: 2.5 TABLET ORAL at 20:26

## 2019-01-02 RX ADMIN — HYDROCODONE BITARTRATE AND ACETAMINOPHEN PRN TAB: 5; 325 TABLET ORAL at 12:38

## 2019-01-02 RX ADMIN — MINOXIDIL SCH MG: 2.5 TABLET ORAL at 09:36

## 2019-01-02 RX ADMIN — APIXABAN SCH MG: 2.5 TABLET, FILM COATED ORAL at 17:50

## 2019-01-02 RX ADMIN — HYDROCODONE BITARTRATE AND ACETAMINOPHEN PRN TAB: 5; 325 TABLET ORAL at 00:40

## 2019-01-02 RX ADMIN — VITAMIN D, TAB 1000IU (100/BT) SCH INTLU: 25 TAB at 09:36

## 2019-01-02 RX ADMIN — CHLORHEXIDINE GLUCONATE SCH APPLIC: 213 SOLUTION TOPICAL at 20:24

## 2019-01-02 RX ADMIN — AMIODARONE HYDROCHLORIDE SCH MG: 200 TABLET ORAL at 08:40

## 2019-01-02 RX ADMIN — APIXABAN SCH MG: 2.5 TABLET, FILM COATED ORAL at 09:36

## 2019-01-02 NOTE — NEPHROLOGY PROGRESS NOTE
Assessment/Plan


Assessment


Seee below


Plan





ESRD HD q MWF 


Anemia of CKD , SAMANTHA high dose. Transfuse PRN.





A. 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 with LAE. LVEF 65% . On Eliquis.





Post trach + PEG .


Referred to [facility]. Referral not received there despite numerous requests. Pt'

s son agreeable to [facility].


See orders.


DW pt's son. Needs extensive Vascular W/U. 


DW 





Subjective


Subjective


c/o pain





Objective


Objective





Last 24 Hour Vital Signs








  Date Time  Temp Pulse Resp B/P (MAP) Pulse Ox O2 Delivery O2 Flow Rate FiO2


 


1/2/19 16:56  100 17     40


 


1/2/19 16:00 97.3 88 14 97/50 (66) 100   


 


1/2/19 16:00      Mechanical Ventilator  





      Mechanical Ventilator  


 


1/2/19 15:08  97 18     40


 


1/2/19 13:14  76 17     40


 


1/2/19 12:00        40


 


1/2/19 12:00  97      


 


1/2/19 12:00      Mechanical Ventilator  





      Mechanical Ventilator  


 


1/2/19 12:00 97.2 92 22 130/44 (72) 96   


 


1/2/19 11:02  94 20     40


 


1/2/19 09:36    115/96    


 


1/2/19 08:46  86 18     40


 


1/2/19 08:00      Mechanical Ventilator  





      Mechanical Ventilator  


 


1/2/19 08:00        40


 


1/2/19 08:00  72      


 


1/2/19 08:00 97.5 79 22 115/96 (102) 97   


 


1/2/19 07:02  93 12     40


 


1/2/19 05:13  80 18     40


 


1/2/19 04:00      Mechanical Ventilator  





      Mechanical Ventilator  


 


1/2/19 04:00        40


 


1/2/19 04:00  71      


 


1/2/19 04:00 97.9 70 22 150/55 (86) 97   


 


1/2/19 03:11  75 22     40


 


1/2/19 00:48  67 18     40


 


1/2/19 00:00 97.3 66 24 128/54 (78) 97   


 


1/2/19 00:00      Mechanical Ventilator  





      Mechanical Ventilator  


 


1/2/19 00:00        40


 


1/1/19 22:54  64 16     40


 


1/1/19 20:44  75 17     40


 


1/1/19 20:33    148/55    


 


1/1/19 20:00  63      


 


1/1/19 20:00 97.7 69 18 143/52 (82) 95   


 


1/1/19 20:00      Mechanical Ventilator  





      Mechanical Ventilator  


 


1/1/19 20:00        40


 


1/1/19 19:14  67 17     40

















Intake and Output  


 


 1/1/19 1/2/19





 18:59 06:59


 


Intake Total 600 ml 590 ml


 


Output Total  230 ml


 


Balance 600 ml 360 ml


 


  


 


Free Water 120 ml 110 ml


 


Tube Feeding 480 ml 480 ml


 


Output Urine Total  200 ml


 


Stool Total  30 ml


 


# Voids 1 1


 


# Bowel Movements 50 








Laboratory Tests


1/2/19 03:20: 


White Blood Count 8.9, Red Blood Count 2.83L, Hemoglobin 8.4L, Hematocrit 27.0L

, Mean Corpuscular Volume 95, Mean Corpuscular Hemoglobin 29.8, Mean 

Corpuscular Hemoglobin Concent 31.3L, Red Cell Distribution Width 19.1H, 

Platelet Count 398, Mean Platelet Volume 5.7L, Neutrophils (%) (Auto) 83.9H, 

Lymphocytes (%) (Auto) 5.7L, Monocytes (%) (Auto) 7.1, Eosinophils (%) (Auto) 

2.1, Basophils (%) (Auto) 1.0, Sodium Level 130L, Potassium Level 4.2, Chloride 

Level 96L, Carbon Dioxide Level 26, Anion Gap 8, Blood Urea Nitrogen 48H, 

Creatinine 2.3H, Estimat Glomerular Filtration Rate , Glucose Level 123H, 

Calcium Level 9.2


Height (Feet):  5


Height (Inches):  2.00


Weight (Pounds):  115


Objective


On vent.


Trach clean.


Cv IRR/IRR


Lungs B ronchi.


Abd SNT. BS +  PEG.


E Rt. foot diabetic ulcers











Gavino Daigle MD Jan 2, 2019 17:09

## 2019-01-02 NOTE — NUR
CASE MANAGEMENT: REVIEW





SI: ESRD ON HD

TRACHEOSTOMY 12/17

PEG PLACEMENT 12/19 

T 97.5 HR 79 RR 22 /55 SAT 97% MECH VENT FIO2 40

H/H 8.4/27.0  







IS: MINOXIDIL GT Q12HR 

PROCRIT SQ MWF

ELIQUIS NG BID 

AMIODARONE GT Q12HR 

GT FEEDING NEPRO @ 40ML/HR



***STEP DOWN UNIT  STATUS***

DCP: PATIENT IS FROM [facility]. REFERRED TO [facility]

## 2019-01-02 NOTE — NUR
NURSE NOTES:

Report received from CAROLYN Abraham. Observed pt lying on the bed, watching TV. Denies pain at 
this time. A/O x 3-4. SR with telemonitor. pt has trach to vent, Shiley 8, AC8, , FiO2 
40%, PEEP 5. Gtube site intact and patent, running Nepro 40cc/hr. Rectal tube intact and 
draining well. R UA midline, intact and patent. L AV shunt, asymptomatic. Bed in the lowest 
position. Call light within reach. Side rails up x3. Will continue to monitor.

## 2019-01-02 NOTE — DIAGNOSTIC IMAGING REPORT
Indication: Dyspnea

 

Comparison:  12/10/2018

 

A single view chest radiograph was obtained.

 

Findings:

 

Bilateral pleural effusions suspected. Interstitial edema appears slightly less

although the patient has lung volumes are considerably better on the current study.

The tracheostomy has been placed and is in good position. No pneumothorax identified.

A right midline catheter is noted. NG tube was removed. Surgical clips in the upper

abdomen again noted.

 

IMPRESSION:

 

Tracheostomy placement. No pneumothorax.

 

Suggestion of bilateral pleural effusions versus pleural thickening

## 2019-01-02 NOTE — PULMONOLOGY PROGRESS NOTE
Assessment/Plan


Assessment/Plan


IMPRESSION:


1. Hypotension. Resolved.


2. Sepsis.


3. Atrial fibrillation.


4. Bradycardia.


5. Diabetes mellitus.


6. End-stage renal disease on dialysis.


7. Left lung collapse. Resolved


8. Respiratory failure. now status post tracheostomy


9. Dysphagia.


10. Dementia.


11. Anemia


12. Pneumonia





DISCUSSION:


1. Continue medications.


2. CXR improved


3. Status post tracheostomy


4. S/p G-tube 


5. Continue HD














  ______________________________________________


  Quentin Cardoso M.D.





Subjective


Interval Events:  None new


Constitutional:  Reports: no symptoms


HEENT:  Repors: no symptoms


Respiratory:  Reports: no symptoms


Cardiovascular:  Reports: no symptoms


Gastrointestinal/Abdominal:  Reports: no symptoms


Genitourinary:  Reports: no symptoms


Neurologic:  Reports: no symptoms


Allergies:  


Coded Allergies:  


     No Known Allergies (Unverified , 11/26/18)





Objective





Last 24 Hour Vital Signs








  Date Time  Temp Pulse Resp B/P (MAP) Pulse Ox O2 Delivery O2 Flow Rate FiO2


 


1/2/19 09:36    115/96    


 


1/2/19 08:46  86 18     40


 


1/2/19 08:00      Mechanical Ventilator  





      Mechanical Ventilator  


 


1/2/19 08:00        40


 


1/2/19 08:00 97.5 79 22 115/96 (102) 97   


 


1/2/19 07:02  93 12     40


 


1/2/19 05:13  80 18     40


 


1/2/19 04:00      Mechanical Ventilator  





      Mechanical Ventilator  


 


1/2/19 04:00        40


 


1/2/19 04:00  71      


 


1/2/19 04:00 97.9 70 22 150/55 (86) 97   


 


1/2/19 03:11  75 22     40


 


1/2/19 00:48  67 18     40


 


1/2/19 00:00 97.3 66 24 128/54 (78) 97   


 


1/2/19 00:00      Mechanical Ventilator  





      Mechanical Ventilator  


 


1/2/19 00:00        40


 


1/1/19 22:54  64 16     40


 


1/1/19 20:44  75 17     40


 


1/1/19 20:33    148/55    


 


1/1/19 20:00  63      


 


1/1/19 20:00 97.7 69 18 143/52 (82) 95   


 


1/1/19 20:00      Mechanical Ventilator  





      Mechanical Ventilator  


 


1/1/19 20:00        40


 


1/1/19 19:14  67 17     40


 


1/1/19 17:07  75 17     40


 


1/1/19 16:00 97.7 71 18 143/52 (82) 100   


 


1/1/19 15:58        40


 


1/1/19 15:57      Mechanical Ventilator  





      Mechanical Ventilator  


 


1/1/19 15:33  66      


 


1/1/19 15:01  65 16     40


 


1/1/19 14:00        40


 


1/1/19 13:43  67 19     40


 


1/1/19 12:00        40


 


1/1/19 12:00 97.5 61 18 131/54 (79) 100   


 


1/1/19 12:00      Mechanical Ventilator  





      Mechanical Ventilator  


 


1/1/19 11:34  75      


 


1/1/19 10:57  65 12     40

















Intake and Output  


 


 1/1/19 1/2/19





 19:00 07:00


 


Intake Total 600 ml 550 ml


 


Output Total  230 ml


 


Balance 600 ml 320 ml


 


  


 


Free Water 120 ml 110 ml


 


Tube Feeding 480 ml 440 ml


 


Output Urine Total  200 ml


 


Stool Total  30 ml


 


# Voids 1 1


 


# Bowel Movements 50 








General Appearance:  no acute distress


HEENT:  normocephalic


Respiratory/Chest:  chest wall non-tender, lungs clear


Cardiovascular:  normal peripheral pulses, normal rate


Abdomen:  normal bowel sounds


Laboratory Tests


1/2/19 03:20: 


White Blood Count 8.9, Red Blood Count 2.83L, Hemoglobin 8.4L, Hematocrit 27.0L

, Mean Corpuscular Volume 95, Mean Corpuscular Hemoglobin 29.8, Mean 

Corpuscular Hemoglobin Concent 31.3L, Red Cell Distribution Width 19.1H, 

Platelet Count 398, Mean Platelet Volume 5.7L, Neutrophils (%) (Auto) 83.9H, 

Lymphocytes (%) (Auto) 5.7L, Monocytes (%) (Auto) 7.1, Eosinophils (%) (Auto) 

2.1, Basophils (%) (Auto) 1.0, Sodium Level 130L, Potassium Level 4.2, Chloride 

Level 96L, Carbon Dioxide Level 26, Anion Gap 8, Blood Urea Nitrogen 48H, 

Creatinine 2.3H, Estimat Glomerular Filtration Rate , Glucose Level 123H, 

Calcium Level 9.2





Current Medications








 Medications


  (Trade)  Dose


 Ordered  Sig/Tony


 Route


 PRN Reason  Start Time


 Stop Time Status Last Admin


Dose Admin


 


 Acetaminophen


  (Tylenol)  650 mg  Q6H  PRN


 GT


 Mild Pain/Temp > 100.5  12/23/18 16:00


 1/20/19 15:59  12/28/18 12:03


 


 


 Acetaminophen/


 Hydrocodone Bitart


  (Norco 5/325)  1 tab  Q6H  PRN


 GT


 For Pain  12/28/18 18:15


 1/4/19 18:14  1/2/19 00:40


 


 


 Amiodarone HCl


  (Cordarone)  200 mg  DAILY


 GT


   1/2/19 09:00


 1/26/19 20:59   


 


 


 Apixaban


  (Eliquis)  2.5 mg  BID


 GT


   12/27/18 18:00


 1/26/19 17:59  1/2/19 09:36


 


 


 Artificial Tears


  (Akwa-Tears)  1 drop  Q4H  PRN


 BOTH EYES


 Dry Eyes  12/23/18 15:30


 1/21/19 15:29  1/1/19 16:53


 


 


 Chlorhexidine


 Gluconate


  (Juana-Hex 2%)  1 applic  DAILY@2000


 TOPIC


   12/30/18 20:00


 1/29/19 19:59  1/1/19 20:33


 


 


 Collagenase


  (Santyl)  1 applic  Q12HR


 TOPIC


   12/23/18 21:00


 1/5/19 20:59  1/2/19 09:00


 


 


 Lansoprazole


  (Prevacid)  30 mg  Q12HR


 GT


   12/27/18 21:00


 1/26/19 20:59  1/2/19 09:36


 


 


 Lorazepam


  (Ativan)  0.5 mg  Q6H  PRN


 ORAL


 For Anxiety  12/30/18 12:00


 1/6/19 11:59  1/1/19 16:52


 


 


 Minoxidil


  (Loniten)  2.5 mg  Q12HR


 GT


   12/27/18 21:00


 1/26/19 20:59  1/2/19 09:36


 


 


 Sodium Chloride  1,000 ml @ 


 500 mls/hr  Q2H PRN


 IVLG


 sbp<90 during hd  1/2/19 14:35


 1/2/19 23:59   


 


 


 Sodium Chloride


  (Ocean Nasal


 Spray)  1 spray  Q4H  PRN


 NASAL


 dry nose  12/23/18 14:30


 1/21/19 18:29  12/25/18 11:49


 


 


 Vitamin D


  (Vitamin D)  1,000 intlu  DAILY


 GT


   12/24/18 09:00


 1/8/19 09:59  1/2/19 09:36


 


 


 Zolpidem Tartrate


  (Ambien)  5 mg  HSPRN  PRN


 GT


 Insomnia  12/27/18 16:45


 1/3/19 16:44  1/1/19 21:41


 

















Quentin Cardoso MD Jan 2, 2019 10:15

## 2019-01-02 NOTE — NUR
NURSE NOTES:

Observed pt lying on bed, awake and disconnect the vent tube. Vent-tube reconnected and pt 
saturating at 95%, no acute distress noted. Pt c/o headache and prn pain med given. Will 
continue to monitor.

## 2019-01-02 NOTE — NUR
NURSE NOTES:

Patient complaining of severe pain and discomfort. Generalized, Non radiating, body ache on 
a scale of 1-10 patient stated 8 on paper. Norco administered. Will reassess patient and 
manage pain.

## 2019-01-02 NOTE — NUR
NURSE NOTES:

pt c/o pain at right lower abdomen. stated 10/10 on a 0-10 pain scale. Feeding is on hold. 
PRN pain med given. Pt c/o of insomnia and prn med given. Observed pt sleeping on the bed. 
VS within normal range. Bed in the lowest position. Side rails up x3. Will continue to 
monitor.

## 2019-01-02 NOTE — NUR
NURSE NOTES:

Patient alert&oriented x4. Observed moving devices. Restraints on bilaterally. Active range 
of motion. No discoloration or swelling noted related to restraints. Cardiac monitor showing 
SR. Shiley 8 AC 8  Peep 5 FiO2 40%. Lung sounds clear bilaterally. Patient has G-tube. 
dry and intact dressing. Nepro at 40 cc/hr. Patient has rectal tube. Pabon catheter intact. 
Multiple skin issues - see WCP. Patient turned and repositioned. R upper arm midline. L AV 
shunt. Dialysis scheduled and confirmed for today per log. No critical values regards to 
lab. Patient stable. VSS. No distress noted. HOB elevated, Bed on lowest position, call 
light within reach, bed alarm on for safety. Will continue plan of care.

## 2019-01-03 VITALS — DIASTOLIC BLOOD PRESSURE: 31 MMHG | SYSTOLIC BLOOD PRESSURE: 115 MMHG

## 2019-01-03 VITALS — DIASTOLIC BLOOD PRESSURE: 54 MMHG | SYSTOLIC BLOOD PRESSURE: 157 MMHG

## 2019-01-03 VITALS — SYSTOLIC BLOOD PRESSURE: 134 MMHG | DIASTOLIC BLOOD PRESSURE: 58 MMHG

## 2019-01-03 VITALS — DIASTOLIC BLOOD PRESSURE: 83 MMHG | SYSTOLIC BLOOD PRESSURE: 159 MMHG

## 2019-01-03 VITALS — DIASTOLIC BLOOD PRESSURE: 45 MMHG | SYSTOLIC BLOOD PRESSURE: 135 MMHG

## 2019-01-03 VITALS — SYSTOLIC BLOOD PRESSURE: 106 MMHG | DIASTOLIC BLOOD PRESSURE: 27 MMHG

## 2019-01-03 LAB
ADD MANUAL DIFF: YES
ANION GAP SERPL CALC-SCNC: 9 MMOL/L (ref 5–15)
BUN SERPL-MCNC: 39 MG/DL (ref 7–18)
CALCIUM SERPL-MCNC: 9.3 MG/DL (ref 8.5–10.1)
CHLORIDE SERPL-SCNC: 97 MMOL/L (ref 98–107)
CO2 SERPL-SCNC: 26 MMOL/L (ref 21–32)
CREAT SERPL-MCNC: 2.1 MG/DL (ref 0.55–1.3)
ERYTHROCYTE [DISTWIDTH] IN BLOOD BY AUTOMATED COUNT: 18.2 % (ref 11.6–14.8)
HCT VFR BLD CALC: 33 % (ref 37–47)
HGB BLD-MCNC: 10.2 G/DL (ref 12–16)
MCV RBC AUTO: 96 FL (ref 80–99)
PHOSPHATE SERPL-MCNC: 3.7 MG/DL (ref 2.5–4.9)
PLATELET # BLD: 456 K/UL (ref 150–450)
POTASSIUM SERPL-SCNC: 4.6 MMOL/L (ref 3.5–5.1)
RBC # BLD AUTO: 3.46 M/UL (ref 4.2–5.4)
SODIUM SERPL-SCNC: 132 MMOL/L (ref 136–145)
WBC # BLD AUTO: 12.7 K/UL (ref 4.8–10.8)

## 2019-01-03 RX ADMIN — APIXABAN SCH MG: 2.5 TABLET, FILM COATED ORAL at 09:04

## 2019-01-03 RX ADMIN — LORAZEPAM PRN MG: 0.5 TABLET ORAL at 13:17

## 2019-01-03 RX ADMIN — AMIODARONE HYDROCHLORIDE SCH MG: 200 TABLET ORAL at 09:04

## 2019-01-03 RX ADMIN — VITAMIN D, TAB 1000IU (100/BT) SCH INTLU: 25 TAB at 09:04

## 2019-01-03 RX ADMIN — CHLORHEXIDINE GLUCONATE SCH APPLIC: 213 SOLUTION TOPICAL at 20:41

## 2019-01-03 RX ADMIN — MINOXIDIL SCH MG: 2.5 TABLET ORAL at 20:42

## 2019-01-03 RX ADMIN — APIXABAN SCH MG: 2.5 TABLET, FILM COATED ORAL at 17:53

## 2019-01-03 RX ADMIN — HYDROCODONE BITARTRATE AND ACETAMINOPHEN PRN TAB: 5; 325 TABLET ORAL at 11:32

## 2019-01-03 RX ADMIN — MINOXIDIL SCH MG: 2.5 TABLET ORAL at 09:04

## 2019-01-03 NOTE — NUR
NURSE NOTES:

Report received from CAROLYN Duran. Observed pt lying on the bed, watching TV. A/O x3, but 
forgetful. Pt on vent with setting of AC 8, , PEEP 5, FIO2 40%. Observed pt trying to 
disconnect vent tube. Tube reconnected and no distress noted, SaO2 96%. Gtube site intact 
and patent, running Nepro at 40cc/hr, no residual noted. Midline R UA, intact and patent. AV 
shutn L UA, asymptomatic. Bed in the lowest position. Call light within reach. Side rails up 
x3. Will continue to monitor.

## 2019-01-03 NOTE — GI PROGRESS NOTE
Assessment/Plan


Problems:  


(1) Encounter for PEG (percutaneous endoscopic gastrostomy)


ICD Codes:  Z43.1 - Encounter for attention to gastrostomy


SNOMED:  484206196, 744307168


(2) Severe malnutrition


ICD Codes:  E43 - Unspecified severe protein-calorie malnutrition


SNOMED:  95304297


(3) Dehydration


ICD Codes:  E86.0 - Dehydration


SNOMED:  69475294


Status:  unchanged


Status Narrative


Discussed with Dr. Lam


Assessment/Plan


Assessment


- Dysphagia


- Resp failure


- ESRD


- Anemia


- Status post PEG, tolerating feeds





Recommendations


1. Abdominal binder.


2. Elevate the head of the bed at all times.


3. G-tube flush.


4. G-tube care.


5. tube feeding later today per RD to goal


Monitor H&H, PRN transfusions


PPI


Electrolyte correction


Follow-up labs





The patient was seen and examined at bedside and all new and available data was 

reviewed in the patients chart. I agree with the above findings, impression 

and plan.  (Patient seen earlier today. Signature stamp does not reflect 

patient encounter time.). - Elliott Lam MD





Subjective


Subjective


limited





Objective





Last 24 Hour Vital Signs








  Date Time  Temp Pulse Resp B/P (MAP) Pulse Ox O2 Delivery O2 Flow Rate FiO2


 


1/3/19 09:04    135/45    


 


1/3/19 08:00        40


 


1/3/19 08:00 97.5 73 16 135/45 (75) 97   


 


1/3/19 07:45  70      


 


1/3/19 05:28  69 17     40


 


1/3/19 04:00      Mechanical Ventilator  





      Mechanical Ventilator  


 


1/3/19 04:00  108      


 


1/3/19 04:00        40


 


1/3/19 04:00 97.9 83 19 157/54 (88) 100   


 


1/3/19 03:14  79 18     40


 


1/3/19 01:14  86 18     40


 


1/3/19 00:00  70      


 


1/3/19 00:00      Mechanical Ventilator  





      Mechanical Ventilator  


 


1/3/19 00:00 97.8 68 16 134/58 (83) 100   


 


1/3/19 00:00        40


 


1/2/19 22:58  83 12     40


 


1/2/19 21:53  82 19     40


 


1/2/19 20:26    134/58    


 


1/2/19 20:00      Mechanical Ventilator  





      Mechanical Ventilator  


 


1/2/19 20:00 97.5 97 16 134/58 (83) 100   


 


1/2/19 20:00  88      


 


1/2/19 20:00        40


 


1/2/19 19:30  93 18     40


 


1/2/19 16:56  100 17     40


 


1/2/19 16:00 97.3 88 14 97/50 (66) 100   


 


1/2/19 16:00        40


 


1/2/19 16:00      Mechanical Ventilator  





      Mechanical Ventilator  


 


1/2/19 15:38  102      


 


1/2/19 15:08  97 18     40


 


1/2/19 13:14  76 17     40


 


1/2/19 12:00        40


 


1/2/19 12:00  97      


 


1/2/19 12:00      Mechanical Ventilator  





      Mechanical Ventilator  


 


1/2/19 12:00 97.2 92 22 130/44 (72) 96   


 


1/2/19 11:02  94 20     40

















Intake and Output  


 


 1/2/19 1/3/19





 19:00 07:00


 


Intake Total 460 ml 300 ml


 


Output Total 100 ml 20 ml


 


Balance 360 ml 280 ml


 


  


 


Free Water 100 ml 60 ml


 


Tube Feeding 360 ml 240 ml


 


Stool Total 100 ml 20 ml


 


# Voids 1 











Laboratory Tests








Test


  1/3/19


10:10


 


White Blood Count


  12.7 K/UL


(4.8-10.8)  H


 


Red Blood Count


  3.46 M/UL


(4.20-5.40)  L


 


Hemoglobin


  10.2 G/DL


(12.0-16.0)  L


 


Hematocrit


  33.0 %


(37.0-47.0)  L


 


Mean Corpuscular Volume 96 FL (80-99)  


 


Mean Corpuscular Hemoglobin


  29.4 PG


(27.0-31.0)


 


Mean Corpuscular Hemoglobin


Concent 30.8 G/DL


(32.0-36.0)  L


 


Red Cell Distribution Width


  18.2 %


(11.6-14.8)  H


 


Platelet Count


  456 K/UL


(150-450)  H


 


Mean Platelet Volume


  5.4 FL


(6.5-10.1)  L


 


Neutrophils (%) (Auto)


  % (45.0-75.0)


 


 


Lymphocytes (%) (Auto)


  % (20.0-45.0)


 


 


Monocytes (%) (Auto)  % (1.0-10.0)  


 


Eosinophils (%) (Auto)  % (0.0-3.0)  


 


Basophils (%) (Auto)  % (0.0-2.0)  


 


Neutrophils % (Manual) Pending  


 


Lymphocytes % (Manual) Pending  


 


Platelet Estimate Pending  


 


Platelet Morphology Pending  


 


Sodium Level Pending  


 


Potassium Level Pending  


 


Chloride Level Pending  


 


Carbon Dioxide Level Pending  


 


Blood Urea Nitrogen Pending  


 


Creatinine Pending  


 


Estimat Glomerular Filtration


Rate Pending  


 


 


Glucose Level Pending  


 


Calcium Level Pending  


 


Phosphorus Level Pending  


 


Magnesium Level Pending  








Height (Feet):  5


Height (Inches):  2.00


Weight (Pounds):  115


General Appearance:  no apparent distress


Cardiovascular:  normal rate


Respiratory/Chest:  normal breath sounds, no respiratory distress


Abdominal Exam:  normal bowel sounds, non tender, soft, GT site - Clean dry and 

intact


Extremities:  non-tender











Shane Lowry NP Joel 3, 2019 10:29

## 2019-01-03 NOTE — NEPHROLOGY PROGRESS NOTE
Assessment/Plan


Assessment


Seee below


Plan





ESRD HD q MWF 


Anemia of CKD , SAMANTHA high dose. Transfuse PRN.





A. 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 with LAE. LVEF 65% . On Eliquis.





Post trach + PEG .


Referred to [facility]. Referral not received there despite numerous requests. Pt'

s son agreeable to [facility].


See orders.


DW pt's son. Needs extensive Vascular W/U. 


DW 





Subjective


Subjective


No new c/o





Objective


Objective





Last 24 Hour Vital Signs








  Date Time  Temp Pulse Resp B/P (MAP) Pulse Ox O2 Delivery O2 Flow Rate FiO2


 


1/3/19 17:05  78 17     40


 


1/3/19 16:00        40


 


1/3/19 16:00 97.5 71 16 115/31 (59) 100   


 


1/3/19 15:29  69 13     40


 


1/3/19 15:24  68      


 


1/3/19 13:26  73 12     40


 


1/3/19 12:00      Mechanical Ventilator  





      Mechanical Ventilator  


 


1/3/19 12:00        40


 


1/3/19 12:00 97.2 83 14 106/27 (53) 97   


 


1/3/19 11:57  72 11     40


 


1/3/19 11:55  77      


 


1/3/19 09:04  77 19     40


 


1/3/19 09:04    135/45    


 


1/3/19 08:00        40


 


1/3/19 08:00      Mechanical Ventilator  





      Mechanical Ventilator  


 


1/3/19 08:00 97.5 73 16 135/45 (75) 97   


 


1/3/19 07:45  70      


 


1/3/19 07:23  71 15     40


 


1/3/19 05:28  69 17     40


 


1/3/19 04:00      Mechanical Ventilator  





      Mechanical Ventilator  


 


1/3/19 04:00  108      


 


1/3/19 04:00        40


 


1/3/19 04:00 97.9 83 19 157/54 (88) 100   


 


1/3/19 03:14  79 18     40


 


1/3/19 01:14  86 18     40


 


1/3/19 00:00  70      


 


1/3/19 00:00      Mechanical Ventilator  





      Mechanical Ventilator  


 


1/3/19 00:00 97.8 68 16 134/58 (83) 100   


 


1/3/19 00:00        40


 


1/2/19 22:58  83 12     40


 


1/2/19 21:53  82 19     40


 


1/2/19 20:26    134/58    


 


1/2/19 20:00      Mechanical Ventilator  





      Mechanical Ventilator  


 


1/2/19 20:00 97.5 97 16 134/58 (83) 100   


 


1/2/19 20:00  88      


 


1/2/19 20:00        40


 


1/2/19 19:30  93 18     40

















Intake and Output  


 


 1/2/19 1/3/19





 18:59 06:59


 


Intake Total 460 ml 340 ml


 


Output Total 100 ml 20 ml


 


Balance 360 ml 320 ml


 


  


 


Free Water 100 ml 60 ml


 


Tube Feeding 360 ml 280 ml


 


Stool Total 100 ml 20 ml


 


# Voids 1 








Laboratory Tests


1/3/19 10:10: 


White Blood Count 12.7H, Red Blood Count 3.46L, Hemoglobin 10.2L, Hematocrit 

33.0L, Mean Corpuscular Volume 96, Mean Corpuscular Hemoglobin 29.4, Mean 

Corpuscular Hemoglobin Concent 30.8L, Red Cell Distribution Width 18.2H, 

Platelet Count 456H, Mean Platelet Volume 5.4L, Neutrophils (%) (Auto) , 

Lymphocytes (%) (Auto) , Monocytes (%) (Auto) , Eosinophils (%) (Auto) , 

Basophils (%) (Auto) , Differential Total Cells Counted 100, Neutrophils % (

Manual) 84H, Lymphocytes % (Manual) 6L, Monocytes % (Manual) 6, Eosinophils % (

Manual) 3, Basophils % (Manual) 0, Band Neutrophils 1, Platelet Estimate 

Adequate, Platelet Morphology Normal, Hypochromasia 1+, Anisocytosis 1+, Sodium 

Level 132L, Potassium Level 4.6, Chloride Level 97L, Carbon Dioxide Level 26, 

Anion Gap 9, Blood Urea Nitrogen 39H, Creatinine 2.1H, Estimat Glomerular 

Filtration Rate , Glucose Level 90, Calcium Level 9.3, Phosphorus Level 3.7, 

Magnesium Level 2.7H


Height (Feet):  5


Height (Inches):  2.00


Weight (Pounds):  115


Objective


On vent.


Trach clean.


Cv IRR/IRR


Lungs B ronchi.


Abd SNT. BS +  PEG.


E Rt. foot diabetic ulcers











Gavino Daigle MD Joel 3, 2019 17:54

## 2019-01-03 NOTE — NUR
RD ASSESSMENT & RECOMMENDATIONS

SEE CARE ACTIVITY FOR COMPLETE ASSESSMENT



DAILY ESTIMATED NEEDS:

Needs based on Critical care + Advanced Wounds + ESRD/ 60kg 

22-30  kcals/kg 

0966-9985  total kcals

1.5-2.0  g protein/kg

90-120g  g total protein 

per MD  mL/kg

 total fluid mLs



NUTRITION DIAGNOSIS:

* Swallowing difficulty R/T respiratory status as evidenced by pt s/p self

extubation, s/p code bluce, reintubated, now s/p trach and PEG placement.

* Increased kcal/prot needs R/T wound healing as evidenced by pt admitted

w/ multiple wounds including stage 4 sacral wound, refer to WC eval.

* Altered nutrition related lab values R/T ESRD dx, clinical condition as

evidenced by low K (2.7-> wnl), episodes of hypoglycemia (68 69-> now

resolved), elev creat (4.6-> 2.1), low Na (132), elev BNP >18926,

low BP, off pressor at this time.





CURRENT TF:Nepro @40ml/hr + PS BID   





ENTERAL NUTRITION RECOMMENDATIONS:

Nepro @40ml/hr x 24 hrs + Prosource 1pkt BID  to provide 960ml, 1728kcal, 78g + 22g prot, 
698ml free water 



1. Maintain current rate + Prosource 1 pack BID to better meet est protein needs

2. HOB over 30 degrees

3. Decrease water flushes to improve Na level







ADDITIONAL RECOMMENDATIONS:

* RECALIBRATE BED SCALE POST TRACH AND PEG PROCEDURES 

* Wound healing-  Nephrovite x 1, Continue Scotty 1pkt BID 

* Monitor for hypoglcyemia 

* Decrease water flushes to help improve Na level- consistently low Na 

* Probiotics for loose  BM 

* Monitor lytes daily w/ con't loose stool

## 2019-01-03 NOTE — NUR
CASE MANAGEMENT: REVIEW





SI: ESRD ON HD

TRACHEOSTOMY 12/17

PEG PLACEMENT 12/19 

T 97.5  RR 16 /45 SAT 97% MECH VENT FIO2 40

H/H 8.4/27.0  BUN 48 CR 2.3 







IS: MINOXIDIL GT Q12HR 

PROCRIT SQ MWF

ELIQUIS NG BID 

AMIODARONE GT Q12HR 

GT FEEDING NEPRO @ 40ML/HR



***STEP DOWN UNIT  STATUS***

DCP: PATIENT IS FROM [facility]. REFERRED TO [facility]

## 2019-01-03 NOTE — NUR
RESPIRATORY NOTE:



Patient received mechanically ventilated on  with current ordered vent settings. 
Patient has trach 8.0 Shiley cuffed that is secured with trach tie and guard. There is 
bilateral coarse breath sounds present upon auscultation. Moderate amount of thick pale, 
yellow secretions were suctioned without incident. Vent alarms are functional and audible. 
Vent is connected to a red outlet and there is an ambu bag available the bedside. Will 
continue to monitor patient.

## 2019-01-03 NOTE — PULMONOLOGY PROGRESS NOTE
Assessment/Plan


Assessment/Plan


IMPRESSION:


1. Hypotension. Resolved.


2. Sepsis. Resolved


3. Atrial fibrillation.


4. Bradycardia.


5. Diabetes mellitus.


6. End-stage renal disease on dialysis.


7. Left lung collapse. Resolved


8. Respiratory failure. now status post tracheostomy


9. Dysphagia.


10. Dementia.


11. Anemia








DISCUSSION:


1. Continue medications.


2. CXR improved


3. Status post tracheostomy


4. S/p G-tube 


5. Continue HD














  ______________________________________________


  Quentin Cardoso M.D.





Subjective


Interval Events:  None new; on 40% FiO2


Constitutional:  Reports: no symptoms


HEENT:  Repors: no symptoms


Respiratory:  Reports: no symptoms


Cardiovascular:  Reports: no symptoms


Gastrointestinal/Abdominal:  Reports: no symptoms


Allergies:  


Coded Allergies:  


     No Known Allergies (Unverified , 11/26/18)





Objective





Last 24 Hour Vital Signs








  Date Time  Temp Pulse Resp B/P (MAP) Pulse Ox O2 Delivery O2 Flow Rate FiO2


 


1/3/19 05:28  69 17     40


 


1/3/19 04:00      Mechanical Ventilator  





      Mechanical Ventilator  


 


1/3/19 04:00  108      


 


1/3/19 04:00        40


 


1/3/19 04:00 97.9 83 19 157/54 (88) 100   


 


1/3/19 03:14  79 18     40


 


1/3/19 01:14  86 18     40


 


1/3/19 00:00  70      


 


1/3/19 00:00      Mechanical Ventilator  





      Mechanical Ventilator  


 


1/3/19 00:00 97.8 68 16 134/58 (83) 100   


 


1/3/19 00:00        40


 


1/2/19 22:58  83 12     40


 


1/2/19 21:53  82 19     40


 


1/2/19 20:26    134/58    


 


1/2/19 20:00      Mechanical Ventilator  





      Mechanical Ventilator  


 


1/2/19 20:00 97.5 97 16 134/58 (83) 100   


 


1/2/19 20:00  88      


 


1/2/19 20:00        40


 


1/2/19 19:30  93 18     40


 


1/2/19 16:56  100 17     40


 


1/2/19 16:00 97.3 88 14 97/50 (66) 100   


 


1/2/19 16:00        40


 


1/2/19 16:00      Mechanical Ventilator  





      Mechanical Ventilator  


 


1/2/19 15:38  102      


 


1/2/19 15:08  97 18     40


 


1/2/19 13:14  76 17     40


 


1/2/19 12:00        40


 


1/2/19 12:00  97      


 


1/2/19 12:00      Mechanical Ventilator  





      Mechanical Ventilator  


 


1/2/19 12:00 97.2 92 22 130/44 (72) 96   


 


1/2/19 11:02  94 20     40


 


1/2/19 09:36    115/96    


 


1/2/19 08:46  86 18     40


 


1/2/19 08:00      Mechanical Ventilator  





      Mechanical Ventilator  


 


1/2/19 08:00        40


 


1/2/19 08:00  72      


 


1/2/19 08:00 97.5 79 22 115/96 (102) 97   

















Intake and Output  


 


 1/2/19 1/3/19





 19:00 07:00


 


Intake Total 460 ml 300 ml


 


Output Total 100 ml 20 ml


 


Balance 360 ml 280 ml


 


  


 


Free Water 100 ml 60 ml


 


Tube Feeding 360 ml 240 ml


 


Stool Total 100 ml 20 ml


 


# Voids 1 








General Appearance:  no acute distress


HEENT:  normocephalic


Respiratory/Chest:  chest wall non-tender, lungs clear


Cardiovascular:  normal peripheral pulses, normal rate


Abdomen:  normal bowel sounds





Current Medications








 Medications


  (Trade)  Dose


 Ordered  Sig/Tony


 Route


 PRN Reason  Start Time


 Stop Time Status Last Admin


Dose Admin


 


 Acetaminophen


  (Tylenol)  650 mg  Q6H  PRN


 GT


 Mild Pain/Temp > 100.5  12/23/18 16:00


 1/20/19 15:59  12/28/18 12:03


 


 


 Acetaminophen/


 Hydrocodone Bitart


  (Norco 5/325)  1 tab  Q6H  PRN


 GT


 For Pain  1/2/19 18:30


 1/9/19 18:29  1/2/19 22:22


 


 


 Amiodarone HCl


  (Cordarone)  200 mg  DAILY


 GT


   1/2/19 09:00


 1/26/19 20:59   


 


 


 Apixaban


  (Eliquis)  2.5 mg  BID


 GT


   12/27/18 18:00


 1/26/19 17:59  1/2/19 17:50


 


 


 Artificial Tears


  (Akwa-Tears)  1 drop  Q4H  PRN


 BOTH EYES


 Dry Eyes  12/23/18 15:30


 1/21/19 15:29  1/1/19 16:53


 


 


 Chlorhexidine


 Gluconate


  (Juana-Hex 2%)  1 applic  DAILY@2000


 TOPIC


   12/30/18 20:00


 1/29/19 19:59  1/2/19 20:24


 


 


 Collagenase


  (Santyl)  1 applic  Q12HR


 TOPIC


   12/23/18 21:00


 1/5/19 20:59  1/2/19 20:27


 


 


 Lansoprazole


  (Prevacid)  30 mg  Q12HR


 GT


   12/27/18 21:00


 1/26/19 20:59  1/2/19 20:24


 


 


 Lorazepam


  (Ativan)  0.5 mg  Q6H  PRN


 ORAL


 For Anxiety  12/30/18 12:00


 1/6/19 11:59  1/1/19 16:52


 


 


 Minoxidil


  (Loniten)  2.5 mg  Q12HR


 GT


   12/27/18 21:00


 1/26/19 20:59  1/2/19 20:26


 


 


 Sodium Chloride


  (Ocean Nasal


 Spray)  1 spray  Q4H  PRN


 NASAL


 dry nose  12/23/18 14:30


 1/21/19 18:29  12/25/18 11:49


 


 


 Vitamin D


  (Vitamin D)  1,000 intlu  DAILY


 GT


   12/24/18 09:00


 1/8/19 09:59  1/2/19 09:36


 


 


 Zolpidem Tartrate


  (Ambien)  5 mg  HSPRN  PRN


 GT


 Insomnia  12/27/18 16:45


 1/3/19 16:44  1/2/19 22:23


 

















Quentin Cardoso MD Joel 3, 2019 07:44

## 2019-01-03 NOTE — NUR
NURSE NOTES:

Pt c/o R abdomen pain, 10/10, aching and sharp. Feeding is on hold. Distraction with 
television, pillow support, position changed, massage done to distract the pain. Will 
continue to monitor.

## 2019-01-03 NOTE — CARDIOLOGY PROGRESS NOTE
Assessment/Plan


Assessment/Plan


1. Hypotension, possibly sepsis versus volume.


2. Paroxysmal episodes of atrial fibrillation history.


3. Bradycardia secondary to medications, amiodarone possibly.


4. Diabetes mellitus.


5. End-stage renal disease, on hemodialysis.


6. Lung collapse.


7. Respiratory failure, status post intubation.


8. Dysphagia.


9. History of dementia.


10. Pulm htn 


11.  Pleural effusion 


12. cardaic arrest


13. chest wall pain post cpr  





mostly in sinus now and occasional fib flutter 


stool ob neg x2 


amiod 200 mg daily 


vent support 


s/p trach


peg done 


on minoxidl and hold parameter applied 


on elequis  2.5 mg bid for stroke prevention 


awaits snf





Subjective


ROS Limited/Unobtainable:  Yes


Subjective


on the vent, sob





Objective





Last 24 Hour Vital Signs








  Date Time  Temp Pulse Resp B/P (MAP) Pulse Ox O2 Delivery O2 Flow Rate FiO2


 


1/3/19 18:40  79 20     40


 


1/3/19 17:05  78 17     40


 


1/3/19 16:00        40


 


1/3/19 16:00 97.5 71 16 115/31 (59) 100   


 


1/3/19 16:00      Mechanical Ventilator  





      Mechanical Ventilator  


 


1/3/19 15:29  69 13     40


 


1/3/19 15:24  68      


 


1/3/19 13:26  73 12     40


 


1/3/19 12:00      Mechanical Ventilator  





      Mechanical Ventilator  


 


1/3/19 12:00        40


 


1/3/19 12:00 97.2 83 14 106/27 (53) 97   


 


1/3/19 11:57  72 11     40


 


1/3/19 11:55  77      


 


1/3/19 09:04  77 19     40


 


1/3/19 09:04    135/45    


 


1/3/19 08:00        40


 


1/3/19 08:00      Mechanical Ventilator  





      Mechanical Ventilator  


 


1/3/19 08:00 97.5 73 16 135/45 (75) 97   


 


1/3/19 07:45  70      


 


1/3/19 07:23  71 15     40


 


1/3/19 05:28  69 17     40


 


1/3/19 04:00      Mechanical Ventilator  





      Mechanical Ventilator  


 


1/3/19 04:00  108      


 


1/3/19 04:00        40


 


1/3/19 04:00 97.9 83 19 157/54 (88) 100   


 


1/3/19 03:14  79 18     40


 


1/3/19 01:14  86 18     40


 


1/3/19 00:00  70      


 


1/3/19 00:00      Mechanical Ventilator  





      Mechanical Ventilator  


 


1/3/19 00:00 97.8 68 16 134/58 (83) 100   


 


1/3/19 00:00        40


 


1/2/19 22:58  83 12     40


 


1/2/19 21:53  82 19     40








General Appearance:  no apparent distress, on vent, patient on isolation











Intake and Output  


 


 1/2/19 1/3/19





 18:59 06:59


 


Intake Total 460 ml 340 ml


 


Output Total 100 ml 20 ml


 


Balance 360 ml 320 ml


 


  


 


Free Water 100 ml 60 ml


 


Tube Feeding 360 ml 280 ml


 


Stool Total 100 ml 20 ml


 


# Voids 1 











Laboratory Tests








Test


  1/3/19


10:10


 


White Blood Count


  12.7 K/UL


(4.8-10.8)  H


 


Red Blood Count


  3.46 M/UL


(4.20-5.40)  L


 


Hemoglobin


  10.2 G/DL


(12.0-16.0)  L


 


Hematocrit


  33.0 %


(37.0-47.0)  L


 


Mean Corpuscular Volume 96 FL (80-99)  


 


Mean Corpuscular Hemoglobin


  29.4 PG


(27.0-31.0)


 


Mean Corpuscular Hemoglobin


Concent 30.8 G/DL


(32.0-36.0)  L


 


Red Cell Distribution Width


  18.2 %


(11.6-14.8)  H


 


Platelet Count


  456 K/UL


(150-450)  H


 


Mean Platelet Volume


  5.4 FL


(6.5-10.1)  L


 


Neutrophils (%) (Auto)


  % (45.0-75.0)


 


 


Lymphocytes (%) (Auto)


  % (20.0-45.0)


 


 


Monocytes (%) (Auto)  % (1.0-10.0)  


 


Eosinophils (%) (Auto)  % (0.0-3.0)  


 


Basophils (%) (Auto)  % (0.0-2.0)  


 


Differential Total Cells


Counted 100  


 


 


Neutrophils % (Manual) 84 % (45-75)  H


 


Lymphocytes % (Manual) 6 % (20-45)  L


 


Monocytes % (Manual) 6 % (1-10)  


 


Eosinophils % (Manual) 3 % (0-3)  


 


Basophils % (Manual) 0 % (0-2)  


 


Band Neutrophils 1 % (0-8)  


 


Platelet Estimate Adequate  


 


Platelet Morphology Normal  


 


Hypochromasia 1+  


 


Anisocytosis 1+  


 


Sodium Level


  132 MMOL/L


(136-145)  L


 


Potassium Level


  4.6 MMOL/L


(3.5-5.1)


 


Chloride Level


  97 MMOL/L


()  L


 


Carbon Dioxide Level


  26 MMOL/L


(21-32)


 


Anion Gap


  9 mmol/L


(5-15)


 


Blood Urea Nitrogen


  39 mg/dL


(7-18)  H


 


Creatinine


  2.1 MG/DL


(0.55-1.30)  H


 


Estimat Glomerular Filtration


Rate  mL/min (>60)  


 


 


Glucose Level


  90 MG/DL


()


 


Calcium Level


  9.3 MG/DL


(8.5-10.1)


 


Phosphorus Level


  3.7 MG/DL


(2.5-4.9)


 


Magnesium Level


  2.7 MG/DL


(1.8-2.4)  H

















Mariusz Wade MD Joel 3, 2019 20:32

## 2019-01-03 NOTE — NUR
NURSE NOTES:



Received report from CAROLYN Ivey. Patient is asleep in bed with no s/s of acute distress 
noted. Sinus rhythm on cardiac monitor. Patient is non-verbal. Trach-vent settings: Shiley 
8, AC 8, Vt 450, PEEP 5, FiO2 40%. Right upper arm midline TKO, patent and intact. Bed 
locked in lowest position with side rails up x3. Call light left within reach. Will continue 
to monitor.

## 2019-01-03 NOTE — INFECTIOUS DISEASES PROG NOTE
Assessment/Plan


Assessment/Plan


A:


Sepsis treated


UTI treated


Cellulitis of R leg, osteomyelitis treated


Hypercapnic respiratory failure


ESRD on HD


DM


Anemia


Dementia


PVD


R pleural effusion


Mucous plug


s/o Cardiac arrest


Multiple Pressure ulcers


P;


Observe off antibiotic


Wound care


Waiting for placement





Subjective


ROS Limited/Unobtainable:  Yes


Neurologic:  Reports: confusion, other - on restraint


Allergies:  


Coded Allergies:  


     No Known Allergies (Unverified , 11/26/18)





Objective


Vital Signs





Last 24 Hour Vital Signs








  Date Time  Temp Pulse Resp B/P (MAP) Pulse Ox O2 Delivery O2 Flow Rate FiO2


 


1/3/19 09:04  77 19     40


 


1/3/19 09:04    135/45    


 


1/3/19 08:00        40


 


1/3/19 08:00 97.5 73 16 135/45 (75) 97   


 


1/3/19 07:45  70      


 


1/3/19 07:23  71 15     40


 


1/3/19 05:28  69 17     40


 


1/3/19 04:00      Mechanical Ventilator  





      Mechanical Ventilator  


 


1/3/19 04:00  108      


 


1/3/19 04:00        40


 


1/3/19 04:00 97.9 83 19 157/54 (88) 100   


 


1/3/19 03:14  79 18     40


 


1/3/19 01:14  86 18     40


 


1/3/19 00:00  70      


 


1/3/19 00:00      Mechanical Ventilator  





      Mechanical Ventilator  


 


1/3/19 00:00 97.8 68 16 134/58 (83) 100   


 


1/3/19 00:00        40


 


1/2/19 22:58  83 12     40


 


1/2/19 21:53  82 19     40


 


1/2/19 20:26    134/58    


 


1/2/19 20:00      Mechanical Ventilator  





      Mechanical Ventilator  


 


1/2/19 20:00 97.5 97 16 134/58 (83) 100   


 


1/2/19 20:00  88      


 


1/2/19 20:00        40


 


1/2/19 19:30  93 18     40


 


1/2/19 16:56  100 17     40


 


1/2/19 16:00 97.3 88 14 97/50 (66) 100   


 


1/2/19 16:00        40


 


1/2/19 16:00      Mechanical Ventilator  





      Mechanical Ventilator  


 


1/2/19 15:38  102      


 


1/2/19 15:08  97 18     40


 


1/2/19 13:14  76 17     40


 


1/2/19 12:00        40


 


1/2/19 12:00  97      


 


1/2/19 12:00      Mechanical Ventilator  





      Mechanical Ventilator  


 


1/2/19 12:00 97.2 92 22 130/44 (72) 96   








Height (Feet):  5


Height (Inches):  2.00


Weight (Pounds):  115


General Appearance:  no acute distress


HEENT:  mucous membranes moist


Respiratory/Chest:  lungs clear, other - on ventilator


Cardiovascular:  normal rate


Abdomen:  soft, non tender, other - GT & rectal tube


Extremities:  no edema


Neurologic/Psychiatric:  alert, responsive, disoriented





Laboratory Tests








Test


  1/3/19


10:10


 


White Blood Count


  12.7 K/UL


(4.8-10.8)  H


 


Red Blood Count


  3.46 M/UL


(4.20-5.40)  L


 


Hemoglobin


  10.2 G/DL


(12.0-16.0)  L


 


Hematocrit


  33.0 %


(37.0-47.0)  L


 


Mean Corpuscular Volume 96 FL (80-99)  


 


Mean Corpuscular Hemoglobin


  29.4 PG


(27.0-31.0)


 


Mean Corpuscular Hemoglobin


Concent 30.8 G/DL


(32.0-36.0)  L


 


Red Cell Distribution Width


  18.2 %


(11.6-14.8)  H


 


Platelet Count


  456 K/UL


(150-450)  H


 


Mean Platelet Volume


  5.4 FL


(6.5-10.1)  L


 


Neutrophils (%) (Auto)


  % (45.0-75.0)


 


 


Lymphocytes (%) (Auto)


  % (20.0-45.0)


 


 


Monocytes (%) (Auto)  % (1.0-10.0)  


 


Eosinophils (%) (Auto)  % (0.0-3.0)  


 


Basophils (%) (Auto)  % (0.0-2.0)  


 


Neutrophils % (Manual) Pending  


 


Lymphocytes % (Manual) Pending  


 


Platelet Estimate Pending  


 


Platelet Morphology Pending  


 


Sodium Level


  132 MMOL/L


(136-145)  L


 


Potassium Level


  4.6 MMOL/L


(3.5-5.1)


 


Chloride Level


  97 MMOL/L


()  L


 


Carbon Dioxide Level


  26 MMOL/L


(21-32)


 


Anion Gap


  9 mmol/L


(5-15)


 


Blood Urea Nitrogen


  39 mg/dL


(7-18)  H


 


Creatinine


  2.1 MG/DL


(0.55-1.30)  H


 


Estimat Glomerular Filtration


Rate  mL/min (>60)  


 


 


Glucose Level


  90 MG/DL


()


 


Calcium Level


  9.3 MG/DL


(8.5-10.1)


 


Phosphorus Level


  3.7 MG/DL


(2.5-4.9)


 


Magnesium Level


  2.7 MG/DL


(1.8-2.4)  H











Current Medications








 Medications


  (Trade)  Dose


 Ordered  Sig/Tony


 Route


 PRN Reason  Start Time


 Stop Time Status Last Admin


Dose Admin


 


 Acetaminophen


  (Tylenol)  650 mg  Q6H  PRN


 GT


 Mild Pain/Temp > 100.5  12/23/18 16:00


 1/20/19 15:59  12/28/18 12:03


 


 


 Acetaminophen/


 Hydrocodone Bitart


  (Norco 5/325)  1 tab  Q6H  PRN


 GT


 For Pain  1/2/19 18:30


 1/9/19 18:29  1/2/19 22:22


 


 


 Amiodarone HCl


  (Cordarone)  200 mg  DAILY


 GT


   1/2/19 09:00


 1/26/19 20:59  1/3/19 09:04


 


 


 Apixaban


  (Eliquis)  2.5 mg  BID


 GT


   12/27/18 18:00


 1/26/19 17:59  1/3/19 09:04


 


 


 Artificial Tears


  (Akwa-Tears)  1 drop  Q4H  PRN


 BOTH EYES


 Dry Eyes  12/23/18 15:30


 1/21/19 15:29  1/1/19 16:53


 


 


 Chlorhexidine


 Gluconate


  (Juana-Hex 2%)  1 applic  DAILY@2000


 TOPIC


   12/30/18 20:00


 1/29/19 19:59  1/2/19 20:24


 


 


 Collagenase


  (Santyl)  1 applic  Q12HR


 TOPIC


   12/23/18 21:00


 1/5/19 20:59  1/3/19 09:06


 


 


 Lansoprazole


  (Prevacid)  30 mg  Q12HR


 GT


   12/27/18 21:00


 1/26/19 20:59  1/3/19 09:03


 


 


 Lorazepam


  (Ativan)  0.5 mg  Q6H  PRN


 ORAL


 For Anxiety  12/30/18 12:00


 1/6/19 11:59  1/1/19 16:52


 


 


 Minoxidil


  (Loniten)  2.5 mg  Q12HR


 GT


   12/27/18 21:00


 1/26/19 20:59  1/3/19 09:04


 


 


 Sodium Chloride


  (Ocean Nasal


 Spray)  1 spray  Q4H  PRN


 NASAL


 dry nose  12/23/18 14:30


 1/21/19 18:29  12/25/18 11:49


 


 


 Vitamin D


  (Vitamin D)  1,000 intlu  DAILY


 GT


   12/24/18 09:00


 1/8/19 09:59  1/3/19 09:04


 


 


 Zolpidem Tartrate


  (Ambien)  5 mg  HSPRN  PRN


 GT


 Insomnia  12/27/18 16:45


 1/3/19 16:44  1/2/19 22:23


 

















Luke Maynard MD Joel 3, 2019 11:05

## 2019-01-03 NOTE — NUR
RESPIRATORY NOTE:

Received pt on AC 8, 450VT, 40%, PEEP +5. Pt is trach-dependent w/ a cuffed, Shiley 8 tube. 
Pt alert/awake, follows commands. B/S moy. rhonchi/diminished, sxn minimal amounts of thin, 
white secretions. Vent plugged into red outlet, ambubag & backup trach kit at bedside. Pt in 
no apparent distress at this time. Will continue plan of care.

## 2019-01-03 NOTE — NUR
NURSE NOTES:

Observed pt sleeping on the bed. VS within normal range. No acute distress noted at this 
time. Will continue to monitor.

## 2019-01-03 NOTE — NUR
NURSE NOTES:



MARILIN Carr made aware of increased WBC's. No new orders received. Will continue to monitor.

## 2019-01-03 NOTE — NUR
NURSE NOTES:WOUND CARE FOLLOW-UP NOTES:Non-blanchable erythema with induration involving  R 
and L buttocks into both ischial regions(L)11.5cm x (W)15cm. with Full thickness pressure 
injury at sacrum  with tunneled cavity into L buttock 7-12o'clock by 13cm at 10o'clock. 
Minimal serous non-odorous exudate noted. Multiple ulcerations R lower ext. Full thickness 
ulcer paz R tibia with 50% fibrinous yellow slough,50% granular.edges adherent to base of 
wound .Periwound intact without erythema. Full thickness ulcer medial R tibia(L)5cm x 
(W)3.9cm  ,75% fibrinous yellow slough ,25% granular ,(+) maceration along borders.periwound 
skin is dry without erythema. No odor noted.Full thickness ulcer posterior R tibia(L)3cm x 
(W)3.2cm, 60% thick yellow slough ,40% granular,(+) maceration along borders.Periwound skin 
dryness without erythema or elevation in skin temp.No odor noted. Stable dry eschar R heel. 
periwound without erythema or fluctuance. All wound prevention protocols in place and 
continued .Turning scheduled observed as recommended .Pt has an air fluidized mattress on 
her bed and is being positioned with pillows.



Tx.Plan: Continue All wound care orders as per orders .

            Air fluidized mattress. 

            Reposition at least every 2 hours or as tolerated.

            Off-load heels with pillow.

## 2019-01-04 VITALS — DIASTOLIC BLOOD PRESSURE: 39 MMHG | SYSTOLIC BLOOD PRESSURE: 108 MMHG

## 2019-01-04 VITALS — SYSTOLIC BLOOD PRESSURE: 120 MMHG | DIASTOLIC BLOOD PRESSURE: 57 MMHG

## 2019-01-04 VITALS — DIASTOLIC BLOOD PRESSURE: 41 MMHG | SYSTOLIC BLOOD PRESSURE: 126 MMHG

## 2019-01-04 VITALS — SYSTOLIC BLOOD PRESSURE: 129 MMHG | DIASTOLIC BLOOD PRESSURE: 50 MMHG

## 2019-01-04 VITALS — DIASTOLIC BLOOD PRESSURE: 76 MMHG | SYSTOLIC BLOOD PRESSURE: 157 MMHG

## 2019-01-04 VITALS — SYSTOLIC BLOOD PRESSURE: 130 MMHG | DIASTOLIC BLOOD PRESSURE: 54 MMHG

## 2019-01-04 LAB
ADD MANUAL DIFF: NO
ADD MANUAL DIFF: YES
ANION GAP SERPL CALC-SCNC: 9 MMOL/L (ref 5–15)
BASOPHILS NFR BLD AUTO: 0.9 % (ref 0–2)
BUN SERPL-MCNC: 46 MG/DL (ref 7–18)
CALCIUM SERPL-MCNC: 9.3 MG/DL (ref 8.5–10.1)
CHLORIDE SERPL-SCNC: 98 MMOL/L (ref 98–107)
CO2 SERPL-SCNC: 25 MMOL/L (ref 21–32)
CREAT SERPL-MCNC: 2.4 MG/DL (ref 0.55–1.3)
EOSINOPHIL NFR BLD AUTO: 2.4 % (ref 0–3)
ERYTHROCYTE [DISTWIDTH] IN BLOOD BY AUTOMATED COUNT: 18.7 % (ref 11.6–14.8)
ERYTHROCYTE [DISTWIDTH] IN BLOOD BY AUTOMATED COUNT: 19 % (ref 11.6–14.8)
HCT VFR BLD CALC: 28.2 % (ref 37–47)
HCT VFR BLD CALC: 29 % (ref 37–47)
HGB BLD-MCNC: 9 G/DL (ref 12–16)
HGB BLD-MCNC: 9.1 G/DL (ref 12–16)
LYMPHOCYTES NFR BLD AUTO: 5.2 % (ref 20–45)
MCV RBC AUTO: 95 FL (ref 80–99)
MCV RBC AUTO: 96 FL (ref 80–99)
MONOCYTES NFR BLD AUTO: 8.9 % (ref 1–10)
NEUTROPHILS NFR BLD AUTO: 82.6 % (ref 45–75)
PHOSPHATE SERPL-MCNC: 4.5 MG/DL (ref 2.5–4.9)
PLATELET # BLD: 422 K/UL (ref 150–450)
PLATELET # BLD: 468 K/UL (ref 150–450)
POTASSIUM SERPL-SCNC: 4.9 MMOL/L (ref 3.5–5.1)
RBC # BLD AUTO: 2.96 M/UL (ref 4.2–5.4)
RBC # BLD AUTO: 3.02 M/UL (ref 4.2–5.4)
SODIUM SERPL-SCNC: 132 MMOL/L (ref 136–145)
WBC # BLD AUTO: 10.4 K/UL (ref 4.8–10.8)
WBC # BLD AUTO: 24.4 K/UL (ref 4.8–10.8)

## 2019-01-04 RX ADMIN — APIXABAN SCH MG: 2.5 TABLET, FILM COATED ORAL at 17:16

## 2019-01-04 RX ADMIN — LORAZEPAM PRN MG: 0.5 TABLET ORAL at 17:02

## 2019-01-04 RX ADMIN — MINOXIDIL SCH MG: 2.5 TABLET ORAL at 20:45

## 2019-01-04 RX ADMIN — AMIODARONE HYDROCHLORIDE SCH MG: 200 TABLET ORAL at 08:43

## 2019-01-04 RX ADMIN — MINOXIDIL SCH MG: 2.5 TABLET ORAL at 08:45

## 2019-01-04 RX ADMIN — HYDROCODONE BITARTRATE AND ACETAMINOPHEN PRN TAB: 5; 325 TABLET ORAL at 18:19

## 2019-01-04 RX ADMIN — HYDROCODONE BITARTRATE AND ACETAMINOPHEN PRN TAB: 5; 325 TABLET ORAL at 11:59

## 2019-01-04 RX ADMIN — APIXABAN SCH MG: 2.5 TABLET, FILM COATED ORAL at 08:44

## 2019-01-04 RX ADMIN — ZOLPIDEM TARTRATE PRN MG: 5 TABLET ORAL at 01:25

## 2019-01-04 RX ADMIN — VITAMIN D, TAB 1000IU (100/BT) SCH INTLU: 25 TAB at 08:44

## 2019-01-04 RX ADMIN — CHLORHEXIDINE GLUCONATE SCH APPLIC: 213 SOLUTION TOPICAL at 20:45

## 2019-01-04 RX ADMIN — ZOLPIDEM TARTRATE PRN MG: 5 TABLET ORAL at 23:13

## 2019-01-04 NOTE — PULMONOLOGY PROGRESS NOTE
Assessment/Plan


Assessment/Plan


IMPRESSION:


1. Hypotension. Resolved.


2. Sepsis. Resolved


3. Atrial fibrillation.


4. Bradycardia.


5. Diabetes mellitus.


6. End-stage renal disease on dialysis.


7. Left lung collapse. Resolved


8. Respiratory failure. now status post tracheostomy


9. Dysphagia.


10. Dementia.


11. Anemia








DISCUSSION:


1. Continue medications.


2. CXR improved


3. Status post tracheostomy


4. S/p G-tube 


5. Continue HD














  ______________________________________________


  Quentin Cardoso M.D.





Subjective


Interval Events:  None new reported


Constitutional:  Reports: no symptoms


HEENT:  Repors: no symptoms


Respiratory:  Reports: no symptoms


Cardiovascular:  Reports: no symptoms


Gastrointestinal/Abdominal:  Reports: no symptoms


Genitourinary:  Reports: no symptoms


Neurologic:  Reports: no symptoms


Psychiatric:  Reports: no symptoms


Allergies:  


Coded Allergies:  


     No Known Allergies (Unverified , 11/26/18)





Objective





Last 24 Hour Vital Signs








  Date Time  Temp Pulse Resp B/P (MAP) Pulse Ox O2 Delivery O2 Flow Rate FiO2


 


1/4/19 05:09  74 12     40


 


1/4/19 04:00  85      


 


1/4/19 04:00        40


 


1/4/19 04:00      Mechanical Ventilator  





      Mechanical Ventilator  


 


1/4/19 04:00 97.8 69 14 129/50 (76) 100   


 


1/4/19 02:44  69 12     40


 


1/4/19 01:00  78 15     40


 


1/4/19 00:00        40


 


1/4/19 00:00  69      


 


1/4/19 00:00 97.7 73 15 157/76 (103) 100   


 


1/4/19 00:00      Mechanical Ventilator  





      Mechanical Ventilator  


 


1/3/19 22:48  69 14     40


 


1/3/19 20:55  79 22     40


 


1/3/19 20:42    159/83    


 


1/3/19 20:00  88      


 


1/3/19 20:00        40


 


1/3/19 20:00      Mechanical Ventilator  





      Mechanical Ventilator  


 


1/3/19 20:00 97.8 80 18 159/83 (108) 100   


 


1/3/19 18:40  79 20     40


 


1/3/19 17:05  78 17     40


 


1/3/19 16:00        40


 


1/3/19 16:00 97.5 71 16 115/31 (59) 100   


 


1/3/19 16:00      Mechanical Ventilator  





      Mechanical Ventilator  


 


1/3/19 15:29  69 13     40


 


1/3/19 15:24  68      


 


1/3/19 13:26  73 12     40


 


1/3/19 12:00      Mechanical Ventilator  





      Mechanical Ventilator  


 


1/3/19 12:00        40


 


1/3/19 12:00 97.2 83 14 106/27 (53) 97   


 


1/3/19 11:57  72 11     40


 


1/3/19 11:55  77      


 


1/3/19 09:04  77 19     40


 


1/3/19 09:04    135/45    


 


1/3/19 08:00        40


 


1/3/19 08:00      Mechanical Ventilator  





      Mechanical Ventilator  


 


1/3/19 08:00 97.5 73 16 135/45 (75) 97   


 


1/3/19 07:45  70      

















Intake and Output  


 


 1/3/19 1/4/19





 19:00 07:00


 


Intake Total 190 ml 


 


Output Total 25 ml 


 


Balance 165 ml 


 


  


 


Tube Feeding 100 ml 


 


Other 90 ml 


 


Stool Total 25 ml 








General Appearance:  no acute distress


HEENT:  normocephalic


Respiratory/Chest:  chest wall non-tender, lungs clear


Cardiovascular:  normal peripheral pulses, normal rate


Abdomen:  normal bowel sounds


Laboratory Tests


1/3/19 10:10: 


White Blood Count 12.7H, Red Blood Count 3.46L, Hemoglobin 10.2L, Hematocrit 

33.0L, Mean Corpuscular Volume 96, Mean Corpuscular Hemoglobin 29.4, Mean 

Corpuscular Hemoglobin Concent 30.8L, Red Cell Distribution Width 18.2H, 

Platelet Count 456H, Mean Platelet Volume 5.4L, Neutrophils (%) (Auto) , 

Lymphocytes (%) (Auto) , Monocytes (%) (Auto) , Eosinophils (%) (Auto) , 

Basophils (%) (Auto) , Differential Total Cells Counted 100, Neutrophils % (

Manual) 84H, Lymphocytes % (Manual) 6L, Monocytes % (Manual) 6, Eosinophils % (

Manual) 3, Basophils % (Manual) 0, Band Neutrophils 1, Platelet Estimate 

Adequate, Platelet Morphology Normal, Hypochromasia 1+, Anisocytosis 1+, Sodium 

Level 132L, Potassium Level 4.6, Chloride Level 97L, Carbon Dioxide Level 26, 

Anion Gap 9, Blood Urea Nitrogen 39H, Creatinine 2.1H, Estimat Glomerular 

Filtration Rate , Glucose Level 90, Calcium Level 9.3, Phosphorus Level 3.7, 

Magnesium Level 2.7H


1/4/19 04:00: 


White Blood Count 10.4, Red Blood Count 3.02L, Hemoglobin 9.1L, Hematocrit 29.0L

, Mean Corpuscular Volume 96, Mean Corpuscular Hemoglobin 30.0, Mean 

Corpuscular Hemoglobin Concent 31.3L, Red Cell Distribution Width 19.0H, 

Platelet Count 422, Mean Platelet Volume 5.2L, Neutrophils (%) (Auto) 82.6H, 

Lymphocytes (%) (Auto) 5.2L, Monocytes (%) (Auto) 8.9, Eosinophils (%) (Auto) 

2.4, Basophils (%) (Auto) 0.9, Sodium Level 132L, Potassium Level 4.9, Chloride 

Level 98, Carbon Dioxide Level 25, Anion Gap 9, Blood Urea Nitrogen 46H, 

Creatinine 2.4H, Estimat Glomerular Filtration Rate , Glucose Level 81, Calcium 

Level 9.3, Phosphorus Level 4.5, Magnesium Level 3.0H





Current Medications








 Medications


  (Trade)  Dose


 Ordered  Sig/Tony


 Route


 PRN Reason  Start Time


 Stop Time Status Last Admin


Dose Admin


 


 Acetaminophen


  (Tylenol)  650 mg  Q6H  PRN


 GT


 Mild Pain/Temp > 100.5  12/23/18 16:00


 1/20/19 15:59  12/28/18 12:03


 


 


 Acetaminophen/


 Hydrocodone Bitart


  (Norco 5/325)  1 tab  Q6H  PRN


 GT


 For Pain  1/2/19 18:30


 1/9/19 18:29  1/3/19 11:32


 


 


 Amiodarone HCl


  (Cordarone)  200 mg  DAILY


 GT


   1/2/19 09:00


 1/26/19 20:59  1/3/19 09:04


 


 


 Apixaban


  (Eliquis)  2.5 mg  BID


 GT


   12/27/18 18:00


 1/26/19 17:59  1/3/19 17:53


 


 


 Artificial Tears


  (Akwa-Tears)  1 drop  Q4H  PRN


 BOTH EYES


 Dry Eyes  12/23/18 15:30


 1/21/19 15:29  1/1/19 16:53


 


 


 Chlorhexidine


 Gluconate


  (Juana-Hex 2%)  1 applic  DAILY@2000


 TOPIC


   12/30/18 20:00


 1/29/19 19:59  1/3/19 20:41


 


 


 Collagenase


  (Santyl)  1 applic  Q12HR


 TOPIC


   12/23/18 21:00


 1/5/19 20:59  1/3/19 20:42


 


 


 Lansoprazole


  (Prevacid)  30 mg  Q12HR


 GT


   12/27/18 21:00


 1/26/19 20:59  1/3/19 20:42


 


 


 Lorazepam


  (Ativan)  0.5 mg  Q6H  PRN


 ORAL


 For Anxiety  12/30/18 12:00


 1/6/19 11:59  1/3/19 13:17


 


 


 Minoxidil


  (Loniten)  2.5 mg  Q12HR


 GT


   12/27/18 21:00


 1/26/19 20:59  1/3/19 20:42


 


 


 Sodium Chloride  1,000 ml @ 


 500 mls/hr  Q2H PRN


 IVLG


 sbp<90 during hd  1/4/19 06:00


 1/4/19 23:59   


 


 


 Sodium Chloride


  (Ocean Nasal


 Spray)  1 spray  Q4H  PRN


 NASAL


 dry nose  12/23/18 14:30


 1/21/19 18:29  12/25/18 11:49


 


 


 Vitamin D


  (Vitamin D)  1,000 intlu  DAILY


 GT


   12/24/18 09:00


 1/8/19 09:59  1/3/19 09:04


 


 


 Zolpidem Tartrate


  (Ambien)  5 mg  HSPRN  PRN


 GT


 Insomnia  1/3/19 20:45


 1/10/19 20:44  1/4/19 01:25


 

















Quentin Cardoso MD Jan 4, 2019 07:42

## 2019-01-04 NOTE — NUR
HAND-OFF: 

Report given to CAROLYN Ocasio. Observed pt sleeping on the bed. No acute disterss noted at this 
time.

## 2019-01-04 NOTE — NUR
CASE MANAGEMENT: ***DCP***NOTE***



UPON DISCHARGE PATIENT WILL TRANSFER TO A SUBACUTE ABLE TO ACCOMMODATE 
TRACH/VENT/HEMODIALYSIS PATIENTS



PATIENT HAS BEEN REFERRED TO:



[facility] [phone] / THEY ARE REQUESTING OUTPATIENT HD AND TRANSPORTATION TO BE 
ARRANGED

PATIENT HAS BEEN REFERRED TO  [facility] [phone]

PATIENT HAS BEEN REFERRED TO [facility] [phone]

PATIENT HAS BEEN REFERRED TO [facility] [phone]



PATIENT HAS BEEN REFERRED TO [facility] [phone]

PATIENT HAS BEEN REFERRED TO [facility] [phone]



PER INSURANCE COMPANY  [employer] [phone]; INSURANCE MEDICAL DIRECTOR DR. SPRINGER [phone] 
HAS REQUESTED TO SPEAK TO DR. GARCIA REGARDING THE PLAN OF CARE FOR THIS PATIENT. DR. RENO MADE AWARE.

## 2019-01-04 NOTE — NUR
RESPIRATORY NOTE:Received pt on current vent settings. Pt is asleep with no s/s of distress 
noted. Vent alarms on and audible. Will cont. to monitor pt.

## 2019-01-04 NOTE — NUR
NURSE NOTES:

Dialysis RN has arrived.  Patient is currently being dialyzed.  V/S is stable, and patient 
is tolerating well.

## 2019-01-04 NOTE — NUR
NURSE NOTES:

Received call from Dr. Lea regarding message left by AM nurse regarding WBC of 24.4, 
trending up from 10.  Was told Dr. Santillan is on the case.  Called and left message for 
Dr. Santillan. Awaiting a call back.

## 2019-01-04 NOTE — NUR
NURSE NOTES:

Report received from CAROLYN Ocasio. Patient seen in bed in carroll position. Alert, responsive, no 
S/Sx of pain noted.  Patient is on vent with previous setting, SpO2 97%, no respiratory 
distress is noted at this time. Noted with AV shunt to left arm, Mid line to right upper 
arm, and is intact.  Wrist restrain is present secondary to patient attempting to pull out 
trach and other tubings. No redness noted to wrist area, pulse is palpable. Rectal tube is 
intact.  patient is on GT formula of nephro, running at 40cc/HR.  GT site is intact.  bed is 
in lowest position, call light is within reach, will continue to monitor.

## 2019-01-04 NOTE — NUR
NURSE NOTES:

Bedside report received from CAROLYN Peguero.  Pt is awake, restless, and observed pulling at 
devices.  Bilaterally wrist restraints observed; palpable pulses, skin intact.  Nonverbal, 
but told pt is able to write down simple statements on paper.  Ventilator Shiley 8:  AC 8, 
, PEEP 5, FiO2 40%.  CGT Nepro @ 40 ml, patent.  Rectal tube draining.  Skin is clean 
and dry. Midline ASHISH; asymptomatic.  VALE shunt palpable thrill, audible bruit.  Bed locked 
in lowest position, side rails x3, call bell within reach.  Will continue to monitor and 
follow with plan of care.

## 2019-01-04 NOTE — NUR
NURSE NOTES:

Observed pt lying on the bed, awake, restless, and c/o insomnia. PRN med given. Position 
changed done. Distraction measures done. Will continue to monitor.

## 2019-01-04 NOTE — NUR
NURSE NOTES:

Received report RN Callum patient asleep on ventilator ,no distress,rectal tube,G tube 
feeding-changed pump alarming,anuric.on hemodialysis,head elevated aspiration precaution,on 
Bilateral wrist restraints-pulling device,continue care close watch

## 2019-01-04 NOTE — NEPHROLOGY PROGRESS NOTE
Assessment/Plan


Assessment


Seee below


Plan





ESRD HD q MWF 


Anemia of CKD , SAMANTHA high dose. Transfuse PRN.





A. 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 with LAE. LVEF 65% . On Eliquis.





Post trach + PEG .


Referred to [facility]. Referral not received there despite numerous requests. Pt'

s son agreeable to [facility].


See orders.


Still needs extensive Vascular W/U.





Subjective


Subjective


No new c/o





Objective


Objective





Last 24 Hour Vital Signs








  Date Time  Temp Pulse Resp B/P (MAP) Pulse Ox O2 Delivery O2 Flow Rate FiO2


 


1/4/19 05:09  74 12     40


 


1/4/19 04:00  85      


 


1/4/19 04:00        40


 


1/4/19 04:00      Mechanical Ventilator  





      Mechanical Ventilator  


 


1/4/19 04:00 97.8 69 14 129/50 (76) 100   


 


1/4/19 02:44  69 12     40


 


1/4/19 01:00  78 15     40


 


1/4/19 00:00        40


 


1/4/19 00:00  69      


 


1/4/19 00:00 97.7 73 15 157/76 (103) 100   


 


1/4/19 00:00      Mechanical Ventilator  





      Mechanical Ventilator  


 


1/3/19 22:48  69 14     40


 


1/3/19 20:55  79 22     40


 


1/3/19 20:42    159/83    


 


1/3/19 20:00  88      


 


1/3/19 20:00        40


 


1/3/19 20:00      Mechanical Ventilator  





      Mechanical Ventilator  


 


1/3/19 20:00 97.8 80 18 159/83 (108) 100   


 


1/3/19 18:40  79 20     40


 


1/3/19 17:05  78 17     40


 


1/3/19 16:00        40


 


1/3/19 16:00 97.5 71 16 115/31 (59) 100   


 


1/3/19 16:00      Mechanical Ventilator  





      Mechanical Ventilator  


 


1/3/19 15:29  69 13     40


 


1/3/19 15:24  68      


 


1/3/19 13:26  73 12     40


 


1/3/19 12:00      Mechanical Ventilator  





      Mechanical Ventilator  


 


1/3/19 12:00        40


 


1/3/19 12:00 97.2 83 14 106/27 (53) 97   


 


1/3/19 11:57  72 11     40


 


1/3/19 11:55  77      


 


1/3/19 09:04  77 19     40


 


1/3/19 09:04    135/45    

















Intake and Output  


 


 1/3/19 1/4/19





 19:00 07:00


 


Intake Total 190 ml 


 


Output Total 25 ml 


 


Balance 165 ml 


 


  


 


Tube Feeding 100 ml 


 


Other 90 ml 


 


Stool Total 25 ml 








Laboratory Tests


1/3/19 10:10: 


White Blood Count 12.7H, Red Blood Count 3.46L, Hemoglobin 10.2L, Hematocrit 

33.0L, Mean Corpuscular Volume 96, Mean Corpuscular Hemoglobin 29.4, Mean 

Corpuscular Hemoglobin Concent 30.8L, Red Cell Distribution Width 18.2H, 

Platelet Count 456H, Mean Platelet Volume 5.4L, Neutrophils (%) (Auto) , 

Lymphocytes (%) (Auto) , Monocytes (%) (Auto) , Eosinophils (%) (Auto) , 

Basophils (%) (Auto) , Differential Total Cells Counted 100, Neutrophils % (

Manual) 84H, Lymphocytes % (Manual) 6L, Monocytes % (Manual) 6, Eosinophils % (

Manual) 3, Basophils % (Manual) 0, Band Neutrophils 1, Platelet Estimate 

Adequate, Platelet Morphology Normal, Hypochromasia 1+, Anisocytosis 1+, Sodium 

Level 132L, Potassium Level 4.6, Chloride Level 97L, Carbon Dioxide Level 26, 

Anion Gap 9, Blood Urea Nitrogen 39H, Creatinine 2.1H, Estimat Glomerular 

Filtration Rate , Glucose Level 90, Calcium Level 9.3, Phosphorus Level 3.7, 

Magnesium Level 2.7H


1/4/19 04:00: 


White Blood Count 10.4, Red Blood Count 3.02L, Hemoglobin 9.1L, Hematocrit 29.0L

, Mean Corpuscular Volume 96, Mean Corpuscular Hemoglobin 30.0, Mean 

Corpuscular Hemoglobin Concent 31.3L, Red Cell Distribution Width 19.0H, 

Platelet Count 422, Mean Platelet Volume 5.2L, Neutrophils (%) (Auto) 82.6H, 

Lymphocytes (%) (Auto) 5.2L, Monocytes (%) (Auto) 8.9, Eosinophils (%) (Auto) 

2.4, Basophils (%) (Auto) 0.9, Sodium Level 132L, Potassium Level 4.9, Chloride 

Level 98, Carbon Dioxide Level 25, Anion Gap 9, Blood Urea Nitrogen 46H, 

Creatinine 2.4H, Estimat Glomerular Filtration Rate , Glucose Level 81, Calcium 

Level 9.3, Phosphorus Level 4.5, Magnesium Level 3.0H


Height (Feet):  5


Height (Inches):  2.00


Weight (Pounds):  115


Objective


On vent.


Trach clean.


Cv IRR/IRR


Lungs B ronchi.


Abd SNT. BS +  PEG.


E Rt. foot diabetic ulcers











Gavino Daigle MD Jan 4, 2019 08:13

## 2019-01-04 NOTE — INFECTIOUS DISEASES PROG NOTE
Assessment/Plan


Assessment/Plan


A:


Sepsis treated


UTI treated


Cellulitis of R leg, osteomyelitis treated


Hypercapnic respiratory failure


ESRD on HD


DM


Anemia


Dementia


PVD


R pleural effusion


Mucous plug


s/o Cardiac arrest


Multiple Pressure ulcers


P;


Observe off antibiotic


Wound care


Waiting for placement





Subjective


Neurologic:  Reports: confusion, other - on restraint


Allergies:  


Coded Allergies:  


     No Known Allergies (Unverified , 11/26/18)





Objective


Vital Signs





Last 24 Hour Vital Signs








  Date Time  Temp Pulse Resp B/P (MAP) Pulse Ox O2 Delivery O2 Flow Rate FiO2


 


1/4/19 12:00 97.8 97 24 108/39 (62) 96   


 


1/4/19 12:00      Mechanical Ventilator  





      Mechanical Ventilator  


 


1/4/19 12:00        40


 


1/4/19 11:53  94      


 


1/4/19 10:59  82 12     40


 


1/4/19 08:45    120/57    


 


1/4/19 08:30  72 12     40


 


1/4/19 08:00      Mechanical Ventilator  





      Mechanical Ventilator  


 


1/4/19 08:00        40


 


1/4/19 07:45 97.8 86 16 120/57 (78) 100   


 


1/4/19 07:45  69      


 


1/4/19 07:05  77 14     40


 


1/4/19 05:09  74 12     40


 


1/4/19 04:00  85      


 


1/4/19 04:00        40


 


1/4/19 04:00      Mechanical Ventilator  





      Mechanical Ventilator  


 


1/4/19 04:00 97.8 69 14 129/50 (76) 100   


 


1/4/19 02:44  69 12     40


 


1/4/19 01:00  78 15     40


 


1/4/19 00:00        40


 


1/4/19 00:00  69      


 


1/4/19 00:00 97.7 73 15 157/76 (103) 100   


 


1/4/19 00:00      Mechanical Ventilator  





      Mechanical Ventilator  


 


1/3/19 22:48  69 14     40


 


1/3/19 20:55  79 22     40


 


1/3/19 20:42    159/83    


 


1/3/19 20:00  88      


 


1/3/19 20:00        40


 


1/3/19 20:00      Mechanical Ventilator  





      Mechanical Ventilator  


 


1/3/19 20:00 97.8 80 18 159/83 (108) 100   


 


1/3/19 18:40  79 20     40


 


1/3/19 17:05  78 17     40


 


1/3/19 16:00        40


 


1/3/19 16:00 97.5 71 16 115/31 (59) 100   


 


1/3/19 16:00      Mechanical Ventilator  





      Mechanical Ventilator  


 


1/3/19 15:29  69 13     40


 


1/3/19 15:24  68      


 


1/3/19 13:26  73 12     40








Height (Feet):  5


Height (Inches):  2.00


Weight (Pounds):  114


HEENT:  status post trach


Respiratory/Chest:  lungs clear, other - on ventilator


Cardiovascular:  normal rate, other - R arm PICC line


Abdomen:  soft, non tender, other - GT feeding


Extremities:  no edema


Neurologic/Psychiatric:  other - sleeping





Laboratory Tests








Test


  1/4/19


04:00


 


White Blood Count


  10.4 K/UL


(4.8-10.8)


 


Red Blood Count


  3.02 M/UL


(4.20-5.40)  L


 


Hemoglobin


  9.1 G/DL


(12.0-16.0)  L


 


Hematocrit


  29.0 %


(37.0-47.0)  L


 


Mean Corpuscular Volume 96 FL (80-99)  


 


Mean Corpuscular Hemoglobin


  30.0 PG


(27.0-31.0)


 


Mean Corpuscular Hemoglobin


Concent 31.3 G/DL


(32.0-36.0)  L


 


Red Cell Distribution Width


  19.0 %


(11.6-14.8)  H


 


Platelet Count


  422 K/UL


(150-450)


 


Mean Platelet Volume


  5.2 FL


(6.5-10.1)  L


 


Neutrophils (%) (Auto)


  82.6 %


(45.0-75.0)  H


 


Lymphocytes (%) (Auto)


  5.2 %


(20.0-45.0)  L


 


Monocytes (%) (Auto)


  8.9 %


(1.0-10.0)


 


Eosinophils (%) (Auto)


  2.4 %


(0.0-3.0)


 


Basophils (%) (Auto)


  0.9 %


(0.0-2.0)


 


Sodium Level


  132 MMOL/L


(136-145)  L


 


Potassium Level


  4.9 MMOL/L


(3.5-5.1)


 


Chloride Level


  98 MMOL/L


()


 


Carbon Dioxide Level


  25 MMOL/L


(21-32)


 


Anion Gap


  9 mmol/L


(5-15)


 


Blood Urea Nitrogen


  46 mg/dL


(7-18)  H


 


Creatinine


  2.4 MG/DL


(0.55-1.30)  H


 


Estimat Glomerular Filtration


Rate  mL/min (>60)  


 


 


Glucose Level


  81 MG/DL


()


 


Calcium Level


  9.3 MG/DL


(8.5-10.1)


 


Phosphorus Level


  4.5 MG/DL


(2.5-4.9)


 


Magnesium Level


  3.0 MG/DL


(1.8-2.4)  H











Current Medications








 Medications


  (Trade)  Dose


 Ordered  Sig/Tony


 Route


 PRN Reason  Start Time


 Stop Time Status Last Admin


Dose Admin


 


 Acetaminophen


  (Tylenol)  650 mg  Q6H  PRN


 GT


 Mild Pain/Temp > 100.5  12/23/18 16:00


 1/20/19 15:59  12/28/18 12:03


 


 


 Acetaminophen/


 Hydrocodone Bitart


  (Norco 5/325)  1 tab  Q6H  PRN


 GT


 For Pain  1/2/19 18:30


 1/9/19 18:29  1/4/19 11:59


 


 


 Amiodarone HCl


  (Cordarone)  200 mg  DAILY


 GT


   1/2/19 09:00


 1/26/19 20:59  1/4/19 08:43


 


 


 Apixaban


  (Eliquis)  2.5 mg  BID


 GT


   12/27/18 18:00


 1/26/19 17:59  1/4/19 08:44


 


 


 Artificial Tears


  (Akwa-Tears)  1 drop  Q4H  PRN


 BOTH EYES


 Dry Eyes  12/23/18 15:30


 1/21/19 15:29  1/1/19 16:53


 


 


 Chlorhexidine


 Gluconate


  (Juana-Hex 2%)  1 applic  DAILY@2000


 TOPIC


   12/30/18 20:00


 1/29/19 19:59  1/3/19 20:41


 


 


 Collagenase


  (Santyl)  1 applic  Q12HR


 TOPIC


   12/23/18 21:00


 1/5/19 20:59  1/4/19 09:27


 


 


 Lansoprazole


  (Prevacid)  30 mg  Q12HR


 GT


   12/27/18 21:00


 1/26/19 20:59  1/4/19 08:46


 


 


 Lorazepam


  (Ativan)  0.5 mg  Q6H  PRN


 ORAL


 For Anxiety  12/30/18 12:00


 1/6/19 11:59  1/3/19 13:17


 


 


 Minoxidil


  (Loniten)  2.5 mg  Q12HR


 GT


   12/27/18 21:00


 1/26/19 20:59  1/4/19 08:45


 


 


 Sodium Chloride  1,000 ml @ 


 500 mls/hr  Q2H PRN


 IVLG


 sbp<90 during hd  1/4/19 06:00


 1/4/19 23:59   


 


 


 Sodium Chloride


  (Ocean Nasal


 Spray)  1 spray  Q4H  PRN


 NASAL


 dry nose  12/23/18 14:30


 1/21/19 18:29  12/25/18 11:49


 


 


 Vitamin D


  (Vitamin D)  1,000 intlu  DAILY


 GT


   12/24/18 09:00


 1/8/19 09:59  1/4/19 08:44


 


 


 Zolpidem Tartrate


  (Ambien)  5 mg  HSPRN  PRN


 GT


 Insomnia  1/3/19 20:45


 1/10/19 20:44  1/4/19 01:25


 

















Luke Maynard MD Jan 4, 2019 12:38

## 2019-01-04 NOTE — GI PROGRESS NOTE
Assessment/Plan


Problems:  


(1) Encounter for PEG (percutaneous endoscopic gastrostomy)


ICD Codes:  Z43.1 - Encounter for attention to gastrostomy


SNOMED:  710169294, 517384398


(2) Severe malnutrition


ICD Codes:  E43 - Unspecified severe protein-calorie malnutrition


SNOMED:  14818219


(3) Dehydration


ICD Codes:  E86.0 - Dehydration


SNOMED:  85677739


Status:  unchanged


Status Narrative


Discussed with Dr. Lam


Assessment/Plan


Assessment


- Dysphagia


- Resp failure


- ESRD


- Anemia


- Status post PEG, tolerating feeds





Recommendations


1. Abdominal binder.


2. Elevate the head of the bed at all times.


3. G-tube flush.


4. G-tube care.


5. tube feeding later today per RD to goal


Monitor H&H, PRN transfusions


PPI


Electrolyte correction


Follow-up labs





The patient was seen and examined at bedside and all new and available data was 

reviewed in the patients chart. I agree with the above findings, impression 

and plan.  (Patient seen earlier today. Signature stamp does not reflect 

patient encounter time.). - Elliott Lam MD





Subjective


Subjective


limited





Objective





Last 24 Hour Vital Signs








  Date Time  Temp Pulse Resp B/P (MAP) Pulse Ox O2 Delivery O2 Flow Rate FiO2


 


1/4/19 10:59  82 12     40


 


1/4/19 08:45    120/57    


 


1/4/19 08:30  72 12     40


 


1/4/19 08:00      Mechanical Ventilator  





      Mechanical Ventilator  


 


1/4/19 08:00        40


 


1/4/19 07:45 97.8 86 16 120/57 (78) 100   


 


1/4/19 07:45  69      


 


1/4/19 07:05  77 14     40


 


1/4/19 05:09  74 12     40


 


1/4/19 04:00  85      


 


1/4/19 04:00        40


 


1/4/19 04:00      Mechanical Ventilator  





      Mechanical Ventilator  


 


1/4/19 04:00 97.8 69 14 129/50 (76) 100   


 


1/4/19 02:44  69 12     40


 


1/4/19 01:00  78 15     40


 


1/4/19 00:00        40


 


1/4/19 00:00  69      


 


1/4/19 00:00 97.7 73 15 157/76 (103) 100   


 


1/4/19 00:00      Mechanical Ventilator  





      Mechanical Ventilator  


 


1/3/19 22:48  69 14     40


 


1/3/19 20:55  79 22     40


 


1/3/19 20:42    159/83    


 


1/3/19 20:00  88      


 


1/3/19 20:00        40


 


1/3/19 20:00      Mechanical Ventilator  





      Mechanical Ventilator  


 


1/3/19 20:00 97.8 80 18 159/83 (108) 100   


 


1/3/19 18:40  79 20     40


 


1/3/19 17:05  78 17     40


 


1/3/19 16:00        40


 


1/3/19 16:00 97.5 71 16 115/31 (59) 100   


 


1/3/19 16:00      Mechanical Ventilator  





      Mechanical Ventilator  


 


1/3/19 15:29  69 13     40


 


1/3/19 15:24  68      


 


1/3/19 13:26  73 12     40

















Intake and Output  


 


 1/3/19 1/4/19





 19:00 07:00


 


Intake Total 215 ml 375 ml


 


Output Total 25 ml 0 ml


 


Balance 190 ml 375 ml


 


  


 


Free Water  90 ml


 


Tube Feeding 125 ml 285 ml


 


Other 90 ml 


 


Stool Total 25 ml 0 ml


 


# Voids  1











Laboratory Tests








Test


  1/4/19


04:00


 


White Blood Count


  10.4 K/UL


(4.8-10.8)


 


Red Blood Count


  3.02 M/UL


(4.20-5.40)  L


 


Hemoglobin


  9.1 G/DL


(12.0-16.0)  L


 


Hematocrit


  29.0 %


(37.0-47.0)  L


 


Mean Corpuscular Volume 96 FL (80-99)  


 


Mean Corpuscular Hemoglobin


  30.0 PG


(27.0-31.0)


 


Mean Corpuscular Hemoglobin


Concent 31.3 G/DL


(32.0-36.0)  L


 


Red Cell Distribution Width


  19.0 %


(11.6-14.8)  H


 


Platelet Count


  422 K/UL


(150-450)


 


Mean Platelet Volume


  5.2 FL


(6.5-10.1)  L


 


Neutrophils (%) (Auto)


  82.6 %


(45.0-75.0)  H


 


Lymphocytes (%) (Auto)


  5.2 %


(20.0-45.0)  L


 


Monocytes (%) (Auto)


  8.9 %


(1.0-10.0)


 


Eosinophils (%) (Auto)


  2.4 %


(0.0-3.0)


 


Basophils (%) (Auto)


  0.9 %


(0.0-2.0)


 


Sodium Level


  132 MMOL/L


(136-145)  L


 


Potassium Level


  4.9 MMOL/L


(3.5-5.1)


 


Chloride Level


  98 MMOL/L


()


 


Carbon Dioxide Level


  25 MMOL/L


(21-32)


 


Anion Gap


  9 mmol/L


(5-15)


 


Blood Urea Nitrogen


  46 mg/dL


(7-18)  H


 


Creatinine


  2.4 MG/DL


(0.55-1.30)  H


 


Estimat Glomerular Filtration


Rate  mL/min (>60)  


 


 


Glucose Level


  81 MG/DL


()


 


Calcium Level


  9.3 MG/DL


(8.5-10.1)


 


Phosphorus Level


  4.5 MG/DL


(2.5-4.9)


 


Magnesium Level


  3.0 MG/DL


(1.8-2.4)  H








Height (Feet):  5


Height (Inches):  2.00


Weight (Pounds):  114


General Appearance:  no apparent distress


Cardiovascular:  normal rate


Respiratory/Chest:  normal breath sounds, no respiratory distress, other - 

Mechanical ventilator


Abdominal Exam:  normal bowel sounds, non tender, soft, GT site - Clean dry and 

intact


Extremities:  non-tender











Shane Lowry NP Jan 4, 2019 12:16

## 2019-01-04 NOTE — NUR
NURSE NOTES:

Paged Dr Montes De Oca in regards to elevated WBC of 24.4, awaiting for call back at this time.

## 2019-01-04 NOTE — NUR
NURSE NOTES:

Patient completed dialysis. Output was 3L. Patient is in stable condition at this time. Will 
continue to monitor.

## 2019-01-04 NOTE — NUR
CASE MANAGEMENT: REVIEW





SI: ESRD ON HD

TRACHEOSTOMY 12/17

PEG PLACEMENT 12/19 

T 97.8 HR 97 RR 24 /39 SAT 96% MECH VENT FIO2 40

H/H 9.1/29.0  BUN 46 CR 2.4 







IS: MINOXIDIL GT Q12HR 

PROCRIT SQ MWF

ELIQUIS NG BID 

AMIODARONE GT Q12HR 

GT FEEDING NEPRO @ 40ML/HR

HD PRN 



***STEP DOWN UNIT  STATUS***

DCP: PATIENT IS FROM [facility]. REFERRED TO [facility]

## 2019-01-05 VITALS — DIASTOLIC BLOOD PRESSURE: 60 MMHG | SYSTOLIC BLOOD PRESSURE: 139 MMHG

## 2019-01-05 VITALS — SYSTOLIC BLOOD PRESSURE: 118 MMHG | DIASTOLIC BLOOD PRESSURE: 57 MMHG

## 2019-01-05 VITALS — SYSTOLIC BLOOD PRESSURE: 103 MMHG | DIASTOLIC BLOOD PRESSURE: 33 MMHG

## 2019-01-05 VITALS — SYSTOLIC BLOOD PRESSURE: 139 MMHG | DIASTOLIC BLOOD PRESSURE: 84 MMHG

## 2019-01-05 VITALS — DIASTOLIC BLOOD PRESSURE: 78 MMHG | SYSTOLIC BLOOD PRESSURE: 124 MMHG

## 2019-01-05 VITALS — DIASTOLIC BLOOD PRESSURE: 34 MMHG | SYSTOLIC BLOOD PRESSURE: 127 MMHG

## 2019-01-05 VITALS — DIASTOLIC BLOOD PRESSURE: 55 MMHG | SYSTOLIC BLOOD PRESSURE: 110 MMHG

## 2019-01-05 LAB
ADD MANUAL DIFF: YES
ANION GAP SERPL CALC-SCNC: 9 MMOL/L (ref 5–15)
BUN SERPL-MCNC: 30 MG/DL (ref 7–18)
CALCIUM SERPL-MCNC: 9.2 MG/DL (ref 8.5–10.1)
CHLORIDE SERPL-SCNC: 99 MMOL/L (ref 98–107)
CO2 SERPL-SCNC: 26 MMOL/L (ref 21–32)
CREAT SERPL-MCNC: 1.8 MG/DL (ref 0.55–1.3)
ERYTHROCYTE [DISTWIDTH] IN BLOOD BY AUTOMATED COUNT: 18.4 % (ref 11.6–14.8)
HCT VFR BLD CALC: 27 % (ref 37–47)
HGB BLD-MCNC: 8.5 G/DL (ref 12–16)
MCV RBC AUTO: 96 FL (ref 80–99)
PLATELET # BLD: 411 K/UL (ref 150–450)
POTASSIUM SERPL-SCNC: 3.8 MMOL/L (ref 3.5–5.1)
RBC # BLD AUTO: 2.8 M/UL (ref 4.2–5.4)
SODIUM SERPL-SCNC: 134 MMOL/L (ref 136–145)
WBC # BLD AUTO: 27.2 K/UL (ref 4.8–10.8)

## 2019-01-05 RX ADMIN — MINOXIDIL SCH MG: 2.5 TABLET ORAL at 09:21

## 2019-01-05 RX ADMIN — SODIUM CHLORIDE SCH MLS/HR: 0.9 INJECTION INTRAVENOUS at 12:02

## 2019-01-05 RX ADMIN — APIXABAN SCH MG: 2.5 TABLET, FILM COATED ORAL at 09:21

## 2019-01-05 RX ADMIN — VITAMIN D, TAB 1000IU (100/BT) SCH INTLU: 25 TAB at 09:20

## 2019-01-05 RX ADMIN — HYDROCODONE BITARTRATE AND ACETAMINOPHEN PRN TAB: 5; 325 TABLET ORAL at 01:52

## 2019-01-05 RX ADMIN — SODIUM CHLORIDE SCH MLS/HR: 0.9 INJECTION INTRAVENOUS at 20:13

## 2019-01-05 RX ADMIN — LORAZEPAM PRN MG: 0.5 TABLET ORAL at 17:55

## 2019-01-05 RX ADMIN — MINOXIDIL SCH MG: 2.5 TABLET ORAL at 20:14

## 2019-01-05 RX ADMIN — CHLORHEXIDINE GLUCONATE SCH APPLIC: 213 SOLUTION TOPICAL at 20:13

## 2019-01-05 RX ADMIN — APIXABAN SCH MG: 2.5 TABLET, FILM COATED ORAL at 17:54

## 2019-01-05 RX ADMIN — AMIODARONE HYDROCHLORIDE SCH MG: 200 TABLET ORAL at 09:20

## 2019-01-05 NOTE — INFECTIOUS DISEASES PROG NOTE
Assessment/Plan


Assessment/Plan


antibiotics : vancomycin iv, zosyn





A


1. leucocytosis increased


2. shock resolved


3. r/o UTI, sepsis, pneumonia, c.diff


4. respiratory failure s/p tracheostomy


5. renal failure


6. decubitus ulcers


7. nasal MRSA colonization


8. rectal VRE colonization


9. pleural effusion





P


1. blood cultures


2. UA and urine culture


3. sputum culture


4. stool for c.diff


5. iv vancomycin, zosyn started


6. will follow up cultures





Subjective


ROS Limited/Unobtainable:  Yes


Allergies:  


Coded Allergies:  


     No Known Allergies (Unverified , 11/26/18)





Objective


Vital Signs





Last 24 Hour Vital Signs








  Date Time  Temp Pulse Resp B/P (MAP) Pulse Ox O2 Delivery O2 Flow Rate FiO2


 


1/5/19 09:21    139/84    


 


1/5/19 09:13 98.6 110 22 139/84 (102) 100   





  92      


 


1/5/19 08:47  103      


 


1/5/19 08:00        40


 


1/5/19 08:00      Mechanical Ventilator  





      Mechanical Ventilator  


 


1/5/19 08:00 98.4 94 21 124/78 (93) 100   


 


1/5/19 07:10  98 21     40


 


1/5/19 05:08  95 18     40


 


1/5/19 04:00        40


 


1/5/19 04:00 97.7 110 21 139/60 (86) 100   


 


1/5/19 04:00  123      


 


1/5/19 03:40      Mechanical Ventilator  





      Mechanical Ventilator  


 


1/5/19 03:40  88 20     40


 


1/5/19 01:11  123 24     40


 


1/5/19 00:00 98.2 66 20 118/57 (77) 100   


 


1/5/19 00:00      Mechanical Ventilator  





      Mechanical Ventilator  


 


1/4/19 23:16  94 20     40


 


1/4/19 21:09  104 23     40


 


1/4/19 20:45    126/41    


 


1/4/19 20:00        40


 


1/4/19 20:00  108      


 


1/4/19 20:00 97.9 100 23 126/41 (69) 98   


 


1/4/19 20:00      Mechanical Ventilator  





      Mechanical Ventilator  


 


1/4/19 19:25  107 21     40


 


1/4/19 16:50  72 13     40


 


1/4/19 16:00      Mechanical Ventilator  





      Mechanical Ventilator  


 


1/4/19 16:00 98.3 82 21 130/54 (79) 98   


 


1/4/19 16:00        40


 


1/4/19 15:25  88      


 


1/4/19 14:45  70 14     40


 


1/4/19 12:30  75 14     40


 


1/4/19 12:00 97.8 97 24 108/39 (62) 96   


 


1/4/19 12:00      Mechanical Ventilator  





      Mechanical Ventilator  


 


1/4/19 12:00        40


 


1/4/19 11:53  94      








Height (Feet):  5


Height (Inches):  2.00


Weight (Pounds):  113


HEENT:  status post trach


Respiratory/Chest:  lungs clear


Cardiovascular:  normal rate, regular rhythm, no gallop/murmur


Abdomen:  soft, non tender, other - Gt


Extremities:  no edema, other - right arm PICC





Laboratory Tests








Test


  1/4/19


17:40 1/5/19


03:16


 


White Blood Count


  24.4 K/UL


(4.8-10.8)  #*H 27.2 K/UL


(4.8-10.8)  *H


 


Red Blood Count


  2.96 M/UL


(4.20-5.40)  L 2.80 M/UL


(4.20-5.40)  L


 


Hemoglobin


  9.0 G/DL


(12.0-16.0)  L 8.5 G/DL


(12.0-16.0)  L


 


Hematocrit


  28.2 %


(37.0-47.0)  L 27.0 %


(37.0-47.0)  L


 


Mean Corpuscular Volume 95 FL (80-99)   96 FL (80-99)  


 


Mean Corpuscular Hemoglobin


  30.4 PG


(27.0-31.0) 30.3 PG


(27.0-31.0)


 


Mean Corpuscular Hemoglobin


Concent 31.9 G/DL


(32.0-36.0)  L 31.5 G/DL


(32.0-36.0)  L


 


Red Cell Distribution Width


  18.7 %


(11.6-14.8)  H 18.4 %


(11.6-14.8)  H


 


Platelet Count


  468 K/UL


(150-450)  H 411 K/UL


(150-450)


 


Mean Platelet Volume


  5.4 FL


(6.5-10.1)  L 5.3 FL


(6.5-10.1)  L


 


Neutrophils (%) (Auto)


  % (45.0-75.0)


  % (45.0-75.0)


 


 


Lymphocytes (%) (Auto)


  % (20.0-45.0)


  % (20.0-45.0)


 


 


Monocytes (%) (Auto)  % (1.0-10.0)    % (1.0-10.0)  


 


Eosinophils (%) (Auto)  % (0.0-3.0)    % (0.0-3.0)  


 


Basophils (%) (Auto)  % (0.0-2.0)    % (0.0-2.0)  


 


Differential Total Cells


Counted 100  


  100  


 


 


Neutrophils % (Manual) 91 % (45-75)  H 92 % (45-75)  H


 


Lymphocytes % (Manual) 1 % (20-45)  L 1 % (20-45)  L


 


Monocytes % (Manual) 5 % (1-10)   7 % (1-10)  


 


Eosinophils % (Manual) 1 % (0-3)   0 % (0-3)  


 


Basophils % (Manual) 0 % (0-2)   0 % (0-2)  


 


Band Neutrophils 2 % (0-8)   0 % (0-8)  


 


Platelet Estimate Increased  H Adequate  


 


Platelet Morphology Normal   Normal  


 


Polychromasia 1+   


 


Anisocytosis 2+   2+  


 


Phosphorus Level


  2.4 MG/DL


(2.5-4.9)  L 


 


 


Hypochromasia  2+  


 


Sodium Level


  


  134 MMOL/L


(136-145)  L


 


Potassium Level


  


  3.8 MMOL/L


(3.5-5.1)


 


Chloride Level


  


  99 MMOL/L


()


 


Carbon Dioxide Level


  


  26 MMOL/L


(21-32)


 


Anion Gap


  


  9 mmol/L


(5-15)


 


Blood Urea Nitrogen


  


  30 mg/dL


(7-18)  H


 


Creatinine


  


  1.8 MG/DL


(0.55-1.30)  H


 


Estimat Glomerular Filtration


Rate 


   mL/min (>60)  


 


 


Glucose Level


  


  122 MG/DL


()  H


 


Calcium Level


  


  9.2 MG/DL


(8.5-10.1)











Current Medications








 Medications


  (Trade)  Dose


 Ordered  Sig/Tony


 Route


 PRN Reason  Start Time


 Stop Time Status Last Admin


Dose Admin


 


 Acetaminophen


  (Tylenol)  650 mg  Q6H  PRN


 GT


 Mild Pain/Temp > 100.5  12/23/18 16:00


 1/20/19 15:59  12/28/18 12:03


 


 


 Acetaminophen/


 Hydrocodone Bitart


  (Norco 5/325)  1 tab  Q6H  PRN


 GT


 For Pain  1/2/19 18:30


 1/9/19 18:29  1/5/19 01:52


 


 


 Amiodarone HCl


  (Cordarone)  200 mg  DAILY


 GT


   1/2/19 09:00


 1/26/19 20:59  1/5/19 09:20


 


 


 Apixaban


  (Eliquis)  2.5 mg  BID


 GT


   12/27/18 18:00


 1/26/19 17:59  1/5/19 09:21


 


 


 Artificial Tears


  (Akwa-Tears)  1 drop  Q4H  PRN


 BOTH EYES


 Dry Eyes  12/23/18 15:30


 1/21/19 15:29  1/1/19 16:53


 


 


 Chlorhexidine


 Gluconate


  (Juana-Hex 2%)  1 applic  DAILY@2000


 TOPIC


   12/30/18 20:00


 1/29/19 19:59  1/4/19 20:45


 


 


 Collagenase


  (Santyl)  1 applic  Q12HR


 TOPIC


   12/23/18 21:00


 1/5/19 20:59  1/5/19 09:22


 


 


 Lansoprazole


  (Prevacid)  30 mg  Q12HR


 GT


   12/27/18 21:00


 1/26/19 20:59  1/5/19 09:20


 


 


 Lorazepam


  (Ativan)  0.5 mg  Q6H  PRN


 ORAL


 For Anxiety  12/30/18 12:00


 1/6/19 11:59  1/4/19 17:02


 


 


 Minoxidil


  (Loniten)  2.5 mg  Q12HR


 GT


   12/27/18 21:00


 1/26/19 20:59  1/5/19 09:21


 


 


 Piperacillin Sod/


 Tazobactam Sod


 2.25 gm/Dextrose  110 ml @ 


 220 mls/hr  Q12H


 IV


   1/5/19 15:00


 1/12/19 14:59 UNV  


 


 


 Sodium Chloride


  (Ocean Nasal


 Spray)  1 spray  Q4H  PRN


 NASAL


 dry nose  12/23/18 14:30


 1/21/19 18:29  12/25/18 11:49


 


 


 Vancomycin HCl


  (Vanco rx to


 dose)  1 ea  DAILY  PRN


 MISC


 Per rx protocol  1/5/19 00:30


 2/4/19 00:29   


 


 


 Vitamin D


  (Vitamin D)  1,000 intlu  DAILY


 GT


   12/24/18 09:00


 1/8/19 09:59  1/5/19 09:20


 


 


 Zolpidem Tartrate


  (Ambien)  5 mg  HSPRN  PRN


 GT


 Insomnia  1/3/19 20:45


 1/10/19 20:44  1/4/19 23:13


 

















Chase Santillan MD Jan 5, 2019 11:32

## 2019-01-05 NOTE — NUR
NURSE NOTES:

Son visiting ,patient anxious,restless,pulling device,Ativan given ,son agree,watch closely

## 2019-01-05 NOTE — CARDIOLOGY PROGRESS NOTE
Assessment/Plan


Assessment/Plan


no atrial fibrillation on current regimen





Subjective


Subjective


The patient is with tach, she is alert, but unable to speak, she denies 

palpitations or shortness of breath


her son is at the bedside





Objective





Last 24 Hour Vital Signs








  Date Time  Temp Pulse Resp B/P (MAP) Pulse Ox O2 Delivery O2 Flow Rate FiO2


 


1/5/19 21:30  81 16     40


 


1/5/19 20:14    127/40    


 


1/5/19 20:00      Mechanical Ventilator  





      Mechanical Ventilator  


 


1/5/19 20:00  78      


 


1/5/19 20:00        40


 


1/5/19 20:00 98.2 85 17 127/34 (65) 100   


 


1/5/19 19:11  78 16     40


 


1/5/19 18:00      Mechanical Ventilator  





      Mechanical Ventilator  


 


1/5/19 17:27  87 21     40


 


1/5/19 16:59  82      


 


1/5/19 16:00        40


 


1/5/19 16:00      Mechanical Ventilator  





      Mechanical Ventilator  


 


1/5/19 16:00 98.6 92 20 110/55 (73) 98   


 


1/5/19 15:00  89 20     40


 


1/5/19 13:53  88      


 


1/5/19 13:10  87 22     40


 


1/5/19 12:05      Mechanical Ventilator  





      Mechanical Ventilator  


 


1/5/19 12:00        40


 


1/5/19 11:52 98.4 93 20 103/33 (56) 96   


 


1/5/19 10:50  94 21     40


 


1/5/19 09:21    139/84    


 


1/5/19 09:13 98.6 110 22 139/84 (102) 100   





  92      


 


1/5/19 08:47  103      


 


1/5/19 08:40  96 22     40


 


1/5/19 08:00        40


 


1/5/19 08:00      Mechanical Ventilator  





      Mechanical Ventilator  


 


1/5/19 08:00 98.4 94 21 124/78 (93) 100   


 


1/5/19 07:10  98 21     40


 


1/5/19 05:08  95 18     40


 


1/5/19 04:00        40


 


1/5/19 04:00 97.7 110 21 139/60 (86) 100   


 


1/5/19 04:00  123      


 


1/5/19 03:40      Mechanical Ventilator  





      Mechanical Ventilator  


 


1/5/19 03:40  88 20     40


 


1/5/19 01:11  123 24     40


 


1/5/19 00:00 98.2 66 20 118/57 (77) 100   


 


1/5/19 00:00      Mechanical Ventilator  





      Mechanical Ventilator  


 


1/4/19 23:16  94 20     40








General Appearance:  on vent


EENT:  PERRL/EOMI


Neck:  supple


Rhythm:  NSR


Cardiovascular:  normal rate


Respiratory/Chest:  rhonchi - bilaterally


Abdomen:  other - g tuibe


Extremities:  trace edema











Intake and Output  


 


 1/4/19 1/5/19





 19:00 07:00


 


Intake Total 600 ml 1040 ml


 


Output Total 200 ml 


 


Balance 400 ml 1040 ml


 


  


 


Free Water 200 ml 200 ml


 


IV Total  360 ml


 


Tube Feeding 400 ml 480 ml


 


Stool Total 200 ml 


 


# Voids  2


 


# Bowel Movements  1











Laboratory Tests








Test


  1/5/19


03:16


 


White Blood Count


  27.2 K/UL


(4.8-10.8)  *H


 


Red Blood Count


  2.80 M/UL


(4.20-5.40)  L


 


Hemoglobin


  8.5 G/DL


(12.0-16.0)  L


 


Hematocrit


  27.0 %


(37.0-47.0)  L


 


Mean Corpuscular Volume 96 FL (80-99)  


 


Mean Corpuscular Hemoglobin


  30.3 PG


(27.0-31.0)


 


Mean Corpuscular Hemoglobin


Concent 31.5 G/DL


(32.0-36.0)  L


 


Red Cell Distribution Width


  18.4 %


(11.6-14.8)  H


 


Platelet Count


  411 K/UL


(150-450)


 


Mean Platelet Volume


  5.3 FL


(6.5-10.1)  L


 


Neutrophils (%) (Auto)


  % (45.0-75.0)


 


 


Lymphocytes (%) (Auto)


  % (20.0-45.0)


 


 


Monocytes (%) (Auto)  % (1.0-10.0)  


 


Eosinophils (%) (Auto)  % (0.0-3.0)  


 


Basophils (%) (Auto)  % (0.0-2.0)  


 


Differential Total Cells


Counted 100  


 


 


Neutrophils % (Manual) 92 % (45-75)  H


 


Lymphocytes % (Manual) 1 % (20-45)  L


 


Monocytes % (Manual) 7 % (1-10)  


 


Eosinophils % (Manual) 0 % (0-3)  


 


Basophils % (Manual) 0 % (0-2)  


 


Band Neutrophils 0 % (0-8)  


 


Platelet Estimate Adequate  


 


Platelet Morphology Normal  


 


Hypochromasia 2+  


 


Anisocytosis 2+  


 


Sodium Level


  134 MMOL/L


(136-145)  L


 


Potassium Level


  3.8 MMOL/L


(3.5-5.1)


 


Chloride Level


  99 MMOL/L


()


 


Carbon Dioxide Level


  26 MMOL/L


(21-32)


 


Anion Gap


  9 mmol/L


(5-15)


 


Blood Urea Nitrogen


  30 mg/dL


(7-18)  H


 


Creatinine


  1.8 MG/DL


(0.55-1.30)  H


 


Estimat Glomerular Filtration


Rate  mL/min (>60)  


 


 


Glucose Level


  122 MG/DL


()  H


 


Calcium Level


  9.2 MG/DL


(8.5-10.1)

















Annette Sykes MD Jan 5, 2019 21:39

## 2019-01-05 NOTE — NUR
CASE MANAGEMENT: REVIEW



1/5/18



SI: ESRD ON HD

S/P TRACHEOSTOMY 12/17

S/P PEG PLACEMENT 12/19 

98.4  93  20  103/33   SAT 96% MECH VENT FIO2 40

WBC+27.2





IS:IV ZOSYN Q8HRS

IV VANCOMYCIN Q48

 MINOXIDIL GT Q12HR 

ELIQUIS NG BID 

AMIODARONE GT Q12HR 

GT FEEDING NEPRO @ 40ML/HR

***STEP DOWN UNIT  STATUS***

DCP: PATIENT IS FROM [facility]. REFERRED TO [facility]

## 2019-01-05 NOTE — GENERAL PROGRESS NOTE
Assessment/Plan


Assessment/Plan


- Dysphagia


- Resp failure


- ESRD


- Anemia


- Status post PEG, tolerating feeds





Recommendations


1. Abdominal binder.


2. Elevate the head of the bed at all times.


3. G-tube flush.


4. G-tube care.


5. tube feeding later today per RD to goal


Monitor H&H, PRN transfusions


PPI


Electrolyte correction


Follow-up labs





Subjective


ROS Limited/Unobtainable:  No


Allergies:  


Coded Allergies:  


     No Known Allergies (Unverified , 11/26/18)





Objective





Last 24 Hour Vital Signs








  Date Time  Temp Pulse Resp B/P (MAP) Pulse Ox O2 Delivery O2 Flow Rate FiO2


 


1/5/19 09:21    139/84    


 


1/5/19 09:13 98.6 110 22 139/84 (102) 100   





  92      


 


1/5/19 08:47  103      


 


1/5/19 07:10  98 21     40


 


1/5/19 05:08  95 18     40


 


1/5/19 04:00        40


 


1/5/19 04:00 97.7 110 21 139/60 (86) 100   


 


1/5/19 04:00  123      


 


1/5/19 03:40      Mechanical Ventilator  





      Mechanical Ventilator  


 


1/5/19 03:40  88 20     40


 


1/5/19 01:11  123 24     40


 


1/5/19 00:00 98.2 66 20 118/57 (77) 100   


 


1/5/19 00:00      Mechanical Ventilator  





      Mechanical Ventilator  


 


1/4/19 23:16  94 20     40


 


1/4/19 21:09  104 23     40


 


1/4/19 20:45    126/41    


 


1/4/19 20:00        40


 


1/4/19 20:00  108      


 


1/4/19 20:00 97.9 100 23 126/41 (69) 98   


 


1/4/19 20:00      Mechanical Ventilator  





      Mechanical Ventilator  


 


1/4/19 19:25  107 21     40


 


1/4/19 16:50  72 13     40


 


1/4/19 16:00      Mechanical Ventilator  





      Mechanical Ventilator  


 


1/4/19 16:00 98.3 82 21 130/54 (79) 98   


 


1/4/19 16:00        40


 


1/4/19 15:25  88      


 


1/4/19 14:45  70 14     40


 


1/4/19 12:30  75 14     40


 


1/4/19 12:00 97.8 97 24 108/39 (62) 96   


 


1/4/19 12:00      Mechanical Ventilator  





      Mechanical Ventilator  


 


1/4/19 12:00        40


 


1/4/19 11:53  94      

















Intake and Output  


 


 1/4/19 1/5/19





 19:00 07:00


 


Intake Total 600 ml 1000 ml


 


Output Total 200 ml 


 


Balance 400 ml 1000 ml


 


  


 


Free Water 200 ml 200 ml


 


IV Total  360 ml


 


Tube Feeding 400 ml 440 ml


 


Stool Total 200 ml 


 


# Voids  2


 


# Bowel Movements  1








Laboratory Tests


1/4/19 17:40: 


White Blood Count 24.4#*H, Red Blood Count 2.96L, Hemoglobin 9.0L, Hematocrit 

28.2L, Mean Corpuscular Volume 95, Mean Corpuscular Hemoglobin 30.4, Mean 

Corpuscular Hemoglobin Concent 31.9L, Red Cell Distribution Width 18.7H, 

Platelet Count 468H, Mean Platelet Volume 5.4L, Neutrophils (%) (Auto) , 

Lymphocytes (%) (Auto) , Monocytes (%) (Auto) , Eosinophils (%) (Auto) , 

Basophils (%) (Auto) , Differential Total Cells Counted 100, Neutrophils % (

Manual) 91H, Lymphocytes % (Manual) 1L, Monocytes % (Manual) 5, Eosinophils % (

Manual) 1, Basophils % (Manual) 0, Band Neutrophils 2, Platelet Estimate 

IncreasedH, Platelet Morphology Normal, Polychromasia 1+, Anisocytosis 2+, 

Phosphorus Level 2.4L


1/5/19 03:16: 


White Blood Count 27.2*H, Red Blood Count 2.80L, Hemoglobin 8.5L, Hematocrit 

27.0L, Mean Corpuscular Volume 96, Mean Corpuscular Hemoglobin 30.3, Mean 

Corpuscular Hemoglobin Concent 31.5L, Red Cell Distribution Width 18.4H, 

Platelet Count 411, Mean Platelet Volume 5.3L, Neutrophils (%) (Auto) , 

Lymphocytes (%) (Auto) , Monocytes (%) (Auto) , Eosinophils (%) (Auto) , 

Basophils (%) (Auto) , Differential Total Cells Counted 100, Neutrophils % (

Manual) 92H, Lymphocytes % (Manual) 1L, Monocytes % (Manual) 7, Eosinophils % (

Manual) 0, Basophils % (Manual) 0, Band Neutrophils 0, Platelet Estimate 

Adequate, Platelet Morphology Normal, Anisocytosis 2+, Hypochromasia 2+, Sodium 

Level 134L, Potassium Level 3.8, Chloride Level 99, Carbon Dioxide Level 26, 

Anion Gap 9, Blood Urea Nitrogen 30H, Creatinine 1.8H, Estimat Glomerular 

Filtration Rate , Glucose Level 122H, Calcium Level 9.2


Height (Feet):  5


Height (Inches):  2.00


Weight (Pounds):  113


General Appearance:  lethargic


EENT:  normal ENT inspection


Neck:  supple


Cardiovascular:  normal rate


Respiratory/Chest:  decreased breath sounds


Abdomen:  normal bowel sounds, non tender, soft


Extremities:  non-tender











Elliott Lam MD Jan 5, 2019 11:14

## 2019-01-05 NOTE — NUR
NURSE NOTES:

Bedside report received from CAROLYN Ocasio.  Pt is awake, restless, and observed pulling at 
devices.  Bilaterally wrist restraints observed; palpable pulses, skin intact.  Nonverbal, 
but pt is able to write down simple statements on paper; answer yes/no questions.  
Ventilator Shiley 8:  AC 8, , PEEP 5, FiO2 40%.  CGT Nepro @ 40 ml, patent.  Rectal 
tube draining.  Skin is clean and dry. Midline ASHISH; asymptomatic.  VALE shunt palpable 
thrill, audible bruit.  Bed locked in lowest position, side rails x3, call bell within 
reach.  Will continue to monitor and follow with plan of care.

## 2019-01-05 NOTE — PULMONOLOGY PROGRESS NOTE
Assessment/Plan


Assessment/Plan


IMPRESSION:


1. Hypotension. Resolved.


2. Sepsis. Resolved


3. Atrial fibrillation.


4. Bradycardia.


5. Diabetes mellitus.


6. End-stage renal disease on dialysis.


7. Left lung collapse. Resolved


8. Respiratory failure. now status post tracheostomy


9. Dysphagia.


10. Dementia.


11. Anemia








DISCUSSION:


1. Continue medications.


2. CXR improved


3. Status post tracheostomy


4. S/p G-tube 


5. Continue HD














  ______________________________________________


  Quentin Cardoso M.D.





Subjective


Interval Events:  None reported


Constitutional:  Reports: no symptoms


HEENT:  Repors: no symptoms


Respiratory:  Reports: no symptoms


Cardiovascular:  Reports: no symptoms


Gastrointestinal/Abdominal:  Reports: no symptoms


Allergies:  


Coded Allergies:  


     No Known Allergies (Unverified , 11/26/18)





Objective





Last 24 Hour Vital Signs








  Date Time  Temp Pulse Resp B/P (MAP) Pulse Ox O2 Delivery O2 Flow Rate FiO2


 


1/5/19 13:53  88      


 


1/5/19 12:05      Mechanical Ventilator  





      Mechanical Ventilator  


 


1/5/19 12:00        40


 


1/5/19 11:52 98.4 93 20 103/33 (56) 96   


 


1/5/19 10:50  94 21     40


 


1/5/19 09:21    139/84    


 


1/5/19 09:13 98.6 110 22 139/84 (102) 100   





  92      


 


1/5/19 08:47  103      


 


1/5/19 08:40  96 22     40


 


1/5/19 08:00        40


 


1/5/19 08:00      Mechanical Ventilator  





      Mechanical Ventilator  


 


1/5/19 08:00 98.4 94 21 124/78 (93) 100   


 


1/5/19 07:10  98 21     40


 


1/5/19 05:08  95 18     40


 


1/5/19 04:00        40


 


1/5/19 04:00 97.7 110 21 139/60 (86) 100   


 


1/5/19 04:00  123      


 


1/5/19 03:40      Mechanical Ventilator  





      Mechanical Ventilator  


 


1/5/19 03:40  88 20     40


 


1/5/19 01:11  123 24     40


 


1/5/19 00:00 98.2 66 20 118/57 (77) 100   


 


1/5/19 00:00      Mechanical Ventilator  





      Mechanical Ventilator  


 


1/4/19 23:16  94 20     40


 


1/4/19 21:09  104 23     40


 


1/4/19 20:45    126/41    


 


1/4/19 20:00        40


 


1/4/19 20:00  108      


 


1/4/19 20:00 97.9 100 23 126/41 (69) 98   


 


1/4/19 20:00      Mechanical Ventilator  





      Mechanical Ventilator  


 


1/4/19 19:25  107 21     40


 


1/4/19 16:50  72 13     40


 


1/4/19 16:00      Mechanical Ventilator  





      Mechanical Ventilator  


 


1/4/19 16:00 98.3 82 21 130/54 (79) 98   


 


1/4/19 16:00        40


 


1/4/19 15:25  88      


 


1/4/19 14:45  70 14     40

















Intake and Output  


 


 1/4/19 1/5/19





 19:00 07:00


 


Intake Total 600 ml 1040 ml


 


Output Total 200 ml 


 


Balance 400 ml 1040 ml


 


  


 


Free Water 200 ml 200 ml


 


IV Total  360 ml


 


Tube Feeding 400 ml 480 ml


 


Stool Total 200 ml 


 


# Voids  2


 


# Bowel Movements  1








General Appearance:  no acute distress


HEENT:  normocephalic


Respiratory/Chest:  chest wall non-tender, lungs clear


Cardiovascular:  normal peripheral pulses, normal rate


Abdomen:  normal bowel sounds, soft, non tender


Laboratory Tests


1/4/19 17:40: 


White Blood Count 24.4#*H, Red Blood Count 2.96L, Hemoglobin 9.0L, Hematocrit 

28.2L, Mean Corpuscular Volume 95, Mean Corpuscular Hemoglobin 30.4, Mean 

Corpuscular Hemoglobin Concent 31.9L, Red Cell Distribution Width 18.7H, 

Platelet Count 468H, Mean Platelet Volume 5.4L, Neutrophils (%) (Auto) , 

Lymphocytes (%) (Auto) , Monocytes (%) (Auto) , Eosinophils (%) (Auto) , 

Basophils (%) (Auto) , Differential Total Cells Counted 100, Neutrophils % (

Manual) 91H, Lymphocytes % (Manual) 1L, Monocytes % (Manual) 5, Eosinophils % (

Manual) 1, Basophils % (Manual) 0, Band Neutrophils 2, Platelet Estimate 

IncreasedH, Platelet Morphology Normal, Polychromasia 1+, Anisocytosis 2+, 

Phosphorus Level 2.4L


1/5/19 03:16: 


White Blood Count 27.2*H, Red Blood Count 2.80L, Hemoglobin 8.5L, Hematocrit 

27.0L, Mean Corpuscular Volume 96, Mean Corpuscular Hemoglobin 30.3, Mean 

Corpuscular Hemoglobin Concent 31.5L, Red Cell Distribution Width 18.4H, 

Platelet Count 411, Mean Platelet Volume 5.3L, Neutrophils (%) (Auto) , 

Lymphocytes (%) (Auto) , Monocytes (%) (Auto) , Eosinophils (%) (Auto) , 

Basophils (%) (Auto) , Differential Total Cells Counted 100, Neutrophils % (

Manual) 92H, Lymphocytes % (Manual) 1L, Monocytes % (Manual) 7, Eosinophils % (

Manual) 0, Basophils % (Manual) 0, Band Neutrophils 0, Platelet Estimate 

Adequate, Platelet Morphology Normal, Anisocytosis 2+, Hypochromasia 2+, Sodium 

Level 134L, Potassium Level 3.8, Chloride Level 99, Carbon Dioxide Level 26, 

Anion Gap 9, Blood Urea Nitrogen 30H, Creatinine 1.8H, Estimat Glomerular 

Filtration Rate , Glucose Level 122H, Calcium Level 9.2





Current Medications








 Medications


  (Trade)  Dose


 Ordered  Sig/Tony


 Route


 PRN Reason  Start Time


 Stop Time Status Last Admin


Dose Admin


 


 Acetaminophen


  (Tylenol)  650 mg  Q6H  PRN


 GT


 Mild Pain/Temp > 100.5  12/23/18 16:00


 1/20/19 15:59  12/28/18 12:03


 


 


 Acetaminophen/


 Hydrocodone Bitart


  (Norco 5/325)  1 tab  Q6H  PRN


 GT


 For Pain  1/2/19 18:30


 1/9/19 18:29  1/5/19 01:52


 


 


 Amiodarone HCl


  (Cordarone)  200 mg  DAILY


 GT


   1/2/19 09:00


 1/26/19 20:59  1/5/19 09:20


 


 


 Apixaban


  (Eliquis)  2.5 mg  BID


 GT


   12/27/18 18:00


 1/26/19 17:59  1/5/19 09:21


 


 


 Artificial Tears


  (Akwa-Tears)  1 drop  Q4H  PRN


 BOTH EYES


 Dry Eyes  12/23/18 15:30


 1/21/19 15:29  1/1/19 16:53


 


 


 Chlorhexidine


 Gluconate


  (Juana-Hex 2%)  1 applic  DAILY@2000


 TOPIC


   12/30/18 20:00


 1/29/19 19:59  1/4/19 20:45


 


 


 Collagenase


  (Santyl)  1 applic  Q12HR


 TOPIC


   12/23/18 21:00


 1/5/19 20:59  1/5/19 09:22


 


 


 Lansoprazole


  (Prevacid)  30 mg  Q12HR


 GT


   12/27/18 21:00


 1/26/19 20:59  1/5/19 09:20


 


 


 Lorazepam


  (Ativan)  0.5 mg  Q6H  PRN


 ORAL


 For Anxiety  12/30/18 12:00


 1/6/19 11:59  1/4/19 17:02


 


 


 Minoxidil


  (Loniten)  2.5 mg  Q12HR


 GT


   12/27/18 21:00


 1/26/19 20:59  1/5/19 09:21


 


 


 Piperacillin Sod/


 Tazobactam Sod


 2.25 gm/Dextrose  110 ml @ 


 220 mls/hr  Q8H


 IV


   1/5/19 12:00


 1/12/19 11:59  1/5/19 12:02


 


 


 Sodium Chloride


  (Ocean Nasal


 Spray)  1 spray  Q4H  PRN


 NASAL


 dry nose  12/23/18 14:30


 1/21/19 18:29  12/25/18 11:49


 


 


 Vancomycin HCl


  (Vanco rx to


 dose)  1 ea  DAILY  PRN


 MISC


 Per rx protocol  1/5/19 00:30


 2/4/19 00:29   


 


 


 Vitamin D


  (Vitamin D)  1,000 intlu  DAILY


 GT


   12/24/18 09:00


 1/8/19 09:59  1/5/19 09:20


 


 


 Zolpidem Tartrate


  (Ambien)  5 mg  HSPRN  PRN


 GT


 Insomnia  1/3/19 20:45


 1/10/19 20:44  1/4/19 23:13


 

















Quentin Cardoso MD Jan 5, 2019 13:59

## 2019-01-05 NOTE — NUR
NURSE NOTES:

Received report CAROLYN King patient awake on restraints,pulling device,G tube feeding ,rectal 
tube,head elevated,overlay mattress,on ventilator,repositioned,sliding down from bed

## 2019-01-06 VITALS — SYSTOLIC BLOOD PRESSURE: 160 MMHG | DIASTOLIC BLOOD PRESSURE: 69 MMHG

## 2019-01-06 VITALS — DIASTOLIC BLOOD PRESSURE: 56 MMHG | SYSTOLIC BLOOD PRESSURE: 135 MMHG

## 2019-01-06 VITALS — SYSTOLIC BLOOD PRESSURE: 140 MMHG | DIASTOLIC BLOOD PRESSURE: 81 MMHG

## 2019-01-06 VITALS — SYSTOLIC BLOOD PRESSURE: 103 MMHG | DIASTOLIC BLOOD PRESSURE: 50 MMHG

## 2019-01-06 VITALS — SYSTOLIC BLOOD PRESSURE: 114 MMHG | DIASTOLIC BLOOD PRESSURE: 55 MMHG

## 2019-01-06 VITALS — DIASTOLIC BLOOD PRESSURE: 50 MMHG | SYSTOLIC BLOOD PRESSURE: 134 MMHG

## 2019-01-06 LAB
ADD MANUAL DIFF: YES
ANION GAP SERPL CALC-SCNC: 10 MMOL/L (ref 5–15)
BUN SERPL-MCNC: 44 MG/DL (ref 7–18)
CALCIUM SERPL-MCNC: 9.7 MG/DL (ref 8.5–10.1)
CHLORIDE SERPL-SCNC: 96 MMOL/L (ref 98–107)
CO2 SERPL-SCNC: 25 MMOL/L (ref 21–32)
CREAT SERPL-MCNC: 2.4 MG/DL (ref 0.55–1.3)
ERYTHROCYTE [DISTWIDTH] IN BLOOD BY AUTOMATED COUNT: 18.3 % (ref 11.6–14.8)
HCT VFR BLD CALC: 26.9 % (ref 37–47)
HGB BLD-MCNC: 8.3 G/DL (ref 12–16)
MCV RBC AUTO: 96 FL (ref 80–99)
PHOSPHATE SERPL-MCNC: 3.9 MG/DL (ref 2.5–4.9)
PLATELET # BLD: 366 K/UL (ref 150–450)
POTASSIUM SERPL-SCNC: 4.1 MMOL/L (ref 3.5–5.1)
RBC # BLD AUTO: 2.81 M/UL (ref 4.2–5.4)
SODIUM SERPL-SCNC: 131 MMOL/L (ref 136–145)
WBC # BLD AUTO: 14.8 K/UL (ref 4.8–10.8)

## 2019-01-06 RX ADMIN — MINOXIDIL SCH MG: 2.5 TABLET ORAL at 08:27

## 2019-01-06 RX ADMIN — ZOLPIDEM TARTRATE PRN MG: 5 TABLET ORAL at 22:27

## 2019-01-06 RX ADMIN — VITAMIN D, TAB 1000IU (100/BT) SCH INTLU: 25 TAB at 08:27

## 2019-01-06 RX ADMIN — CHLORHEXIDINE GLUCONATE SCH APPLIC: 213 SOLUTION TOPICAL at 20:28

## 2019-01-06 RX ADMIN — APIXABAN SCH MG: 2.5 TABLET, FILM COATED ORAL at 08:28

## 2019-01-06 RX ADMIN — HYDROCODONE BITARTRATE AND ACETAMINOPHEN PRN TAB: 5; 325 TABLET ORAL at 06:43

## 2019-01-06 RX ADMIN — AMIODARONE HYDROCHLORIDE SCH MG: 200 TABLET ORAL at 08:27

## 2019-01-06 RX ADMIN — SODIUM CHLORIDE SCH MLS/HR: 0.9 INJECTION INTRAVENOUS at 20:28

## 2019-01-06 RX ADMIN — APIXABAN SCH MG: 2.5 TABLET, FILM COATED ORAL at 17:31

## 2019-01-06 RX ADMIN — SODIUM CHLORIDE SCH MLS/HR: 0.9 INJECTION INTRAVENOUS at 13:06

## 2019-01-06 RX ADMIN — MINOXIDIL SCH MG: 2.5 TABLET ORAL at 20:29

## 2019-01-06 RX ADMIN — SODIUM CHLORIDE SCH MLS/HR: 0.9 INJECTION INTRAVENOUS at 04:28

## 2019-01-06 RX ADMIN — LORAZEPAM PRN MG: 0.5 TABLET ORAL at 16:52

## 2019-01-06 NOTE — NUR
CASE MANAGEMENT: REVIEW



1/6/18



SI: ESRD ON HD

S/P TRACHEOSTOMY 12/17

S/P PEG PLACEMENT 12/19 

97.4   73   19   135/56    % MECH VENT FIO2 40

WBC+14.8





IS:IV ZOSYN Q8HRS

 MINOXIDIL GT Q12HR 

ELIQUIS NG BID 

AMIODARONE GT Q12HR 

GT FEEDING NEPRO @ 40ML/HR

***STEP DOWN UNIT  STATUS***

DCP: PATIENT IS FROM [facility]. REFERRED TO [facility]

## 2019-01-06 NOTE — CARDIOLOGY PROGRESS NOTE
Assessment/Plan


Assessment/Plan


the patient had episodes of atrial fibrillation, on ECG, but not seen on 

telemetry, probably ws very short


she is stable anticoagulated





Subjective


Subjective


The patient is with tach, she is alert, but unable to speak, she denies 

palpitations or shortness of breath


her son is at the bedside





Objective





Last 24 Hour Vital Signs








  Date Time  Temp Pulse Resp B/P (MAP) Pulse Ox O2 Delivery O2 Flow Rate FiO2


 


1/6/19 17:15  72 18     40


 


1/6/19 16:00 97.2 73 18 114/55 (74) 100   


 


1/6/19 16:00        40


 


1/6/19 16:00      Mechanical Ventilator  





      Mechanical Ventilator  


 


1/6/19 16:00  72      


 


1/6/19 15:26  81 18     40


 


1/6/19 13:42  71 18     40


 


1/6/19 12:00  65      


 


1/6/19 12:00      Mechanical Ventilator  





      Mechanical Ventilator  


 


1/6/19 12:00 97.2 68 19 103/50 (67) 99   


 


1/6/19 12:00        40


 


1/6/19 11:27  79 18     40


 


1/6/19 09:14  73 19     40


 


1/6/19 09:00        40


 


1/6/19 08:27    135/56    


 


1/6/19 08:00 96.6 80 17 135/56 (82) 100   


 


1/6/19 08:00      Mechanical Ventilator  





      Mechanical Ventilator  


 


1/6/19 07:58  75      


 


1/6/19 07:13 97.4       


 


1/6/19 07:08  80 19     40


 


1/6/19 05:36  83 22     40


 


1/6/19 04:00      Mechanical Ventilator  





      Mechanical Ventilator  


 


1/6/19 04:00 97.4 81 19 134/50 (78) 100   


 


1/6/19 04:00        40


 


1/6/19 04:00  75      


 


1/6/19 03:05  98 14     40


 


1/6/19 01:10  98 13     40


 


1/6/19 00:00  10      


 


1/6/19 00:00        40


 


1/6/19 00:00      Mechanical Ventilator  





      Mechanical Ventilator  


 


1/6/19 00:00 97.7 112 21 140/81 (100) 100   


 


1/5/19 23:14  81 16     40


 


1/5/19 21:30  81 16     40


 


1/5/19 20:14    127/40    


 


1/5/19 20:00      Mechanical Ventilator  





      Mechanical Ventilator  


 


1/5/19 20:00  78      


 


1/5/19 20:00        40


 


1/5/19 20:00 98.2 85 17 127/34 (65) 100   








General Appearance:  on vent, other - trach


EENT:  PERRL/EOMI


Neck:  supple, no JVD


Rhythm:  SB, Afib


Cardiovascular:  normal rate


Respiratory/Chest:  crackles/rales


Abdomen:  distended, other - gtube


Extremities:  normal capillary refill


Neurologic:  CNs II-XII grossly normal











Intake and Output  


 


 1/5/19 1/6/19





 19:00 07:00


 


Intake Total 860 ml 710 ml


 


Output Total  100 ml


 


Balance 860 ml 610 ml


 


  


 


Free Water 160 ml 50 ml


 


IV Total 220 ml 220 ml


 


Tube Feeding 480 ml 440 ml


 


Stool Total  100 ml


 


# Voids  1











Laboratory Tests








Test


  1/6/19


09:02


 


White Blood Count


  14.8 K/UL


(4.8-10.8)  H


 


Red Blood Count


  2.81 M/UL


(4.20-5.40)  L


 


Hemoglobin


  8.3 G/DL


(12.0-16.0)  L


 


Hematocrit


  26.9 %


(37.0-47.0)  L


 


Mean Corpuscular Volume 96 FL (80-99)  


 


Mean Corpuscular Hemoglobin


  29.5 PG


(27.0-31.0)


 


Mean Corpuscular Hemoglobin


Concent 30.8 G/DL


(32.0-36.0)  L


 


Red Cell Distribution Width


  18.3 %


(11.6-14.8)  H


 


Platelet Count


  366 K/UL


(150-450)


 


Mean Platelet Volume


  6.0 FL


(6.5-10.1)  L


 


Neutrophils (%) (Auto)


  % (45.0-75.0)


 


 


Lymphocytes (%) (Auto)


  % (20.0-45.0)


 


 


Monocytes (%) (Auto)  % (1.0-10.0)  


 


Eosinophils (%) (Auto)  % (0.0-3.0)  


 


Basophils (%) (Auto)  % (0.0-2.0)  


 


Differential Total Cells


Counted 100  


 


 


Neutrophils % (Manual) 93 % (45-75)  H


 


Lymphocytes % (Manual) 3 % (20-45)  L


 


Monocytes % (Manual) 3 % (1-10)  


 


Eosinophils % (Manual) 1 % (0-3)  


 


Basophils % (Manual) 0 % (0-2)  


 


Band Neutrophils 0 % (0-8)  


 


Platelet Estimate Adequate  


 


Platelet Morphology Normal  


 


Hypochromasia 2+  


 


Anisocytosis 1+  


 


Sodium Level


  131 MMOL/L


(136-145)  L


 


Potassium Level


  4.1 MMOL/L


(3.5-5.1)


 


Chloride Level


  96 MMOL/L


()  L


 


Carbon Dioxide Level


  25 MMOL/L


(21-32)


 


Anion Gap


  10 mmol/L


(5-15)


 


Blood Urea Nitrogen


  44 mg/dL


(7-18)  H


 


Creatinine


  2.4 MG/DL


(0.55-1.30)  H


 


Estimat Glomerular Filtration


Rate  mL/min (>60)  


 


 


Glucose Level


  123 MG/DL


()  H


 


Calcium Level


  9.7 MG/DL


(8.5-10.1)


 


Phosphorus Level


  3.9 MG/DL


(2.5-4.9)


 


Magnesium Level


  2.9 MG/DL


(1.8-2.4)  H











Microbiology








 Date/Time


Source Procedure


Growth Status


 


 


 1/5/19 03:16


Blood Blood Culture - Preliminary


NO GROWTH AFTER 24 HOURS Resulted





 1/5/19 06:00


Sputum Gram Stain - Final Resulted


 


 1/5/19 06:00


Sputum Sputum Culture


Pending Resulted


 


 1/5/19 06:00


Stool Clostridium difficile Toxin Assay - Final Complete

















Annette Sykes MD Jan 6, 2019 19:57

## 2019-01-06 NOTE — GENERAL PROGRESS NOTE
Assessment/Plan


Assessment/Plan


- Dysphagia


- Resp failure


- ESRD


- Anemia


- Status post PEG, tolerating feeds





Recommendations


1. Abdominal binder.


2. Elevate the head of the bed at all times.


3. G-tube flush.


4. G-tube care.


5. tube feeding later today per RD to goal


Monitor H&H, PRN transfusions


PPI


Electrolyte correction


Follow-up labs





Subjective


ROS Limited/Unobtainable:  No


Allergies:  


Coded Allergies:  


     No Known Allergies (Unverified , 11/26/18)





Objective





Last 24 Hour Vital Signs








  Date Time  Temp Pulse Resp B/P (MAP) Pulse Ox O2 Delivery O2 Flow Rate FiO2


 


1/6/19 05:36  83 22     40


 


1/6/19 04:00      Mechanical Ventilator  





      Mechanical Ventilator  


 


1/6/19 04:00 97.4 81 19 134/50 (78) 100   


 


1/6/19 04:00        40


 


1/6/19 04:00  75      


 


1/6/19 03:05  98 14     40


 


1/6/19 01:10  98 13     40


 


1/6/19 00:00  10      


 


1/6/19 00:00        40


 


1/6/19 00:00      Mechanical Ventilator  





      Mechanical Ventilator  


 


1/6/19 00:00 97.7 112 21 140/81 (100) 100   


 


1/5/19 23:14  81 16     40


 


1/5/19 21:30  81 16     40


 


1/5/19 20:14    127/40    


 


1/5/19 20:00      Mechanical Ventilator  





      Mechanical Ventilator  


 


1/5/19 20:00  78      


 


1/5/19 20:00        40


 


1/5/19 20:00 98.2 85 17 127/34 (65) 100   


 


1/5/19 19:11  78 16     40


 


1/5/19 18:00      Mechanical Ventilator  





      Mechanical Ventilator  


 


1/5/19 17:27  87 21     40


 


1/5/19 16:59  82      


 


1/5/19 16:00        40


 


1/5/19 16:00      Mechanical Ventilator  





      Mechanical Ventilator  


 


1/5/19 16:00 98.6 92 20 110/55 (73) 98   


 


1/5/19 15:00  89 20     40


 


1/5/19 13:53  88      


 


1/5/19 13:10  87 22     40


 


1/5/19 12:05      Mechanical Ventilator  





      Mechanical Ventilator  


 


1/5/19 12:00        40


 


1/5/19 11:52 98.4 93 20 103/33 (56) 96   


 


1/5/19 10:50  94 21     40


 


1/5/19 09:21    139/84    


 


1/5/19 09:13 98.6 110 22 139/84 (102) 100   





  92      


 


1/5/19 08:47  103      


 


1/5/19 08:40  96 22     40


 


1/5/19 08:00        40


 


1/5/19 08:00      Mechanical Ventilator  





      Mechanical Ventilator  


 


1/5/19 08:00 98.4 94 21 124/78 (93) 100   


 


1/5/19 07:10  98 21     40

















Intake and Output  


 


 1/5/19 1/6/19





 18:59 06:59


 


Intake Total 860 ml 750 ml


 


Output Total  100 ml


 


Balance 860 ml 650 ml


 


  


 


Free Water 160 ml 50 ml


 


IV Total 220 ml 220 ml


 


Tube Feeding 480 ml 480 ml


 


Stool Total  100 ml


 


# Voids  1








Height (Feet):  5


Height (Inches):  2.00


Weight (Pounds):  115


General Appearance:  alert


EENT:  normal ENT inspection


Neck:  supple


Cardiovascular:  normal rate


Respiratory/Chest:  decreased breath sounds


Abdomen:  normal bowel sounds, non tender, soft


Extremities:  non-tender











Elliott Lam MD Jan 6, 2019 06:58

## 2019-01-06 NOTE — NUR
NURSE NOTES:

received patient report from rafael perez. patient is on bed awake. noted restraints, 
bilaterraly. no acute events. bed is low and locked for safety. call light within reach. 
will continue to monitor.

## 2019-01-06 NOTE — NEPHROLOGY PROGRESS NOTE
Assessment/Plan


Assessment


Seee below


Plan





ESRD HD q MWF 


Anemia of CKD , SAMANTHA high dose. Transfuse PRN.





A. 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 with LAE. LVEF 65% . On Eliquis.





Post trach + PEG .


Referred to [facility]. Pt's son agreeable to [facility].


See orders.


Still needs extensive Vascular W/U.





Subjective


Subjective


No new c/o





Objective


Objective





Last 24 Hour Vital Signs








  Date Time  Temp Pulse Resp B/P (MAP) Pulse Ox O2 Delivery O2 Flow Rate FiO2


 


1/6/19 11:27  79 18     40


 


1/6/19 09:14  73 19     40


 


1/6/19 09:00        40


 


1/6/19 08:27    135/56    


 


1/6/19 08:00 96.6 80 17 135/56 (82) 100   


 


1/6/19 08:00      Mechanical Ventilator  





      Mechanical Ventilator  


 


1/6/19 07:58  75      


 


1/6/19 07:13 97.4       


 


1/6/19 07:08  80 19     40


 


1/6/19 05:36  83 22     40


 


1/6/19 04:00      Mechanical Ventilator  





      Mechanical Ventilator  


 


1/6/19 04:00 97.4 81 19 134/50 (78) 100   


 


1/6/19 04:00        40


 


1/6/19 04:00  75      


 


1/6/19 03:05  98 14     40


 


1/6/19 01:10  98 13     40


 


1/6/19 00:00  10      


 


1/6/19 00:00        40


 


1/6/19 00:00      Mechanical Ventilator  





      Mechanical Ventilator  


 


1/6/19 00:00 97.7 112 21 140/81 (100) 100   


 


1/5/19 23:14  81 16     40


 


1/5/19 21:30  81 16     40


 


1/5/19 20:14    127/40    


 


1/5/19 20:00      Mechanical Ventilator  





      Mechanical Ventilator  


 


1/5/19 20:00  78      


 


1/5/19 20:00        40


 


1/5/19 20:00 98.2 85 17 127/34 (65) 100   


 


1/5/19 19:11  78 16     40


 


1/5/19 18:00      Mechanical Ventilator  





      Mechanical Ventilator  


 


1/5/19 17:27  87 21     40


 


1/5/19 16:59  82      


 


1/5/19 16:00        40


 


1/5/19 16:00      Mechanical Ventilator  





      Mechanical Ventilator  


 


1/5/19 16:00 98.6 92 20 110/55 (73) 98   


 


1/5/19 15:00  89 20     40


 


1/5/19 13:53  88      


 


1/5/19 13:10  87 22     40


 


1/5/19 12:05      Mechanical Ventilator  





      Mechanical Ventilator  


 


1/5/19 12:00        40

















Intake and Output  


 


 1/5/19 1/6/19





 19:00 07:00


 


Intake Total 860 ml 710 ml


 


Output Total  100 ml


 


Balance 860 ml 610 ml


 


  


 


Free Water 160 ml 50 ml


 


IV Total 220 ml 220 ml


 


Tube Feeding 480 ml 440 ml


 


Stool Total  100 ml


 


# Voids  1








Laboratory Tests


1/6/19 09:02: 


White Blood Count 14.8H, Red Blood Count 2.81L, Hemoglobin 8.3L, Hematocrit 

26.9L, Mean Corpuscular Volume 96, Mean Corpuscular Hemoglobin 29.5, Mean 

Corpuscular Hemoglobin Concent 30.8L, Red Cell Distribution Width 18.3H, 

Platelet Count 366, Mean Platelet Volume 6.0L, Neutrophils (%) (Auto) , 

Lymphocytes (%) (Auto) , Monocytes (%) (Auto) , Eosinophils (%) (Auto) , 

Basophils (%) (Auto) , Differential Total Cells Counted 100, Neutrophils % (

Manual) 93H, Lymphocytes % (Manual) 3L, Monocytes % (Manual) 3, Eosinophils % (

Manual) 1, Basophils % (Manual) 0, Band Neutrophils 0, Platelet Estimate 

Adequate, Platelet Morphology Normal, Hypochromasia 2+, Anisocytosis 1+, Sodium 

Level 131L, Potassium Level 4.1, Chloride Level 96L, Carbon Dioxide Level 25, 

Anion Gap 10, Blood Urea Nitrogen 44H, Creatinine 2.4H, Estimat Glomerular 

Filtration Rate , Glucose Level 123H, Calcium Level 9.7, Phosphorus Level 3.9, 

Magnesium Level 2.9H


Height (Feet):  5


Height (Inches):  2.00


Weight (Pounds):  115


Objective


On vent.


Trach clean.


Cv IRR/IRR


Lungs B ronchi.


Abd SNT. BS +  PEG.


E Rt. foot diabetic ulcers











Gavino Daigle MD Jan 6, 2019 11:54

## 2019-01-06 NOTE — NUR
NURSE NOTES:

Observed pt becomes agitated, trying to pull tubing around her and take off her gown. pt c/o 
insomnia. Position changed with pillow support and distraction measures done. Will continue 
to monitor.

## 2019-01-06 NOTE — NUR
NURSE NOTES:

Report received from CAROLYN Li. Observed pt sleeping on the bed, no distress noted at this 
time. SR with cardiac monitor. Trach on vent, with Shiley 8, AC 8, , FiO2 40%, PEEP of 
5, saturating at 96%. GT site intact and patent, running Nepro 40cc/hr. Rectal tube intact 
and draining well. Bed in the lowest position. Side rails up x 3. Call light within reach. 
Will continue to monitor.

## 2019-01-06 NOTE — INFECTIOUS DISEASES PROG NOTE
Assessment/Plan


Assessment/Plan


A:


New leukocytosis improving


UTI treated


Cellulitis of R leg, osteomyelitis treated


Hypercapnic respiratory failure


ESRD on HD


DM


Anemia


Dementia


PVD


R pleural effusion


Mucous plug


s/o Cardiac arrest


Multiple Pressure ulcers


P;


Continue Zosyn & Vancomycin


will f/u cultures





Subjective


ROS Limited/Unobtainable:  Yes


Constitutional:  Reports: no symptoms


Allergies:  


Coded Allergies:  


     No Known Allergies (Unverified , 11/26/18)





Objective


Vital Signs





Last 24 Hour Vital Signs








  Date Time  Temp Pulse Resp B/P (MAP) Pulse Ox O2 Delivery O2 Flow Rate FiO2


 


1/6/19 09:14  73 19     40


 


1/6/19 09:00        40


 


1/6/19 08:27    135/56    


 


1/6/19 08:00 96.6 80 17 135/56 (82) 100   


 


1/6/19 08:00      Mechanical Ventilator  





      Mechanical Ventilator  


 


1/6/19 07:58  75      


 


1/6/19 07:13 97.4       


 


1/6/19 07:08  80 19     40


 


1/6/19 05:36  83 22     40


 


1/6/19 04:00      Mechanical Ventilator  





      Mechanical Ventilator  


 


1/6/19 04:00 97.4 81 19 134/50 (78) 100   


 


1/6/19 04:00        40


 


1/6/19 04:00  75      


 


1/6/19 03:05  98 14     40


 


1/6/19 01:10  98 13     40


 


1/6/19 00:00  10      


 


1/6/19 00:00        40


 


1/6/19 00:00      Mechanical Ventilator  





      Mechanical Ventilator  


 


1/6/19 00:00 97.7 112 21 140/81 (100) 100   


 


1/5/19 23:14  81 16     40


 


1/5/19 21:30  81 16     40


 


1/5/19 20:14    127/40    


 


1/5/19 20:00      Mechanical Ventilator  





      Mechanical Ventilator  


 


1/5/19 20:00  78      


 


1/5/19 20:00        40


 


1/5/19 20:00 98.2 85 17 127/34 (65) 100   


 


1/5/19 19:11  78 16     40


 


1/5/19 18:00      Mechanical Ventilator  





      Mechanical Ventilator  


 


1/5/19 17:27  87 21     40


 


1/5/19 16:59  82      


 


1/5/19 16:00        40


 


1/5/19 16:00      Mechanical Ventilator  





      Mechanical Ventilator  


 


1/5/19 16:00 98.6 92 20 110/55 (73) 98   


 


1/5/19 15:00  89 20     40


 


1/5/19 13:53  88      


 


1/5/19 13:10  87 22     40


 


1/5/19 12:05      Mechanical Ventilator  





      Mechanical Ventilator  


 


1/5/19 12:00        40


 


1/5/19 11:52 98.4 93 20 103/33 (56) 96   


 


1/5/19 10:50  94 21     40








Height (Feet):  5


Height (Inches):  2.00


Weight (Pounds):  115


General Appearance:  no acute distress


HEENT:  status post trach


Respiratory/Chest:  lungs clear, other - on ventilator


Cardiovascular:  normal rate, other - PICC line


Abdomen:  soft, non tender, other - GT feeding


Extremities:  no edema


Neurologic/Psychiatric:  other - sleeping





Microbiology








 Date/Time


Source Procedure


Growth Status


 


 


 1/5/19 03:16


Blood Blood Culture - Preliminary


NO GROWTH AFTER 24 HOURS Resulted





 1/5/19 06:00


Sputum Gram Stain - Final Resulted


 


 1/5/19 06:00


Sputum Sputum Culture


Pending Resulted


 


 1/5/19 06:00


Stool Clostridium difficile Toxin Assay - Final Complete











Laboratory Tests








Test


  1/6/19


09:02


 


White Blood Count


  14.8 K/UL


(4.8-10.8)  H


 


Red Blood Count


  2.81 M/UL


(4.20-5.40)  L


 


Hemoglobin


  8.3 G/DL


(12.0-16.0)  L


 


Hematocrit


  26.9 %


(37.0-47.0)  L


 


Mean Corpuscular Volume 96 FL (80-99)  


 


Mean Corpuscular Hemoglobin


  29.5 PG


(27.0-31.0)


 


Mean Corpuscular Hemoglobin


Concent 30.8 G/DL


(32.0-36.0)  L


 


Red Cell Distribution Width


  18.3 %


(11.6-14.8)  H


 


Platelet Count


  366 K/UL


(150-450)


 


Mean Platelet Volume


  6.0 FL


(6.5-10.1)  L


 


Neutrophils (%) (Auto)


  % (45.0-75.0)


 


 


Lymphocytes (%) (Auto)


  % (20.0-45.0)


 


 


Monocytes (%) (Auto)  % (1.0-10.0)  


 


Eosinophils (%) (Auto)  % (0.0-3.0)  


 


Basophils (%) (Auto)  % (0.0-2.0)  


 


Neutrophils % (Manual) Pending  


 


Lymphocytes % (Manual) Pending  


 


Platelet Estimate Pending  


 


Platelet Morphology Pending  


 


Sodium Level


  131 MMOL/L


(136-145)  L


 


Potassium Level


  4.1 MMOL/L


(3.5-5.1)


 


Chloride Level


  96 MMOL/L


()  L


 


Carbon Dioxide Level


  25 MMOL/L


(21-32)


 


Anion Gap


  10 mmol/L


(5-15)


 


Blood Urea Nitrogen


  44 mg/dL


(7-18)  H


 


Creatinine


  2.4 MG/DL


(0.55-1.30)  H


 


Estimat Glomerular Filtration


Rate  mL/min (>60)  


 


 


Glucose Level


  123 MG/DL


()  H


 


Calcium Level


  9.7 MG/DL


(8.5-10.1)


 


Phosphorus Level


  3.9 MG/DL


(2.5-4.9)


 


Magnesium Level


  2.9 MG/DL


(1.8-2.4)  H











Current Medications








 Medications


  (Trade)  Dose


 Ordered  Sig/Tony


 Route


 PRN Reason  Start Time


 Stop Time Status Last Admin


Dose Admin


 


 Acetaminophen


  (Tylenol)  650 mg  Q6H  PRN


 GT


 Mild Pain/Temp > 100.5  12/23/18 16:00


 1/20/19 15:59  12/28/18 12:03


 


 


 Acetaminophen/


 Hydrocodone Bitart


  (Norco 5/325)  1 tab  Q6H  PRN


 GT


 For Pain  1/2/19 18:30


 1/9/19 18:29  1/6/19 06:43


 


 


 Amiodarone HCl


  (Cordarone)  200 mg  DAILY


 GT


   1/2/19 09:00


 1/26/19 20:59  1/6/19 08:27


 


 


 Apixaban


  (Eliquis)  2.5 mg  BID


 GT


   12/27/18 18:00


 1/26/19 17:59  1/6/19 08:28


 


 


 Artificial Tears


  (Akwa-Tears)  1 drop  Q4H  PRN


 BOTH EYES


 Dry Eyes  12/23/18 15:30


 1/21/19 15:29  1/1/19 16:53


 


 


 Chlorhexidine


 Gluconate


  (Juana-Hex 2%)  1 applic  DAILY@2000


 TOPIC


   12/30/18 20:00


 1/29/19 19:59  1/5/19 20:13


 


 


 Lansoprazole


  (Prevacid)  30 mg  Q12HR


 GT


   12/27/18 21:00


 1/26/19 20:59  1/6/19 08:27


 


 


 Lorazepam


  (Ativan)  0.5 mg  Q6H  PRN


 ORAL


 For Anxiety  12/30/18 12:00


 1/6/19 11:59  1/5/19 17:55


 


 


 Minoxidil


  (Loniten)  2.5 mg  Q12HR


 GT


   12/27/18 21:00


 1/26/19 20:59  1/6/19 08:27


 


 


 Piperacillin Sod/


 Tazobactam Sod


 2.25 gm/Dextrose  110 ml @ 


 220 mls/hr  Q8H


 IV


   1/5/19 12:00


 1/12/19 11:59  1/6/19 04:28


 


 


 Sodium Chloride


  (Ocean Nasal


 Spray)  1 spray  Q4H  PRN


 NASAL


 dry nose  12/23/18 14:30


 1/21/19 18:29  12/25/18 11:49


 


 


 Vancomycin HCl


  (Vanco rx to


 dose)  1 ea  DAILY  PRN


 MISC


 Per rx protocol  1/5/19 00:30


 2/4/19 00:29   


 


 


 Vitamin D


  (Vitamin D)  1,000 intlu  DAILY


 GT


   12/24/18 09:00


 1/8/19 09:59  1/6/19 08:27


 


 


 Zolpidem Tartrate


  (Ambien)  5 mg  HSPRN  PRN


 GT


 Insomnia  1/3/19 20:45


 1/10/19 20:44  1/4/19 23:13


 

















Luke Maynard MD Jan 6, 2019 10:17

## 2019-01-06 NOTE — PULMONOLOGY PROGRESS NOTE
Assessment/Plan


Assessment/Plan


IMPRESSION:


1. Hypotension. Resolved.


2. Sepsis. Resolved


3. Atrial fibrillation.


4. Bradycardia.


5. Diabetes mellitus.


6. End-stage renal disease on dialysis.


7. Left lung collapse. Resolved


8. Respiratory failure. now status post tracheostomy


9. Dysphagia.


10. Dementia.


11. Anemia








DISCUSSION:


1. Continue medications.


2. CXR improved


3. Status post tracheostomy


4. S/p G-tube 


5. Continue HD














  ______________________________________________


  Quentin Cardoso M.D.





Subjective


Interval Events:  None new


Constitutional:  Reports: no symptoms


HEENT:  Repors: no symptoms


Respiratory:  Reports: no symptoms


Cardiovascular:  Reports: no symptoms


Gastrointestinal/Abdominal:  Reports: no symptoms


Genitourinary:  Reports: no symptoms


Allergies:  


Coded Allergies:  


     No Known Allergies (Unverified , 11/26/18)





Objective





Last 24 Hour Vital Signs








  Date Time  Temp Pulse Resp B/P (MAP) Pulse Ox O2 Delivery O2 Flow Rate FiO2


 


1/6/19 09:14  73 19     40


 


1/6/19 09:00        40


 


1/6/19 08:27    135/56    


 


1/6/19 08:00 96.6 80 17 135/56 (82) 100   


 


1/6/19 08:00      Mechanical Ventilator  





      Mechanical Ventilator  


 


1/6/19 07:58  75      


 


1/6/19 07:13 97.4       


 


1/6/19 07:08  80 19     40


 


1/6/19 05:36  83 22     40


 


1/6/19 04:00      Mechanical Ventilator  





      Mechanical Ventilator  


 


1/6/19 04:00 97.4 81 19 134/50 (78) 100   


 


1/6/19 04:00        40


 


1/6/19 04:00  75      


 


1/6/19 03:05  98 14     40


 


1/6/19 01:10  98 13     40


 


1/6/19 00:00  10      


 


1/6/19 00:00        40


 


1/6/19 00:00      Mechanical Ventilator  





      Mechanical Ventilator  


 


1/6/19 00:00 97.7 112 21 140/81 (100) 100   


 


1/5/19 23:14  81 16     40


 


1/5/19 21:30  81 16     40


 


1/5/19 20:14    127/40    


 


1/5/19 20:00      Mechanical Ventilator  





      Mechanical Ventilator  


 


1/5/19 20:00  78      


 


1/5/19 20:00        40


 


1/5/19 20:00 98.2 85 17 127/34 (65) 100   


 


1/5/19 19:11  78 16     40


 


1/5/19 18:00      Mechanical Ventilator  





      Mechanical Ventilator  


 


1/5/19 17:27  87 21     40


 


1/5/19 16:59  82      


 


1/5/19 16:00        40


 


1/5/19 16:00      Mechanical Ventilator  





      Mechanical Ventilator  


 


1/5/19 16:00 98.6 92 20 110/55 (73) 98   


 


1/5/19 15:00  89 20     40


 


1/5/19 13:53  88      


 


1/5/19 13:10  87 22     40


 


1/5/19 12:05      Mechanical Ventilator  





      Mechanical Ventilator  


 


1/5/19 12:00        40


 


1/5/19 11:52 98.4 93 20 103/33 (56) 96   

















Intake and Output  


 


 1/5/19 1/6/19





 19:00 07:00


 


Intake Total 860 ml 710 ml


 


Output Total  100 ml


 


Balance 860 ml 610 ml


 


  


 


Free Water 160 ml 50 ml


 


IV Total 220 ml 220 ml


 


Tube Feeding 480 ml 440 ml


 


Stool Total  100 ml


 


# Voids  1








General Appearance:  no acute distress


HEENT:  normocephalic


Respiratory/Chest:  chest wall non-tender, lungs clear


Cardiovascular:  normal peripheral pulses, normal rate


Abdomen:  normal bowel sounds, soft, non tender





Microbiology








 Date/Time


Source Procedure


Growth Status


 


 


 1/5/19 03:16


Blood Blood Culture - Preliminary


NO GROWTH AFTER 24 HOURS Resulted





 1/5/19 06:00


Sputum Gram Stain - Final Resulted


 


 1/5/19 06:00


Sputum Sputum Culture


Pending Resulted


 


 1/5/19 06:00


Stool Clostridium difficile Toxin Assay - Final Complete








Laboratory Tests


1/6/19 09:02: 


White Blood Count 14.8H, Red Blood Count 2.81L, Hemoglobin 8.3L, Hematocrit 

26.9L, Mean Corpuscular Volume 96, Mean Corpuscular Hemoglobin 29.5, Mean 

Corpuscular Hemoglobin Concent 30.8L, Red Cell Distribution Width 18.3H, 

Platelet Count 366, Mean Platelet Volume 6.0L, Neutrophils (%) (Auto) , 

Lymphocytes (%) (Auto) , Monocytes (%) (Auto) , Eosinophils (%) (Auto) , 

Basophils (%) (Auto) , Differential Total Cells Counted 100, Neutrophils % (

Manual) 93H, Lymphocytes % (Manual) 3L, Monocytes % (Manual) 3, Eosinophils % (

Manual) 1, Basophils % (Manual) 0, Band Neutrophils 0, Platelet Estimate 

Adequate, Platelet Morphology Normal, Hypochromasia 2+, Anisocytosis 1+, Sodium 

Level 131L, Potassium Level 4.1, Chloride Level 96L, Carbon Dioxide Level 25, 

Anion Gap 10, Blood Urea Nitrogen 44H, Creatinine 2.4H, Estimat Glomerular 

Filtration Rate , Glucose Level 123H, Calcium Level 9.7, Phosphorus Level 3.9, 

Magnesium Level 2.9H





Current Medications








 Medications


  (Trade)  Dose


 Ordered  Sig/Tony


 Route


 PRN Reason  Start Time


 Stop Time Status Last Admin


Dose Admin


 


 Acetaminophen


  (Tylenol)  650 mg  Q6H  PRN


 GT


 Mild Pain/Temp > 100.5  12/23/18 16:00


 1/20/19 15:59  12/28/18 12:03


 


 


 Acetaminophen/


 Hydrocodone Bitart


  (Norco 5/325)  1 tab  Q6H  PRN


 GT


 For Pain  1/2/19 18:30


 1/9/19 18:29  1/6/19 06:43


 


 


 Amiodarone HCl


  (Cordarone)  200 mg  DAILY


 GT


   1/2/19 09:00


 1/26/19 20:59  1/6/19 08:27


 


 


 Apixaban


  (Eliquis)  2.5 mg  BID


 GT


   12/27/18 18:00


 1/26/19 17:59  1/6/19 08:28


 


 


 Artificial Tears


  (Akwa-Tears)  1 drop  Q4H  PRN


 BOTH EYES


 Dry Eyes  12/23/18 15:30


 1/21/19 15:29  1/1/19 16:53


 


 


 Chlorhexidine


 Gluconate


  (Juana-Hex 2%)  1 applic  DAILY@2000


 TOPIC


   12/30/18 20:00


 1/29/19 19:59  1/5/19 20:13


 


 


 Lansoprazole


  (Prevacid)  30 mg  Q12HR


 GT


   12/27/18 21:00


 1/26/19 20:59  1/6/19 08:27


 


 


 Lorazepam


  (Ativan)  0.5 mg  Q6H  PRN


 ORAL


 For Anxiety  12/30/18 12:00


 1/6/19 11:59  1/5/19 17:55


 


 


 Minoxidil


  (Loniten)  2.5 mg  Q12HR


 GT


   12/27/18 21:00


 1/26/19 20:59  1/6/19 08:27


 


 


 Piperacillin Sod/


 Tazobactam Sod


 2.25 gm/Dextrose  110 ml @ 


 220 mls/hr  Q8H


 IV


   1/5/19 12:00


 1/12/19 11:59  1/6/19 04:28


 


 


 Sodium Chloride


  (Ocean Nasal


 Spray)  1 spray  Q4H  PRN


 NASAL


 dry nose  12/23/18 14:30


 1/21/19 18:29  12/25/18 11:49


 


 


 Vancomycin HCl


  (Vanco rx to


 dose)  1 ea  DAILY  PRN


 MISC


 Per rx protocol  1/5/19 00:30


 2/4/19 00:29   


 


 


 Vitamin D


  (Vitamin D)  1,000 intlu  DAILY


 GT


   12/24/18 09:00


 1/8/19 09:59  1/6/19 08:27


 


 


 Zolpidem Tartrate


  (Ambien)  5 mg  HSPRN  PRN


 GT


 Insomnia  1/3/19 20:45


 1/10/19 20:44  1/4/19 23:13


 

















Quentin Cardoso MD Jan 6, 2019 11:08

## 2019-01-07 VITALS — DIASTOLIC BLOOD PRESSURE: 54 MMHG | SYSTOLIC BLOOD PRESSURE: 142 MMHG

## 2019-01-07 VITALS — SYSTOLIC BLOOD PRESSURE: 152 MMHG | DIASTOLIC BLOOD PRESSURE: 65 MMHG

## 2019-01-07 VITALS — SYSTOLIC BLOOD PRESSURE: 162 MMHG | DIASTOLIC BLOOD PRESSURE: 45 MMHG

## 2019-01-07 VITALS — SYSTOLIC BLOOD PRESSURE: 150 MMHG | DIASTOLIC BLOOD PRESSURE: 41 MMHG

## 2019-01-07 VITALS — DIASTOLIC BLOOD PRESSURE: 33 MMHG | SYSTOLIC BLOOD PRESSURE: 116 MMHG

## 2019-01-07 VITALS — SYSTOLIC BLOOD PRESSURE: 150 MMHG | DIASTOLIC BLOOD PRESSURE: 48 MMHG

## 2019-01-07 LAB
ANION GAP SERPL CALC-SCNC: 9 MMOL/L (ref 5–15)
BUN SERPL-MCNC: 56 MG/DL (ref 7–18)
CALCIUM SERPL-MCNC: 9.3 MG/DL (ref 8.5–10.1)
CHLORIDE SERPL-SCNC: 94 MMOL/L (ref 98–107)
CO2 SERPL-SCNC: 25 MMOL/L (ref 21–32)
CREAT SERPL-MCNC: 2.4 MG/DL (ref 0.55–1.3)
PHOSPHATE SERPL-MCNC: 4.2 MG/DL (ref 2.5–4.9)
POTASSIUM SERPL-SCNC: 4 MMOL/L (ref 3.5–5.1)
SODIUM SERPL-SCNC: 128 MMOL/L (ref 136–145)

## 2019-01-07 RX ADMIN — VITAMIN D, TAB 1000IU (100/BT) SCH INTLU: 25 TAB at 08:09

## 2019-01-07 RX ADMIN — SODIUM CHLORIDE SCH MLS/HR: 0.9 INJECTION INTRAVENOUS at 12:36

## 2019-01-07 RX ADMIN — AMIODARONE HYDROCHLORIDE SCH MG: 200 TABLET ORAL at 08:09

## 2019-01-07 RX ADMIN — MINOXIDIL SCH MG: 2.5 TABLET ORAL at 08:08

## 2019-01-07 RX ADMIN — APIXABAN SCH MG: 2.5 TABLET, FILM COATED ORAL at 18:02

## 2019-01-07 RX ADMIN — APIXABAN SCH MG: 2.5 TABLET, FILM COATED ORAL at 08:08

## 2019-01-07 RX ADMIN — MINOXIDIL SCH MG: 2.5 TABLET ORAL at 22:32

## 2019-01-07 RX ADMIN — CHLORHEXIDINE GLUCONATE SCH APPLIC: 213 SOLUTION TOPICAL at 22:32

## 2019-01-07 RX ADMIN — SODIUM CHLORIDE SCH MLS/HR: 0.9 INJECTION INTRAVENOUS at 04:26

## 2019-01-07 RX ADMIN — SODIUM CHLORIDE SCH MLS/HR: 0.9 INJECTION INTRAVENOUS at 22:32

## 2019-01-07 NOTE — NUR
NURSE NOTES:



PATIENT KEPT CLEAN AND DRY. TOLERATING TUBE FEEDING AND VENT SETTINGS. WILL CONTINUE TO 
MONITOR.

## 2019-01-07 NOTE — NEPHROLOGY PROGRESS NOTE
Assessment/Plan


Assessment


Seee below


Plan





ESRD HD q MWF 


Anemia of CKD , SAMANTHA high dose. Transfuse PRN.





A. 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 with LAE. LVEF 65% . On Eliquis.





Post trach + PEG .


Referred to [facility]. Pt's son agreeable to [facility].


See orders.


Still needs extensive Vascular W/U.





Subjective


Subjective


Calm when observed from distance. Gets very agitated when notices me!!





Objective


Objective





Last 24 Hour Vital Signs








  Date Time  Temp Pulse Resp B/P (MAP) Pulse Ox O2 Delivery O2 Flow Rate FiO2


 


1/7/19 15:30  71 14     40


 


1/7/19 13:07  75 21     40


 


1/7/19 12:00        40


 


1/7/19 12:00      Mechanical Ventilator  





      Mechanical Ventilator  


 


1/7/19 12:00 97.9 85 19 150/48 (82) 100   


 


1/7/19 11:33  92      


 


1/7/19 11:16  96 20     40


 


1/7/19 08:56  85 20     40


 


1/7/19 08:08    142/54    


 


1/7/19 08:00        40


 


1/7/19 08:00      Mechanical Ventilator  





      Mechanical Ventilator  


 


1/7/19 08:00 97.6 76 17 162/45 (84) 100   


 


1/7/19 07:34  68      


 


1/7/19 07:07  66 14     40


 


1/7/19 05:21  85 23     40


 


1/7/19 04:00  65      


 


1/7/19 04:00      Mechanical Ventilator  





      Mechanical Ventilator  


 


1/7/19 04:00        40


 


1/7/19 04:00 97.4 72 18 142/54 (83) 100   


 


1/7/19 03:25  65 13     40


 


1/7/19 01:04  66 12     40


 


1/7/19 00:00 97.9 72 18 116/33 (60) 100   


 


1/7/19 00:00  68      


 


1/7/19 00:00      Mechanical Ventilator  





      Mechanical Ventilator  


 


1/7/19 00:00        40


 


1/6/19 23:10  69 18     40


 


1/6/19 20:49  75 18     40


 


1/6/19 20:29    160/69    


 


1/6/19 20:00  65      


 


1/6/19 20:00      Mechanical Ventilator  





      Mechanical Ventilator  


 


1/6/19 20:00        40


 


1/6/19 20:00 97.7 69 18 160/69 (99) 100   


 


1/6/19 19:52  75 18     40


 


1/6/19 17:15  72 18     40

















Intake and Output  


 


 1/6/19 1/7/19





 19:00 07:00


 


Intake Total 710 ml 3350 ml


 


Output Total 120 ml 5020 ml


 


Balance 590 ml -1670 ml


 


  


 


Intake Oral  0 ml


 


Free Water 50 ml 120 ml


 


IV Total 220 ml 220 ml


 


Tube Feeding 440 ml 480 ml


 


Hemodialysis  2500 ml


 


Other  30 ml


 


Output Urine Total  200 ml


 


Stool Total 120 ml 120 ml


 


Hemodialysis UF  2500 ml


 


Estimated Blood Loss  200 ml


 


Other  2000 ml


 


# Voids  3


 


# Bowel Movements  1








Laboratory Tests


1/7/19 03:40: 


Sodium Level 128L, Potassium Level 4.0, Chloride Level 94L, Carbon Dioxide 

Level 25, Anion Gap 9, Blood Urea Nitrogen 56H, Creatinine 2.4H, Estimat 

Glomerular Filtration Rate , Glucose Level 132H, Calcium Level 9.3, Phosphorus 

Level 4.2


Height (Feet):  5


Height (Inches):  2.00


Weight (Pounds):  120


Objective


On vent.


Trach clean.


Cv IRR/IRR


Lungs B ronchi.


Abd SNT. BS +  PEG.


E Rt. foot diabetic ulcers











Gavino Daigle MD Jan 7, 2019 16:38

## 2019-01-07 NOTE — PULMONOLOGY PROGRESS NOTE
Assessment/Plan


Assessment/Plan


IMPRESSION:


1. Hypotension. Resolved.


2. Sepsis. Resolved


3. Atrial fibrillation.


4. Bradycardia.


5. Diabetes mellitus.


6. End-stage renal disease on dialysis.


7. Left lung collapse. Resolved


8. Respiratory failure. now status post tracheostomy


9. Dysphagia.


10. Dementia.


11. Anemia








DISCUSSION:


1. Continue medications.


2. CXR improved


3. Status post tracheostomy


4. S/p G-tube 


5. Continue HD














  ______________________________________________


  Quentin Cardoso M.D.





Subjective


Interval Events:  None new


Constitutional:  Reports: no symptoms


HEENT:  Repors: no symptoms


Respiratory:  Reports: no symptoms


Gastrointestinal/Abdominal:  Reports: no symptoms


Genitourinary:  Reports: no symptoms


Allergies:  


Coded Allergies:  


     No Known Allergies (Unverified , 11/26/18)





Objective





Last 24 Hour Vital Signs








  Date Time  Temp Pulse Resp B/P (MAP) Pulse Ox O2 Delivery O2 Flow Rate FiO2


 


1/7/19 08:56  85 20     40


 


1/7/19 08:08    142/54    


 


1/7/19 08:00        40


 


1/7/19 08:00      Mechanical Ventilator  





      Mechanical Ventilator  


 


1/7/19 08:00 97.6 76 17 162/45 (84) 100   


 


1/7/19 07:34  68      


 


1/7/19 07:07  66 14     40


 


1/7/19 05:21  85 23     40


 


1/7/19 04:00  65      


 


1/7/19 04:00      Mechanical Ventilator  





      Mechanical Ventilator  


 


1/7/19 04:00        40


 


1/7/19 04:00 97.4 72 18 142/54 (83) 100   


 


1/7/19 03:25  65 13     40


 


1/7/19 01:04  66 12     40


 


1/7/19 00:00 97.9 72 18 116/33 (60) 100   


 


1/7/19 00:00  68      


 


1/7/19 00:00      Mechanical Ventilator  





      Mechanical Ventilator  


 


1/7/19 00:00        40


 


1/6/19 23:10  69 18     40


 


1/6/19 20:49  75 18     40


 


1/6/19 20:29    160/69    


 


1/6/19 20:00  65      


 


1/6/19 20:00      Mechanical Ventilator  





      Mechanical Ventilator  


 


1/6/19 20:00        40


 


1/6/19 20:00 97.7 69 18 160/69 (99) 100   


 


1/6/19 19:52  75 18     40


 


1/6/19 17:15  72 18     40


 


1/6/19 16:00 97.2 73 18 114/55 (74) 100   


 


1/6/19 16:00        40


 


1/6/19 16:00      Mechanical Ventilator  





      Mechanical Ventilator  


 


1/6/19 16:00  72      


 


1/6/19 15:26  81 18     40


 


1/6/19 13:42  71 18     40


 


1/6/19 12:00  65      


 


1/6/19 12:00      Mechanical Ventilator  





      Mechanical Ventilator  


 


1/6/19 12:00 97.2 68 19 103/50 (67) 99   


 


1/6/19 12:00        40


 


1/6/19 11:27  79 18     40

















Intake and Output  


 


 1/6/19 1/7/19





 19:00 07:00


 


Intake Total 710 ml 3350 ml


 


Output Total 120 ml 5020 ml


 


Balance 590 ml -1670 ml


 


  


 


Intake Oral  0 ml


 


Free Water 50 ml 120 ml


 


IV Total 220 ml 220 ml


 


Tube Feeding 440 ml 480 ml


 


Hemodialysis  2500 ml


 


Other  30 ml


 


Output Urine Total  200 ml


 


Stool Total 120 ml 120 ml


 


Hemodialysis UF  2500 ml


 


Estimated Blood Loss  200 ml


 


Other  2000 ml


 


# Voids  3


 


# Bowel Movements  1








General Appearance:  no acute distress


HEENT:  normocephalic


Respiratory/Chest:  chest wall non-tender, lungs clear


Cardiovascular:  normal peripheral pulses, normal rate


Abdomen:  normal bowel sounds, soft, non tender





Microbiology








 Date/Time


Source Procedure


Growth Status


 


 


 1/5/19 03:16


Blood Blood Culture - Preliminary


NO GROWTH AFTER 48 HOURS Resulted





 1/5/19 06:00


Sputum Gram Stain - Final Resulted


 


 1/5/19 06:00 Sputum Culture - Preliminary


Gram Negative Bacillus 1 Resulted


 


 1/5/19 06:00


Stool Clostridium difficile Toxin Assay - Final Complete


 


 1/6/19 12:15


Indwelling Cath Urine Culture - Preliminary


Gram Negative Bacillus 1 Resulted








Laboratory Tests


1/7/19 03:40: 


Sodium Level 128L, Potassium Level 4.0, Chloride Level 94L, Carbon Dioxide 

Level 25, Anion Gap 9, Blood Urea Nitrogen 56H, Creatinine 2.4H, Estimat 

Glomerular Filtration Rate , Glucose Level 132H, Calcium Level 9.3, Phosphorus 

Level 4.2





Current Medications








 Medications


  (Trade)  Dose


 Ordered  Sig/Tony


 Route


 PRN Reason  Start Time


 Stop Time Status Last Admin


Dose Admin


 


 Acetaminophen


  (Tylenol)  650 mg  Q6H  PRN


 GT


 Mild Pain/Temp > 100.5  12/23/18 16:00


 1/20/19 15:59  12/28/18 12:03


 


 


 Acetaminophen/


 Hydrocodone Bitart


  (Norco 5/325)  1 tab  Q6H  PRN


 GT


 For Pain  1/2/19 18:30


 1/9/19 18:29  1/6/19 06:43


 


 


 Amiodarone HCl


  (Cordarone)  200 mg  DAILY


 GT


   1/2/19 09:00


 1/26/19 20:59  1/7/19 08:09


 


 


 Apixaban


  (Eliquis)  2.5 mg  BID


 GT


   12/27/18 18:00


 1/26/19 17:59  1/7/19 08:08


 


 


 Artificial Tears


  (Akwa-Tears)  1 drop  Q4H  PRN


 BOTH EYES


 Dry Eyes  12/23/18 15:30


 1/21/19 15:29  1/1/19 16:53


 


 


 Chlorhexidine


 Gluconate


  (Juana-Hex 2%)  1 applic  DAILY@2000


 TOPIC


   12/30/18 20:00


 1/29/19 19:59  1/6/19 20:28


 


 


 Collagenase


  (Santyl)  1 applic  DAILY


 TOPIC


   1/7/19 21:00


 2/6/19 08:59   


 


 


 Lansoprazole


  (Prevacid)  30 mg  Q12HR


 GT


   12/27/18 21:00


 1/26/19 20:59  1/7/19 08:08


 


 


 Lorazepam


  (Ativan)  0.5 mg  Q6H  PRN


 ORAL


 For Anxiety  1/6/19 15:45


 1/13/19 15:44  1/6/19 16:52


 


 


 Minoxidil


  (Loniten)  2.5 mg  Q12HR


 GT


   12/27/18 21:00


 1/26/19 20:59  1/7/19 08:08


 


 


 Piperacillin Sod/


 Tazobactam Sod


 2.25 gm/Dextrose  110 ml @ 


 220 mls/hr  Q8H


 IV


   1/5/19 12:00


 1/12/19 11:59  1/7/19 04:26


 


 


 Sodium Chloride  1,000 ml @ 


 500 mls/hr  Q2H PRN


 IVLG


 sbp<90 during hd  1/7/19 11:54


 1/7/19 23:59   


 


 


 Sodium Chloride


  (Ocean Nasal


 Spray)  1 spray  Q4H  PRN


 NASAL


 dry nose  12/23/18 14:30


 1/21/19 18:29  12/25/18 11:49


 


 


 Vancomycin HCl


  (Vanco rx to


 dose)  1 ea  DAILY  PRN


 MISC


 Per rx protocol  1/5/19 00:30


 2/4/19 00:29   


 


 


 Vitamin D


  (Vitamin D)  1,000 intlu  DAILY


 GT


   12/24/18 09:00


 1/8/19 09:59  1/7/19 08:09


 


 


 Zolpidem Tartrate


  (Ambien)  5 mg  HSPRN  PRN


 GT


 Insomnia  1/3/19 20:45


 1/10/19 20:44  1/6/19 22:27


 

















Quentin Cardoso MD Jan 7, 2019 09:54

## 2019-01-07 NOTE — NUR
NURSE NOTES:



RECEIVED PATIENT FROM CHAU WATERS RN. PATIENT IS LYING IN BED, ASLEEP. HOOKED TO CARDIAC 
MONITOR. TRACH TO VENT. SHILEY 8. WITH VENT SETTINGS AC 8, , FIO2 40, PEEP 5. NO SIGNS 
OF CARDIO OR RESPI DISTRESS AS OF THE MOMENT. GT IN PLACE, DRY AND INTACT/ WITH GTF NEPRO AT 
40CC/HR. NOTED RECTAL TUBE AND SKIN ALTERATIONS. NOTED R UA MIDLINE AND L UA AV SHUNT FOR HD 
PORT. SCHEDULED FOR HD TODAY. CALL LIGHT WITHIN REACH. BED AT LOWEST POSITION. SIDE RAILS 
UP. WILL CONTINUE TO MONITOR.

## 2019-01-07 NOTE — NUR
RD ASSESSMENT & RECOMMENDATIONS

SEE CARE ACTIVITY FOR COMPLETE ASSESSMENT



DAILY ESTIMATED NEEDS:

Needs based on Critical care + Advanced Wounds + ESRD/ 60kg 

22-30  kcals/kg 

0580-1075  total kcals

1.5-2.0  g protein/kg

90-120g  g total protein 

Fluid per MD 



NUTRITION DIAGNOSIS:

* Swallowing difficulty R/T respiratory status as evidenced by pt s/p self

extubation, s/p code blue, reintubated, now s/p trach and PEG placement.

* Increased kcal/prot needs R/T wound healing as evidenced by pt admitted

w/ multiple wounds including stage 4 sacral wound, refer to  eval.

* Altered nutrition related lab values R/T ESRD dx, clinical condition as

evidenced by low K (2.7-> wnl), episodes of hypoglycemia (68 69-> now

resolved), elev creat (4.6-> 2.4), low Na (132-> 128).





CURRENT TF: Nepro @40ml/hr + PS BID   





ENTERAL NUTRITION RECOMMENDATIONS:

Nepro @40ml/hr x 24 hrs + Prosource 1pkt BID  

to provide 960ml, 1728kcal, 78g + 22g prot, 698ml free water 



1. Maintain current rate + Prosource 1 pack BID to better meet est

    protein needs

2. HOB over 30 degrees

3. Decrease water flushes to improve Na level







ADDITIONAL RECOMMENDATIONS:

* RECALIBRATE BED SCALE POST TRACH AND PEG PROCEDURES 

* Wound healing-  Nephrovite x 1, Continue Scotty 1pkt BID 

* Monitor for hypoglycemia 

** DECREASE water flushes to help improve Na level->consistently low Na** 

** Probiotics for loose  BM** 

* Monitor lytes daily w/ con't loose stool

## 2019-01-07 NOTE — INFECTIOUS DISEASES PROG NOTE
Assessment/Plan


Assessment/Plan


antibiotics : vancomycin iv, zosyn





A


1. gram negative UTI


2. gram negative pneumonia


3. leucocytosis improving


4. respiratory failure s/p tracheostomy


5. renal failure


6. decubitus ulcers


7. nasal MRSA colonization


8. rectal VRE colonization


9. pleural effusion





P


1. continue iv vancomycin, zosyn 


2. will follow up cultures





Subjective


ROS Limited/Unobtainable:  Yes


Allergies:  


Coded Allergies:  


     No Known Allergies (Unverified , 11/26/18)





Objective


Vital Signs





Last 24 Hour Vital Signs








  Date Time  Temp Pulse Resp B/P (MAP) Pulse Ox O2 Delivery O2 Flow Rate FiO2


 


1/7/19 08:56  85 20     40


 


1/7/19 08:08    142/54    


 


1/7/19 08:00        40


 


1/7/19 08:00      Mechanical Ventilator  





      Mechanical Ventilator  


 


1/7/19 08:00 97.6 76 17 162/45 (84) 100   


 


1/7/19 07:34  68      


 


1/7/19 07:07  66 14     40


 


1/7/19 05:21  85 23     40


 


1/7/19 04:00  65      


 


1/7/19 04:00      Mechanical Ventilator  





      Mechanical Ventilator  


 


1/7/19 04:00        40


 


1/7/19 04:00 97.4 72 18 142/54 (83) 100   


 


1/7/19 03:25  65 13     40


 


1/7/19 01:04  66 12     40


 


1/7/19 00:00 97.9 72 18 116/33 (60) 100   


 


1/7/19 00:00  68      


 


1/7/19 00:00      Mechanical Ventilator  





      Mechanical Ventilator  


 


1/7/19 00:00        40


 


1/6/19 23:10  69 18     40


 


1/6/19 20:49  75 18     40


 


1/6/19 20:29    160/69    


 


1/6/19 20:00  65      


 


1/6/19 20:00      Mechanical Ventilator  





      Mechanical Ventilator  


 


1/6/19 20:00        40


 


1/6/19 20:00 97.7 69 18 160/69 (99) 100   


 


1/6/19 19:52  75 18     40


 


1/6/19 17:15  72 18     40


 


1/6/19 16:00 97.2 73 18 114/55 (74) 100   


 


1/6/19 16:00        40


 


1/6/19 16:00      Mechanical Ventilator  





      Mechanical Ventilator  


 


1/6/19 16:00  72      


 


1/6/19 15:26  81 18     40


 


1/6/19 13:42  71 18     40


 


1/6/19 12:00  65      


 


1/6/19 12:00      Mechanical Ventilator  





      Mechanical Ventilator  


 


1/6/19 12:00 97.2 68 19 103/50 (67) 99   


 


1/6/19 12:00        40


 


1/6/19 11:27  79 18     40








Height (Feet):  5


Height (Inches):  2.00


Weight (Pounds):  120


HEENT:  status post trach


Respiratory/Chest:  lungs clear


Cardiovascular:  normal rate, regular rhythm, no gallop/murmur


Abdomen:  soft, non tender, other - GT


Extremities:  no edema, other - right arm PICC





Microbiology








 Date/Time


Source Procedure


Growth Status


 


 


 1/5/19 03:16


Blood Blood Culture - Preliminary


NO GROWTH AFTER 48 HOURS Resulted





 1/5/19 06:00


Sputum Gram Stain - Final Resulted


 


 1/5/19 06:00 Sputum Culture - Preliminary


Gram Negative Bacillus 1 Resulted


 


 1/5/19 06:00


Stool Clostridium difficile Toxin Assay - Final Complete


 


 1/6/19 12:15


Indwelling Cath Urine Culture - Preliminary


Gram Negative Bacillus 1 Resulted











Laboratory Tests








Test


  1/7/19


03:40


 


Sodium Level


  128 MMOL/L


(136-145)  L


 


Potassium Level


  4.0 MMOL/L


(3.5-5.1)


 


Chloride Level


  94 MMOL/L


()  L


 


Carbon Dioxide Level


  25 MMOL/L


(21-32)


 


Anion Gap


  9 mmol/L


(5-15)


 


Blood Urea Nitrogen


  56 mg/dL


(7-18)  H


 


Creatinine


  2.4 MG/DL


(0.55-1.30)  H


 


Estimat Glomerular Filtration


Rate  mL/min (>60)  


 


 


Glucose Level


  132 MG/DL


()  H


 


Calcium Level


  9.3 MG/DL


(8.5-10.1)


 


Phosphorus Level


  4.2 MG/DL


(2.5-4.9)











Current Medications








 Medications


  (Trade)  Dose


 Ordered  Sig/Tony


 Route


 PRN Reason  Start Time


 Stop Time Status Last Admin


Dose Admin


 


 Acetaminophen


  (Tylenol)  650 mg  Q6H  PRN


 GT


 Mild Pain/Temp > 100.5  12/23/18 16:00


 1/20/19 15:59  12/28/18 12:03


 


 


 Acetaminophen/


 Hydrocodone Bitart


  (Norco 5/325)  1 tab  Q6H  PRN


 GT


 For Pain  1/2/19 18:30


 1/9/19 18:29  1/6/19 06:43


 


 


 Amiodarone HCl


  (Cordarone)  200 mg  DAILY


 GT


   1/2/19 09:00


 1/26/19 20:59  1/7/19 08:09


 


 


 Apixaban


  (Eliquis)  2.5 mg  BID


 GT


   12/27/18 18:00


 1/26/19 17:59  1/7/19 08:08


 


 


 Artificial Tears


  (Akwa-Tears)  1 drop  Q4H  PRN


 BOTH EYES


 Dry Eyes  12/23/18 15:30


 1/21/19 15:29  1/1/19 16:53


 


 


 Chlorhexidine


 Gluconate


  (Juana-Hex 2%)  1 applic  DAILY@2000


 TOPIC


   12/30/18 20:00


 1/29/19 19:59  1/6/19 20:28


 


 


 Collagenase


  (Santyl)  1 applic  DAILY


 TOPIC


   1/7/19 21:00


 2/6/19 08:59   


 


 


 Lansoprazole


  (Prevacid)  30 mg  Q12HR


 GT


   12/27/18 21:00


 1/26/19 20:59  1/7/19 08:08


 


 


 Lorazepam


  (Ativan)  0.5 mg  Q6H  PRN


 ORAL


 For Anxiety  1/6/19 15:45


 1/13/19 15:44  1/6/19 16:52


 


 


 Minoxidil


  (Loniten)  2.5 mg  Q12HR


 GT


   12/27/18 21:00


 1/26/19 20:59  1/7/19 08:08


 


 


 Piperacillin Sod/


 Tazobactam Sod


 2.25 gm/Dextrose  110 ml @ 


 220 mls/hr  Q8H


 IV


   1/5/19 12:00


 1/12/19 11:59  1/7/19 04:26


 


 


 Sodium Chloride  1,000 ml @ 


 500 mls/hr  Q2H PRN


 IVLG


 sbp<90 during hd  1/7/19 11:54


 1/7/19 23:59   


 


 


 Sodium Chloride


  (Ocean Nasal


 Spray)  1 spray  Q4H  PRN


 NASAL


 dry nose  12/23/18 14:30


 1/21/19 18:29  12/25/18 11:49


 


 


 Vancomycin HCl


  (Vanco rx to


 dose)  1 ea  DAILY  PRN


 MISC


 Per rx protocol  1/5/19 00:30


 2/4/19 00:29   


 


 


 Vitamin D


  (Vitamin D)  1,000 intlu  DAILY


 GT


   12/24/18 09:00


 1/8/19 09:59  1/7/19 08:09


 


 


 Zolpidem Tartrate


  (Ambien)  5 mg  HSPRN  PRN


 GT


 Insomnia  1/3/19 20:45


 1/10/19 20:44  1/6/19 22:27


 

















Chase Santillan MD Jan 7, 2019 11:13

## 2019-01-07 NOTE — NUR
NURSE NOTES:

Received patient from CAROLYN Baron. Patient is awake, alert and oriented x2. On Trach Shiley 
8 to vent with setting of AC:8, TV:450, FiO2:40%, Peep:5 and saturating at 98%. Patient 
shows no signs of pain or distress, will continue plan of care.

## 2019-01-07 NOTE — GI PROGRESS NOTE
Assessment/Plan


Problems:  


(1) Encounter for PEG (percutaneous endoscopic gastrostomy)


ICD Codes:  Z43.1 - Encounter for attention to gastrostomy


SNOMED:  606922858, 392672256


(2) Severe malnutrition


ICD Codes:  E43 - Unspecified severe protein-calorie malnutrition


SNOMED:  99048796


(3) Dehydration


ICD Codes:  E86.0 - Dehydration


SNOMED:  26540987


Status:  stable


Status Narrative


Discussed with Dr. Lam.


Assessment/Plan


Assessment


- Dysphagia


- Resp failure


- ESRD


- Anemia


- Status post PEG, tolerating feeds





Recommendations


1. Abdominal binder.


2. Elevate the head of the bed at all times.


3. G-tube flush.


4. G-tube care.


5. tube feeding later today per RD to goal


Monitor H&H, PRN transfusions


PPI


Electrolyte correction


Follow-up labs





The patient was seen and examined at bedside and all new and available data was 

reviewed in the patients chart. I agree with the above findings, impression 

and plan.  (Patient seen earlier today. Signature stamp does not reflect 

patient encounter time.). - Elliott Lam MD





Subjective


Subjective


limited





Objective





Last 24 Hour Vital Signs








  Date Time  Temp Pulse Resp B/P (MAP) Pulse Ox O2 Delivery O2 Flow Rate FiO2


 


1/7/19 11:16  96 20     40


 


1/7/19 08:56  85 20     40


 


1/7/19 08:08    142/54    


 


1/7/19 08:00        40


 


1/7/19 08:00      Mechanical Ventilator  





      Mechanical Ventilator  


 


1/7/19 08:00 97.6 76 17 162/45 (84) 100   


 


1/7/19 07:34  68      


 


1/7/19 07:07  66 14     40


 


1/7/19 05:21  85 23     40


 


1/7/19 04:00  65      


 


1/7/19 04:00      Mechanical Ventilator  





      Mechanical Ventilator  


 


1/7/19 04:00        40


 


1/7/19 04:00 97.4 72 18 142/54 (83) 100   


 


1/7/19 03:25  65 13     40


 


1/7/19 01:04  66 12     40


 


1/7/19 00:00 97.9 72 18 116/33 (60) 100   


 


1/7/19 00:00  68      


 


1/7/19 00:00      Mechanical Ventilator  





      Mechanical Ventilator  


 


1/7/19 00:00        40


 


1/6/19 23:10  69 18     40


 


1/6/19 20:49  75 18     40


 


1/6/19 20:29    160/69    


 


1/6/19 20:00  65      


 


1/6/19 20:00      Mechanical Ventilator  





      Mechanical Ventilator  


 


1/6/19 20:00        40


 


1/6/19 20:00 97.7 69 18 160/69 (99) 100   


 


1/6/19 19:52  75 18     40


 


1/6/19 17:15  72 18     40


 


1/6/19 16:00 97.2 73 18 114/55 (74) 100   


 


1/6/19 16:00        40


 


1/6/19 16:00      Mechanical Ventilator  





      Mechanical Ventilator  


 


1/6/19 16:00  72      


 


1/6/19 15:26  81 18     40


 


1/6/19 13:42  71 18     40


 


1/6/19 12:00  65      


 


1/6/19 12:00      Mechanical Ventilator  





      Mechanical Ventilator  


 


1/6/19 12:00 97.2 68 19 103/50 (67) 99   


 


1/6/19 12:00        40


 


1/6/19 11:27  79 18     40

















Intake and Output  


 


 1/6/19 1/7/19





 19:00 07:00


 


Intake Total 710 ml 3350 ml


 


Output Total 120 ml 5020 ml


 


Balance 590 ml -1670 ml


 


  


 


Intake Oral  0 ml


 


Free Water 50 ml 120 ml


 


IV Total 220 ml 220 ml


 


Tube Feeding 440 ml 480 ml


 


Hemodialysis  2500 ml


 


Other  30 ml


 


Output Urine Total  200 ml


 


Stool Total 120 ml 120 ml


 


Hemodialysis UF  2500 ml


 


Estimated Blood Loss  200 ml


 


Other  2000 ml


 


# Voids  3


 


# Bowel Movements  1











Laboratory Tests








Test


  1/7/19


03:40


 


Sodium Level


  128 MMOL/L


(136-145)  L


 


Potassium Level


  4.0 MMOL/L


(3.5-5.1)


 


Chloride Level


  94 MMOL/L


()  L


 


Carbon Dioxide Level


  25 MMOL/L


(21-32)


 


Anion Gap


  9 mmol/L


(5-15)


 


Blood Urea Nitrogen


  56 mg/dL


(7-18)  H


 


Creatinine


  2.4 MG/DL


(0.55-1.30)  H


 


Estimat Glomerular Filtration


Rate  mL/min (>60)  


 


 


Glucose Level


  132 MG/DL


()  H


 


Calcium Level


  9.3 MG/DL


(8.5-10.1)


 


Phosphorus Level


  4.2 MG/DL


(2.5-4.9)











Microbiology








 Date/Time


Source Procedure


Growth Status


 


 


 1/6/19 12:15


Indwelling Cath Urine Culture - Preliminary


Gram Negative Bacillus 1 Resulted








Height (Feet):  5


Height (Inches):  2.00


Weight (Pounds):  120


General Appearance:  alert


Cardiovascular:  normal rate


Respiratory/Chest:  normal breath sounds


Abdominal Exam:  soft, GT site - Clean dry and intact











Shane Lowry NP Jan 7, 2019 11:28

## 2019-01-07 NOTE — NUR
CASE MANAGEMENT: REVIEW





SI: ESRD ON HD

TRACHEOSTOMY 12/17

PEG PLACEMENT 12/19 

T 97.5 HR 87 RR 23 /65 SAT 99% MECH VENT FIO2 40









IS: MINOXIDIL GT Q12HR 

PROCRIT SQ MWF

ELIQUIS NG BID 

AMIODARONE GT Q12HR 

GT FEEDING NEPRO @ 40ML/HR

HD PRN 



***STEP DOWN UNIT  STATUS***

DCP: [facility]

## 2019-01-07 NOTE — NUR
RESPIRATORY NOTE: Pt was received stable, awake, and alert at vent settings AC 8, 450 VT, 
35% PEEP +5. Pt is on a trach, Shiley 8. Suctioned with no complications. Rhonchi breath 
sounds. Alarms are on and audible. Vent is plugged into red outlet. No respiratory distress 
noted. Will continue to monitor closely.

## 2019-01-08 VITALS — SYSTOLIC BLOOD PRESSURE: 125 MMHG | DIASTOLIC BLOOD PRESSURE: 63 MMHG

## 2019-01-08 VITALS — SYSTOLIC BLOOD PRESSURE: 102 MMHG | DIASTOLIC BLOOD PRESSURE: 42 MMHG

## 2019-01-08 VITALS — DIASTOLIC BLOOD PRESSURE: 62 MMHG | SYSTOLIC BLOOD PRESSURE: 140 MMHG

## 2019-01-08 VITALS — SYSTOLIC BLOOD PRESSURE: 137 MMHG | DIASTOLIC BLOOD PRESSURE: 47 MMHG

## 2019-01-08 VITALS — SYSTOLIC BLOOD PRESSURE: 102 MMHG | DIASTOLIC BLOOD PRESSURE: 73 MMHG

## 2019-01-08 VITALS — SYSTOLIC BLOOD PRESSURE: 146 MMHG | DIASTOLIC BLOOD PRESSURE: 40 MMHG

## 2019-01-08 RX ADMIN — MINOXIDIL SCH MG: 2.5 TABLET ORAL at 08:41

## 2019-01-08 RX ADMIN — APIXABAN SCH MG: 2.5 TABLET, FILM COATED ORAL at 08:42

## 2019-01-08 RX ADMIN — HYDROCODONE BITARTRATE AND ACETAMINOPHEN PRN TAB: 5; 325 TABLET ORAL at 18:02

## 2019-01-08 RX ADMIN — SODIUM CHLORIDE SCH MLS/HR: 0.9 INJECTION INTRAVENOUS at 04:25

## 2019-01-08 RX ADMIN — HYDROCODONE BITARTRATE AND ACETAMINOPHEN PRN TAB: 5; 325 TABLET ORAL at 12:07

## 2019-01-08 RX ADMIN — AMIODARONE HYDROCHLORIDE SCH MG: 200 TABLET ORAL at 08:42

## 2019-01-08 RX ADMIN — LORAZEPAM PRN MG: 0.5 TABLET ORAL at 14:07

## 2019-01-08 RX ADMIN — ZOLPIDEM TARTRATE PRN MG: 5 TABLET ORAL at 00:26

## 2019-01-08 RX ADMIN — VITAMIN D, TAB 1000IU (100/BT) SCH INTLU: 25 TAB at 08:42

## 2019-01-08 RX ADMIN — CHLORHEXIDINE GLUCONATE SCH APPLIC: 213 SOLUTION TOPICAL at 20:16

## 2019-01-08 RX ADMIN — SODIUM CHLORIDE SCH MLS/HR: 0.9 INJECTION INTRAVENOUS at 20:16

## 2019-01-08 RX ADMIN — MINOXIDIL SCH MG: 2.5 TABLET ORAL at 20:17

## 2019-01-08 RX ADMIN — APIXABAN SCH MG: 2.5 TABLET, FILM COATED ORAL at 18:01

## 2019-01-08 RX ADMIN — SODIUM CHLORIDE SCH MLS/HR: 0.9 INJECTION INTRAVENOUS at 12:06

## 2019-01-08 NOTE — INFECTIOUS DISEASES PROG NOTE
Assessment/Plan


Assessment/Plan


antibiotics : vancomycin iv, zosyn





A


1. e.coli UTI


2. pseudomonas pneumonia


3. leucocytosis improving


4. respiratory failure s/p tracheostomy


5. renal failure


6. decubitus ulcers


7. nasal MRSA colonization


8. rectal VRE colonization


9. pleural effusion





P


1. continue iv zosyn 


2. d/c iv vancomycin


3. start tygacil


4. will follow up cultures





Subjective


ROS Limited/Unobtainable:  Yes


Allergies:  


Coded Allergies:  


     No Known Allergies (Unverified , 11/26/18)





Objective


Vital Signs





Last 24 Hour Vital Signs








  Date Time  Temp Pulse Resp B/P (MAP) Pulse Ox O2 Delivery O2 Flow Rate FiO2


 


1/8/19 09:30  77 18     40


 


1/8/19 08:41    102/73    


 


1/8/19 08:00      Mechanical Ventilator  





      Mechanical Ventilator  


 


1/8/19 08:00  82      


 


1/8/19 08:00 97.7 110 19 102/73 (83) 100   


 


1/8/19 08:00        40


 


1/8/19 07:40  110 14     40


 


1/8/19 05:21  86 22     40


 


1/8/19 04:00        40


 


1/8/19 04:00      Mechanical Ventilator  





      Mechanical Ventilator  


 


1/8/19 04:00 98.1 80 20 137/47 (77) 100   


 


1/8/19 03:36  79      


 


1/8/19 03:31  78 17     40


 


1/8/19 02:03  89 20     40


 


1/8/19 00:00      Mechanical Ventilator  





      Mechanical Ventilator  


 


1/8/19 00:00  111      


 


1/8/19 00:00 97.7 119 22 146/40 (75) 100   


 


1/7/19 22:32    150/41    


 


1/7/19 22:25  90 20     40


 


1/7/19 20:14  86 20     40


 


1/7/19 20:00 98.0 117 20 150/41 (77) 100   


 


1/7/19 20:00      Mechanical Ventilator  





      Mechanical Ventilator  


 


1/7/19 20:00  120      


 


1/7/19 20:00        40


 


1/7/19 17:05  83 18     40


 


1/7/19 16:00      Mechanical Ventilator  





      Mechanical Ventilator  


 


1/7/19 16:00 97.5 87 23 152/65 (94) 99   


 


1/7/19 16:00        40


 


1/7/19 15:36  80      


 


1/7/19 15:30  71 14     40


 


1/7/19 13:07  75 21     40


 


1/7/19 12:00        40


 


1/7/19 12:00      Mechanical Ventilator  





      Mechanical Ventilator  


 


1/7/19 12:00 97.9 85 19 150/48 (82) 100   


 


1/7/19 11:33  92      


 


1/7/19 11:16  96 20     40








Height (Feet):  5


Height (Inches):  2.00


Weight (Pounds):  120


HEENT:  status post trach


Respiratory/Chest:  lungs clear


Cardiovascular:  normal rate, regular rhythm, no gallop/murmur


Abdomen:  soft, non tender, other - GT


Extremities:  no edema, other - right arm PICC





Microbiology








 Date/Time


Source Procedure


Growth Status


 


 


 1/6/19 12:15


Indwelling Cath Urine Culture - Preliminary


Escherichia Coli Resulted











Laboratory Tests








Test


  1/8/19


04:00


 


Random Vancomycin Level 9.3 ug/mL  


 


Hepatitis A IgM Antibody Pending  


 


Hepatitis B Surface Antigen Pending  


 


Hepatitis B Core IgM Antibody Pending  


 


Hepatitis C Antibody Pending  











Current Medications








 Medications


  (Trade)  Dose


 Ordered  Sig/Tony


 Route


 PRN Reason  Start Time


 Stop Time Status Last Admin


Dose Admin


 


 Acetaminophen


  (Tylenol)  650 mg  Q6H  PRN


 GT


 Mild Pain/Temp > 100.5  12/23/18 16:00


 1/20/19 15:59  12/28/18 12:03


 


 


 Acetaminophen/


 Hydrocodone Bitart


  (Norco 5/325)  1 tab  Q6H  PRN


 GT


 For Pain  1/2/19 18:30


 1/9/19 18:29  1/6/19 06:43


 


 


 Amiodarone HCl


  (Cordarone)  200 mg  DAILY


 GT


   1/2/19 09:00


 1/26/19 20:59  1/8/19 08:42


 


 


 Apixaban


  (Eliquis)  2.5 mg  BID


 GT


   12/27/18 18:00


 1/26/19 17:59  1/8/19 08:42


 


 


 Artificial Tears


  (Akwa-Tears)  1 drop  Q4H  PRN


 BOTH EYES


 Dry Eyes  12/23/18 15:30


 1/21/19 15:29  1/1/19 16:53


 


 


 Chlorhexidine


 Gluconate


  (Juana-Hex 2%)  1 applic  DAILY@2000


 TOPIC


   12/30/18 20:00


 1/29/19 19:59  1/7/19 22:32


 


 


 Collagenase


  (Santyl)  1 applic  DAILY


 TOPIC


   1/7/19 21:00


 2/6/19 08:59  1/8/19 08:42


 


 


 Lansoprazole


  (Prevacid)  30 mg  Q12HR


 GT


   12/27/18 21:00


 1/26/19 20:59  1/8/19 08:41


 


 


 Lorazepam


  (Ativan)  0.5 mg  Q6H  PRN


 ORAL


 For Anxiety  1/6/19 15:45


 1/13/19 15:44  1/6/19 16:52


 


 


 Minoxidil


  (Loniten)  2.5 mg  Q12HR


 GT


   12/27/18 21:00


 1/26/19 20:59  1/7/19 22:32


 


 


 Piperacillin Sod/


 Tazobactam Sod


 2.25 gm/Dextrose  110 ml @ 


 220 mls/hr  Q8H


 IV


   1/5/19 12:00


 1/12/19 11:59  1/8/19 04:25


 


 


 Sodium Chloride


  (Ocean Nasal


 Spray)  1 spray  Q4H  PRN


 NASAL


 dry nose  12/23/18 14:30


 1/21/19 18:29  12/25/18 11:49


 


 


 Vancomycin HCl


  (Vanco rx to


 dose)  1 ea  DAILY  PRN


 MISC


 Per rx protocol  1/5/19 00:30


 2/4/19 00:29   


 


 


 Zolpidem Tartrate


  (Ambien)  5 mg  HSPRN  PRN


 GT


 Insomnia  1/3/19 20:45


 1/10/19 20:44  1/8/19 00:26


 

















Chase Santillan MD Jan 8, 2019 11:01

## 2019-01-08 NOTE — NUR
RESPIRATORY NOTE:

received pt on vent, trached with current vent settings. trach size shiley 8. pt not in resp 
distress at this time. will cont to monitor

## 2019-01-08 NOTE — GI PROGRESS NOTE
Assessment/Plan


Problems:  


(1) Encounter for PEG (percutaneous endoscopic gastrostomy)


ICD Codes:  Z43.1 - Encounter for attention to gastrostomy


SNOMED:  804395858, [id number]


(2) Severe malnutrition


ICD Codes:  E43 - Unspecified severe protein-calorie malnutrition


SNOMED:  77412868


(3) Dehydration


ICD Codes:  E86.0 - Dehydration


SNOMED:  46102403


Status:  stable


Status Narrative


Discussed with Dr. Lam.


Assessment/Plan


Assessment


- Dysphagia


- Resp failure


- ESRD


- Anemia


- Status post PEG, tolerating feeds





Recommendations


1. Abdominal binder.


2. Elevate the head of the bed at all times.


3. G-tube flush.


4. G-tube care.


5. tube feeding later today per RD to goal


Monitor H&H, PRN transfusions


PPI


Electrolyte correction


Follow-up labs





The patient was seen and examined at bedside and all new and available data was 

reviewed in the patients chart. I agree with the above findings, impression 

and plan.  (Patient seen earlier today. Signature stamp does not reflect 

patient encounter time.). - Elliott Lam MD





Subjective


Subjective


limited





Objective





Last 24 Hour Vital Signs








  Date Time  Temp Pulse Resp B/P (MAP) Pulse Ox O2 Delivery O2 Flow Rate FiO2


 


1/8/19 13:34  75 21     40


 


1/8/19 12:00 96.8 75 21 125/63 (83) 100   


 


1/8/19 12:00        40


 


1/8/19 12:00      Mechanical Ventilator  





      Mechanical Ventilator  


 


1/8/19 11:45  74      


 


1/8/19 11:30  82 19     40


 


1/8/19 09:30  77 18     40


 


1/8/19 08:41    102/73    


 


1/8/19 08:00      Mechanical Ventilator  





      Mechanical Ventilator  


 


1/8/19 08:00  82      


 


1/8/19 08:00 97.7 110 19 102/73 (83) 100   


 


1/8/19 08:00        40


 


1/8/19 07:40  110 14     40


 


1/8/19 05:21  86 22     40


 


1/8/19 04:00        40


 


1/8/19 04:00      Mechanical Ventilator  





      Mechanical Ventilator  


 


1/8/19 04:00 98.1 80 20 137/47 (77) 100   


 


1/8/19 03:36  79      


 


1/8/19 03:31  78 17     40


 


1/8/19 02:03  89 20     40


 


1/8/19 00:00      Mechanical Ventilator  





      Mechanical Ventilator  


 


1/8/19 00:00  111      


 


1/8/19 00:00 97.7 119 22 146/40 (75) 100   


 


1/7/19 22:32    150/41    


 


1/7/19 22:25  90 20     40


 


1/7/19 20:14  86 20     40


 


1/7/19 20:00 98.0 117 20 150/41 (77) 100   


 


1/7/19 20:00      Mechanical Ventilator  





      Mechanical Ventilator  


 


1/7/19 20:00  120      


 


1/7/19 20:00        40


 


1/7/19 17:05  83 18     40


 


1/7/19 16:00      Mechanical Ventilator  





      Mechanical Ventilator  


 


1/7/19 16:00 97.5 87 23 152/65 (94) 99   


 


1/7/19 16:00        40


 


1/7/19 15:36  80      


 


1/7/19 15:30  71 14     40

















Intake and Output  


 


 1/7/19 1/8/19





 19:00 07:00


 


Intake Total 790 ml 779.6 ml


 


Balance 790 ml 779.6 ml


 


  


 


Free Water 90 ml 100 ml


 


IV Total 220 ml 239.6 ml


 


Tube Feeding 480 ml 440 ml











Laboratory Tests








Test


  1/8/19


04:00


 


Random Vancomycin Level 9.3 ug/mL  


 


Hepatitis A IgM Antibody Pending  


 


Hepatitis B Surface Antigen Pending  


 


Hepatitis B Core IgM Antibody Pending  


 


Hepatitis C Antibody Pending  








Height (Feet):  5


Height (Inches):  2.00


Weight (Pounds):  120


General Appearance:  no apparent distress


Cardiovascular:  normal rate


Respiratory/Chest:  normal breath sounds, no respiratory distress


Abdominal Exam:  normal bowel sounds, non tender, soft, GT site - c/d/i


Extremities:  non-tender











Shane Lowry NP Jan 8, 2019 14:33

## 2019-01-08 NOTE — NUR
NURSE NOTES:

Received patient from Marielena RN and Tod RN. Patient is awake, alert and oriented x1. 
On trach Shiley 8 to vent with settings of AC:8, TV:450, FiO2:40%, PEEP:5 and showing no 
signs and symptoms of pain and/or distress. On GTube feeding of Nepro @40ml/hour and 
flushing well. Rectal tube is patent and draining, patient is anuric. Will continue plan of 
care.

## 2019-01-08 NOTE — NUR
NURSE NOTES:

Received report from CAROLYN Haas.  Alert, responsive, able to make needs known with writing 
board. Patient seen in bed in carroll position, vent on previous setting. No respiratory 
distress noted at this time. Denies pain at this time. Midline to left upper arm is intact.  
GT feeding of nepro is running at 40cc/hr, tolerating well.  GT site is intact. Rectal tube 
in place and is intact.  Bed is in lowest position.  Wrist restraint present, no redness 
present to bilateral wrist, pulse is palpable.  Call light is within reach. Will continue to 
monitor.

## 2019-01-08 NOTE — PULMONOLOGY PROGRESS NOTE
Assessment/Plan


Assessment/Plan


IMPRESSION:


1. Hypotension. Resolved.


2. Sepsis. Resolved


3. Atrial fibrillation.


4. Bradycardia.


5. Diabetes mellitus.


6. End-stage renal disease on dialysis.


7. Left lung collapse. Resolved


8. Respiratory failure. now status post tracheostomy


9. Dysphagia.


10. Dementia.


11. Anemia








DISCUSSION:


1. Continue medications.


2. CXR improved


3. Status post tracheostomy


4. S/p G-tube 


5. Continue HD














  ______________________________________________


  Quentin Cardoso M.D.





Subjective


Interval Events:  No new events


Constitutional:  Reports: no symptoms


HEENT:  Repors: no symptoms


Respiratory:  Reports: no symptoms


Cardiovascular:  Reports: no symptoms


Genitourinary:  Reports: no symptoms


Neurologic:  Reports: no symptoms


Allergies:  


Coded Allergies:  


     No Known Allergies (Unverified , 11/26/18)





Objective





Last 24 Hour Vital Signs








  Date Time  Temp Pulse Resp B/P (MAP) Pulse Ox O2 Delivery O2 Flow Rate FiO2


 


1/8/19 12:00      Mechanical Ventilator  





      Mechanical Ventilator  


 


1/8/19 11:30  82 19     40


 


1/8/19 09:30  77 18     40


 


1/8/19 08:41    102/73    


 


1/8/19 08:00      Mechanical Ventilator  





      Mechanical Ventilator  


 


1/8/19 08:00  82      


 


1/8/19 08:00 97.7 110 19 102/73 (83) 100   


 


1/8/19 08:00        40


 


1/8/19 07:40  110 14     40


 


1/8/19 05:21  86 22     40


 


1/8/19 04:00        40


 


1/8/19 04:00      Mechanical Ventilator  





      Mechanical Ventilator  


 


1/8/19 04:00 98.1 80 20 137/47 (77) 100   


 


1/8/19 03:36  79      


 


1/8/19 03:31  78 17     40


 


1/8/19 02:03  89 20     40


 


1/8/19 00:00      Mechanical Ventilator  





      Mechanical Ventilator  


 


1/8/19 00:00  111      


 


1/8/19 00:00 97.7 119 22 146/40 (75) 100   


 


1/7/19 22:32    150/41    


 


1/7/19 22:25  90 20     40


 


1/7/19 20:14  86 20     40


 


1/7/19 20:00 98.0 117 20 150/41 (77) 100   


 


1/7/19 20:00      Mechanical Ventilator  





      Mechanical Ventilator  


 


1/7/19 20:00  120      


 


1/7/19 20:00        40


 


1/7/19 17:05  83 18     40


 


1/7/19 16:00      Mechanical Ventilator  





      Mechanical Ventilator  


 


1/7/19 16:00 97.5 87 23 152/65 (94) 99   


 


1/7/19 16:00        40


 


1/7/19 15:36  80      


 


1/7/19 15:30  71 14     40


 


1/7/19 13:07  75 21     40

















Intake and Output  


 


 1/7/19 1/8/19





 19:00 07:00


 


Intake Total 790 ml 779.6 ml


 


Balance 790 ml 779.6 ml


 


  


 


Free Water 90 ml 100 ml


 


IV Total 220 ml 239.6 ml


 


Tube Feeding 480 ml 440 ml








General Appearance:  no acute distress


HEENT:  normocephalic


Respiratory/Chest:  chest wall non-tender, lungs clear


Cardiovascular:  normal peripheral pulses


Abdomen:  normal bowel sounds, soft, non tender


Extremities:  no cyanosis





Microbiology








 Date/Time


Source Procedure


Growth Status


 


 


 1/6/19 12:15


Indwelling Cath Urine Culture - Preliminary


Escherichia Coli Resulted








Laboratory Tests


1/8/19 04:00: 


Random Vancomycin Level 9.3, Hepatitis A IgM Antibody [Pending], Hepatitis B 

Surface Antigen [Pending], Hepatitis B Core IgM Antibody [Pending], Hepatitis C 

Antibody [Pending]





Current Medications








 Medications


  (Trade)  Dose


 Ordered  Sig/Tony


 Route


 PRN Reason  Start Time


 Stop Time Status Last Admin


Dose Admin


 


 Acetaminophen


  (Tylenol)  650 mg  Q6H  PRN


 GT


 Mild Pain/Temp > 100.5  12/23/18 16:00


 1/20/19 15:59  12/28/18 12:03


 


 


 Acetaminophen/


 Hydrocodone Bitart


  (Norco 5/325)  1 tab  Q6H  PRN


 GT


 For Pain  1/2/19 18:30


 1/9/19 18:29  1/8/19 12:07


 


 


 Amiodarone HCl


  (Cordarone)  200 mg  DAILY


 GT


   1/2/19 09:00


 1/26/19 20:59  1/8/19 08:42


 


 


 Apixaban


  (Eliquis)  2.5 mg  BID


 GT


   12/27/18 18:00


 1/26/19 17:59  1/8/19 08:42


 


 


 Artificial Tears


  (Akwa-Tears)  1 drop  Q4H  PRN


 BOTH EYES


 Dry Eyes  12/23/18 15:30


 1/21/19 15:29  1/1/19 16:53


 


 


 Chlorhexidine


 Gluconate


  (Juana-Hex 2%)  1 applic  DAILY@2000


 TOPIC


   12/30/18 20:00


 1/29/19 19:59  1/7/19 22:32


 


 


 Collagenase


  (Santyl)  1 applic  DAILY


 TOPIC


   1/7/19 21:00


 2/6/19 08:59  1/8/19 08:42


 


 


 Lansoprazole


  (Prevacid)  30 mg  Q12HR


 GT


   12/27/18 21:00


 1/26/19 20:59  1/8/19 08:41


 


 


 Lorazepam


  (Ativan)  0.5 mg  Q6H  PRN


 ORAL


 For Anxiety  1/6/19 15:45


 1/13/19 15:44  1/6/19 16:52


 


 


 Minoxidil


  (Loniten)  2.5 mg  Q12HR


 GT


   12/27/18 21:00


 1/26/19 20:59  1/7/19 22:32


 


 


 Piperacillin Sod/


 Tazobactam Sod


 2.25 gm/Dextrose  110 ml @ 


 220 mls/hr  Q8H


 IV


   1/5/19 12:00


 1/12/19 11:59  1/8/19 12:06


 


 


 Sodium Chloride


  (Ocean Nasal


 Spray)  1 spray  Q4H  PRN


 NASAL


 dry nose  12/23/18 14:30


 1/21/19 18:29  12/25/18 11:49


 


 


 Tigecycline 100


 mg/Sodium Chloride  110 ml @ 


 110 mls/hr  ONCE


 IVPB


   1/8/19 14:00


 1/8/19 16:00   


 


 


 Tigecycline 50 mg/


 Sodium Chloride  110 ml @ 


 220 mls/hr  EVERY 12  HOURS


 IVPB


   1/9/19 09:00


 1/16/19 08:59   


 


 


 Zolpidem Tartrate


  (Ambien)  5 mg  HSPRN  PRN


 GT


 Insomnia  1/3/19 20:45


 1/10/19 20:44  1/8/19 00:26


 

















Quentin Cardoso MD Jan 8, 2019 12:47

## 2019-01-08 NOTE — NEPHROLOGY PROGRESS NOTE
Assessment/Plan


Assessment


Seee below


Plan





ESRD HD q MWF 


Anemia of CKD , SAMANTHA high dose. Transfuse PRN.





A. 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 with LAE. LVEF 65% . On Eliquis.





Post trach + PEG .


Referred to [facility]. Pt's son agreeable to [facility]. Referred again.


See orders.


Still needs extensive Vascular W/U.





Subjective


Subjective


Calm when observed from distance. Gets very agitated when notices me!!





Objective


Objective





Last 24 Hour Vital Signs








  Date Time  Temp Pulse Resp B/P (MAP) Pulse Ox O2 Delivery O2 Flow Rate FiO2


 


1/8/19 12:00      Mechanical Ventilator  





      Mechanical Ventilator  


 


1/8/19 11:30  82 19     40


 


1/8/19 09:30  77 18     40


 


1/8/19 08:41    102/73    


 


1/8/19 08:00      Mechanical Ventilator  





      Mechanical Ventilator  


 


1/8/19 08:00  82      


 


1/8/19 08:00 97.7 110 19 102/73 (83) 100   


 


1/8/19 08:00        40


 


1/8/19 07:40  110 14     40


 


1/8/19 05:21  86 22     40


 


1/8/19 04:00        40


 


1/8/19 04:00      Mechanical Ventilator  





      Mechanical Ventilator  


 


1/8/19 04:00 98.1 80 20 137/47 (77) 100   


 


1/8/19 03:36  79      


 


1/8/19 03:31  78 17     40


 


1/8/19 02:03  89 20     40


 


1/8/19 00:00      Mechanical Ventilator  





      Mechanical Ventilator  


 


1/8/19 00:00  111      


 


1/8/19 00:00 97.7 119 22 146/40 (75) 100   


 


1/7/19 22:32    150/41    


 


1/7/19 22:25  90 20     40


 


1/7/19 20:14  86 20     40


 


1/7/19 20:00 98.0 117 20 150/41 (77) 100   


 


1/7/19 20:00      Mechanical Ventilator  





      Mechanical Ventilator  


 


1/7/19 20:00  120      


 


1/7/19 20:00        40


 


1/7/19 17:05  83 18     40


 


1/7/19 16:00      Mechanical Ventilator  





      Mechanical Ventilator  


 


1/7/19 16:00 97.5 87 23 152/65 (94) 99   


 


1/7/19 16:00        40


 


1/7/19 15:36  80      


 


1/7/19 15:30  71 14     40

















Intake and Output  


 


 1/7/19 1/8/19





 19:00 07:00


 


Intake Total 790 ml 779.6 ml


 


Balance 790 ml 779.6 ml


 


  


 


Free Water 90 ml 100 ml


 


IV Total 220 ml 239.6 ml


 


Tube Feeding 480 ml 440 ml








Laboratory Tests


1/8/19 04:00: 


Random Vancomycin Level 9.3, Hepatitis A IgM Antibody [Pending], Hepatitis B 

Surface Antigen [Pending], Hepatitis B Core IgM Antibody [Pending], Hepatitis C 

Antibody [Pending]


Height (Feet):  5


Height (Inches):  2.00


Weight (Pounds):  120


Objective


On vent.


Trach clean.


Cv IRR/IRR


Lungs B ronchi.


Abd SNT. BS +  PEG.


E Rt. foot diabetic ulcers











Gavino Daigle MD Jan 8, 2019 13:26

## 2019-01-08 NOTE — CARDIOLOGY PROGRESS NOTE
Assessment/Plan


Assessment/Plan


1. Hypotension, possibly sepsis versus volume.


2. Paroxysmal episodes of atrial fibrillation history.


3. Bradycardia secondary to medications, amiodarone possibly.


4. Diabetes mellitus.


5. End-stage renal disease, on hemodialysis.


6. Lung collapse.


7. Respiratory failure, status post intubation.


8. Dysphagia.


9. History of dementia.


10. Pulm htn 


11.  Pleural effusion 


12. cardaic arrest


13. chest wall pain post cpr  


14. hyponatremai 





mostly in sinus now 


amiod 200 mg daily 


vent support 


s/p trach


peg done 


on minoxidl and hold parameter applied 


on elequis  2.5 mg bid for stroke prevention 


awaits snf 


na is lower will leave to dr rondon 








Subjective


ROS Limited/Unobtainable:  Yes


Subjective


on the vent, has pain is in restriatns





Objective





Last 24 Hour Vital Signs








  Date Time  Temp Pulse Resp B/P (MAP) Pulse Ox O2 Delivery O2 Flow Rate FiO2


 


1/8/19 11:30  82 19     40


 


1/8/19 09:30  77 18     40


 


1/8/19 08:41    102/73    


 


1/8/19 08:00      Mechanical Ventilator  





      Mechanical Ventilator  


 


1/8/19 08:00  82      


 


1/8/19 08:00 97.7 110 19 102/73 (83) 100   


 


1/8/19 08:00        40


 


1/8/19 07:40  110 14     40


 


1/8/19 05:21  86 22     40


 


1/8/19 04:00        40


 


1/8/19 04:00      Mechanical Ventilator  





      Mechanical Ventilator  


 


1/8/19 04:00 98.1 80 20 137/47 (77) 100   


 


1/8/19 03:36  79      


 


1/8/19 03:31  78 17     40


 


1/8/19 02:03  89 20     40


 


1/8/19 00:00      Mechanical Ventilator  





      Mechanical Ventilator  


 


1/8/19 00:00  111      


 


1/8/19 00:00 97.7 119 22 146/40 (75) 100   


 


1/7/19 22:32    150/41    


 


1/7/19 22:25  90 20     40


 


1/7/19 20:14  86 20     40


 


1/7/19 20:00 98.0 117 20 150/41 (77) 100   


 


1/7/19 20:00      Mechanical Ventilator  





      Mechanical Ventilator  


 


1/7/19 20:00  120      


 


1/7/19 20:00        40


 


1/7/19 17:05  83 18     40


 


1/7/19 16:00      Mechanical Ventilator  





      Mechanical Ventilator  


 


1/7/19 16:00 97.5 87 23 152/65 (94) 99   


 


1/7/19 16:00        40


 


1/7/19 15:36  80      


 


1/7/19 15:30  71 14     40


 


1/7/19 13:07  75 21     40








General Appearance:  no apparent distress, alert, on vent, patient on isolation


Cardiovascular:  normal rate


Respiratory/Chest:  lungs clear


Abdomen:  normal bowel sounds, soft, tender


Extremities:  no swelling











Intake and Output  


 


 1/7/19 1/8/19





 19:00 07:00


 


Intake Total 790 ml 779.6 ml


 


Balance 790 ml 779.6 ml


 


  


 


Free Water 90 ml 100 ml


 


IV Total 220 ml 239.6 ml


 


Tube Feeding 480 ml 440 ml











Laboratory Tests








Test


  1/8/19


04:00


 


Random Vancomycin Level 9.3 ug/mL  


 


Hepatitis A IgM Antibody Pending  


 


Hepatitis B Surface Antigen Pending  


 


Hepatitis B Core IgM Antibody Pending  


 


Hepatitis C Antibody Pending  











Microbiology








 Date/Time


Source Procedure


Growth Status


 


 


 1/6/19 12:15


Indwelling Cath Urine Culture - Preliminary


Escherichia Coli Resulted

















Mariusz Wade MD Jan 8, 2019 12:06

## 2019-01-09 VITALS — SYSTOLIC BLOOD PRESSURE: 123 MMHG | DIASTOLIC BLOOD PRESSURE: 55 MMHG

## 2019-01-09 VITALS — DIASTOLIC BLOOD PRESSURE: 35 MMHG | SYSTOLIC BLOOD PRESSURE: 126 MMHG

## 2019-01-09 VITALS — SYSTOLIC BLOOD PRESSURE: 122 MMHG | DIASTOLIC BLOOD PRESSURE: 52 MMHG

## 2019-01-09 VITALS — DIASTOLIC BLOOD PRESSURE: 39 MMHG | SYSTOLIC BLOOD PRESSURE: 121 MMHG

## 2019-01-09 VITALS — DIASTOLIC BLOOD PRESSURE: 43 MMHG | SYSTOLIC BLOOD PRESSURE: 116 MMHG

## 2019-01-09 VITALS — SYSTOLIC BLOOD PRESSURE: 116 MMHG | DIASTOLIC BLOOD PRESSURE: 52 MMHG

## 2019-01-09 LAB
ADD MANUAL DIFF: NO
ANION GAP SERPL CALC-SCNC: 12 MMOL/L (ref 5–15)
BASOPHILS NFR BLD AUTO: 2.7 % (ref 0–2)
BUN SERPL-MCNC: 34 MG/DL (ref 7–18)
CALCIUM SERPL-MCNC: 9.1 MG/DL (ref 8.5–10.1)
CHLORIDE SERPL-SCNC: 95 MMOL/L (ref 98–107)
CO2 SERPL-SCNC: 26 MMOL/L (ref 21–32)
CREAT SERPL-MCNC: 1.8 MG/DL (ref 0.55–1.3)
EOSINOPHIL NFR BLD AUTO: 2.7 % (ref 0–3)
ERYTHROCYTE [DISTWIDTH] IN BLOOD BY AUTOMATED COUNT: 17.9 % (ref 11.6–14.8)
HCT VFR BLD CALC: 26.5 % (ref 37–47)
HGB BLD-MCNC: 8.3 G/DL (ref 12–16)
LYMPHOCYTES NFR BLD AUTO: 5.3 % (ref 20–45)
MCV RBC AUTO: 95 FL (ref 80–99)
MONOCYTES NFR BLD AUTO: 9.9 % (ref 1–10)
NEUTROPHILS NFR BLD AUTO: 79.5 % (ref 45–75)
PLATELET # BLD: 413 K/UL (ref 150–450)
POTASSIUM SERPL-SCNC: 4.1 MMOL/L (ref 3.5–5.1)
RBC # BLD AUTO: 2.78 M/UL (ref 4.2–5.4)
SODIUM SERPL-SCNC: 132 MMOL/L (ref 136–145)
WBC # BLD AUTO: 8.5 K/UL (ref 4.8–10.8)

## 2019-01-09 RX ADMIN — APIXABAN SCH MG: 2.5 TABLET, FILM COATED ORAL at 08:39

## 2019-01-09 RX ADMIN — SODIUM CHLORIDE SCH MLS/HR: 0.9 INJECTION INTRAVENOUS at 04:20

## 2019-01-09 RX ADMIN — HYDROCODONE BITARTRATE AND ACETAMINOPHEN PRN TAB: 5; 325 TABLET ORAL at 11:41

## 2019-01-09 RX ADMIN — HYDROCODONE BITARTRATE AND ACETAMINOPHEN PRN TAB: 5; 325 TABLET ORAL at 05:11

## 2019-01-09 RX ADMIN — MINOXIDIL SCH MG: 2.5 TABLET ORAL at 22:59

## 2019-01-09 RX ADMIN — MINOXIDIL SCH MG: 2.5 TABLET ORAL at 08:40

## 2019-01-09 RX ADMIN — ZOLPIDEM TARTRATE PRN MG: 5 TABLET ORAL at 04:20

## 2019-01-09 RX ADMIN — MEROPENEM SCH MLS/HR: 500 INJECTION INTRAVENOUS at 11:55

## 2019-01-09 RX ADMIN — AMIODARONE HYDROCHLORIDE SCH MG: 200 TABLET ORAL at 08:39

## 2019-01-09 RX ADMIN — HYDROCODONE BITARTRATE AND ACETAMINOPHEN PRN TAB: 5; 325 TABLET ORAL at 17:50

## 2019-01-09 RX ADMIN — CHLORHEXIDINE GLUCONATE SCH APPLIC: 213 SOLUTION TOPICAL at 22:59

## 2019-01-09 RX ADMIN — APIXABAN SCH MG: 2.5 TABLET, FILM COATED ORAL at 17:49

## 2019-01-09 NOTE — NUR
RESPIRATORY NOTE: Pt was received stable, awake, and alert on trach Shiley 8 at vent 
settings AC 8, 450 VT, 35% PEEP +5, saturated at 100%. Suctioned with no complications. 
Rhonchi breath sounds.  No SOB or acute resp distress noted. Alarms are on and audible. Vent 
is plugged into red outlet. Ambu bag and extra shiley 8 @ bedside. Will continue to monitor 
closely.

## 2019-01-09 NOTE — NUR
NURSE NOTES:

Dialysis RN is at bed side. Patient is currently being dialyzed and is tolerating well. Will 
continue to monitor.

## 2019-01-09 NOTE — NEPHROLOGY PROGRESS NOTE
Assessment/Plan


Assessment


Seee below


Plan





ESRD HD q MWF 


Anemia of CKD , SAMANTHA high dose. 





A. 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 with LAE. LVEF 65% . On Eliquis.





Post trach + PEG .


Referred to [facility]. Pt's son agreeable to [facility]. Referred again.


See orders.


Still needs extensive Vascular W/U.





Subjective


Subjective


No new c/o.





Objective


Objective





Last 24 Hour Vital Signs








  Date Time  Temp Pulse Resp B/P (MAP) Pulse Ox O2 Delivery O2 Flow Rate FiO2


 


1/9/19 08:40    121/39    


 


1/9/19 08:00  76      


 


1/9/19 08:00      Mechanical Ventilator  





      Mechanical Ventilator  


 


1/9/19 08:00        40


 


1/9/19 08:00 97.5 75 23 121/39 (66) 100   


 


1/9/19 06:35  71 12     40


 


1/9/19 04:46  105 18     40


 


1/9/19 04:00 97.9 82 15 116/52 (73) 100   


 


1/9/19 04:00      Mechanical Ventilator  





      Mechanical Ventilator  


 


1/9/19 04:00        40


 


1/9/19 03:32  121      


 


1/9/19 03:30  83 16     40


 


1/9/19 00:39  80 17     40


 


1/9/19 00:00 98.0 80 16 122/52 (75) 95   


 


1/9/19 00:00      Mechanical Ventilator  





      Mechanical Ventilator  


 


1/9/19 00:00        40


 


1/8/19 23:19  83      


 


1/8/19 22:57  81 16     40


 


1/8/19 20:35  80 16     40


 


1/8/19 20:28  85      


 


1/8/19 20:17    140/62    


 


1/8/19 20:00 98.1 82 18 140/62 (88) 100   


 


1/8/19 20:00      Mechanical Ventilator  





      Mechanical Ventilator  


 


1/8/19 20:00        40


 


1/8/19 19:30  82 16     40


 


1/8/19 17:33  70 18     40


 


1/8/19 16:00  74      


 


1/8/19 16:00        40


 


1/8/19 16:00      Mechanical Ventilator  





      Mechanical Ventilator  


 


1/8/19 16:00 97.5 68 20 102/42 (62) 100   


 


1/8/19 15:28  74 22     40


 


1/8/19 13:34  75 21     40


 


1/8/19 12:00 96.8 75 21 125/63 (83) 100   


 


1/8/19 12:00        40


 


1/8/19 12:00      Mechanical Ventilator  





      Mechanical Ventilator  


 


1/8/19 11:45  74      


 


1/8/19 11:30  82 19     40


 


1/8/19 09:30  77 18     40

















Intake and Output  


 


 1/8/19 1/9/19





 18:59 06:59


 


Intake Total 1383.334 ml 900 ml


 


Output Total 100 ml 


 


Balance 1283.334 ml 900 ml


 


  


 


Free Water 200 ml 100 ml


 


IV Total 663.334 ml 220 ml


 


Tube Feeding 520 ml 480 ml


 


Other  100 ml


 


Stool Total 100 ml 








Laboratory Tests


1/9/19 04:00: 


White Blood Count 8.5, Red Blood Count 2.78L, Hemoglobin 8.3L, Hematocrit 26.5L

, Mean Corpuscular Volume 95, Mean Corpuscular Hemoglobin 29.9, Mean 

Corpuscular Hemoglobin Concent 31.3L, Red Cell Distribution Width 17.9H, 

Platelet Count 413, Mean Platelet Volume 5.1L, Neutrophils (%) (Auto) 79.5H, 

Lymphocytes (%) (Auto) 5.3L, Monocytes (%) (Auto) 9.9, Eosinophils (%) (Auto) 

2.7, Basophils (%) (Auto) 2.7H, Sodium Level 132L, Potassium Level 4.1, 

Chloride Level 95L, Carbon Dioxide Level 26, Anion Gap 12, Blood Urea Nitrogen 

34H, Creatinine 1.8H, Estimat Glomerular Filtration Rate , Glucose Level 86, 

Calcium Level 9.1


Height (Feet):  5


Height (Inches):  2.00


Weight (Pounds):  120


Objective


On vent.


Trach clean.


Cv IRR/IRR


Lungs B ronchi.


Abd SNT. BS +  PEG.


E Rt. foot diabetic ulcers











Gavino Daigle MD Jan 9, 2019 09:00

## 2019-01-09 NOTE — GI PROGRESS NOTE
Assessment/Plan


Problems:  


(1) Encounter for PEG (percutaneous endoscopic gastrostomy)


ICD Codes:  Z43.1 - Encounter for attention to gastrostomy


SNOMED:  275254512, 254830390


(2) Severe malnutrition


ICD Codes:  E43 - Unspecified severe protein-calorie malnutrition


SNOMED:  40606612


(3) Dehydration


ICD Codes:  E86.0 - Dehydration


SNOMED:  18190898


Status:  unchanged


Status Narrative


Discussed with Dr. aLm.


Assessment/Plan


Assessment


- Dysphagia


- Resp failure


- ESRD


- Anemia


- Status post PEG, tolerating feeds





Recommendations


1. Abdominal binder.


2. Elevate the head of the bed at all times.


3. G-tube flush.


4. G-tube care.


5. tube feeding later today per RD to goal


Monitor H&H, PRN transfusions


PPI


Electrolyte correction


Follow-up labs





The patient was seen and examined at bedside and all new and available data was 

reviewed in the patients chart. I agree with the above findings, impression 

and plan.  (Patient seen earlier today. Signature stamp does not reflect 

patient encounter time.). - Elliott Lam MD





Subjective


Subjective


limited





Objective





Last 24 Hour Vital Signs








  Date Time  Temp Pulse Resp B/P (MAP) Pulse Ox O2 Delivery O2 Flow Rate FiO2


 


1/9/19 08:40    121/39    


 


1/9/19 08:30  69 12     40


 


1/9/19 08:00  76      


 


1/9/19 08:00      Mechanical Ventilator  





      Mechanical Ventilator  


 


1/9/19 08:00        40


 


1/9/19 08:00 97.5 75 23 121/39 (66) 100   


 


1/9/19 06:35  71 12     40


 


1/9/19 04:46  105 18     40


 


1/9/19 04:00 97.9 82 15 116/52 (73) 100   


 


1/9/19 04:00      Mechanical Ventilator  





      Mechanical Ventilator  


 


1/9/19 04:00        40


 


1/9/19 03:32  121      


 


1/9/19 03:30  83 16     40


 


1/9/19 00:39  80 17     40


 


1/9/19 00:00 98.0 80 16 122/52 (75) 95   


 


1/9/19 00:00      Mechanical Ventilator  





      Mechanical Ventilator  


 


1/9/19 00:00        40


 


1/8/19 23:19  83      


 


1/8/19 22:57  81 16     40


 


1/8/19 20:35  80 16     40


 


1/8/19 20:28  85      


 


1/8/19 20:17    140/62    


 


1/8/19 20:00 98.1 82 18 140/62 (88) 100   


 


1/8/19 20:00      Mechanical Ventilator  





      Mechanical Ventilator  


 


1/8/19 20:00        40


 


1/8/19 19:30  82 16     40


 


1/8/19 17:33  70 18     40


 


1/8/19 16:00  74      


 


1/8/19 16:00        40


 


1/8/19 16:00      Mechanical Ventilator  





      Mechanical Ventilator  


 


1/8/19 16:00 97.5 68 20 102/42 (62) 100   


 


1/8/19 15:28  74 22     40


 


1/8/19 13:34  75 21     40


 


1/8/19 12:00 96.8 75 21 125/63 (83) 100   


 


1/8/19 12:00        40


 


1/8/19 12:00      Mechanical Ventilator  





      Mechanical Ventilator  


 


1/8/19 11:45  74      


 


1/8/19 11:30  82 19     40

















Intake and Output  


 


 1/8/19 1/9/19





 18:59 06:59


 


Intake Total 1383.334 ml 900 ml


 


Output Total 100 ml 


 


Balance 1283.334 ml 900 ml


 


  


 


Free Water 200 ml 100 ml


 


IV Total 663.334 ml 220 ml


 


Tube Feeding 520 ml 480 ml


 


Other  100 ml


 


Stool Total 100 ml 











Laboratory Tests








Test


  1/9/19


04:00


 


White Blood Count


  8.5 K/UL


(4.8-10.8)


 


Red Blood Count


  2.78 M/UL


(4.20-5.40)  L


 


Hemoglobin


  8.3 G/DL


(12.0-16.0)  L


 


Hematocrit


  26.5 %


(37.0-47.0)  L


 


Mean Corpuscular Volume 95 FL (80-99)  


 


Mean Corpuscular Hemoglobin


  29.9 PG


(27.0-31.0)


 


Mean Corpuscular Hemoglobin


Concent 31.3 G/DL


(32.0-36.0)  L


 


Red Cell Distribution Width


  17.9 %


(11.6-14.8)  H


 


Platelet Count


  413 K/UL


(150-450)


 


Mean Platelet Volume


  5.1 FL


(6.5-10.1)  L


 


Neutrophils (%) (Auto)


  79.5 %


(45.0-75.0)  H


 


Lymphocytes (%) (Auto)


  5.3 %


(20.0-45.0)  L


 


Monocytes (%) (Auto)


  9.9 %


(1.0-10.0)


 


Eosinophils (%) (Auto)


  2.7 %


(0.0-3.0)


 


Basophils (%) (Auto)


  2.7 %


(0.0-2.0)  H


 


Sodium Level


  132 MMOL/L


(136-145)  L


 


Potassium Level


  4.1 MMOL/L


(3.5-5.1)


 


Chloride Level


  95 MMOL/L


()  L


 


Carbon Dioxide Level


  26 MMOL/L


(21-32)


 


Anion Gap


  12 mmol/L


(5-15)


 


Blood Urea Nitrogen


  34 mg/dL


(7-18)  H


 


Creatinine


  1.8 MG/DL


(0.55-1.30)  H


 


Estimat Glomerular Filtration


Rate  mL/min (>60)  


 


 


Glucose Level


  86 MG/DL


()


 


Calcium Level


  9.1 MG/DL


(8.5-10.1)








Height (Feet):  5


Height (Inches):  2.00


Weight (Pounds):  120


General Appearance:  alert


Respiratory/Chest:  other - Mechanical ventilator


Abdominal Exam:  GT site - Clean dry and intact











Shane Lowry NP Jan 9, 2019 10:52

## 2019-01-09 NOTE — PULMONOLOGY PROGRESS NOTE
Assessment/Plan


Assessment/Plan


IMPRESSION:


1. Hypotension. Resolved.


2. Sepsis. Resolved


3. Atrial fibrillation.


4. Bradycardia.


5. Diabetes mellitus.


6. End-stage renal disease on dialysis.


7. Left lung collapse. Resolved


8. Respiratory failure. now status post tracheostomy


9. Dysphagia.


10. Dementia.


11. Anemia








DISCUSSION:


1. Continue medications.


2. CXR improved


3. Status post tracheostomy


4. S/p G-tube 


5. Continue HD














  ______________________________________________


  Quentin Cardoso M.D.





Subjective


Interval Events:  none new


Constitutional:  Reports: no symptoms


HEENT:  Repors: no symptoms


Respiratory:  Reports: no symptoms


Cardiovascular:  Reports: no symptoms


Gastrointestinal/Abdominal:  Reports: no symptoms


Allergies:  


Coded Allergies:  


     No Known Allergies (Unverified , 11/26/18)





Objective





Last 24 Hour Vital Signs








  Date Time  Temp Pulse Resp B/P (MAP) Pulse Ox O2 Delivery O2 Flow Rate FiO2


 


1/9/19 17:20  115 17     40


 


1/9/19 16:00  113      


 


1/9/19 16:00 96.6 112 15 116/43 (67) 100   


 


1/9/19 16:00        40


 


1/9/19 16:00      Mechanical Ventilator  





      Mechanical Ventilator  


 


1/9/19 15:10  115 15     40


 


1/9/19 13:00  105 19     40


 


1/9/19 12:00      Mechanical Ventilator  





      Mechanical Ventilator  


 


1/9/19 12:00 97.5 68 16 126/35 (65) 100   


 


1/9/19 12:00  70      


 


1/9/19 12:00        40


 


1/9/19 11:20  86 20     40


 


1/9/19 08:40    121/39    


 


1/9/19 08:30  69 12     40


 


1/9/19 08:00  76      


 


1/9/19 08:00      Mechanical Ventilator  





      Mechanical Ventilator  


 


1/9/19 08:00        40


 


1/9/19 08:00 97.5 75 19 121/39 (66) 100   


 


1/9/19 06:35  71 12     40


 


1/9/19 04:46  105 18     40


 


1/9/19 04:00 97.9 82 15 116/52 (73) 100   


 


1/9/19 04:00      Mechanical Ventilator  





      Mechanical Ventilator  


 


1/9/19 04:00        40


 


1/9/19 03:32  121      


 


1/9/19 03:30  83 16     40


 


1/9/19 00:39  80 17     40


 


1/9/19 00:00 98.0 80 16 122/52 (75) 95   


 


1/9/19 00:00      Mechanical Ventilator  





      Mechanical Ventilator  


 


1/9/19 00:00        40


 


1/8/19 23:19  83      


 


1/8/19 22:57  81 16     40


 


1/8/19 20:35  80 16     40


 


1/8/19 20:28  85      


 


1/8/19 20:17    140/62    


 


1/8/19 20:00 98.1 82 18 140/62 (88) 100   


 


1/8/19 20:00      Mechanical Ventilator  





      Mechanical Ventilator  


 


1/8/19 20:00        40


 


1/8/19 19:30  82 16     40

















Intake and Output  


 


 1/8/19 1/9/19





 19:00 07:00


 


Intake Total 1116.667 ml 900 ml


 


Output Total 100 ml 


 


Balance 1016.667 ml 900 ml


 


  


 


Free Water 100 ml 100 ml


 


IV Total 496.667 ml 220 ml


 


Tube Feeding 520 ml 480 ml


 


Other  100 ml


 


Stool Total 100 ml 








General Appearance:  no acute distress


HEENT:  status post trach


Respiratory/Chest:  chest wall non-tender, lungs clear


Cardiovascular:  normal rate, regular rhythm


Abdomen:  normal bowel sounds


Laboratory Tests


1/9/19 04:00: 


White Blood Count 8.5, Red Blood Count 2.78L, Hemoglobin 8.3L, Hematocrit 26.5L

, Mean Corpuscular Volume 95, Mean Corpuscular Hemoglobin 29.9, Mean 

Corpuscular Hemoglobin Concent 31.3L, Red Cell Distribution Width 17.9H, 

Platelet Count 413, Mean Platelet Volume 5.1L, Neutrophils (%) (Auto) 79.5H, 

Lymphocytes (%) (Auto) 5.3L, Monocytes (%) (Auto) 9.9, Eosinophils (%) (Auto) 

2.7, Basophils (%) (Auto) 2.7H, Sodium Level 132L, Potassium Level 4.1, 

Chloride Level 95L, Carbon Dioxide Level 26, Anion Gap 12, Blood Urea Nitrogen 

34H, Creatinine 1.8H, Estimat Glomerular Filtration Rate , Glucose Level 86, 

Calcium Level 9.1





Current Medications








 Medications


  (Trade)  Dose


 Ordered  Sig/Tony


 Route


 PRN Reason  Start Time


 Stop Time Status Last Admin


Dose Admin


 


 Acetaminophen


  (Tylenol)  650 mg  Q6H  PRN


 GT


 Mild Pain/Temp > 100.5  12/23/18 16:00


 1/20/19 15:59  12/28/18 12:03


 


 


 Acetaminophen/


 Hydrocodone Bitart


  (Norco 5/325)  1 tab  Q6H  PRN


 GT


 For Pain  1/8/19 13:35


 1/15/19 13:00  1/9/19 17:50


 


 


 Amiodarone HCl


  (Cordarone)  200 mg  DAILY


 GT


   1/2/19 09:00


 1/26/19 20:59  1/9/19 08:39


 


 


 Apixaban


  (Eliquis)  2.5 mg  BID


 GT


   12/27/18 18:00


 1/26/19 17:59  1/9/19 17:49


 


 


 Artificial Tears


  (Akwa-Tears)  1 drop  Q4H  PRN


 BOTH EYES


 Dry Eyes  12/23/18 15:30


 1/21/19 15:29  1/1/19 16:53


 


 


 Chlorhexidine


 Gluconate


  (Juana-Hex 2%)  1 applic  DAILY@2000


 TOPIC


   12/30/18 20:00


 1/29/19 19:59  1/8/19 20:16


 


 


 Collagenase


  (Santyl)  1 applic  DAILY


 TOPIC


   1/7/19 21:00


 2/6/19 08:59  1/9/19 08:40


 


 


 Lansoprazole


  (Prevacid)  30 mg  Q12HR


 GT


   12/27/18 21:00


 1/26/19 20:59  1/9/19 08:39


 


 


 Lorazepam


  (Ativan)  0.5 mg  Q6H  PRN


 ORAL


 For Anxiety  1/6/19 15:45


 1/13/19 15:44  1/8/19 14:07


 


 


 Meropenem 500 mg/


 Sodium Chloride  55 ml @ 


 110 mls/hr  DAILY


 IVPB


   1/9/19 12:00


 1/14/19 11:59  1/9/19 11:55


 


 


 Minoxidil


  (Loniten)  2.5 mg  Q12HR


 GT


   12/27/18 21:00


 1/26/19 20:59  1/9/19 08:40


 


 


 Sodium Chloride  1,000 ml @ 


 500 mls/hr  Q2H PRN


 IVLG


 sbp<90 during hd  1/8/19 13:29


 1/9/19 23:59   


 


 


 Sodium Chloride


  (Ocean Nasal


 Spray)  1 spray  Q4H  PRN


 NASAL


 dry nose  12/23/18 14:30


 1/21/19 18:29  12/25/18 11:49


 


 


 Zolpidem Tartrate


  (Ambien)  5 mg  HSPRN  PRN


 GT


 Insomnia  1/3/19 20:45


 1/10/19 20:44  1/9/19 04:20


 

















Quentin Cardoso MD Jan 9, 2019 19:28

## 2019-01-09 NOTE — INFECTIOUS DISEASES PROG NOTE
Assessment/Plan


Assessment/Plan


A:


New leukocytosis resolved


UTI with ESBL E. Coli


Cellulitis of R leg, osteomyelitis treated


Hypercapnic respiratory failure


ESRD on HD


DM


Anemia


Dementia


PVD


R pleural effusion


Mucous plug


s/o Cardiac arrest


Multiple Pressure ulcers


P;


Discontinue Zosyn & Tygacil


start on Meropenem





Subjective


ROS Limited/Unobtainable:  Yes


Neurologic:  Reports: confusion, other - on restraint


Allergies:  


Coded Allergies:  


     No Known Allergies (Unverified , 11/26/18)





Objective


Vital Signs





Last 24 Hour Vital Signs








  Date Time  Temp Pulse Resp B/P (MAP) Pulse Ox O2 Delivery O2 Flow Rate FiO2


 


1/9/19 08:40    121/39    


 


1/9/19 08:30  69 12     40


 


1/9/19 08:00  76      


 


1/9/19 08:00      Mechanical Ventilator  





      Mechanical Ventilator  


 


1/9/19 08:00        40


 


1/9/19 08:00 97.5 75 23 121/39 (66) 100   


 


1/9/19 06:35  71 12     40


 


1/9/19 04:46  105 18     40


 


1/9/19 04:00 97.9 82 15 116/52 (73) 100   


 


1/9/19 04:00      Mechanical Ventilator  





      Mechanical Ventilator  


 


1/9/19 04:00        40


 


1/9/19 03:32  121      


 


1/9/19 03:30  83 16     40


 


1/9/19 00:39  80 17     40


 


1/9/19 00:00 98.0 80 16 122/52 (75) 95   


 


1/9/19 00:00      Mechanical Ventilator  





      Mechanical Ventilator  


 


1/9/19 00:00        40


 


1/8/19 23:19  83      


 


1/8/19 22:57  81 16     40


 


1/8/19 20:35  80 16     40


 


1/8/19 20:28  85      


 


1/8/19 20:17    140/62    


 


1/8/19 20:00 98.1 82 18 140/62 (88) 100   


 


1/8/19 20:00      Mechanical Ventilator  





      Mechanical Ventilator  


 


1/8/19 20:00        40


 


1/8/19 19:30  82 16     40


 


1/8/19 17:33  70 18     40


 


1/8/19 16:00  74      


 


1/8/19 16:00        40


 


1/8/19 16:00      Mechanical Ventilator  





      Mechanical Ventilator  


 


1/8/19 16:00 97.5 68 20 102/42 (62) 100   


 


1/8/19 15:28  74 22     40


 


1/8/19 13:34  75 21     40


 


1/8/19 12:00 96.8 75 21 125/63 (83) 100   


 


1/8/19 12:00        40


 


1/8/19 12:00      Mechanical Ventilator  





      Mechanical Ventilator  


 


1/8/19 11:45  74      


 


1/8/19 11:30  82 19     40








Height (Feet):  5


Height (Inches):  2.00


Weight (Pounds):  120


General Appearance:  no acute distress


HEENT:  status post trach


Respiratory/Chest:  lungs clear, other - on ventilator


Cardiovascular:  normal peripheral pulses


Abdomen:  soft, non tender, other - GT & rectal tube


Extremities:  no edema


Neurologic/Psychiatric:  alert, disoriented





Microbiology








 Date/Time


Source Procedure


Growth Status


 


 


 1/6/19 12:15


Indwelling Cath Urine Culture - Final


Escherichia Coli - Esbl Complete











Laboratory Tests








Test


  1/9/19


04:00


 


White Blood Count


  8.5 K/UL


(4.8-10.8)


 


Red Blood Count


  2.78 M/UL


(4.20-5.40)  L


 


Hemoglobin


  8.3 G/DL


(12.0-16.0)  L


 


Hematocrit


  26.5 %


(37.0-47.0)  L


 


Mean Corpuscular Volume 95 FL (80-99)  


 


Mean Corpuscular Hemoglobin


  29.9 PG


(27.0-31.0)


 


Mean Corpuscular Hemoglobin


Concent 31.3 G/DL


(32.0-36.0)  L


 


Red Cell Distribution Width


  17.9 %


(11.6-14.8)  H


 


Platelet Count


  413 K/UL


(150-450)


 


Mean Platelet Volume


  5.1 FL


(6.5-10.1)  L


 


Neutrophils (%) (Auto)


  79.5 %


(45.0-75.0)  H


 


Lymphocytes (%) (Auto)


  5.3 %


(20.0-45.0)  L


 


Monocytes (%) (Auto)


  9.9 %


(1.0-10.0)


 


Eosinophils (%) (Auto)


  2.7 %


(0.0-3.0)


 


Basophils (%) (Auto)


  2.7 %


(0.0-2.0)  H


 


Sodium Level


  132 MMOL/L


(136-145)  L


 


Potassium Level


  4.1 MMOL/L


(3.5-5.1)


 


Chloride Level


  95 MMOL/L


()  L


 


Carbon Dioxide Level


  26 MMOL/L


(21-32)


 


Anion Gap


  12 mmol/L


(5-15)


 


Blood Urea Nitrogen


  34 mg/dL


(7-18)  H


 


Creatinine


  1.8 MG/DL


(0.55-1.30)  H


 


Estimat Glomerular Filtration


Rate  mL/min (>60)  


 


 


Glucose Level


  86 MG/DL


()


 


Calcium Level


  9.1 MG/DL


(8.5-10.1)











Current Medications








 Medications


  (Trade)  Dose


 Ordered  Sig/Tony


 Route


 PRN Reason  Start Time


 Stop Time Status Last Admin


Dose Admin


 


 Acetaminophen


  (Tylenol)  650 mg  Q6H  PRN


 GT


 Mild Pain/Temp > 100.5  12/23/18 16:00


 1/20/19 15:59  12/28/18 12:03


 


 


 Acetaminophen/


 Hydrocodone Bitart


  (Norco 5/325)  1 tab  Q6H  PRN


 GT


 For Pain  1/8/19 13:35


 1/15/19 13:00  1/9/19 05:11


 


 


 Amiodarone HCl


  (Cordarone)  200 mg  DAILY


 GT


   1/2/19 09:00


 1/26/19 20:59  1/9/19 08:39


 


 


 Apixaban


  (Eliquis)  2.5 mg  BID


 GT


   12/27/18 18:00


 1/26/19 17:59  1/9/19 08:39


 


 


 Artificial Tears


  (Akwa-Tears)  1 drop  Q4H  PRN


 BOTH EYES


 Dry Eyes  12/23/18 15:30


 1/21/19 15:29  1/1/19 16:53


 


 


 Chlorhexidine


 Gluconate


  (Juana-Hex 2%)  1 applic  DAILY@2000


 TOPIC


   12/30/18 20:00


 1/29/19 19:59  1/8/19 20:16


 


 


 Collagenase


  (Santyl)  1 applic  DAILY


 TOPIC


   1/7/19 21:00


 2/6/19 08:59  1/9/19 08:40


 


 


 Lansoprazole


  (Prevacid)  30 mg  Q12HR


 GT


   12/27/18 21:00


 1/26/19 20:59  1/9/19 08:39


 


 


 Lorazepam


  (Ativan)  0.5 mg  Q6H  PRN


 ORAL


 For Anxiety  1/6/19 15:45


 1/13/19 15:44  1/8/19 14:07


 


 


 Minoxidil


  (Loniten)  2.5 mg  Q12HR


 GT


   12/27/18 21:00


 1/26/19 20:59  1/9/19 08:40


 


 


 Piperacillin Sod/


 Tazobactam Sod


 2.25 gm/Dextrose  110 ml @ 


 220 mls/hr  Q8H


 IV


   1/5/19 12:00


 1/12/19 11:59  1/9/19 04:20


 


 


 Sodium Chloride  1,000 ml @ 


 500 mls/hr  Q2H PRN


 IVLG


 sbp<90 during hd  1/8/19 13:29


 1/9/19 23:59   


 


 


 Sodium Chloride


  (Ocean Nasal


 Spray)  1 spray  Q4H  PRN


 NASAL


 dry nose  12/23/18 14:30


 1/21/19 18:29  12/25/18 11:49


 


 


 Tigecycline 50 mg/


 Sodium Chloride  110 ml @ 


 220 mls/hr  EVERY 12  HOURS


 IVPB


   1/9/19 09:00


 1/16/19 08:59  1/9/19 08:40


 


 


 Zolpidem Tartrate


  (Ambien)  5 mg  HSPRN  PRN


 GT


 Insomnia  1/3/19 20:45


 1/10/19 20:44  1/9/19 04:20


 

















Luke Maynard MD Jan 9, 2019 10:53

## 2019-01-09 NOTE — NUR
CASE MANAGEMENT: REVIEW





SI: ESRD ON HD

TRACHEOSTOMY 12/17

PEG PLACEMENT 12/19 

T 97.5  /35 % MECH VENT FIO2 40

H/H 8.3/26.5  BUN 34 CR 1.8 









IS: MINOXIDIL GT Q12HR 

PROCRIT SQ MWF

ELIQUIS NG BID 

AMIODARONE GT Q12HR 

GT FEEDING NEPRO @ 40ML/HR

HD PRN 



***STEP DOWN UNIT  STATUS***

DCP: PATIENT IS FROM [facility]

## 2019-01-09 NOTE — CARDIOLOGY PROGRESS NOTE
Assessment/Plan


Assessment/Plan


1. Hypotension, possibly sepsis versus volume.


2. Paroxysmal episodes of atrial fibrillation history.


3. Bradycardia secondary to medications, amiodarone possibly.


4. Diabetes mellitus.


5. End-stage renal disease, on hemodialysis.


6. Lung collapse.


7. Respiratory failure, status post intubation.


8. Dysphagia.


9. History of dementia.


10. Pulm htn 


11.  Pleural effusion 


12. cardaic arrest


13. chest wall pain post cpr  


14. hyponatremai 





mostly in sinus now 


amiod 200 mg daily 


vent support 


s/p trach


peg done 


on minoxidl and hold parameter applied 


on elequis  2.5 mg bid for stroke prevention 


awaits snf 


na is better 








Subjective


ROS Limited/Unobtainable:  Yes


Subjective


on the vent, has pain is in restriatns





Objective





Last 24 Hour Vital Signs








  Date Time  Temp Pulse Resp B/P (MAP) Pulse Ox O2 Delivery O2 Flow Rate FiO2


 


1/9/19 17:20  115 17     40


 


1/9/19 16:00  113      


 


1/9/19 16:00 96.6 112 15 116/43 (67) 100   


 


1/9/19 16:00        40


 


1/9/19 16:00      Mechanical Ventilator  





      Mechanical Ventilator  


 


1/9/19 15:10  115 15     40


 


1/9/19 13:00  105 19     40


 


1/9/19 12:00      Mechanical Ventilator  





      Mechanical Ventilator  


 


1/9/19 12:00 97.5 68 16 126/35 (65) 100   


 


1/9/19 12:00  70      


 


1/9/19 12:00        40


 


1/9/19 11:20  86 20     40


 


1/9/19 08:40    121/39    


 


1/9/19 08:30  69 12     40


 


1/9/19 08:00  76      


 


1/9/19 08:00      Mechanical Ventilator  





      Mechanical Ventilator  


 


1/9/19 08:00        40


 


1/9/19 08:00 97.5 75 19 121/39 (66) 100   


 


1/9/19 06:35  71 12     40


 


1/9/19 04:46  105 18     40


 


1/9/19 04:00 97.9 82 15 116/52 (73) 100   


 


1/9/19 04:00      Mechanical Ventilator  





      Mechanical Ventilator  


 


1/9/19 04:00        40


 


1/9/19 03:32  121      


 


1/9/19 03:30  83 16     40


 


1/9/19 00:39  80 17     40


 


1/9/19 00:00 98.0 80 16 122/52 (75) 95   


 


1/9/19 00:00      Mechanical Ventilator  





      Mechanical Ventilator  


 


1/9/19 00:00        40


 


1/8/19 23:19  83      


 


1/8/19 22:57  81 16     40


 


1/8/19 20:35  80 16     40


 


1/8/19 20:28  85      


 


1/8/19 20:17    140/62    


 


1/8/19 20:00 98.1 82 18 140/62 (88) 100   


 


1/8/19 20:00      Mechanical Ventilator  





      Mechanical Ventilator  


 


1/8/19 20:00        40








General Appearance:  no apparent distress, alert, on vent, patient on isolation











Intake and Output  


 


 1/8/19 1/9/19





 19:00 07:00


 


Intake Total 1116.667 ml 900 ml


 


Output Total 100 ml 


 


Balance 1016.667 ml 900 ml


 


  


 


Free Water 100 ml 100 ml


 


IV Total 496.667 ml 220 ml


 


Tube Feeding 520 ml 480 ml


 


Other  100 ml


 


Stool Total 100 ml 











Laboratory Tests








Test


  1/9/19


04:00


 


White Blood Count


  8.5 K/UL


(4.8-10.8)


 


Red Blood Count


  2.78 M/UL


(4.20-5.40)  L


 


Hemoglobin


  8.3 G/DL


(12.0-16.0)  L


 


Hematocrit


  26.5 %


(37.0-47.0)  L


 


Mean Corpuscular Volume 95 FL (80-99)  


 


Mean Corpuscular Hemoglobin


  29.9 PG


(27.0-31.0)


 


Mean Corpuscular Hemoglobin


Concent 31.3 G/DL


(32.0-36.0)  L


 


Red Cell Distribution Width


  17.9 %


(11.6-14.8)  H


 


Platelet Count


  413 K/UL


(150-450)


 


Mean Platelet Volume


  5.1 FL


(6.5-10.1)  L


 


Neutrophils (%) (Auto)


  79.5 %


(45.0-75.0)  H


 


Lymphocytes (%) (Auto)


  5.3 %


(20.0-45.0)  L


 


Monocytes (%) (Auto)


  9.9 %


(1.0-10.0)


 


Eosinophils (%) (Auto)


  2.7 %


(0.0-3.0)


 


Basophils (%) (Auto)


  2.7 %


(0.0-2.0)  H


 


Sodium Level


  132 MMOL/L


(136-145)  L


 


Potassium Level


  4.1 MMOL/L


(3.5-5.1)


 


Chloride Level


  95 MMOL/L


()  L


 


Carbon Dioxide Level


  26 MMOL/L


(21-32)


 


Anion Gap


  12 mmol/L


(5-15)


 


Blood Urea Nitrogen


  34 mg/dL


(7-18)  H


 


Creatinine


  1.8 MG/DL


(0.55-1.30)  H


 


Estimat Glomerular Filtration


Rate  mL/min (>60)  


 


 


Glucose Level


  86 MG/DL


()


 


Calcium Level


  9.1 MG/DL


(8.5-10.1)

















Mariusz Wade MD Jan 9, 2019 19:43

## 2019-01-09 NOTE — NUR
NURSE NOTES:

Report received from CAROLYN Haas. Patient seen in bed in carroll position with previous vent 
setting. Patient's spo02 is at 99%. no respiratory distress is noted.  Denies any pain. 
alert responsive, able to make needs known with writing board.  Midline to rightt

-------------------------------------------------------------------------------

Addendum: 01/09/19 at 0741 by Netta Hawthorne RN

-------------------------------------------------------------------------------

Report received from CAROLYN Haas. Patient seen in bed in carroll position with previous vent 
setting. Patient's sp02 is at 99%. no respiratory distress is noted.  Denies any pain. alert 
responsive, able to make needs known with writing board.  Midline to right upper arm is 
intact.  Rectal tube is in place and is intact.  Wrist restraint present, no redness to 
bilateral wrist and radial pulse is palpable.  GT Site is intact.  Bed is in lowest 
position. CaLL light is within reach. will continue to monitor.

## 2019-01-10 VITALS — DIASTOLIC BLOOD PRESSURE: 46 MMHG | SYSTOLIC BLOOD PRESSURE: 99 MMHG

## 2019-01-10 VITALS — DIASTOLIC BLOOD PRESSURE: 69 MMHG | SYSTOLIC BLOOD PRESSURE: 168 MMHG

## 2019-01-10 VITALS — SYSTOLIC BLOOD PRESSURE: 110 MMHG | DIASTOLIC BLOOD PRESSURE: 71 MMHG

## 2019-01-10 VITALS — DIASTOLIC BLOOD PRESSURE: 23 MMHG | SYSTOLIC BLOOD PRESSURE: 92 MMHG

## 2019-01-10 VITALS — SYSTOLIC BLOOD PRESSURE: 139 MMHG | DIASTOLIC BLOOD PRESSURE: 71 MMHG

## 2019-01-10 VITALS — SYSTOLIC BLOOD PRESSURE: 137 MMHG | DIASTOLIC BLOOD PRESSURE: 70 MMHG

## 2019-01-10 LAB
ADD MANUAL DIFF: YES
ANION GAP SERPL CALC-SCNC: 7 MMOL/L (ref 5–15)
BUN SERPL-MCNC: 44 MG/DL (ref 7–18)
CALCIUM SERPL-MCNC: 8.9 MG/DL (ref 8.5–10.1)
CHLORIDE SERPL-SCNC: 96 MMOL/L (ref 98–107)
CO2 SERPL-SCNC: 30 MMOL/L (ref 21–32)
CREAT SERPL-MCNC: 1.7 MG/DL (ref 0.55–1.3)
ERYTHROCYTE [DISTWIDTH] IN BLOOD BY AUTOMATED COUNT: 17.4 % (ref 11.6–14.8)
HCT VFR BLD CALC: 23 % (ref 37–47)
HGB BLD-MCNC: 7.4 G/DL (ref 12–16)
MCV RBC AUTO: 93 FL (ref 80–99)
PLATELET # BLD: 432 K/UL (ref 150–450)
POTASSIUM SERPL-SCNC: 4 MMOL/L (ref 3.5–5.1)
RBC # BLD AUTO: 2.49 M/UL (ref 4.2–5.4)
SODIUM SERPL-SCNC: 133 MMOL/L (ref 136–145)
WBC # BLD AUTO: 12.2 K/UL (ref 4.8–10.8)

## 2019-01-10 RX ADMIN — ACETAMINOPHEN PRN MG: 160 SOLUTION ORAL at 08:49

## 2019-01-10 RX ADMIN — APIXABAN SCH MG: 2.5 TABLET, FILM COATED ORAL at 09:00

## 2019-01-10 RX ADMIN — APIXABAN SCH MG: 2.5 TABLET, FILM COATED ORAL at 17:55

## 2019-01-10 RX ADMIN — HYDROCODONE BITARTRATE AND ACETAMINOPHEN PRN TAB: 5; 325 TABLET ORAL at 22:02

## 2019-01-10 RX ADMIN — MINOXIDIL SCH MG: 2.5 TABLET ORAL at 09:00

## 2019-01-10 RX ADMIN — CHLORHEXIDINE GLUCONATE SCH APPLIC: 213 SOLUTION TOPICAL at 22:00

## 2019-01-10 RX ADMIN — AMIODARONE HYDROCHLORIDE SCH MG: 200 TABLET ORAL at 08:49

## 2019-01-10 RX ADMIN — LORAZEPAM PRN MG: 0.5 TABLET ORAL at 14:34

## 2019-01-10 RX ADMIN — MINOXIDIL SCH MG: 2.5 TABLET ORAL at 21:00

## 2019-01-10 RX ADMIN — MEROPENEM SCH MLS/HR: 500 INJECTION INTRAVENOUS at 08:48

## 2019-01-10 RX ADMIN — HYDROCODONE BITARTRATE AND ACETAMINOPHEN PRN TAB: 5; 325 TABLET ORAL at 14:34

## 2019-01-10 NOTE — NUR
CASE MANAGEMENT: REVIEW





SI: ESRD ON HD

TRACHEOSTOMY 12/17

PEG PLACEMENT 12/19 

T 98.1  RR 18 /69 % MECH VENT FIO2 40

WBC 12.2 H/H 7.4/23.0 







IS: MEROPENEM IV QD

AMIODARONE GT QD

MINOXIDIL GT Q12HR 

ELEQUIS GT BID 

GT FEEDING NEPRO @ 40ML/HR

HD PRN 



***STEP DOWN UNIT  STATUS***

DCP: PATIENT IS FROM [facility]. REFERRED TO [facility]. [facility] WILL NOT 
ACCEPT PATIENT WITHOUT CHAIR TIME AT DIALYSIS CENTER. PATIENT REFERRED TO [facility] SUSIE JACOBS. AWAITING CHAIR TIME AT DIALYSIS CENTER.

## 2019-01-10 NOTE — NUR
NURSE NOTES:



Report received from CAROLYN Haas. Pt is alert, awake and confused. Resist to care and 
combative at times. On bilateral soft restraints. A-fib on cardiac monitor in 80's. Trach to 
vent. S8, AC 8, , FiO2 40%, P 5. O2 sat 100%. No respiratory distress noted. G-tube in 
place receiving Nepro at 40cc/hr. No residual noted. Right upper arm midline patent and 
asymptomatic. Left UA AV shunt for HD noted. Bed in lowest position. Side rails up x3. Will 
resume plan of care.

-------------------------------------------------------------------------------

Addendum: 01/10/19 at 0824 by MI YEON YOON, RN RN

-------------------------------------------------------------------------------

Wrong time 0716

## 2019-01-10 NOTE — NUR
NURSE NOTES:



Report received from CAROLYN Haas. Pt is alert, awake and confused. Resist to care and 
combative at times. On bilateral soft restraints. A-fib on cardiac monitor in 80's. Trach to 
vent. S8, AC 8, , FiO2 40%, P 5. O2 sat 100%. No respiratory distress noted. G-tube in 
place receiving Nepro at 40cc/hr. No residual noted. Right upper arm midline patent and 
asymptomatic. Left UA AV shunt for HD noted. Bed in lowest position. Side rails up x3. Will 
resume plan of care.

## 2019-01-10 NOTE — INFECTIOUS DISEASES PROG NOTE
Assessment/Plan


Assessment/Plan


A:


leukocytosis 


UTI with ESBL E. Coli


Cellulitis of R leg, osteomyelitis treated


Hypercapnic respiratory failure


ESRD on HD


DM


Anemia


Dementia


PVD


R pleural effusion


Mucous plug


s/o Cardiac arrest


Multiple Pressure ulcers


P;


continue  Meropenem





Subjective


ROS Limited/Unobtainable:  Yes


Constitutional:  Reports: other - afebrile


Neurologic:  Reports: confusion, other - on restraint


Allergies:  


Coded Allergies:  


     No Known Allergies (Unverified , 11/26/18)





Objective


Vital Signs





Last 24 Hour Vital Signs








  Date Time  Temp Pulse Resp B/P (MAP) Pulse Ox O2 Delivery O2 Flow Rate FiO2


 


1/10/19 07:14  72 17     40


 


1/10/19 05:01  102 14     40


 


1/10/19 04:00      Mechanical Ventilator  





      Mechanical Ventilator  


 


1/10/19 04:00 97.8 75 17 110/71 (84) 96   


 


1/10/19 04:00        40


 


1/10/19 03:27  102 12     40


 


1/10/19 03:15  72      


 


1/10/19 01:29  67 15     40


 


1/10/19 00:00        40


 


1/10/19 00:00 97.9 110 15 139/61 (87) 100   


 


1/10/19 00:00      Mechanical Ventilator  





      Mechanical Ventilator  


 


1/9/19 23:29  68 12     40


 


1/9/19 23:19  116      


 


1/9/19 22:59    123/55    


 


1/9/19 21:17  69 10     40


 


1/9/19 21:00        40


 


1/9/19 21:00 97.5 72 16 123/55 (77) 100   


 


1/9/19 21:00      Mechanical Ventilator  





      Mechanical Ventilator  


 


1/9/19 19:40  108 14     40


 


1/9/19 19:15  106      


 


1/9/19 17:20  115 17     40


 


1/9/19 16:00  113      


 


1/9/19 16:00 96.6 112 15 116/43 (67) 100   


 


1/9/19 16:00        40


 


1/9/19 16:00      Mechanical Ventilator  





      Mechanical Ventilator  


 


1/9/19 15:10  115 15     40


 


1/9/19 13:00  105 19     40


 


1/9/19 12:00      Mechanical Ventilator  





      Mechanical Ventilator  


 


1/9/19 12:00 97.5 68 16 126/35 (65) 100   


 


1/9/19 12:00  70      


 


1/9/19 12:00        40


 


1/9/19 11:20  86 20     40








Height (Feet):  5


Height (Inches):  2.00


Weight (Pounds):  120


General Appearance:  no acute distress


HEENT:  status post trach


Respiratory/Chest:  lungs clear, other - on ventilator


Cardiovascular:  normal rate, other - midline


Abdomen:  soft, non tender, other - GT & rectal tube


Extremities:  no edema


Skin:  ulcers


Neurologic/Psychiatric:  other - sleeping





Laboratory Tests








Test


  1/10/19


04:00


 


White Blood Count


  12.2 K/UL


(4.8-10.8)  H


 


Red Blood Count


  2.49 M/UL


(4.20-5.40)  L


 


Hemoglobin


  7.4 G/DL


(12.0-16.0)  L


 


Hematocrit


  23.0 %


(37.0-47.0)  L


 


Mean Corpuscular Volume 93 FL (80-99)  


 


Mean Corpuscular Hemoglobin


  29.9 PG


(27.0-31.0)


 


Mean Corpuscular Hemoglobin


Concent 32.3 G/DL


(32.0-36.0)


 


Red Cell Distribution Width


  17.4 %


(11.6-14.8)  H


 


Platelet Count


  432 K/UL


(150-450)


 


Mean Platelet Volume


  5.7 FL


(6.5-10.1)  L


 


Neutrophils (%) (Auto)


  % (45.0-75.0)


 


 


Lymphocytes (%) (Auto)


  % (20.0-45.0)


 


 


Monocytes (%) (Auto)  % (1.0-10.0)  


 


Eosinophils (%) (Auto)  % (0.0-3.0)  


 


Basophils (%) (Auto)  % (0.0-2.0)  


 


Neutrophils % (Manual) Pending  


 


Lymphocytes % (Manual) Pending  


 


Platelet Estimate Pending  


 


Platelet Morphology Pending  


 


Sodium Level


  133 MMOL/L


(136-145)  L


 


Potassium Level


  4.0 MMOL/L


(3.5-5.1)


 


Chloride Level


  96 MMOL/L


()  L


 


Carbon Dioxide Level


  30 MMOL/L


(21-32)


 


Anion Gap


  7 mmol/L


(5-15)


 


Blood Urea Nitrogen


  44 mg/dL


(7-18)  H


 


Creatinine


  1.7 MG/DL


(0.55-1.30)  H


 


Estimat Glomerular Filtration


Rate  mL/min (>60)  


 


 


Glucose Level


  110 MG/DL


()  H


 


Calcium Level


  8.9 MG/DL


(8.5-10.1)











Current Medications








 Medications


  (Trade)  Dose


 Ordered  Sig/Tony


 Route


 PRN Reason  Start Time


 Stop Time Status Last Admin


Dose Admin


 


 Acetaminophen


  (Tylenol)  650 mg  Q6H  PRN


 GT


 Mild Pain/Temp > 100.5  12/23/18 16:00


 1/20/19 15:59  1/10/19 08:49


 


 


 Acetaminophen/


 Hydrocodone Bitart


  (Norco 5/325)  1 tab  Q6H  PRN


 GT


 For Pain  1/8/19 13:35


 1/15/19 13:00  1/9/19 17:50


 


 


 Amiodarone HCl


  (Cordarone)  200 mg  DAILY


 GT


   1/2/19 09:00


 1/26/19 20:59  1/10/19 08:49


 


 


 Apixaban


  (Eliquis)  2.5 mg  BID


 GT


   12/27/18 18:00


 1/26/19 17:59  1/9/19 17:49


 


 


 Artificial Tears


  (Akwa-Tears)  1 drop  Q4H  PRN


 BOTH EYES


 Dry Eyes  12/23/18 15:30


 1/21/19 15:29  1/1/19 16:53


 


 


 Chlorhexidine


 Gluconate


  (Juana-Hex 2%)  1 applic  DAILY@2000


 TOPIC


   12/30/18 20:00


 1/29/19 19:59  1/9/19 22:59


 


 


 Collagenase


  (Santyl)  1 applic  DAILY


 TOPIC


   1/7/19 21:00


 2/6/19 08:59  1/10/19 08:50


 


 


 Lansoprazole


  (Prevacid)  30 mg  Q12HR


 GT


   12/27/18 21:00


 1/26/19 20:59  1/10/19 08:49


 


 


 Lorazepam


  (Ativan)  0.5 mg  Q6H  PRN


 ORAL


 For Anxiety  1/6/19 15:45


 1/13/19 15:44  1/8/19 14:07


 


 


 Meropenem 500 mg/


 Sodium Chloride  55 ml @ 


 110 mls/hr  DAILY


 IVPB


   1/9/19 12:00


 1/14/19 11:59  1/10/19 08:48


 


 


 Minoxidil


  (Loniten)  2.5 mg  Q12HR


 GT


   12/27/18 21:00


 1/26/19 20:59  1/9/19 22:59


 


 


 Sodium Chloride


  (Ocean Nasal


 Spray)  1 spray  Q4H  PRN


 NASAL


 dry nose  12/23/18 14:30


 1/21/19 18:29  12/25/18 11:49


 


 


 Zolpidem Tartrate


  (Ambien)  5 mg  HSPRN  PRN


 GT


 Insomnia  1/3/19 20:45


 1/10/19 20:44  1/9/19 04:20


 

















Luke Maynard MD Joel 10, 2019 09:36

## 2019-01-10 NOTE — PULMONOLOGY PROGRESS NOTE
Assessment/Plan


Assessment/Plan


1. Hypotension per history 


2. Sepsis.


3. Atrial fibrillation.


4. Bradycardia.


5. Diabetes mellitus.


6. End-stage renal disease on dialysis.


7. Left lung collapse. s/p bronchoscopy 


8. Respiratory failure, acute on chronic s/p trach


9. Dysphagia.


10. Dementia.


11. Anemia


12. Pneumonia


13. bilateral effusion





PLAN


respiratory care without change


full vent support


monitor imaging and recommend


oxygen taper and monitor needs


aspiration precautions


keep negative


supportive care for now


will follow up and recommend for change and monitor as is


impression, plan, and exam edited and reviewed in detail


care discussed with RN





medications/laboratory data/nursing notes reviewed in detail


note reviewed and edited


care discussed with RN and RT





Subjective


ROS Limited/Unobtainable:  Yes


Allergies:  


Coded Allergies:  


     No Known Allergies (Unverified , 11/26/18)


Subjective


care noted and reviewed


no distress 


events reviewed and discussed


trach in place





Objective





Last 24 Hour Vital Signs








  Date Time  Temp Pulse Resp B/P (MAP) Pulse Ox O2 Delivery O2 Flow Rate FiO2


 


1/10/19 16:00      Mechanical Ventilator  





      Mechanical Ventilator  


 


1/10/19 16:00        40


 


1/10/19 15:43  66 12     40


 


1/10/19 12:44  87 16     40


 


1/10/19 12:00 98.1 104 18 168/69 (102) 100   


 


1/10/19 12:00      Mechanical Ventilator  





      Mechanical Ventilator  


 


1/10/19 12:00        40


 


1/10/19 12:00  110      


 


1/10/19 10:50  63 11     40


 


1/10/19 09:02  74 13     40


 


1/10/19 08:02  79      


 


1/10/19 08:00        40


 


1/10/19 08:00 97.9 100 12 92/23 (46) 100   


 


1/10/19 08:00      Mechanical Ventilator  





      Mechanical Ventilator  


 


1/10/19 07:14  72 17     40


 


1/10/19 05:01  102 14     40


 


1/10/19 04:00      Mechanical Ventilator  





      Mechanical Ventilator  


 


1/10/19 04:00 97.8 75 17 110/71 (84) 96   


 


1/10/19 04:00        40


 


1/10/19 03:27  102 12     40


 


1/10/19 03:15  72      


 


1/10/19 01:29  67 15     40


 


1/10/19 00:00        40


 


1/10/19 00:00 97.9 110 15 139/61 (87) 100   


 


1/10/19 00:00      Mechanical Ventilator  





      Mechanical Ventilator  


 


1/9/19 23:29  68 12     40


 


1/9/19 23:19  116      


 


1/9/19 22:59    123/55    


 


1/9/19 21:17  69 10     40


 


1/9/19 21:00        40


 


1/9/19 21:00 97.5 72 16 123/55 (77) 100   


 


1/9/19 21:00      Mechanical Ventilator  





      Mechanical Ventilator  


 


1/9/19 19:40  108 14     40


 


1/9/19 19:15  106      


 


1/9/19 17:20  115 17     40

















Intake and Output  


 


 1/9/19 1/10/19





 18:59 06:59


 


Intake Total 1010 ml 640 ml


 


Output Total 250 ml 


 


Balance 760 ml 640 ml


 


  


 


Free Water 200 ml 100 ml


 


IV Total 330 ml 


 


Tube Feeding 480 ml 440 ml


 


Other  100 ml


 


Stool Total 250 ml 


 


# Voids  1








Objective


WDWN


NAD


intubated/trach


reduced breath sounds bilaterally without rhonchi or wheeze


[id number] without MRG


NABS nontender no HSM


no CCE


poorly responsive


Laboratory Tests


1/10/19 04:00: 


White Blood Count 12.2H, Red Blood Count 2.49L, Hemoglobin 7.4L, Hematocrit 

23.0L, Mean Corpuscular Volume 93, Mean Corpuscular Hemoglobin 29.9, Mean 

Corpuscular Hemoglobin Concent 32.3, Red Cell Distribution Width 17.4H, 

Platelet Count 432, Mean Platelet Volume 5.7L, Neutrophils (%) (Auto) , 

Lymphocytes (%) (Auto) , Monocytes (%) (Auto) , Eosinophils (%) (Auto) , 

Basophils (%) (Auto) , Differential Total Cells Counted 100, Neutrophils % (

Manual) 78H, Lymphocytes % (Manual) 7L, Monocytes % (Manual) 12H, Eosinophils % 

(Manual) 1, Basophils % (Manual) 1, Band Neutrophils 1, Platelet Estimate 

IncreasedH, Platelet Morphology Normal, Hypochromasia 1+, Anisocytosis 1+, 

Sodium Level 133L, Potassium Level 4.0, Chloride Level 96L, Carbon Dioxide 

Level 30, Anion Gap 7, Blood Urea Nitrogen 44H, Creatinine 1.7H, Estimat 

Glomerular Filtration Rate , Glucose Level 110H, Calcium Level 8.9





Current Medications








 Medications


  (Trade)  Dose


 Ordered  Sig/Tony


 Route


 PRN Reason  Start Time


 Stop Time Status Last Admin


Dose Admin


 


 Acetaminophen


  (Tylenol)  650 mg  Q6H  PRN


 GT


 Mild Pain/Temp > 100.5  12/23/18 16:00


 1/20/19 15:59  1/10/19 08:49


 


 


 Acetaminophen/


 Hydrocodone Bitart


  (Norco 5/325)  1 tab  Q6H  PRN


 GT


 For Pain  1/8/19 13:35


 1/15/19 13:00  1/10/19 14:34


 


 


 Amiodarone HCl


  (Cordarone)  200 mg  DAILY


 GT


   1/2/19 09:00


 1/26/19 20:59  1/10/19 08:49


 


 


 Apixaban


  (Eliquis)  2.5 mg  BID


 GT


   12/27/18 18:00


 1/26/19 17:59  1/9/19 17:49


 


 


 Artificial Tears


  (Akwa-Tears)  1 drop  Q4H  PRN


 BOTH EYES


 Dry Eyes  12/23/18 15:30


 1/21/19 15:29  1/1/19 16:53


 


 


 Chlorhexidine


 Gluconate


  (Juana-Hex 2%)  1 applic  DAILY@2000


 TOPIC


   12/30/18 20:00


 1/29/19 19:59  1/9/19 22:59


 


 


 Collagenase


  (Santyl)  1 applic  DAILY


 TOPIC


   1/7/19 21:00


 2/6/19 08:59  1/10/19 08:50


 


 


 Lansoprazole


  (Prevacid)  30 mg  Q12HR


 GT


   12/27/18 21:00


 1/26/19 20:59  1/10/19 08:49


 


 


 Lorazepam


  (Ativan)  0.5 mg  Q6H  PRN


 ORAL


 For Anxiety  1/6/19 15:45


 1/13/19 15:44  1/10/19 14:34


 


 


 Meropenem 500 mg/


 Sodium Chloride  55 ml @ 


 110 mls/hr  DAILY


 IVPB


   1/9/19 12:00


 1/14/19 11:59  1/10/19 08:48


 


 


 Minoxidil


  (Loniten)  2.5 mg  Q12HR


 GT


   12/27/18 21:00


 1/26/19 20:59  1/9/19 22:59


 


 


 Sodium Chloride


  (Ocean Nasal


 Spray)  1 spray  Q4H  PRN


 NASAL


 dry nose  12/23/18 14:30


 1/21/19 18:29  12/25/18 11:49


 


 


 Zolpidem Tartrate


  (Ambien)  5 mg  HSPRN  PRN


 GT


 Insomnia  1/3/19 20:45


 1/10/19 20:44  1/9/19 04:20


 

















Omar Luna MD Joel 10, 2019 16:09

## 2019-01-10 NOTE — NUR
NURSE NOTES:

Report received from MI-YEON, RN. Pt is alert, awake and confused. Resist to care and 
combative at times. On bilateral soft restraints. A-fib on cardiac monitor in 80's. Trach to 
vent.mode on  AC 8, , FiO2 40%, P 5. O2 sat %. No respiratory distress noted. 
G-tube in place receiving Nepro at 40cc/hr. No residual noted. HOB elevated. Right upper arm 
midline patent and asymptomatic. Left UA AV shunt for HD noted.presence of bruit and thrill. 
Bed in lowest position. Side rails up x3. Will resume plan of care.

## 2019-01-10 NOTE — GI PROGRESS NOTE
Assessment/Plan


Problems:  


(1) Encounter for PEG (percutaneous endoscopic gastrostomy)


ICD Codes:  Z43.1 - Encounter for attention to gastrostomy


SNOMED:  915717641, 564072189


(2) Severe malnutrition


ICD Codes:  E43 - Unspecified severe protein-calorie malnutrition


SNOMED:  44823827


(3) Dehydration


ICD Codes:  E86.0 - Dehydration


SNOMED:  41682624


Status:  doing well, unchanged


Status Narrative


Discussed with Dr. Lam


Assessment/Plan


Assessment


- Dysphagia


- Resp failure


- ESRD


- Anemia


- Status post PEG, tolerating feeds





Recommendations


1. Abdominal binder.


2. Elevate the head of the bed at all times.


3. G-tube flush.


4. G-tube care.


5. tube feeding later today per RD to goal


Monitor H&H, PRN transfusions


PPI


Electrolyte correction


Follow-up labs





The patient was seen and examined at bedside and all new and available data was 

reviewed in the patients chart. I agree with the above findings, impression 

and plan.  (Patient seen earlier today. Signature stamp does not reflect 

patient encounter time.). - Elliott Lam MD





Subjective


Subjective


limited





Objective





Last 24 Hour Vital Signs








  Date Time  Temp Pulse Resp B/P (MAP) Pulse Ox O2 Delivery O2 Flow Rate FiO2


 


1/10/19 10:50  63 11     40


 


1/10/19 09:02  74 13     40


 


1/10/19 07:14  72 17     40


 


1/10/19 05:01  102 14     40


 


1/10/19 04:00      Mechanical Ventilator  





      Mechanical Ventilator  


 


1/10/19 04:00 97.8 75 17 110/71 (84) 96   


 


1/10/19 04:00        40


 


1/10/19 03:27  102 12     40


 


1/10/19 03:15  72      


 


1/10/19 01:29  67 15     40


 


1/10/19 00:00        40


 


1/10/19 00:00 97.9 110 15 139/61 (87) 100   


 


1/10/19 00:00      Mechanical Ventilator  





      Mechanical Ventilator  


 


1/9/19 23:29  68 12     40


 


1/9/19 23:19  116      


 


1/9/19 22:59    123/55    


 


1/9/19 21:17  69 10     40


 


1/9/19 21:00        40


 


1/9/19 21:00 97.5 72 16 123/55 (77) 100   


 


1/9/19 21:00      Mechanical Ventilator  





      Mechanical Ventilator  


 


1/9/19 19:40  108 14     40


 


1/9/19 19:15  106      


 


1/9/19 17:20  115 17     40


 


1/9/19 16:00  113      


 


1/9/19 16:00 96.6 112 15 116/43 (67) 100   


 


1/9/19 16:00        40


 


1/9/19 16:00      Mechanical Ventilator  





      Mechanical Ventilator  


 


1/9/19 15:10  115 15     40


 


1/9/19 13:00  105 19     40


 


1/9/19 12:00      Mechanical Ventilator  





      Mechanical Ventilator  


 


1/9/19 12:00 97.5 68 16 126/35 (65) 100   


 


1/9/19 12:00  70      


 


1/9/19 12:00        40


 


1/9/19 11:20  86 20     40

















Intake and Output  


 


 1/9/19 1/10/19





 18:59 06:59


 


Intake Total 1010 ml 640 ml


 


Output Total 250 ml 


 


Balance 760 ml 640 ml


 


  


 


Free Water 200 ml 100 ml


 


IV Total 330 ml 


 


Tube Feeding 480 ml 440 ml


 


Other  100 ml


 


Stool Total 250 ml 


 


# Voids  1











Laboratory Tests








Test


  1/10/19


04:00


 


White Blood Count


  12.2 K/UL


(4.8-10.8)  H


 


Red Blood Count


  2.49 M/UL


(4.20-5.40)  L


 


Hemoglobin


  7.4 G/DL


(12.0-16.0)  L


 


Hematocrit


  23.0 %


(37.0-47.0)  L


 


Mean Corpuscular Volume 93 FL (80-99)  


 


Mean Corpuscular Hemoglobin


  29.9 PG


(27.0-31.0)


 


Mean Corpuscular Hemoglobin


Concent 32.3 G/DL


(32.0-36.0)


 


Red Cell Distribution Width


  17.4 %


(11.6-14.8)  H


 


Platelet Count


  432 K/UL


(150-450)


 


Mean Platelet Volume


  5.7 FL


(6.5-10.1)  L


 


Neutrophils (%) (Auto)


  % (45.0-75.0)


 


 


Lymphocytes (%) (Auto)


  % (20.0-45.0)


 


 


Monocytes (%) (Auto)  % (1.0-10.0)  


 


Eosinophils (%) (Auto)  % (0.0-3.0)  


 


Basophils (%) (Auto)  % (0.0-2.0)  


 


Neutrophils % (Manual) Pending  


 


Lymphocytes % (Manual) Pending  


 


Platelet Estimate Pending  


 


Platelet Morphology Pending  


 


Sodium Level


  133 MMOL/L


(136-145)  L


 


Potassium Level


  4.0 MMOL/L


(3.5-5.1)


 


Chloride Level


  96 MMOL/L


()  L


 


Carbon Dioxide Level


  30 MMOL/L


(21-32)


 


Anion Gap


  7 mmol/L


(5-15)


 


Blood Urea Nitrogen


  44 mg/dL


(7-18)  H


 


Creatinine


  1.7 MG/DL


(0.55-1.30)  H


 


Estimat Glomerular Filtration


Rate  mL/min (>60)  


 


 


Glucose Level


  110 MG/DL


()  H


 


Calcium Level


  8.9 MG/DL


(8.5-10.1)








Height (Feet):  5


Height (Inches):  2.00


Weight (Pounds):  120


General Appearance:  alert


Cardiovascular:  regular rhythm


Respiratory/Chest:  normal breath sounds, no respiratory distress


Abdominal Exam:  non tender, GT site - Clean dry and intact











Shane Lowry NP Joel 10, 2019 10:52

## 2019-01-10 NOTE — NUR
NURSE NOTES:



Ativan 0.5mg and Norco 5/325 one tab PO given for anxiety and pain. Son at bedside. Will 
continue to monitor.

## 2019-01-10 NOTE — NEPHROLOGY PROGRESS NOTE
Assessment/Plan


Assessment


Seee below


Plan





ESRD HD q MWF 


Anemia of CKD , SAMANTHA high dose. 





A. 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 with LAE. LVEF 65% . On Eliquis.





Post trach + PEG .


Referred to [facility]. Pt's son agreeable to [facility]. Referred again.


See orders.


Still needs extensive Vascular W/U.





Subjective


Subjective


No new c/o.





Objective


Objective





Last 24 Hour Vital Signs








  Date Time  Temp Pulse Resp B/P (MAP) Pulse Ox O2 Delivery O2 Flow Rate FiO2


 


1/10/19 16:00      Mechanical Ventilator  





      Mechanical Ventilator  


 


1/10/19 16:00        40


 


1/10/19 15:48  65      


 


1/10/19 15:43  66 12     40


 


1/10/19 12:44  87 16     40


 


1/10/19 12:00 98.1 104 18 168/69 (102) 100   


 


1/10/19 12:00      Mechanical Ventilator  





      Mechanical Ventilator  


 


1/10/19 12:00        40


 


1/10/19 12:00  110      


 


1/10/19 10:50  63 11     40


 


1/10/19 09:02  74 13     40


 


1/10/19 08:02  79      


 


1/10/19 08:00        40


 


1/10/19 08:00 97.9 100 12 92/23 (46) 100   


 


1/10/19 08:00      Mechanical Ventilator  





      Mechanical Ventilator  


 


1/10/19 07:14  72 17     40


 


1/10/19 05:01  102 14     40


 


1/10/19 04:00      Mechanical Ventilator  





      Mechanical Ventilator  


 


1/10/19 04:00 97.8 75 17 110/71 (84) 96   


 


1/10/19 04:00        40


 


1/10/19 03:27  102 12     40


 


1/10/19 03:15  72      


 


1/10/19 01:29  67 15     40


 


1/10/19 00:00        40


 


1/10/19 00:00 97.9 110 15 139/61 (87) 100   


 


1/10/19 00:00      Mechanical Ventilator  





      Mechanical Ventilator  


 


1/9/19 23:29  68 12     40


 


1/9/19 23:19  116      


 


1/9/19 22:59    123/55    


 


1/9/19 21:17  69 10     40


 


1/9/19 21:00        40


 


1/9/19 21:00 97.5 72 16 123/55 (77) 100   


 


1/9/19 21:00      Mechanical Ventilator  





      Mechanical Ventilator  


 


1/9/19 19:40  108 14     40


 


1/9/19 19:15  106      

















Intake and Output  


 


 1/9/19 1/10/19





 18:59 06:59


 


Intake Total 1010 ml 640 ml


 


Output Total 250 ml 


 


Balance 760 ml 640 ml


 


  


 


Free Water 200 ml 100 ml


 


IV Total 330 ml 


 


Tube Feeding 480 ml 440 ml


 


Other  100 ml


 


Stool Total 250 ml 


 


# Voids  1








Laboratory Tests


1/10/19 04:00: 


White Blood Count 12.2H, Red Blood Count 2.49L, Hemoglobin 7.4L, Hematocrit 

23.0L, Mean Corpuscular Volume 93, Mean Corpuscular Hemoglobin 29.9, Mean 

Corpuscular Hemoglobin Concent 32.3, Red Cell Distribution Width 17.4H, 

Platelet Count 432, Mean Platelet Volume 5.7L, Neutrophils (%) (Auto) , 

Lymphocytes (%) (Auto) , Monocytes (%) (Auto) , Eosinophils (%) (Auto) , 

Basophils (%) (Auto) , Differential Total Cells Counted 100, Neutrophils % (

Manual) 78H, Lymphocytes % (Manual) 7L, Monocytes % (Manual) 12H, Eosinophils % 

(Manual) 1, Basophils % (Manual) 1, Band Neutrophils 1, Platelet Estimate 

IncreasedH, Platelet Morphology Normal, Hypochromasia 1+, Anisocytosis 1+, 

Sodium Level 133L, Potassium Level 4.0, Chloride Level 96L, Carbon Dioxide 

Level 30, Anion Gap 7, Blood Urea Nitrogen 44H, Creatinine 1.7H, Estimat 

Glomerular Filtration Rate , Glucose Level 110H, Calcium Level 8.9


Height (Feet):  5


Height (Inches):  2.00


Weight (Pounds):  120


Objective


On vent.


Trach clean.


Cv IRR/IRR


Lungs B ronchi.


Abd SNT. BS +  PEG.


E Rt. foot diabetic ulcers











Gavino Daigle MD Joel 10, 2019 17:34

## 2019-01-10 NOTE — CARDIOLOGY PROGRESS NOTE
Assessment/Plan


Assessment/Plan


1. Hypotension, resolved 


2. Paroxysmal episodes of atrial fibrillation history.


3. Bradycardia secondary to medications, amiodarone possibly.


4. Diabetes mellitus.


5. End-stage renal disease, on hemodialysis.


6. Lung collapse.


7. Respiratory failure, status post intubation.


8. Dysphagia.


9. History of dementia.


10. Pulm htn 


11.  Pleural effusion 


12. cardaic arrest


13. chest wall pain post cpr  


14. hyponatremai 





mostly in sinus now 


amiod 200 mg daily 


vent support 


s/p trach


peg done 


on minoxidl and hold parameter applied 


on elequis  2.5 mg bid for stroke prevention 


awaits snf 











Subjective


ROS Limited/Unobtainable:  Yes


Subjective


on the vent, has pain is in restriatns





Objective





Last 24 Hour Vital Signs








  Date Time  Temp Pulse Resp B/P (MAP) Pulse Ox O2 Delivery O2 Flow Rate FiO2


 


1/10/19 19:29  77 19     40


 


1/10/19 17:51 98.1 74 14 137/70 (92) 100   


 


1/10/19 17:27  66 16     40


 


1/10/19 16:00      Mechanical Ventilator  





      Mechanical Ventilator  


 


1/10/19 16:00        40


 


1/10/19 15:48  65      


 


1/10/19 15:43  66 12     40


 


1/10/19 12:44  87 16     40


 


1/10/19 12:00 98.1 104 18 168/69 (102) 100   


 


1/10/19 12:00      Mechanical Ventilator  





      Mechanical Ventilator  


 


1/10/19 12:00        40


 


1/10/19 12:00  110      


 


1/10/19 10:50  63 11     40


 


1/10/19 09:02  74 13     40


 


1/10/19 08:02  79      


 


1/10/19 08:00        40


 


1/10/19 08:00 97.9 100 12 92/23 (46) 100   


 


1/10/19 08:00      Mechanical Ventilator  





      Mechanical Ventilator  


 


1/10/19 07:14  72 17     40


 


1/10/19 05:01  102 14     40


 


1/10/19 04:00      Mechanical Ventilator  





      Mechanical Ventilator  


 


1/10/19 04:00 97.8 75 17 110/71 (84) 96   


 


1/10/19 04:00        40


 


1/10/19 03:27  102 12     40


 


1/10/19 03:15  72      


 


1/10/19 01:29  67 15     40


 


1/10/19 00:00        40


 


1/10/19 00:00 97.9 110 15 139/61 (87) 100   


 


1/10/19 00:00      Mechanical Ventilator  





      Mechanical Ventilator  


 


1/9/19 23:29  68 12     40


 


1/9/19 23:19  116      


 


1/9/19 22:59    123/55    


 


1/9/19 21:17  69 10     40


 


1/9/19 21:00        40


 


1/9/19 21:00 97.5 72 16 123/55 (77) 100   


 


1/9/19 21:00      Mechanical Ventilator  





      Mechanical Ventilator  








General Appearance:  no apparent distress, alert, on vent, patient on isolation











Intake and Output  


 


 1/9/19 1/10/19





 19:00 07:00


 


Intake Total 1010 ml 640 ml


 


Output Total 250 ml 


 


Balance 760 ml 640 ml


 


  


 


Free Water 200 ml 100 ml


 


IV Total 330 ml 


 


Tube Feeding 480 ml 440 ml


 


Other  100 ml


 


Stool Total 250 ml 


 


# Voids  1











Laboratory Tests








Test


  1/10/19


04:00


 


White Blood Count


  12.2 K/UL


(4.8-10.8)  H


 


Red Blood Count


  2.49 M/UL


(4.20-5.40)  L


 


Hemoglobin


  7.4 G/DL


(12.0-16.0)  L


 


Hematocrit


  23.0 %


(37.0-47.0)  L


 


Mean Corpuscular Volume 93 FL (80-99)  


 


Mean Corpuscular Hemoglobin


  29.9 PG


(27.0-31.0)


 


Mean Corpuscular Hemoglobin


Concent 32.3 G/DL


(32.0-36.0)


 


Red Cell Distribution Width


  17.4 %


(11.6-14.8)  H


 


Platelet Count


  432 K/UL


(150-450)


 


Mean Platelet Volume


  5.7 FL


(6.5-10.1)  L


 


Neutrophils (%) (Auto)


  % (45.0-75.0)


 


 


Lymphocytes (%) (Auto)


  % (20.0-45.0)


 


 


Monocytes (%) (Auto)  % (1.0-10.0)  


 


Eosinophils (%) (Auto)  % (0.0-3.0)  


 


Basophils (%) (Auto)  % (0.0-2.0)  


 


Differential Total Cells


Counted 100  


 


 


Neutrophils % (Manual) 78 % (45-75)  H


 


Lymphocytes % (Manual) 7 % (20-45)  L


 


Monocytes % (Manual) 12 % (1-10)  H


 


Eosinophils % (Manual) 1 % (0-3)  


 


Basophils % (Manual) 1 % (0-2)  


 


Band Neutrophils 1 % (0-8)  


 


Platelet Estimate Increased  H


 


Platelet Morphology Normal  


 


Hypochromasia 1+  


 


Anisocytosis 1+  


 


Sodium Level


  133 MMOL/L


(136-145)  L


 


Potassium Level


  4.0 MMOL/L


(3.5-5.1)


 


Chloride Level


  96 MMOL/L


()  L


 


Carbon Dioxide Level


  30 MMOL/L


(21-32)


 


Anion Gap


  7 mmol/L


(5-15)


 


Blood Urea Nitrogen


  44 mg/dL


(7-18)  H


 


Creatinine


  1.7 MG/DL


(0.55-1.30)  H


 


Estimat Glomerular Filtration


Rate  mL/min (>60)  


 


 


Glucose Level


  110 MG/DL


()  H


 


Calcium Level


  8.9 MG/DL


(8.5-10.1)

















Mariusz Wade MD Joel 10, 2019 20:07

## 2019-01-11 VITALS — DIASTOLIC BLOOD PRESSURE: 75 MMHG | SYSTOLIC BLOOD PRESSURE: 133 MMHG

## 2019-01-11 VITALS — SYSTOLIC BLOOD PRESSURE: 135 MMHG | DIASTOLIC BLOOD PRESSURE: 57 MMHG

## 2019-01-11 VITALS — SYSTOLIC BLOOD PRESSURE: 130 MMHG | DIASTOLIC BLOOD PRESSURE: 56 MMHG

## 2019-01-11 VITALS — DIASTOLIC BLOOD PRESSURE: 53 MMHG | SYSTOLIC BLOOD PRESSURE: 133 MMHG

## 2019-01-11 VITALS — DIASTOLIC BLOOD PRESSURE: 79 MMHG | SYSTOLIC BLOOD PRESSURE: 149 MMHG

## 2019-01-11 VITALS — DIASTOLIC BLOOD PRESSURE: 55 MMHG | SYSTOLIC BLOOD PRESSURE: 127 MMHG

## 2019-01-11 RX ADMIN — MEROPENEM SCH MLS/HR: 500 INJECTION INTRAVENOUS at 10:44

## 2019-01-11 RX ADMIN — APIXABAN SCH MG: 2.5 TABLET, FILM COATED ORAL at 10:24

## 2019-01-11 RX ADMIN — ZOLPIDEM TARTRATE PRN MG: 5 TABLET ORAL at 20:58

## 2019-01-11 RX ADMIN — AMIODARONE HYDROCHLORIDE SCH MG: 200 TABLET ORAL at 10:25

## 2019-01-11 RX ADMIN — MINOXIDIL SCH MG: 2.5 TABLET ORAL at 20:57

## 2019-01-11 RX ADMIN — APIXABAN SCH MG: 2.5 TABLET, FILM COATED ORAL at 17:40

## 2019-01-11 RX ADMIN — HYDROCODONE BITARTRATE AND ACETAMINOPHEN PRN TAB: 5; 325 TABLET ORAL at 10:55

## 2019-01-11 RX ADMIN — HYDROCODONE BITARTRATE AND ACETAMINOPHEN PRN TAB: 5; 325 TABLET ORAL at 20:58

## 2019-01-11 RX ADMIN — MINOXIDIL SCH MG: 2.5 TABLET ORAL at 10:25

## 2019-01-11 RX ADMIN — CHLORHEXIDINE GLUCONATE SCH APPLIC: 213 SOLUTION TOPICAL at 20:57

## 2019-01-11 NOTE — CARDIOLOGY PROGRESS NOTE
Assessment/Plan


Assessment/Plan


1. Hypotension, resolved 


2. Paroxysmal episodes of atrial fibrillation history.


3. Bradycardia secondary to medications, amiodarone possibly.


4. Diabetes mellitus.


5. End-stage renal disease, on hemodialysis.


6. Lung collapse.


7. Respiratory failure, status post intubation.


8. Dysphagia.


9. History of dementia.


10. Pulm htn 


11.  Pleural effusion 


12. cardaic arrest


13. chest wall pain post cpr  


14. hyponatremai 





mostly in sinus now 


amiod 200 mg daily 


vent support 


s/p trach


peg done 


on minoxidl and hold parameter applied 


on elequis  2.5 mg bid for stroke prevention 


awaits snf 











Subjective


ROS Limited/Unobtainable:  Yes


Subjective


on the vent





Objective





Last 24 Hour Vital Signs








  Date Time  Temp Pulse Resp B/P (MAP) Pulse Ox O2 Delivery O2 Flow Rate FiO2


 


1/11/19 19:36  81 18     40


 


1/11/19 16:51  78 18     40


 


1/11/19 16:00 97.7 70 14 133/53 (79) 100   


 


1/11/19 16:00        40


 


1/11/19 16:00  83      


 


1/11/19 16:00      Mechanical Ventilator  





      Mechanical Ventilator  


 


1/11/19 15:09  82 18     40


 


1/11/19 13:16  80 18     40


 


1/11/19 12:00        40


 


1/11/19 12:00      Mechanical Ventilator  





      Mechanical Ventilator  


 


1/11/19 12:00 97.2 74 20 127/55 (79) 100   


 


1/11/19 12:00  79      


 


1/11/19 11:28  79 18     40


 


1/11/19 11:25 96.0       


 


1/11/19 10:25    135/57    


 


1/11/19 09:15  81 20     40


 


1/11/19 08:00      Mechanical Ventilator  





      Mechanical Ventilator  


 


1/11/19 08:00  80      


 


1/11/19 08:00 96.0 78 14 135/57 (83) 96   


 


1/11/19 08:00        40


 


1/11/19 05:06  75 19     40


 


1/11/19 04:00        40


 


1/11/19 04:00 97.1 87 19 133/75 (94) 99   


 


1/11/19 04:00      Mechanical Ventilator  





      Mechanical Ventilator  


 


1/11/19 04:00  75      


 


1/11/19 03:02  80 17     40


 


1/11/19 00:54  78 19     40


 


1/11/19 00:00        40


 


1/11/19 00:00  82      


 


1/11/19 00:00 98.8 115 28 130/56 (80) 99   


 


1/11/19 00:00      Mechanical Ventilator  





      Mechanical Ventilator  


 


1/10/19 22:31  82 18     40


 


1/10/19 21:00    99/46    


 


1/10/19 20:55  84 17     40


 


1/10/19 20:00      Mechanical Ventilator  





      Mechanical Ventilator  


 


1/10/19 20:00 98.4 88 18 99/46 (63) 100   


 


1/10/19 20:00        40


 


1/10/19 20:00  106      








General Appearance:  no apparent distress, alert, on vent, patient on isolation











Intake and Output  


 


 1/10/19 1/11/19





 18:59 06:59


 


Intake Total 595 ml 640 ml


 


Output Total  200 ml


 


Balance 595 ml 440 ml


 


  


 


Free Water 100 ml 150 ml


 


IV Total 55 ml 


 


Tube Feeding 440 ml 440 ml


 


Other  50 ml


 


Stool Total  200 ml


 


# Voids  1

















Mariusz Wade MD Jan 11, 2019 19:45

## 2019-01-11 NOTE — NUR
NURSE NOTES:H.D COMPLETED DONE BY EDILBERTO LEON, OUT PUT 2000ML, V/S STABLE.PT TOLERATED 
WELL H.D PROCEDURE.WILL CONT TO MONITOR.

## 2019-01-11 NOTE — NUR
NURSE NOTES:

Received patient from [facility] . patient is awake, nonverbal. able to communicate but 
agitated and combative. keeps trying to pull out medical devices. on bilateral soft wrist 
restraints  with  no skin issues. Trach to vent dependent with AC 8  FIO2 40% PEEP 5 
and sating 100%. no respiratory distress noted. ST with HR . VS stable. afebrile. on 
GT feeding with NEPRO at 40cc/hr with no residual. HOB elevated.c/o pain in sacral and rt 
buttock wound area .will give pain Meds as per order. Rectal tube in placed with brown 
liquid stool.skin remains clean and dry.bed in locked and lowest position . bed alarm 
on.call light in reach.will resume plan of care.

## 2019-01-11 NOTE — NUR
RESPIRATORY NOTE: Received pt. on 840 vent. Vent settings are: A/C rate of 8, Vt 450, FI02 
40%, PEEP +5. No respiratory distress noted, Pt Sp02 @ 100%. Ambu bag @ bs. Vent plugged on 
red outlet. Will continue to monitor pt.

## 2019-01-11 NOTE — NUR
RD ASSESSMENT & RECOMMENDATIONS

SEE CARE ACTIVITY FOR COMPLETE ASSESSMENT



DAILY ESTIMATED NEEDS:

Needs based on Critical care + Advanced Wounds + ESRD/ 60kg 

22-30  kcals/kg 

0398-3051  total kcals

1.5-2.0  g protein/kg

90-120g  g total protein 

per MD  mL/kg

 total fluid mLs



NUTRITION DIAGNOSIS:

* Swallowing difficulty R/T respiratory status as evidenced by pt s/p self

extubation, s/p code bluce, reintubated, now s/p trach and PEG placement.

* Increased kcal/prot needs R/T wound healing as evidenced by pt admitted

w/ multiple wounds including stage 4 sacral wound, refer to WC eval.

* Altered nutrition related lab values R/T ESRD dx, clinical condition as

evidenced by low K (2.7-> wnl), episodes of hypoglycemia (68 69-> now

resolved), elev creat (4.6-> 1.7 trend down), low Na (133 132 128 131).





CURRENT TF:Nepro @40ml/hr + PS BID   





ENTERAL NUTRITION RECOMMENDATIONS:

Nepro @40ml/hr x 24 hrs + Prosource 1pkt BID  to provide 960ml, 1728kcal, 78g + 22g prot, 
698ml free water 



1. Maintain current rate + Prosource 1 pack BID to better meet est protein needs

2. HOB over 30 degrees

3. Decrease water flushes to improve Na level







ADDITIONAL RECOMMENDATIONS:

* RECALIBRATE BED SCALE POST TRACH AND PEG PROCEDURES 

* Wound healing-  Nephrovite x 1, Continue Scotty 1pkt BID 

* Monitor for hypoglcyemia 

** Decrease water flushes to help improve Na level- consistently low Na** 

** Probiotics for loose  BM** 

* Monitor lytes daily w/ con't loose stool

## 2019-01-11 NOTE — NUR
CASE MANAGEMENT: REVIEW





SI: ESRD ON HD

TRACHEOSTOMY 12/17

PEG PLACEMENT 12/19 

T 96.0 HR 78 RR 14 /57 SAT 96% MECH VENT FIO2 40









IS: MEROPENEM IV QD

AMIODARONE GT QD

MINOXIDIL GT Q12HR 

ELEQUIS GT BID 

GT FEEDING NEPRO @ 40ML/HR

HD PRN 



***STEP DOWN UNIT  STATUS***

DCP: PATIENT IS FROM [facility]. PATIENT REFERRED TO [facility]. [facility] 
WILL NOT ACCEPT PATIENT WITHOUT CHAIR TIME AT [facility]. PATIENT REFERRED TO [facility] 
[facility]AC[facility]. AWAITING CHAIR TIME AT DIALYSIS CENTER.

## 2019-01-11 NOTE — GI PROGRESS NOTE
Assessment/Plan


Problems:  


(1) Encounter for PEG (percutaneous endoscopic gastrostomy)


ICD Codes:  Z43.1 - Encounter for attention to gastrostomy


SNOMED:  571240156, 966137816


(2) Severe malnutrition


ICD Codes:  E43 - Unspecified severe protein-calorie malnutrition


SNOMED:  61121568


(3) Dehydration


ICD Codes:  E86.0 - Dehydration


SNOMED:  29169826


Status:  unchanged


Status Narrative


Discussed with Dr. Lam.


Assessment/Plan


Assessment


- Dysphagia


- Resp failure


- ESRD


- Anemia


- Status post PEG, tolerating feeds





Recommendations


1. Abdominal binder.


2. Elevate the head of the bed at all times.


3. G-tube flush.


4. G-tube care.


5. tube feeding later today per RD to goal


Monitor H&H, PRN transfusions


PPI


Electrolyte correction


Follow-up labs





The patient was seen and examined at bedside and all new and available data was 

reviewed in the patients chart. I agree with the above findings, impression 

and plan.  (Patient seen earlier today. Signature stamp does not reflect 

patient encounter time.). - Elliott Lam MD





Subjective


Subjective


limited





Objective





Last 24 Hour Vital Signs








  Date Time  Temp Pulse Resp B/P (MAP) Pulse Ox O2 Delivery O2 Flow Rate FiO2


 


1/11/19 11:28  79 18     40


 


1/11/19 11:25 96.0       


 


1/11/19 10:25    135/57    


 


1/11/19 09:15  81 20     40


 


1/11/19 08:00      Mechanical Ventilator  





      Mechanical Ventilator  


 


1/11/19 08:00  80      


 


1/11/19 08:00 96.0 78 14 135/57 (83) 96   


 


1/11/19 08:00        40


 


1/11/19 05:06  75 19     40


 


1/11/19 04:00        40


 


1/11/19 04:00 97.1 87 19 133/75 (94) 99   


 


1/11/19 04:00      Mechanical Ventilator  





      Mechanical Ventilator  


 


1/11/19 04:00  75      


 


1/11/19 03:02  80 17     40


 


1/11/19 00:54  78 19     40


 


1/11/19 00:00        40


 


1/11/19 00:00  82      


 


1/11/19 00:00 98.8 115 28 130/56 (80) 99   


 


1/11/19 00:00      Mechanical Ventilator  





      Mechanical Ventilator  


 


1/10/19 22:31  82 18     40


 


1/10/19 21:00    99/46    


 


1/10/19 20:55  84 17     40


 


1/10/19 20:00      Mechanical Ventilator  





      Mechanical Ventilator  


 


1/10/19 20:00 98.4 88 18 99/46 (63) 100   


 


1/10/19 20:00        40


 


1/10/19 20:00  106      


 


1/10/19 19:29  77 19     40


 


1/10/19 17:51 98.1 74 14 137/70 (92) 100   


 


1/10/19 17:27  66 16     40


 


1/10/19 16:00      Mechanical Ventilator  





      Mechanical Ventilator  


 


1/10/19 16:00        40


 


1/10/19 15:48  65      


 


1/10/19 15:43  66 12     40


 


1/10/19 12:44  87 16     40


 


1/10/19 12:00 98.1 104 18 168/69 (102) 100   


 


1/10/19 12:00      Mechanical Ventilator  





      Mechanical Ventilator  


 


1/10/19 12:00        40


 


1/10/19 12:00  110      

















Intake and Output  


 


 1/10/19 1/11/19





 19:00 07:00


 


Intake Total 555 ml 680 ml


 


Output Total  200 ml


 


Balance 555 ml 480 ml


 


  


 


Free Water 100 ml 150 ml


 


IV Total 55 ml 


 


Tube Feeding 400 ml 480 ml


 


Other  50 ml


 


Stool Total  200 ml


 


# Voids  1








Height (Feet):  5


Height (Inches):  2.00


Weight (Pounds):  124


General Appearance:  WD/WN, no apparent distress, alert


Cardiovascular:  normal rate


Respiratory/Chest:  normal breath sounds, no respiratory distress


Abdominal Exam:  normal bowel sounds, non tender, soft, GT site - clean dry 

intact


Extremities:  normal range of motion, non-tender











Shane Lowry NP Jan 11, 2019 11:53

## 2019-01-11 NOTE — INFECTIOUS DISEASES PROG NOTE
Assessment/Plan


Assessment/Plan


antibiotics : meropenem





A


1. e.coli UTI


2. pseudomonas pneumonia


3. leucocytosis improving


4. respiratory failure s/p tracheostomy


5. renal failure


6. decubitus ulcers


7. nasal MRSA colonization


8. rectal VRE colonization


9. pleural effusion





P


1. continue iv meropenem 6 more days


2. will follow up cultures





Subjective


ROS Limited/Unobtainable:  Yes


Allergies:  


Coded Allergies:  


     No Known Allergies (Unverified , 11/26/18)





Objective


Vital Signs





Last 24 Hour Vital Signs








  Date Time  Temp Pulse Resp B/P (MAP) Pulse Ox O2 Delivery O2 Flow Rate FiO2


 


1/11/19 10:25    135/57    


 


1/11/19 09:15  81 20     40


 


1/11/19 08:00      Mechanical Ventilator  





      Mechanical Ventilator  


 


1/11/19 08:00 96.0 78 14 135/57 (83) 96   


 


1/11/19 08:00        40


 


1/11/19 05:06  75 19     40


 


1/11/19 04:00        40


 


1/11/19 04:00 97.1 87 19 133/75 (94) 99   


 


1/11/19 04:00      Mechanical Ventilator  





      Mechanical Ventilator  


 


1/11/19 04:00  75      


 


1/11/19 03:02  80 17     40


 


1/11/19 00:54  78 19     40


 


1/11/19 00:00        40


 


1/11/19 00:00  82      


 


1/11/19 00:00 98.8 115 28 130/56 (80) 99   


 


1/11/19 00:00      Mechanical Ventilator  





      Mechanical Ventilator  


 


1/10/19 22:31  82 18     40


 


1/10/19 21:00    99/46    


 


1/10/19 20:55  84 17     40


 


1/10/19 20:00      Mechanical Ventilator  





      Mechanical Ventilator  


 


1/10/19 20:00 98.4 88 18 99/46 (63) 100   


 


1/10/19 20:00        40


 


1/10/19 20:00  106      


 


1/10/19 19:29  77 19     40


 


1/10/19 17:51 98.1 74 14 137/70 (92) 100   


 


1/10/19 17:27  66 16     40


 


1/10/19 16:00      Mechanical Ventilator  





      Mechanical Ventilator  


 


1/10/19 16:00        40


 


1/10/19 15:48  65      


 


1/10/19 15:43  66 12     40


 


1/10/19 12:44  87 16     40


 


1/10/19 12:00 98.1 104 18 168/69 (102) 100   


 


1/10/19 12:00      Mechanical Ventilator  





      Mechanical Ventilator  


 


1/10/19 12:00        40


 


1/10/19 12:00  110      


 


1/10/19 10:50  63 11     40








Height (Feet):  5


Height (Inches):  2.00


Weight (Pounds):  124


HEENT:  status post trach


Respiratory/Chest:  lungs clear


Cardiovascular:  normal rate, regular rhythm, no gallop/murmur


Abdomen:  soft, non tender, other - GT


Extremities:  other - + edema, right arm PICC





Current Medications








 Medications


  (Trade)  Dose


 Ordered  Sig/Tony


 Route


 PRN Reason  Start Time


 Stop Time Status Last Admin


Dose Admin


 


 Acetaminophen


  (Tylenol)  650 mg  Q6H  PRN


 GT


 Mild Pain/Temp > 100.5  12/23/18 16:00


 1/20/19 15:59  1/10/19 08:49


 


 


 Acetaminophen/


 Hydrocodone Bitart


  (Norco 5/325)  1 tab  Q6H  PRN


 GT


 For Pain  1/8/19 13:35


 1/15/19 13:00  1/10/19 22:02


 


 


 Amiodarone HCl


  (Cordarone)  200 mg  DAILY


 GT


   1/2/19 09:00


 1/26/19 20:59  1/11/19 10:25


 


 


 Apixaban


  (Eliquis)  2.5 mg  BID


 GT


   12/27/18 18:00


 1/26/19 17:59  1/11/19 10:24


 


 


 Artificial Tears


  (Akwa-Tears)  1 drop  Q4H  PRN


 BOTH EYES


 Dry Eyes  12/23/18 15:30


 1/21/19 15:29  1/1/19 16:53


 


 


 Chlorhexidine


 Gluconate


  (Juana-Hex 2%)  1 applic  DAILY@2000


 TOPIC


   12/30/18 20:00


 1/29/19 19:59  1/10/19 22:00


 


 


 Collagenase


  (Santyl)  1 applic  DAILY


 TOPIC


   1/7/19 21:00


 2/6/19 08:59  1/11/19 09:00


 


 


 Lansoprazole


  (Prevacid)  30 mg  Q12HR


 GT


   12/27/18 21:00


 1/26/19 20:59  1/11/19 10:24


 


 


 Lorazepam


  (Ativan)  0.5 mg  Q6H  PRN


 ORAL


 For Anxiety  1/6/19 15:45


 1/13/19 15:44  1/10/19 14:34


 


 


 Meropenem 500 mg/


 Sodium Chloride  55 ml @ 


 110 mls/hr  DAILY


 IVPB


   1/9/19 12:00


 1/14/19 11:59  1/10/19 08:48


 


 


 Minoxidil


  (Loniten)  2.5 mg  Q12HR


 GT


   12/27/18 21:00


 1/26/19 20:59  1/11/19 10:25


 


 


 Sodium Chloride  1,000 ml @ 


 500 mls/hr  Q2H PRN


 IVLG


 sbp<90 during hd  1/10/19 17:40


 1/11/19 23:59   


 


 


 Sodium Chloride


  (Ocean Nasal


 Spray)  1 spray  Q4H  PRN


 NASAL


 dry nose  12/23/18 14:30


 1/21/19 18:29  12/25/18 11:49


 


 


 Zolpidem Tartrate


  (Ambien)  5 mg  HSPRN  PRN


 ORAL


 Insomnia  1/11/19 08:00


 1/18/19 07:59   


 

















Chase Santillan MD Jan 11, 2019 10:46

## 2019-01-11 NOTE — NEPHROLOGY PROGRESS NOTE
Assessment/Plan


Assessment


Seee below


Plan





ESRD HD q MWF 


Anemia of CKD , SAMANTHA high dose. 





A. 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 with LAE. LVEF 65% . On Eliquis.





Post trach + PEG .


Referred to [facility]. Pt's son agreeable to [facility]. Referred again.


See orders.


Still needs extensive Vascular W/U.





Subjective


Subjective


Very Anxious





Objective


Objective





Last 24 Hour Vital Signs








  Date Time  Temp Pulse Resp B/P (MAP) Pulse Ox O2 Delivery O2 Flow Rate FiO2


 


1/11/19 05:06  75 19     40


 


1/11/19 04:00        40


 


1/11/19 04:00 97.1 87 19 133/75 (94) 99   


 


1/11/19 04:00      Mechanical Ventilator  





      Mechanical Ventilator  


 


1/11/19 04:00  75      


 


1/11/19 03:02  80 17     40


 


1/11/19 00:54  78 19     40


 


1/11/19 00:00        40


 


1/11/19 00:00  82      


 


1/11/19 00:00 98.8 115 28 130/56 (80) 99   


 


1/11/19 00:00      Mechanical Ventilator  





      Mechanical Ventilator  


 


1/10/19 22:31  82 18     40


 


1/10/19 21:00    99/46    


 


1/10/19 20:55  84 17     40


 


1/10/19 20:00      Mechanical Ventilator  





      Mechanical Ventilator  


 


1/10/19 20:00 98.4 88 18 99/46 (63) 100   


 


1/10/19 20:00        40


 


1/10/19 20:00  106      


 


1/10/19 19:29  77 19     40


 


1/10/19 17:51 98.1 74 14 137/70 (92) 100   


 


1/10/19 17:27  66 16     40


 


1/10/19 16:00      Mechanical Ventilator  





      Mechanical Ventilator  


 


1/10/19 16:00        40


 


1/10/19 15:48  65      


 


1/10/19 15:43  66 12     40


 


1/10/19 12:44  87 16     40


 


1/10/19 12:00 98.1 104 18 168/69 (102) 100   


 


1/10/19 12:00      Mechanical Ventilator  





      Mechanical Ventilator  


 


1/10/19 12:00        40


 


1/10/19 12:00  110      


 


1/10/19 10:50  63 11     40


 


1/10/19 09:02  74 13     40


 


1/10/19 08:02  79      


 


1/10/19 08:00        40


 


1/10/19 08:00 97.9 100 12 92/23 (46) 100   


 


1/10/19 08:00      Mechanical Ventilator  





      Mechanical Ventilator  

















Intake and Output  


 


 1/10/19 1/11/19





 19:00 07:00


 


Intake Total 555 ml 640 ml


 


Output Total  200 ml


 


Balance 555 ml 440 ml


 


  


 


Free Water 100 ml 150 ml


 


IV Total 55 ml 


 


Tube Feeding 400 ml 440 ml


 


Other  50 ml


 


Stool Total  200 ml


 


# Voids  1








Height (Feet):  5


Height (Inches):  2.00


Weight (Pounds):  124


Objective


On vent.


Trach clean.


Cv IRR/IRR


Lungs B ronchi.


Abd SNT. BS +  PEG.


E Rt. foot diabetic ulcers











Gavino Daigle MD Jan 11, 2019 07:46

## 2019-01-11 NOTE — PULMONOLOGY PROGRESS NOTE
Assessment/Plan


Assessment/Plan


Pulmonary Progress Note 





Assessment/Plan


Assessment/Plan


1. Hypotension per history , now resolved


2. Sepsis.


3. Atrial fibrillation.


4. Bradycardia.


5. Diabetes mellitus.


6. End-stage renal disease on dialysis.


7. Left lung collapse. s/p bronchoscopy 


8. Respiratory failure, acute on chronic s/p trach


9. Dysphagia.


10. Dementia.


11. Anemia


12. Pneumonia


13. bilateral effusion





PLAN


respiratory care without change


full vent support


monitor imaging and recommend


oxygen taper and monitor needs


aspiration precautions


keep negative


supportive care for now


will follow up and recommend for change and monitor as is


impression, plan, and exam edited and reviewed in detail


care discussed with RN





medications/laboratory data/nursing notes reviewed in detail


note reviewed and edited


care discussed with RN and RT





Subjective


ROS Limited/Unobtainable:  Yes


Allergies:  


Coded Allergies:  


     No Known Allergies (Unverified , 11/26/18)


Subjective


care noted and reviewed


no distress 


events reviewed and discussed


trach in place





Objective





Vital Signs Noted





Objective


WDWN


NAD


intubated/trach


reduced breath sounds bilaterally without rhonchi or wheeze


[id number] without MRG


NABS nontender no HSM


no CCE


poorly responsive


Laboratory Tests


1/10/19 04:00: 


White Blood Count 12.2H, Red Blood Count 2.49L, Hemoglobin 7.4L, Hematocrit 

23.0L, Mean Corpuscular Volume 93, Mean Corpuscular Hemoglobin 29.9, Mean 

Corpuscular Hemoglobin Concent 32.3, Red Cell Distribution Width 17.4H, 

Platelet Count 432, Mean Platelet Volume 5.7L, Neutrophils (%) (Auto) , 

Lymphocytes (%) (Auto) , Monocytes (%) (Auto) , Eosinophils (%) (Auto) , 

Basophils (%) (Auto) , Differential Total Cells Counted 100, Neutrophils % (

Manual) 78H, Lymphocytes % (Manual) 7L, Monocytes % (Manual) 12H, Eosinophils % 

(Manual) 1, Basophils % (Manual) 1, Band Neutrophils 1, Platelet Estimate 

IncreasedH, Platelet Morphology Normal, Hypochromasia 1+, Anisocytosis 1+, 

Sodium Level 133L, Potassium Level 4.0, Chloride Level 96L, Carbon Dioxide 

Level 30, Anion Gap 7, Blood Urea Nitrogen 44H, Creatinine 1.7H, Estimat 

Glomerular Filtration Rate , Glucose Level 110H, Calcium Level 8.9





Current Medications








 Medications


  (Trade)  Dose


 Ordered  Sig/Tony


 Route


 PRN Reason  Start Time


 Stop Time Status Last Admin


Dose Admin


 


 Acetaminophen


  (Tylenol)  650 mg  Q6H  PRN


 GT


 Mild Pain/Temp > 100.5  12/23/18 16:00


 1/20/19 15:59  1/10/19 08:49


 


 


 Acetaminophen/


 Hydrocodone Bitart


  (Norco 5/325)  1 tab  Q6H  PRN


 GT


 For Pain  1/8/19 13:35


 1/15/19 13:00  1/10/19 14:34


 


 


 Amiodarone HCl


  (Cordarone)  200 mg  DAILY


 GT


   1/2/19 09:00


 1/26/19 20:59  1/10/19 08:49


 


 


 Apixaban


  (Eliquis)  2.5 mg  BID


 GT


   12/27/18 18:00


 1/26/19 17:59  1/9/19 17:49


 


 


 Artificial Tears


  (Akwa-Tears)  1 drop  Q4H  PRN


 BOTH EYES


 Dry Eyes  12/23/18 15:30


 1/21/19 15:29  1/1/19 16:53


 


 


 Chlorhexidine


 Gluconate


  (Juana-Hex 2%)  1 applic  DAILY@2000


 TOPIC


   12/30/18 20:00


 1/29/19 19:59  1/9/19 22:59


 


 


 Collagenase


  (Santyl)  1 applic  DAILY


 TOPIC


   1/7/19 21:00


 2/6/19 08:59  1/10/19 08:50


 


 


 Lansoprazole


  (Prevacid)  30 mg  Q12HR


 GT


   12/27/18 21:00


 1/26/19 20:59  1/10/19 08:49


 


 


 Lorazepam


  (Ativan)  0.5 mg  Q6H  PRN


 ORAL


 For Anxiety  1/6/19 15:45


 1/13/19 15:44  1/10/19 14:34


 


 


 Meropenem 500 mg/


 Sodium Chloride  55 ml @ 


 110 mls/hr  DAILY


 IVPB


   1/9/19 12:00


 1/14/19 11:59  1/10/19 08:48


 


 


 Minoxidil


  (Loniten)  2.5 mg  Q12HR


 GT


   12/27/18 21:00


 1/26/19 20:59  1/9/19 22:59


 


 


 Sodium Chloride


  (Ocean Nasal


 Spray)  1 spray  Q4H  PRN


 NASAL


 dry nose  12/23/18 14:30


 1/21/19 18:29  12/25/18 11:49


 


 


 Zolpidem Tartrate


  (Ambien)  5 mg  HSPRN  PRN


 GT


 Insomnia  1/3/19 20:45


 1/10/19 20:44  1/9/19 04:20


 











Subjective


ROS Limited/Unobtainable:  Yes


Allergies:  


Coded Allergies:  


     No Known Allergies (Unverified , 11/26/18)





Objective





Last 24 Hour Vital Signs








  Date Time  Temp Pulse Resp B/P (MAP) Pulse Ox O2 Delivery O2 Flow Rate FiO2


 


1/11/19 13:16  80 18     40


 


1/11/19 12:00        40


 


1/11/19 12:00      Mechanical Ventilator  





      Mechanical Ventilator  


 


1/11/19 12:00 97.2 74 20 127/55 (79) 100   


 


1/11/19 11:28  79 18     40


 


1/11/19 11:25 96.0       


 


1/11/19 10:25    135/57    


 


1/11/19 09:15  81 20     40


 


1/11/19 08:00      Mechanical Ventilator  





      Mechanical Ventilator  


 


1/11/19 08:00  80      


 


1/11/19 08:00 96.0 78 14 135/57 (83) 96   


 


1/11/19 08:00        40


 


1/11/19 05:06  75 19     40


 


1/11/19 04:00        40


 


1/11/19 04:00 97.1 87 19 133/75 (94) 99   


 


1/11/19 04:00      Mechanical Ventilator  





      Mechanical Ventilator  


 


1/11/19 04:00  75      


 


1/11/19 03:02  80 17     40


 


1/11/19 00:54  78 19     40


 


1/11/19 00:00        40


 


1/11/19 00:00  82      


 


1/11/19 00:00 98.8 115 28 130/56 (80) 99   


 


1/11/19 00:00      Mechanical Ventilator  





      Mechanical Ventilator  


 


1/10/19 22:31  82 18     40


 


1/10/19 21:00    99/46    


 


1/10/19 20:55  84 17     40


 


1/10/19 20:00      Mechanical Ventilator  





      Mechanical Ventilator  


 


1/10/19 20:00 98.4 88 18 99/46 (63) 100   


 


1/10/19 20:00        40


 


1/10/19 20:00  106      


 


1/10/19 19:29  77 19     40


 


1/10/19 17:51 98.1 74 14 137/70 (92) 100   


 


1/10/19 17:27  66 16     40


 


1/10/19 16:00      Mechanical Ventilator  





      Mechanical Ventilator  


 


1/10/19 16:00        40


 


1/10/19 15:48  65      


 


1/10/19 15:43  66 12     40

















Intake and Output  


 


 1/10/19 1/11/19





 18:59 06:59


 


Intake Total 595 ml 640 ml


 


Output Total  200 ml


 


Balance 595 ml 440 ml


 


  


 


Free Water 100 ml 150 ml


 


IV Total 55 ml 


 


Tube Feeding 440 ml 440 ml


 


Other  50 ml


 


Stool Total  200 ml


 


# Voids  1











Current Medications








 Medications


  (Trade)  Dose


 Ordered  Sig/Tony


 Route


 PRN Reason  Start Time


 Stop Time Status Last Admin


Dose Admin


 


 Acetaminophen


  (Tylenol)  650 mg  Q6H  PRN


 GT


 Mild Pain/Temp > 100.5  12/23/18 16:00


 1/20/19 15:59  1/10/19 08:49


 


 


 Acetaminophen/


 Hydrocodone Bitart


  (Norco 5/325)  1 tab  Q6H  PRN


 GT


 For Pain  1/8/19 13:35


 1/15/19 13:00  1/11/19 10:55


 


 


 Amiodarone HCl


  (Cordarone)  200 mg  DAILY


 GT


   1/2/19 09:00


 1/26/19 20:59  1/11/19 10:25


 


 


 Apixaban


  (Eliquis)  2.5 mg  BID


 GT


   12/27/18 18:00


 1/26/19 17:59  1/11/19 10:24


 


 


 Artificial Tears


  (Akwa-Tears)  1 drop  Q4H  PRN


 BOTH EYES


 Dry Eyes  12/23/18 15:30


 1/21/19 15:29  1/1/19 16:53


 


 


 Chlorhexidine


 Gluconate


  (Juana-Hex 2%)  1 applic  DAILY@2000


 TOPIC


   12/30/18 20:00


 1/29/19 19:59  1/10/19 22:00


 


 


 Collagenase


  (Santyl)  1 applic  DAILY


 TOPIC


   1/7/19 21:00


 2/6/19 08:59  1/11/19 09:00


 


 


 Lansoprazole


  (Prevacid)  30 mg  Q12HR


 GT


   12/27/18 21:00


 1/26/19 20:59  1/11/19 10:24


 


 


 Lorazepam


  (Ativan)  0.5 mg  Q6H  PRN


 ORAL


 For Anxiety  1/6/19 15:45


 1/13/19 15:44  1/10/19 14:34


 


 


 Meropenem 500 mg/


 Sodium Chloride  55 ml @ 


 110 mls/hr  DAILY


 IVPB


   1/9/19 12:00


 1/14/19 11:59  1/11/19 10:44


 


 


 Minoxidil


  (Loniten)  2.5 mg  Q12HR


 GT


   12/27/18 21:00


 1/26/19 20:59  1/11/19 10:25


 


 


 Sodium Chloride  1,000 ml @ 


 500 mls/hr  Q2H PRN


 IVLG


 sbp<90 during hd  1/10/19 17:40


 1/11/19 23:59   


 


 


 Sodium Chloride


  (Ocean Nasal


 Spray)  1 spray  Q4H  PRN


 NASAL


 dry nose  12/23/18 14:30


 1/21/19 18:29  12/25/18 11:49


 


 


 Zolpidem Tartrate


  (Ambien)  5 mg  HSPRN  PRN


 ORAL


 Insomnia  1/11/19 08:00


 1/18/19 07:59   


 

















Simon Silva MD Jan 11, 2019 14:32

## 2019-01-11 NOTE — NUR
NURSE NOTES: RECEIVED BED SIDE REPORT FROM [facility].RECEIVED PT WITH HOB 
ELEVATED 45 DEGREE ALERT TO NAME ,NON-VERBAL TRACH TO VENT DEPENDENT,TOLERATING WELL 
CURRENTS VENTS SETTINGS.RENDERED TRACH CARE AND ORAL HYGIENE,SX,D MOD AMT OF YELLOWISH 
SECRETIONS .PT WITH BILAT SOFT WRIST RESTRAINTS TO PREVENT FROM PULLING MEDICAL 
DEVICES.RELEASED BILAT SOFT WRIST RESTRAINTS AND PROVIDE ROM,S TO ALL EXTREMITIES ALSO 
REPOSITIONED Q 2HRS TO PROVIDE COMFORT AND TO PREVENT FURTHERS SKIN BREAK DOWN.WILL CONT TO 
MONITOR.PT RECEIVING NEPRO @ 40CC/HRS ,NO RESIDUAL NOTED AT THIS TIME.WILL CONT TO MONITOR.

## 2019-01-12 VITALS — SYSTOLIC BLOOD PRESSURE: 120 MMHG | DIASTOLIC BLOOD PRESSURE: 73 MMHG

## 2019-01-12 VITALS — SYSTOLIC BLOOD PRESSURE: 111 MMHG | DIASTOLIC BLOOD PRESSURE: 45 MMHG

## 2019-01-12 VITALS — DIASTOLIC BLOOD PRESSURE: 52 MMHG | SYSTOLIC BLOOD PRESSURE: 147 MMHG

## 2019-01-12 VITALS — SYSTOLIC BLOOD PRESSURE: 163 MMHG | DIASTOLIC BLOOD PRESSURE: 79 MMHG

## 2019-01-12 VITALS — DIASTOLIC BLOOD PRESSURE: 40 MMHG | SYSTOLIC BLOOD PRESSURE: 152 MMHG

## 2019-01-12 VITALS — DIASTOLIC BLOOD PRESSURE: 55 MMHG | SYSTOLIC BLOOD PRESSURE: 107 MMHG

## 2019-01-12 LAB
ADD MANUAL DIFF: YES
ANION GAP SERPL CALC-SCNC: 7 MMOL/L (ref 5–15)
BUN SERPL-MCNC: 31 MG/DL (ref 7–18)
CALCIUM SERPL-MCNC: 8.8 MG/DL (ref 8.5–10.1)
CHLORIDE SERPL-SCNC: 99 MMOL/L (ref 98–107)
CO2 SERPL-SCNC: 31 MMOL/L (ref 21–32)
CREAT SERPL-MCNC: 1.5 MG/DL (ref 0.55–1.3)
ERYTHROCYTE [DISTWIDTH] IN BLOOD BY AUTOMATED COUNT: 17.7 % (ref 11.6–14.8)
HCT VFR BLD CALC: 20.9 % (ref 37–47)
HGB BLD-MCNC: 6.5 G/DL (ref 12–16)
MCV RBC AUTO: 96 FL (ref 80–99)
PHOSPHATE SERPL-MCNC: 3.3 MG/DL (ref 2.5–4.9)
PLATELET # BLD: 411 K/UL (ref 150–450)
POTASSIUM SERPL-SCNC: 3.7 MMOL/L (ref 3.5–5.1)
RBC # BLD AUTO: 2.18 M/UL (ref 4.2–5.4)
SODIUM SERPL-SCNC: 136 MMOL/L (ref 136–145)
WBC # BLD AUTO: 8.3 K/UL (ref 4.8–10.8)

## 2019-01-12 RX ADMIN — HYDROCODONE BITARTRATE AND ACETAMINOPHEN PRN TAB: 5; 325 TABLET ORAL at 02:47

## 2019-01-12 RX ADMIN — LORAZEPAM PRN MG: 0.5 TABLET ORAL at 02:47

## 2019-01-12 RX ADMIN — MEROPENEM SCH MLS/HR: 500 INJECTION INTRAVENOUS at 08:14

## 2019-01-12 RX ADMIN — AMIODARONE HYDROCHLORIDE SCH MG: 200 TABLET ORAL at 08:14

## 2019-01-12 RX ADMIN — MEROPENEM SCH MLS/HR: 500 INJECTION INTRAVENOUS at 20:13

## 2019-01-12 RX ADMIN — CHLORHEXIDINE GLUCONATE SCH APPLIC: 213 SOLUTION TOPICAL at 20:12

## 2019-01-12 RX ADMIN — MINOXIDIL SCH MG: 2.5 TABLET ORAL at 20:12

## 2019-01-12 RX ADMIN — ZOLPIDEM TARTRATE PRN MG: 5 TABLET ORAL at 20:11

## 2019-01-12 RX ADMIN — APIXABAN SCH MG: 2.5 TABLET, FILM COATED ORAL at 17:14

## 2019-01-12 RX ADMIN — LORAZEPAM PRN MG: 0.5 TABLET ORAL at 15:28

## 2019-01-12 RX ADMIN — MINOXIDIL SCH MG: 2.5 TABLET ORAL at 08:14

## 2019-01-12 RX ADMIN — SODIUM CHLORIDE SCH MLS/HR: 0.9 INJECTION INTRAVENOUS at 20:12

## 2019-01-12 RX ADMIN — APIXABAN SCH MG: 2.5 TABLET, FILM COATED ORAL at 08:14

## 2019-01-12 RX ADMIN — HYDROCODONE BITARTRATE AND ACETAMINOPHEN PRN TAB: 5; 325 TABLET ORAL at 17:27

## 2019-01-12 NOTE — NEPHROLOGY PROGRESS NOTE
Assessment/Plan


Assessment


Seee below


Plan





ESRD HD q MWF 


Anemia of CKD , SAMANTHA high dose. 





A. 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 with LAE. LVEF 65% . On Eliquis.





Post trach + PEG .


Referred to [facility]. Pt's son agreeable to [facility]. Referred again.


See orders.


Still needs extensive Vascular W/U. 














Hct 20 ------> repeat cbc tomorrow. Trigger for transfusion  hct 18





Subjective


Subjective


More calm (Ativan increased)





Objective


Objective





Last 24 Hour Vital Signs








  Date Time  Temp Pulse Resp B/P (MAP) Pulse Ox O2 Delivery O2 Flow Rate FiO2


 


1/12/19 08:52  75 16     40


 


1/12/19 08:14    152/40    


 


1/12/19 08:00        40


 


1/12/19 08:00      Mechanical Ventilator  





      Mechanical Ventilator  


 


1/12/19 08:00 98.6 75 16 152/40 (77) 100   


 


1/12/19 07:56  74      


 


1/12/19 06:44  78 17     40


 


1/12/19 05:09  74 12     40


 


1/12/19 04:00 98.3 75 16 107/55 (72) 100   


 


1/12/19 04:00      Mechanical Ventilator  





      Mechanical Ventilator  


 


1/12/19 04:00  72      


 


1/12/19 04:00        40


 


1/12/19 03:25  91 18     40


 


1/12/19 01:21  82 18     40


 


1/12/19 00:00        40


 


1/12/19 00:00  81      


 


1/12/19 00:00 98.1 110 16 163/79 (107) 100   


 


1/12/19 00:00      Mechanical Ventilator  





      Mechanical Ventilator  


 


1/11/19 23:12  80 19     40


 


1/11/19 21:24  73 18     40


 


1/11/19 20:57    149/79    


 


1/11/19 20:00 97.9 80 18 149/79 (102) 100   


 


1/11/19 20:00      Mechanical Ventilator  





      Mechanical Ventilator  


 


1/11/19 20:00  102      


 


1/11/19 20:00        40


 


1/11/19 19:36  81 18     40


 


1/11/19 16:51  78 18     40


 


1/11/19 16:00 97.7 70 14 133/53 (79) 100   


 


1/11/19 16:00        40


 


1/11/19 16:00  83      


 


1/11/19 16:00      Mechanical Ventilator  





      Mechanical Ventilator  


 


1/11/19 15:09  82 18     40


 


1/11/19 13:16  80 18     40


 


1/11/19 12:00        40


 


1/11/19 12:00      Mechanical Ventilator  





      Mechanical Ventilator  


 


1/11/19 12:00 97.2 74 20 127/55 (79) 100   


 


1/11/19 12:00  79      


 


1/11/19 11:28  79 18     40


 


1/11/19 11:25 96.0       

















Intake and Output  


 


 1/11/19 1/12/19





 19:00 07:00


 


Intake Total 590 ml 640 ml


 


Output Total 150 ml 150 ml


 


Balance 440 ml 490 ml


 


  


 


Free Water 150 ml 150 ml


 


Tube Feeding 440 ml 440 ml


 


Other  50 ml


 


Stool Total 150 ml 150 ml


 


# Voids  1








Laboratory Tests


1/12/19 03:55: 


White Blood Count 8.3, Red Blood Count 2.18L, Hemoglobin 6.5*L, Hematocrit 20.9L

, Mean Corpuscular Volume 96, Mean Corpuscular Hemoglobin 29.8, Mean 

Corpuscular Hemoglobin Concent 31.0L, Red Cell Distribution Width 17.7H, 

Platelet Count 411, Mean Platelet Volume 5.7L, Neutrophils (%) (Auto) , 

Lymphocytes (%) (Auto) , Monocytes (%) (Auto) , Eosinophils (%) (Auto) , 

Basophils (%) (Auto) , Neutrophils % (Manual) [Pending], Lymphocytes % (Manual) 

[Pending], Platelet Estimate [Pending], Platelet Morphology [Pending], Sodium 

Level 136, Potassium Level 3.7, Chloride Level 99, Carbon Dioxide Level 31, 

Anion Gap 7, Blood Urea Nitrogen 31H, Creatinine 1.5H, Estimat Glomerular 

Filtration Rate , Glucose Level 112H, Calcium Level 8.8, Phosphorus Level 3.3, 

Magnesium Level 2.5H


Height (Feet):  5


Height (Inches):  2.00


Weight (Pounds):  121


Objective


On vent.


Trach clean.


Cv IRR/IRR


Lungs B ronchi.


Abd SNT. BS +  PEG.


E Rt. foot diabetic ulcers











Gavino Daigle MD Jan 12, 2019 11:03

## 2019-01-12 NOTE — NUR
NURSE NOTES:

Report received from Beulah RN.  Patient seen in bed in carroll position with vent 
setting of AC 8, 450tv, 40% FiO2 and peep of 5. spo2 is 100% with no respiratory distress is 
noted. patient is alert, responsive, but also Forgetful at times.  Mid line to right upper 
arm is intact.   Noted to have rectal tube in place, and is intact. Continues on GT formula 
of nepro at 40cc/HR and is tolerating well at this time. Patient is on bilateral wrist 
restraints, no redness present.  Pulse is palpable.   Bed is in lowest position. call light 
is within reach. will continue to monitor.

## 2019-01-12 NOTE — NUR
NURSE NOTES:

Patient repositioned, oral care given and suctioned, no acute distress at this time, 
patients remains on bilateral wrist restraints, pulses noted and skin is intact.

## 2019-01-12 NOTE — NUR
NURSE NOTES:

Patient asleep in bed . No sign of respiratory distress with current vent settings. o2 
saturation %. no facial grimace or moaning noted. Turned and repositioned. vs stable. 
afebrile. keep patient comfortable.

## 2019-01-12 NOTE — NUR
NURSE NOTES:

Patient restless at times,frequent visits done with emotional support,on restraint due to 
pulling device,restraints off when at her bedside-patient thankful,happy when restarints 
off,can not be taken off she pulls tracheostomy,suction via tracheostomy small 
secretions,oral care skin care done,G tube care,

## 2019-01-12 NOTE — NUR
NURSE NOTES:

Patient's son Valeriano is by bed side at the moment. he came to visit around 1500, while he was 
here at the bed side, tried to release the restraint, but patient attempted to pull out her 
trach and tubing.  Wrist restraint is put back on to the patient after patient attempted to 
pull out tubings.  Patient became agitated.  PRN Ativan was administered as ordered. 
Continues with safety measures.  Will continue to monitor.

## 2019-01-12 NOTE — CARDIOLOGY PROGRESS NOTE
Assessment/Plan


Assessment/Plan


1. Hypotension, resolved 


2. Paroxysmal episodes of atrial fibrillation history.


3. Bradycardia secondary to medications, amiodarone possibly.


4. Diabetes mellitus.


5. End-stage renal disease, on hemodialysis.


6. Lung collapse.


7. Respiratory failure, status post intubation.


8. Dysphagia.


9. History of dementia.


10. Pulm htn 


11.  Pleural effusion 


12. cardaic arrest


13. chest wall pain post cpr  


14. hyponatremai 


15. anemia 





mostly in sinus now 


amiod 200 mg daily 


vent support 


s/p trach


peg done 


on minoxidl and hold parameter applied 


on elequis  2.5 mg bid for stroke prevention 


awaits snf 


prbc ordred 














Subjective


Subjective


on the vent





Objective





Last 24 Hour Vital Signs








  Date Time  Temp Pulse Resp B/P (MAP) Pulse Ox O2 Delivery O2 Flow Rate FiO2


 


1/12/19 15:22  81 14     40


 


1/12/19 12:55  74 15     40


 


1/12/19 12:19  85 25     40


 


1/12/19 12:11  68 16     40


 


1/12/19 12:00        40


 


1/12/19 12:00 99.0 74 20 147/52 (83) 100   


 


1/12/19 12:00      Mechanical Ventilator  





      Mechanical Ventilator  


 


1/12/19 11:43  79      


 


1/12/19 08:52  75 16     40


 


1/12/19 08:14    152/40    


 


1/12/19 08:00        40


 


1/12/19 08:00      Mechanical Ventilator  





      Mechanical Ventilator  


 


1/12/19 08:00 98.6 75 16 152/40 (77) 100   


 


1/12/19 07:56  74      


 


1/12/19 06:44  78 17     40


 


1/12/19 05:09  74 12     40


 


1/12/19 04:00 98.3 75 16 107/55 (72) 100   


 


1/12/19 04:00      Mechanical Ventilator  





      Mechanical Ventilator  


 


1/12/19 04:00  72      


 


1/12/19 04:00        40


 


1/12/19 03:25  91 18     40


 


1/12/19 01:21  82 18     40


 


1/12/19 00:00        40


 


1/12/19 00:00  81      


 


1/12/19 00:00 98.1 110 16 163/79 (107) 100   


 


1/12/19 00:00      Mechanical Ventilator  





      Mechanical Ventilator  


 


1/11/19 23:12  80 19     40


 


1/11/19 21:24  73 18     40


 


1/11/19 20:57    149/79    


 


1/11/19 20:00 97.9 80 18 149/79 (102) 100   


 


1/11/19 20:00      Mechanical Ventilator  





      Mechanical Ventilator  


 


1/11/19 20:00  102      


 


1/11/19 20:00        40


 


1/11/19 19:36  81 18     40


 


1/11/19 16:51  78 18     40








General Appearance:  no apparent distress, on vent, patient on isolation











Intake and Output  


 


 1/11/19 1/12/19





 19:00 07:00


 


Intake Total 590 ml 680 ml


 


Output Total 150 ml 150 ml


 


Balance 440 ml 530 ml


 


  


 


Free Water 150 ml 150 ml


 


Tube Feeding 440 ml 480 ml


 


Other  50 ml


 


Stool Total 150 ml 150 ml


 


# Voids  1











Laboratory Tests








Test


  1/12/19


03:55


 


White Blood Count


  8.3 K/UL


(4.8-10.8)


 


Red Blood Count


  2.18 M/UL


(4.20-5.40)  L


 


Hemoglobin


  6.5 G/DL


(12.0-16.0)  *L


 


Hematocrit


  20.9 %


(37.0-47.0)  L


 


Mean Corpuscular Volume 96 FL (80-99)  


 


Mean Corpuscular Hemoglobin


  29.8 PG


(27.0-31.0)


 


Mean Corpuscular Hemoglobin


Concent 31.0 G/DL


(32.0-36.0)  L


 


Red Cell Distribution Width


  17.7 %


(11.6-14.8)  H


 


Platelet Count


  411 K/UL


(150-450)


 


Mean Platelet Volume


  5.7 FL


(6.5-10.1)  L


 


Neutrophils (%) (Auto)


  % (45.0-75.0)


 


 


Lymphocytes (%) (Auto)


  % (20.0-45.0)


 


 


Monocytes (%) (Auto)  % (1.0-10.0)  


 


Eosinophils (%) (Auto)  % (0.0-3.0)  


 


Basophils (%) (Auto)  % (0.0-2.0)  


 


Differential Total Cells


Counted 100  


 


 


Neutrophils % (Manual) 78 % (45-75)  H


 


Lymphocytes % (Manual) 5 % (20-45)  L


 


Monocytes % (Manual) 11 % (1-10)  H


 


Eosinophils % (Manual) 4 % (0-3)  H


 


Basophils % (Manual) 2 % (0-2)  


 


Band Neutrophils 0 % (0-8)  


 


Platelet Estimate Adequate  


 


Platelet Morphology Normal  


 


Hypochromasia 2+  


 


Anisocytosis 2+  


 


Sodium Level


  136 MMOL/L


(136-145)


 


Potassium Level


  3.7 MMOL/L


(3.5-5.1)


 


Chloride Level


  99 MMOL/L


()


 


Carbon Dioxide Level


  31 MMOL/L


(21-32)


 


Anion Gap


  7 mmol/L


(5-15)


 


Blood Urea Nitrogen


  31 mg/dL


(7-18)  H


 


Creatinine


  1.5 MG/DL


(0.55-1.30)  H


 


Estimat Glomerular Filtration


Rate  mL/min (>60)  


 


 


Glucose Level


  112 MG/DL


()  H


 


Calcium Level


  8.8 MG/DL


(8.5-10.1)


 


Phosphorus Level


  3.3 MG/DL


(2.5-4.9)


 


Magnesium Level


  2.5 MG/DL


(1.8-2.4)  H

















Mariusz Wade MD Jan 12, 2019 16:21

## 2019-01-12 NOTE — NUR
NURSE NOTES:

Report received from CAROLYN Villatoro.  Patient seen in bed in carroll position with previous vent 
setting. spo2 is 100% with no respiratory distress is noted. patient is alert, responsive, 
able to make needs known with writing board.  Forgetful at times.  Mid line to right upper 
arm is intact.   Noted with rectal tube in place, and is intact. Continues on GT formula of 
nepro at 40cc/HR and is tolerating well at this time. Current

-------------------------------------------------------------------------------

Addendum: 01/12/19 at 0752 by Netta Hawthorne RN

-------------------------------------------------------------------------------

NURSE NOTES:

Report received from CAROLYN Villatoro.  Patient seen in bed in carroll position with previous vent 
setting. spo2 is 100% with no respiratory distress is noted. patient is alert, responsive, 
able to make needs known with writing board.  Forgetful at times.  Mid line to right upper 
arm is intact.   Noted with rectal tube in place, and is intact. Continues on GT formula of 
nepro at 40cc/HR and is tolerating well at this time. Patient is on wrist restraints, no 
redness present.  Pulse is palpable.  Currently awaiting for Dr Daigle to call back in 
regards to low hgb (abnormal lab results).  Bed is in lowest position. call light is within 
reach. will continue to monitor.

## 2019-01-12 NOTE — PULMONOLOGY PROGRESS NOTE
Assessment/Plan


Assessment/Plan


Pulmonary Progress Note 





Assessment/Plan


Assessment/Plan


1. Hypotension per history , now resolved


2. Sepsis.


3. Atrial fibrillation.


4. Bradycardia.


5. Diabetes mellitus.


6. End-stage renal disease on dialysis.


7. Left lung collapse. s/p bronchoscopy 


8. Respiratory failure, acute on chronic s/p trach


9. Dysphagia.


10. Dementia.


11. Anemia


12. Pneumonia


13. bilateral effusion





PLAN


respiratory care without change


full vent support


monitor imaging and recommend


oxygen taper and monitor needs


aspiration precautions


keep negative


supportive care for now


will follow up and recommend for change and monitor as is


impression, plan, and exam edited and reviewed in detail


care discussed with RN





medications/laboratory data/nursing notes reviewed in detail


note reviewed and edited


care discussed with RN and RT





Subjective


ROS Limited/Unobtainable:  Yes


Allergies:  


Coded Allergies:  


     No Known Allergies (Unverified , 11/26/18)


Subjective


care noted and reviewed


no distress 


events reviewed and discussed


trach in place





Objective





Vital Signs Noted





Objective


WDWN


NAD


intubated/trach


reduced breath sounds bilaterally without rhonchi or wheeze


[id number] without MRG


NABS nontender no HSM


no CCE


poorly responsive


Laboratory Tests


1/10/19 04:00: 


White Blood Count 12.2H, Red Blood Count 2.49L, Hemoglobin 7.4L, Hematocrit 

23.0L, Mean Corpuscular Volume 93, Mean Corpuscular Hemoglobin 29.9, Mean 

Corpuscular Hemoglobin Concent 32.3, Red Cell Distribution Width 17.4H, 

Platelet Count 432, Mean Platelet Volume 5.7L, Neutrophils (%) (Auto) , 

Lymphocytes (%) (Auto) , Monocytes (%) (Auto) , Eosinophils (%) (Auto) , 

Basophils (%) (Auto) , Differential Total Cells Counted 100, Neutrophils % (

Manual) 78H, Lymphocytes % (Manual) 7L, Monocytes % (Manual) 12H, Eosinophils % 

(Manual) 1, Basophils % (Manual) 1, Band Neutrophils 1, Platelet Estimate 

IncreasedH, Platelet Morphology Normal, Hypochromasia 1+, Anisocytosis 1+, 

Sodium Level 133L, Potassium Level 4.0, Chloride Level 96L, Carbon Dioxide 

Level 30, Anion Gap 7, Blood Urea Nitrogen 44H, Creatinine 1.7H, Estimat 

Glomerular Filtration Rate , Glucose Level 110H, Calcium Level 8.9





Current Medications








 Medications


  (Trade)  Dose


 Ordered  Sig/Tony


 Route


 PRN Reason  Start Time


 Stop Time Status Last Admin


Dose Admin


 


 Acetaminophen


  (Tylenol)  650 mg  Q6H  PRN


 GT


 Mild Pain/Temp > 100.5  12/23/18 16:00


 1/20/19 15:59  1/10/19 08:49


 


 


 Acetaminophen/


 Hydrocodone Bitart


  (Norco 5/325)  1 tab  Q6H  PRN


 GT


 For Pain  1/8/19 13:35


 1/15/19 13:00  1/10/19 14:34


 


 


 Amiodarone HCl


  (Cordarone)  200 mg  DAILY


 GT


   1/2/19 09:00


 1/26/19 20:59  1/10/19 08:49


 


 


 Apixaban


  (Eliquis)  2.5 mg  BID


 GT


   12/27/18 18:00


 1/26/19 17:59  1/9/19 17:49


 


 


 Artificial Tears


  (Akwa-Tears)  1 drop  Q4H  PRN


 BOTH EYES


 Dry Eyes  12/23/18 15:30


 1/21/19 15:29  1/1/19 16:53


 


 


 Chlorhexidine


 Gluconate


  (Juana-Hex 2%)  1 applic  DAILY@2000


 TOPIC


   12/30/18 20:00


 1/29/19 19:59  1/9/19 22:59


 


 


 Collagenase


  (Santyl)  1 applic  DAILY


 TOPIC


   1/7/19 21:00


 2/6/19 08:59  1/10/19 08:50


 


 


 Lansoprazole


  (Prevacid)  30 mg  Q12HR


 GT


   12/27/18 21:00


 1/26/19 20:59  1/10/19 08:49


 


 


 Lorazepam


  (Ativan)  0.5 mg  Q6H  PRN


 ORAL


 For Anxiety  1/6/19 15:45


 1/13/19 15:44  1/10/19 14:34


 


 


 Meropenem 500 mg/


 Sodium Chloride  55 ml @ 


 110 mls/hr  DAILY


 IVPB


   1/9/19 12:00


 1/14/19 11:59  1/10/19 08:48


 


 


 Minoxidil


  (Loniten)  2.5 mg  Q12HR


 GT


   12/27/18 21:00


 1/26/19 20:59  1/9/19 22:59


 


 


 Sodium Chloride


  (Ocean Nasal


 Spray)  1 spray  Q4H  PRN


 NASAL


 dry nose  12/23/18 14:30


 1/21/19 18:29  12/25/18 11:49


 


 


 Zolpidem Tartrate


  (Ambien)  5 mg  HSPRN  PRN


 GT


 Insomnia  1/3/19 20:45


 1/10/19 20:44  1/9/19 04:20


 











Subjective


ROS Limited/Unobtainable:  No


Allergies:  


Coded Allergies:  


     No Known Allergies (Unverified , 11/26/18)





Objective





Last 24 Hour Vital Signs








  Date Time  Temp Pulse Resp B/P (MAP) Pulse Ox O2 Delivery O2 Flow Rate FiO2


 


1/12/19 12:55  74 15     40


 


1/12/19 12:19  85 25     40


 


1/12/19 12:11  68 16     40


 


1/12/19 12:00        40


 


1/12/19 12:00 99.0 74 20 147/52 (83) 100   


 


1/12/19 12:00      Mechanical Ventilator  





      Mechanical Ventilator  


 


1/12/19 11:43  79      


 


1/12/19 08:52  75 16     40


 


1/12/19 08:14    152/40    


 


1/12/19 08:00        40


 


1/12/19 08:00      Mechanical Ventilator  





      Mechanical Ventilator  


 


1/12/19 08:00 98.6 75 16 152/40 (77) 100   


 


1/12/19 07:56  74      


 


1/12/19 06:44  78 17     40


 


1/12/19 05:09  74 12     40


 


1/12/19 04:00 98.3 75 16 107/55 (72) 100   


 


1/12/19 04:00      Mechanical Ventilator  





      Mechanical Ventilator  


 


1/12/19 04:00  72      


 


1/12/19 04:00        40


 


1/12/19 03:25  91 18     40


 


1/12/19 01:21  82 18     40


 


1/12/19 00:00        40


 


1/12/19 00:00  81      


 


1/12/19 00:00 98.1 110 16 163/79 (107) 100   


 


1/12/19 00:00      Mechanical Ventilator  





      Mechanical Ventilator  


 


1/11/19 23:12  80 19     40


 


1/11/19 21:24  73 18     40


 


1/11/19 20:57    149/79    


 


1/11/19 20:00 97.9 80 18 149/79 (102) 100   


 


1/11/19 20:00      Mechanical Ventilator  





      Mechanical Ventilator  


 


1/11/19 20:00  102      


 


1/11/19 20:00        40


 


1/11/19 19:36  81 18     40


 


1/11/19 16:51  78 18     40


 


1/11/19 16:00 97.7 70 14 133/53 (79) 100   


 


1/11/19 16:00        40


 


1/11/19 16:00  83      


 


1/11/19 16:00      Mechanical Ventilator  





      Mechanical Ventilator  

















Intake and Output  


 


 1/11/19 1/12/19





 19:00 07:00


 


Intake Total 590 ml 680 ml


 


Output Total 150 ml 150 ml


 


Balance 440 ml 530 ml


 


  


 


Free Water 150 ml 150 ml


 


Tube Feeding 440 ml 480 ml


 


Other  50 ml


 


Stool Total 150 ml 150 ml


 


# Voids  1








Laboratory Tests


1/12/19 03:55: 


White Blood Count 8.3, Red Blood Count 2.18L, Hemoglobin 6.5*L, Hematocrit 20.9L

, Mean Corpuscular Volume 96, Mean Corpuscular Hemoglobin 29.8, Mean 

Corpuscular Hemoglobin Concent 31.0L, Red Cell Distribution Width 17.7H, 

Platelet Count 411, Mean Platelet Volume 5.7L, Neutrophils (%) (Auto) , 

Lymphocytes (%) (Auto) , Monocytes (%) (Auto) , Eosinophils (%) (Auto) , 

Basophils (%) (Auto) , Differential Total Cells Counted 100, Neutrophils % (

Manual) 78H, Lymphocytes % (Manual) 5L, Monocytes % (Manual) 11H, Eosinophils % 

(Manual) 4H, Basophils % (Manual) 2, Band Neutrophils 0, Platelet Estimate 

Adequate, Platelet Morphology Normal, Hypochromasia 2+, Anisocytosis 2+, Sodium 

Level 136, Potassium Level 3.7, Chloride Level 99, Carbon Dioxide Level 31, 

Anion Gap 7, Blood Urea Nitrogen 31H, Creatinine 1.5H, Estimat Glomerular 

Filtration Rate , Glucose Level 112H, Calcium Level 8.8, Phosphorus Level 3.3, 

Magnesium Level 2.5H





Current Medications








 Medications


  (Trade)  Dose


 Ordered  Sig/Tony


 Route


 PRN Reason  Start Time


 Stop Time Status Last Admin


Dose Admin


 


 Acetaminophen


  (Tylenol)  650 mg  Q6H  PRN


 GT


 Mild Pain/Temp > 100.5  12/23/18 16:00


 1/20/19 15:59  1/10/19 08:49


 


 


 Acetaminophen/


 Hydrocodone Bitart


  (Norco 5/325)  1 tab  Q6H  PRN


 GT


 For Pain  1/8/19 13:35


 1/15/19 13:00  1/12/19 02:47


 


 


 Amiodarone HCl


  (Cordarone)  200 mg  DAILY


 GT


   1/2/19 09:00


 1/26/19 20:59  1/12/19 08:14


 


 


 Apixaban


  (Eliquis)  2.5 mg  BID


 GT


   12/27/18 18:00


 1/26/19 17:59  1/12/19 08:14


 


 


 Artificial Tears


  (Akwa-Tears)  1 drop  Q4H  PRN


 BOTH EYES


 Dry Eyes  12/23/18 15:30


 1/21/19 15:29  1/1/19 16:53


 


 


 Chlorhexidine


 Gluconate


  (Juana-Hex 2%)  1 applic  DAILY@2000


 TOPIC


   12/30/18 20:00


 1/29/19 19:59  1/11/19 20:57


 


 


 Collagenase


  (Santyl)  1 applic  DAILY


 TOPIC


   1/7/19 21:00


 2/6/19 08:59  1/12/19 08:15


 


 


 Epoetin Rupesh


  (Procrit (for


 ESRD on dialysis))  10,000 units  MON-WED-FRI


 SUBQ


   1/14/19 21:00


 2/13/19 20:59   


 


 


 Iron Sucrose 100


 mg/Sodium Chloride  60 ml @ 


 240 mls/hr  BEDTIME


 IV


   1/12/19 21:00


 1/16/19 21:14   


 


 


 Lansoprazole


  (Prevacid)  30 mg  Q12HR


 GT


   12/27/18 21:00


 1/26/19 20:59  1/12/19 08:14


 


 


 Lorazepam


  (Ativan)  0.5 mg  Q6H  PRN


 ORAL


 For Anxiety  1/6/19 15:45


 1/13/19 15:44  1/12/19 15:28


 


 


 Meropenem 500 mg/


 Sodium Chloride  55 ml @ 


 110 mls/hr  Q12HR


 IVPB


   1/12/19 21:00


 1/17/19 20:59   


 


 


 Minoxidil


  (Loniten)  2.5 mg  Q12HR


 GT


   12/27/18 21:00


 1/26/19 20:59  1/12/19 08:14


 


 


 Sodium Chloride


  (Ocean Nasal


 Spray)  1 spray  Q4H  PRN


 NASAL


 dry nose  12/23/18 14:30


 1/21/19 18:29  1/12/19 08:35


 


 


 Zolpidem Tartrate


  (Ambien)  5 mg  HSPRN  PRN


 ORAL


 Insomnia  1/11/19 08:00


 1/18/19 07:59  1/11/19 20:58


 

















Simon Silva MD Jan 12, 2019 15:34

## 2019-01-12 NOTE — INFECTIOUS DISEASES PROG NOTE
Assessment/Plan


Assessment/Plan


antibiotics : meropenem





A


1. e.coli UTI


2. pseudomonas pneumonia


3. leucocytosis improving


4. respiratory failure s/p tracheostomy


5. renal failure


6. decubitus ulcers


7. nasal MRSA colonization


8. rectal VRE colonization


9. pleural effusion





P


1. continue iv meropenem 5 more days


2. will follow up cultures





Subjective


ROS Limited/Unobtainable:  Yes


Allergies:  


Coded Allergies:  


     No Known Allergies (Unverified , 11/26/18)





Objective


Vital Signs





Last 24 Hour Vital Signs








  Date Time  Temp Pulse Resp B/P (MAP) Pulse Ox O2 Delivery O2 Flow Rate FiO2


 


1/12/19 12:55  74 15     40


 


1/12/19 12:19  85 25     40


 


1/12/19 12:11  68 16     40


 


1/12/19 12:00        40


 


1/12/19 12:00 99.0 74 20 147/52 (83) 100   


 


1/12/19 12:00      Mechanical Ventilator  





      Mechanical Ventilator  


 


1/12/19 11:43  79      


 


1/12/19 08:52  75 16     40


 


1/12/19 08:14    152/40    


 


1/12/19 08:00        40


 


1/12/19 08:00      Mechanical Ventilator  





      Mechanical Ventilator  


 


1/12/19 08:00 98.6 75 16 152/40 (77) 100   


 


1/12/19 07:56  74      


 


1/12/19 06:44  78 17     40


 


1/12/19 05:09  74 12     40


 


1/12/19 04:00 98.3 75 16 107/55 (72) 100   


 


1/12/19 04:00      Mechanical Ventilator  





      Mechanical Ventilator  


 


1/12/19 04:00  72      


 


1/12/19 04:00        40


 


1/12/19 03:25  91 18     40


 


1/12/19 01:21  82 18     40


 


1/12/19 00:00        40


 


1/12/19 00:00  81      


 


1/12/19 00:00 98.1 110 16 163/79 (107) 100   


 


1/12/19 00:00      Mechanical Ventilator  





      Mechanical Ventilator  


 


1/11/19 23:12  80 19     40


 


1/11/19 21:24  73 18     40


 


1/11/19 20:57    149/79    


 


1/11/19 20:00 97.9 80 18 149/79 (102) 100   


 


1/11/19 20:00      Mechanical Ventilator  





      Mechanical Ventilator  


 


1/11/19 20:00  102      


 


1/11/19 20:00        40


 


1/11/19 19:36  81 18     40


 


1/11/19 16:51  78 18     40


 


1/11/19 16:00 97.7 70 14 133/53 (79) 100   


 


1/11/19 16:00        40


 


1/11/19 16:00  83      


 


1/11/19 16:00      Mechanical Ventilator  





      Mechanical Ventilator  


 


1/11/19 15:09  82 18     40








Height (Feet):  5


Height (Inches):  2.00


Weight (Pounds):  121


HEENT:  status post trach


Respiratory/Chest:  lungs clear


Cardiovascular:  normal rate, regular rhythm, no gallop/murmur


Abdomen:  soft, non tender, other - GT


Extremities:  no edema, other - right arm PICC





Laboratory Tests








Test


  1/12/19


03:55


 


White Blood Count


  8.3 K/UL


(4.8-10.8)


 


Red Blood Count


  2.18 M/UL


(4.20-5.40)  L


 


Hemoglobin


  6.5 G/DL


(12.0-16.0)  *L


 


Hematocrit


  20.9 %


(37.0-47.0)  L


 


Mean Corpuscular Volume 96 FL (80-99)  


 


Mean Corpuscular Hemoglobin


  29.8 PG


(27.0-31.0)


 


Mean Corpuscular Hemoglobin


Concent 31.0 G/DL


(32.0-36.0)  L


 


Red Cell Distribution Width


  17.7 %


(11.6-14.8)  H


 


Platelet Count


  411 K/UL


(150-450)


 


Mean Platelet Volume


  5.7 FL


(6.5-10.1)  L


 


Neutrophils (%) (Auto)


  % (45.0-75.0)


 


 


Lymphocytes (%) (Auto)


  % (20.0-45.0)


 


 


Monocytes (%) (Auto)  % (1.0-10.0)  


 


Eosinophils (%) (Auto)  % (0.0-3.0)  


 


Basophils (%) (Auto)  % (0.0-2.0)  


 


Differential Total Cells


Counted 100  


 


 


Neutrophils % (Manual) 78 % (45-75)  H


 


Lymphocytes % (Manual) 5 % (20-45)  L


 


Monocytes % (Manual) 11 % (1-10)  H


 


Eosinophils % (Manual) 4 % (0-3)  H


 


Basophils % (Manual) 2 % (0-2)  


 


Band Neutrophils 0 % (0-8)  


 


Platelet Estimate Adequate  


 


Platelet Morphology Normal  


 


Hypochromasia 2+  


 


Anisocytosis 2+  


 


Sodium Level


  136 MMOL/L


(136-145)


 


Potassium Level


  3.7 MMOL/L


(3.5-5.1)


 


Chloride Level


  99 MMOL/L


()


 


Carbon Dioxide Level


  31 MMOL/L


(21-32)


 


Anion Gap


  7 mmol/L


(5-15)


 


Blood Urea Nitrogen


  31 mg/dL


(7-18)  H


 


Creatinine


  1.5 MG/DL


(0.55-1.30)  H


 


Estimat Glomerular Filtration


Rate  mL/min (>60)  


 


 


Glucose Level


  112 MG/DL


()  H


 


Calcium Level


  8.8 MG/DL


(8.5-10.1)


 


Phosphorus Level


  3.3 MG/DL


(2.5-4.9)


 


Magnesium Level


  2.5 MG/DL


(1.8-2.4)  H











Current Medications








 Medications


  (Trade)  Dose


 Ordered  Sig/Tony


 Route


 PRN Reason  Start Time


 Stop Time Status Last Admin


Dose Admin


 


 Acetaminophen


  (Tylenol)  650 mg  Q6H  PRN


 GT


 Mild Pain/Temp > 100.5  12/23/18 16:00


 1/20/19 15:59  1/10/19 08:49


 


 


 Acetaminophen/


 Hydrocodone Bitart


  (Norco 5/325)  1 tab  Q6H  PRN


 GT


 For Pain  1/8/19 13:35


 1/15/19 13:00  1/12/19 02:47


 


 


 Amiodarone HCl


  (Cordarone)  200 mg  DAILY


 GT


   1/2/19 09:00


 1/26/19 20:59  1/12/19 08:14


 


 


 Apixaban


  (Eliquis)  2.5 mg  BID


 GT


   12/27/18 18:00


 1/26/19 17:59  1/12/19 08:14


 


 


 Artificial Tears


  (Akwa-Tears)  1 drop  Q4H  PRN


 BOTH EYES


 Dry Eyes  12/23/18 15:30


 1/21/19 15:29  1/1/19 16:53


 


 


 Chlorhexidine


 Gluconate


  (Juana-Hex 2%)  1 applic  DAILY@2000


 TOPIC


   12/30/18 20:00


 1/29/19 19:59  1/11/19 20:57


 


 


 Collagenase


  (Santyl)  1 applic  DAILY


 TOPIC


   1/7/19 21:00


 2/6/19 08:59  1/12/19 08:15


 


 


 Epoetin Rupesh


  (Procrit (for


 ESRD on dialysis))  10,000 units  MON-WED-FRI


 SUBQ


   1/14/19 21:00


 2/13/19 20:59   


 


 


 Iron Sucrose 100


 mg/Sodium Chloride  60 ml @ 


 240 mls/hr  BEDTIME


 IV


   1/12/19 21:00


 1/16/19 21:14   


 


 


 Lansoprazole


  (Prevacid)  30 mg  Q12HR


 GT


   12/27/18 21:00


 1/26/19 20:59  1/12/19 08:14


 


 


 Lorazepam


  (Ativan)  0.5 mg  Q6H  PRN


 ORAL


 For Anxiety  1/6/19 15:45


 1/13/19 15:44  1/12/19 02:47


 


 


 Meropenem 500 mg/


 Sodium Chloride  55 ml @ 


 110 mls/hr  Q12HR


 IVPB


   1/12/19 21:00


 1/17/19 20:59   


 


 


 Minoxidil


  (Loniten)  2.5 mg  Q12HR


 GT


   12/27/18 21:00


 1/26/19 20:59  1/12/19 08:14


 


 


 Sodium Chloride


  (Ocean Nasal


 Spray)  1 spray  Q4H  PRN


 NASAL


 dry nose  12/23/18 14:30


 1/21/19 18:29  1/12/19 08:35


 


 


 Zolpidem Tartrate


  (Ambien)  5 mg  HSPRN  PRN


 ORAL


 Insomnia  1/11/19 08:00


 1/18/19 07:59  1/11/19 20:58


 

















Chase Santillan MD Jan 12, 2019 14:32

## 2019-01-13 VITALS — DIASTOLIC BLOOD PRESSURE: 50 MMHG | SYSTOLIC BLOOD PRESSURE: 138 MMHG

## 2019-01-13 VITALS — DIASTOLIC BLOOD PRESSURE: 48 MMHG | SYSTOLIC BLOOD PRESSURE: 125 MMHG

## 2019-01-13 VITALS — SYSTOLIC BLOOD PRESSURE: 140 MMHG | DIASTOLIC BLOOD PRESSURE: 68 MMHG

## 2019-01-13 VITALS — DIASTOLIC BLOOD PRESSURE: 50 MMHG | SYSTOLIC BLOOD PRESSURE: 100 MMHG

## 2019-01-13 VITALS — DIASTOLIC BLOOD PRESSURE: 80 MMHG | SYSTOLIC BLOOD PRESSURE: 146 MMHG

## 2019-01-13 VITALS — DIASTOLIC BLOOD PRESSURE: 61 MMHG | SYSTOLIC BLOOD PRESSURE: 135 MMHG

## 2019-01-13 LAB
ADD MANUAL DIFF: YES
ANION GAP SERPL CALC-SCNC: 6 MMOL/L (ref 5–15)
BUN SERPL-MCNC: 51 MG/DL (ref 7–18)
CALCIUM SERPL-MCNC: 9.7 MG/DL (ref 8.5–10.1)
CHLORIDE SERPL-SCNC: 97 MMOL/L (ref 98–107)
CO2 SERPL-SCNC: 31 MMOL/L (ref 21–32)
CREAT SERPL-MCNC: 1.9 MG/DL (ref 0.55–1.3)
ERYTHROCYTE [DISTWIDTH] IN BLOOD BY AUTOMATED COUNT: 17.8 % (ref 11.6–14.8)
HCT VFR BLD CALC: 23.3 % (ref 37–47)
HGB BLD-MCNC: 7.4 G/DL (ref 12–16)
MCV RBC AUTO: 96 FL (ref 80–99)
PLATELET # BLD: 458 K/UL (ref 150–450)
POTASSIUM SERPL-SCNC: 4.5 MMOL/L (ref 3.5–5.1)
RBC # BLD AUTO: 2.43 M/UL (ref 4.2–5.4)
SODIUM SERPL-SCNC: 134 MMOL/L (ref 136–145)
WBC # BLD AUTO: 9.3 K/UL (ref 4.8–10.8)

## 2019-01-13 RX ADMIN — APIXABAN SCH MG: 2.5 TABLET, FILM COATED ORAL at 17:21

## 2019-01-13 RX ADMIN — APIXABAN SCH MG: 2.5 TABLET, FILM COATED ORAL at 08:56

## 2019-01-13 RX ADMIN — MEROPENEM SCH MLS/HR: 500 INJECTION INTRAVENOUS at 08:56

## 2019-01-13 RX ADMIN — AMIODARONE HYDROCHLORIDE SCH MG: 200 TABLET ORAL at 08:56

## 2019-01-13 RX ADMIN — MINOXIDIL SCH MG: 2.5 TABLET ORAL at 08:56

## 2019-01-13 RX ADMIN — MINOXIDIL SCH MG: 2.5 TABLET ORAL at 20:54

## 2019-01-13 RX ADMIN — HYDROCODONE BITARTRATE AND ACETAMINOPHEN PRN TAB: 5; 325 TABLET ORAL at 17:32

## 2019-01-13 RX ADMIN — CHLORHEXIDINE GLUCONATE SCH APPLIC: 213 SOLUTION TOPICAL at 19:57

## 2019-01-13 RX ADMIN — SODIUM CHLORIDE SCH MLS/HR: 0.9 INJECTION INTRAVENOUS at 20:55

## 2019-01-13 NOTE — INFECTIOUS DISEASES PROG NOTE
Assessment/Plan


Assessment/Plan


A:


leukocytosis 


UTI with ESBL E. Coli


Cellulitis of R leg, osteomyelitis treated


Hypercapnic respiratory failure


ESRD on HD


DM


Anemia


Dementia


PVD


R pleural effusion


Mucous plug


s/o Cardiac arrest


Multiple Pressure ulcers


P;


continue  Meropenem X 4 days





Subjective


ROS Limited/Unobtainable:  Yes


Constitutional:  Reports: no symptoms


Neurologic:  Reports: confusion, other - on restraint


Allergies:  


Coded Allergies:  


     No Known Allergies (Unverified , 11/26/18)





Objective


Vital Signs





Last 24 Hour Vital Signs








  Date Time  Temp Pulse Resp B/P (MAP) Pulse Ox O2 Delivery O2 Flow Rate FiO2


 


1/13/19 08:58  78 16     40


 


1/13/19 08:56    125/48    


 


1/13/19 08:46 97.9 69 20 125/48 (73) 100   


 


1/13/19 08:30      Mechanical Ventilator  





      Mechanical Ventilator  


 


1/13/19 08:00        40


 


1/13/19 08:00  91      


 


1/13/19 06:49  86 16     40


 


1/13/19 05:11  83 15     40


 


1/13/19 04:00      Mechanical Ventilator  





      Mechanical Ventilator  


 


1/13/19 04:00  115      


 


1/13/19 04:00        40


 


1/13/19 04:00 97.7 88 17 146/80 (102) 100   


 


1/13/19 03:23  70 16     40


 


1/13/19 01:37  74 16     40


 


1/13/19 00:00        40


 


1/13/19 00:00 98.6 80 16 100/50 (67) 100   


 


1/13/19 00:00  114      


 


1/13/19 00:00      Mechanical Ventilator  





      Mechanical Ventilator  


 


1/12/19 23:20  80 15     40


 


1/12/19 21:10  75 13     40


 


1/12/19 20:12    111/45    


 


1/12/19 20:00 98.4 90 16 120/73 (89) 100   


 


1/12/19 20:00  89      


 


1/12/19 20:00        40


 


1/12/19 20:00      Mechanical Ventilator  





      Mechanical Ventilator  


 


1/12/19 19:05  95 15     40


 


1/12/19 17:23  78 14     40


 


1/12/19 16:00        40


 


1/12/19 16:00      Mechanical Ventilator  





      Mechanical Ventilator  


 


1/12/19 16:00 99.3 80 20 111/45 (67) 96   


 


1/12/19 15:33  83      


 


1/12/19 15:22  81 14     40


 


1/12/19 12:55  74 15     40


 


1/12/19 12:19  85 25     40


 


1/12/19 12:11  68 16     40


 


1/12/19 12:00        40


 


1/12/19 12:00 99.0 74 20 147/52 (83) 100   


 


1/12/19 12:00      Mechanical Ventilator  





      Mechanical Ventilator  


 


1/12/19 11:43  79      








Height (Feet):  5


Height (Inches):  2.00


Weight (Pounds):  121


General Appearance:  no acute distress


HEENT:  status post trach


Respiratory/Chest:  lungs clear, other - on ventilator


Cardiovascular:  normal rate, other - R arm PICC line


Abdomen:  soft, non tender, other - GT feeding


Extremities:  no edema


Skin:  ulcers


Neurologic/Psychiatric:  alert, responsive, disoriented





Laboratory Tests








Test


  1/13/19


03:55


 


White Blood Count


  9.3 K/UL


(4.8-10.8)


 


Red Blood Count


  2.43 M/UL


(4.20-5.40)  L


 


Hemoglobin


  7.4 G/DL


(12.0-16.0)  L


 


Hematocrit


  23.3 %


(37.0-47.0)  L


 


Mean Corpuscular Volume 96 FL (80-99)  


 


Mean Corpuscular Hemoglobin


  30.4 PG


(27.0-31.0)


 


Mean Corpuscular Hemoglobin


Concent 31.6 G/DL


(32.0-36.0)  L


 


Red Cell Distribution Width


  17.8 %


(11.6-14.8)  H


 


Platelet Count


  458 K/UL


(150-450)  H


 


Mean Platelet Volume


  5.9 FL


(6.5-10.1)  L


 


Neutrophils (%) (Auto)


  % (45.0-75.0)


 


 


Lymphocytes (%) (Auto)


  % (20.0-45.0)


 


 


Monocytes (%) (Auto)  % (1.0-10.0)  


 


Eosinophils (%) (Auto)  % (0.0-3.0)  


 


Basophils (%) (Auto)  % (0.0-2.0)  


 


Differential Total Cells


Counted 100  


 


 


Neutrophils % (Manual) 83 % (45-75)  H


 


Lymphocytes % (Manual) 6 % (20-45)  L


 


Monocytes % (Manual) 9 % (1-10)  


 


Eosinophils % (Manual) 1 % (0-3)  


 


Basophils % (Manual) 1 % (0-2)  


 


Band Neutrophils 0 % (0-8)  


 


Platelet Estimate Adequate  


 


Platelet Morphology Normal  


 


Hypochromasia 1+  


 


Anisocytosis 1+  


 


Sodium Level


  134 MMOL/L


(136-145)  L


 


Potassium Level


  4.5 MMOL/L


(3.5-5.1)


 


Chloride Level


  97 MMOL/L


()  L


 


Carbon Dioxide Level


  31 MMOL/L


(21-32)


 


Anion Gap


  6 mmol/L


(5-15)


 


Blood Urea Nitrogen


  51 mg/dL


(7-18)  H


 


Creatinine


  1.9 MG/DL


(0.55-1.30)  H


 


Estimat Glomerular Filtration


Rate  mL/min (>60)  


 


 


Glucose Level


  123 MG/DL


()  H


 


Calcium Level


  9.7 MG/DL


(8.5-10.1)











Current Medications








 Medications


  (Trade)  Dose


 Ordered  Sig/Tony


 Route


 PRN Reason  Start Time


 Stop Time Status Last Admin


Dose Admin


 


 Acetaminophen


  (Tylenol)  650 mg  Q6H  PRN


 GT


 Mild Pain/Temp > 100.5  12/23/18 16:00


 1/20/19 15:59  1/10/19 08:49


 


 


 Acetaminophen/


 Hydrocodone Bitart


  (Norco 5/325)  1 tab  Q6H  PRN


 GT


 For Pain  1/8/19 13:35


 1/15/19 13:00  1/12/19 17:27


 


 


 Amiodarone HCl


  (Cordarone)  200 mg  DAILY


 GT


   1/2/19 09:00


 1/26/19 20:59  1/13/19 08:56


 


 


 Apixaban


  (Eliquis)  2.5 mg  BID


 GT


   12/27/18 18:00


 1/26/19 17:59  1/13/19 08:56


 


 


 Artificial Tears


  (Akwa-Tears)  1 drop  Q4H  PRN


 BOTH EYES


 Dry Eyes  12/23/18 15:30


 1/21/19 15:29  1/1/19 16:53


 


 


 Chlorhexidine


 Gluconate


  (Juana-Hex 2%)  1 applic  DAILY@2000


 TOPIC


   12/30/18 20:00


 1/29/19 19:59  1/12/19 20:12


 


 


 Collagenase


  (Santyl)  1 applic  DAILY


 TOPIC


   1/7/19 21:00


 2/6/19 08:59  1/12/19 08:15


 


 


 Epoetin Rupesh


  (Procrit (for


 ESRD on dialysis))  10,000 units  MON-WED-FRI


 SUBQ


   1/14/19 21:00


 2/13/19 20:59   


 


 


 Iron Sucrose 100


 mg/Sodium Chloride  60 ml @ 


 240 mls/hr  BEDTIME


 IV


   1/12/19 21:00


 1/16/19 21:14  1/12/19 20:12


 


 


 Lansoprazole


  (Prevacid)  30 mg  Q12HR


 GT


   12/27/18 21:00


 1/26/19 20:59  1/13/19 08:56


 


 


 Lorazepam


  (Ativan)  0.5 mg  Q6H  PRN


 ORAL


 For Anxiety  1/6/19 15:45


 1/13/19 15:44  1/12/19 15:28


 


 


 Meropenem 500 mg/


 Sodium Chloride  55 ml @ 


 110 mls/hr  Q12HR


 IVPB


   1/12/19 21:00


 1/17/19 20:59  1/13/19 08:56


 


 


 Minoxidil


  (Loniten)  2.5 mg  Q12HR


 GT


   12/27/18 21:00


 1/26/19 20:59  1/13/19 08:56


 


 


 Sodium Chloride


  (Ocean Nasal


 Spray)  1 spray  Q4H  PRN


 NASAL


 dry nose  12/23/18 14:30


 1/21/19 18:29  1/12/19 08:35


 


 


 Zolpidem Tartrate


  (Ambien)  5 mg  HSPRN  PRN


 ORAL


 Insomnia  1/11/19 08:00


 1/18/19 07:59  1/12/19 20:11


 

















Luke Maynard MD Jan 13, 2019 09:31

## 2019-01-13 NOTE — NUR
NURSE NOTES:

Patient remain restless want take restraints out,son visited concerned about patient 
confusion,medicated for pain on her tracheostomy site,patient point pain,oral care done q 4 
hrs ,skin care done g tube care,on special mattress to protect skin,head elevated aspiration 
precaution,while i was with her i take restraints off but arms swinging almost pulled 
tracheostomy bowser,talked to her not to pull device,she can communicate by writing,when she 
says help me i hold her hand ,thats what she needs,made comfortable,emotional support 
provided to patient and son

## 2019-01-13 NOTE — NUR
HAND-OFF: 

Report given to CAROLYN Ivey ,patient awake on restraints,ventilator,G tube feeding,no distress.

## 2019-01-13 NOTE — NUR
NURSE NOTES:

Received report CAROLYN Campos patient awake likes restraints off,stayed patient off 
restraints,on overlay mattress,G tube feeding,ventilator ,head elevated,aspiration 
precaution

## 2019-01-13 NOTE — NUR
NURSE NOTES:

Report received from CAROLYN Ocasio. Observed pt lying on the bed, awake and agitated, uncovering 
herself by taking off her gown and blankets, and swinging her hands side by side. Held her 
hands and told her I am here to help her and will bring night time medication. Emotional 
support done. Changed the position with multiple pillows. SR with cardiac monitor. Trach on 
vent AC 8, , FIO2 40%, PEEP 5, and Shiley 8. GT site intact and running Nepro at 
40cc/hr. Rectal tube in place and draining well. Midline at ASHISH, intact and patent. AV shunt 
at left UA, intact. Bed in the lowest position. Side rails up x3. Will continue to monitor.

## 2019-01-13 NOTE — PULMONOLOGY PROGRESS NOTE
Assessment/Plan


Assessment/Plan


Pulmonary Progress Note 





Assessment/Plan


Assessment/Plan


1. Hypotension per history , now resolved


2. Sepsis.


3. Atrial fibrillation.


4. Bradycardia.


5. Diabetes mellitus.


6. End-stage renal disease on dialysis.


7. Left lung collapse. s/p bronchoscopy 


8. Respiratory failure, acute on chronic s/p trach


9. Dysphagia.


10. Dementia.


11. Anemia


12. Pneumonia


13. bilateral effusion





PLAN


respiratory care without change


full vent support


monitor imaging and recommend


oxygen taper and monitor needs


aspiration precautions


keep negative


supportive care for now


will follow up and recommend for change and monitor as is


impression, plan, and exam edited and reviewed in detail


care discussed with RN





medications/laboratory data/nursing notes reviewed in detail


note reviewed and edited


care discussed with RN and RT





Subjective


ROS Limited/Unobtainable:  Yes


Allergies:  


Coded Allergies:  


     No Known Allergies (Unverified , 11/26/18)


Subjective


care noted and reviewed


no distress 


events reviewed and discussed


trach in place





Objective





Vital Signs Noted





Objective


WDWN


NAD


intubated/trach


reduced breath sounds bilaterally without rhonchi or wheeze


[id number] without MRG


NABS nontender no HSM


no CCE


poorly responsive


Laboratory Tests


1/10/19 04:00: 


White Blood Count 12.2H, Red Blood Count 2.49L, Hemoglobin 7.4L, Hematocrit 

23.0L, Mean Corpuscular Volume 93, Mean Corpuscular Hemoglobin 29.9, Mean 

Corpuscular Hemoglobin Concent 32.3, Red Cell Distribution Width 17.4H, 

Platelet Count 432, Mean Platelet Volume 5.7L, Neutrophils (%) (Auto) , 

Lymphocytes (%) (Auto) , Monocytes (%) (Auto) , Eosinophils (%) (Auto) , 

Basophils (%) (Auto) , Differential Total Cells Counted 100, Neutrophils % (

Manual) 78H, Lymphocytes % (Manual) 7L, Monocytes % (Manual) 12H, Eosinophils % 

(Manual) 1, Basophils % (Manual) 1, Band Neutrophils 1, Platelet Estimate 

IncreasedH, Platelet Morphology Normal, Hypochromasia 1+, Anisocytosis 1+, 

Sodium Level 133L, Potassium Level 4.0, Chloride Level 96L, Carbon Dioxide 

Level 30, Anion Gap 7, Blood Urea Nitrogen 44H, Creatinine 1.7H, Estimat 

Glomerular Filtration Rate , Glucose Level 110H, Calcium Level 8.9





Current Medications








 Medications


  (Trade)  Dose


 Ordered  Sig/Tony


 Route


 PRN Reason  Start Time


 Stop Time Status Last Admin


Dose Admin


 


 Acetaminophen


  (Tylenol)  650 mg  Q6H  PRN


 GT


 Mild Pain/Temp > 100.5  12/23/18 16:00


 1/20/19 15:59  1/10/19 08:49


 


 


 Acetaminophen/


 Hydrocodone Bitart


  (Norco 5/325)  1 tab  Q6H  PRN


 GT


 For Pain  1/8/19 13:35


 1/15/19 13:00  1/10/19 14:34


 


 


 Amiodarone HCl


  (Cordarone)  200 mg  DAILY


 GT


   1/2/19 09:00


 1/26/19 20:59  1/10/19 08:49


 


 


 Apixaban


  (Eliquis)  2.5 mg  BID


 GT


   12/27/18 18:00


 1/26/19 17:59  1/9/19 17:49


 


 


 Artificial Tears


  (Akwa-Tears)  1 drop  Q4H  PRN


 BOTH EYES


 Dry Eyes  12/23/18 15:30


 1/21/19 15:29  1/1/19 16:53


 


 


 Chlorhexidine


 Gluconate


  (Juana-Hex 2%)  1 applic  DAILY@2000


 TOPIC


   12/30/18 20:00


 1/29/19 19:59  1/9/19 22:59


 


 


 Collagenase


  (Santyl)  1 applic  DAILY


 TOPIC


   1/7/19 21:00


 2/6/19 08:59  1/10/19 08:50


 


 


 Lansoprazole


  (Prevacid)  30 mg  Q12HR


 GT


   12/27/18 21:00


 1/26/19 20:59  1/10/19 08:49


 


 


 Lorazepam


  (Ativan)  0.5 mg  Q6H  PRN


 ORAL


 For Anxiety  1/6/19 15:45


 1/13/19 15:44  1/10/19 14:34


 


 


 Meropenem 500 mg/


 Sodium Chloride  55 ml @ 


 110 mls/hr  DAILY


 IVPB


   1/9/19 12:00


 1/14/19 11:59  1/10/19 08:48


 


 


 Minoxidil


  (Loniten)  2.5 mg  Q12HR


 GT


   12/27/18 21:00


 1/26/19 20:59  1/9/19 22:59


 


 


 Sodium Chloride


  (Ocean Nasal


 Spray)  1 spray  Q4H  PRN


 NASAL


 dry nose  12/23/18 14:30


 1/21/19 18:29  12/25/18 11:49


 


 


 Zolpidem Tartrate


  (Ambien)  5 mg  HSPRN  PRN


 GT


 Insomnia  1/3/19 20:45


 1/10/19 20:44  1/9/19 04:20


 











Subjective


ROS Limited/Unobtainable:  No


Allergies:  


Coded Allergies:  


     No Known Allergies (Unverified , 11/26/18)





Objective





Last 24 Hour Vital Signs








  Date Time  Temp Pulse Resp B/P (MAP) Pulse Ox O2 Delivery O2 Flow Rate FiO2


 


1/13/19 18:00        40


 


1/13/19 16:48  87 17     40


 


1/13/19 16:10      Mechanical Ventilator  





      Mechanical Ventilator  


 


1/13/19 16:07  88 19     40


 


1/13/19 16:00  95      


 


1/13/19 16:00        40


 


1/13/19 16:00 97.7 81 20 135/61 (85) 100   


 


1/13/19 12:48  94 17     40


 


1/13/19 12:00  117      


 


1/13/19 12:00 97.9 110 20 140/68 (92) 100   


 


1/13/19 12:00      Mechanical Ventilator  





      Mechanical Ventilator  


 


1/13/19 12:00        40


 


1/13/19 10:55  108 20     40


 


1/13/19 08:58  78 16     40


 


1/13/19 08:56    125/48    


 


1/13/19 08:46 97.9 69 20 125/48 (73) 100   


 


1/13/19 08:30      Mechanical Ventilator  





      Mechanical Ventilator  


 


1/13/19 08:00        40


 


1/13/19 08:00  91      


 


1/13/19 06:49  86 16     40


 


1/13/19 05:11  83 15     40


 


1/13/19 04:00      Mechanical Ventilator  





      Mechanical Ventilator  


 


1/13/19 04:00  115      


 


1/13/19 04:00        40


 


1/13/19 04:00 97.7 88 17 146/80 (102) 100   


 


1/13/19 03:23  70 16     40


 


1/13/19 01:37  74 16     40


 


1/13/19 00:00        40


 


1/13/19 00:00 98.6 80 16 100/50 (67) 100   


 


1/13/19 00:00  114      


 


1/13/19 00:00      Mechanical Ventilator  





      Mechanical Ventilator  


 


1/12/19 23:20  80 15     40


 


1/12/19 21:10  75 13     40


 


1/12/19 20:12    111/45    


 


1/12/19 20:00 98.4 90 16 120/73 (89) 100   


 


1/12/19 20:00  89      


 


1/12/19 20:00        40


 


1/12/19 20:00      Mechanical Ventilator  





      Mechanical Ventilator  


 


1/12/19 19:05  95 15     40

















Intake and Output  


 


 1/12/19 1/13/19





 19:00 07:00


 


Intake Total 975 ml 565 ml


 


Output Total 150 ml 


 


Balance 825 ml 565 ml


 


  


 


Free Water 440 ml 50 ml


 


IV Total 55 ml 115 ml


 


Tube Feeding 480 ml 400 ml


 


Stool Total 150 ml 








Laboratory Tests


1/13/19 03:55: 


White Blood Count 9.3, Red Blood Count 2.43L, Hemoglobin 7.4L, Hematocrit 23.3L

, Mean Corpuscular Volume 96, Mean Corpuscular Hemoglobin 30.4, Mean 

Corpuscular Hemoglobin Concent 31.6L, Red Cell Distribution Width 17.8H, 

Platelet Count 458H, Mean Platelet Volume 5.9L, Neutrophils (%) (Auto) , 

Lymphocytes (%) (Auto) , Monocytes (%) (Auto) , Eosinophils (%) (Auto) , 

Basophils (%) (Auto) , Differential Total Cells Counted 100, Neutrophils % (

Manual) 83H, Lymphocytes % (Manual) 6L, Monocytes % (Manual) 9, Eosinophils % (

Manual) 1, Basophils % (Manual) 1, Band Neutrophils 0, Platelet Estimate 

Adequate, Platelet Morphology Normal, Hypochromasia 1+, Anisocytosis 1+, Sodium 

Level 134L, Potassium Level 4.5, Chloride Level 97L, Carbon Dioxide Level 31, 

Anion Gap 6, Blood Urea Nitrogen 51H, Creatinine 1.9H, Estimat Glomerular 

Filtration Rate , Glucose Level 123H, Calcium Level 9.7





Current Medications








 Medications


  (Trade)  Dose


 Ordered  Sig/Tony


 Route


 PRN Reason  Start Time


 Stop Time Status Last Admin


Dose Admin


 


 Acetaminophen


  (Tylenol)  650 mg  Q6H  PRN


 GT


 Mild Pain/Temp > 100.5  12/23/18 16:00


 1/20/19 15:59  1/10/19 08:49


 


 


 Acetaminophen/


 Hydrocodone Bitart


  (Norco 5/325)  1 tab  Q6H  PRN


 GT


 For Pain  1/8/19 13:35


 1/15/19 13:00  1/13/19 17:32


 


 


 Amiodarone HCl


  (Cordarone)  200 mg  DAILY


 GT


   1/2/19 09:00


 1/26/19 20:59  1/13/19 08:56


 


 


 Apixaban


  (Eliquis)  2.5 mg  BID


 GT


   12/27/18 18:00


 1/26/19 17:59  1/13/19 17:21


 


 


 Artificial Tears


  (Akwa-Tears)  1 drop  Q4H  PRN


 BOTH EYES


 Dry Eyes  12/23/18 15:30


 1/21/19 15:29  1/1/19 16:53


 


 


 Chlorhexidine


 Gluconate


  (Juana-Hex 2%)  1 applic  DAILY@2000


 TOPIC


   12/30/18 20:00


 1/29/19 19:59  1/12/19 20:12


 


 


 Collagenase


  (Santyl)  1 applic  DAILY


 TOPIC


   1/7/19 21:00


 2/6/19 08:59  1/13/19 13:53


 


 


 Epoetin Rupesh


  (Procrit (for


 ESRD on dialysis))  10,000 units  MON-WED-FRI


 SUBQ


   1/14/19 21:00


 2/13/19 20:59   


 


 


 Iron Sucrose 100


 mg/Sodium Chloride  60 ml @ 


 240 mls/hr  BEDTIME


 IV


   1/12/19 21:00


 1/16/19 21:14  1/12/19 20:12


 


 


 Lansoprazole


  (Prevacid)  30 mg  Q12HR


 GT


   12/27/18 21:00


 1/26/19 20:59  1/13/19 08:56


 


 


 Meropenem 500 mg/


 Sodium Chloride  55 ml @ 


 110 mls/hr  DAILY


 IVPB


   1/14/19 09:00


 1/17/19 08:59   


 


 


 Minoxidil


  (Loniten)  2.5 mg  Q12HR


 GT


   12/27/18 21:00


 1/26/19 20:59  1/13/19 08:56


 


 


 Sodium Chloride  1,000 ml @ 


 500 mls/hr  Q2H PRN


 IVLG


 sbp<90 during hd  1/13/19 10:18


 1/14/19 23:59   


 


 


 Sodium Chloride


  (Ocean Nasal


 Spray)  1 spray  Q4H  PRN


 NASAL


 dry nose  12/23/18 14:30


 1/21/19 18:29  1/12/19 08:35


 


 


 Zolpidem Tartrate


  (Ambien)  5 mg  HSPRN  PRN


 ORAL


 Insomnia  1/11/19 08:00


 1/18/19 07:59  1/12/19 20:11


 

















Simon Silva MD Jan 13, 2019 18:38

## 2019-01-13 NOTE — NEPHROLOGY PROGRESS NOTE
Assessment/Plan


Assessment


Seee below


Plan





ESRD HD q MWF 


Anemia of CKD , SAMANTHA high dose. 





A. 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 with LAE. LVEF 65% . On Eliquis.





Post trach + PEG .


Referred to [facility]. Pt's son agreeable to [facility]. Referred again.


See orders.


Still needs extensive Vascular W/U. 














Hct 23.3 . No need for transfusion on HD 1/14/19.





Subjective


Subjective


More calm (Ativan increased)





Objective


Objective





Last 24 Hour Vital Signs








  Date Time  Temp Pulse Resp B/P (MAP) Pulse Ox O2 Delivery O2 Flow Rate FiO2


 


1/13/19 08:58  78 16     40


 


1/13/19 08:56    125/48    


 


1/13/19 08:46 97.9 69 20 125/48 (73) 100   


 


1/13/19 08:30      Mechanical Ventilator  





      Mechanical Ventilator  


 


1/13/19 08:00        40


 


1/13/19 08:00  91      


 


1/13/19 06:49  86 16     40


 


1/13/19 05:11  83 15     40


 


1/13/19 04:00      Mechanical Ventilator  





      Mechanical Ventilator  


 


1/13/19 04:00  115      


 


1/13/19 04:00        40


 


1/13/19 04:00 97.7 88 17 146/80 (102) 100   


 


1/13/19 03:23  70 16     40


 


1/13/19 01:37  74 16     40


 


1/13/19 00:00        40


 


1/13/19 00:00 98.6 80 16 100/50 (67) 100   


 


1/13/19 00:00  114      


 


1/13/19 00:00      Mechanical Ventilator  





      Mechanical Ventilator  


 


1/12/19 23:20  80 15     40


 


1/12/19 21:10  75 13     40


 


1/12/19 20:12    111/45    


 


1/12/19 20:00 98.4 90 16 120/73 (89) 100   


 


1/12/19 20:00  89      


 


1/12/19 20:00        40


 


1/12/19 20:00      Mechanical Ventilator  





      Mechanical Ventilator  


 


1/12/19 19:05  95 15     40


 


1/12/19 17:23  78 14     40


 


1/12/19 16:00        40


 


1/12/19 16:00      Mechanical Ventilator  





      Mechanical Ventilator  


 


1/12/19 16:00 99.3 80 20 111/45 (67) 96   


 


1/12/19 15:33  83      


 


1/12/19 15:22  81 14     40


 


1/12/19 12:55  74 15     40


 


1/12/19 12:19  85 25     40


 


1/12/19 12:11  68 16     40


 


1/12/19 12:00        40


 


1/12/19 12:00 99.0 74 20 147/52 (83) 100   


 


1/12/19 12:00      Mechanical Ventilator  





      Mechanical Ventilator  


 


1/12/19 11:43  79      

















Intake and Output  


 


 1/12/19 1/13/19





 19:00 07:00


 


Intake Total 975 ml 525 ml


 


Output Total 150 ml 


 


Balance 825 ml 525 ml


 


  


 


Free Water 440 ml 50 ml


 


IV Total 55 ml 115 ml


 


Tube Feeding 480 ml 360 ml


 


Stool Total 150 ml 








Laboratory Tests


1/13/19 03:55: 


White Blood Count 9.3, Red Blood Count 2.43L, Hemoglobin 7.4L, Hematocrit 23.3L

, Mean Corpuscular Volume 96, Mean Corpuscular Hemoglobin 30.4, Mean 

Corpuscular Hemoglobin Concent 31.6L, Red Cell Distribution Width 17.8H, 

Platelet Count 458H, Mean Platelet Volume 5.9L, Neutrophils (%) (Auto) , 

Lymphocytes (%) (Auto) , Monocytes (%) (Auto) , Eosinophils (%) (Auto) , 

Basophils (%) (Auto) , Differential Total Cells Counted 100, Neutrophils % (

Manual) 83H, Lymphocytes % (Manual) 6L, Monocytes % (Manual) 9, Eosinophils % (

Manual) 1, Basophils % (Manual) 1, Band Neutrophils 0, Platelet Estimate 

Adequate, Platelet Morphology Normal, Hypochromasia 1+, Anisocytosis 1+, Sodium 

Level 134L, Potassium Level 4.5, Chloride Level 97L, Carbon Dioxide Level 31, 

Anion Gap 6, Blood Urea Nitrogen 51H, Creatinine 1.9H, Estimat Glomerular 

Filtration Rate , Glucose Level 123H, Calcium Level 9.7


Height (Feet):  5


Height (Inches):  2.00


Weight (Pounds):  121


Objective


On vent.


Trach clean.


Cv IRR/IRR


Lungs B ronchi.


Abd SNT. BS +  PEG.


E Rt. foot diabetic ulcers











Gavino Daigle MD Jan 13, 2019 10:16

## 2019-01-13 NOTE — CARDIOLOGY PROGRESS NOTE
Assessment/Plan


Assessment/Plan


1. Hypotension, resolved 


2. Paroxysmal episodes of atrial fibrillation history.


3. Bradycardia secondary to medications, amiodarone possibly.


4. Diabetes mellitus.


5. End-stage renal disease, on hemodialysis.


6. Lung collapse.


7. Respiratory failure, status post intubation.


8. Dysphagia.


9. History of dementia.


10. Pulm htn 


11.  Pleural effusion 


12. cardaic arrest


13. chest wall pain post cpr  


14. hyponatremai 


15. anemia 





mostly in sinus now 


amiod 200 mg daily 


vent support 


s/p trach


peg done 


on minoxidl and hold parameter applied 


on elequis  2.5 mg bid for stroke prevention 


awaits snf 


s/p prbc tx 














Subjective


ROS Limited/Unobtainable:  Yes


Subjective


on the vent





Objective





Last 24 Hour Vital Signs








  Date Time  Temp Pulse Resp B/P (MAP) Pulse Ox O2 Delivery O2 Flow Rate FiO2


 


1/13/19 12:48  94 17     40


 


1/13/19 12:00  117      


 


1/13/19 10:55  108 20     40


 


1/13/19 08:58  78 16     40


 


1/13/19 08:56    125/48    


 


1/13/19 08:46 97.9 69 20 125/48 (73) 100   


 


1/13/19 08:30      Mechanical Ventilator  





      Mechanical Ventilator  


 


1/13/19 08:00        40


 


1/13/19 08:00  91      


 


1/13/19 06:49  86 16     40


 


1/13/19 05:11  83 15     40


 


1/13/19 04:00      Mechanical Ventilator  





      Mechanical Ventilator  


 


1/13/19 04:00  115      


 


1/13/19 04:00        40


 


1/13/19 04:00 97.7 88 17 146/80 (102) 100   


 


1/13/19 03:23  70 16     40


 


1/13/19 01:37  74 16     40


 


1/13/19 00:00        40


 


1/13/19 00:00 98.6 80 16 100/50 (67) 100   


 


1/13/19 00:00  114      


 


1/13/19 00:00      Mechanical Ventilator  





      Mechanical Ventilator  


 


1/12/19 23:20  80 15     40


 


1/12/19 21:10  75 13     40


 


1/12/19 20:12    111/45    


 


1/12/19 20:00 98.4 90 16 120/73 (89) 100   


 


1/12/19 20:00  89      


 


1/12/19 20:00        40


 


1/12/19 20:00      Mechanical Ventilator  





      Mechanical Ventilator  


 


1/12/19 19:05  95 15     40


 


1/12/19 17:23  78 14     40


 


1/12/19 16:00        40


 


1/12/19 16:00      Mechanical Ventilator  





      Mechanical Ventilator  


 


1/12/19 16:00 99.3 80 20 111/45 (67) 96   


 


1/12/19 15:33  83      


 


1/12/19 15:22  81 14     40








General Appearance:  alert, on vent, patient on isolation











Intake and Output  


 


 1/12/19 1/13/19





 19:00 07:00


 


Intake Total 975 ml 525 ml


 


Output Total 150 ml 


 


Balance 825 ml 525 ml


 


  


 


Free Water 440 ml 50 ml


 


IV Total 55 ml 115 ml


 


Tube Feeding 480 ml 360 ml


 


Stool Total 150 ml 











Laboratory Tests








Test


  1/13/19


03:55


 


White Blood Count


  9.3 K/UL


(4.8-10.8)


 


Red Blood Count


  2.43 M/UL


(4.20-5.40)  L


 


Hemoglobin


  7.4 G/DL


(12.0-16.0)  L


 


Hematocrit


  23.3 %


(37.0-47.0)  L


 


Mean Corpuscular Volume 96 FL (80-99)  


 


Mean Corpuscular Hemoglobin


  30.4 PG


(27.0-31.0)


 


Mean Corpuscular Hemoglobin


Concent 31.6 G/DL


(32.0-36.0)  L


 


Red Cell Distribution Width


  17.8 %


(11.6-14.8)  H


 


Platelet Count


  458 K/UL


(150-450)  H


 


Mean Platelet Volume


  5.9 FL


(6.5-10.1)  L


 


Neutrophils (%) (Auto)


  % (45.0-75.0)


 


 


Lymphocytes (%) (Auto)


  % (20.0-45.0)


 


 


Monocytes (%) (Auto)  % (1.0-10.0)  


 


Eosinophils (%) (Auto)  % (0.0-3.0)  


 


Basophils (%) (Auto)  % (0.0-2.0)  


 


Differential Total Cells


Counted 100  


 


 


Neutrophils % (Manual) 83 % (45-75)  H


 


Lymphocytes % (Manual) 6 % (20-45)  L


 


Monocytes % (Manual) 9 % (1-10)  


 


Eosinophils % (Manual) 1 % (0-3)  


 


Basophils % (Manual) 1 % (0-2)  


 


Band Neutrophils 0 % (0-8)  


 


Platelet Estimate Adequate  


 


Platelet Morphology Normal  


 


Hypochromasia 1+  


 


Anisocytosis 1+  


 


Sodium Level


  134 MMOL/L


(136-145)  L


 


Potassium Level


  4.5 MMOL/L


(3.5-5.1)


 


Chloride Level


  97 MMOL/L


()  L


 


Carbon Dioxide Level


  31 MMOL/L


(21-32)


 


Anion Gap


  6 mmol/L


(5-15)


 


Blood Urea Nitrogen


  51 mg/dL


(7-18)  H


 


Creatinine


  1.9 MG/DL


(0.55-1.30)  H


 


Estimat Glomerular Filtration


Rate  mL/min (>60)  


 


 


Glucose Level


  123 MG/DL


()  H


 


Calcium Level


  9.7 MG/DL


(8.5-10.1)

















Mariusz Wade MD Jan 13, 2019 14:38

## 2019-01-13 NOTE — NUR
NURSE NOTES:

Patient repositioned, no acute distress. 72 hour restraint renewed. Next 72hr renewal will 
be 1/16 at 0000. Patient sleeping at this time, HR is in Afib 110.

## 2019-01-13 NOTE — NUR
CASE MANAGEMENT: REVIEW



1/12/2019



SI: ESRD ON HD

TRACHEOSTOMY 12/17

PEG PLACEMENT 12/19 

T 98.4 HR 90 RR 16 B/P 120/73 SATS 100% ON MECH VENT FIo2 40

HGB 6.5 HCT 20.9 BUN 31 CR 1.5  MG 2.5 



IS: MEROPENEM IV QD

AMIODARONE GT QD

MINOXIDIL GT Q12HR 

ELEQUIS GT BID 

GT FEEDING NEPRO @ 40ML/HR

HD PRN 



***STEP DOWN UNIT  STATUS***

DCP: PATIENT IS FROM [facility]. PATIENT REFERRED TO [facility]. [facility] 
WILL NOT ACCEPT PATIENT WITHOUT CHAIR TIME AT [facility]IALYSIS CENTER. PATIENT REFERRED TO .[facility] 
[facility]ACO[facility]. AWAITING CHAIR TIME AT DIALYSIS CENTER. 



1/13/2019



SI: ESRD ON HD

TRACHEOSTOMY 12/17

PEG PLACEMENT 12/19 

T 97.9 HR 69 RR 20 B/P 125/48 SATS 100% ON MECH VENT FiO2 40

HGB 7.4 HCT 23.3  CL 97 BUN 51 CR 1.9  



IS: MEROPENEM IV QD

AMIODARONE GT QD

MINOXIDIL GT Q12HR 

ELEQUIS GT BID 

GT FEEDI

## 2019-01-14 VITALS — DIASTOLIC BLOOD PRESSURE: 51 MMHG | SYSTOLIC BLOOD PRESSURE: 150 MMHG

## 2019-01-14 VITALS — DIASTOLIC BLOOD PRESSURE: 37 MMHG | SYSTOLIC BLOOD PRESSURE: 111 MMHG

## 2019-01-14 VITALS — DIASTOLIC BLOOD PRESSURE: 44 MMHG | SYSTOLIC BLOOD PRESSURE: 132 MMHG

## 2019-01-14 VITALS — DIASTOLIC BLOOD PRESSURE: 54 MMHG | SYSTOLIC BLOOD PRESSURE: 143 MMHG

## 2019-01-14 VITALS — DIASTOLIC BLOOD PRESSURE: 43 MMHG | SYSTOLIC BLOOD PRESSURE: 121 MMHG

## 2019-01-14 VITALS — SYSTOLIC BLOOD PRESSURE: 149 MMHG | DIASTOLIC BLOOD PRESSURE: 50 MMHG

## 2019-01-14 VITALS — DIASTOLIC BLOOD PRESSURE: 62 MMHG | SYSTOLIC BLOOD PRESSURE: 125 MMHG

## 2019-01-14 LAB
ADD MANUAL DIFF: YES
ADD MANUAL DIFF: YES
ANION GAP SERPL CALC-SCNC: 6 MMOL/L (ref 5–15)
ANION GAP SERPL CALC-SCNC: 6 MMOL/L (ref 5–15)
BUN SERPL-MCNC: 64 MG/DL (ref 7–18)
BUN SERPL-MCNC: 69 MG/DL (ref 7–18)
CALCIUM SERPL-MCNC: 9 MG/DL (ref 8.5–10.1)
CALCIUM SERPL-MCNC: 9.4 MG/DL (ref 8.5–10.1)
CHLORIDE SERPL-SCNC: 98 MMOL/L (ref 98–107)
CHLORIDE SERPL-SCNC: 99 MMOL/L (ref 98–107)
CO2 SERPL-SCNC: 29 MMOL/L (ref 21–32)
CO2 SERPL-SCNC: 30 MMOL/L (ref 21–32)
CREAT SERPL-MCNC: 2.2 MG/DL (ref 0.55–1.3)
CREAT SERPL-MCNC: 2.4 MG/DL (ref 0.55–1.3)
ERYTHROCYTE [DISTWIDTH] IN BLOOD BY AUTOMATED COUNT: 17 % (ref 11.6–14.8)
ERYTHROCYTE [DISTWIDTH] IN BLOOD BY AUTOMATED COUNT: 17.3 % (ref 11.6–14.8)
HCT VFR BLD CALC: 20.8 % (ref 37–47)
HCT VFR BLD CALC: 20.9 % (ref 37–47)
HGB BLD-MCNC: 6.6 G/DL (ref 12–16)
HGB BLD-MCNC: 6.6 G/DL (ref 12–16)
MCV RBC AUTO: 95 FL (ref 80–99)
MCV RBC AUTO: 95 FL (ref 80–99)
PLATELET # BLD: 419 K/UL (ref 150–450)
PLATELET # BLD: 421 K/UL (ref 150–450)
POTASSIUM SERPL-SCNC: 4.2 MMOL/L (ref 3.5–5.1)
POTASSIUM SERPL-SCNC: 4.4 MMOL/L (ref 3.5–5.1)
RBC # BLD AUTO: 2.19 M/UL (ref 4.2–5.4)
RBC # BLD AUTO: 2.2 M/UL (ref 4.2–5.4)
SODIUM SERPL-SCNC: 133 MMOL/L (ref 136–145)
SODIUM SERPL-SCNC: 135 MMOL/L (ref 136–145)
WBC # BLD AUTO: 6.7 K/UL (ref 4.8–10.8)
WBC # BLD AUTO: 6.9 K/UL (ref 4.8–10.8)

## 2019-01-14 RX ADMIN — LORAZEPAM PRN MG: 0.5 TABLET ORAL at 10:33

## 2019-01-14 RX ADMIN — MINOXIDIL SCH MG: 2.5 TABLET ORAL at 20:35

## 2019-01-14 RX ADMIN — MEROPENEM SCH MLS/HR: 500 INJECTION INTRAVENOUS at 09:37

## 2019-01-14 RX ADMIN — HYDROCODONE BITARTRATE AND ACETAMINOPHEN PRN TAB: 5; 325 TABLET ORAL at 17:35

## 2019-01-14 RX ADMIN — APIXABAN SCH MG: 2.5 TABLET, FILM COATED ORAL at 17:34

## 2019-01-14 RX ADMIN — CHLORHEXIDINE GLUCONATE SCH APPLIC: 213 SOLUTION TOPICAL at 20:33

## 2019-01-14 RX ADMIN — SODIUM CHLORIDE SCH MLS/HR: 0.9 INJECTION INTRAVENOUS at 20:36

## 2019-01-14 RX ADMIN — APIXABAN SCH MG: 2.5 TABLET, FILM COATED ORAL at 09:38

## 2019-01-14 RX ADMIN — MINOXIDIL SCH MG: 2.5 TABLET ORAL at 09:38

## 2019-01-14 RX ADMIN — HYDROCODONE BITARTRATE AND ACETAMINOPHEN PRN TAB: 5; 325 TABLET ORAL at 00:54

## 2019-01-14 RX ADMIN — LORAZEPAM PRN MG: 0.5 TABLET ORAL at 20:34

## 2019-01-14 RX ADMIN — AMIODARONE HYDROCHLORIDE SCH MG: 200 TABLET ORAL at 09:38

## 2019-01-14 RX ADMIN — ZOLPIDEM TARTRATE PRN MG: 5 TABLET ORAL at 04:12

## 2019-01-14 RX ADMIN — ERYTHROPOIETIN SCH UNITS: 20000 INJECTION, SOLUTION INTRAVENOUS; SUBCUTANEOUS at 20:34

## 2019-01-14 NOTE — NUR
NURSE NOTES:

Held iron sucrose d/t blood transfusion, risk for iron overload.  Will leave message w/ 
[facility].

## 2019-01-14 NOTE — NUR
NURSE NOTES:

Blood transfusion started,VS stable ,stay with patient watch for reaction

1915 tolerated blood ,continue transfusion,observe for reaction

## 2019-01-14 NOTE — NUR
NURSE NOTES:

pt c/o pain on right abdomen, aching and 7/10, with facial grimacing, restlessness. 
Distraction measures done. prn pain med given. Will continue to monitor.

## 2019-01-14 NOTE — NUR
NURSE NOTES:

Received report RN Uche Allred patient asleep on restraints-pulling device,calm,on overlay 
mattress,ventilator ,G tube feeding,continue care

## 2019-01-14 NOTE — NUR
NURSE NOTES:

patient banging hands on side rail,discuss with patient,reminded not to hit her hand on 
rails,she can hurt her hand,need ativan

## 2019-01-14 NOTE — NUR
NURSE NOTES:

Patient on hemodialysis at this time,CBC,BMP,drawn by HD nurse,result- Hgb 6.6 HCt- 
20.8,reported to Dr. Daigle,waiting for return call

## 2019-01-14 NOTE — NUR
NURSE NOTES:

Bedside report received from CAROLYN Ocasio.  Pt is currently receiving 1 unit of PRBCs; 
tolerating well, VSS.  AOx2, oriented to name, able to follow commands, understands purpose, 
nonverbal.  Shiley 8, AC 8, , FiO2 40, PEEp 5; tolerating well, some coughing noted.  
CGT Nepro @ 40 ml/hr; patent, no residual.  Rectal tube intact, draining.  Skin is clean and 
dry, dressings intact.  Will cx later per protocol.  ASHISH midline noted; asymptomatic.  VALE 
shunt noted; edematous, MD aware per RN.  Pt received dialysis today 2L out noted.  Pt 
requesting prn Ativan, will give w/ 2100 meds.  Bed is locked in lowest position, side rails 
x3, bilater wrist restraints, palpable pulses, bed alarm on, safety precautions continued.  
Will continue to monitor and follow plan of care.

## 2019-01-14 NOTE — NUR
NURSE NOTES:

reported to Dr. Daigle low blood levels with orders 1 unit blood,dialysis completed

-------------------------------------------------------------------------------

Addendum: 01/14/19 at 1737 by MICHELL SILVA RN

-------------------------------------------------------------------------------

restless banging side rails express pain  neck,medicate for pain

## 2019-01-14 NOTE — NUR
CASE MANAGEMENT: REVIEW





SI: ESRD ON HD

TRACHEOSTOMY 12/17

PEG PLACEMENT 12/19 

T 98.2 HR 70 RR 18 /43 % MECH VENT FIO2 40

 BUN 64 CR 2.2 







IS: MEROPENEM IV QD

AMIODARONE GT QD

MINOXIDIL GT Q12HR 

ELEQUIS GT BID 

GT FEEDING NEPRO @ 40ML/HR

HD PRN 





***STEP DOWN UNIT  STATUS***

DCP: PATIENT IS FROM [facility]. PATIENT REFERRED TO [facility]. [facility] 
WILL NOT ACCEPT PATIENT WITHOUT CHAIR TIME AT DIALYSIS CENTER. PATIENT REFERRED TO [facility] 
[facility]. AWAITING CHAIR TIME AT DIALYSIS CENTER.

## 2019-01-14 NOTE — NUR
RESPIRATORY NOTE: Pt received on mechanical ventilator. patient is a trach patient with 
Shiley 8. current settings are ,8, 40%, +5. AMBU bag bedside, spare trach, and all 
alarms are set and audible. Will continue to monitor patient.

## 2019-01-14 NOTE — GI PROGRESS NOTE
Assessment/Plan


Problems:  


(1) Encounter for PEG (percutaneous endoscopic gastrostomy)


ICD Codes:  Z43.1 - Encounter for attention to gastrostomy


SNOMED:  243317176, 004895119


(2) Severe malnutrition


ICD Codes:  E43 - Unspecified severe protein-calorie malnutrition


SNOMED:  53117987


(3) Dehydration


ICD Codes:  E86.0 - Dehydration


SNOMED:  69310378


Status:  unchanged


Status Narrative


Discussed with Dr. Lam


Assessment/Plan


Assessment


- Dysphagia


- Resp failure


- ESRD


- Anemia


- Status post PEG, tolerating feeds





Recommendations


1. Abdominal binder.


2. Elevate the head of the bed at all times.


3. G-tube flush.


4. G-tube care.


5. tube feeding later today per RD to goal


Monitor H&H, PRN transfusions


PPI


Electrolyte correction


Follow-up labs





The patient was seen and examined at bedside and all new and available data was 

reviewed in the patients chart. I agree with the above findings, impression 

and plan.  (Patient seen earlier today. Signature stamp does not reflect 

patient encounter time.). - Elliott Lam MD





Subjective


Subjective


limited





Objective





Last 24 Hour Vital Signs








  Date Time  Temp Pulse Resp B/P (MAP) Pulse Ox O2 Delivery O2 Flow Rate FiO2


 


1/14/19 10:53  77 16     40


 


1/14/19 09:38    121/43    


 


1/14/19 08:50  66 12     40


 


1/14/19 08:30 98.2 70 18 121/43 (69) 100   


 


1/14/19 08:00  67      


 


1/14/19 06:56  69 15     40


 


1/14/19 05:04  64 11     40


 


1/14/19 04:00 98.1 82 18 150/51 (84) 100   


 


1/14/19 04:00      Mechanical Ventilator  





      Mechanical Ventilator  


 


1/14/19 04:00        40


 


1/14/19 04:00  68      


 


1/14/19 03:20  67 12     40


 


1/14/19 01:05  90 19     40


 


1/14/19 00:00 98.1 110 18 149/50 (83) 100   


 


1/14/19 00:00  110      


 


1/14/19 00:00      Mechanical Ventilator  





      Mechanical Ventilator  


 


1/14/19 00:00        40


 


1/13/19 22:55  85 16     40


 


1/13/19 20:54    138/50    


 


1/13/19 20:44  84 14     40


 


1/13/19 20:00  84      


 


1/13/19 20:00      Mechanical Ventilator  





      Mechanical Ventilator  


 


1/13/19 20:00        40


 


1/13/19 20:00 98.1 94 16 138/50 (79) 100   


 


1/13/19 19:13  81 21     40


 


1/13/19 18:00        40


 


1/13/19 16:48  87 17     40


 


1/13/19 16:10      Mechanical Ventilator  





      Mechanical Ventilator  


 


1/13/19 16:07  88 19     40


 


1/13/19 16:00  95      


 


1/13/19 16:00        40


 


1/13/19 16:00 97.7 81 20 135/61 (85) 100   


 


1/13/19 12:48  94 17     40

















Intake and Output  


 


 1/13/19 1/14/19





 19:00 07:00


 


Intake Total 690 ml 680 ml


 


Output Total 50 ml 20 ml


 


Balance 640 ml 660 ml


 


  


 


Free Water 210 ml 140 ml


 


IV Total  60 ml


 


Tube Feeding 480 ml 480 ml


 


Stool Total  20 ml


 


Chest Tube Drainage Total 50 ml 


 


# Voids  1











Laboratory Tests








Test


  1/14/19


05:05


 


White Blood Count


  6.7 K/UL


(4.8-10.8)


 


Red Blood Count


  2.19 M/UL


(4.20-5.40)  L


 


Hemoglobin


  6.6 G/DL


(12.0-16.0)  *L


 


Hematocrit


  20.9 %


(37.0-47.0)  L


 


Mean Corpuscular Volume 95 FL (80-99)  


 


Mean Corpuscular Hemoglobin


  30.2 PG


(27.0-31.0)


 


Mean Corpuscular Hemoglobin


Concent 31.6 G/DL


(32.0-36.0)  L


 


Red Cell Distribution Width


  17.3 %


(11.6-14.8)  H


 


Platelet Count


  419 K/UL


(150-450)


 


Mean Platelet Volume


  6.0 FL


(6.5-10.1)  L


 


Neutrophils (%) (Auto)


  % (45.0-75.0)


 


 


Lymphocytes (%) (Auto)


  % (20.0-45.0)


 


 


Monocytes (%) (Auto)  % (1.0-10.0)  


 


Eosinophils (%) (Auto)  % (0.0-3.0)  


 


Basophils (%) (Auto)  % (0.0-2.0)  


 


Differential Total Cells


Counted 100  


 


 


Neutrophils % (Manual) 84 % (45-75)  H


 


Lymphocytes % (Manual) 3 % (20-45)  L


 


Monocytes % (Manual) 10 % (1-10)  


 


Eosinophils % (Manual) 1 % (0-3)  


 


Basophils % (Manual) 1 % (0-2)  


 


Band Neutrophils 1 % (0-8)  


 


Platelet Estimate Adequate  


 


Platelet Morphology Normal  


 


Anisocytosis 1+  


 


Sodium Level


  133 MMOL/L


(136-145)  L


 


Potassium Level


  4.2 MMOL/L


(3.5-5.1)


 


Chloride Level


  98 MMOL/L


()


 


Carbon Dioxide Level


  29 MMOL/L


(21-32)


 


Anion Gap


  6 mmol/L


(5-15)


 


Blood Urea Nitrogen


  64 mg/dL


(7-18)  H


 


Creatinine


  2.2 MG/DL


(0.55-1.30)  H


 


Estimat Glomerular Filtration


Rate  mL/min (>60)  


 


 


Glucose Level


  115 MG/DL


()  H


 


Calcium Level


  9.4 MG/DL


(8.5-10.1)








Height (Feet):  5


Height (Inches):  2.00


Weight (Pounds):  122


General Appearance:  no apparent distress


Cardiovascular:  normal rate


Respiratory/Chest:  other - Mechanical ventilator


Abdominal Exam:  GT site - Clean dry and intact











Shane Lowry NP Jan 14, 2019 12:08

## 2019-01-14 NOTE — NEPHROLOGY PROGRESS NOTE
Assessment/Plan


Assessment


Seee below


Plan





ESRD HD q MWF 


Anemia of CKD , SAMANTHA high dose. 





A. 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 with LAE. LVEF 65% . On Eliquis.





Post trach + PEG .


Referred to [facility]. Pt's son agreeable to [facility]. Referred again.


See orders.


Still needs extensive Vascular W/U. 














Hct  20.  Being transfused on HD.





Subjective


Subjective


More calm (Ativan increased). On HD now. Stable run.





Objective


Objective





Last 24 Hour Vital Signs








  Date Time  Temp Pulse Resp B/P (MAP) Pulse Ox O2 Delivery O2 Flow Rate FiO2


 


1/14/19 16:00        40


 


1/14/19 15:07  84 18     40


 


1/14/19 13:15  70 12     40


 


1/14/19 12:00        40


 


1/14/19 10:53  77 16     40


 


1/14/19 09:38    121/43    


 


1/14/19 08:50  66 12     40


 


1/14/19 08:30 98.2 70 18 121/43 (69) 100   


 


1/14/19 08:00  67      


 


1/14/19 08:00      Mechanical Ventilator  





      Mechanical Ventilator  


 


1/14/19 08:00        40


 


1/14/19 06:56  69 15     40


 


1/14/19 05:04  64 11     40


 


1/14/19 04:00 98.1 82 18 150/51 (84) 100   


 


1/14/19 04:00      Mechanical Ventilator  





      Mechanical Ventilator  


 


1/14/19 04:00        40


 


1/14/19 04:00  68      


 


1/14/19 03:20  67 12     40


 


1/14/19 01:05  90 19     40


 


1/14/19 00:00 98.1 110 18 149/50 (83) 100   


 


1/14/19 00:00  110      


 


1/14/19 00:00      Mechanical Ventilator  





      Mechanical Ventilator  


 


1/14/19 00:00        40


 


1/13/19 22:55  85 16     40


 


1/13/19 20:54    138/50    


 


1/13/19 20:44  84 14     40


 


1/13/19 20:00  84      


 


1/13/19 20:00      Mechanical Ventilator  





      Mechanical Ventilator  


 


1/13/19 20:00        40


 


1/13/19 20:00 98.1 94 16 138/50 (79) 100   


 


1/13/19 19:13  81 21     40


 


1/13/19 18:00        40


 


1/13/19 16:48  87 17     40

















Intake and Output  


 


 1/13/19 1/14/19





 19:00 07:00


 


Intake Total 690 ml 680 ml


 


Output Total 50 ml 20 ml


 


Balance 640 ml 660 ml


 


  


 


Free Water 210 ml 140 ml


 


IV Total  60 ml


 


Tube Feeding 480 ml 480 ml


 


Stool Total  20 ml


 


Chest Tube Drainage Total 50 ml 


 


# Voids  1








Laboratory Tests


1/14/19 05:05: 


White Blood Count 6.7, Red Blood Count 2.19L, Hemoglobin 6.6*L, Hematocrit 20.9L

, Mean Corpuscular Volume 95, Mean Corpuscular Hemoglobin 30.2, Mean 

Corpuscular Hemoglobin Concent 31.6L, Red Cell Distribution Width 17.3H, 

Platelet Count 419, Mean Platelet Volume 6.0L, Neutrophils (%) (Auto) , 

Lymphocytes (%) (Auto) , Monocytes (%) (Auto) , Eosinophils (%) (Auto) , 

Basophils (%) (Auto) , Differential Total Cells Counted 100, Neutrophils % (

Manual) 84H, Lymphocytes % (Manual) 3L, Monocytes % (Manual) 10, Eosinophils % (

Manual) 1, Basophils % (Manual) 1, Band Neutrophils 1, Platelet Estimate 

Adequate, Platelet Morphology Normal, Anisocytosis 1+, Sodium Level 133L, 

Potassium Level 4.2, Chloride Level 98, Carbon Dioxide Level 29, Anion Gap 6, 

Blood Urea Nitrogen 64H, Creatinine 2.2H, Estimat Glomerular Filtration Rate , 

Glucose Level 115H, Calcium Level 9.4


1/14/19 14:18: 


White Blood Count 6.9, Red Blood Count 2.20L, Hemoglobin 6.6*L, Hematocrit 20.8L

, Mean Corpuscular Volume 95, Mean Corpuscular Hemoglobin 29.8, Mean 

Corpuscular Hemoglobin Concent 31.5L, Red Cell Distribution Width 17.0H, 

Platelet Count 421, Mean Platelet Volume 5.9L, Neutrophils (%) (Auto) , 

Lymphocytes (%) (Auto) , Monocytes (%) (Auto) , Eosinophils (%) (Auto) , 

Basophils (%) (Auto) , Neutrophils % (Manual) [Pending], Lymphocytes % (Manual) 

[Pending], Platelet Estimate [Pending], Platelet Morphology [Pending], Sodium 

Level 135L, Potassium Level 4.4, Chloride Level 99, Carbon Dioxide Level 30, 

Anion Gap 6, Blood Urea Nitrogen 69H, Creatinine 2.4H, Estimat Glomerular 

Filtration Rate , Glucose Level 132H, Calcium Level 9.0


Height (Feet):  5


Height (Inches):  2.00


Weight (Pounds):  122


Objective


On vent.


Trach clean.


Cv IRR/IRR


Lungs B ronchi.


Abd SNT. BS +  PEG.


E Rt. foot diabetic ulcers











Gavino Daigle MD Jan 14, 2019 16:42

## 2019-01-14 NOTE — NUR
HAND-OFF: 

Report given to CAROLYN Ocasio. Pt's hgb level endorsed to the RN. No acute distress noted at this 
time. Observed pt sleeping on the bed.

## 2019-01-14 NOTE — PULMONOLOGY PROGRESS NOTE
Assessment/Plan


Assessment/Plan


IMPRESSION:


1. Hypotension. Resolved.


2. Sepsis. Resolved


3. Atrial fibrillation.


4. Bradycardia.


5. Diabetes mellitus.


6. End-stage renal disease on dialysis.


7. Left lung collapse. Resolved


8. Respiratory failure. S/p tracheostomy


9. Dysphagia.


10. Dementia.


11. Anemia








DISCUSSION:


1. Continue medications.


2. On amiodarone


3. Status post tracheostomy


4. S/p G-tube 


5. Continue HD














  ______________________________________________


  Quentin Cardoso M.D.





Subjective


Interval Events:  No new reported events


Constitutional:  Reports: no symptoms


HEENT:  Repors: no symptoms


Respiratory:  Reports: no symptoms


Cardiovascular:  Reports: no symptoms


Gastrointestinal/Abdominal:  Reports: no symptoms


Genitourinary:  Reports: no symptoms


Neurologic:  Reports: no symptoms


Allergies:  


Coded Allergies:  


     No Known Allergies (Unverified , 11/26/18)





Objective





Last 24 Hour Vital Signs








  Date Time  Temp Pulse Resp B/P (MAP) Pulse Ox O2 Delivery O2 Flow Rate FiO2


 


1/14/19 06:56  69 15     40


 


1/14/19 05:04  64 11     40


 


1/14/19 04:00 98.1 82 18 150/51 (84) 100   


 


1/14/19 04:00      Mechanical Ventilator  





      Mechanical Ventilator  


 


1/14/19 04:00        40


 


1/14/19 04:00  68      


 


1/14/19 03:20  67 12     40


 


1/14/19 01:05  90 19     40


 


1/14/19 00:00 98.1 110 18 149/50 (83) 100   


 


1/14/19 00:00  110      


 


1/14/19 00:00      Mechanical Ventilator  





      Mechanical Ventilator  


 


1/14/19 00:00        40


 


1/13/19 22:55  85 16     40


 


1/13/19 20:54    138/50    


 


1/13/19 20:44  84 14     40


 


1/13/19 20:00  84      


 


1/13/19 20:00      Mechanical Ventilator  





      Mechanical Ventilator  


 


1/13/19 20:00        40


 


1/13/19 20:00 98.1 94 16 138/50 (79) 100   


 


1/13/19 19:13  81 21     40


 


1/13/19 18:00        40


 


1/13/19 16:48  87 17     40


 


1/13/19 16:10      Mechanical Ventilator  





      Mechanical Ventilator  


 


1/13/19 16:07  88 19     40


 


1/13/19 16:00  95      


 


1/13/19 16:00        40


 


1/13/19 16:00 97.7 81 20 135/61 (85) 100   


 


1/13/19 12:48  94 17     40


 


1/13/19 12:00  117      


 


1/13/19 12:00 97.9 110 20 140/68 (92) 100   


 


1/13/19 12:00      Mechanical Ventilator  





      Mechanical Ventilator  


 


1/13/19 12:00        40


 


1/13/19 10:55  108 20     40


 


1/13/19 08:58  78 16     40


 


1/13/19 08:56    125/48    


 


1/13/19 08:46 97.9 69 20 125/48 (73) 100   


 


1/13/19 08:30      Mechanical Ventilator  





      Mechanical Ventilator  

















Intake and Output  


 


 1/13/19 1/14/19





 19:00 07:00


 


Intake Total 690 ml 680 ml


 


Output Total 50 ml 20 ml


 


Balance 640 ml 660 ml


 


  


 


Free Water 210 ml 140 ml


 


IV Total  60 ml


 


Tube Feeding 480 ml 480 ml


 


Stool Total  20 ml


 


Chest Tube Drainage Total 50 ml 


 


# Voids  1








General Appearance:  no acute distress


HEENT:  normocephalic, status post trach


Respiratory/Chest:  chest wall non-tender, lungs clear


Cardiovascular:  normal peripheral pulses


Abdomen:  normal bowel sounds, soft, non tender


Extremities:  no cyanosis


Laboratory Tests


1/14/19 05:05: 


White Blood Count 6.7, Red Blood Count 2.19L, Hemoglobin 6.6*L, Hematocrit 20.9L

, Mean Corpuscular Volume 95, Mean Corpuscular Hemoglobin 30.2, Mean 

Corpuscular Hemoglobin Concent 31.6L, Red Cell Distribution Width 17.3H, 

Platelet Count 419, Mean Platelet Volume 6.0L, Neutrophils (%) (Auto) , 

Lymphocytes (%) (Auto) , Monocytes (%) (Auto) , Eosinophils (%) (Auto) , 

Basophils (%) (Auto) , Neutrophils % (Manual) [Pending], Lymphocytes % (Manual) 

[Pending], Platelet Estimate [Pending], Platelet Morphology [Pending], Sodium 

Level 133L, Potassium Level 4.2, Chloride Level 98, Carbon Dioxide Level 29, 

Anion Gap 6, Blood Urea Nitrogen 64H, Creatinine 2.2H, Estimat Glomerular 

Filtration Rate , Glucose Level 115H, Calcium Level 9.4





Current Medications








 Medications


  (Trade)  Dose


 Ordered  Sig/Tony


 Route


 PRN Reason  Start Time


 Stop Time Status Last Admin


Dose Admin


 


 Acetaminophen


  (Tylenol)  650 mg  Q6H  PRN


 GT


 Mild Pain/Temp > 100.5  12/23/18 16:00


 1/20/19 15:59  1/10/19 08:49


 


 


 Acetaminophen/


 Hydrocodone Bitart


  (Norco 5/325)  1 tab  Q6H  PRN


 GT


 For Pain  1/8/19 13:35


 1/15/19 13:00  1/14/19 00:54


 


 


 Amiodarone HCl


  (Cordarone)  200 mg  DAILY


 GT


   1/2/19 09:00


 1/26/19 20:59  1/13/19 08:56


 


 


 Apixaban


  (Eliquis)  2.5 mg  BID


 GT


   12/27/18 18:00


 1/26/19 17:59  1/13/19 17:21


 


 


 Artificial Tears


  (Akwa-Tears)  1 drop  Q4H  PRN


 BOTH EYES


 Dry Eyes  12/23/18 15:30


 1/21/19 15:29  1/1/19 16:53


 


 


 Chlorhexidine


 Gluconate


  (Juana-Hex 2%)  1 applic  DAILY@2000


 TOPIC


   12/30/18 20:00


 1/29/19 19:59  1/13/19 19:57


 


 


 Collagenase


  (Santyl)  1 applic  DAILY


 TOPIC


   1/7/19 21:00


 2/6/19 08:59  1/13/19 13:53


 


 


 Epoetin Rupesh


  (Procrit (for


 ESRD on dialysis))  10,000 units  MON-WED-FRI


 SUBQ


   1/14/19 21:00


 2/13/19 20:59   


 


 


 Iron Sucrose 100


 mg/Sodium Chloride  60 ml @ 


 240 mls/hr  BEDTIME


 IV


   1/12/19 21:00


 1/16/19 21:14  1/13/19 20:55


 


 


 Lansoprazole


  (Prevacid)  30 mg  Q12HR


 GT


   12/27/18 21:00


 1/26/19 20:59  1/13/19 20:54


 


 


 Meropenem 500 mg/


 Sodium Chloride  55 ml @ 


 110 mls/hr  DAILY


 IVPB


   1/14/19 09:00


 1/17/19 08:59   


 


 


 Minoxidil


  (Loniten)  2.5 mg  Q12HR


 GT


   12/27/18 21:00


 1/26/19 20:59  1/13/19 20:54


 


 


 Sodium Chloride  1,000 ml @ 


 500 mls/hr  Q2H PRN


 IVLG


 sbp<90 during hd  1/13/19 10:18


 1/14/19 23:59   


 


 


 Sodium Chloride


  (Ocean Nasal


 Spray)  1 spray  Q4H  PRN


 NASAL


 dry nose  12/23/18 14:30


 1/21/19 18:29  1/12/19 08:35


 


 


 Zolpidem Tartrate


  (Ambien)  5 mg  HSPRN  PRN


 ORAL


 Insomnia  1/11/19 08:00


 1/18/19 07:59  1/14/19 04:12


 

















Quentin Cardoso MD Jan 14, 2019 08:17

## 2019-01-15 VITALS — SYSTOLIC BLOOD PRESSURE: 144 MMHG | DIASTOLIC BLOOD PRESSURE: 70 MMHG

## 2019-01-15 VITALS — DIASTOLIC BLOOD PRESSURE: 73 MMHG | SYSTOLIC BLOOD PRESSURE: 145 MMHG

## 2019-01-15 VITALS — DIASTOLIC BLOOD PRESSURE: 62 MMHG | SYSTOLIC BLOOD PRESSURE: 103 MMHG

## 2019-01-15 VITALS — SYSTOLIC BLOOD PRESSURE: 110 MMHG | DIASTOLIC BLOOD PRESSURE: 82 MMHG

## 2019-01-15 VITALS — DIASTOLIC BLOOD PRESSURE: 65 MMHG | SYSTOLIC BLOOD PRESSURE: 150 MMHG

## 2019-01-15 VITALS — SYSTOLIC BLOOD PRESSURE: 130 MMHG | DIASTOLIC BLOOD PRESSURE: 42 MMHG

## 2019-01-15 LAB
% IRON SATURATION: 50 % (ref 15–50)
ADD MANUAL DIFF: NO
ANION GAP SERPL CALC-SCNC: 6 MMOL/L (ref 5–15)
BASOPHILS NFR BLD AUTO: 1.4 % (ref 0–2)
BUN SERPL-MCNC: 34 MG/DL (ref 7–18)
CALCIUM SERPL-MCNC: 9.1 MG/DL (ref 8.5–10.1)
CHLORIDE SERPL-SCNC: 99 MMOL/L (ref 98–107)
CO2 SERPL-SCNC: 31 MMOL/L (ref 21–32)
CREAT SERPL-MCNC: 1.4 MG/DL (ref 0.55–1.3)
EOSINOPHIL NFR BLD AUTO: 2.6 % (ref 0–3)
ERYTHROCYTE [DISTWIDTH] IN BLOOD BY AUTOMATED COUNT: 16.2 % (ref 11.6–14.8)
HCT VFR BLD CALC: 26.8 % (ref 37–47)
HGB BLD-MCNC: 8.6 G/DL (ref 12–16)
IRON SERPL-MCNC: 51 UG/DL (ref 50–175)
LYMPHOCYTES NFR BLD AUTO: 7.1 % (ref 20–45)
MCV RBC AUTO: 95 FL (ref 80–99)
MONOCYTES NFR BLD AUTO: 9.8 % (ref 1–10)
NEUTROPHILS NFR BLD AUTO: 79.1 % (ref 45–75)
PLATELET # BLD: 432 K/UL (ref 150–450)
POTASSIUM SERPL-SCNC: 4.1 MMOL/L (ref 3.5–5.1)
RBC # BLD AUTO: 2.83 M/UL (ref 4.2–5.4)
SODIUM SERPL-SCNC: 136 MMOL/L (ref 136–145)
TIBC SERPL-MCNC: 102 UG/DL (ref 250–450)
UNSATURATED IRON BINDING: 51 UG/DL (ref 112–346)
WBC # BLD AUTO: 8 K/UL (ref 4.8–10.8)

## 2019-01-15 RX ADMIN — CHLORHEXIDINE GLUCONATE SCH APPLIC: 213 SOLUTION TOPICAL at 19:40

## 2019-01-15 RX ADMIN — APIXABAN SCH MG: 2.5 TABLET, FILM COATED ORAL at 08:51

## 2019-01-15 RX ADMIN — MINOXIDIL SCH MG: 2.5 TABLET ORAL at 20:34

## 2019-01-15 RX ADMIN — AMIODARONE HYDROCHLORIDE SCH MG: 200 TABLET ORAL at 08:51

## 2019-01-15 RX ADMIN — APIXABAN SCH MG: 2.5 TABLET, FILM COATED ORAL at 17:44

## 2019-01-15 RX ADMIN — SODIUM CHLORIDE SCH MLS/HR: 0.9 INJECTION INTRAVENOUS at 09:39

## 2019-01-15 RX ADMIN — MINOXIDIL SCH MG: 2.5 TABLET ORAL at 08:50

## 2019-01-15 RX ADMIN — LORAZEPAM PRN MG: 0.5 TABLET ORAL at 09:20

## 2019-01-15 RX ADMIN — MEROPENEM SCH MLS/HR: 500 INJECTION INTRAVENOUS at 08:51

## 2019-01-15 RX ADMIN — HYDROCODONE BITARTRATE AND ACETAMINOPHEN PRN TAB: 5; 325 TABLET ORAL at 20:37

## 2019-01-15 RX ADMIN — HYDROCODONE BITARTRATE AND ACETAMINOPHEN PRN TAB: 5; 325 TABLET ORAL at 11:44

## 2019-01-15 RX ADMIN — LORAZEPAM PRN MG: 0.5 TABLET ORAL at 20:35

## 2019-01-15 RX ADMIN — HYDROCODONE BITARTRATE AND ACETAMINOPHEN PRN TAB: 5; 325 TABLET ORAL at 04:50

## 2019-01-15 RX ADMIN — LORAZEPAM PRN MG: 0.5 TABLET ORAL at 15:53

## 2019-01-15 NOTE — NUR
NURSE NOTES:

Anxious/apprehensive c/o abd'l.pain.Medicated.Pos chg.Kept comfortable.Due meds admin.

## 2019-01-15 NOTE — NUR
NURSE NOTES:WOUND CARE FOLLOW-UP NOTES: Pt with scattered senile purpuras bilat upper and 
bilat lower ext.Full thickness sacral pressure injury with significant undermining into L 
gluteus (L)2.5cm x (W)3cm x(D)0.7cm ,undermining 11-7 by 11.3cm @11o'clock.Minimal 
non-odorous serous exudate noted. Non-blanchable erythema noted to R and L buttocks with 
incontinence associated dermatitis R ischium and R groin areas. Full thickness ulcer R tibia 
with 90% fibrinous slough 10% granular and moist .Periwound without erythema. Reabsorbed 
blood blister with dry eschar L hallux(L)1.5cm x (W)0.5cm .Periwound without erythema or 
induration.Full thickness pressure injury medial R malleolus with bone visible at base of 
wound with surrounding granulation,edges adherent and flat to base of wound. Periwound 
without erythema or induration.No odor noted. Resolving full thickness pressure plantar/ 
medial R heel with 90% loose fibrinous slough 10% granular (+) maceration along edges 
periwound dry and intact.(L)1.5cm x (W)2cm . Full thickness ulcer lateral R tibia with 80% 
biofilm ,(+) maceration along edges. NO odor noted .



Tx.Plan:Cleanse Sacral wound with Saline.Loosely pack with Plain Packing strip impregnated 
with Silvasorb Gel .Apply 

           Moisture Barrier Paste Periwound ,R and L buttocks. Cover with Optifoam drsg 
every 3 days and PRN.

           Apply Moisture Barrier Paste to bilat grin and bilat ischial regions each shift.

           Continue wound care orders to bilat lower ext as per Podiatry orders.

           Reposition at least every 2 hours or as tolerated.

           off-load heels with pillows.

## 2019-01-15 NOTE — GI PROGRESS NOTE
Assessment/Plan


Problems:  


(1) Encounter for PEG (percutaneous endoscopic gastrostomy)


ICD Codes:  Z43.1 - Encounter for attention to gastrostomy


SNOMED:  461410867, 706578988


(2) Severe malnutrition


ICD Codes:  E43 - Unspecified severe protein-calorie malnutrition


SNOMED:  35453576


(3) Dehydration


ICD Codes:  E86.0 - Dehydration


SNOMED:  30543255


Status:  stable


Status Narrative


Discussed with Dr. Lam


Assessment/Plan


Assessment


- Dysphagia


- Resp failure


- ESRD


- Anemia


- Status post PEG, tolerating feeds





Recommendations


1. Abdominal binder.


2. Elevate the head of the bed at all times.


3. G-tube flush.


4. G-tube care.


5. tube feeding later today per RD to goal


Monitor H&H, PRN transfusions


PPI


Electrolyte correction


Follow-up labs





The patient was seen and examined at bedside and all new and available data was 

reviewed in the patients chart. I agree with the above findings, impression 

and plan.  (Patient seen earlier today. Signature stamp does not reflect 

patient encounter time.). - Elliott Lam MD





Subjective


Subjective


limited





Objective





Last 24 Hour Vital Signs








  Date Time  Temp Pulse Resp B/P (MAP) Pulse Ox O2 Delivery O2 Flow Rate FiO2


 


1/15/19 12:00 98.1 70 22 130/42 (71) 100   


 


1/15/19 12:00        40


 


1/15/19 12:00      Mechanical Ventilator  





      Mechanical Ventilator  


 


1/15/19 11:51  117      


 


1/15/19 11:03  110 23     40


 


1/15/19 08:50    150/65    


 


1/15/19 08:41  129 26     40


 


1/15/19 08:00        40


 


1/15/19 08:00 97.5 113 22 150/65 (93) 100   


 


1/15/19 08:00      Mechanical Ventilator  





      Mechanical Ventilator  


 


1/15/19 07:55  122      


 


1/15/19 07:10  120 16     40


 


1/15/19 05:20 97.3       


 


1/15/19 05:12  119 22     40


 


1/15/19 04:00  129      


 


1/15/19 04:00 97.3 104 21 103/62 (76) 100   


 


1/15/19 04:00        40


 


1/15/19 04:00      Mechanical Ventilator  





      Mechanical Ventilator  


 


1/15/19 03:02  111 20     40


 


1/15/19 01:28  96 19     40


 


1/15/19 00:00 97.9 119 17 110/82 (91) 100   


 


1/15/19 00:00      Mechanical Ventilator  





      Mechanical Ventilator  


 


1/15/19 00:00        40


 


1/14/19 23:48  116      


 


1/14/19 20:57  113 24     40


 


1/14/19 20:35    125/62    


 


1/14/19 20:00  111      


 


1/14/19 20:00      Mechanical Ventilator  





      Mechanical Ventilator  


 


1/14/19 20:00        40


 


1/14/19 19:15 97.9 119 22 125/62 (83) 100   


 


1/14/19 18:52  121 18     40


 


1/14/19 18:30 98.1 87 22 111/37 (61) 100   


 


1/14/19 17:25  84 20     40


 


1/14/19 16:00      Mechanical Ventilator  





      Mechanical Ventilator  


 


1/14/19 16:00 98.5 84 22 132/44 (73) 100   


 


1/14/19 16:00  126      


 


1/14/19 16:00        40


 


1/14/19 15:07  84 18     40

















Intake and Output  


 


 1/14/19 1/15/19





 19:00 07:00


 


Intake Total 640 ml 665 ml


 


Output Total 40 ml 100 ml


 


Balance 600 ml 565 ml


 


  


 


Free Water 160 ml 90 ml


 


IV Total  55 ml


 


Tube Feeding 480 ml 520 ml


 


Stool Total 40 ml 100 ml


 


# Voids  2











Laboratory Tests








Test


  1/14/19


14:18 1/15/19


03:15


 


White Blood Count


  6.9 K/UL


(4.8-10.8) 8.0 K/UL


(4.8-10.8)


 


Red Blood Count


  2.20 M/UL


(4.20-5.40)  L 2.83 M/UL


(4.20-5.40)  L


 


Hemoglobin


  6.6 G/DL


(12.0-16.0)  *L 8.6 G/DL


(12.0-16.0)  #L


 


Hematocrit


  20.8 %


(37.0-47.0)  L 26.8 %


(37.0-47.0)  L


 


Mean Corpuscular Volume 95 FL (80-99)   95 FL (80-99)  


 


Mean Corpuscular Hemoglobin


  29.8 PG


(27.0-31.0) 30.2 PG


(27.0-31.0)


 


Mean Corpuscular Hemoglobin


Concent 31.5 G/DL


(32.0-36.0)  L 31.9 G/DL


(32.0-36.0)  L


 


Red Cell Distribution Width


  17.0 %


(11.6-14.8)  H 16.2 %


(11.6-14.8)  H


 


Platelet Count


  421 K/UL


(150-450) 432 K/UL


(150-450)


 


Mean Platelet Volume


  5.9 FL


(6.5-10.1)  L 6.0 FL


(6.5-10.1)  L


 


Neutrophils (%) (Auto)


  % (45.0-75.0)


  79.1 %


(45.0-75.0)  H


 


Lymphocytes (%) (Auto)


  % (20.0-45.0)


  7.1 %


(20.0-45.0)  L


 


Monocytes (%) (Auto)


   % (1.0-10.0)  


  9.8 %


(1.0-10.0)


 


Eosinophils (%) (Auto)


   % (0.0-3.0)  


  2.6 %


(0.0-3.0)


 


Basophils (%) (Auto)


   % (0.0-2.0)  


  1.4 %


(0.0-2.0)


 


Differential Total Cells


Counted 100  


  


 


 


Neutrophils % (Manual) 83 % (45-75)  H 


 


Lymphocytes % (Manual) 5 % (20-45)  L 


 


Monocytes % (Manual) 8 % (1-10)   


 


Eosinophils % (Manual) 2 % (0-3)   


 


Basophils % (Manual) 1 % (0-2)   


 


Band Neutrophils 1 % (0-8)   


 


Platelet Estimate Adequate   


 


Platelet Morphology Normal   


 


Hypochromasia 2+   


 


Anisocytosis 2+   


 


Sodium Level


  135 MMOL/L


(136-145)  L 136 MMOL/L


(136-145)


 


Potassium Level


  4.4 MMOL/L


(3.5-5.1) 4.1 MMOL/L


(3.5-5.1)


 


Chloride Level


  99 MMOL/L


() 99 MMOL/L


()


 


Carbon Dioxide Level


  30 MMOL/L


(21-32) 31 MMOL/L


(21-32)


 


Anion Gap


  6 mmol/L


(5-15) 6 mmol/L


(5-15)


 


Blood Urea Nitrogen


  69 mg/dL


(7-18)  H 34 mg/dL


(7-18)  H


 


Creatinine


  2.4 MG/DL


(0.55-1.30)  H 1.4 MG/DL


(0.55-1.30)  H


 


Estimat Glomerular Filtration


Rate  mL/min (>60)  


   mL/min (>60)  


 


 


Glucose Level


  132 MG/DL


()  H 116 MG/DL


()  H


 


Calcium Level


  9.0 MG/DL


(8.5-10.1) 9.1 MG/DL


(8.5-10.1)








Height (Feet):  5


Height (Inches):  2.00


Weight (Pounds):  124


General Appearance:  WD/WN, no apparent distress, alert


Cardiovascular:  normal rate


Respiratory/Chest:  normal breath sounds, no respiratory distress, other - 

Mechanical ventilator


Abdominal Exam:  normal bowel sounds, non tender, soft, GT site - Clean dry and 

intact


Extremities:  non-tender











Shane Lowry NP Joel 15, 2019 13:47

## 2019-01-15 NOTE — NUR
DIALYSIS CHAIR TIME: 

EZRA @5PM 

[address] [place] [id number] 

[facility] [id number] [phone] 





TRANSPORTATION:

[facility]

[phone] 

[phone] FAX

## 2019-01-15 NOTE — INFECTIOUS DISEASES PROG NOTE
Assessment/Plan


Assessment/Plan


antibiotics : meropenem





A


1. e.coli UTI


2. pseudomonas pneumonia


3. leucocytosis improving


4. respiratory failure s/p tracheostomy


5. renal failure


6. decubitus ulcers


7. nasal MRSA colonization


8. rectal VRE colonization


9. pleural effusion





P


1. continue iv meropenem 2 more days


2. will follow up cultures





Subjective


ROS Limited/Unobtainable:  Yes


Allergies:  


Coded Allergies:  


     No Known Allergies (Unverified , 11/26/18)





Objective


Vital Signs





Last 24 Hour Vital Signs








  Date Time  Temp Pulse Resp B/P (MAP) Pulse Ox O2 Delivery O2 Flow Rate FiO2


 


1/15/19 08:50    150/65    


 


1/15/19 08:41  129 26     40


 


1/15/19 08:00        40


 


1/15/19 08:00 97.5 113 22 150/65 (93) 100   


 


1/15/19 08:00      Mechanical Ventilator  





      Mechanical Ventilator  


 


1/15/19 07:55  122      


 


1/15/19 07:10  120 16     40


 


1/15/19 05:20 97.3       


 


1/15/19 05:12  119 22     40


 


1/15/19 04:00  129      


 


1/15/19 04:00 97.3 104 21 103/62 (76) 100   


 


1/15/19 04:00        40


 


1/15/19 04:00      Mechanical Ventilator  





      Mechanical Ventilator  


 


1/15/19 03:02  111 20     40


 


1/15/19 01:28  96 19     40


 


1/15/19 00:00 97.9 119 17 110/82 (91) 100   


 


1/15/19 00:00      Mechanical Ventilator  





      Mechanical Ventilator  


 


1/15/19 00:00        40


 


1/14/19 23:48  116      


 


1/14/19 20:57  113 24     40


 


1/14/19 20:35    125/62    


 


1/14/19 20:00  111      


 


1/14/19 20:00      Mechanical Ventilator  





      Mechanical Ventilator  


 


1/14/19 20:00        40


 


1/14/19 19:15 97.9 119 22 125/62 (83) 100   


 


1/14/19 18:52  121 18     40


 


1/14/19 18:30 98.1 87 22 111/37 (61) 100   


 


1/14/19 17:25  84 20     40


 


1/14/19 16:00      Mechanical Ventilator  





      Mechanical Ventilator  


 


1/14/19 16:00 98.5 84 22 132/44 (73) 100   


 


1/14/19 16:00  126      


 


1/14/19 16:00        40


 


1/14/19 15:07  84 18     40


 


1/14/19 13:15  70 12     40


 


1/14/19 12:10  65      


 


1/14/19 12:00        40


 


1/14/19 12:00      Mechanical Ventilator  





      Mechanical Ventilator  


 


1/14/19 12:00 98.1 106 20 143/54 (83) 100   








Height (Feet):  5


Height (Inches):  2.00


Weight (Pounds):  124


HEENT:  status post trach


Respiratory/Chest:  lungs clear


Cardiovascular:  normal rate, regular rhythm, no gallop/murmur


Abdomen:  soft, non tender, other - GT


Extremities:  no edema, other - right arm PICC





Laboratory Tests








Test


  1/14/19


14:18 1/15/19


03:15


 


White Blood Count


  6.9 K/UL


(4.8-10.8) 8.0 K/UL


(4.8-10.8)


 


Red Blood Count


  2.20 M/UL


(4.20-5.40)  L 2.83 M/UL


(4.20-5.40)  L


 


Hemoglobin


  6.6 G/DL


(12.0-16.0)  *L 8.6 G/DL


(12.0-16.0)  #L


 


Hematocrit


  20.8 %


(37.0-47.0)  L 26.8 %


(37.0-47.0)  L


 


Mean Corpuscular Volume 95 FL (80-99)   95 FL (80-99)  


 


Mean Corpuscular Hemoglobin


  29.8 PG


(27.0-31.0) 30.2 PG


(27.0-31.0)


 


Mean Corpuscular Hemoglobin


Concent 31.5 G/DL


(32.0-36.0)  L 31.9 G/DL


(32.0-36.0)  L


 


Red Cell Distribution Width


  17.0 %


(11.6-14.8)  H 16.2 %


(11.6-14.8)  H


 


Platelet Count


  421 K/UL


(150-450) 432 K/UL


(150-450)


 


Mean Platelet Volume


  5.9 FL


(6.5-10.1)  L 6.0 FL


(6.5-10.1)  L


 


Neutrophils (%) (Auto)


  % (45.0-75.0)


  79.1 %


(45.0-75.0)  H


 


Lymphocytes (%) (Auto)


  % (20.0-45.0)


  7.1 %


(20.0-45.0)  L


 


Monocytes (%) (Auto)


   % (1.0-10.0)  


  9.8 %


(1.0-10.0)


 


Eosinophils (%) (Auto)


   % (0.0-3.0)  


  2.6 %


(0.0-3.0)


 


Basophils (%) (Auto)


   % (0.0-2.0)  


  1.4 %


(0.0-2.0)


 


Differential Total Cells


Counted 100  


  


 


 


Neutrophils % (Manual) 83 % (45-75)  H 


 


Lymphocytes % (Manual) 5 % (20-45)  L 


 


Monocytes % (Manual) 8 % (1-10)   


 


Eosinophils % (Manual) 2 % (0-3)   


 


Basophils % (Manual) 1 % (0-2)   


 


Band Neutrophils 1 % (0-8)   


 


Platelet Estimate Adequate   


 


Platelet Morphology Normal   


 


Hypochromasia 2+   


 


Anisocytosis 2+   


 


Sodium Level


  135 MMOL/L


(136-145)  L 136 MMOL/L


(136-145)


 


Potassium Level


  4.4 MMOL/L


(3.5-5.1) 4.1 MMOL/L


(3.5-5.1)


 


Chloride Level


  99 MMOL/L


() 99 MMOL/L


()


 


Carbon Dioxide Level


  30 MMOL/L


(21-32) 31 MMOL/L


(21-32)


 


Anion Gap


  6 mmol/L


(5-15) 6 mmol/L


(5-15)


 


Blood Urea Nitrogen


  69 mg/dL


(7-18)  H 34 mg/dL


(7-18)  H


 


Creatinine


  2.4 MG/DL


(0.55-1.30)  H 1.4 MG/DL


(0.55-1.30)  H


 


Estimat Glomerular Filtration


Rate  mL/min (>60)  


   mL/min (>60)  


 


 


Glucose Level


  132 MG/DL


()  H 116 MG/DL


()  H


 


Calcium Level


  9.0 MG/DL


(8.5-10.1) 9.1 MG/DL


(8.5-10.1)











Current Medications








 Medications


  (Trade)  Dose


 Ordered  Sig/Tony


 Route


 PRN Reason  Start Time


 Stop Time Status Last Admin


Dose Admin


 


 Acetaminophen


  (Tylenol)  650 mg  Q6H  PRN


 GT


 Mild Pain/Temp > 100.5  12/23/18 16:00


 1/20/19 15:59  1/10/19 08:49


 


 


 Acetaminophen/


 Hydrocodone Bitart


  (Norco 5/325)  1 tab  Q6H  PRN


 GT


 For Pain  1/8/19 13:35


 1/15/19 13:00  1/15/19 04:50


 


 


 Amiodarone HCl


  (Cordarone)  200 mg  DAILY


 GT


   1/2/19 09:00


 1/26/19 20:59  1/15/19 08:51


 


 


 Apixaban


  (Eliquis)  2.5 mg  BID


 GT


   12/27/18 18:00


 1/26/19 17:59  1/15/19 08:51


 


 


 Artificial Tears


  (Akwa-Tears)  1 drop  Q4H  PRN


 BOTH EYES


 Dry Eyes  12/23/18 15:30


 1/21/19 15:29  1/1/19 16:53


 


 


 Chlorhexidine


 Gluconate


  (Juana-Hex 2%)  1 applic  DAILY@2000


 TOPIC


   12/30/18 20:00


 1/29/19 19:59  1/14/19 20:33


 


 


 Collagenase


  (Santyl)  1 applic  DAILY


 TOPIC


   1/7/19 21:00


 2/6/19 08:59  1/15/19 09:20


 


 


 Epoetin Rupesh


  (Procrit (for


 ESRD on dialysis))  10,000 units  MON-WED-FRI


 SUBQ


   1/14/19 21:00


 2/13/19 20:59  1/14/19 20:34


 


 


 Iron Sucrose 100


 mg/Sodium Chloride  60 ml @ 


 240 mls/hr  DAILY


 IV


   1/15/19 09:00


 1/17/19 09:14  1/15/19 09:39


 


 


 Lansoprazole


  (Prevacid)  30 mg  Q12HR


 GT


   12/27/18 21:00


 1/26/19 20:59  1/15/19 08:50


 


 


 Lorazepam


  (Ativan)  0.5 mg  Q6H  PRN


 ORAL


 For Anxiety  1/14/19 09:00


 1/21/19 08:59  1/15/19 09:20


 


 


 Meropenem 500 mg/


 Sodium Chloride  55 ml @ 


 110 mls/hr  DAILY


 IVPB


   1/14/19 09:00


 1/17/19 08:59  1/15/19 08:51


 


 


 Minoxidil


  (Loniten)  2.5 mg  Q12HR


 GT


   12/27/18 21:00


 1/26/19 20:59  1/15/19 08:50


 


 


 Sodium Chloride


  (Ocean Nasal


 Spray)  1 spray  Q4H  PRN


 NASAL


 dry nose  12/23/18 14:30


 1/21/19 18:29  1/12/19 08:35


 


 


 Zolpidem Tartrate


  (Ambien)  5 mg  HSPRN  PRN


 ORAL


 Insomnia  1/11/19 08:00


 1/18/19 07:59  1/14/19 04:12


 

















Chase Santillan MD Joel 15, 2019 11:01

## 2019-01-15 NOTE — PULMONOLOGY PROGRESS NOTE
Assessment/Plan


Assessment/Plan


IMPRESSION:


1. Hypotension. Resolved.


2. Sepsis. Resolved


3. Atrial fibrillation.


4. Bradycardia.


5. Diabetes mellitus.


6. End-stage renal disease on dialysis.


7. Left lung collapse. Resolved


8. Respiratory failure. S/p tracheostomy


9. Dysphagia.


10. Dementia.


11. Anemia








DISCUSSION:


1. Continue medications.


2. On amiodarone


3. Status post tracheostomy


4. S/p G-tube 


5. Continue HD














  ______________________________________________


  Quentin Cardoso M.D.





Subjective


Interval Events:  States she cannot breathe however SaO2 100% on 40% FiO2 on AC 

mode on vent


Constitutional:  Reports: no symptoms


HEENT:  Repors: no symptoms


Respiratory:  Reports: no symptoms


Cardiovascular:  Reports: no symptoms


Gastrointestinal/Abdominal:  Reports: no symptoms


Genitourinary:  Reports: no symptoms


Allergies:  


Coded Allergies:  


     No Known Allergies (Unverified , 11/26/18)





Objective





Last 24 Hour Vital Signs








  Date Time  Temp Pulse Resp B/P (MAP) Pulse Ox O2 Delivery O2 Flow Rate FiO2


 


1/15/19 12:00 98.1 70 22 130/42 (71) 100   


 


1/15/19 11:03  110 23     40


 


1/15/19 08:50    150/65    


 


1/15/19 08:41  129 26     40


 


1/15/19 08:00        40


 


1/15/19 08:00 97.5 113 22 150/65 (93) 100   


 


1/15/19 08:00      Mechanical Ventilator  





      Mechanical Ventilator  


 


1/15/19 07:55  122      


 


1/15/19 07:10  120 16     40


 


1/15/19 05:20 97.3       


 


1/15/19 05:12  119 22     40


 


1/15/19 04:00  129      


 


1/15/19 04:00 97.3 104 21 103/62 (76) 100   


 


1/15/19 04:00        40


 


1/15/19 04:00      Mechanical Ventilator  





      Mechanical Ventilator  


 


1/15/19 03:02  111 20     40


 


1/15/19 01:28  96 19     40


 


1/15/19 00:00 97.9 119 17 110/82 (91) 100   


 


1/15/19 00:00      Mechanical Ventilator  





      Mechanical Ventilator  


 


1/15/19 00:00        40


 


1/14/19 23:48  116      


 


1/14/19 20:57  113 24     40


 


1/14/19 20:35    125/62    


 


1/14/19 20:00  111      


 


1/14/19 20:00      Mechanical Ventilator  





      Mechanical Ventilator  


 


1/14/19 20:00        40


 


1/14/19 19:15 97.9 119 22 125/62 (83) 100   


 


1/14/19 18:52  121 18     40


 


1/14/19 18:30 98.1 87 22 111/37 (61) 100   


 


1/14/19 17:25  84 20     40


 


1/14/19 16:00      Mechanical Ventilator  





      Mechanical Ventilator  


 


1/14/19 16:00 98.5 84 22 132/44 (73) 100   


 


1/14/19 16:00  126      


 


1/14/19 16:00        40


 


1/14/19 15:07  84 18     40


 


1/14/19 13:15  70 12     40

















Intake and Output  


 


 1/14/19 1/15/19





 19:00 07:00


 


Intake Total 640 ml 625 ml


 


Output Total 40 ml 100 ml


 


Balance 600 ml 525 ml


 


  


 


Free Water 160 ml 90 ml


 


IV Total  55 ml


 


Tube Feeding 480 ml 480 ml


 


Stool Total 40 ml 100 ml


 


# Voids  2








General Appearance:  no acute distress


HEENT:  normocephalic


Respiratory/Chest:  chest wall non-tender, lungs clear


Cardiovascular:  normal peripheral pulses, normal rate


Abdomen:  normal bowel sounds, soft, non tender


Laboratory Tests


1/14/19 14:18: 


White Blood Count 6.9, Red Blood Count 2.20L, Hemoglobin 6.6*L, Hematocrit 20.8L

, Mean Corpuscular Volume 95, Mean Corpuscular Hemoglobin 29.8, Mean 

Corpuscular Hemoglobin Concent 31.5L, Red Cell Distribution Width 17.0H, 

Platelet Count 421, Mean Platelet Volume 5.9L, Neutrophils (%) (Auto) , 

Lymphocytes (%) (Auto) , Monocytes (%) (Auto) , Eosinophils (%) (Auto) , 

Basophils (%) (Auto) , Differential Total Cells Counted 100, Neutrophils % (

Manual) 83H, Lymphocytes % (Manual) 5L, Monocytes % (Manual) 8, Eosinophils % (

Manual) 2, Basophils % (Manual) 1, Band Neutrophils 1, Platelet Estimate 

Adequate, Platelet Morphology Normal, Hypochromasia 2+, Anisocytosis 2+, Sodium 

Level 135L, Potassium Level 4.4, Chloride Level 99, Carbon Dioxide Level 30, 

Anion Gap 6, Blood Urea Nitrogen 69H, Creatinine 2.4H, Estimat Glomerular 

Filtration Rate , Glucose Level 132H, Calcium Level 9.0


1/15/19 03:15: 


White Blood Count 8.0, Red Blood Count 2.83L, Hemoglobin 8.6#L, Hematocrit 26.8L

, Mean Corpuscular Volume 95, Mean Corpuscular Hemoglobin 30.2, Mean 

Corpuscular Hemoglobin Concent 31.9L, Red Cell Distribution Width 16.2H, 

Platelet Count 432, Mean Platelet Volume 6.0L, Neutrophils (%) (Auto) 79.1H, 

Lymphocytes (%) (Auto) 7.1L, Monocytes (%) (Auto) 9.8, Eosinophils (%) (Auto) 

2.6, Basophils (%) (Auto) 1.4, Sodium Level 136, Potassium Level 4.1, Chloride 

Level 99, Carbon Dioxide Level 31, Anion Gap 6, Blood Urea Nitrogen 34H, 

Creatinine 1.4H, Estimat Glomerular Filtration Rate , Glucose Level 116H, 

Calcium Level 9.1





Current Medications








 Medications


  (Trade)  Dose


 Ordered  Sig/Tony


 Route


 PRN Reason  Start Time


 Stop Time Status Last Admin


Dose Admin


 


 Acetaminophen


  (Tylenol)  650 mg  Q6H  PRN


 GT


 Mild Pain/Temp > 100.5  12/23/18 16:00


 1/20/19 15:59  1/10/19 08:49


 


 


 Acetaminophen/


 Hydrocodone Bitart


  (Norco 5/325)  1 tab  Q6H  PRN


 GT


 For Pain  1/8/19 13:35


 1/15/19 13:00  1/15/19 11:44


 


 


 Amiodarone HCl


  (Cordarone)  200 mg  DAILY


 GT


   1/2/19 09:00


 1/26/19 20:59  1/15/19 08:51


 


 


 Apixaban


  (Eliquis)  2.5 mg  BID


 GT


   12/27/18 18:00


 1/26/19 17:59  1/15/19 08:51


 


 


 Artificial Tears


  (Akwa-Tears)  1 drop  Q4H  PRN


 BOTH EYES


 Dry Eyes  12/23/18 15:30


 1/21/19 15:29  1/1/19 16:53


 


 


 Chlorhexidine


 Gluconate


  (Juana-Hex 2%)  1 applic  DAILY@2000


 TOPIC


   12/30/18 20:00


 1/29/19 19:59  1/14/19 20:33


 


 


 Collagenase


  (Santyl)  1 applic  DAILY


 TOPIC


   1/7/19 21:00


 2/6/19 08:59  1/15/19 09:20


 


 


 Epoetin Rupesh


  (Procrit (for


 ESRD on dialysis))  10,000 units  MON-WED-FRI


 SUBQ


   1/14/19 21:00


 2/13/19 20:59  1/14/19 20:34


 


 


 Iron Sucrose 100


 mg/Sodium Chloride  60 ml @ 


 240 mls/hr  DAILY


 IV


   1/15/19 09:00


 1/17/19 09:14  1/15/19 09:39


 


 


 Lansoprazole


  (Prevacid)  30 mg  Q12HR


 GT


   12/27/18 21:00


 1/26/19 20:59  1/15/19 08:50


 


 


 Lorazepam


  (Ativan)  0.5 mg  Q6H  PRN


 ORAL


 For Anxiety  1/14/19 09:00


 1/21/19 08:59  1/15/19 09:20


 


 


 Meropenem 500 mg/


 Sodium Chloride  55 ml @ 


 110 mls/hr  DAILY


 IVPB


   1/14/19 09:00


 1/17/19 08:59  1/15/19 08:51


 


 


 Minoxidil


  (Loniten)  2.5 mg  Q12HR


 GT


   12/27/18 21:00


 1/26/19 20:59  1/15/19 08:50


 


 


 Sodium Chloride


  (Ocean Nasal


 Spray)  1 spray  Q4H  PRN


 NASAL


 dry nose  12/23/18 14:30


 1/21/19 18:29  1/12/19 08:35


 


 


 Zolpidem Tartrate


  (Ambien)  5 mg  HSPRN  PRN


 ORAL


 Insomnia  1/11/19 08:00


 1/18/19 07:59  1/14/19 04:12


 

















Quentin Cardoso MD Joel 15, 2019 12:27

## 2019-01-15 NOTE — NUR
NURSE NOTES:



Received patient from [facility]. Patient in bed, awake, and alert. Able to mouth her needs. 
Trach to vent dependent Shiley 8 AC 8  Fi02 40 Peep 5. Cardiac monitor in placed. 
Tolerating gtube feeding. Rectal tube in placed. ASHISH midline asymptomatic. Bed in lowest 
position with side rails up. bilateral soft wrist restraints in placed. Will continue to 
follow plan of care.

## 2019-01-15 NOTE — NUR
Rehab P.T Notes: late entry 1005

P.T evaluation completed and treatment initiated. Please refer to P.T evaluation for current 
functional status. Pt received at bedside  with caregiver present. Pt is alert , oriented to 
self and place, not time. Pt follows commands appropriately and cooperative. Pt c/o lower 
back and B feet pain 5/10 aggravated however agreeable to participate in P.T evaluation and 
tx. Pt is limited by generalized weakness and decreased activity tolerance due deconditioned 
state. Pt currently require MAX A X  1 for bed mobilities and MAX A X 2 for transfer 
activities. Pt was only able to stand with hand held/MAX support x2 however unable to  
ambulate at this time.  Skilled P.T service is warranted to improve strength , endurance and 
balance to increase activity tolerance and functional mobility independence during stay. 
Recommend SNF for further rehab or home with 25 hr caregiver and P.T follow up. Thank you 
for this referral.

-------------------------------------------------------------------------------

Addendum: 01/15/19 at 1434 by MAURO CLINE PT

-------------------------------------------------------------------------------



NOTE CORRECTION:  PLEASE DISREGARD THE P.T  ABOVE NOTES. WRONG ENTRY!

## 2019-01-15 NOTE — NEPHROLOGY PROGRESS NOTE
Assessment/Plan


Assessment


Seee below


Plan





ESRD HD q MWF 


Anemia of CKD , SAMANTHA high dose. 





A. 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 with LAE. LVEF 65% . On Eliquis.





Post trach + PEG .


Referred to [facility]. Pt's son agreeable to [facility]. Referred again.


See orders.


Still needs extensive Vascular W/U. 














Was transfused on HD.





Subjective


Subjective


Very anxious!





Objective


Objective





Last 24 Hour Vital Signs








  Date Time  Temp Pulse Resp B/P (MAP) Pulse Ox O2 Delivery O2 Flow Rate FiO2


 


1/15/19 15:41  114      


 


1/15/19 15:21  122 19     40


 


1/15/19 13:47  134 18     40


 


1/15/19 12:00 98.1 70 22 130/42 (71) 100   


 


1/15/19 12:00        40


 


1/15/19 12:00      Mechanical Ventilator  





      Mechanical Ventilator  


 


1/15/19 11:51  117      


 


1/15/19 11:03  110 23     40


 


1/15/19 08:50    150/65    


 


1/15/19 08:41  129 26     40


 


1/15/19 08:00        40


 


1/15/19 08:00 97.5 113 22 150/65 (93) 100   


 


1/15/19 08:00      Mechanical Ventilator  





      Mechanical Ventilator  


 


1/15/19 07:55  122      


 


1/15/19 07:10  120 16     40


 


1/15/19 05:20 97.3       


 


1/15/19 05:12  119 22     40


 


1/15/19 04:00  129      


 


1/15/19 04:00 97.3 104 21 103/62 (76) 100   


 


1/15/19 04:00        40


 


1/15/19 04:00      Mechanical Ventilator  





      Mechanical Ventilator  


 


1/15/19 03:02  111 20     40


 


1/15/19 01:28  96 19     40


 


1/15/19 00:00 97.9 119 17 110/82 (91) 100   


 


1/15/19 00:00      Mechanical Ventilator  





      Mechanical Ventilator  


 


1/15/19 00:00        40


 


1/14/19 23:48  116      


 


1/14/19 20:57  113 24     40


 


1/14/19 20:35    125/62    


 


1/14/19 20:00  111      


 


1/14/19 20:00      Mechanical Ventilator  





      Mechanical Ventilator  


 


1/14/19 20:00        40


 


1/14/19 19:15 97.9 119 22 125/62 (83) 100   


 


1/14/19 18:52  121 18     40


 


1/14/19 18:30 98.1 87 22 111/37 (61) 100   


 


1/14/19 17:25  84 20     40

















Intake and Output  


 


 1/14/19 1/15/19





 19:00 07:00


 


Intake Total 640 ml 665 ml


 


Output Total 40 ml 100 ml


 


Balance 600 ml 565 ml


 


  


 


Free Water 160 ml 90 ml


 


IV Total  55 ml


 


Tube Feeding 480 ml 520 ml


 


Stool Total 40 ml 100 ml


 


# Voids  2








Laboratory Tests


1/15/19 03:15: 


White Blood Count 8.0, Red Blood Count 2.83L, Hemoglobin 8.6#L, Hematocrit 26.8L

, Mean Corpuscular Volume 95, Mean Corpuscular Hemoglobin 30.2, Mean 

Corpuscular Hemoglobin Concent 31.9L, Red Cell Distribution Width 16.2H, 

Platelet Count 432, Mean Platelet Volume 6.0L, Neutrophils (%) (Auto) 79.1H, 

Lymphocytes (%) (Auto) 7.1L, Monocytes (%) (Auto) 9.8, Eosinophils (%) (Auto) 

2.6, Basophils (%) (Auto) 1.4, Sodium Level 136, Potassium Level 4.1, Chloride 

Level 99, Carbon Dioxide Level 31, Anion Gap 6, Blood Urea Nitrogen 34H, 

Creatinine 1.4H, Estimat Glomerular Filtration Rate , Glucose Level 116H, 

Calcium Level 9.1, Iron Level 51, Total Iron Binding Capacity 102L, Percent 

Iron Saturation 50, Unsaturated Iron Binding 51L


Height (Feet):  5


Height (Inches):  2.00


Weight (Pounds):  124


Objective


On vent.


Trach clean.


Cv IRR/IRR


Lungs B ronchi.


Abd SNT. BS +  PEG.


E Rt. foot diabetic ulcers











Gavino Daigle MD Joel 15, 2019 16:34

## 2019-01-15 NOTE — NUR
CASE MANAGEMENT: REVIEW





SI: ESRD ON HD

TRACHEOSTOMY 12/17

PEG PLACEMENT 12/19 

T 98.1  RR 26 /65 % MECH VENT FIO2 40

H/H 8.6/26.8 BUN 34 CR 1.4 









IS: MEROPENEM IV QD

AMIODARONE GT QD

MINOXIDIL GT Q12HR 

ELEQUIS GT BID 

GT FEEDING NEPRO @ 40ML/HR

HD PRN 





***STEP DOWN UNIT  STATUS***

DCP: PATIENT IS FROM [facility]. PATIENT REFERRED TO [facility]. [facility] 
WILL NOT ACCEPT PATIENT WITHOUT CHAIR TIME AT [facility]. PATIENT REFERRED TO .[facility] 
SUSIE JACOBS. AWAITING CHAIR TIME AT DIALYSIS CENTER.

## 2019-01-15 NOTE — NUR
NURSE NOTES:

Recvd.on a vent.TRACHE see settings.Lungs few scatt.Rh.Diminished BS at 
Bases.P.Ox-%.Suctioned tk yellowish sec.NS Lavaged.GT-Feeding in 
progress.Res.-0.Anuric HD pt.Tony.for HD in am.AVF (L) arm pos.thrill/Bruit.See I/O.

## 2019-01-15 NOTE — CARDIOLOGY PROGRESS NOTE
Assessment/Plan


Assessment/Plan


1. Hypotension, resolved 


2. Paroxysmal episodes of atrial fibrillation history.


3. Bradycardia secondary to medications, amiodarone possibly.


4. Diabetes mellitus.


5. End-stage renal disease, on hemodialysis.


6. Lung collapse.


7. Respiratory failure, status post intubation.


8. Dysphagia.


9. History of dementia.


10. Pulm htn 


11.  Pleural effusion 


12. cardaic arrest


13. chest wall pain post cpr  


14. hyponatremai 


15. anemia 





mostly in sinus now 


amiod 200 mg daily 


vent support 


s/p trach


peg done 


on minoxidl and hold parameter applied 


on elequis  2.5 mg bid for stroke prevention 


check stool ob and iron /tibc if stool ob + will need to dc anticoagualtion in 

light of recurrent anemia 


s/p prbc tx 


son wishes pt to be medicated for anxiety  














Subjective


ROS Limited/Unobtainable:  Yes


Subjective


on the vent





Objective





Last 24 Hour Vital Signs








  Date Time  Temp Pulse Resp B/P (MAP) Pulse Ox O2 Delivery O2 Flow Rate FiO2


 


1/15/19 13:47  134 18     40


 


1/15/19 12:00 98.1 70 22 130/42 (71) 100   


 


1/15/19 12:00        40


 


1/15/19 12:00      Mechanical Ventilator  





      Mechanical Ventilator  


 


1/15/19 11:51  117      


 


1/15/19 11:03  110 23     40


 


1/15/19 08:50    150/65    


 


1/15/19 08:41  129 26     40


 


1/15/19 08:00        40


 


1/15/19 08:00 97.5 113 22 150/65 (93) 100   


 


1/15/19 08:00      Mechanical Ventilator  





      Mechanical Ventilator  


 


1/15/19 07:55  122      


 


1/15/19 07:10  120 16     40


 


1/15/19 05:20 97.3       


 


1/15/19 05:12  119 22     40


 


1/15/19 04:00  129      


 


1/15/19 04:00 97.3 104 21 103/62 (76) 100   


 


1/15/19 04:00        40


 


1/15/19 04:00      Mechanical Ventilator  





      Mechanical Ventilator  


 


1/15/19 03:02  111 20     40


 


1/15/19 01:28  96 19     40


 


1/15/19 00:00 97.9 119 17 110/82 (91) 100   


 


1/15/19 00:00      Mechanical Ventilator  





      Mechanical Ventilator  


 


1/15/19 00:00        40


 


1/14/19 23:48  116      


 


1/14/19 20:57  113 24     40


 


1/14/19 20:35    125/62    


 


1/14/19 20:00  111      


 


1/14/19 20:00      Mechanical Ventilator  





      Mechanical Ventilator  


 


1/14/19 20:00        40


 


1/14/19 19:15 97.9 119 22 125/62 (83) 100   


 


1/14/19 18:52  121 18     40


 


1/14/19 18:30 98.1 87 22 111/37 (61) 100   


 


1/14/19 17:25  84 20     40


 


1/14/19 16:00      Mechanical Ventilator  





      Mechanical Ventilator  


 


1/14/19 16:00 98.5 84 22 132/44 (73) 100   


 


1/14/19 16:00  126      


 


1/14/19 16:00        40


 


1/14/19 15:07  84 18     40








General Appearance:  no apparent distress, on vent, patient on isolation











Intake and Output  


 


 1/14/19 1/15/19





 19:00 07:00


 


Intake Total 640 ml 665 ml


 


Output Total 40 ml 100 ml


 


Balance 600 ml 565 ml


 


  


 


Free Water 160 ml 90 ml


 


IV Total  55 ml


 


Tube Feeding 480 ml 520 ml


 


Stool Total 40 ml 100 ml


 


# Voids  2











Laboratory Tests








Test


  1/14/19


14:18 1/15/19


03:15


 


White Blood Count


  6.9 K/UL


(4.8-10.8) 8.0 K/UL


(4.8-10.8)


 


Red Blood Count


  2.20 M/UL


(4.20-5.40)  L 2.83 M/UL


(4.20-5.40)  L


 


Hemoglobin


  6.6 G/DL


(12.0-16.0)  *L 8.6 G/DL


(12.0-16.0)  #L


 


Hematocrit


  20.8 %


(37.0-47.0)  L 26.8 %


(37.0-47.0)  L


 


Mean Corpuscular Volume 95 FL (80-99)   95 FL (80-99)  


 


Mean Corpuscular Hemoglobin


  29.8 PG


(27.0-31.0) 30.2 PG


(27.0-31.0)


 


Mean Corpuscular Hemoglobin


Concent 31.5 G/DL


(32.0-36.0)  L 31.9 G/DL


(32.0-36.0)  L


 


Red Cell Distribution Width


  17.0 %


(11.6-14.8)  H 16.2 %


(11.6-14.8)  H


 


Platelet Count


  421 K/UL


(150-450) 432 K/UL


(150-450)


 


Mean Platelet Volume


  5.9 FL


(6.5-10.1)  L 6.0 FL


(6.5-10.1)  L


 


Neutrophils (%) (Auto)


  % (45.0-75.0)


  79.1 %


(45.0-75.0)  H


 


Lymphocytes (%) (Auto)


  % (20.0-45.0)


  7.1 %


(20.0-45.0)  L


 


Monocytes (%) (Auto)


   % (1.0-10.0)  


  9.8 %


(1.0-10.0)


 


Eosinophils (%) (Auto)


   % (0.0-3.0)  


  2.6 %


(0.0-3.0)


 


Basophils (%) (Auto)


   % (0.0-2.0)  


  1.4 %


(0.0-2.0)


 


Differential Total Cells


Counted 100  


  


 


 


Neutrophils % (Manual) 83 % (45-75)  H 


 


Lymphocytes % (Manual) 5 % (20-45)  L 


 


Monocytes % (Manual) 8 % (1-10)   


 


Eosinophils % (Manual) 2 % (0-3)   


 


Basophils % (Manual) 1 % (0-2)   


 


Band Neutrophils 1 % (0-8)   


 


Platelet Estimate Adequate   


 


Platelet Morphology Normal   


 


Hypochromasia 2+   


 


Anisocytosis 2+   


 


Sodium Level


  135 MMOL/L


(136-145)  L 136 MMOL/L


(136-145)


 


Potassium Level


  4.4 MMOL/L


(3.5-5.1) 4.1 MMOL/L


(3.5-5.1)


 


Chloride Level


  99 MMOL/L


() 99 MMOL/L


()


 


Carbon Dioxide Level


  30 MMOL/L


(21-32) 31 MMOL/L


(21-32)


 


Anion Gap


  6 mmol/L


(5-15) 6 mmol/L


(5-15)


 


Blood Urea Nitrogen


  69 mg/dL


(7-18)  H 34 mg/dL


(7-18)  H


 


Creatinine


  2.4 MG/DL


(0.55-1.30)  H 1.4 MG/DL


(0.55-1.30)  H


 


Estimat Glomerular Filtration


Rate  mL/min (>60)  


   mL/min (>60)  


 


 


Glucose Level


  132 MG/DL


()  H 116 MG/DL


()  H


 


Calcium Level


  9.0 MG/DL


(8.5-10.1) 9.1 MG/DL


(8.5-10.1)

















Mariusz Wade MD Joel 15, 2019 14:16

## 2019-01-15 NOTE — NUR
NURSE NOTES:



Left Dr. Mac a message regarding about Simon (son)'s request to speak to him in person 
about patient's overall condition and gave him son's call back number. Also, informed Dr. Mac if Norco 5/325mg 1 tab GT Q6H PRN for pain can be renewed. Gave call back number to 
2W and awaiting for his call.

## 2019-01-15 NOTE — NUR
RESPIRATORY NOTE:

received pt on vent, trached with shiley 8, secured by trach tie/guard. no redness around 
stoma or skin tears. pt is awake and alert. alarms are set and audible with current vent 
settings. ambu at bedside and vent plugged into red outlet. will cont to monitor pt.

## 2019-01-15 NOTE — NUR
CASE MANAGEMENT: ***DCP***NOTE***





UPON DISCHARGE PATIENT WILL TRANSFER TO [facility] [phone] ***[facility]***





FAMILY SHALONDA DUVALL [phone] IN AGREEMENT WITH PATIENT TRANSFERRING TO THIS FACILITY

## 2019-01-16 VITALS — SYSTOLIC BLOOD PRESSURE: 137 MMHG | DIASTOLIC BLOOD PRESSURE: 31 MMHG

## 2019-01-16 VITALS — DIASTOLIC BLOOD PRESSURE: 81 MMHG | SYSTOLIC BLOOD PRESSURE: 151 MMHG

## 2019-01-16 VITALS — DIASTOLIC BLOOD PRESSURE: 76 MMHG | SYSTOLIC BLOOD PRESSURE: 144 MMHG

## 2019-01-16 VITALS — DIASTOLIC BLOOD PRESSURE: 69 MMHG | SYSTOLIC BLOOD PRESSURE: 110 MMHG

## 2019-01-16 VITALS — SYSTOLIC BLOOD PRESSURE: 112 MMHG | DIASTOLIC BLOOD PRESSURE: 55 MMHG

## 2019-01-16 VITALS — DIASTOLIC BLOOD PRESSURE: 82 MMHG | SYSTOLIC BLOOD PRESSURE: 142 MMHG

## 2019-01-16 LAB
ADD MANUAL DIFF: NO
ANION GAP SERPL CALC-SCNC: 5 MMOL/L (ref 5–15)
BASOPHILS NFR BLD AUTO: 1.4 % (ref 0–2)
BUN SERPL-MCNC: 44 MG/DL (ref 7–18)
CALCIUM SERPL-MCNC: 9.6 MG/DL (ref 8.5–10.1)
CHLORIDE SERPL-SCNC: 98 MMOL/L (ref 98–107)
CO2 SERPL-SCNC: 32 MMOL/L (ref 21–32)
CREAT SERPL-MCNC: 1.8 MG/DL (ref 0.55–1.3)
EOSINOPHIL NFR BLD AUTO: 2.9 % (ref 0–3)
ERYTHROCYTE [DISTWIDTH] IN BLOOD BY AUTOMATED COUNT: 15.8 % (ref 11.6–14.8)
HCT VFR BLD CALC: 26.6 % (ref 37–47)
HGB BLD-MCNC: 8.5 G/DL (ref 12–16)
LYMPHOCYTES NFR BLD AUTO: 6.5 % (ref 20–45)
MCV RBC AUTO: 96 FL (ref 80–99)
MONOCYTES NFR BLD AUTO: 9.3 % (ref 1–10)
NEUTROPHILS NFR BLD AUTO: 80 % (ref 45–75)
PLATELET # BLD: 437 K/UL (ref 150–450)
POTASSIUM SERPL-SCNC: 4.1 MMOL/L (ref 3.5–5.1)
RBC # BLD AUTO: 2.77 M/UL (ref 4.2–5.4)
SODIUM SERPL-SCNC: 135 MMOL/L (ref 136–145)
WBC # BLD AUTO: 9.4 K/UL (ref 4.8–10.8)

## 2019-01-16 RX ADMIN — ERYTHROPOIETIN SCH UNITS: 20000 INJECTION, SOLUTION INTRAVENOUS; SUBCUTANEOUS at 20:45

## 2019-01-16 RX ADMIN — SODIUM CHLORIDE SCH MLS/HR: 0.9 INJECTION INTRAVENOUS at 09:30

## 2019-01-16 RX ADMIN — MEROPENEM SCH MLS/HR: 500 INJECTION INTRAVENOUS at 08:23

## 2019-01-16 RX ADMIN — MINOXIDIL SCH MG: 2.5 TABLET ORAL at 20:47

## 2019-01-16 RX ADMIN — AMIODARONE HYDROCHLORIDE SCH MG: 200 TABLET ORAL at 08:42

## 2019-01-16 RX ADMIN — APIXABAN SCH MG: 2.5 TABLET, FILM COATED ORAL at 17:25

## 2019-01-16 RX ADMIN — CHLORHEXIDINE GLUCONATE SCH APPLIC: 213 SOLUTION TOPICAL at 20:46

## 2019-01-16 RX ADMIN — MINOXIDIL SCH MG: 2.5 TABLET ORAL at 08:42

## 2019-01-16 RX ADMIN — APIXABAN SCH MG: 2.5 TABLET, FILM COATED ORAL at 08:42

## 2019-01-16 NOTE — NUR
Received Patient on Vent Settings of ACVC RR 8, , FIO2 40%, PEEP +5. Patient has 
Shiley 8 tracheostomy tube that is secure and patent. Pt is alert and awake. Bilateral 
rhonchi heard upon auscultation. Suction minimal amounts of thin, clear secretions. Vent 
plugged into red outlet. Will continue to monitor throughout the day.

## 2019-01-16 NOTE — NUR
NURSE NOTES:



Received patient from CAROLYN Villarreal. Patient in bed and able to open her eyes. Trach to vent 
dependent with Shiley 8 AC 8  Fi02 40 Peep 5. Cardiac monitor in placed. Gtube feeding 
Nephro 1.8 at 40 cc/hour. Rectal tube in placed. ASHISH midline and VALE shunt are asymptomatic. 
On bilateral soft wrist restraints. Bed in lowest position with side rails up. Will continue 
to follow plan of care.

## 2019-01-16 NOTE — GENERAL PROGRESS NOTE
Assessment/Plan


Problem List:  


(1) Encephalopathy chronic


ICD Codes:  G93.49 - Other encephalopathy


SNOMED:  55744125


Assessment/Plan


dc ativan 


increase seroquel


cont restraints.





Subjective


Neurologic/Psychiatric:  Reports: anxiety, depressed, emotional problems


Allergies:  


Coded Allergies:  


     No Known Allergies (Unverified , 11/26/18)


Subjective


in restraints agitated





Objective





Last 24 Hour Vital Signs








  Date Time  Temp Pulse Resp B/P (MAP) Pulse Ox O2 Delivery O2 Flow Rate FiO2


 


1/16/19 20:47    146/72    


 


1/16/19 19:30  83 17     30


 


1/16/19 16:55  89 22     40


 


1/16/19 16:00  87      


 


1/16/19 16:00        40


 


1/16/19 16:00 99.0 84 21 112/55 (74) 98   


 


1/16/19 16:00      Mechanical Ventilator  





      Mechanical Ventilator  


 


1/16/19 14:48  98 11     40


 


1/16/19 13:20  90 21     40


 


1/16/19 12:00        40


 


1/16/19 12:00      Mechanical Ventilator  





      Mechanical Ventilator  


 


1/16/19 12:00 98.6 79 21 137/31 (66) 98   


 


1/16/19 11:24  85      


 


1/16/19 10:51  87 16     40


 


1/16/19 08:48  95 18     40


 


1/16/19 08:42    110/69    


 


1/16/19 08:00        40


 


1/16/19 08:00      Mechanical Ventilator  





      Mechanical Ventilator  


 


1/16/19 08:00 98.6 99 24 110/69 (83) 95   


 


1/16/19 07:43  100      


 


1/16/19 06:53  97 12     40


 


1/16/19 05:25  99 20     40


 


1/16/19 04:00 97.4 88 19 144/76 (98) 99   


 


1/16/19 04:00      Mechanical Ventilator  





      Mechanical Ventilator  


 


1/16/19 04:00  80      


 


1/16/19 04:00        40


 


1/16/19 03:10  80 17     40


 


1/16/19 00:53  78 15     40


 


1/16/19 00:00 98.7 88 17 151/81 (104) 100   


 


1/16/19 00:00      Mechanical Ventilator  





      Mechanical Ventilator  


 


1/16/19 00:00        40


 


1/16/19 00:00  88      


 


1/15/19 22:55  102 18     40


 


1/15/19 21:30  104 18     40


 


1/15/19 21:10 98.3       

















Intake and Output  


 


 1/15/19 1/16/19





 19:00 07:00


 


Intake Total 615 ml 80 ml


 


Balance 615 ml 80 ml


 


  


 


Free Water 60 ml 


 


IV Total 115 ml 


 


Tube Feeding 440 ml 80 ml


 


# Voids 2 


 


# Bowel Movements 100 100








Laboratory Tests


1/16/19 04:00: 


White Blood Count 9.4, Red Blood Count 2.77L, Hemoglobin 8.5L, Hematocrit 26.6L

, Mean Corpuscular Volume 96, Mean Corpuscular Hemoglobin 30.6, Mean 

Corpuscular Hemoglobin Concent 31.9L, Red Cell Distribution Width 15.8H, 

Platelet Count 437, Mean Platelet Volume 5.4L, Neutrophils (%) (Auto) 80.0H, 

Lymphocytes (%) (Auto) 6.5L, Monocytes (%) (Auto) 9.3, Eosinophils (%) (Auto) 

2.9, Basophils (%) (Auto) 1.4, Sodium Level 135L, Potassium Level 4.1, Chloride 

Level 98, Carbon Dioxide Level 32, Anion Gap 5, Blood Urea Nitrogen 44H, 

Creatinine 1.8H, Estimat Glomerular Filtration Rate , Glucose Level 90, Calcium 

Level 9.6


1/16/19 16:30: Stool Occult Blood [Pending]


Height (Feet):  5


Height (Inches):  2.00


Weight (Pounds):  122


General Appearance:  confused, moderate distress, agitated











Cass Levi MD Jan 16, 2019 21:03

## 2019-01-16 NOTE — NUR
NURSE NOTES:

Slept well.Pos. chg q2hrs.Bathe,linen chg.Blood draw for cbc/bmp spec.to 
Lab.Cont.Monitoring.Sanya.Vent settings.Suctioned PRN.P.OX-%

## 2019-01-16 NOTE — NUR
NURSE NOTES:



Informed Dr. Levi if she can renew the Ativan 0.5mg GT Q6H PRN for anxiety because 
patient is still agitated and anxious. She stated that she changed it to Seroquel and to 
increase it to 25mg, but Seroquel is already 25mg q6H.

## 2019-01-16 NOTE — NUR
NURSE NOTES:



Called [facility] and spoke to Luoise to confirm that they will be coming today to do the dialysis. 
Awaiting for confirmation from HD Nurse.

## 2019-01-16 NOTE — GI PROGRESS NOTE
Assessment/Plan


Problems:  


(1) Encounter for PEG (percutaneous endoscopic gastrostomy)


ICD Codes:  Z43.1 - Encounter for attention to gastrostomy


SNOMED:  663665433, 124529537


(2) Severe malnutrition


ICD Codes:  E43 - Unspecified severe protein-calorie malnutrition


SNOMED:  03737367


(3) Dehydration


ICD Codes:  E86.0 - Dehydration


SNOMED:  11635545


Status:  unchanged


Status Narrative


Discussed with Dr. Lam.


Assessment/Plan


Assessment


- Dysphagia


- Resp failure


- ESRD


- Anemia


- Status post PEG, tolerating feeds





Recommendations


1. Abdominal binder.


2. Elevate the head of the bed at all times.


3. G-tube flush.


4. G-tube care.


5. tube feeding later today per RD to goal


Monitor H&H, PRN transfusions


PPI


Electrolyte correction


Follow-up labs





The patient was seen and examined at bedside and all new and available data was 

reviewed in the patients chart. I agree with the above findings, impression 

and plan.  (Patient seen earlier today. Signature stamp does not reflect 

patient encounter time.). - Elliott Lam MD





Subjective


Subjective


limited





Objective





Last 24 Hour Vital Signs








  Date Time  Temp Pulse Resp B/P (MAP) Pulse Ox O2 Delivery O2 Flow Rate FiO2


 


1/16/19 10:51  87 16     40


 


1/16/19 08:48  95 18     40


 


1/16/19 08:42    110/69    


 


1/16/19 08:00        40


 


1/16/19 08:00      Mechanical Ventilator  





      Mechanical Ventilator  


 


1/16/19 08:00 98.6 99 24 110/69 (83) 95   


 


1/16/19 07:43  100      


 


1/16/19 06:53  97 12     40


 


1/16/19 05:25  99 20     40


 


1/16/19 04:00 97.4 88 19 144/76 (98) 99   


 


1/16/19 04:00      Mechanical Ventilator  





      Mechanical Ventilator  


 


1/16/19 04:00  80      


 


1/16/19 04:00        40


 


1/16/19 03:10  80 17     40


 


1/16/19 00:53  78 15     40


 


1/16/19 00:00 98.7 88 17 151/81 (104) 100   


 


1/16/19 00:00      Mechanical Ventilator  





      Mechanical Ventilator  


 


1/16/19 00:00        40


 


1/16/19 00:00  88      


 


1/15/19 22:55  102 18     40


 


1/15/19 21:30  104 18     40


 


1/15/19 21:10 98.3       


 


1/15/19 20:34    145/73    


 


1/15/19 20:00      Mechanical Ventilator  





      Mechanical Ventilator  


 


1/15/19 20:00  115      


 


1/15/19 20:00 98.4 115 18 145/73 (97) 98   


 


1/15/19 20:00        40


 


1/15/19 19:29  109 20     40


 


1/15/19 17:15  75 16     40


 


1/15/19 16:00      Mechanical Ventilator  





      Mechanical Ventilator  


 


1/15/19 16:00        40


 


1/15/19 16:00 97.9 115 20 144/70 (94) 100   


 


1/15/19 15:41  114      


 


1/15/19 15:21  122 19     40


 


1/15/19 13:47  134 18     40


 


1/15/19 12:00 98.1 70 22 130/42 (71) 100   


 


1/15/19 12:00        40


 


1/15/19 12:00      Mechanical Ventilator  





      Mechanical Ventilator  


 


1/15/19 11:51  117      

















Intake and Output  


 


 1/15/19 1/16/19





 18:59 06:59


 


Intake Total 655 ml 40 ml


 


Balance 655 ml 40 ml


 


  


 


Free Water 60 ml 


 


IV Total 115 ml 


 


Tube Feeding 480 ml 40 ml


 


# Voids 2 


 


# Bowel Movements 100 100











Laboratory Tests








Test


  1/15/19


18:00 1/16/19


04:00


 


Stool Occult Blood


  Negative


(NEGATIVE) 


 


 


White Blood Count


  


  9.4 K/UL


(4.8-10.8)


 


Red Blood Count


  


  2.77 M/UL


(4.20-5.40)  L


 


Hemoglobin


  


  8.5 G/DL


(12.0-16.0)  L


 


Hematocrit


  


  26.6 %


(37.0-47.0)  L


 


Mean Corpuscular Volume  96 FL (80-99)  


 


Mean Corpuscular Hemoglobin


  


  30.6 PG


(27.0-31.0)


 


Mean Corpuscular Hemoglobin


Concent 


  31.9 G/DL


(32.0-36.0)  L


 


Red Cell Distribution Width


  


  15.8 %


(11.6-14.8)  H


 


Platelet Count


  


  437 K/UL


(150-450)


 


Mean Platelet Volume


  


  5.4 FL


(6.5-10.1)  L


 


Neutrophils (%) (Auto)


  


  80.0 %


(45.0-75.0)  H


 


Lymphocytes (%) (Auto)


  


  6.5 %


(20.0-45.0)  L


 


Monocytes (%) (Auto)


  


  9.3 %


(1.0-10.0)


 


Eosinophils (%) (Auto)


  


  2.9 %


(0.0-3.0)


 


Basophils (%) (Auto)


  


  1.4 %


(0.0-2.0)


 


Sodium Level


  


  135 MMOL/L


(136-145)  L


 


Potassium Level


  


  4.1 MMOL/L


(3.5-5.1)


 


Chloride Level


  


  98 MMOL/L


()


 


Carbon Dioxide Level


  


  32 MMOL/L


(21-32)


 


Anion Gap


  


  5 mmol/L


(5-15)


 


Blood Urea Nitrogen


  


  44 mg/dL


(7-18)  H


 


Creatinine


  


  1.8 MG/DL


(0.55-1.30)  H


 


Estimat Glomerular Filtration


Rate 


   mL/min (>60)  


 


 


Glucose Level


  


  90 MG/DL


()


 


Calcium Level


  


  9.6 MG/DL


(8.5-10.1)








Height (Feet):  5


Height (Inches):  2.00


Weight (Pounds):  122


General Appearance:  no apparent distress


Cardiovascular:  normal rate


Respiratory/Chest:  other - Mechanical ventilator


Abdominal Exam:  GT site - Clean dry and intact











Shane Lowry NP Jan 16, 2019 11:37

## 2019-01-16 NOTE — NUR
NURSE NOTES:

Received report form Radha Alexander RN, pt. in bed awake, alert to name, non-verbal, no signs or 
symptoms of acute cardiac or respiratory distress noted, bed in lowest position and call 
light within easy reach, bed alarm on, side rails up x's3, safety brakes engaged, bed alarm 
on, cardiac monitoring on, pt. appears to be tolerating current vent settings well- AC 8, TV 
450, FIo2 at 40% and peep of 5, nephro running via g tube at 40cc/hr at goal - no residual 
noted, rectal tube intact and draining to gravity, pt. is clean and dry, ASHISH midline SL- IV 
intact, VALE shunt for hemodialysis, safety measures continued, will continue, with plan of 
care.

## 2019-01-16 NOTE — PULMONOLOGY PROGRESS NOTE
Assessment/Plan


Assessment/Plan


IMPRESSION:


1. Hypotension. Resolved.


2. Sepsis. Resolved


3. Atrial fibrillation.


4. Bradycardia.


5. Diabetes mellitus.


6. End-stage renal disease on dialysis.


7. Left lung collapse. Resolved


8. Respiratory failure. S/p tracheostomy


9. Dysphagia.


10. Dementia.


11. Anemia








DISCUSSION:


1. Continue medications.


2. On amiodarone


3. Status post tracheostomy


4. S/p G-tube 


5. Continue HD














  ______________________________________________


  Quentin Cardoso M.D.





Subjective


Interval Events:  none new


HEENT:  Repors: no symptoms


Cardiovascular:  Reports: no symptoms


Gastrointestinal/Abdominal:  Reports: no symptoms


Genitourinary:  Reports: no symptoms


Allergies:  


Coded Allergies:  


     No Known Allergies (Unverified , 11/26/18)





Objective





Last 24 Hour Vital Signs








  Date Time  Temp Pulse Resp B/P (MAP) Pulse Ox O2 Delivery O2 Flow Rate FiO2


 


1/16/19 10:51  87 16     40


 


1/16/19 08:48  95 18     40


 


1/16/19 08:42    110/69    


 


1/16/19 08:00        40


 


1/16/19 08:00      Mechanical Ventilator  





      Mechanical Ventilator  


 


1/16/19 08:00 98.6 99 24 110/69 (83) 95   


 


1/16/19 07:43  100      


 


1/16/19 06:53  97 12     40


 


1/16/19 05:25  99 20     40


 


1/16/19 04:00 97.4 88 19 144/76 (98) 99   


 


1/16/19 04:00      Mechanical Ventilator  





      Mechanical Ventilator  


 


1/16/19 04:00  80      


 


1/16/19 04:00        40


 


1/16/19 03:10  80 17     40


 


1/16/19 00:53  78 15     40


 


1/16/19 00:00 98.7 88 17 151/81 (104) 100   


 


1/16/19 00:00      Mechanical Ventilator  





      Mechanical Ventilator  


 


1/16/19 00:00        40


 


1/16/19 00:00  88      


 


1/15/19 22:55  102 18     40


 


1/15/19 21:30  104 18     40


 


1/15/19 21:10 98.3       


 


1/15/19 20:34    145/73    


 


1/15/19 20:00      Mechanical Ventilator  





      Mechanical Ventilator  


 


1/15/19 20:00  115      


 


1/15/19 20:00 98.4 115 18 145/73 (97) 98   


 


1/15/19 20:00        40


 


1/15/19 19:29  109 20     40


 


1/15/19 17:15  75 16     40


 


1/15/19 16:00      Mechanical Ventilator  





      Mechanical Ventilator  


 


1/15/19 16:00        40


 


1/15/19 16:00 97.9 115 20 144/70 (94) 100   


 


1/15/19 15:41  114      


 


1/15/19 15:21  122 19     40


 


1/15/19 13:47  134 18     40

















Intake and Output  


 


 1/15/19 1/16/19





 19:00 07:00


 


Intake Total 615 ml 80 ml


 


Balance 615 ml 80 ml


 


  


 


Free Water 60 ml 


 


IV Total 115 ml 


 


Tube Feeding 440 ml 80 ml


 


# Voids 2 


 


# Bowel Movements 100 100








General Appearance:  no acute distress


Respiratory/Chest:  chest wall non-tender, lungs clear


Cardiovascular:  normal peripheral pulses


Laboratory Tests


1/15/19 18:00: Stool Occult Blood Negative


1/16/19 04:00: 


White Blood Count 9.4, Red Blood Count 2.77L, Hemoglobin 8.5L, Hematocrit 26.6L

, Mean Corpuscular Volume 96, Mean Corpuscular Hemoglobin 30.6, Mean 

Corpuscular Hemoglobin Concent 31.9L, Red Cell Distribution Width 15.8H, 

Platelet Count 437, Mean Platelet Volume 5.4L, Neutrophils (%) (Auto) 80.0H, 

Lymphocytes (%) (Auto) 6.5L, Monocytes (%) (Auto) 9.3, Eosinophils (%) (Auto) 

2.9, Basophils (%) (Auto) 1.4, Sodium Level 135L, Potassium Level 4.1, Chloride 

Level 98, Carbon Dioxide Level 32, Anion Gap 5, Blood Urea Nitrogen 44H, 

Creatinine 1.8H, Estimat Glomerular Filtration Rate , Glucose Level 90, Calcium 

Level 9.6





Current Medications








 Medications


  (Trade)  Dose


 Ordered  Sig/Tony


 Route


 PRN Reason  Start Time


 Stop Time Status Last Admin


Dose Admin


 


 Acetaminophen


  (Tylenol)  650 mg  Q6H  PRN


 GT


 Mild Pain/Temp > 100.5  12/23/18 16:00


 1/20/19 15:59  1/10/19 08:49


 


 


 Acetaminophen/


 Hydrocodone Bitart


  (Norco 5/325)  1 tab  Q6H  PRN


 GT


 For Pain  1/15/19 16:45


 1/22/19 16:44  1/15/19 20:37


 


 


 Amiodarone HCl


  (Cordarone)  200 mg  DAILY


 GT


   1/2/19 09:00


 1/26/19 20:59  1/16/19 08:42


 


 


 Apixaban


  (Eliquis)  2.5 mg  BID


 GT


   12/27/18 18:00


 1/26/19 17:59  1/16/19 08:42


 


 


 Artificial Tears


  (Akwa-Tears)  1 drop  Q4H  PRN


 BOTH EYES


 Dry Eyes  12/23/18 15:30


 1/21/19 15:29  1/1/19 16:53


 


 


 Chlorhexidine


 Gluconate


  (Juana-Hex 2%)  1 applic  DAILY@2000


 TOPIC


   12/30/18 20:00


 1/29/19 19:59  1/15/19 19:40


 


 


 Collagenase


  (Santyl)  1 applic  DAILY


 TOPIC


   1/7/19 21:00


 2/6/19 08:59  1/16/19 08:43


 


 


 Epoetin Rupesh


  (Procrit (for


 ESRD on dialysis))  10,000 units  MON-WED-FRI


 SUBQ


   1/14/19 21:00


 2/13/19 20:59  1/14/19 20:34


 


 


 Iron Sucrose 100


 mg/Sodium Chloride  60 ml @ 


 240 mls/hr  DAILY


 IV


   1/15/19 09:00


 1/17/19 09:14  1/16/19 09:30


 


 


 Lansoprazole


  (Prevacid)  30 mg  Q12HR


 GT


   12/27/18 21:00


 1/26/19 20:59  1/16/19 08:42


 


 


 Meropenem 500 mg/


 Sodium Chloride  55 ml @ 


 110 mls/hr  DAILY


 IVPB


   1/16/19 09:00


 1/17/19 23:59  1/16/19 08:23


 


 


 Minoxidil


  (Loniten)  2.5 mg  Q12HR


 GT


   12/27/18 21:00


 1/26/19 20:59  1/16/19 08:42


 


 


 Quetiapine


 Fumarate


  (SEROquel)  25 mg  EVERY 6  HOURS


 ORAL


   1/16/19 00:00


 2/15/19 00:00  1/16/19 11:13


 


 


 Sodium Chloride  1,000 ml @ 


 500 mls/hr  Q2H PRN


 IVLG


 sbp<90 during hd  1/15/19 16:43


 2/14/19 16:42   


 


 


 Sodium Chloride


  (Ocean Nasal


 Spray)  1 spray  Q4H  PRN


 NASAL


 dry nose  12/23/18 14:30


 1/21/19 18:29  1/12/19 08:35


 


 


 Zolpidem Tartrate


  (Ambien)  5 mg  HSPRN  PRN


 ORAL


 Insomnia  1/11/19 08:00


 1/18/19 07:59  1/14/19 04:12


 

















Quentin Cardoso MD Jan 16, 2019 12:43

## 2019-01-16 NOTE — INFECTIOUS DISEASES PROG NOTE
Assessment/Plan


Assessment/Plan


antibiotics : meropenem





A


1. e.coli UTI


2. pseudomonas pneumonia


3. leucocytosis resolved


4. respiratory failure s/p tracheostomy


5. renal failure


6. decubitus ulcers


7. nasal MRSA colonization


8. rectal VRE colonization


9. pleural effusion





P


1. continue iv meropenem 1 more day


2. will follow up cultures





Subjective


ROS Limited/Unobtainable:  Yes


Allergies:  


Coded Allergies:  


     No Known Allergies (Unverified , 11/26/18)





Objective


Vital Signs





Last 24 Hour Vital Signs








  Date Time  Temp Pulse Resp B/P (MAP) Pulse Ox O2 Delivery O2 Flow Rate FiO2


 


1/16/19 08:48  95 18     40


 


1/16/19 08:42    110/69    


 


1/16/19 08:00        40


 


1/16/19 08:00      Mechanical Ventilator  





      Mechanical Ventilator  


 


1/16/19 08:00 98.6 99 24 110/69 (83) 95   


 


1/16/19 07:43  100      


 


1/16/19 06:53  97 12     40


 


1/16/19 05:25  99 20     40


 


1/16/19 04:00 97.4 88 19 144/76 (98) 99   


 


1/16/19 04:00      Mechanical Ventilator  





      Mechanical Ventilator  


 


1/16/19 04:00  80      


 


1/16/19 04:00        40


 


1/16/19 03:10  80 17     40


 


1/16/19 00:53  78 15     40


 


1/16/19 00:00 98.7 88 17 151/81 (104) 100   


 


1/16/19 00:00      Mechanical Ventilator  





      Mechanical Ventilator  


 


1/16/19 00:00        40


 


1/16/19 00:00  88      


 


1/15/19 22:55  102 18     40


 


1/15/19 21:30  104 18     40


 


1/15/19 21:10 98.3       


 


1/15/19 20:34    145/73    


 


1/15/19 20:00      Mechanical Ventilator  





      Mechanical Ventilator  


 


1/15/19 20:00  115      


 


1/15/19 20:00 98.4 115 18 145/73 (97) 98   


 


1/15/19 20:00        40


 


1/15/19 19:29  109 20     40


 


1/15/19 17:15  75 16     40


 


1/15/19 16:00      Mechanical Ventilator  





      Mechanical Ventilator  


 


1/15/19 16:00        40


 


1/15/19 16:00 97.9 115 20 144/70 (94) 100   


 


1/15/19 15:41  114      


 


1/15/19 15:21  122 19     40


 


1/15/19 13:47  134 18     40


 


1/15/19 12:00 98.1 70 22 130/42 (71) 100   


 


1/15/19 12:00        40


 


1/15/19 12:00      Mechanical Ventilator  





      Mechanical Ventilator  


 


1/15/19 11:51  117      


 


1/15/19 11:03  110 23     40








Height (Feet):  5


Height (Inches):  2.00


Weight (Pounds):  122


HEENT:  status post trach


Respiratory/Chest:  lungs clear


Cardiovascular:  normal rate, regular rhythm, no gallop/murmur


Abdomen:  soft, non tender, other - GT


Extremities:  no edema, other - right arm PICC





Laboratory Tests








Test


  1/15/19


18:00 1/16/19


04:00


 


Stool Occult Blood Pending   


 


White Blood Count


  


  9.4 K/UL


(4.8-10.8)


 


Red Blood Count


  


  2.77 M/UL


(4.20-5.40)  L


 


Hemoglobin


  


  8.5 G/DL


(12.0-16.0)  L


 


Hematocrit


  


  26.6 %


(37.0-47.0)  L


 


Mean Corpuscular Volume  96 FL (80-99)  


 


Mean Corpuscular Hemoglobin


  


  30.6 PG


(27.0-31.0)


 


Mean Corpuscular Hemoglobin


Concent 


  31.9 G/DL


(32.0-36.0)  L


 


Red Cell Distribution Width


  


  15.8 %


(11.6-14.8)  H


 


Platelet Count


  


  437 K/UL


(150-450)


 


Mean Platelet Volume


  


  5.4 FL


(6.5-10.1)  L


 


Neutrophils (%) (Auto)


  


  80.0 %


(45.0-75.0)  H


 


Lymphocytes (%) (Auto)


  


  6.5 %


(20.0-45.0)  L


 


Monocytes (%) (Auto)


  


  9.3 %


(1.0-10.0)


 


Eosinophils (%) (Auto)


  


  2.9 %


(0.0-3.0)


 


Basophils (%) (Auto)


  


  1.4 %


(0.0-2.0)


 


Sodium Level


  


  135 MMOL/L


(136-145)  L


 


Potassium Level


  


  4.1 MMOL/L


(3.5-5.1)


 


Chloride Level


  


  98 MMOL/L


()


 


Carbon Dioxide Level


  


  32 MMOL/L


(21-32)


 


Anion Gap


  


  5 mmol/L


(5-15)


 


Blood Urea Nitrogen


  


  44 mg/dL


(7-18)  H


 


Creatinine


  


  1.8 MG/DL


(0.55-1.30)  H


 


Estimat Glomerular Filtration


Rate 


   mL/min (>60)  


 


 


Glucose Level


  


  90 MG/DL


()


 


Calcium Level


  


  9.6 MG/DL


(8.5-10.1)











Current Medications








 Medications


  (Trade)  Dose


 Ordered  Sig/Tony


 Route


 PRN Reason  Start Time


 Stop Time Status Last Admin


Dose Admin


 


 Acetaminophen


  (Tylenol)  650 mg  Q6H  PRN


 GT


 Mild Pain/Temp > 100.5  12/23/18 16:00


 1/20/19 15:59  1/10/19 08:49


 


 


 Acetaminophen/


 Hydrocodone Bitart


  (Norco 5/325)  1 tab  Q6H  PRN


 GT


 For Pain  1/15/19 16:45


 1/22/19 16:44  1/15/19 20:37


 


 


 Amiodarone HCl


  (Cordarone)  200 mg  DAILY


 GT


   1/2/19 09:00


 1/26/19 20:59  1/16/19 08:42


 


 


 Apixaban


  (Eliquis)  2.5 mg  BID


 GT


   12/27/18 18:00


 1/26/19 17:59  1/16/19 08:42


 


 


 Artificial Tears


  (Akwa-Tears)  1 drop  Q4H  PRN


 BOTH EYES


 Dry Eyes  12/23/18 15:30


 1/21/19 15:29  1/1/19 16:53


 


 


 Chlorhexidine


 Gluconate


  (Juana-Hex 2%)  1 applic  DAILY@2000


 TOPIC


   12/30/18 20:00


 1/29/19 19:59  1/15/19 19:40


 


 


 Collagenase


  (Santyl)  1 applic  DAILY


 TOPIC


   1/7/19 21:00


 2/6/19 08:59  1/16/19 08:43


 


 


 Epoetin Rupesh


  (Procrit (for


 ESRD on dialysis))  10,000 units  MON-WED-FRI


 SUBQ


   1/14/19 21:00


 2/13/19 20:59  1/14/19 20:34


 


 


 Iron Sucrose 100


 mg/Sodium Chloride  60 ml @ 


 240 mls/hr  DAILY


 IV


   1/15/19 09:00


 1/17/19 09:14  1/16/19 09:30


 


 


 Lansoprazole


  (Prevacid)  30 mg  Q12HR


 GT


   12/27/18 21:00


 1/26/19 20:59  1/16/19 08:42


 


 


 Meropenem 500 mg/


 Sodium Chloride  55 ml @ 


 110 mls/hr  DAILY


 IVPB


   1/16/19 09:00


 1/17/19 23:59  1/16/19 08:23


 


 


 Minoxidil


  (Loniten)  2.5 mg  Q12HR


 GT


   12/27/18 21:00


 1/26/19 20:59  1/16/19 08:42


 


 


 Quetiapine


 Fumarate


  (SEROquel)  25 mg  EVERY 6  HOURS


 ORAL


   1/16/19 00:00


 2/15/19 00:00  1/16/19 06:26


 


 


 Sodium Chloride  1,000 ml @ 


 500 mls/hr  Q2H PRN


 IVLG


 sbp<90 during hd  1/15/19 16:43


 2/14/19 16:42   


 


 


 Sodium Chloride


  (Ocean Nasal


 Spray)  1 spray  Q4H  PRN


 NASAL


 dry nose  12/23/18 14:30


 1/21/19 18:29  1/12/19 08:35


 


 


 Zolpidem Tartrate


  (Ambien)  5 mg  HSPRN  PRN


 ORAL


 Insomnia  1/11/19 08:00


 1/18/19 07:59  1/14/19 04:12


 

















Chase Santillan MD Jan 16, 2019 10:40

## 2019-01-16 NOTE — NEPHROLOGY PROGRESS NOTE
Assessment/Plan


Assessment


Seee below


Plan





ESRD HD q MWF 


Anemia of CKD , SAMANTHA high dose. 





A. 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 with LAE. LVEF 65% . On Eliquis.








Referred to [facility]. Pt's son agreeable to [facility]. Referred again.


See orders.


Still needs extensive Vascular W/U.





Subjective


Subjective


Very anxious!





Objective


Objective





Last 24 Hour Vital Signs








  Date Time  Temp Pulse Resp B/P (MAP) Pulse Ox O2 Delivery O2 Flow Rate FiO2


 


1/16/19 10:51  87 16     40


 


1/16/19 08:48  95 18     40


 


1/16/19 08:42    110/69    


 


1/16/19 08:00        40


 


1/16/19 08:00      Mechanical Ventilator  





      Mechanical Ventilator  


 


1/16/19 08:00 98.6 99 24 110/69 (83) 95   


 


1/16/19 07:43  100      


 


1/16/19 06:53  97 12     40


 


1/16/19 05:25  99 20     40


 


1/16/19 04:00 97.4 88 19 144/76 (98) 99   


 


1/16/19 04:00      Mechanical Ventilator  





      Mechanical Ventilator  


 


1/16/19 04:00  80      


 


1/16/19 04:00        40


 


1/16/19 03:10  80 17     40


 


1/16/19 00:53  78 15     40


 


1/16/19 00:00 98.7 88 17 151/81 (104) 100   


 


1/16/19 00:00      Mechanical Ventilator  





      Mechanical Ventilator  


 


1/16/19 00:00        40


 


1/16/19 00:00  88      


 


1/15/19 22:55  102 18     40


 


1/15/19 21:30  104 18     40


 


1/15/19 21:10 98.3       


 


1/15/19 20:34    145/73    


 


1/15/19 20:00      Mechanical Ventilator  





      Mechanical Ventilator  


 


1/15/19 20:00  115      


 


1/15/19 20:00 98.4 115 18 145/73 (97) 98   


 


1/15/19 20:00        40


 


1/15/19 19:29  109 20     40


 


1/15/19 17:15  75 16     40


 


1/15/19 16:00      Mechanical Ventilator  





      Mechanical Ventilator  


 


1/15/19 16:00        40


 


1/15/19 16:00 97.9 115 20 144/70 (94) 100   


 


1/15/19 15:41  114      


 


1/15/19 15:21  122 19     40


 


1/15/19 13:47  134 18     40

















Intake and Output  


 


 1/15/19 1/16/19





 19:00 07:00


 


Intake Total 615 ml 80 ml


 


Balance 615 ml 80 ml


 


  


 


Free Water 60 ml 


 


IV Total 115 ml 


 


Tube Feeding 440 ml 80 ml


 


# Voids 2 


 


# Bowel Movements 100 100








Laboratory Tests


1/15/19 18:00: Stool Occult Blood Negative


1/16/19 04:00: 


White Blood Count 9.4, Red Blood Count 2.77L, Hemoglobin 8.5L, Hematocrit 26.6L

, Mean Corpuscular Volume 96, Mean Corpuscular Hemoglobin 30.6, Mean 

Corpuscular Hemoglobin Concent 31.9L, Red Cell Distribution Width 15.8H, 

Platelet Count 437, Mean Platelet Volume 5.4L, Neutrophils (%) (Auto) 80.0H, 

Lymphocytes (%) (Auto) 6.5L, Monocytes (%) (Auto) 9.3, Eosinophils (%) (Auto) 

2.9, Basophils (%) (Auto) 1.4, Sodium Level 135L, Potassium Level 4.1, Chloride 

Level 98, Carbon Dioxide Level 32, Anion Gap 5, Blood Urea Nitrogen 44H, 

Creatinine 1.8H, Estimat Glomerular Filtration Rate , Glucose Level 90, Calcium 

Level 9.6


Height (Feet):  5


Height (Inches):  2.00


Weight (Pounds):  122


Objective


On vent.


Trach clean.


Cv IRR/IRR


Lungs B ronchi.


Abd SNT. BS +  PEG.


E Rt. foot diabetic ulcers











Gavino Daigle MD Jan 16, 2019 12:45

## 2019-01-16 NOTE — NUR
NURSE NOTES:



Did not receive call back from HD nurse. Called [facility] again and spoke to Jen and she said she 
will relay the message to the HD nurse. Awaiting for call back.

## 2019-01-17 VITALS — DIASTOLIC BLOOD PRESSURE: 54 MMHG | SYSTOLIC BLOOD PRESSURE: 95 MMHG

## 2019-01-17 VITALS — SYSTOLIC BLOOD PRESSURE: 103 MMHG | DIASTOLIC BLOOD PRESSURE: 72 MMHG

## 2019-01-17 VITALS — SYSTOLIC BLOOD PRESSURE: 137 MMHG | DIASTOLIC BLOOD PRESSURE: 70 MMHG

## 2019-01-17 VITALS — DIASTOLIC BLOOD PRESSURE: 68 MMHG | SYSTOLIC BLOOD PRESSURE: 118 MMHG

## 2019-01-17 LAB
ADD MANUAL DIFF: YES
ANION GAP SERPL CALC-SCNC: 3 MMOL/L (ref 5–15)
BUN SERPL-MCNC: 29 MG/DL (ref 7–18)
CALCIUM SERPL-MCNC: 9.2 MG/DL (ref 8.5–10.1)
CHLORIDE SERPL-SCNC: 102 MMOL/L (ref 98–107)
CO2 SERPL-SCNC: 31 MMOL/L (ref 21–32)
CREAT SERPL-MCNC: 1.5 MG/DL (ref 0.55–1.3)
ERYTHROCYTE [DISTWIDTH] IN BLOOD BY AUTOMATED COUNT: 16.3 % (ref 11.6–14.8)
HCT VFR BLD CALC: 24.9 % (ref 37–47)
HGB BLD-MCNC: 7.8 G/DL (ref 12–16)
MCV RBC AUTO: 96 FL (ref 80–99)
PLATELET # BLD: 399 K/UL (ref 150–450)
POTASSIUM SERPL-SCNC: 4 MMOL/L (ref 3.5–5.1)
RBC # BLD AUTO: 2.61 M/UL (ref 4.2–5.4)
SODIUM SERPL-SCNC: 136 MMOL/L (ref 136–145)
WBC # BLD AUTO: 10.1 K/UL (ref 4.8–10.8)

## 2019-01-17 RX ADMIN — APIXABAN SCH MG: 2.5 TABLET, FILM COATED ORAL at 08:57

## 2019-01-17 RX ADMIN — AMIODARONE HYDROCHLORIDE SCH MG: 200 TABLET ORAL at 08:57

## 2019-01-17 RX ADMIN — MEROPENEM SCH MLS/HR: 500 INJECTION INTRAVENOUS at 08:59

## 2019-01-17 RX ADMIN — SODIUM CHLORIDE SCH MLS/HR: 0.9 INJECTION INTRAVENOUS at 08:59

## 2019-01-17 RX ADMIN — MINOXIDIL SCH MG: 2.5 TABLET ORAL at 08:58

## 2019-01-17 RX ADMIN — APIXABAN SCH MG: 2.5 TABLET, FILM COATED ORAL at 17:30

## 2019-01-17 RX ADMIN — HYDROCODONE BITARTRATE AND ACETAMINOPHEN PRN TAB: 5; 325 TABLET ORAL at 08:59

## 2019-01-17 NOTE — NUR
HAND-OFF: 

Report given to ambulance personel with [facility]. Patient midline was removed per Dr Daigle. Patient VS stable at this time with no sign of acute distress at this time. 
Patient son at the bedside and will follow ambulance to facility. Patient disconeted from 
tube feeding at this time. Patient given 6pm medications prior to discharge. Patient is 
calm.

## 2019-01-17 NOTE — NEPHROLOGY PROGRESS NOTE
Assessment/Plan


Assessment


Seee below


Plan





ESRD HD q MWF 


Anemia of CKD , SAMANTHA high dose. 





A. 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 with LAE. LVEF 65% . On Eliquis.








Referred to [facility]. Pt's son agreeable to [facility]. Referred again.


See orders.


Still needs extensive Vascular W/U.





Subjective


Subjective


Very anxious!





Objective


Objective





Last 24 Hour Vital Signs








  Date Time  Temp Pulse Resp B/P (MAP) Pulse Ox O2 Delivery O2 Flow Rate FiO2


 


1/17/19 15:56      Mechanical Ventilator  





      Mechanical Ventilator  


 


1/17/19 15:13  82 18     30


 


1/17/19 12:40  76 16     30


 


1/17/19 12:00      Mechanical Ventilator  





      Mechanical Ventilator  


 


1/17/19 11:49  105      


 


1/17/19 10:55  79 17     30


 


1/17/19 09:15  98 16     30


 


1/17/19 08:58    103/47    


 


1/17/19 08:00      Mechanical Ventilator  





      Mechanical Ventilator  


 


1/17/19 06:32  66 13     30


 


1/17/19 05:30  80 15     30


 


1/17/19 04:00      Mechanical Ventilator  





      Mechanical Ventilator  


 


1/17/19 04:00 98.4 86 17 95/54 (68) 95   


 


1/17/19 04:00  82      


 


1/17/19 04:00        40


 


1/17/19 03:00  82 18     30


 


1/17/19 01:30  81 20     30


 


1/17/19 00:00 98.0 81 20 118/68 (85) 96   


 


1/17/19 00:00      Mechanical Ventilator  





      Mechanical Ventilator  


 


1/17/19 00:00        40


 


1/17/19 00:00  88      


 


1/16/19 23:24  80 18     30


 


1/16/19 21:30  82 18     30


 


1/16/19 20:47    146/72    


 


1/16/19 20:00  91      


 


1/16/19 20:00 97.8 91 21 142/82 (102) 96   


 


1/16/19 20:00        40


 


1/16/19 20:00      Mechanical Ventilator  





      Mechanical Ventilator  


 


1/16/19 19:30  83 17     30


 


1/16/19 16:55  89 22     40

















Intake and Output  


 


 1/16/19 1/17/19





 19:00 07:00


 


Intake Total 655 ml 530 ml


 


Output Total 3000 ml 


 


Balance -2345 ml 530 ml


 


  


 


Free Water 60 ml 50 ml


 


IV Total 115 ml 


 


Tube Feeding 480 ml 480 ml


 


Hemodialysis UF 3000 ml 


 


# Voids 1 2


 


# Bowel Movements 50 60








Laboratory Tests


1/16/19 16:30: Stool Occult Blood Positive


1/17/19 03:20: 


White Blood Count 10.1, Red Blood Count 2.61L, Hemoglobin 7.8L, Hematocrit 24.9L

, Mean Corpuscular Volume 96, Mean Corpuscular Hemoglobin 29.9, Mean 

Corpuscular Hemoglobin Concent 31.3L, Red Cell Distribution Width 16.3H, 

Platelet Count 399, Mean Platelet Volume 6.3L, Neutrophils (%) (Auto) , 

Lymphocytes (%) (Auto) , Monocytes (%) (Auto) , Eosinophils (%) (Auto) , 

Basophils (%) (Auto) , Differential Total Cells Counted 100, Neutrophils % (

Manual) 80H, Lymphocytes % (Manual) 13L, Monocytes % (Manual) 6, Eosinophils % (

Manual) 0, Basophils % (Manual) 1, Band Neutrophils 0, Platelet Estimate 

Adequate, Platelet Morphology Normal, Anisocytosis 1+, Sodium Level 136, 

Potassium Level 4.0, Chloride Level 102, Carbon Dioxide Level 31, Anion Gap 3L, 

Blood Urea Nitrogen 29H, Creatinine 1.5H, Estimat Glomerular Filtration Rate , 

Glucose Level 102, Calcium Level 9.2


1/17/19 06:30: Stool Occult Blood Positive


Height (Feet):  5


Height (Inches):  2.00


Weight (Pounds):  122


Objective


On vent.


Trach clean.


Cv IRR/IRR


Lungs B ronchi.


Abd SNT. BS +  PEG.


E Rt. foot diabetic ulcers











Gavino Daigle MD Jan 17, 2019 16:10

## 2019-01-17 NOTE — NUR
NURSE NOTES:

Received patient from [facility]. Patient VS stable at this time. Patient witnessed 
sleeping comfortably in bed. Patient reportedly confused when awake and alert and oriented x 
2-3. Patient is showing sinus rhythm on the monitor. Patient is trach to vent of AC 8, TV 
450, FiO2 40%, and PEEP 5. Patient tolerating setting with stable saturation of 100% at this 
time. Patient has G tube that is patent and running nepro 1.8 at 40cc/hr at this time. 
Patient is oliguric at this time. Patient is incontinent. Patient has rectal tube that is 
patent and asymptomatic at this time. Patient has multiple wounds. All dressings changed on 
previous shift. Patient has a right upper arm midline that is patent and saline locked at 
this time. Patient has a left upper arm shunt that is functional and slightly swollen at 
this time. MD aware. Patient has Hgb of 7.8 this morning. Dr Daigle has been paged. 
Awaiting call back. Patient bed in low position with bed alarm on and call light in reach at 
this time.

## 2019-01-17 NOTE — NUR
CASE MANAGEMENT: ***DCP***NOTE***



UPON DISCHARGE PATIENT WILL TRANSFER TO [facility] [phone] ***longterm*** ROOM 
7B



OUTPATIENT HEMODIALYSIS: [facility] / [facility] [place] [phone]

[address] [place] [address] 



DIALYSIS TRANSPORTATION: [facility] / [facility] [phone]



[facility] SHALONDA DUVALL [phone] IN AGREEMENT WITH PATIENT TRANSFERRING TO THIS FACILITY 



TRANSPORTATION VIA [facility]  ETA 15:00

## 2019-01-17 NOTE — NUR
RESPIRATORY NOTE:Received Patient on  trach Shiley 8 with Vent Settings of ACVC- 8, , 
FIO2 30%, PEEP +5. Tracheostomy tube is secure and patent. Pt is sleeping, no SOB or resp 
distress noted . Bilateral rhonchi heard upon auscultation. Suction minimal amounts of thin 
white/ tan secretions. Vent plugged into red outlet. Alarms are set and audible. Ambu bag is 
at bedside. Will continue to monitor throughout the day.

## 2019-01-17 NOTE — NUR
HAND-OFF: 

Report given to Jen Golden RN, pt. remains stable and no signs of distress noted.

-------------------------------------------------------------------------------

Addendum: 01/17/19 at 0712 by RAGHAVENDRA OWENS RN RN

-------------------------------------------------------------------------------

nurse aware to f/u on hgb trending down.

## 2019-01-17 NOTE — NUR
CASE MANAGEMENT: REVIEW





SI: ESRD ON HD

TRACHEOSTOMY 12/17

PEG PLACEMENT 12/19 

T 98.4 HR 86 RR 17 BP 95/54 SAT 95% MECH VENT FIO2 40

H/H 7.8/24.9 BUN 29 CR 1.5 









IS: MEROPENEM IV QD

AMIODARONE GT QD

MINOXIDIL GT Q12HR 

ELEQUIS GT BID 

GT FEEDING NEPRO @ 40ML/HR

HD PRN 





***STEP DOWN UNIT  STATUS***

DCP: PATIENT WILL TRANSFER TO  [facility].  DIALYSIS CHAIR TIME SET UP WITH [facility].  TRANSPORTATION VIA [facility].

## 2019-01-17 NOTE — INFECTIOUS DISEASES PROG NOTE
Assessment/Plan


Assessment/Plan


A:


leukocytosis 


UTI with ESBL E. Coli treated


Cellulitis of R leg, osteomyelitis treated


Hypercapnic respiratory failure


ESRD on HD


DM


Anemia


Dementia


PVD


R pleural effusion


Mucous plug


s/o Cardiac arrest


Multiple Pressure ulcers


P;


discontinue  Meropenem





Subjective


ROS Limited/Unobtainable:  Yes


Allergies:  


Coded Allergies:  


     No Known Allergies (Unverified , 11/26/18)





Objective


Vital Signs





Last 24 Hour Vital Signs








  Date Time  Temp Pulse Resp B/P (MAP) Pulse Ox O2 Delivery O2 Flow Rate FiO2


 


1/17/19 09:15  98 16     30


 


1/17/19 08:58    103/47    


 


1/17/19 06:32  66 13     30


 


1/17/19 05:30  80 15     30


 


1/17/19 04:00      Mechanical Ventilator  





      Mechanical Ventilator  


 


1/17/19 04:00 98.4 86 17 95/54 (68) 95   


 


1/17/19 04:00  82      


 


1/17/19 04:00        40


 


1/17/19 03:00  82 18     30


 


1/17/19 01:30  81 20     30


 


1/17/19 00:00 98.0 81 20 118/68 (85) 96   


 


1/17/19 00:00      Mechanical Ventilator  





      Mechanical Ventilator  


 


1/17/19 00:00        40


 


1/17/19 00:00  88      


 


1/16/19 23:24  80 18     30


 


1/16/19 21:30  82 18     30


 


1/16/19 20:47    146/72    


 


1/16/19 20:00  91      


 


1/16/19 20:00 97.8 91 21 142/82 (102) 96   


 


1/16/19 20:00        40


 


1/16/19 20:00      Mechanical Ventilator  





      Mechanical Ventilator  


 


1/16/19 19:30  83 17     30


 


1/16/19 16:55  89 22     40


 


1/16/19 16:00  87      


 


1/16/19 16:00        40


 


1/16/19 16:00 99.0 84 21 112/55 (74) 98   


 


1/16/19 16:00      Mechanical Ventilator  





      Mechanical Ventilator  


 


1/16/19 14:48  98 11     40


 


1/16/19 13:20  90 21     40








Height (Feet):  5


Height (Inches):  2.00


Weight (Pounds):  122


General Appearance:  no acute distress


HEENT:  status post trach


Respiratory/Chest:  lungs clear, other - on ventilator


Cardiovascular:  normal rate


Abdomen:  soft, non tender, other - GT feeding


Extremities:  no edema


Neurologic/Psychiatric:  disoriented





Laboratory Tests








Test


  1/16/19


16:30 1/17/19


03:20 1/17/19


06:30


 


Stool Occult Blood


  Positive


(NEGATIVE) 


  Positive


(NEGATIVE)


 


White Blood Count


  


  10.1 K/UL


(4.8-10.8) 


 


 


Red Blood Count


  


  2.61 M/UL


(4.20-5.40)  L 


 


 


Hemoglobin


  


  7.8 G/DL


(12.0-16.0)  L 


 


 


Hematocrit


  


  24.9 %


(37.0-47.0)  L 


 


 


Mean Corpuscular Volume  96 FL (80-99)   


 


Mean Corpuscular Hemoglobin


  


  29.9 PG


(27.0-31.0) 


 


 


Mean Corpuscular Hemoglobin


Concent 


  31.3 G/DL


(32.0-36.0)  L 


 


 


Red Cell Distribution Width


  


  16.3 %


(11.6-14.8)  H 


 


 


Platelet Count


  


  399 K/UL


(150-450) 


 


 


Mean Platelet Volume


  


  6.3 FL


(6.5-10.1)  L 


 


 


Neutrophils (%) (Auto)


  


  % (45.0-75.0)


  


 


 


Lymphocytes (%) (Auto)


  


  % (20.0-45.0)


  


 


 


Monocytes (%) (Auto)   % (1.0-10.0)   


 


Eosinophils (%) (Auto)   % (0.0-3.0)   


 


Basophils (%) (Auto)   % (0.0-2.0)   


 


Differential Total Cells


Counted 


  100  


  


 


 


Neutrophils % (Manual)  80 % (45-75)  H 


 


Lymphocytes % (Manual)  13 % (20-45)  L 


 


Monocytes % (Manual)  6 % (1-10)   


 


Eosinophils % (Manual)  0 % (0-3)   


 


Basophils % (Manual)  1 % (0-2)   


 


Band Neutrophils  0 % (0-8)   


 


Platelet Estimate  Adequate   


 


Platelet Morphology  Normal   


 


Anisocytosis  1+   


 


Sodium Level


  


  136 MMOL/L


(136-145) 


 


 


Potassium Level


  


  4.0 MMOL/L


(3.5-5.1) 


 


 


Chloride Level


  


  102 MMOL/L


() 


 


 


Carbon Dioxide Level


  


  31 MMOL/L


(21-32) 


 


 


Anion Gap


  


  3 mmol/L


(5-15)  L 


 


 


Blood Urea Nitrogen


  


  29 mg/dL


(7-18)  H 


 


 


Creatinine


  


  1.5 MG/DL


(0.55-1.30)  H 


 


 


Estimat Glomerular Filtration


Rate 


   mL/min (>60)  


  


 


 


Glucose Level


  


  102 MG/DL


() 


 


 


Calcium Level


  


  9.2 MG/DL


(8.5-10.1) 


 











Current Medications








 Medications


  (Trade)  Dose


 Ordered  Sig/Tony


 Route


 PRN Reason  Start Time


 Stop Time Status Last Admin


Dose Admin


 


 Acetaminophen


  (Tylenol)  650 mg  Q6H  PRN


 GT


 Mild Pain/Temp > 100.5  12/23/18 16:00


 1/20/19 15:59  1/10/19 08:49


 


 


 Acetaminophen/


 Hydrocodone Bitart


  (Norco 5/325)  1 tab  Q6H  PRN


 GT


 For Pain  1/15/19 16:45


 1/22/19 16:44  1/17/19 08:59


 


 


 Amiodarone HCl


  (Cordarone)  200 mg  DAILY


 GT


   1/2/19 09:00


 1/26/19 20:59  1/17/19 08:57


 


 


 Apixaban


  (Eliquis)  2.5 mg  BID


 GT


   12/27/18 18:00


 1/26/19 17:59  1/17/19 08:57


 


 


 Artificial Tears


  (Akwa-Tears)  1 drop  Q4H  PRN


 BOTH EYES


 Dry Eyes  12/23/18 15:30


 1/21/19 15:29  1/1/19 16:53


 


 


 Chlorhexidine


 Gluconate


  (Juana-Hex 2%)  1 applic  DAILY@2000


 TOPIC


   12/30/18 20:00


 1/29/19 19:59  1/16/19 20:46


 


 


 Collagenase


  (Santyl)  1 applic  DAILY


 TOPIC


   1/7/19 21:00


 2/6/19 08:59  1/17/19 09:00


 


 


 Epoetin Rupesh


  (Procrit (for


 ESRD on dialysis))  10,000 units  MON-WED-FRI


 SUBQ


   1/14/19 21:00


 2/13/19 20:59  1/16/19 20:45


 


 


 Lansoprazole


  (Prevacid)  30 mg  Q12HR


 GT


   12/27/18 21:00


 1/26/19 20:59  1/17/19 08:57


 


 


 Meropenem 500 mg/


 Sodium Chloride  55 ml @ 


 110 mls/hr  DAILY


 IVPB


   1/16/19 09:00


 1/17/19 23:59  1/17/19 08:59


 


 


 Minoxidil


  (Loniten)  2.5 mg  Q12HR


 GT


   12/27/18 21:00


 1/26/19 20:59  1/16/19 20:47


 


 


 Quetiapine


 Fumarate


  (SEROquel)  25 mg  EVERY 6  HOURS


 ORAL


   1/16/19 00:00


 2/15/19 00:00  1/17/19 06:10


 


 


 Sodium Chloride  1,000 ml @ 


 500 mls/hr  Q2H PRN


 IVLG


 sbp<90 during hd  1/15/19 16:43


 2/14/19 16:42   


 


 


 Sodium Chloride


  (Ocean Nasal


 Spray)  1 spray  Q4H  PRN


 NASAL


 dry nose  12/23/18 14:30


 1/21/19 18:29  1/12/19 08:35


 


 


 Zolpidem Tartrate


  (Ambien)  5 mg  HSPRN  PRN


 ORAL


 Insomnia  1/11/19 08:00


 1/18/19 07:59  1/14/19 04:12


 

















Luke Maynard MD Jan 17, 2019 12:07

## 2019-01-17 NOTE — GI PROGRESS NOTE
Assessment/Plan


Problems:  


(1) Encounter for PEG (percutaneous endoscopic gastrostomy)


ICD Codes:  Z43.1 - Encounter for attention to gastrostomy


SNOMED:  078401765, 501397984


(2) Severe malnutrition


ICD Codes:  E43 - Unspecified severe protein-calorie malnutrition


SNOMED:  69638182


(3) Dehydration


ICD Codes:  E86.0 - Dehydration


SNOMED:  89753458


Status:  unchanged


Status Narrative


Discussed with Dr. Lam


Assessment/Plan


Assessment


- Dysphagia


- Resp failure


- ESRD


- Anemia


- Status post PEG, tolerating feeds


OB stool positive now





Recommendations


1. Abdominal binder.


2. Elevate the head of the bed at all times.


3. G-tube flush.


4. G-tube care.


5. tube feeding later today per RD to goal


Monitor H&H, PRN transfusions >> will consider colonoscopy


Increase PPI to twice daily


Electrolyte correction


Follow-up labs





The patient was seen and examined at bedside and all new and available data was 

reviewed in the patients chart. I agree with the above findings, impression 

and plan.  (Patient seen earlier today. Signature stamp does not reflect 

patient encounter time.). - Elliott Lam MD





Subjective


Subjective


limited





Objective





Last 24 Hour Vital Signs








  Date Time  Temp Pulse Resp B/P (MAP) Pulse Ox O2 Delivery O2 Flow Rate FiO2


 


1/17/19 10:55  79 17     30


 


1/17/19 09:15  98 16     30


 


1/17/19 08:58    103/47    


 


1/17/19 06:32  66 13     30


 


1/17/19 05:30  80 15     30


 


1/17/19 04:00      Mechanical Ventilator  





      Mechanical Ventilator  


 


1/17/19 04:00 98.4 86 17 95/54 (68) 95   


 


1/17/19 04:00  82      


 


1/17/19 04:00        40


 


1/17/19 03:00  82 18     30


 


1/17/19 01:30  81 20     30


 


1/17/19 00:00 98.0 81 20 118/68 (85) 96   


 


1/17/19 00:00      Mechanical Ventilator  





      Mechanical Ventilator  


 


1/17/19 00:00        40


 


1/17/19 00:00  88      


 


1/16/19 23:24  80 18     30


 


1/16/19 21:30  82 18     30


 


1/16/19 20:47    146/72    


 


1/16/19 20:00  91      


 


1/16/19 20:00 97.8 91 21 142/82 (102) 96   


 


1/16/19 20:00        40


 


1/16/19 20:00      Mechanical Ventilator  





      Mechanical Ventilator  


 


1/16/19 19:30  83 17     30


 


1/16/19 16:55  89 22     40


 


1/16/19 16:00  87      


 


1/16/19 16:00        40


 


1/16/19 16:00 99.0 84 21 112/55 (74) 98   


 


1/16/19 16:00      Mechanical Ventilator  





      Mechanical Ventilator  


 


1/16/19 14:48  98 11     40


 


1/16/19 13:20  90 21     40

















Intake and Output  


 


 1/16/19 1/17/19





 18:59 06:59


 


Intake Total 655 ml 530 ml


 


Output Total 3000 ml 


 


Balance -2345 ml 530 ml


 


  


 


Free Water 60 ml 50 ml


 


IV Total 115 ml 


 


Tube Feeding 480 ml 480 ml


 


Hemodialysis UF 3000 ml 


 


# Voids 1 2


 


# Bowel Movements 50 60











Laboratory Tests








Test


  1/16/19


16:30 1/17/19


03:20 1/17/19


06:30


 


Stool Occult Blood


  Positive


(NEGATIVE) 


  Positive


(NEGATIVE)


 


White Blood Count


  


  10.1 K/UL


(4.8-10.8) 


 


 


Red Blood Count


  


  2.61 M/UL


(4.20-5.40)  L 


 


 


Hemoglobin


  


  7.8 G/DL


(12.0-16.0)  L 


 


 


Hematocrit


  


  24.9 %


(37.0-47.0)  L 


 


 


Mean Corpuscular Volume  96 FL (80-99)   


 


Mean Corpuscular Hemoglobin


  


  29.9 PG


(27.0-31.0) 


 


 


Mean Corpuscular Hemoglobin


Concent 


  31.3 G/DL


(32.0-36.0)  L 


 


 


Red Cell Distribution Width


  


  16.3 %


(11.6-14.8)  H 


 


 


Platelet Count


  


  399 K/UL


(150-450) 


 


 


Mean Platelet Volume


  


  6.3 FL


(6.5-10.1)  L 


 


 


Neutrophils (%) (Auto)


  


  % (45.0-75.0)


  


 


 


Lymphocytes (%) (Auto)


  


  % (20.0-45.0)


  


 


 


Monocytes (%) (Auto)   % (1.0-10.0)   


 


Eosinophils (%) (Auto)   % (0.0-3.0)   


 


Basophils (%) (Auto)   % (0.0-2.0)   


 


Differential Total Cells


Counted 


  100  


  


 


 


Neutrophils % (Manual)  80 % (45-75)  H 


 


Lymphocytes % (Manual)  13 % (20-45)  L 


 


Monocytes % (Manual)  6 % (1-10)   


 


Eosinophils % (Manual)  0 % (0-3)   


 


Basophils % (Manual)  1 % (0-2)   


 


Band Neutrophils  0 % (0-8)   


 


Platelet Estimate  Adequate   


 


Platelet Morphology  Normal   


 


Anisocytosis  1+   


 


Sodium Level


  


  136 MMOL/L


(136-145) 


 


 


Potassium Level


  


  4.0 MMOL/L


(3.5-5.1) 


 


 


Chloride Level


  


  102 MMOL/L


() 


 


 


Carbon Dioxide Level


  


  31 MMOL/L


(21-32) 


 


 


Anion Gap


  


  3 mmol/L


(5-15)  L 


 


 


Blood Urea Nitrogen


  


  29 mg/dL


(7-18)  H 


 


 


Creatinine


  


  1.5 MG/DL


(0.55-1.30)  H 


 


 


Estimat Glomerular Filtration


Rate 


   mL/min (>60)  


  


 


 


Glucose Level


  


  102 MG/DL


() 


 


 


Calcium Level


  


  9.2 MG/DL


(8.5-10.1) 


 








Height (Feet):  5


Height (Inches):  2.00


Weight (Pounds):  122


General Appearance:  no apparent distress


Cardiovascular:  normal rate, other


Respiratory/Chest:  other - Mechanical ventilator


Abdominal Exam:  GT site - Clean dry and intact











Shane Lowry NP Jan 17, 2019 13:02

## 2019-01-17 NOTE — CARDIOLOGY REPORT
--------------- APPROVED REPORT --------------





EKG Measurement

Heart 

[id number]

[id number]

[id number]

[id number]





Atrial fibrillation

Nonspecific intraventricular block

Abnormal ECG

## 2019-01-17 NOTE — NUR
NURSE NOTES:

paged doctor Oxana as voicemail indicated- regarding patients hgb trending down- waiting 
for call back from doctor.

## 2019-01-18 NOTE — DISCHARGE SUMMARY
Discharge Summary


Discharge Summary


_


DATE OF ADMISSION: November 26, 2018


DATE OF DISCHARGE: January 17, 2019





DISCHARGED BY: Dr. Gavino Daigle





CONSULTANTS: 


Dr. aCss Lewis





BRIEF HOSPITAL COURSE:


Patient is a 78-year-old female, who came to the hospital from skilled nursing 

facility for altered mental status.  Patient was apparently doing fairly well , 

however was minimally responsive.  She was evaluated at the emergency room and 

workup showed left lung pneumonia. Patient was initially admitted to telemetry, 

she suddenly desaturated and became altered.  She was then immediately 

transferred to ICU and was orally intubated.  Patient required emergent IV 

access.  A central catheter was inserted to the left subclavian vein.  Chest x-

ray showed left lung opacification suspicious for left mucous plug.  She was 

admitted to ICU and was started on broad-spectrum antibiotics, Protonix and IV 

pressors.





Cardiologist and nephrologist were consulted.  Patient was given albumin bolus.

  She was eventually started on dopamine which was later switched to Levophed.  





Chest X-ray post intubation showed left lung collapse.  On November 27, 2018, 

she underwent bronchoscopy and lavage by Dr. Cardoso.  She was eventually 

extubated.





Later that day, patient developed deteriorating mental status.  She was placed 

on BiPAP but continued to deteriorate.  ER physician was called.  Patient was 

reintubated.





She was noted to have bilateral lower extremity wounds.  Podiatrist was 

consulted.  Patient has cellulitis of right lower extremity secondary to 

infected wounds.  Patient's wounds possibly arterial in etiology.  She was 

given wound care.  No surgical intervention was needed.  She was also noted to 

have sacral full-thickness pressure injury with tunneling.  Wound care was 

rendered.  She was placed on air-fluidized mattress with frequent repositioning 

and offloading.  She was assessed by vascular surgeon.  Patient will need 

selective leg angiogram to assess for percutaneous revascularization.





ID was consulted.  She was initially on vancomycin and Zosyn.  Antibiotics were 

discontinued.  She was started on meropenem and linezolid.  Patient had 

multiorgan failure, sepsis due to infected diabetic ulcers.





She had an episode of anemia and was given blood transfusion.





Patient converted to sinus rhythm but had been had been in and out of A. fib 

despite amiodarone BID.  Dose was increased to 400 mg.  Heart rate was 

monitored. She was  continued on weaning protocol.





She self extubated on 12/09/2018.  She had an episode of atrial fibrillation 

for a few minutes after being extubated.  She eventually reverted back to sinus 

rhythm spontaneously.  She had a transient bradycardia after being given 

labetalol for hypertension.  She was given amiodarone 200 mg twice daily.  

Echocardiogram done showed mitral regurgitation, LVEF 65%. Urine culture showed 

growth of E. coli and VRE.  She was continued on meropenem.





On 12/10/18, patient became bradycardic then went into asystole.  [facility] was 

called.  Patient was reintubated.  Labetalol was discontinued.   He was 

continued on vent support.  Patient was unable to be weaned.  Discussed with 

patients family. Patient may eventually need trach and PEG.





He failed weaning process. On December 17, 2018, he underwent tracheostomy.  On 

December 19, 2018, he underwent EGD with PEG tube placement.  He tolerated 

procedure well.





He was eventually started on G-tube feedings.  He was mostly in sinus rhythm on 

telemetry.  He was started on Eliquis 2.5 mg twice daily for stroke prevention.





Patient had difficulty placement.  Patient will need subacute placement and 

dialysis arranged. Son was agreeable.





On 01/04/2019, he had increased leukocytosis, WBC went up to 20s.  Patient was 

again restarted on antibiotics.  Sputum culture showed Pseudomonas.  Urine 

culture with ESBL E. coli.





There was a drop in hemoglobin and required blood transfusion.  Proton pump 

inhibitor was increased to twice daily.





Leukocytosis eventually resolved.  Patient was eventually accepted at [facility].  Outpatient hemodialysis with [facility].





Patient was eventually discharged.





FINAL DIAGNOSES: 


Multiorgan failure


Status post cardiac arrest


End-stage renal disease on hemodialysis


Anemia of chronic kidney disease


Paroxysmal atrial fibrillation


Acute respiratory failure status post tracheostomy placement on 12/17/2018


Status post EGD and PEG tube placement on 12/19/2018


Severe protein calorie malnutrition


Dehydration


ESBL E. coli UTI


Cellulitis of the right leg


Drop in hemoglobin requiring blood transfusion


Left lung collapse


status post bronchoscopy and lavage


Multiple pressure ulcers, present on admission


Diabetes mellitus


Pulmonary hypertension


Anemia


Bradycardia secondary to medications


Peripheral arterial disease


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





DISPOSITION: Patient was transferred to subacute





DISCHARGE MEDICATIONS: Refer to Discharge Medication List.














I have been assigned to dictate discharge summary on this account, and I was 

not involved in the patient's management.











Chelsea Arboleda NP Jan 18, 2019 17:41

## 2019-01-18 NOTE — GENERAL PROGRESS NOTE
Assessment/Plan


Problem List:  


(1) Encephalopathy chronic


ICD Codes:  G93.49 - Other encephalopathy


SNOMED:  [id number]


Assessment/Plan


dc ativan 


increase seroquel


cont restraints.





Subjective


Neurologic/Psychiatric:  Reports: anxiety


Allergies:  


Coded Allergies:  


     No Known Allergies (Unverified , 11/26/18)


Subjective


in restraints agitated





Objective





Last 24 Hour Vital Signs








  Date Time  Temp Pulse Resp B/P (MAP) Pulse Ox O2 Delivery O2 Flow Rate FiO2


 


1/17/19 17:03  116 16     30


 


1/17/19 16:00        30


 


1/17/19 15:56      Mechanical Ventilator  





      Mechanical Ventilator  


 


1/17/19 15:13  82 18     30


 


1/17/19 12:40  76 16     30


 


1/17/19 12:00 97.7 120 18 137/70 (92) 100   


 


1/17/19 12:00        40


 


1/17/19 12:00      Mechanical Ventilator  





      Mechanical Ventilator  


 


1/17/19 11:49  105      


 


1/17/19 10:55  79 17     30


 


1/17/19 09:15  98 16     30


 


1/17/19 08:58    103/47    


 


1/17/19 08:00      Mechanical Ventilator  





      Mechanical Ventilator  


 


1/17/19 08:00        40


 


1/17/19 08:00 98.4 71 20 103/72 (82) 93   


 


1/17/19 06:32  66 13     30


 


1/17/19 05:30  80 15     30


 


1/17/19 04:00      Mechanical Ventilator  





      Mechanical Ventilator  


 


1/17/19 04:00 98.4 86 17 95/54 (68) 95   


 


1/17/19 04:00  82      


 


1/17/19 04:00        40


 


1/17/19 03:00  82 18     30


 


1/17/19 01:30  81 20     30

















Intake and Output  


 


 1/17/19 1/18/19





 19:00 07:00


 


Intake Total 640 ml 


 


Output Total 100 ml 


 


Balance 540 ml 


 


  


 


Free Water 200 ml 


 


Tube Feeding 440 ml 


 


Stool Total 100 ml 








Laboratory Tests


1/17/19 03:20: 


White Blood Count 10.1, Red Blood Count 2.61L, Hemoglobin 7.8L, Hematocrit 24.9L

, Mean Corpuscular Volume 96, Mean Corpuscular Hemoglobin 29.9, Mean 

Corpuscular Hemoglobin Concent 31.3L, Red Cell Distribution Width 16.3H, 

Platelet Count 399, Mean Platelet Volume 6.3L, Neutrophils (%) (Auto) , 

Lymphocytes (%) (Auto) , Monocytes (%) (Auto) , Eosinophils (%) (Auto) , 

Basophils (%) (Auto) , Differential Total Cells Counted 100, Neutrophils % (

Manual) 80H, Lymphocytes % (Manual) 13L, Monocytes % (Manual) 6, Eosinophils % (

Manual) 0, Basophils % (Manual) 1, Band Neutrophils 0, Platelet Estimate 

Adequate, Platelet Morphology Normal, Anisocytosis 1+, Sodium Level 136, 

Potassium Level 4.0, Chloride Level 102, Carbon Dioxide Level 31, Anion Gap 3L, 

Blood Urea Nitrogen 29H, Creatinine 1.5H, Estimat Glomerular Filtration Rate , 

Glucose Level 102, Calcium Level 9.2


1/17/19 06:30: Stool Occult Blood Positive


Height (Feet):  5


Height (Inches):  2.00


Weight (Pounds):  122


General Appearance:  alert, confused, agitated











Cass Levi MD Jan 18, 2019 00:13

## 2019-01-21 ENCOUNTER — HOSPITAL ENCOUNTER (EMERGENCY)
Dept: HOSPITAL 54 - ER | Age: 79
LOS: 1 days | Discharge: ANOTHER HOSPITAL | End: 2019-01-22
Payer: MEDICARE

## 2019-01-21 VITALS — SYSTOLIC BLOOD PRESSURE: 159 MMHG | DIASTOLIC BLOOD PRESSURE: 75 MMHG

## 2019-01-21 VITALS — DIASTOLIC BLOOD PRESSURE: 30 MMHG | SYSTOLIC BLOOD PRESSURE: 113 MMHG

## 2019-01-21 VITALS — WEIGHT: 137 LBS | BODY MASS INDEX: 25.21 KG/M2 | HEIGHT: 62 IN

## 2019-01-21 DIAGNOSIS — F41.9: ICD-10-CM

## 2019-01-21 DIAGNOSIS — K21.9: ICD-10-CM

## 2019-01-21 DIAGNOSIS — Z86.74: ICD-10-CM

## 2019-01-21 DIAGNOSIS — G47.00: ICD-10-CM

## 2019-01-21 DIAGNOSIS — R94.31: ICD-10-CM

## 2019-01-21 DIAGNOSIS — E11.22: ICD-10-CM

## 2019-01-21 DIAGNOSIS — J96.10: ICD-10-CM

## 2019-01-21 DIAGNOSIS — I12.0: ICD-10-CM

## 2019-01-21 DIAGNOSIS — R13.10: ICD-10-CM

## 2019-01-21 DIAGNOSIS — F03.90: ICD-10-CM

## 2019-01-21 DIAGNOSIS — D63.1: Primary | ICD-10-CM

## 2019-01-21 DIAGNOSIS — I48.91: ICD-10-CM

## 2019-01-21 DIAGNOSIS — I73.9: ICD-10-CM

## 2019-01-21 DIAGNOSIS — N18.6: ICD-10-CM

## 2019-01-21 LAB
ALBUMIN SERPL BCP-MCNC: 1.9 G/DL (ref 3.4–5)
ALP SERPL-CCNC: 142 U/L (ref 46–116)
ALT SERPL W P-5'-P-CCNC: 16 U/L (ref 12–78)
AST SERPL W P-5'-P-CCNC: 26 U/L (ref 15–37)
BASOPHILS # BLD AUTO: 0.1 /CMM (ref 0–0.2)
BASOPHILS NFR BLD AUTO: 0.7 % (ref 0–2)
BILIRUB DIRECT SERPL-MCNC: 0.1 MG/DL (ref 0–0.2)
BILIRUB SERPL-MCNC: 0.4 MG/DL (ref 0.2–1)
BUN SERPL-MCNC: 22 MG/DL (ref 7–18)
CALCIUM SERPL-MCNC: 9.5 MG/DL (ref 8.5–10.1)
CHLORIDE SERPL-SCNC: 103 MMOL/L (ref 98–107)
CO2 SERPL-SCNC: 27 MMOL/L (ref 21–32)
CREAT SERPL-MCNC: 1.1 MG/DL (ref 0.6–1.3)
EOSINOPHIL NFR BLD AUTO: 1.2 % (ref 0–6)
GLUCOSE SERPL-MCNC: 82 MG/DL (ref 74–106)
HCT VFR BLD AUTO: 25 % (ref 33–45)
HGB BLD-MCNC: 8.4 G/DL (ref 11.5–14.8)
LIPASE SERPL-CCNC: 218 U/L (ref 73–393)
LYMPHOCYTES NFR BLD AUTO: 0.3 /CMM (ref 0.8–4.8)
LYMPHOCYTES NFR BLD AUTO: 3.8 % (ref 20–44)
MCHC RBC AUTO-ENTMCNC: 33 G/DL (ref 31–36)
MCV RBC AUTO: 95 FL (ref 82–100)
MONOCYTES NFR BLD AUTO: 0.8 /CMM (ref 0.1–1.3)
MONOCYTES NFR BLD AUTO: 9.2 % (ref 2–12)
NEUTROPHILS # BLD AUTO: 7.7 /CMM (ref 1.8–8.9)
NEUTROPHILS NFR BLD AUTO: 85.1 % (ref 43–81)
PLATELET # BLD AUTO: 577 /CMM (ref 150–450)
POTASSIUM SERPL-SCNC: 3.4 MMOL/L (ref 3.5–5.1)
PROT SERPL-MCNC: 7.4 G/DL (ref 6.4–8.2)
RBC # BLD AUTO: 2.66 MIL/UL (ref 4–5.2)
SODIUM SERPL-SCNC: 139 MMOL/L (ref 136–145)
WBC NRBC COR # BLD AUTO: 9.1 K/UL (ref 4.3–11)

## 2019-01-21 NOTE — NUR
PT [facility] FOR [facility] BP, PER PA, PT BP POST DIALYSIS 210/100. 
PT COMPLETED DIALYSIS, 1.2KG REMOVED. PT'S LOC AT BASELINE. AFEBRILE. PT ON 
MONITOR IN BED 3 WITH SON AT BEDSIDE. WILL CONTINUE TO MONITOR.

## 2019-01-21 NOTE — NUR
[id number]

TRIP#[id number] 

-------------------------------------------------------------------------------

Addendum: 01/21/19 at 2332 by DOLLY

-------------------------------------------------------------------------------

[id number] TRIP#[id number]

## 2019-01-21 NOTE — NUR
PT RECEIVED FROM TRANSPORT ON VENT VIA TRACH, SETTINGS AS CHARTED. TRACH SECURED VIA TRACH 
TIE. AIRWAY PATENT. PT AWAKE.

AMBU BAG AND BACKUP TRACH AT BEDSIDE ALARMS SET AND AUDIBLE. DISCONNECT ALARMS CHECKED. VENT 
PLUGGED INTO RED OUTLET.

SUCTIONED A SMALL AMOUNT OF THICK YELLOW SECRETIONS. ORAL SECRETIONS SUCTIONED FROM MOUTH 
VIA [place]ANKAUER. HEAD OF BED AT 30 DEGREES.

PT RECEIVING NO BREATHING TX AT THIS TIME

-------------------------------------------------------------------------------

Addendum: 01/21/19 at 2209 by SUMANTH NERI RT

-------------------------------------------------------------------------------

Amended: Links added.

## 2019-01-31 ENCOUNTER — HOSPITAL ENCOUNTER (INPATIENT)
Dept: HOSPITAL 72 - EMR | Age: 79
LOS: 8 days | Discharge: NURSING, REHAB OR LONG-TERM CARE | DRG: 720 | End: 2019-02-08
Payer: MEDICARE

## 2019-01-31 VITALS — DIASTOLIC BLOOD PRESSURE: 35 MMHG | SYSTOLIC BLOOD PRESSURE: 119 MMHG

## 2019-01-31 VITALS — DIASTOLIC BLOOD PRESSURE: 35 MMHG | SYSTOLIC BLOOD PRESSURE: 102 MMHG

## 2019-01-31 VITALS — SYSTOLIC BLOOD PRESSURE: 100 MMHG | DIASTOLIC BLOOD PRESSURE: 25 MMHG

## 2019-01-31 VITALS — DIASTOLIC BLOOD PRESSURE: 52 MMHG | SYSTOLIC BLOOD PRESSURE: 93 MMHG

## 2019-01-31 VITALS — SYSTOLIC BLOOD PRESSURE: 76 MMHG | DIASTOLIC BLOOD PRESSURE: 17 MMHG

## 2019-01-31 VITALS — SYSTOLIC BLOOD PRESSURE: 96 MMHG | DIASTOLIC BLOOD PRESSURE: 26 MMHG

## 2019-01-31 VITALS — SYSTOLIC BLOOD PRESSURE: 104 MMHG | DIASTOLIC BLOOD PRESSURE: 24 MMHG

## 2019-01-31 VITALS — SYSTOLIC BLOOD PRESSURE: 111 MMHG | DIASTOLIC BLOOD PRESSURE: 30 MMHG

## 2019-01-31 VITALS — WEIGHT: 153 LBS | HEIGHT: 63 IN | BODY MASS INDEX: 27.11 KG/M2

## 2019-01-31 VITALS — DIASTOLIC BLOOD PRESSURE: 46 MMHG | SYSTOLIC BLOOD PRESSURE: 83 MMHG

## 2019-01-31 VITALS — SYSTOLIC BLOOD PRESSURE: 104 MMHG | DIASTOLIC BLOOD PRESSURE: 28 MMHG

## 2019-01-31 VITALS — DIASTOLIC BLOOD PRESSURE: 32 MMHG | SYSTOLIC BLOOD PRESSURE: 98 MMHG

## 2019-01-31 VITALS — DIASTOLIC BLOOD PRESSURE: 31 MMHG | SYSTOLIC BLOOD PRESSURE: 107 MMHG

## 2019-01-31 VITALS — SYSTOLIC BLOOD PRESSURE: 113 MMHG | DIASTOLIC BLOOD PRESSURE: 32 MMHG

## 2019-01-31 VITALS — SYSTOLIC BLOOD PRESSURE: 69 MMHG | DIASTOLIC BLOOD PRESSURE: 29 MMHG

## 2019-01-31 VITALS — DIASTOLIC BLOOD PRESSURE: 25 MMHG | SYSTOLIC BLOOD PRESSURE: 116 MMHG

## 2019-01-31 VITALS — SYSTOLIC BLOOD PRESSURE: 101 MMHG | DIASTOLIC BLOOD PRESSURE: 25 MMHG

## 2019-01-31 VITALS — SYSTOLIC BLOOD PRESSURE: 104 MMHG | DIASTOLIC BLOOD PRESSURE: 46 MMHG

## 2019-01-31 VITALS — SYSTOLIC BLOOD PRESSURE: 105 MMHG | DIASTOLIC BLOOD PRESSURE: 29 MMHG

## 2019-01-31 VITALS — SYSTOLIC BLOOD PRESSURE: 94 MMHG | DIASTOLIC BLOOD PRESSURE: 26 MMHG

## 2019-01-31 VITALS — SYSTOLIC BLOOD PRESSURE: 108 MMHG | DIASTOLIC BLOOD PRESSURE: 27 MMHG

## 2019-01-31 VITALS — DIASTOLIC BLOOD PRESSURE: 37 MMHG | SYSTOLIC BLOOD PRESSURE: 46 MMHG

## 2019-01-31 DIAGNOSIS — I12.0: ICD-10-CM

## 2019-01-31 DIAGNOSIS — G93.40: ICD-10-CM

## 2019-01-31 DIAGNOSIS — L97.329: ICD-10-CM

## 2019-01-31 DIAGNOSIS — E43: ICD-10-CM

## 2019-01-31 DIAGNOSIS — E11.22: ICD-10-CM

## 2019-01-31 DIAGNOSIS — Z93.1: ICD-10-CM

## 2019-01-31 DIAGNOSIS — F41.9: ICD-10-CM

## 2019-01-31 DIAGNOSIS — Z93.0: ICD-10-CM

## 2019-01-31 DIAGNOSIS — A41.02: Primary | ICD-10-CM

## 2019-01-31 DIAGNOSIS — T80.219A: ICD-10-CM

## 2019-01-31 DIAGNOSIS — N39.0: ICD-10-CM

## 2019-01-31 DIAGNOSIS — B96.20: ICD-10-CM

## 2019-01-31 DIAGNOSIS — J15.1: ICD-10-CM

## 2019-01-31 DIAGNOSIS — I73.9: ICD-10-CM

## 2019-01-31 DIAGNOSIS — F03.91: ICD-10-CM

## 2019-01-31 DIAGNOSIS — J15.6: ICD-10-CM

## 2019-01-31 DIAGNOSIS — N18.6: ICD-10-CM

## 2019-01-31 DIAGNOSIS — J96.90: ICD-10-CM

## 2019-01-31 DIAGNOSIS — D63.1: ICD-10-CM

## 2019-01-31 DIAGNOSIS — L97.319: ICD-10-CM

## 2019-01-31 DIAGNOSIS — F33.9: ICD-10-CM

## 2019-01-31 DIAGNOSIS — Z99.2: ICD-10-CM

## 2019-01-31 DIAGNOSIS — R65.21: ICD-10-CM

## 2019-01-31 DIAGNOSIS — Z99.11: ICD-10-CM

## 2019-01-31 DIAGNOSIS — L89.153: ICD-10-CM

## 2019-01-31 LAB
ADD MANUAL DIFF: YES
ALBUMIN SERPL-MCNC: 2 G/DL (ref 3.4–5)
ALBUMIN/GLOB SERPL: 0.3 {RATIO} (ref 1–2.7)
ALP SERPL-CCNC: 134 U/L (ref 46–116)
ALT SERPL-CCNC: 27 U/L (ref 12–78)
ANION GAP SERPL CALC-SCNC: 9 MMOL/L (ref 5–15)
APPEARANCE UR: (no result)
APTT BLD: 30 SEC (ref 23–33)
APTT PPP: (no result) S
AST SERPL-CCNC: 49 U/L (ref 15–37)
BILIRUB SERPL-MCNC: 0.5 MG/DL (ref 0.2–1)
BUN SERPL-MCNC: 41 MG/DL (ref 7–18)
CALCIUM SERPL-MCNC: 10.6 MG/DL (ref 8.5–10.1)
CHLORIDE SERPL-SCNC: 97 MMOL/L (ref 98–107)
CO2 SERPL-SCNC: 25 MMOL/L (ref 21–32)
CREAT SERPL-MCNC: 1.4 MG/DL (ref 0.55–1.3)
ERYTHROCYTE [DISTWIDTH] IN BLOOD BY AUTOMATED COUNT: 16.9 % (ref 11.6–14.8)
GLOBULIN SER-MCNC: 6.5 G/DL
GLUCOSE UR STRIP-MCNC: NEGATIVE MG/DL
HCT VFR BLD CALC: 26.6 % (ref 37–47)
HGB BLD-MCNC: 8.7 G/DL (ref 12–16)
INR PPP: 0.9 (ref 0.9–1.1)
KETONES UR QL STRIP: (no result)
LEUKOCYTE ESTERASE UR QL STRIP: (no result)
MCV RBC AUTO: 96 FL (ref 80–99)
NITRITE UR QL STRIP: NEGATIVE
PH UR STRIP: 7 [PH] (ref 4.5–8)
PLATELET # BLD: 580 K/UL (ref 150–450)
POTASSIUM SERPL-SCNC: 4.1 MMOL/L (ref 3.5–5.1)
PROT UR QL STRIP: (no result)
RBC # BLD AUTO: 2.76 M/UL (ref 4.2–5.4)
SODIUM SERPL-SCNC: 131 MMOL/L (ref 136–145)
SP GR UR STRIP: 1.01 (ref 1–1.03)
UROBILINOGEN UR-MCNC: NORMAL MG/DL (ref 0–1)
WBC # BLD AUTO: 24.1 K/UL (ref 4.8–10.8)

## 2019-01-31 PROCEDURE — 80202 ASSAY OF VANCOMYCIN: CPT

## 2019-01-31 PROCEDURE — 85007 BL SMEAR W/DIFF WBC COUNT: CPT

## 2019-01-31 PROCEDURE — 85025 COMPLETE CBC W/AUTO DIFF WBC: CPT

## 2019-01-31 PROCEDURE — 80048 BASIC METABOLIC PNL TOTAL CA: CPT

## 2019-01-31 PROCEDURE — 93306 TTE W/DOPPLER COMPLETE: CPT

## 2019-01-31 PROCEDURE — 81003 URINALYSIS AUTO W/O SCOPE: CPT

## 2019-01-31 PROCEDURE — 87181 SC STD AGAR DILUTION PER AGT: CPT

## 2019-01-31 PROCEDURE — 86901 BLOOD TYPING SEROLOGIC RH(D): CPT

## 2019-01-31 PROCEDURE — 82803 BLOOD GASES ANY COMBINATION: CPT

## 2019-01-31 PROCEDURE — 94002 VENT MGMT INPAT INIT DAY: CPT

## 2019-01-31 PROCEDURE — 86900 BLOOD TYPING SEROLOGIC ABO: CPT

## 2019-01-31 PROCEDURE — 94003 VENT MGMT INPAT SUBQ DAY: CPT

## 2019-01-31 PROCEDURE — 83615 LACTATE (LD) (LDH) ENZYME: CPT

## 2019-01-31 PROCEDURE — 87324 CLOSTRIDIUM AG IA: CPT

## 2019-01-31 PROCEDURE — 83735 ASSAY OF MAGNESIUM: CPT

## 2019-01-31 PROCEDURE — 80053 COMPREHEN METABOLIC PANEL: CPT

## 2019-01-31 PROCEDURE — 96365 THER/PROPH/DIAG IV INF INIT: CPT

## 2019-01-31 PROCEDURE — 96368 THER/DIAG CONCURRENT INF: CPT

## 2019-01-31 PROCEDURE — 36415 COLL VENOUS BLD VENIPUNCTURE: CPT

## 2019-01-31 PROCEDURE — 86850 RBC ANTIBODY SCREEN: CPT

## 2019-01-31 PROCEDURE — 36600 WITHDRAWAL OF ARTERIAL BLOOD: CPT

## 2019-01-31 PROCEDURE — 96376 TX/PRO/DX INJ SAME DRUG ADON: CPT

## 2019-01-31 PROCEDURE — 5A1955Z RESPIRATORY VENTILATION, GREATER THAN 96 CONSECUTIVE HOURS: ICD-10-PCS

## 2019-01-31 PROCEDURE — 87205 SMEAR GRAM STAIN: CPT

## 2019-01-31 PROCEDURE — 85610 PROTHROMBIN TIME: CPT

## 2019-01-31 PROCEDURE — 83690 ASSAY OF LIPASE: CPT

## 2019-01-31 PROCEDURE — 87086 URINE CULTURE/COLONY COUNT: CPT

## 2019-01-31 PROCEDURE — 83605 ASSAY OF LACTIC ACID: CPT

## 2019-01-31 PROCEDURE — 86920 COMPATIBILITY TEST SPIN: CPT

## 2019-01-31 PROCEDURE — 87081 CULTURE SCREEN ONLY: CPT

## 2019-01-31 PROCEDURE — 94664 DEMO&/EVAL PT USE INHALER: CPT

## 2019-01-31 PROCEDURE — 87070 CULTURE OTHR SPECIMN AEROBIC: CPT

## 2019-01-31 PROCEDURE — 99291 CRITICAL CARE FIRST HOUR: CPT

## 2019-01-31 PROCEDURE — 85730 THROMBOPLASTIN TIME PARTIAL: CPT

## 2019-01-31 PROCEDURE — 96375 TX/PRO/DX INJ NEW DRUG ADDON: CPT

## 2019-01-31 PROCEDURE — 71045 X-RAY EXAM CHEST 1 VIEW: CPT

## 2019-01-31 PROCEDURE — 06HN33Z INSERTION OF INFUSION DEVICE INTO LEFT FEMORAL VEIN, PERCUTANEOUS APPROACH: ICD-10-PCS

## 2019-01-31 PROCEDURE — 84100 ASSAY OF PHOSPHORUS: CPT

## 2019-01-31 PROCEDURE — 87040 BLOOD CULTURE FOR BACTERIA: CPT

## 2019-01-31 PROCEDURE — 80076 HEPATIC FUNCTION PANEL: CPT

## 2019-01-31 NOTE — EMERGENCY ROOM REPORT
History of Present Illness


General


Chief Complaint:  Abnormal Labs


Source:  Patient, Medical Record





Present Illness


HPI


78-year-old female presents ED for evaluation.  Brought in by EMS from skilled 

nursing facility.  Noted to have low hemoglobin on recent labs drawn.  6.7.  

Patient is asked trach is on ventilator.  History of ESRD and is on dialysis.  

No reported rectal bleeding.  No hematemesis.  No other aggravating relieving 

factors.  No other associated symptoms


Allergies:  


Coded Allergies:  


     No Known Allergies (Unverified , 11/26/18)





Patient History


Past Medical History:  HTN, AFib, COPD, GERD, renal disease, dialysis


Pertinent Family History:  none


Social History:  Denies: smoking, alcohol use, drug use


Last Menstrual Period:  n/a


Pregnant Now:  No


Immunizations:  UTD


Reviewed Nursing Documentation:  PMH: Agreed; PSxH: Agreed





Nursing Documentation-PMH


Past Medical History:  No History, Except For


Hx Cardiac Problems:  Yes - a-fib, HTN


Hx Hypertension:  Yes


Hx COPD:  Yes


Hx Diabetes:  Yes - esrd


Hx Cancer:  Yes


Hx Gastrointestinal Problems:  Yes - Gall bladder removed, GERD


Hx Dialysis:  Yes


Hx Neurological Problems:  Yes - dementia


Hx Cerebrovascular Accident:  No


Hx Dementia:  Yes





Review of Systems


All Other Systems:  negative except mentioned in HPI





Physical Exam





Vital Signs








  Date Time  Temp Pulse Resp B/P (MAP) Pulse Ox O2 Delivery O2 Flow Rate FiO2


 


1/31/19 15:41 98.4 61 22 95/70 98 Trach Collar  


 


1/31/19 15:50        40








Sp02 EP Interpretation:  reviewed, normal


General Appearance:  alert, GCS 15, non-toxic, cachetic, thin


Head:  normocephalic, atraumatic


Eyes:  bilateral eye normal inspection, bilateral eye PERRL


ENT:  hearing grossly normal, normal pharynx, no angioedema, normal voice


Neck:  full range of motion, supple/symm/no masses, tracheotomy


Respiratory:  chest non-tender, lungs clear, normal breath sounds, speaking 

full sentences


Cardiovascular #1:  regular rate, rhythm, no edema


Cardiovascular #2:  2+ carotid (R), 2+ carotid (L), 2+ radial (R), 2+ radial (L)

, 2+ dorsalis pedis (R), 2+ dorsalis pedis (L)


Gastrointestinal:  normal bowel sounds, non tender, soft, non-distended, no 

guarding, no rebound


Rectal:  deferred


Genitourinary:  normal inspection, no CVA tenderness


Musculoskeletal:  back normal, gait/station normal, normal range of motion, non-

tender


Neurologic:  alert, oriented x3, responsive, motor strength/tone normal, 

sensory intact, speech normal


Psychiatric:  judgement/insight normal, memory normal, mood/affect normal, no 

suicidal/homicidal ideation


Reflexes:  3+ bicep (R), 3+ bicep (L), 3+ tricep (R), 3+ tricep (L), 3+ knee (R)

, 3+ knee (L)


Skin:  normal color, no rash, warm/dry, well hydrated


Lymphatic:  no adenopathy





Procedures


Critical Care Time


Critical Care Time


i.  I feel this is a highly complex case requiring extensive working including 

EKG/Rhythm strip, Xray/CT/US, Blood/urine lab work, repeat exams while in ED, 


and administration of strong opiates/narcotics for pain control, admission to 

hospital or close patient follow up.  





Total time: 60 min bedside evaluation and treatment excludes procedures (EKG). 


Reason for critical care: hypotensive, anemia, ESRD


Possible complications: hypotension, hypertension, MI, shock, arrhythmias, 

metabolic acidosis, end organ damage, respiratory failure. 


Interventions: labs, IVFS, EKG, central line, pressors.  broad spectrum abx


Course: Patient presenting for low hemoglobin of 6.7.  Hemoglobin here 8.7.  

Patient remains profoundly hypotensive.  Not improving with IV fluid boluses.  

White count elevated.  Consideration for septic shock.  Central line placed.  

Pressors started.  Broad spectrum abx given.  Episode of atrial fibrillation.  

Given Lopressor


Consultations: nursing staff, EMS, family 


Performed by: Dr Fam


Tolerated well condition = critical





j.  because of unstable vital signs this patient had a condition that could 

potentially threaten life or limb.  I feel this is a critical patient who 

required my full attention while patient was considered critical.  Total 

Critical Care Time excluding procedures was greater than 60 minutes





Central Line


Central Line :  


   Consent:  Emergent


   Central Line Lumen:  triple


   Maximal Sterile Barrier Tech:  yes cap, yes mask, yes sterile gown, yes 

sterile gloves, yes large sterile sheet, yes hand hygiene, yes chlorhexidine 

prep


   Central Line Postion:  femoral (L)


   Anesthesia:  Lidocaine


   Complications:  none


   Central Line Post Position:  sutured, good blood return


   Attempts:  One


   Patient Tolerated:  Well


   Complications:  None





Medical Decision Making


Diagnostic Impression:  


 Primary Impression:  


 Anemia


 Qualified Codes:  D64.9 - Anemia, unspecified


 Additional Impressions:  


 Sepsis


 Qualified Codes:  A41.9 - Sepsis, unspecified organism


 UTI (urinary tract infection)


 Qualified Codes:  N39.0 - Urinary tract infection, site not specified; R31.9 - 

Hematuria, unspecified


 ESRD needing dialysis


 Septic shock


ER Course


Hospital Course 


78-year-old female presents for low hemoglobin, hypotensive





Differential diagnoses include: Pneumonia, UTI, sepsis, dehydration, anemia





Clinical course


Patient placed on stretcher.  On cardiac monitor with hypotension.  After 

initial history and physical, I ordered labs, IV fluids, EKG





Labs - BUN/Cr elevated, marked leukocytosis, UA + blood + bacteria. Hb 8.7


EKG - atrial fibrilation with RVR





Patient remaining hypotensive despite fluid bolus.  Consideration for sepsis.  

Discussed with son and he agrees for invasive treatment 





Central line placed.  Pressors started.  Vancomycin given





Hb 8.7 we'll withhold transfusion for now.  Given Lopressor for A. fib





Case discussed with Dr Pennington and they agreed to admit patient to their 

service for further care and support





I feel this is a highly complex case requiring extensive working including EKG/

Rhythm strip, Xray/CT/US, Blood/urine lab work, repeat exams while in ED, and 

administration of strong opiates/narcotics for pain control, admission to 

hospital or close patient follow up.  





Diagnosis - anemia, sepsis, UTI, ESRD on dilaysis, septic shock 





Patient admitted to ICU in critical condition





Labs








Test


  1/31/19


16:06 1/31/19


16:26 1/31/19


18:26


 


White Blood Count


  24.1 K/UL


(4.8-10.8) 


  


 


 


Red Blood Count


  2.76 M/UL


(4.20-5.40) 


  


 


 


Hemoglobin


  8.7 G/DL


(12.0-16.0) 


  


 


 


Hematocrit


  26.6 %


(37.0-47.0) 


  


 


 


Mean Corpuscular Volume 96 FL (80-99)   


 


Mean Corpuscular Hemoglobin


  31.3 PG


(27.0-31.0) 


  


 


 


Mean Corpuscular Hemoglobin


Concent 32.5 G/DL


(32.0-36.0) 


  


 


 


Red Cell Distribution Width


  16.9 %


(11.6-14.8) 


  


 


 


Platelet Count


  580 K/UL


(150-450) 


  


 


 


Mean Platelet Volume


  5.9 FL


(6.5-10.1) 


  


 


 


Neutrophils (%) (Auto)  % (45.0-75.0)   


 


Lymphocytes (%) (Auto)  % (20.0-45.0)   


 


Monocytes (%) (Auto)  % (1.0-10.0)   


 


Eosinophils (%) (Auto)  % (0.0-3.0)   


 


Basophils (%) (Auto)  % (0.0-2.0)   


 


Differential Total Cells


Counted 100 


  


  


 


 


Neutrophils % (Manual) 89 % (45-75)   


 


Lymphocytes % (Manual) 4 % (20-45)   


 


Monocytes % (Manual) 4 % (1-10)   


 


Eosinophils % (Manual) 0 % (0-3)   


 


Basophils % (Manual) 0 % (0-2)   


 


Band Neutrophils 3 % (0-8)   


 


Platelet Estimate Increased   


 


Platelet Morphology Normal   


 


Hypochromasia 1+   


 


Anisocytosis 1+   


 


Prothrombin Time


  10.0 SEC


(9.30-11.50) 


  


 


 


Prothromb Time International


Ratio 0.9 (0.9-1.1) 


  


  


 


 


Activated Partial


Thromboplast Time 30 SEC (23-33) 


  


  


 


 


Sodium Level


  131 MMOL/L


(136-145) 


  


 


 


Potassium Level


  4.1 MMOL/L


(3.5-5.1) 


  


 


 


Chloride Level


  97 MMOL/L


() 


  


 


 


Carbon Dioxide Level


  25 MMOL/L


(21-32) 


  


 


 


Anion Gap


  9 mmol/L


(5-15) 


  


 


 


Blood Urea Nitrogen


  41 mg/dL


(7-18) 


  


 


 


Creatinine


  1.4 MG/DL


(0.55-1.30) 


  


 


 


Estimat Glomerular Filtration


Rate  mL/min (>60) 


  


  


 


 


Glucose Level


  106 MG/DL


() 


  


 


 


Calcium Level


  10.6 MG/DL


(8.5-10.1) 


  


 


 


Total Bilirubin


  0.5 MG/DL


(0.2-1.0) 


  


 


 


Aspartate Amino Transf


(AST/SGOT) 49 U/L (15-37) 


  


  


 


 


Alanine Aminotransferase


(ALT/SGPT) 27 U/L (12-78) 


  


  


 


 


Alkaline Phosphatase


  134 U/L


() 


  


 


 


Total Protein


  8.5 G/DL


(6.4-8.2) 


  


 


 


Albumin


  2.0 G/DL


(3.4-5.0) 


  


 


 


Globulin 6.5 g/dL   


 


Albumin/Globulin Ratio 0.3 (1.0-2.7)   


 


Lipase


  85 U/L


() 


  


 


 


Urine Color  Red  


 


Urine Appearance  Cloudy  


 


Urine pH  7 (4.5-8.0)  


 


Urine Specific Gravity


  


  1.010


(1.005-1.035) 


 


 


Urine Protein  4+ (NEGATIVE)  


 


Urine Glucose (UA)


  


  Negative


(NEGATIVE) 


 


 


Urine Ketones  1+ (NEGATIVE)  


 


Urine Blood  5+ (NEGATIVE)  


 


Urine Nitrite


  


  Negative


(NEGATIVE) 


 


 


Urine Bilirubin


  


  Negative


(NEGATIVE) 


 


 


Urine Urobilinogen


  


  Normal MG/DL


(0.0-1.0) 


 


 


Urine Leukocyte Esterase  2+ (NEGATIVE)  


 


Urine RBC


  


  Tntc /HPF (0 -


2) 


 


 


Urine WBC


  


  40-60 /HPF (0


- 2) 


 


 


Urine Squamous Epithelial


Cells 


  Few /LPF


(NONE/OCC) 


 


 


Urine Bacteria


  


  Many /HPF


(NONE) 


 


 


Lactic Acid Level


  


  


  0.80 mmol/L


(0.4-2.0)








EKG Diagnostic Results


Rate:  tachycardiac


Rhythm:  other - afib


ST Segments:  no acute changes


ASA given to the pt in ED:  No





Rhythm Strip Diag. Results


EP Interpretation:  yes


Rhythm:  no PVC's, no ectopy





Last Vital Signs








  Date Time  Temp Pulse Resp B/P (MAP) Pulse Ox O2 Delivery O2 Flow Rate FiO2


 


1/31/19 17:16 98.0       


 


1/31/19 16:50  110 16 69/29 94   


 


1/31/19 16:13      Mechanical Ventilator  40








Status:  improved


Disposition:  ADMITTED AS INPATIENT


Condition:  Critical


Referrals:  


Gavino Daigle MD (PCP)











Zion Fam MD Jan 31, 2019 17:28

## 2019-01-31 NOTE — NUR
RESPIRATORY NOTE:

received pt in ER with tonja de la cruz. no redness or skin tears around stoma. sputum 
collected and given to RN. vent settings given were AC 8 450 40% +5. no resp distress noted 
at this time. current spo2 100%, . will cont to monitor.

## 2019-01-31 NOTE — NUR
NURSE NOTES:

Admitted from ED this 77yo female; a resident of [facility] for anemia and sepsis. 
Pt arrives via gurney with 3 ER nurses; in no apparent distress; lethargic but opens eyes to 
pain; vented per trach. Pt has had multiple admissions here in the past. Pt has history of 
ESRD; is on hemodialysis q MWF. She has an AVgraft on Left upper arm with mod strong bruit 
and thrrill. On the vent via trach Shiley 8; settings of AC8 . fio2.40 peep5, sats 98%; 
NSR on the monitor, afebrile. Left femoral TLC inserted in ER this afternoon and pt on 
Levophed gtt at 14mcg/min; current /25. Larrge decubitus ulcer on sacral area noted 
with tunneling(see WCP) and several wound scabs on heels and lower leg. Anuric. Will 
continue to monitor Vital signs and titrate Levophed accordingly

## 2019-01-31 NOTE — NUR
ED Nurse Note:



Telephone report given to CAROLYN Pina. Pt is transfered to 246G by CAROLYN Lamar 
with RT. Pt is on Levophed 8mg in NS 245ml @ 12mcg/min and Vanco 1gm @167ml/hr. 
Pt remains stable. No belongings came with the pt. Type and Cross tubes were 
sent again by Romana per 's request.

## 2019-01-31 NOTE — HISTORY AND PHYSICAL REPORT
DATE OF ADMISSION:  01/31/2019

CHIEF COMPLAINT:  Abnormally low hemoglobin.



HISTORY OF PRESENT ILLNESS:  This is a 78-year-old female from [facility].  I was called earlier today about abnormally low

hemoglobin of 6.6.  I instructed the nursing home staff to send the

patient for transfusion here in this hospital.  Upon arrival, it turns out

that the patient is diaphoretic, hypotensive, and altered, and her white

count was 24,000 indicating that the patient is septic.  The patient is

admitted to intensive care unit on pressors.  The patient is nonverbal.  I

am seeing the patient in the emergency department together with Dr. Fam.  I saw the patient @ [facility] several days ago and she was at her

baseline.  The patient is a ventilator dependent dialysis patient.



PAST MEDICAL HISTORY:

1. Ventilator-dependent respiratory failure.

2. Type 2 diabetes mellitus.

3. End-stage renal failure.

4. Chronic atrial fibrillation.

5. Peripheral vascular disease.

6. Anemia of chronic kidney disease.

7. Status post G-tube.

8. Anxiety.

9. Bilateral lower extremity ulcers.



MEDICATIONS:  Seroquel, amiodarone, Tylenol as needed, apixaban,

lansoprazole, minoxidil, ascorbic acid, zinc sulfate, Nephro-Jhonny, Norco

as needed, and milk of magnesia as needed.



ALLERGIES:  No known drug allergies.



FAMILY HISTORY:  Unable to obtain due to mental status.



SOCIAL HISTORY:  Unable to obtain due to mental status.



REVIEW OF SYSTEMS:  Unable to obtain due to mental status.



PHYSICAL EXAMINATION:

GENERAL:  This is an elderly, chronically ill-appearing, cachectic female,

who is diaphoretic.  The patient is on the ventilator.

VITAL SIGNS:  Blood pressure initially was 76/17 with a heart rate of 101,

rapid atrial fibrillation, currently after IV fluid bolus and pressors,

blood pressure 116/71 with a heart rate of about 110s, rapid atrial

fibrillation, respirations 19, and temperature is 98.

HEENT:  Head is normocephalic and atraumatic.  Pupils are equal, round, and

reactive to light.

NECK:  She has a midline tracheostomy.  There was no jugular venous

distention.

LUNGS:  Bilateral rhonchi.

HEART:  Irregularly irregular and tachycardic.

ABDOMEN:  Soft, nontender.  She has a G-tube.

EXTREMITIES:  No clubbing, cyanosis, or edema.  She has bilateral ankle

ulcers.

NEUROLOGIC:  She is drowsy, but arousable.  There were no gross focal

findings.



LABORATORY AND ANCILLARY DATA:  CBC shows a white count of 24,100,

hematocrit 26.6.  Serum chemistry, sodium 131, potassium 4.1, BUN 41,

creatinine 1.4, and calcium 10.6.  Culture results are pending.  The chest

x-ray results are pending.  Urinalysis, too numerous to count rbcs and 40

to 60 white blood cells and many bacteria.



ASSESSMENT:

1. Septic shock.

2. Ventilator-dependent respiratory failure.

3. Type 2 diabetes mellitus.

4. End-stage renal failure.

5. Chronic atrial fibrillation.

6. Peripheral vascular disease.

7. Anemia of chronic kidney disease.

8. Status post G-tube.

9. Anxiety.

10. Bilateral lower extremity ulcers.



PLAN:

1. Broad-spectrum IV antibiotics.

2. Continue medications from the nursing home except antihypertensive

medications.

3. Erythropoietin.

4. Hemodialysis most likely to be done on Tuesday, Thursday, and

Saturday.









  ______________________________________________

  Gavino Daigle M.D.





DR:  JESUS

D:  01/31/2019 19:11

T:  01/31/2019 20:37

JOB#:  [id number]

CC:



ARETHA

## 2019-01-31 NOTE — NUR
ED Nurse Note:



TLC inserted by Dr. MARIA at left femoral. Levophed 8mg in NaCL 245ml verbal order 
by  



1845:  46/37 Levophed started at 10mcg/min per Dr. MARIA's verbal order. 

-------------------------------------------------------------------------------

Addendum: 01/31/19 at 1853 by [email or web address]

-------------------------------------------------------------------------------

ED Nurse Note:



TLC inserted by Dr. MARIA at left femoral. Levophed 8mg in NaCL 245ml verbal order 
by  



1845:  46/37 Levophed started at 10mcg/min via TLC central line per Dr. MARIA's 
verbal order. 



unable to change IV spreadsheet documentation

## 2019-01-31 NOTE — NUR
ED Nurse Note:



attempted to give report to ICU. Per CN in ICU, not ready to take the report at 
this time. Will call back in five min.

## 2019-01-31 NOTE — NUR
RESPIRATORY NOTE:

Received pt still in ED. Pt on AC 8, 450VT, 40%, PEEP +5. Pt is trach-dependent w/ a 
cuffed, Shiley 8 tube. Pt is alert/awake, follows commands. B/S moy. rhonchi, sxn small 
amounts of thick/thin, pale-yellow secretions. Vent plugged into red outlet, ambubag at 
bedside. Pt in no apparent distress at this time. Awaiting to move pt up to the floors. Will 
continue plan of care.

## 2019-01-31 NOTE — NUR
NURSE NOTES:

Dr Krause called and gave additional orders. HD ordered for ruth with [facility]. Levophed gtt now 
at 10mcg/min. Pt asleep, calm and no distress. PEG patent; NPO

## 2019-01-31 NOTE — NUR
ED Nurse Note:



brought in by ambulance from [facility] due to abnormal lab- hgb of 6.7. 
Trach to vent dependent- Shiley 8,  FiO2 40%, PEEP 5. Multiple pressure 
ulcer seen. Active shunt on left AC and inactive shunt on the right AC. 
Dialysis M/W/F and last dialysis was 1/30/19. Edematous.

## 2019-02-01 VITALS — SYSTOLIC BLOOD PRESSURE: 117 MMHG | DIASTOLIC BLOOD PRESSURE: 41 MMHG

## 2019-02-01 VITALS — SYSTOLIC BLOOD PRESSURE: 114 MMHG | DIASTOLIC BLOOD PRESSURE: 43 MMHG

## 2019-02-01 VITALS — DIASTOLIC BLOOD PRESSURE: 52 MMHG | SYSTOLIC BLOOD PRESSURE: 105 MMHG

## 2019-02-01 VITALS — SYSTOLIC BLOOD PRESSURE: 101 MMHG | DIASTOLIC BLOOD PRESSURE: 34 MMHG

## 2019-02-01 VITALS — SYSTOLIC BLOOD PRESSURE: 130 MMHG | DIASTOLIC BLOOD PRESSURE: 37 MMHG

## 2019-02-01 VITALS — DIASTOLIC BLOOD PRESSURE: 34 MMHG | SYSTOLIC BLOOD PRESSURE: 101 MMHG

## 2019-02-01 VITALS — DIASTOLIC BLOOD PRESSURE: 43 MMHG | SYSTOLIC BLOOD PRESSURE: 114 MMHG

## 2019-02-01 VITALS — DIASTOLIC BLOOD PRESSURE: 33 MMHG | SYSTOLIC BLOOD PRESSURE: 113 MMHG

## 2019-02-01 VITALS — DIASTOLIC BLOOD PRESSURE: 54 MMHG | SYSTOLIC BLOOD PRESSURE: 109 MMHG

## 2019-02-01 VITALS — DIASTOLIC BLOOD PRESSURE: 33 MMHG | SYSTOLIC BLOOD PRESSURE: 102 MMHG

## 2019-02-01 VITALS — SYSTOLIC BLOOD PRESSURE: 105 MMHG | DIASTOLIC BLOOD PRESSURE: 33 MMHG

## 2019-02-01 VITALS — SYSTOLIC BLOOD PRESSURE: 110 MMHG | DIASTOLIC BLOOD PRESSURE: 58 MMHG

## 2019-02-01 VITALS — SYSTOLIC BLOOD PRESSURE: 119 MMHG | DIASTOLIC BLOOD PRESSURE: 33 MMHG

## 2019-02-01 VITALS — DIASTOLIC BLOOD PRESSURE: 35 MMHG | SYSTOLIC BLOOD PRESSURE: 102 MMHG

## 2019-02-01 VITALS — SYSTOLIC BLOOD PRESSURE: 110 MMHG | DIASTOLIC BLOOD PRESSURE: 44 MMHG

## 2019-02-01 VITALS — SYSTOLIC BLOOD PRESSURE: 121 MMHG | DIASTOLIC BLOOD PRESSURE: 50 MMHG

## 2019-02-01 VITALS — SYSTOLIC BLOOD PRESSURE: 106 MMHG | DIASTOLIC BLOOD PRESSURE: 37 MMHG

## 2019-02-01 VITALS — DIASTOLIC BLOOD PRESSURE: 44 MMHG | SYSTOLIC BLOOD PRESSURE: 113 MMHG

## 2019-02-01 VITALS — DIASTOLIC BLOOD PRESSURE: 38 MMHG | SYSTOLIC BLOOD PRESSURE: 110 MMHG

## 2019-02-01 VITALS — DIASTOLIC BLOOD PRESSURE: 30 MMHG | SYSTOLIC BLOOD PRESSURE: 107 MMHG

## 2019-02-01 VITALS — DIASTOLIC BLOOD PRESSURE: 29 MMHG | SYSTOLIC BLOOD PRESSURE: 106 MMHG

## 2019-02-01 VITALS — SYSTOLIC BLOOD PRESSURE: 109 MMHG | DIASTOLIC BLOOD PRESSURE: 34 MMHG

## 2019-02-01 VITALS — SYSTOLIC BLOOD PRESSURE: 101 MMHG | DIASTOLIC BLOOD PRESSURE: 46 MMHG

## 2019-02-01 VITALS — SYSTOLIC BLOOD PRESSURE: 107 MMHG | DIASTOLIC BLOOD PRESSURE: 34 MMHG

## 2019-02-01 VITALS — DIASTOLIC BLOOD PRESSURE: 34 MMHG | SYSTOLIC BLOOD PRESSURE: 114 MMHG

## 2019-02-01 VITALS — SYSTOLIC BLOOD PRESSURE: 108 MMHG | DIASTOLIC BLOOD PRESSURE: 34 MMHG

## 2019-02-01 VITALS — SYSTOLIC BLOOD PRESSURE: 114 MMHG | DIASTOLIC BLOOD PRESSURE: 44 MMHG

## 2019-02-01 VITALS — DIASTOLIC BLOOD PRESSURE: 33 MMHG | SYSTOLIC BLOOD PRESSURE: 119 MMHG

## 2019-02-01 VITALS — SYSTOLIC BLOOD PRESSURE: 112 MMHG | DIASTOLIC BLOOD PRESSURE: 44 MMHG

## 2019-02-01 VITALS — DIASTOLIC BLOOD PRESSURE: 43 MMHG | SYSTOLIC BLOOD PRESSURE: 132 MMHG

## 2019-02-01 VITALS — DIASTOLIC BLOOD PRESSURE: 31 MMHG | SYSTOLIC BLOOD PRESSURE: 105 MMHG

## 2019-02-01 VITALS — DIASTOLIC BLOOD PRESSURE: 34 MMHG | SYSTOLIC BLOOD PRESSURE: 100 MMHG

## 2019-02-01 VITALS — SYSTOLIC BLOOD PRESSURE: 111 MMHG | DIASTOLIC BLOOD PRESSURE: 31 MMHG

## 2019-02-01 VITALS — SYSTOLIC BLOOD PRESSURE: 103 MMHG | DIASTOLIC BLOOD PRESSURE: 35 MMHG

## 2019-02-01 VITALS — DIASTOLIC BLOOD PRESSURE: 57 MMHG | SYSTOLIC BLOOD PRESSURE: 111 MMHG

## 2019-02-01 VITALS — DIASTOLIC BLOOD PRESSURE: 47 MMHG | SYSTOLIC BLOOD PRESSURE: 119 MMHG

## 2019-02-01 VITALS — SYSTOLIC BLOOD PRESSURE: 106 MMHG | DIASTOLIC BLOOD PRESSURE: 62 MMHG

## 2019-02-01 VITALS — SYSTOLIC BLOOD PRESSURE: 102 MMHG | DIASTOLIC BLOOD PRESSURE: 33 MMHG

## 2019-02-01 VITALS — SYSTOLIC BLOOD PRESSURE: 174 MMHG | DIASTOLIC BLOOD PRESSURE: 54 MMHG

## 2019-02-01 VITALS — DIASTOLIC BLOOD PRESSURE: 51 MMHG | SYSTOLIC BLOOD PRESSURE: 146 MMHG

## 2019-02-01 VITALS — SYSTOLIC BLOOD PRESSURE: 104 MMHG | DIASTOLIC BLOOD PRESSURE: 35 MMHG

## 2019-02-01 VITALS — DIASTOLIC BLOOD PRESSURE: 43 MMHG | SYSTOLIC BLOOD PRESSURE: 118 MMHG

## 2019-02-01 VITALS — SYSTOLIC BLOOD PRESSURE: 110 MMHG | DIASTOLIC BLOOD PRESSURE: 41 MMHG

## 2019-02-01 VITALS — DIASTOLIC BLOOD PRESSURE: 39 MMHG | SYSTOLIC BLOOD PRESSURE: 100 MMHG

## 2019-02-01 VITALS — SYSTOLIC BLOOD PRESSURE: 103 MMHG | DIASTOLIC BLOOD PRESSURE: 34 MMHG

## 2019-02-01 VITALS — SYSTOLIC BLOOD PRESSURE: 110 MMHG | DIASTOLIC BLOOD PRESSURE: 42 MMHG

## 2019-02-01 VITALS — DIASTOLIC BLOOD PRESSURE: 30 MMHG | SYSTOLIC BLOOD PRESSURE: 98 MMHG

## 2019-02-01 LAB
ADD MANUAL DIFF: YES
ALBUMIN SERPL-MCNC: 1.4 G/DL (ref 3.4–5)
ALP SERPL-CCNC: 115 U/L (ref 46–116)
ALT SERPL-CCNC: 19 U/L (ref 12–78)
ANION GAP SERPL CALC-SCNC: 10 MMOL/L (ref 5–15)
AST SERPL-CCNC: 20 U/L (ref 15–37)
BILIRUB DIRECT SERPL-MCNC: 0.2 MG/DL (ref 0–0.3)
BILIRUB SERPL-MCNC: 0.5 MG/DL (ref 0.2–1)
BUN SERPL-MCNC: 52 MG/DL (ref 7–18)
CALCIUM SERPL-MCNC: 9.1 MG/DL (ref 8.5–10.1)
CHLORIDE SERPL-SCNC: 99 MMOL/L (ref 98–107)
CO2 SERPL-SCNC: 24 MMOL/L (ref 21–32)
CREAT SERPL-MCNC: 1.5 MG/DL (ref 0.55–1.3)
ERYTHROCYTE [DISTWIDTH] IN BLOOD BY AUTOMATED COUNT: 17.1 % (ref 11.6–14.8)
HCT VFR BLD CALC: 17.8 % (ref 37–47)
HGB BLD-MCNC: 5.8 G/DL (ref 12–16)
LDH SERPL L TO P-CCNC: 176 U/L (ref 81–234)
MCV RBC AUTO: 97 FL (ref 80–99)
PLATELET # BLD: 496 K/UL (ref 150–450)
POTASSIUM SERPL-SCNC: 3.4 MMOL/L (ref 3.5–5.1)
RBC # BLD AUTO: 1.84 M/UL (ref 4.2–5.4)
SODIUM SERPL-SCNC: 133 MMOL/L (ref 136–145)
WBC # BLD AUTO: 19.2 K/UL (ref 4.8–10.8)

## 2019-02-01 PROCEDURE — 5A1D70Z PERFORMANCE OF URINARY FILTRATION, INTERMITTENT, LESS THAN 6 HOURS PER DAY: ICD-10-PCS

## 2019-02-01 RX ADMIN — DEXTROSE MONOHYDRATE SCH MLS/HR: 50 INJECTION, SOLUTION INTRAVENOUS at 21:42

## 2019-02-01 RX ADMIN — APIXABAN SCH MG: 2.5 TABLET, FILM COATED ORAL at 17:32

## 2019-02-01 RX ADMIN — DEXTROSE MONOHYDRATE SCH MLS/HR: 50 INJECTION, SOLUTION INTRAVENOUS at 14:12

## 2019-02-01 RX ADMIN — DEXTROSE MONOHYDRATE SCH MLS/HR: 50 INJECTION, SOLUTION INTRAVENOUS at 00:14

## 2019-02-01 RX ADMIN — EPOETIN ALFA-EPBX SCH UNIT: 10000 INJECTION, SOLUTION INTRAVENOUS; SUBCUTANEOUS at 21:41

## 2019-02-01 RX ADMIN — PANTOPRAZOLE SODIUM SCH MG: 40 INJECTION, POWDER, FOR SOLUTION INTRAVENOUS at 08:56

## 2019-02-01 RX ADMIN — DEXTROSE MONOHYDRATE SCH MLS/HR: 50 INJECTION, SOLUTION INTRAVENOUS at 06:03

## 2019-02-01 RX ADMIN — APIXABAN SCH MG: 2.5 TABLET, FILM COATED ORAL at 08:57

## 2019-02-01 RX ADMIN — SODIUM CHLORIDE SCH MLS/HR: 9 INJECTION, SOLUTION INTRAVENOUS at 17:31

## 2019-02-01 NOTE — NUR
NURSE NOTES:

ADLS DONE, PT CLEAN AND DRY. HOB ELEVATED TO PREVENT ASPIRATION.KEPT ON CLOSE MONITORING.

## 2019-02-01 NOTE — NEPHROLOGY PROGRESS NOTE
Assessment/Plan


Plan


Urosepsis (GNR) IV Abx per ID.


Worsening anemia 2 above -transfuse on HD.


ESRD HD


Chr. A. Fib - apixaban.





Subjective


Subjective


Less confused.





Objective


Objective





Last 24 Hour Vital Signs








  Date Time  Temp Pulse Resp B/P (MAP) Pulse Ox O2 Delivery O2 Flow Rate FiO2


 


2/1/19 10:38  70 13     40


 


2/1/19 10:00  80 18 114/43 (66) 99   


 


2/1/19 09:00  80 18 101/34 (56) 99   


 


2/1/19 08:35  88 22     40


 


2/1/19 08:00      Mechanical Ventilator  


 


2/1/19 08:00        40


 


2/1/19 07:02  76 17     40


 


2/1/19 07:00    98/30    


 


2/1/19 07:00  77 16 98/30 (52) 100   


 


2/1/19 06:45  108 22 114/44 (67) 100   


 


2/1/19 06:30  126 21 132/43 (72) 100   


 


2/1/19 06:15  131 24 117/41 (66) 100   


 


2/1/19 06:03    117/41    


 


2/1/19 06:00  119 15 118/43 (68) 100   


 


2/1/19 05:45  72 15 107/30 (55) 100   


 


2/1/19 05:30  77 17  100   


 


2/1/19 05:15  78 18 101/46 (64) 99   


 


2/1/19 05:11  78 16     40


 


2/1/19 05:00  75 16 102/35 (57) 100   


 


2/1/19 05:00    102/35    


 


2/1/19 04:45  75 16 103/34 (57) 100   


 


2/1/19 04:30  74 16 105/33 (57) 100   


 


2/1/19 04:15  72 16 105/31 (55) 100   


 


2/1/19 04:00  74      


 


2/1/19 04:00        40


 


2/1/19 04:00    101/34    


 


2/1/19 04:00 99.5 74 16 101/34 (56) 100   


 


2/1/19 04:00      Mechanical Ventilator  


 


2/1/19 03:45  75 16 102/33 (56) 100   


 


2/1/19 03:33  75 17     40


 


2/1/19 03:30  75 17 110/41 (64) 100   


 


2/1/19 03:15  75 16 109/34 (59) 100   


 


2/1/19 03:00  74 16 104/35 (58) 100   


 


2/1/19 03:00    104/35    


 


2/1/19 02:45  74 15 100/34 (56) 100   


 


2/1/19 02:30  76 15 103/35 (57) 100   


 


2/1/19 02:15  79 17 106/37 (60) 100   


 


2/1/19 02:00  134 20 110/44 (66) 100   


 


2/1/19 02:00    110/44    


 


2/1/19 01:45  137 18 174/54 (94) 99   


 


2/1/19 01:30  93 24  100   


 


2/1/19 01:16  76 16     40


 


2/1/19 01:15  76 16 113/33 (59) 100   


 


2/1/19 01:00 101.0 76 17 111/31 (57) 100   


 


2/1/19 01:00    111/31    


 


2/1/19 01:00 101.0       


 


2/1/19 00:45  76 17 114/34 (60) 100   


 


2/1/19 00:30  77 16 106/29 (54) 100   


 


2/1/19 00:15  76 17 119/33 (61) 100   


 


2/1/19 00:00 101.2 75 16 119/33 (61) 100   


 


2/1/19 00:00  75 16 119/33 (61) 100   


 


2/1/19 00:00        40


 


2/1/19 00:00      Mechanical Ventilator  


 


2/1/19 00:00    119/33    


 


2/1/19 00:00  76      


 


1/31/19 23:45  75 16 119/35 (63) 100   


 


1/31/19 23:30  73 16 113/32 (59) 100   


 


1/31/19 23:19  73 17     40


 


1/31/19 23:15  73 14 111/30 (57) 100   


 


1/31/19 23:00  74 15 107/31 (56) 100   


 


1/31/19 23:00    107/31    


 


1/31/19 22:45  78 19 100/25 (50) 100   


 


1/31/19 22:36    101/25    


 


1/31/19 22:30  83 15 101/25 (50) 100   


 


1/31/19 22:15  128 15 83/46 (58) 100   


 


1/31/19 22:00  130 23 93/52 (66) 100   


 


1/31/19 22:00    93/52    


 


1/31/19 21:45  84 16 104/24 (50) 100   


 


1/31/19 21:30  82 14 105/29 (54) 100   


 


1/31/19 21:15  125 18 104/28 (53) 100   


 


1/31/19 21:00  125 20 98/32 (54) 99   


 


1/31/19 21:00    98/32    


 


1/31/19 20:59  131 15     40


 


1/31/19 20:49        40


 


1/31/19 20:45  131 12 102/35 (57) 100   


 


1/31/19 20:30  127 15 94/26 (48) 99   


 


1/31/19 20:16        40


 


1/31/19 20:15  78 15 108/27 (54) 99   


 


1/31/19 20:00      Mechanical Ventilator  


 


1/31/19 20:00  76 15 116/25 (55) 100   


 


1/31/19 20:00 98.8 77 16 102/34 (56)    


 


1/31/19 20:00  77      


 


1/31/19 20:00    116/25    


 


1/31/19 19:29 98.0 120 16 104/46 100 Mechanical Ventilator  40


 


1/31/19 19:25    111/47    


 


1/31/19 19:20    104/71    


 


1/31/19 19:20  120 16 104/46 100 Mechanical Ventilator  


 


1/31/19 19:15    101/46    


 


1/31/19 19:10    90/41    


 


1/31/19 19:09  145  113/39    


 


1/31/19 19:05    113/39    


 


1/31/19 19:00    110/45    


 


1/31/19 18:55    116/71    


 


1/31/19 18:50    105/58    


 


1/31/19 18:49  145 19     40


 


1/31/19 18:45  125 17 46/37 98 Mechanical Ventilator  


 


1/31/19 18:45    46/37    


 


1/31/19 17:34  101 17 76/17 98 Mechanical Ventilator  40


 


1/31/19 17:21  111 18     40


 


1/31/19 17:16 98.0       


 


1/31/19 16:50  110 16 69/29 94   


 


1/31/19 16:13  122 21   Mechanical Ventilator  40


 


1/31/19 16:09  128 23     40


 


1/31/19 15:50 98.0 120 20 96/26 95 Mechanical Ventilator  40


 


1/31/19 15:41 98.4 61 22 95/70 98 Trach Collar  

















Intake and Output  


 


 1/31/19 2/1/19





 19:00 07:00


 


Intake Total  867.47 ml


 


Output Total 75 ml 0 ml


 


Balance -75 ml 867.47 ml


 


  


 


Intake Oral  0 ml


 


IV Total  867.47 ml


 


Output Urine Total 75 ml 0 ml


 


# Voids 1 








Laboratory Tests


1/31/19 16:06: 


White Blood Count 24.1*H, Red Blood Count 2.76L, Hemoglobin 8.7L, Hematocrit 

26.6L, Mean Corpuscular Volume 96, Mean Corpuscular Hemoglobin 31.3H, Mean 

Corpuscular Hemoglobin Concent 32.5, Red Cell Distribution Width 16.9H, 

Platelet Count 580H, Mean Platelet Volume 5.9L, Neutrophils (%) (Auto) , 

Lymphocytes (%) (Auto) , Monocytes (%) (Auto) , Eosinophils (%) (Auto) , 

Basophils (%) (Auto) , Differential Total Cells Counted 100, Neutrophils % (

Manual) 89H, Lymphocytes % (Manual) 4L, Monocytes % (Manual) 4, Eosinophils % (

Manual) 0, Basophils % (Manual) 0, Band Neutrophils 3, Platelet Estimate 

IncreasedH, Platelet Morphology Normal, Hypochromasia 1+, Anisocytosis 1+, 

Prothrombin Time 10.0, Prothromb Time International Ratio 0.9, Activated 

Partial Thromboplast Time 30, Sodium Level 131L, Potassium Level 4.1, Chloride 

Level 97L, Carbon Dioxide Level 25, Anion Gap 9, Blood Urea Nitrogen 41H, 

Creatinine 1.4H, Estimat Glomerular Filtration Rate , Glucose Level 106, 

Calcium Level 10.6H, Total Bilirubin 0.5, Aspartate Amino Transf (AST/SGOT) 49H

, Alanine Aminotransferase (ALT/SGPT) 27, Alkaline Phosphatase 134H, Total 

Protein 8.5H, Albumin 2.0L, Globulin 6.5, Albumin/Globulin Ratio 0.3L, Lipase 85


1/31/19 16:26: 


Urine Color Red, Urine Appearance Cloudy, Urine pH 7, Urine Specific Gravity 

1.010, Urine Protein 4+H, Urine Glucose (UA) Negative, Urine Ketones 1+H, Urine 

Blood 5+H, Urine Nitrite Negative, Urine Bilirubin Negative, Urine Urobilinogen 

Normal, Urine Leukocyte Esterase 2+H, Urine RBC TntcH, Urine WBC 40-60H, Urine 

Squamous Epithelial Cells Few, Urine Bacteria ManyH


1/31/19 18:26: Lactic Acid Level 0.80


2/1/19 04:30: 


White Blood Count 19.2H, Red Blood Count 1.84L, Hemoglobin 5.8#*L, Hematocrit 

17.8#L, Mean Corpuscular Volume 97, Mean Corpuscular Hemoglobin 31.5H, Mean 

Corpuscular Hemoglobin Concent 32.6, Red Cell Distribution Width 17.1H, 

Platelet Count 496H, Mean Platelet Volume 5.9L, Neutrophils (%) (Auto) , 

Lymphocytes (%) (Auto) , Monocytes (%) (Auto) , Eosinophils (%) (Auto) , 

Basophils (%) (Auto) , Differential Total Cells Counted 100, Neutrophils % (

Manual) 91H, Lymphocytes % (Manual) 2L, Monocytes % (Manual) 4, Eosinophils % (

Manual) 1, Basophils % (Manual) 1, Band Neutrophils 1, Platelet Estimate 

IncreasedH, Platelet Morphology Normal, Hypochromasia 2+, Anisocytosis 1+, 

Sodium Level 133L, Potassium Level 3.4L, Chloride Level 99, Carbon Dioxide 

Level 24, Anion Gap 10, Blood Urea Nitrogen 52H, Creatinine 1.5H, Estimat 

Glomerular Filtration Rate , Glucose Level 110H, Calcium Level 9.1, Total 

Bilirubin 0.5, Aspartate Amino Transf (AST/SGOT) 20, Alanine Aminotransferase (

ALT/SGPT) 19, Alkaline Phosphatase 115, Total Protein 5.8#L, Albumin 1.4L, 

Magnesium Level 2.1, Direct Bilirubin 0.2, Lactate Dehydrogenase 176


2/1/19 09:55: 


Arterial Blood pH 7.418, Arterial Blood Partial Pressure CO2 37.4, Arterial 

Blood Partial Pressure O2 104.2H, Arterial Blood HCO3 23.6, Arterial Blood 

Oxygen Saturation 97.3, Arterial Blood Base Excess -0.8, Arcadio Test Positive


Height (Feet):  5


Height (Inches):  3.00


Weight (Pounds):  144


Objective


On Vent.


Cv IRR +tach


Lungs CTA


Abd SNT. BS +


E No CCE











Gavino Daigle MD Feb 1, 2019 11:20

## 2019-02-01 NOTE — NUR
NURSE NOTES:

Lab reports Hgb of 5.8. Pt shows no signs of obvious bleeding but looked pale. Called to Dr Daigle, awaiting response

## 2019-02-01 NOTE — NUR
NURSE NOTES:

Placed pt on P200 mattress. Temp 101, removed extra covers. /54; HR  125 on afib; 
titrated Levo down to 5mcg/min

## 2019-02-01 NOTE — DIAGNOSTIC IMAGING REPORT
Indication: Dyspnea

 

Technique: One view of the chest

 

Comparison: 1/2/2019

 

Findings: Bilateral pleural effusions appear similar to the previous exam on the

right, increased on the left. There is extensive airspace consolidation bilaterally,

which is increased from the prior exam. The heart is borderline enlarged. There is a

tracheostomy. The aorta is calcified

 

Impression: Bilateral pleural effusions, similar on the right previous exam of one

month earlier, increased on the left.

 

Bilateral extensive interstitial and airspace infiltrates versus edema, new since

prior study

## 2019-02-01 NOTE — NUR
NURSE NOTES:

Skin hot ot touch, temp 101.2 axillary. Tylenol 650mg supp given. Wound culture taken.

## 2019-02-01 NOTE — CONSULTATION
DATE OF CONSULTATION:  02/01/2019

INFECTIOUS DISEASES CONSULTATION



CONSULTING PHYSICIAN:  Chase Santillan M.D.



REFERRING PHYSICIAN:  Gavino Daigle M.D.



REASON FOR CONSULTATION:  Leukocytosis.



HISTORY OF PRESENTING ILLNESS:  This is a 78-year-old lady with history of

diabetes, renal failure, respiratory failure, status post tracheostomy,

status post G-tube placement, who comes in with hypotension and altered

mental status and she was found to have leukocytosis.  She has been

admitted to the ICU and an Infectious Diseases consultation has been

obtained for antibiotics.



PAST MEDICAL HISTORY:

1. History of respiratory failure, status post tracheostomy.

2. Diabetes.

3. Renal failure.

4. Atrial fibrillation.

5. Peripheral vascular disease.

6. Anemia.

7. Status post G-tube placement.

8. Anxiety.

9. History of bilateral lower extremity ulcer.



SOCIAL HISTORY:  Unknown.



FAMILY HISTORY:  Unknown.



REVIEW OF SYSTEMS:  Unable to obtain currently.



MEDICATIONS:  As an inpatient, she is on Epogen, chlorhexidine, gluconate,

vancomycin, Protonix, Eliquis, Zosyn, norepinephrine, albuterol,

ipratropium, Zofran, and Tylenol.



ALLERGIES:  No known drug allergies.



PHYSICAL EXAMINATION:

VITAL SIGNS:  Temperature of 99.5, T-max of 101.2, pulse of 70, respiratory

rate of 18, blood pressure 101/34, and O2 saturation of 99%.

HEENT:  Pupils equally reactive to light and accommodation.  Mouth appears

clean without thrush.

NECK:  Supple.  No adenopathy.  No JVD.  Tracheostomy site is clean.

CARDIOVASCULAR:  Regular rate and rhythm.  No murmurs.

LUNGS:  Clear to auscultation bilaterally.  No crackles.  No

wheezes.

ABDOMEN:  Soft and nontender.  No organomegaly.  G-tube site appears clean.

 

EXTREMITIES:  No cyanosis, no clubbing, no edema.



LABORATORY AND DIAGNOSTIC DATA:  White count of 24 on 01/31/2019, white

count 19 today, hemoglobin 5.8, hematocrit 17.8, MCV 97, platelet count of

196 with neutrophils of 91%.  Sodium 133, potassium 3.4, chloride 99,

bicarbonate 24, BUN 52, creatinine 1.5, glucose 110, calcium 9.1, total

bilirubin 0.5, direct bilirubin 0.2.  AST 20, ALT 19, and alkaline

phosphatase 115.  .  Total protein 5.8.  Albumin 1.4.  Lipase of

85.  UA showing 40 to 60 white cells.  Urine culture is growing

gram-negative rods more than 100,000 colonies.  Wound cultures are

pending.



ASSESSMENT:  This is a 78-year-old lady with history of diabetes and renal

failure, on dialysis, who comes in with hypotension and is found to

have.



1. Gram-negative urinary tract infection.

2. Septic shock.

3. Leukocytosis is improving.

4. Diabetes.

5. Renal failure.



PLAN:

1. Continue IV vancomycin and Zosyn for now.

2. We will follow up cultures and adjust antibiotics accordingly.



I would like to thank, Dr. Daigle, for this consultation.









  ______________________________________________

  Chase Santillan M.D.





DR:  NIEVES

D:  02/01/2019 11:09

T:  02/02/2019 04:47

JOB#:  [id number]

CC:  Gavino Daigle M.D.; Fax#:  [phone]

## 2019-02-01 NOTE — NUR
RESPIRATORY NOTE:

received pt on current vent settings. pt is trached with shiley 8; secured via trach 
tie/guard. no resp distress noted at this time. no redness seen around stoma. back-up trach 
and ambu bag at bedside. vent plugged into redoutlet. will cont to monitor.

## 2019-02-01 NOTE — CONSULTATION
DATE OF CONSULTATION:  02/01/2019

PULMONARY CONSULTATION



CONSULTING PHYSICIAN:  Quentin Cardoso M.D.



HISTORY OF PRESENT ILLNESS:  This is a 78-year-old female who is a resident

at [facility] with ESRD, on dialysis and chronic

respiratory failure, status post tracheostomy.  She was transferred to

[facility] after found to be anemic.  She was also found to

have markedly elevated white count, suspicious of a septic process.  The

patient was started on Levophed and is currently in ICU.  Her vent

settings are AC _____, tidal volume of 500, and FiO2 100%.  The patient is

nonverbal and therefore, no further history.



PAST MEDICAL HISTORY:  Notable for ESRD on dialysis, diabetes mellitus,

ventilator dependent respiratory failure, chronic atrial fibrillation,

peripheral vascular disease, anemia, and chronic G-tube.



MEDICATIONS:  Include amiodarone, Seroquel, Tylenol, apixaban, Protonix,

minoxidil, ascorbic acid, zinc, Nephro-Jhonny, Norco, and milk of

magnesia.



ALLERGIES:  None.



FAMILY HISTORY:  Noncontributory.



SOCIAL HISTORY:  She is a nursing home resident.



REVIEW OF SYSTEMS:  Unobtainable.



PHYSICAL EXAMINATION:

GENERAL:  Reveals a 78-year-old female.

HEENT:  Unremarkable.

CHEST:  Shows decreased breath sounds bilaterally with normal heart

sounds.

ABDOMEN:  Soft.  There is a tracheostomy in place.

EXTREMITIES:  There is no peripheral edema, cyanosis, or clubbing.

NEUROLOGIC:  Nonfocal.  She has bilateral lower extremity skin breakdown.



LABORATORY DATA:  Lab testing shows white count 24,000, hemoglobin is 5.

Urinalysis shows pyuria.  Creatinine is 1.4.



IMPRESSION:

1. Shock, possibly septic.

2. UTI.

3. ESRD, on dialysis.

4. Ventilator dependent respiratory failure.

5. Tracheostomy.

6. Diabetes mellitus.

7. Chronic atrial fibrillation.



DISCUSSION:  Start broad-spectrum antibiotics.  We will continue AC mode.

We will increase FiO2 as tolerated.  Blood transfusion as required.

Hemodialysis, suspect the patient will need ID consultation and

broad-spectrum antibiotics and tailoring of therapy depending on culture

results.









  ______________________________________________

  Quentin Cardoso M.D.





DR:  BEBA

D:  02/01/2019 08:17

T:  02/01/2019 17:30

JOB#:  [id number]

CC:

## 2019-02-01 NOTE — NUR
NURSE NOTES:

Awake , tracheostomy care done and dressing changed . Kept clean dry and intact. turned and 
repositioned.HOB elevated to prevent aspiration. Continue to monitor.

## 2019-02-01 NOTE — NUR
NURSE NOTES:

Restless, agitated, attempts to pull lines out. Order for restraints obtained, applied on 
both wrists(soft)

## 2019-02-01 NOTE — NUR
NURSE NOTES: , on afib. Pt known to have hx of Afib. Otherwise mostly sinus rhythm 
when calm. Levophed gtt now at 5mcg/min./44. Will continue to monitor

## 2019-02-01 NOTE — NUR
NURSE NOTES:

Pt awake when received with no apparent distress. Afebrile at this time.On trach and vent 
raffaele 8 with AC 8 Vt 450 Fi)2 40 Peep 5. Pt NPO at this time.GT placement check and 
intact. HOB elevated to prevent aspiration. Mouth care done.Turned and repositioned.Due for 
dialysis today, hemoglobin 5.8 and received order from Dr Mac to transfuse 2 units 
blood during dialysis.Order noted and carried out.AVgraft on Left upper arm with m bruit and 
thrrill present. Left femoral TLC running Levophed gtt at 5mcg/min. Kept clean and dry. Will 
continue to monitor.

## 2019-02-01 NOTE — NUR
NURSE NOTES:

Pt turned and repositioned for skin management.Restraints in place with no skin 
impairment.Kept clean and dry.

## 2019-02-01 NOTE — NUR
RESPIRATORY NOTE:

Received pt on AC 8, 450VT, 40%, PEEP +5. Pt is trach-dependent w/ a cuffed, Shiley 8 tube. 
Pt is alert/awake, follows commands. Both hands on soft restraints as she tends to pull out 
her tubings. B/S moy. rhonchi, sxn small amounts of thick, yellow-green secretions. Vent 
plugged into red outlet, ambubag & spare trach kit at bedside. Pt in no apparent distress at 
this time. Will continue plan of care.

## 2019-02-01 NOTE — NUR
HAND-OFF: 

Report given to CAROLYN Decker.Dialysis started and first unit PRBC started.No ADR noted at this 
time.

## 2019-02-01 NOTE — NUR
RD ASSESSMENT & RECOMMENDATIONS

SEE CARE ACTIVITY FOR COMPLETE ASSESSMENT



DAILY ESTIMATED NEEDS:

Needs based on Critical care +  Wounds + ESRD w/ HD/ 60kg 

22-30  kcals/kg 

2847-9561  total kcals

1.5-2.0  g protein/kg

90-120g  g total protein 

Fluid per MD on HD   mL/kg

 total fluid mLs



NUTRITION DIAGNOSIS:

* Swallowing difficulty R/T respiratory status as evidenced by pt is trach

and PEG dep.

* Increased kcal/prot needs R/T wound healing as evidenced by pt admitted

w/ multiple wounds advanced wound, refer to WC eval.

* Altered nutrition related lab values R/T ESRD dx, clinical condition as

evidenced by low Hgb (5.8), critical WBC now trending down, low BP/ on

pressor support.



CURRENT TF:NPO  





ENTERAL NUTRITION RECOMMENDATIONS:

Nepro @35ml/hr x 24 hrs + Prosource 1pkt BID  

to provide 840ml, 1512 kcal, 78g + 22g prot, 611ml free water 



1. AS MEDICALLY ABLE: start NEPRO at 15ml/hr for 6 hrs, advance 10ml.hr

q4-6 hrs as tolerated to goal rate of 35ml/hr.

2. add Prosource 1 pack BID to  better meet est protein needs

3. HOB over 30 degrees, flush per MD.

** FEED W/ HEMODYNAMIC STABILITY, OTHERWISE TROPHIC FEEDS OF 5-10ML/HR FOR

    GUT INTEGRITY **







ADDITIONAL RECOMMENDATIONS:

*  Re-calibrate bed scale (per SNF 1/30 wts: 132#, 60kg) 

* Wound care: add EMILY BID + Nephrovite x1 daily  

    -> rec VIT C per nephro MD  

* Trophic feeds w/ hypotension and pressor support  

. 

.

## 2019-02-01 NOTE — NUR
NURSE NOTES:

No BM, complete bed bath given. Wound dressings dry and intact. Remains anuric. GT intact, 
dressing clean and dry. DC'd saline lock on right AC. Redressed left femoral TLC. All ports 
patent. Oozing serous fluid from left arm. BP stbale on Levop at 5mcg/min. Temp 99.5 
axillary

## 2019-02-01 NOTE — NUR
NURSE NOTES:

Received pt in no apparent distress; awake, alert mildly restless. Currently undergoing 
hemodialysis and is being attended by HR RN from [facility]. Pt continues to be on the vent via 
trach and appears to be tolerating current vent settings. Remains impulsive and has the 
tendecny to pull tubes out. Bilat soft wrist restraints on. GT intact but clamped; remains 
NPO per MD. No BM; anuric. Left femoral TLC patent with clean and dry dressing. Wound 
dressings dry and intact. Off Levophed gtt; BP in the >100's systolic. Will continue to 
monitor

## 2019-02-01 NOTE — NUR
Social Work

This SW met with patient, currently in the ICU who has trach, was from [facility] and receiving D[facility] at [facility] [place] ([id number] Patient has 
a POLST signed by son, Simon Yang () requesting full code, full treatment 
and artificial means for nutrition (feeding tube). Advance Directive also on patients chart, 
stating both sons, Simon and Jesús Yang () are the decision maker for 
patient. This Sw contacted son, Simon who confirmed he would like to transfer patient back 
to [place], along with remaining full code, full treatment as well. Pending progress and 
further recommendations from the Medical team at this time. Brief emotional support provided 
to son, who expressed no further needs or concerns at this time.

## 2019-02-01 NOTE — NUR
NURSE NOTES:

HD finished and well tolerated per HDRN. No UF. Pt received 2 units PRBC accross dialysis.

## 2019-02-01 NOTE — NUR
NURSE NOTES:

PATIENT TURNED AND REPOSITIONED. MOUTH CARE DONE.KEPT CLEAN DRY AND COMFORTABLE. DIALYSIS 
UNABLE TO BE DONE ON TIME DUR TO LEAK FROM THE MACHINE. ILL CONTINUE TO MONITOR PT.

## 2019-02-01 NOTE — NUR
NURSE NOTES:

Pt seen by wound nurse and all dressings changed. Pt turned and repositioned for skin 
management.

## 2019-02-01 NOTE — NUR
NURSE NOTES:

Pt was seen by Dr Mac with order for wound consult with Dr Smith.Order noted. 
Turned and repositioned. Kept clean dry and comfortable.

## 2019-02-01 NOTE — NUR
77 YO FEMALE BIBA FROM [facility] TO ER



CC     ABNORMAL LABS





SI:     RESP FAILURE TRACH/VENT DEPENDENT, ANEMIA, LEUKOCYTOSIS

T. 98.5  RR 22 B/P 95/70

AC 8  FIO2 40% PEEP 5

WBC 24.1 H/H 5.8/17.8 BUN 41 CR 1.4

CXR= BILATERAL PLEURAL EFFUSION





IS:     IV BOLUS NS X 1 LITER

*****ADMITTED TO ICU @ 1920*****

*********ICU STATUS**********





DCP   RETURN TO [facility]

## 2019-02-01 NOTE — NUR
NURSE NOTES:WOUND CARE NOTES:Pt presents with full thickness pressure injury sacrum with 
deep undermining into L buttocks .Historical arched surgical scar Upper L buttocks.Wound 
measures(L)3.4cm x (W)3cm x (D)1.5cm ,undermining 12-6 by 12 cm @8o'clock. Scattered Biofilm 
at base of sacrum with40% yellow slough along borders of wound .minimal non-odorous serous 
exudate.Non-blanchable erythema with additional scattered partial wounds periwound to R and 
L buttocks .Unstageable pressure injury R ischium  with 95% slough, 5% erythema along 
margins.Non-blanchable erythema periwound (L)2cm x (W)0.4cm.

Stable dry eschar lateral/posterior R tibia.Periwound is clean and pink.(L)3cmx (W)2cm.

Stable dry eschar R heel (L)2cm x (W)1.7cm .Periwound blanchable .

Pressure injury with 100% eschar ,loose around edges ,satish pink borders .No odor or exudate 
noted.(L)1.7cm x (W)2cm. 

L forearm edematous and weeping serous exudate. 

Scattered senile purpuras bilat upper and bilat lower ext.

Skin assessed under trach collar and no evidence of skin breakdown noted.


-------------------------------------------------------------------------------

Addendum: 02/01/19 at 1106 by Sammie Vargas LVN

-------------------------------------------------------------------------------

ADDENDUM TO ABOVE WOUND NOTES:Wound medial /distal R tibia with 100% stable dry eschar 
(L)2cm x (W)1.6cm. Periwound is pink and dry.

Tx.Plan:Cleanse sacral wound with Saline.Loosely pack with Silvasorb impregnated 1/2 inch 
packing strip .Apply Moisture Barrier paste periwound .Cover with Optifoam drsg Daily and 
prn.

Apply Cavilon Skin Barrier to R ischial wound .Cover with Optifoam drsg .Change every 3 days 
and prn.

Apply Cavilon Skin Barrier to wound medial R tibia.Cover with Optifoam drsg every 7  days 
and prn.

Apply Cavilon Skin Barrier to R heel .Cover with Optifoam drsg. Change every 7 days and prn.

Apply Cavilon Skin Barrier to Medial L heel.Cover with Optifoam drsg .Change every 7 days 
and prn.

APM /LANDEN mattress overlay.

Reposition at least every 2 hours or as tolerated.

Off-load heels with pillow.

## 2019-02-02 VITALS — SYSTOLIC BLOOD PRESSURE: 114 MMHG | DIASTOLIC BLOOD PRESSURE: 55 MMHG

## 2019-02-02 VITALS — SYSTOLIC BLOOD PRESSURE: 114 MMHG | DIASTOLIC BLOOD PRESSURE: 40 MMHG

## 2019-02-02 VITALS — SYSTOLIC BLOOD PRESSURE: 114 MMHG | DIASTOLIC BLOOD PRESSURE: 50 MMHG

## 2019-02-02 VITALS — SYSTOLIC BLOOD PRESSURE: 102 MMHG | DIASTOLIC BLOOD PRESSURE: 49 MMHG

## 2019-02-02 VITALS — DIASTOLIC BLOOD PRESSURE: 42 MMHG | SYSTOLIC BLOOD PRESSURE: 121 MMHG

## 2019-02-02 VITALS — SYSTOLIC BLOOD PRESSURE: 119 MMHG | DIASTOLIC BLOOD PRESSURE: 65 MMHG

## 2019-02-02 VITALS — SYSTOLIC BLOOD PRESSURE: 112 MMHG | DIASTOLIC BLOOD PRESSURE: 70 MMHG

## 2019-02-02 VITALS — SYSTOLIC BLOOD PRESSURE: 119 MMHG | DIASTOLIC BLOOD PRESSURE: 54 MMHG

## 2019-02-02 VITALS — SYSTOLIC BLOOD PRESSURE: 108 MMHG | DIASTOLIC BLOOD PRESSURE: 45 MMHG

## 2019-02-02 VITALS — DIASTOLIC BLOOD PRESSURE: 44 MMHG | SYSTOLIC BLOOD PRESSURE: 113 MMHG

## 2019-02-02 VITALS — SYSTOLIC BLOOD PRESSURE: 113 MMHG | DIASTOLIC BLOOD PRESSURE: 56 MMHG

## 2019-02-02 VITALS — SYSTOLIC BLOOD PRESSURE: 117 MMHG | DIASTOLIC BLOOD PRESSURE: 57 MMHG

## 2019-02-02 VITALS — DIASTOLIC BLOOD PRESSURE: 70 MMHG | SYSTOLIC BLOOD PRESSURE: 112 MMHG

## 2019-02-02 VITALS — SYSTOLIC BLOOD PRESSURE: 108 MMHG | DIASTOLIC BLOOD PRESSURE: 61 MMHG

## 2019-02-02 VITALS — SYSTOLIC BLOOD PRESSURE: 100 MMHG | DIASTOLIC BLOOD PRESSURE: 59 MMHG

## 2019-02-02 VITALS — SYSTOLIC BLOOD PRESSURE: 109 MMHG | DIASTOLIC BLOOD PRESSURE: 59 MMHG

## 2019-02-02 VITALS — DIASTOLIC BLOOD PRESSURE: 39 MMHG | SYSTOLIC BLOOD PRESSURE: 111 MMHG

## 2019-02-02 VITALS — SYSTOLIC BLOOD PRESSURE: 137 MMHG | DIASTOLIC BLOOD PRESSURE: 52 MMHG

## 2019-02-02 VITALS — DIASTOLIC BLOOD PRESSURE: 71 MMHG | SYSTOLIC BLOOD PRESSURE: 106 MMHG

## 2019-02-02 VITALS — SYSTOLIC BLOOD PRESSURE: 110 MMHG | DIASTOLIC BLOOD PRESSURE: 52 MMHG

## 2019-02-02 VITALS — SYSTOLIC BLOOD PRESSURE: 117 MMHG | DIASTOLIC BLOOD PRESSURE: 43 MMHG

## 2019-02-02 VITALS — DIASTOLIC BLOOD PRESSURE: 72 MMHG | SYSTOLIC BLOOD PRESSURE: 101 MMHG

## 2019-02-02 VITALS — SYSTOLIC BLOOD PRESSURE: 111 MMHG | DIASTOLIC BLOOD PRESSURE: 70 MMHG

## 2019-02-02 VITALS — SYSTOLIC BLOOD PRESSURE: 133 MMHG | DIASTOLIC BLOOD PRESSURE: 59 MMHG

## 2019-02-02 LAB
ADD MANUAL DIFF: YES
ANION GAP SERPL CALC-SCNC: 11 MMOL/L (ref 5–15)
BUN SERPL-MCNC: 35 MG/DL (ref 7–18)
CALCIUM SERPL-MCNC: 8.8 MG/DL (ref 8.5–10.1)
CHLORIDE SERPL-SCNC: 98 MMOL/L (ref 98–107)
CO2 SERPL-SCNC: 26 MMOL/L (ref 21–32)
CREAT SERPL-MCNC: 1.3 MG/DL (ref 0.55–1.3)
ERYTHROCYTE [DISTWIDTH] IN BLOOD BY AUTOMATED COUNT: 16.7 % (ref 11.6–14.8)
HCT VFR BLD CALC: 25.5 % (ref 37–47)
HGB BLD-MCNC: 8.5 G/DL (ref 12–16)
MCV RBC AUTO: 92 FL (ref 80–99)
PLATELET # BLD: 419 K/UL (ref 150–450)
POTASSIUM SERPL-SCNC: 3.2 MMOL/L (ref 3.5–5.1)
RBC # BLD AUTO: 2.79 M/UL (ref 4.2–5.4)
SODIUM SERPL-SCNC: 135 MMOL/L (ref 136–145)
WBC # BLD AUTO: 15.3 K/UL (ref 4.8–10.8)

## 2019-02-02 RX ADMIN — DEXTROSE MONOHYDRATE SCH MLS/HR: 50 INJECTION, SOLUTION INTRAVENOUS at 21:24

## 2019-02-02 RX ADMIN — SODIUM CHLORIDE SCH MLS/HR: 9 INJECTION, SOLUTION INTRAVENOUS at 17:28

## 2019-02-02 RX ADMIN — DEXTROSE MONOHYDRATE SCH MLS/HR: 50 INJECTION, SOLUTION INTRAVENOUS at 14:38

## 2019-02-02 RX ADMIN — DEXTROSE MONOHYDRATE SCH MLS/HR: 50 INJECTION, SOLUTION INTRAVENOUS at 05:56

## 2019-02-02 RX ADMIN — PANTOPRAZOLE SODIUM SCH MG: 40 INJECTION, POWDER, FOR SOLUTION INTRAVENOUS at 08:23

## 2019-02-02 RX ADMIN — SODIUM CHLORIDE SCH MLS/HR: 0.9 INJECTION INTRAVENOUS at 20:39

## 2019-02-02 RX ADMIN — APIXABAN SCH MG: 2.5 TABLET, FILM COATED ORAL at 08:24

## 2019-02-02 NOTE — NUR
NURSE NOTES:

Awake and restless; no sedation ordered. On afib at 113-116. Noticed that pt digging her 
right thumb into four fingers causing some bleeding. Wrapped both hands with mittens. 
Restraints maintained.

## 2019-02-02 NOTE — DIAGNOSTIC IMAGING REPORT
INDICATION: Shortness of breath

 

COMPARISON: Chest x-ray dated 2/1/19

 

FINDINGS: Single frontal view demonstrates a tracheostomy tube.  Moderate-

sized left pleural effusion with stable opacity in the right mid to lower 

lung zone.  Heart size is borderline enlarged. The visualized osseous 

structures are within normal limits.

 

IMPRESSION: No significant change.  Stable tracheostomy.  Moderate-sized 

left pleural effusion with unchanged opacity in the right mid to lower 

lung zone.

## 2019-02-02 NOTE — NEPHROLOGY PROGRESS NOTE
Assessment/Plan


Plan


Urosepsis (GNR) IV Abx per ID.


Worsening anemia 2 above -transfuse on HD.


ESRD HD


Chr. A. Fib - apixaban.








I have been notified by Dr. Santillan has GPC in the blood!!!


Pt. Has groin TLC inserted last admission. Has Line Sepsis. Needs TLC change 

ASAP. Called Dr. Mitchell.





Subjective


Subjective


Less confused. More alert. Per RN depressed.





Objective


Objective





Last 24 Hour Vital Signs








  Date Time  Temp Pulse Resp B/P (MAP) Pulse Ox O2 Delivery O2 Flow Rate FiO2


 


2/2/19 09:24  123 16     40


 


2/2/19 08:00        40


 


2/2/19 08:00      Mechanical Ventilator  


 


2/2/19 08:00  127 16 100/59 (73) 100   


 


2/2/19 07:01  120 16     40


 


2/2/19 07:00 98.6 115 17 101/72 (82) 91   


 


2/2/19 06:00  121 20 117/57 (77) 100   


 


2/2/19 05:00  117 18 119/54 (75) 100   


 


2/2/19 04:56  117 15     40


 


2/2/19 04:00      Mechanical Ventilator  


 


2/2/19 04:00  116      


 


2/2/19 04:00        40


 


2/2/19 04:00 98.8 117 17 111/70 (84) 91   


 


2/2/19 03:11  120 14     40


 


2/2/19 03:00  117 16 119/65 (83) 94   


 


2/2/19 02:00  117 17 106/71 (83) 100   


 


2/2/19 01:05  117 16     40


 


2/2/19 01:00  119 17 113/56 (75) 100   


 


2/2/19 00:00      Mechanical Ventilator  


 


2/2/19 00:00        40


 


2/2/19 00:00 98.0 75 17 102/49 (66) 100   


 


2/2/19 00:00  75      


 


2/1/19 23:05  77 12     40


 


2/1/19 23:00  80 18 110/42 (64) 100   


 


2/1/19 22:00  120 19 111/57 (75) 100   


 


2/1/19 21:15    115/47    


 


2/1/19 21:00  76 17 110/58 (75) 100   


 


2/1/19 20:55  74 17     40


 


2/1/19 20:00 98.8 92 18 109/54 (72) 100   


 


2/1/19 20:00  76      


 


2/1/19 20:00        40


 


2/1/19 20:00      Mechanical Ventilator  


 


2/1/19 19:06  76 20     40


 


2/1/19 19:00  77 18 106/62 (77) 100   


 


2/1/19 18:00    113/44    


 


2/1/19 18:00  75 18 113/44 (67) 100   


 


2/1/19 17:06  74 16     40


 


2/1/19 17:00    112/44    


 


2/1/19 17:00  81 18 112/44 (66) 100   


 


2/1/19 16:00      Mechanical Ventilator  


 


2/1/19 16:00        40


 


2/1/19 16:00    108/34    


 


2/1/19 16:00 97.9 78 20 108/34 (58) 100   


 


2/1/19 16:00  78      


 


2/1/19 15:30    119/47    


 


2/1/19 15:30  83 19 119/47 (71) 100   


 


2/1/19 15:11  78 12     40


 


2/1/19 15:00  79 18 105/52 (69) 100   


 


2/1/19 15:00    105/52    


 


2/1/19 14:27  85 16 146/51 (82) 93   


 


2/1/19 14:00  71 16 121/50 (73) 93   


 


2/1/19 14:00    146/51    


 


2/1/19 13:00    130/37    


 


2/1/19 13:00  84 16 130/37 (68) 93   


 


2/1/19 12:58  82 20     40


 


2/1/19 12:00  71      


 


2/1/19 12:00      Mechanical Ventilator  


 


2/1/19 12:00    102/33    


 


2/1/19 12:00 98.8 69 16 102/33 (56) 100   


 


2/1/19 12:00        40


 


2/1/19 11:30  72 16 114/43 (66) 100   


 


2/1/19 11:00  73 18 114/43 (66) 99   


 


2/1/19 11:00    114/43    

















Intake and Output  


 


 2/1/19 2/2/19





 18:59 06:59


 


Intake Total 995.9965 ml 893.33 ml


 


Output Total 0 ml 0 ml


 


Balance 995.9965 ml 893.33 ml


 


  


 


Intake Oral 0 ml 0 ml


 


IV Total 995.9965 ml 893.33 ml


 


Output Urine Total 0 ml 0 ml


 


Hemodialysis UF  0 ml








Height (Feet):  5


Height (Inches):  3.00


Weight (Pounds):  144


Objective


On Vent.


Cv IRR +tach


Lungs CTA


Abd SNT. BS +


E No CCE











Gavino Daigle MD Feb 2, 2019 10:46

## 2019-02-02 NOTE — CONSULTATION
History of Present Illness


General


Chief Complaint:  Abnormal Labs


Reason for Consultation:  multiple wounds





Present Illness


HPI


78-year-old female from [facility].  for low


hemoglobin of 6.6. the pt has been pw waxing and waning of consciousness


the pt is agitated the pt is in restraints and is not able to provide hx. the pt


has hx of anxiety and depression


Allergies:  


Coded Allergies:  


     No Known Allergies (Unverified , 11/26/18)





Medication History


Scheduled


Amiodarone Hcl* (Cordarone*), 200 MG GT DAILY, (Reported)


Apixaban (Eliquis), 2.5 MG GT DAILY, (Reported)


Hydrocodone Bit/Acetaminophen 5-325* (Norco 5-325*), 1 TAB GT DAILY, (Reported)


Lansoprazole* (Lansoprazole*), 30 MG GT Q12HR


Lorazepam* (Ativan*), 1 MG GT BEDTIME, (Reported)


Minoxidil* (Loniten*), 2.5 MG GT Q12HR, (Reported)


Quetiapine Fumarate* (Seroquel*), 25 MG GT QID, (Reported)


Vit C/Ascorbate Ca/Ascorb Sod (Vitamin C 500 Mg/15 Ml Liquid), 500 MG GT DAILY, 

(Reported)


Vitamin B Cmplx/Vit C/Folic AC (Nephro-Jhonny Tablet), 1 TAB GT DAILY, (Reported)


Zinc Sulfate (Zinc Sulfate*), 220 MG GT DAILY, (Reported)





Scheduled PRN


Acetaminophen* (Acetaminophen 325MG Tablet*), 650 MG ORAL Q6HR PRN for Mild Pain

/Temp > 100.5, (Reported)


Bisacodyl (Dulcolax), 10 MG RC DAILY PRN for IF NO BM X 3 DAYS AFTER MOM, (

Reported)


Dextran 70/Hypromellose (Artificial Tears Eye Drops*), 1 DROP BOTH EYES Q4HR 

PRN for Dry Eyes, (Reported)


Hydrocodone Bit/Acetaminophen 5-325* (Norco 5-325*), 1 TAB GT Q6H PRN for MOD 

TO SEVERE PAIN, (Reported)


Magnesium Hydroxide* (Milk Of Magnesia*), 30 ML ORAL DAILY PRN for IF NO BM X 3 

DAYS. HOLD IF LBM, (Reported)


Na Phos,M-B/Na Phos,Di-Ba* (Fleet Enema*), 133 ML RECTAL DAILY PRN for NO BM X 

3 DAY AFTER DULCOLAX, (Reported)


Zolpidem Tartrate* (Zolpidem Tartrate*), 5 MG GT BEDTIME PRN for Insomnia, (

Reported)





Discontinued Medications


Alprazolam* (Xanax*), 1 MG ORAL TID, (Reported)


   Discontinued Reason: Therapy completed


Amino Acids/Protein Hydrolys (Pro-Stat Liquid), 30 ML ORAL TWICE A DAY, (

Reported)


   Discontinued Reason: Therapy completed


Amiodarone Hcl* (Amiodarone Hcl*), 200 MG ORAL EVERY 12 HOURS, (Reported)


   Discontinued Reason: Medication dose changed


Ascorbic Acid* (Ascorbic Acid*), 500 MG ORAL DAILY, (Reported)


   Discontinued Reason: Prescription changed


Dextran 70/Hypromellose (Artificial Tears Eye Drops*), 1 DROP BOTH EYES Q4H PRN


   Discontinued Reason: Therapy completed


Docusate Sodium* (Docusate Sodium*), 100 MG ORAL TWICE A DAY, (Reported)


   Discontinued Reason: Therapy completed


Epoetin Rupesh (Procrit), 10,000 UNITS SUBQ MON-WED-FRI


   Discontinued Reason: Therapy completed


Gabapentin* (Gabapentin*), 300 MG ORAL BEDTIME, (Reported)


   Discontinued Reason: Therapy completed


Metoprolol Tartrate* (Metoprolol Tartrate*), 50 MG ORAL EVERY 12 HOURS, (

Reported)


   Discontinued Reason: Therapy completed


Mupirocin* (Mupirocin*), 1 APPLIC TOPIC THREE TIMES A DAY, (Reported)


   Discontinued Reason: Therapy completed


Pantoprazole* (Pantoprazole*), 40 MG ORAL DAILY, (Reported)


   Discontinued Reason: Therapy completed


Sennosides/Docusate Sodium (Senna Laxative Tablet), 2 EACH PO, (Reported)


   Discontinued Reason: Therapy completed


Sertraline Hcl* (Sertraline Hcl*), 100 MG ORAL DAILY, (Reported)


   Discontinued Reason: Therapy completed


Sevelamer Carbonate* (Renvela*), 800 MG ORAL THREE TIMES A DAY, (Reported)


   Discontinued Reason: Therapy completed





Patient History


Limited by:  medical condition


History Provided By:  Family Member, Medical Record, PMD


Healthcare decision maker


Simon Latham, son


Resuscitation status


Full Code


Advanced Directive on File


No





Review of Systems


Psychiatric:  Reports: prior hx, anxiety, emotional problems, hallucinations





Physical Exam


General Appearance:  alert, lethargic, confused, agitated





Last 24 Hour Vital Signs








  Date Time  Temp Pulse Resp B/P (MAP) Pulse Ox O2 Delivery O2 Flow Rate FiO2


 


2/2/19 21:15    110/52    


 


2/2/19 21:03  76 18     40


 


2/2/19 21:00  75 18 110/52 (71) 100   


 


2/2/19 20:00  75      


 


2/2/19 20:00 98.2 75 15 114/40 (64) 100   


 


2/2/19 20:00      Mechanical Ventilator  


 


2/2/19 20:00        40


 


2/2/19 19:28  77 14     40


 


2/2/19 19:00  78 18 121/42 (68) 100   


 


2/2/19 18:00  78 18 114/50 (71) 100   


 


2/2/19 17:20  76 21     40


 


2/2/19 17:00  75 19 117/43 (67) 100   


 


2/2/19 16:00 98.0 72 17 114/55 (74) 100   


 


2/2/19 16:00      Mechanical Ventilator  


 


2/2/19 16:00  78      


 


2/2/19 16:00        40


 


2/2/19 15:22  76 14     40


 


2/2/19 15:00  74 19 111/39 (63) 100   


 


2/2/19 14:00  78 18 108/61 (77) 100   


 


2/2/19 13:05  85 16     40


 


2/2/19 13:00  97 20 133/59 (83) 100   


 


2/2/19 12:00        40


 


2/2/19 12:00      Mechanical Ventilator  


 


2/2/19 12:00 98.6 75 17 108/45 (66) 100   


 


2/2/19 12:00  75      


 


2/2/19 11:00  76 16 112/70 (84) 100   


 


2/2/19 10:49  78 16     40


 


2/2/19 10:00  79 16 112/70 (84) 100   


 


2/2/19 09:24  123 16     40


 


2/2/19 09:00  79 16 109/59 (76) 100   


 


2/2/19 08:00        40


 


2/2/19 08:00  112      


 


2/2/19 08:00      Mechanical Ventilator  


 


2/2/19 08:00  127 16 100/59 (73) 100   


 


2/2/19 07:01  120 16     40


 


2/2/19 07:00 98.6 115 17 101/72 (82) 91   


 


2/2/19 06:00  121 20 117/57 (77) 100   


 


2/2/19 05:00  117 18 119/54 (75) 100   


 


2/2/19 04:56  117 15     40


 


2/2/19 04:00      Mechanical Ventilator  


 


2/2/19 04:00  116      


 


2/2/19 04:00        40


 


2/2/19 04:00 98.8 117 17 111/70 (84) 91   


 


2/2/19 03:11  120 14     40


 


2/2/19 03:00  117 16 119/65 (83) 94   


 


2/2/19 02:00  117 17 106/71 (83) 100   


 


2/2/19 01:05  117 16     40


 


2/2/19 01:00  119 17 113/56 (75) 100   


 


2/2/19 00:00      Mechanical Ventilator  


 


2/2/19 00:00        40


 


2/2/19 00:00 98.0 75 17 102/49 (66) 100   


 


2/2/19 00:00  75      


 


2/1/19 23:05  77 12     40


 


2/1/19 23:00  80 18 110/42 (64) 100   

















Intake and Output  


 


 2/1/19 2/2/19





 19:00 07:00


 


Intake Total 1142.4565 ml 820.0 ml


 


Output Total 0 ml 0 ml


 


Balance 1142.4565 ml 820.0 ml


 


  


 


Intake Oral  0 ml


 


IV Total 1142.4565 ml 820.0 ml


 


Output Urine Total 0 ml 0 ml


 


Hemodialysis UF  0 ml











Laboratory Tests








Test


  2/2/19


06:00


 


White Blood Count


  15.3 K/UL


(4.8-10.8)  H


 


Red Blood Count


  2.79 M/UL


(4.20-5.40)  L


 


Hemoglobin


  8.5 G/DL


(12.0-16.0)  #L


 


Hematocrit


  25.5 %


(37.0-47.0)  #L


 


Mean Corpuscular Volume 92 FL (80-99)  


 


Mean Corpuscular Hemoglobin


  30.6 PG


(27.0-31.0)


 


Mean Corpuscular Hemoglobin


Concent 33.4 G/DL


(32.0-36.0)


 


Red Cell Distribution Width


  16.7 %


(11.6-14.8)  H


 


Platelet Count


  419 K/UL


(150-450)


 


Mean Platelet Volume


  5.3 FL


(6.5-10.1)  L


 


Neutrophils (%) (Auto)


  % (45.0-75.0)


 


 


Lymphocytes (%) (Auto)


  % (20.0-45.0)


 


 


Monocytes (%) (Auto)  % (1.0-10.0)  


 


Eosinophils (%) (Auto)  % (0.0-3.0)  


 


Basophils (%) (Auto)  % (0.0-2.0)  


 


Differential Total Cells


Counted 100  


 


 


Neutrophils % (Manual) 85 % (45-75)  H


 


Lymphocytes % (Manual) 7 % (20-45)  L


 


Monocytes % (Manual) 8 % (1-10)  


 


Eosinophils % (Manual) 0 % (0-3)  


 


Basophils % (Manual) 0 % (0-2)  


 


Band Neutrophils 0 % (0-8)  


 


Platelet Estimate Adequate  


 


Platelet Morphology Normal  


 


Hypochromasia 2+  


 


Anisocytosis 1+  


 


Spherocytes 1+  


 


Sodium Level


  135 MMOL/L


(136-145)  L


 


Potassium Level


  3.2 MMOL/L


(3.5-5.1)  L


 


Chloride Level


  98 MMOL/L


()


 


Carbon Dioxide Level


  26 MMOL/L


(21-32)


 


Anion Gap


  11 mmol/L


(5-15)


 


Blood Urea Nitrogen


  35 mg/dL


(7-18)  H


 


Creatinine


  1.3 MG/DL


(0.55-1.30)


 


Estimat Glomerular Filtration


Rate  mL/min (>60)  


 


 


Glucose Level


  62 MG/DL


()  L


 


Calcium Level


  8.8 MG/DL


(8.5-10.1)








Height (Feet):  5


Height (Inches):  3.00


Weight (Pounds):  144


Medications





Current Medications








 Medications


  (Trade)  Dose


 Ordered  Sig/Tony


 Route


 PRN Reason  Start Time


 Stop Time Status Last Admin


Dose Admin


 


 Acetaminophen


  (Tylenol)  650 mg  Q4H  PRN


 RECTAL


 Mild Pain (Pain Scale 1-3)  1/31/19 19:15


 3/2/19 19:14  2/1/19 00:27


 


 


 Albuterol/


 Ipratropium


  (Albuterol/


 Ipratropium)  3 ml  Q6H  PRN


 HHN


 Shortness of Breath  1/31/19 19:15


 2/5/19 19:14   


 


 


 Dextrose


  (Dextrose 50%)  25 ml  Q30M  PRN


 IV


 Hypoglycemia  1/31/19 19:30


 3/2/19 19:29   


 


 


 Dextrose


  (Dextrose 50%)  50 ml  Q30M  PRN


 IV


 Hypoglycemia  1/31/19 19:30


 3/2/19 19:29   


 


 


 Epoetin Rupesh


  (Epoetin


 Rupesh(ESRD on


 dialysis))  10,000 unit  MON-WED-FRI


 SUBQ


   2/1/19 21:00


 3/3/19 20:59  2/1/19 21:41


 


 


 Escitalopram


 Oxalate


  (Lexapro)  10 mg  DAILY


 ORAL


   2/2/19 11:00


 3/4/19 10:59  2/2/19 11:45


 


 


 Haloperidol


  (Haldol)  5 mg  BID


 ORAL


   2/2/19 18:00


 3/4/19 17:59  2/2/19 17:28


 


 


 Iron Sucrose 100


 mg/Sodium Chloride  60 ml @ 


 240 mls/hr  BEDTIME


 IV


   2/2/19 21:00


 2/6/19 21:14  2/2/19 20:39


 


 


 Norepinephrine


 Bitartrate 8 mg/


 Sodium Chloride  250 ml @ 0


 mls/hr  Q24H


 IV


   1/31/19 21:15


 3/2/19 21:14  1/31/19 22:36


 


 


 Ondansetron HCl


  (Zofran)  4 mg  Q6H  PRN


 IVP


 Nausea & Vomiting  1/31/19 19:15


 3/2/19 19:14   


 


 


 Pantoprazole


  (Protonix)  40 mg  DAILY


 IV


   2/1/19 09:00


 3/3/19 08:59  2/2/19 08:23


 


 


 Piperacillin Sod/


 Tazobactam Sod


 3.375 gm/Sodium


 Chloride  110 ml @ 


 27.5 mls/hr  EVERY 8  HOURS


 IVPB


   1/31/19 23:00


 2/5/19 22:59  2/2/19 21:24


 


 


 Sodium Chloride  1,000 ml @ 


 50 mls/hr  Q20H


 IV


   1/31/19 20:00


 3/2/19 19:59  2/2/19 21:24


 


 


 Vancomycin HCl


  (Vanco rx to


 dose)  1 ea  DAILY  PRN


 MISC


 Per rx protocol  1/31/19 22:15


 3/2/19 22:14   


 


 


 Vancomycin HCl


 750 mg/Dextrose  275 ml @ 


 183.333


 mls/hr  Q24H


 IVPB


   2/1/19 18:30


 2/6/19 18:29  2/2/19 17:28


 











Assessment/Plan


Problem List:  


(1) Dementia with behavioral disturbance


ICD Codes:  F03.91 - Unspecified dementia with behavioral disturbance


SNOMED:  9658348164407


(2) Encephalopathy chronic


ICD Codes:  G93.49 - Other encephalopathy


SNOMED:  36396150


Assessment/Plan


haldol 5mg po bid


lexapro 10mg po qam


cont restraints.











Cass Levi MD Feb 2, 2019 22:50

## 2019-02-02 NOTE — NUR
NURSE NOTES:

Adls done,turned and repositioned.Kept clean and dry. HOB elevated to prevent 
aspiration.Tolerate well feeding

## 2019-02-02 NOTE — NUR
Received Patient on AC 8,  , FIO2 40%, PEEP +5. Pt is trach-dependent with a cuffed, 
Shiley 8 tube. Pt is alert and awake, follows commands. Both hands on soft restraints. 
Breath sounds reveal bilateral rhonchi. Suction small amounts of thick, yellow-green 
secretions. Vent plugged into red outlet, ambubag at bedside. Pt in no apparent distress at 
this time. Will continue plan of care.

## 2019-02-02 NOTE — INFECTIOUS DISEASES PROG NOTE
Assessment/Plan


Assessment/Plan


antibiotics : vancomycin iv, zosyn





A


1. gram positive sepsis


2. e.coli UTI


3. shock


4. pneumonia


5. respiratory failure


6. sacral decubitus ulcer


7. leucocytosis improving





P


1. continue vancomycin iv, zosyn


2. will follow up cultures





Subjective


ROS Limited/Unobtainable:  Yes


Allergies:  


Coded Allergies:  


     No Known Allergies (Unverified , 11/26/18)





Objective


Vital Signs





Last 24 Hour Vital Signs








  Date Time  Temp Pulse Resp B/P (MAP) Pulse Ox O2 Delivery O2 Flow Rate FiO2


 


2/2/19 10:49  78 16     40


 


2/2/19 09:24  123 16     40


 


2/2/19 08:00        40


 


2/2/19 08:00      Mechanical Ventilator  


 


2/2/19 08:00  127 16 100/59 (73) 100   


 


2/2/19 07:01  120 16     40


 


2/2/19 07:00 98.6 115 17 101/72 (82) 91   


 


2/2/19 06:00  121 20 117/57 (77) 100   


 


2/2/19 05:00  117 18 119/54 (75) 100   


 


2/2/19 04:56  117 15     40


 


2/2/19 04:00      Mechanical Ventilator  


 


2/2/19 04:00  116      


 


2/2/19 04:00        40


 


2/2/19 04:00 98.8 117 17 111/70 (84) 91   


 


2/2/19 03:11  120 14     40


 


2/2/19 03:00  117 16 119/65 (83) 94   


 


2/2/19 02:00  117 17 106/71 (83) 100   


 


2/2/19 01:05  117 16     40


 


2/2/19 01:00  119 17 113/56 (75) 100   


 


2/2/19 00:00      Mechanical Ventilator  


 


2/2/19 00:00        40


 


2/2/19 00:00 98.0 75 17 102/49 (66) 100   


 


2/2/19 00:00  75      


 


2/1/19 23:05  77 12     40


 


2/1/19 23:00  80 18 110/42 (64) 100   


 


2/1/19 22:00  120 19 111/57 (75) 100   


 


2/1/19 21:15    115/47    


 


2/1/19 21:00  76 17 110/58 (75) 100   


 


2/1/19 20:55  74 17     40


 


2/1/19 20:00 98.8 92 18 109/54 (72) 100   


 


2/1/19 20:00  76      


 


2/1/19 20:00        40


 


2/1/19 20:00      Mechanical Ventilator  


 


2/1/19 19:06  76 20     40


 


2/1/19 19:00  77 18 106/62 (77) 100   


 


2/1/19 18:00    113/44    


 


2/1/19 18:00  75 18 113/44 (67) 100   


 


2/1/19 17:06  74 16     40


 


2/1/19 17:00    112/44    


 


2/1/19 17:00  81 18 112/44 (66) 100   


 


2/1/19 16:00      Mechanical Ventilator  


 


2/1/19 16:00        40


 


2/1/19 16:00    108/34    


 


2/1/19 16:00 97.9 78 20 108/34 (58) 100   


 


2/1/19 16:00  78      


 


2/1/19 15:30    119/47    


 


2/1/19 15:30  83 19 119/47 (71) 100   


 


2/1/19 15:11  78 12     40


 


2/1/19 15:00  79 18 105/52 (69) 100   


 


2/1/19 15:00    105/52    


 


2/1/19 14:27  85 16 146/51 (82) 93   


 


2/1/19 14:00  71 16 121/50 (73) 93   


 


2/1/19 14:00    146/51    


 


2/1/19 13:00    130/37    


 


2/1/19 13:00  84 16 130/37 (68) 93   


 


2/1/19 12:58  82 20     40


 


2/1/19 12:00  71      


 


2/1/19 12:00      Mechanical Ventilator  


 


2/1/19 12:00    102/33    


 


2/1/19 12:00 98.8 69 16 102/33 (56) 100   


 


2/1/19 12:00        40








Height (Feet):  5


Height (Inches):  3.00


Weight (Pounds):  144


HEENT:  status post trach


Respiratory/Chest:  lungs clear


Cardiovascular:  normal rate, regular rhythm, no gallop/murmur


Abdomen:  soft, non tender, other - Gt


Extremities:  no edema, other - left groin catheter





Microbiology








 Date/Time


Source Procedure


Growth Status


 


 


 1/31/19 17:50


Blood Blood Culture - Preliminary Resulted


 


 1/31/19 17:40


Blood Blood Culture - Preliminary Resulted





 1/31/19 23:30


Wound Gram Stain - Final Resulted


 


 1/31/19 23:30 Wound Culture - Preliminary


Staphylococcus Aureus


Gram Negative Bacillus 1 Resulted


 


 1/31/19 18:00


Nasal Nares MRSA Culture - Final


Staphylococcus Aureus - Mrsa Complete


 


 1/31/19 16:26


Urine,Clean Catch Urine Culture - Preliminary


Escherichia Coli Resulted





 1/31/19 18:00


Rectum VRE Culture - Final


Enterococcus Faecalis - Vre


Enterococcus Faecium - Vre Resulted


 


 1/31/19 18:00


Rectum 


Pending Resulted











Laboratory Tests








Test


  2/2/19


06:00


 


White Blood Count


  15.3 K/UL


(4.8-10.8)  H


 


Red Blood Count


  2.79 M/UL


(4.20-5.40)  L


 


Hemoglobin


  8.5 G/DL


(12.0-16.0)  #L


 


Hematocrit


  25.5 %


(37.0-47.0)  #L


 


Mean Corpuscular Volume 92 FL (80-99)  


 


Mean Corpuscular Hemoglobin


  30.6 PG


(27.0-31.0)


 


Mean Corpuscular Hemoglobin


Concent 33.4 G/DL


(32.0-36.0)


 


Red Cell Distribution Width


  16.7 %


(11.6-14.8)  H


 


Platelet Count


  419 K/UL


(150-450)


 


Mean Platelet Volume


  5.3 FL


(6.5-10.1)  L


 


Neutrophils (%) (Auto)


  % (45.0-75.0)


 


 


Lymphocytes (%) (Auto)


  % (20.0-45.0)


 


 


Monocytes (%) (Auto)  % (1.0-10.0)  


 


Eosinophils (%) (Auto)  % (0.0-3.0)  


 


Basophils (%) (Auto)  % (0.0-2.0)  


 


Neutrophils % (Manual) Pending  


 


Lymphocytes % (Manual) Pending  


 


Platelet Estimate Pending  


 


Platelet Morphology Pending  


 


Sodium Level


  135 MMOL/L


(136-145)  L


 


Potassium Level


  3.2 MMOL/L


(3.5-5.1)  L


 


Chloride Level


  98 MMOL/L


()


 


Carbon Dioxide Level


  26 MMOL/L


(21-32)


 


Anion Gap


  11 mmol/L


(5-15)


 


Blood Urea Nitrogen


  35 mg/dL


(7-18)  H


 


Creatinine


  1.3 MG/DL


(0.55-1.30)


 


Estimat Glomerular Filtration


Rate  mL/min (>60)  


 


 


Glucose Level


  62 MG/DL


()  L


 


Calcium Level


  8.8 MG/DL


(8.5-10.1)











Current Medications








 Medications


  (Trade)  Dose


 Ordered  Sig/Tony


 Route


 PRN Reason  Start Time


 Stop Time Status Last Admin


Dose Admin


 


 Acetaminophen


  (Tylenol)  650 mg  Q4H  PRN


 RECTAL


 Mild Pain (Pain Scale 1-3)  1/31/19 19:15


 3/2/19 19:14  2/1/19 00:27


 


 


 Albuterol/


 Ipratropium


  (Albuterol/


 Ipratropium)  3 ml  Q6H  PRN


 HHN


 Shortness of Breath  1/31/19 19:15


 2/5/19 19:14   


 


 


 Apixaban


  (Eliquis)  2.5 mg  BID


 ORAL


   2/1/19 09:00


 3/3/19 08:59  2/2/19 08:24


 


 


 Chlorhexidine


 Gluconate


  (Juana-Hex 2%)  1 applic  DAILY@2000


 TOPIC


   2/1/19 20:00


 3/3/19 19:59  2/1/19 21:41


 


 


 Dextrose


  (Dextrose 50%)  25 ml  Q30M  PRN


 IV


 Hypoglycemia  1/31/19 19:30


 3/2/19 19:29   


 


 


 Dextrose


  (Dextrose 50%)  50 ml  Q30M  PRN


 IV


 Hypoglycemia  1/31/19 19:30


 3/2/19 19:29   


 


 


 Epoetin Rupesh


  (Epoetin


 Rupesh(ESRD on


 dialysis))  10,000 unit  MON-WED-FRI


 SUBQ


   2/1/19 21:00


 3/3/19 20:59  2/1/19 21:41


 


 


 Escitalopram


 Oxalate


  (Lexapro)  10 mg  DAILY


 ORAL


   2/2/19 11:00


 3/4/19 10:59   


 


 


 Haloperidol


  (Haldol)  5 mg  BID


 ORAL


   2/2/19 18:00


 3/4/19 17:59   


 


 


 Iron Sucrose 100


 mg/Sodium Chloride  60 ml @ 


 240 mls/hr  BEDTIME


 IV


   2/2/19 21:00


 2/6/19 21:14   


 


 


 Norepinephrine


 Bitartrate 8 mg/


 Sodium Chloride  250 ml @ 0


 mls/hr  Q24H


 IV


   1/31/19 21:15


 3/2/19 21:14  1/31/19 22:36


 


 


 Ondansetron HCl


  (Zofran)  4 mg  Q6H  PRN


 IVP


 Nausea & Vomiting  1/31/19 19:15


 3/2/19 19:14   


 


 


 Pantoprazole


  (Protonix)  40 mg  DAILY


 IV


   2/1/19 09:00


 3/3/19 08:59  2/2/19 08:23


 


 


 Piperacillin Sod/


 Tazobactam Sod


 3.375 gm/Sodium


 Chloride  110 ml @ 


 27.5 mls/hr  EVERY 8  HOURS


 IVPB


   1/31/19 23:00


 2/5/19 22:59  2/2/19 05:56


 


 


 Sodium Chloride  1,000 ml @ 


 50 mls/hr  Q20H


 IV


   1/31/19 20:00


 3/2/19 19:59  2/1/19 14:12


 


 


 Vancomycin HCl


  (Vanco rx to


 dose)  1 ea  DAILY  PRN


 MISC


 Per rx protocol  1/31/19 22:15


 3/2/19 22:14   


 


 


 Vancomycin HCl


 750 mg/Dextrose  275 ml @ 


 183.333


 mls/hr  Q24H


 IVPB


   2/1/19 18:30


 2/6/19 18:29  2/1/19 17:31


 

















Chase Santillan MD Feb 2, 2019 11:39

## 2019-02-02 NOTE — NUR
NURSE NOTES:

Received pt in no distress. Awake, alert but confused and restless. Keeps banging the 
siderails with her hands despite being on restraints. Both hands covered with mittens as pt 
tends to self inflict injury to her fingers by digging her nails into it. Small amt of 
bleeding noted. VDRF, on the vent via trach, current vent settings well tolerated; FIO2. 40 
saturating 100%,. BP stable; in and out of afib. Afebrile at this time. GTF with Nepro 
infuses at 35ml/h with 0 residuals. Anuric; Left arm edematous and draining serous fluid. 
Upper left arm with AVgraft; with mod strong bruit and thrill. Previous nurse left a message 
to Dr Levi for anti anxiety med order. Awaiting response. Will continue to monitor

## 2019-02-02 NOTE — NEPHROLOGY PROGRESS NOTE
Assessment/Plan


Plan


Urosepsis (GNR) IV Abx per ID.


Worsening anemia 2 above -transfuse on HD.


ESRD HD


Chr. A. Fib - apixaban.





Subjective


Subjective


Less confused. More alert. Per RN depressed.





Objective


Objective





Last 24 Hour Vital Signs








  Date Time  Temp Pulse Resp B/P (MAP) Pulse Ox O2 Delivery O2 Flow Rate FiO2


 


2/2/19 09:24  123 16     40


 


2/2/19 08:00        40


 


2/2/19 08:00      Mechanical Ventilator  


 


2/2/19 08:00  127 16 100/59 (73) 100   


 


2/2/19 07:01  120 16     40


 


2/2/19 07:00 98.6 115 17 101/72 (82) 91   


 


2/2/19 06:00  121 20 117/57 (77) 100   


 


2/2/19 05:00  117 18 119/54 (75) 100   


 


2/2/19 04:56  117 15     40


 


2/2/19 04:00      Mechanical Ventilator  


 


2/2/19 04:00  116      


 


2/2/19 04:00        40


 


2/2/19 04:00 98.8 117 17 111/70 (84) 91   


 


2/2/19 03:11  120 14     40


 


2/2/19 03:00  117 16 119/65 (83) 94   


 


2/2/19 02:00  117 17 106/71 (83) 100   


 


2/2/19 01:05  117 16     40


 


2/2/19 01:00  119 17 113/56 (75) 100   


 


2/2/19 00:00      Mechanical Ventilator  


 


2/2/19 00:00        40


 


2/2/19 00:00 98.0 75 17 102/49 (66) 100   


 


2/2/19 00:00  75      


 


2/1/19 23:05  77 12     40


 


2/1/19 23:00  80 18 110/42 (64) 100   


 


2/1/19 22:00  120 19 111/57 (75) 100   


 


2/1/19 21:15    115/47    


 


2/1/19 21:00  76 17 110/58 (75) 100   


 


2/1/19 20:55  74 17     40


 


2/1/19 20:00 98.8 92 18 109/54 (72) 100   


 


2/1/19 20:00  76      


 


2/1/19 20:00        40


 


2/1/19 20:00      Mechanical Ventilator  


 


2/1/19 19:06  76 20     40


 


2/1/19 19:00  77 18 106/62 (77) 100   


 


2/1/19 18:00    113/44    


 


2/1/19 18:00  75 18 113/44 (67) 100   


 


2/1/19 17:06  74 16     40


 


2/1/19 17:00    112/44    


 


2/1/19 17:00  81 18 112/44 (66) 100   


 


2/1/19 16:00      Mechanical Ventilator  


 


2/1/19 16:00        40


 


2/1/19 16:00    108/34    


 


2/1/19 16:00 97.9 78 20 108/34 (58) 100   


 


2/1/19 16:00  78      


 


2/1/19 15:30    119/47    


 


2/1/19 15:30  83 19 119/47 (71) 100   


 


2/1/19 15:11  78 12     40


 


2/1/19 15:00  79 18 105/52 (69) 100   


 


2/1/19 15:00    105/52    


 


2/1/19 14:27  85 16 146/51 (82) 93   


 


2/1/19 14:00  71 16 121/50 (73) 93   


 


2/1/19 14:00    146/51    


 


2/1/19 13:00    130/37    


 


2/1/19 13:00  84 16 130/37 (68) 93   


 


2/1/19 12:58  82 20     40


 


2/1/19 12:00  71      


 


2/1/19 12:00      Mechanical Ventilator  


 


2/1/19 12:00    102/33    


 


2/1/19 12:00 98.8 69 16 102/33 (56) 100   


 


2/1/19 12:00        40


 


2/1/19 11:30  72 16 114/43 (66) 100   


 


2/1/19 11:00  73 18 114/43 (66) 99   


 


2/1/19 11:00    114/43    


 


2/1/19 10:38  70 13     40

















Intake and Output  


 


 2/1/19 2/2/19





 18:59 06:59


 


Intake Total 995.9965 ml 893.33 ml


 


Output Total 0 ml 0 ml


 


Balance 995.9965 ml 893.33 ml


 


  


 


Intake Oral 0 ml 0 ml


 


IV Total 995.9965 ml 893.33 ml


 


Output Urine Total 0 ml 0 ml


 


Hemodialysis UF  0 ml








Height (Feet):  5


Height (Inches):  3.00


Weight (Pounds):  144


Objective


On Vent.


Cv IRR +tach


Lungs CTA


Abd SNT. BS +


E No CCE











Gavino Daigle MD Feb 2, 2019 10:36

## 2019-02-02 NOTE — NUR
NURSE NOTES:

PERIPHERAL LINE DC PER DOCTOR LUIGI , NEW PERIPHERAL RIGHT HAND INSERTED.PATENT WITH NO 
S/S INFILTRATION.

## 2019-02-02 NOTE — NUR
NURSE NOTES:

Pt turned and repositioned for skin management. Seen by Dr Mac and Veronica made 
aware uncontrol heart rate with Afib and blood culture gram positive clusters cocci.Order to 
start feeding, CBC and BMP, psych consult with Dr Levi per Dr Mac. Order noted and 
carried out.

## 2019-02-02 NOTE — NUR
NURSE NOTES:

Awake but confused and restless. On bilat soft wrist restraints. Padded siderails as pt 
keeps her hands banging on them. Tolerating GTF at 35ml/h. PIV site patent

## 2019-02-02 NOTE — NUR
NURSE NOTES:

Adls done,turned and repositioned.Right hand and right antecubital line inserted and 
patent.HOB elevated to prevent aspiration.

-------------------------------------------------------------------------------

Addendum: 02/02/19 at 1922 by Sosa Mejia RN

-------------------------------------------------------------------------------

Left message to Dr Levi regarding the son request to review patient medication.Endorsed 
to next shift.

## 2019-02-02 NOTE — NUR
NURSE NOTES:

DURING WOUND CARE,PT NOTED WITH INCREASE BLEEDING AND DR MEYERS MADE AWARE WITH ORDER TO 
HOLD TYRAIS UNTIL FURTHER ORDER.ORDER NOTED

-------------------------------------------------------------------------------

Addendum: 02/02/19 at 1621 by Sosa Mejia RN

-------------------------------------------------------------------------------

TURNED AND REPOSITIONED, WOUND CARE DONE ,KEPT CLEAN AND DRY

## 2019-02-02 NOTE — CONSULTATION
History of Present Illness


General


Chief Complaint:  Abnormal Labs


Reason for Consultation:  multiple wounds





Present Illness


HPI


78 year old female well known to me from prior admission.  Currently admitted 

in ICU for anamia and abnormal labs.  critically ill and prognosis guarded.  on 

admission noted to have multiple wounds which were present prior and cared for 

during prior admission.  surgery called to evaluate and assist with care / 

management of wounds.


Allergies:  


Coded Allergies:  


     No Known Allergies (Unverified , 11/26/18)





Medication History


Scheduled


Amiodarone Hcl* (Cordarone*), 200 MG GT DAILY, (Reported)


Apixaban (Eliquis), 2.5 MG GT DAILY, (Reported)


Hydrocodone Bit/Acetaminophen 5-325* (Norco 5-325*), 1 TAB GT DAILY, (Reported)


Lansoprazole* (Lansoprazole*), 30 MG GT Q12HR


Lorazepam* (Ativan*), 1 MG GT BEDTIME, (Reported)


Minoxidil* (Loniten*), 2.5 MG GT Q12HR, (Reported)


Quetiapine Fumarate* (Seroquel*), 25 MG GT QID, (Reported)


Vit C/Ascorbate Ca/Ascorb Sod (Vitamin C 500 Mg/15 Ml Liquid), 500 MG GT DAILY, 

(Reported)


Vitamin B Cmplx/Vit C/Folic AC (Nephro-Jhonny Tablet), 1 TAB GT DAILY, (Reported)


Zinc Sulfate (Zinc Sulfate*), 220 MG GT DAILY, (Reported)





Scheduled PRN


Acetaminophen* (Acetaminophen 325MG Tablet*), 650 MG ORAL Q6HR PRN for Mild Pain

/Temp > 100.5, (Reported)


Bisacodyl (Dulcolax), 10 MG RC DAILY PRN for IF NO BM X 3 DAYS AFTER MOM, (

Reported)


Dextran 70/Hypromellose (Artificial Tears Eye Drops*), 1 DROP BOTH EYES Q4HR 

PRN for Dry Eyes, (Reported)


Hydrocodone Bit/Acetaminophen 5-325* (Norco 5-325*), 1 TAB GT Q6H PRN for MOD 

TO SEVERE PAIN, (Reported)


Magnesium Hydroxide* (Milk Of Magnesia*), 30 ML ORAL DAILY PRN for IF NO BM X 3 

DAYS. HOLD IF LBM, (Reported)


Na Phos,M-B/Na Phos,Di-Ba* (Fleet Enema*), 133 ML RECTAL DAILY PRN for NO BM X 

3 DAY AFTER DULCOLAX, (Reported)


Zolpidem Tartrate* (Zolpidem Tartrate*), 5 MG GT BEDTIME PRN for Insomnia, (

Reported)





Discontinued Medications


Alprazolam* (Xanax*), 1 MG ORAL TID, (Reported)


   Discontinued Reason: Therapy completed


Amino Acids/Protein Hydrolys (Pro-Stat Liquid), 30 ML ORAL TWICE A DAY, (

Reported)


   Discontinued Reason: Therapy completed


Amiodarone Hcl* (Amiodarone Hcl*), 200 MG ORAL EVERY 12 HOURS, (Reported)


   Discontinued Reason: Medication dose changed


Ascorbic Acid* (Ascorbic Acid*), 500 MG ORAL DAILY, (Reported)


   Discontinued Reason: Prescription changed


Dextran 70/Hypromellose (Artificial Tears Eye Drops*), 1 DROP BOTH EYES Q4H PRN


   Discontinued Reason: Therapy completed


Docusate Sodium* (Docusate Sodium*), 100 MG ORAL TWICE A DAY, (Reported)


   Discontinued Reason: Therapy completed


Epoetin Rupesh (Procrit), 10,000 UNITS SUBQ MON-WED-FRI


   Discontinued Reason: Therapy completed


Gabapentin* (Gabapentin*), 300 MG ORAL BEDTIME, (Reported)


   Discontinued Reason: Therapy completed


Metoprolol Tartrate* (Metoprolol Tartrate*), 50 MG ORAL EVERY 12 HOURS, (

Reported)


   Discontinued Reason: Therapy completed


Mupirocin* (Mupirocin*), 1 APPLIC TOPIC THREE TIMES A DAY, (Reported)


   Discontinued Reason: Therapy completed


Pantoprazole* (Pantoprazole*), 40 MG ORAL DAILY, (Reported)


   Discontinued Reason: Therapy completed


Sennosides/Docusate Sodium (Senna Laxative Tablet), 2 EACH PO, (Reported)


   Discontinued Reason: Therapy completed


Sertraline Hcl* (Sertraline Hcl*), 100 MG ORAL DAILY, (Reported)


   Discontinued Reason: Therapy completed


Sevelamer Carbonate* (Renvela*), 800 MG ORAL THREE TIMES A DAY, (Reported)


   Discontinued Reason: Therapy completed





Patient History


Limited by:  medical condition


History Provided By:  Medical Record, PMD


Healthcare decision maker


Simon Latham, delia


Resuscitation status


Full Code


Advanced Directive on File


No





Past Medical/Surgical History


Past Medical/Surgical History:  


(1) Bradycardia


(2) Hypotension


(3) ESRD (end stage renal disease)


(4) Hypoalbuminemia


(5) Decubital ulcer


(6) Paroxysmal A-fib


(7) Dehydration


(8) Severe malnutrition


(9) Encounter for PEG (percutaneous endoscopic gastrostomy)


(10) Encephalopathy chronic


(11) Dementia with behavioral disturbance


(12) Anemia


(13) ESRD needing dialysis


(14) Septic shock


(15) UTI (urinary tract infection)


(16) Sepsis





Review of Systems


ROS Narrative


cannot obtain given medical condition





Physical Exam


General Appearance:  mild distress


Lines, tubes and drains:  other


HEENT:  mucous membranes moist


Neck:  normal inspection


Respiratory/Chest:  decreased breath sounds


Cardiovascular/Chest:  tachycardia


Abdomen:  soft, no organomegaly, no mass


Extremities:  other


Skin Exam:  other





Last 24 Hour Vital Signs








  Date Time  Temp Pulse Resp B/P (MAP) Pulse Ox O2 Delivery O2 Flow Rate FiO2


 


2/2/19 09:24  123 16     40


 


2/2/19 08:00        40


 


2/2/19 08:00      Mechanical Ventilator  


 


2/2/19 08:00  127 16 100/59 (73) 100   


 


2/2/19 07:01  120 16     40


 


2/2/19 07:00 98.6 115 17 101/72 (82) 91   


 


2/2/19 06:00  121 20 117/57 (77) 100   


 


2/2/19 05:00  117 18 119/54 (75) 100   


 


2/2/19 04:56  117 15     40


 


2/2/19 04:00      Mechanical Ventilator  


 


2/2/19 04:00  116      


 


2/2/19 04:00        40


 


2/2/19 04:00 98.8 117 17 111/70 (84) 91   


 


2/2/19 03:11  120 14     40


 


2/2/19 03:00  117 16 119/65 (83) 94   


 


2/2/19 02:00  117 17 106/71 (83) 100   


 


2/2/19 01:05  117 16     40


 


2/2/19 01:00  119 17 113/56 (75) 100   


 


2/2/19 00:00      Mechanical Ventilator  


 


2/2/19 00:00        40


 


2/2/19 00:00 98.0 75 17 102/49 (66) 100   


 


2/2/19 00:00  75      


 


2/1/19 23:05  77 12     40


 


2/1/19 23:00  80 18 110/42 (64) 100   


 


2/1/19 22:00  120 19 111/57 (75) 100   


 


2/1/19 21:15    115/47    


 


2/1/19 21:00  76 17 110/58 (75) 100   


 


2/1/19 20:55  74 17     40


 


2/1/19 20:00 98.8 92 18 109/54 (72) 100   


 


2/1/19 20:00  76      


 


2/1/19 20:00        40


 


2/1/19 20:00      Mechanical Ventilator  


 


2/1/19 19:06  76 20     40


 


2/1/19 19:00  77 18 106/62 (77) 100   


 


2/1/19 18:00    113/44    


 


2/1/19 18:00  75 18 113/44 (67) 100   


 


2/1/19 17:06  74 16     40


 


2/1/19 17:00    112/44    


 


2/1/19 17:00  81 18 112/44 (66) 100   


 


2/1/19 16:00      Mechanical Ventilator  


 


2/1/19 16:00        40


 


2/1/19 16:00    108/34    


 


2/1/19 16:00 97.9 78 20 108/34 (58) 100   


 


2/1/19 16:00  78      


 


2/1/19 15:30    119/47    


 


2/1/19 15:30  83 19 119/47 (71) 100   


 


2/1/19 15:11  78 12     40


 


2/1/19 15:00  79 18 105/52 (69) 100   


 


2/1/19 15:00    105/52    


 


2/1/19 14:27  85 16 146/51 (82) 93   


 


2/1/19 14:00  71 16 121/50 (73) 93   


 


2/1/19 14:00    146/51    


 


2/1/19 13:00    130/37    


 


2/1/19 13:00  84 16 130/37 (68) 93   


 


2/1/19 12:58  82 20     40


 


2/1/19 12:00  71      


 


2/1/19 12:00      Mechanical Ventilator  


 


2/1/19 12:00    102/33    


 


2/1/19 12:00 98.8 69 16 102/33 (56) 100   


 


2/1/19 12:00        40


 


2/1/19 11:30  72 16 114/43 (66) 100   


 


2/1/19 11:00  73 18 114/43 (66) 99   


 


2/1/19 11:00    114/43    


 


2/1/19 10:38  70 13     40

















Intake and Output  


 


 2/1/19 2/2/19





 18:59 06:59


 


Intake Total 995.9965 ml 893.33 ml


 


Output Total 0 ml 0 ml


 


Balance 995.9965 ml 893.33 ml


 


  


 


Intake Oral 0 ml 0 ml


 


IV Total 995.9965 ml 893.33 ml


 


Output Urine Total 0 ml 0 ml


 


Hemodialysis UF  0 ml








Height (Feet):  5


Height (Inches):  3.00


Weight (Pounds):  144


Medications





Current Medications








 Medications


  (Trade)  Dose


 Ordered  Sig/Tony


 Route


 PRN Reason  Start Time


 Stop Time Status Last Admin


Dose Admin


 


 Acetaminophen


  (Tylenol)  650 mg  Q4H  PRN


 RECTAL


 Mild Pain (Pain Scale 1-3)  1/31/19 19:15


 3/2/19 19:14  2/1/19 00:27


 


 


 Albuterol/


 Ipratropium


  (Albuterol/


 Ipratropium)  3 ml  Q6H  PRN


 HHN


 Shortness of Breath  1/31/19 19:15


 2/5/19 19:14   


 


 


 Apixaban


  (Eliquis)  2.5 mg  BID


 ORAL


   2/1/19 09:00


 3/3/19 08:59  2/2/19 08:24


 


 


 Chlorhexidine


 Gluconate


  (Juana-Hex 2%)  1 applic  DAILY@2000


 TOPIC


   2/1/19 20:00


 3/3/19 19:59  2/1/19 21:41


 


 


 Dextrose


  (Dextrose 50%)  25 ml  Q30M  PRN


 IV


 Hypoglycemia  1/31/19 19:30


 3/2/19 19:29   


 


 


 Dextrose


  (Dextrose 50%)  50 ml  Q30M  PRN


 IV


 Hypoglycemia  1/31/19 19:30


 3/2/19 19:29   


 


 


 Epoetin Rupesh


  (Epoetin


 Rupesh(ESRD on


 dialysis))  10,000 unit  MON-WED-FRI


 SUBQ


   2/1/19 21:00


 3/3/19 20:59  2/1/19 21:41


 


 


 Norepinephrine


 Bitartrate 8 mg/


 Sodium Chloride  250 ml @ 0


 mls/hr  Q24H


 IV


   1/31/19 21:15


 3/2/19 21:14  1/31/19 22:36


 


 


 Ondansetron HCl


  (Zofran)  4 mg  Q6H  PRN


 IVP


 Nausea & Vomiting  1/31/19 19:15


 3/2/19 19:14   


 


 


 Pantoprazole


  (Protonix)  40 mg  DAILY


 IV


   2/1/19 09:00


 3/3/19 08:59  2/2/19 08:23


 


 


 Piperacillin Sod/


 Tazobactam Sod


 3.375 gm/Sodium


 Chloride  110 ml @ 


 27.5 mls/hr  EVERY 8  HOURS


 IVPB


   1/31/19 23:00


 2/5/19 22:59  2/2/19 05:56


 


 


 Sodium Chloride  1,000 ml @ 


 50 mls/hr  Q20H


 IV


   1/31/19 20:00


 3/2/19 19:59  2/1/19 14:12


 


 


 Vancomycin HCl


  (Vanco rx to


 dose)  1 ea  DAILY  PRN


 MISC


 Per rx protocol  1/31/19 22:15


 3/2/19 22:14   


 


 


 Vancomycin HCl


 750 mg/Dextrose  275 ml @ 


 183.333


 mls/hr  Q24H


 IVPB


   2/1/19 18:30


 2/6/19 18:29  2/1/19 17:31


 











Assessment/Plan


Problem List:  


(1) Septic shock


ICD Codes:  A41.9 - Sepsis, unspecified organism; R65.21 - Severe sepsis with 

septic shock


SNOMED:  68419849


(2) Decubital ulcer


Assessment & Plan:  Pt presents with full thickness pressure injury sacrum with 

deep undermining into L buttocks .Historical arched surgical scar Upper L 

buttocks.Wound measures(L)3.4cm x (W)3cm x (D)1.5cm ,undermining 12-6 by 12 cm @

8o'clock. Scattered Biofilm at base of sacrum with40% yellow slough along 

borders of wound .minimal non-odorous serous exudate.Non-blanchable erythema 

with additional scattered partial wounds periwound to R and L buttocks 


Unstageable pressure injury R ischium  with 95% slough, 5% erythema along 

margins.Non-blanchable erythema periwound (L)2cm x (W)0.4cm.


Stable dry eschar lateral/posterior R tibia.Periwound is clean and pink.(L)3cmx 

(W)2cm.


Stable dry eschar R heel (L)2cm x (W)1.7cm .Periwound blanchable .


Pressure injury with 100% eschar ,loose around edges ,satish pink borders .No 

odor or exudate noted.(L)1.7cm x (W)2cm. 


L forearm edematous and weeping serous exudate. 


Scattered senile purpuras bilat upper and bilat lower ext.


Skin assessed under trach collar and no evidence of skin breakdown noted.


Wound medial /distal R tibia with 100% stable dry eschar (L)2cm x (W)1.6cm. 

Periwound is pink and dry.








Tx.Plan:


Cleanse sacral wound with Saline.Loosely pack with Silvasorb impregnated 1/2 

inch packing strip .Apply Moisture Barrier paste periwound .Cover with Optifoam 

drsg Daily and prn.


Apply Cavilon Skin Barrier to R ischial wound .Cover with Optifoam drsg .Change 

every 3 days and prn.


Apply Cavilon Skin Barrier to wound medial R tibia.Cover with Optifoam drsg 

every 7  days and prn.


Apply Cavilon Skin Barrier to R heel .Cover with Optifoam drsg. Change every 7 

days and prn.


Apply Cavilon Skin Barrier to Medial L heel.Cover with Optifoam drsg .Change 

every 7 days and prn.


APM /LANDEN mattress overlay.


Reposition at least every 2 hours or as tolerated.


Off-load heels with pillow.


ICD Codes:  L89.90 - Pressure ulcer of unspecified site, unspecified stage


SNOMED:  446963761


(3) Severe malnutrition


Assessment & Plan:  nutrition consult


needs optimal nutrition in septic state for recovery and wound healing


ICD Codes:  E43 - Unspecified severe protein-calorie malnutrition


SNOMED:  34818084











Bob Lewis Feb 2, 2019 10:36

## 2019-02-02 NOTE — NUR
NURSE NOTES:

Patient was received in bed watching tv with no apparent signs distress. Hands protected 
with gauze due to patient voluntary self injury her fingers.Bilateral soft wrist restraints 
in place.On Trach/Vent Martha 8 with AC 8 Vt 450 FiO2 40 Peep 5. GT placement check and 
intact. HOB elevated to prevent aspiration. Mouth care done.Turned and repositioned.Dialysis 
done yesterday and 2 units PRBC transfused. AVgraft on Left upper arm with bruit and thrill 
present. Left femoral TLC running D51/2 NS at 50cc/hr. Kept clean and dry. Will continue to 
monitor.

## 2019-02-02 NOTE — PULMONOLOGY PROGRESS NOTE
Assessment/Plan


Assessment/Plan


1. Shock, possibly septic.


2. UTI.


3. ESRD, on dialysis.


4. Ventilator dependent respiratory failure.


5. Tracheostomy.


6. Diabetes mellitus.


7. Chronic atrial fibrillation.





DISCUSSION:  Continue broad-spectrum antibiotics.  I will continue AC mode.


I will decrease FiO2 as tolerated.  Blood transfusion as required.


Hemodialysis per renal.














  ______________________________________________


  Quentin Cardoso M.D.





Subjective


Interval Events:  None new


Constitutional:  Reports: no symptoms


HEENT:  Repors: no symptoms


Respiratory:  Reports: no symptoms


Cardiovascular:  Reports: no symptoms


Gastrointestinal/Abdominal:  Reports: no symptoms


Genitourinary:  Reports: no symptoms


Neurologic:  Reports: no symptoms


Allergies:  


Coded Allergies:  


     No Known Allergies (Unverified , 11/26/18)





Objective





Last 24 Hour Vital Signs








  Date Time  Temp Pulse Resp B/P (MAP) Pulse Ox O2 Delivery O2 Flow Rate FiO2


 


2/2/19 09:24  123 16     40


 


2/2/19 08:00        40


 


2/2/19 08:00      Mechanical Ventilator  


 


2/2/19 08:00  127 16 100/59 (73) 100   


 


2/2/19 07:01  120 16     40


 


2/2/19 07:00 98.6 115 17 101/72 (82) 91   


 


2/2/19 06:00  121 20 117/57 (77) 100   


 


2/2/19 05:00  117 18 119/54 (75) 100   


 


2/2/19 04:56  117 15     40


 


2/2/19 04:00      Mechanical Ventilator  


 


2/2/19 04:00  116      


 


2/2/19 04:00        40


 


2/2/19 04:00 98.8 117 17 111/70 (84) 91   


 


2/2/19 03:11  120 14     40


 


2/2/19 03:00  117 16 119/65 (83) 94   


 


2/2/19 02:00  117 17 106/71 (83) 100   


 


2/2/19 01:05  117 16     40


 


2/2/19 01:00  119 17 113/56 (75) 100   


 


2/2/19 00:00      Mechanical Ventilator  


 


2/2/19 00:00        40


 


2/2/19 00:00 98.0 75 17 102/49 (66) 100   


 


2/2/19 00:00  75      


 


2/1/19 23:05  77 12     40


 


2/1/19 23:00  80 18 110/42 (64) 100   


 


2/1/19 22:00  120 19 111/57 (75) 100   


 


2/1/19 21:15    115/47    


 


2/1/19 21:00  76 17 110/58 (75) 100   


 


2/1/19 20:55  74 17     40


 


2/1/19 20:00 98.8 92 18 109/54 (72) 100   


 


2/1/19 20:00  76      


 


2/1/19 20:00        40


 


2/1/19 20:00      Mechanical Ventilator  


 


2/1/19 19:06  76 20     40


 


2/1/19 19:00  77 18 106/62 (77) 100   


 


2/1/19 18:00    113/44    


 


2/1/19 18:00  75 18 113/44 (67) 100   


 


2/1/19 17:06  74 16     40


 


2/1/19 17:00    112/44    


 


2/1/19 17:00  81 18 112/44 (66) 100   


 


2/1/19 16:00      Mechanical Ventilator  


 


2/1/19 16:00        40


 


2/1/19 16:00    108/34    


 


2/1/19 16:00 97.9 78 20 108/34 (58) 100   


 


2/1/19 16:00  78      


 


2/1/19 15:30    119/47    


 


2/1/19 15:30  83 19 119/47 (71) 100   


 


2/1/19 15:11  78 12     40


 


2/1/19 15:00  79 18 105/52 (69) 100   


 


2/1/19 15:00    105/52    


 


2/1/19 14:27  85 16 146/51 (82) 93   


 


2/1/19 14:00  71 16 121/50 (73) 93   


 


2/1/19 14:00    146/51    


 


2/1/19 13:00    130/37    


 


2/1/19 13:00  84 16 130/37 (68) 93   


 


2/1/19 12:58  82 20     40


 


2/1/19 12:00  71      


 


2/1/19 12:00      Mechanical Ventilator  


 


2/1/19 12:00    102/33    


 


2/1/19 12:00 98.8 69 16 102/33 (56) 100   


 


2/1/19 12:00        40


 


2/1/19 11:30  72 16 114/43 (66) 100   


 


2/1/19 11:00  73 18 114/43 (66) 99   


 


2/1/19 11:00    114/43    


 


2/1/19 10:38  70 13     40


 


2/1/19 10:30  70 16 107/34 (58) 100   


 


2/1/19 10:00  80 18 114/43 (66) 99   


 


2/1/19 10:00    110/38    


 


2/1/19 09:30  78 16 110/38 (62) 100   

















Intake and Output  


 


 2/1/19 2/2/19





 18:59 06:59


 


Intake Total 995.9965 ml 893.33 ml


 


Output Total 0 ml 0 ml


 


Balance 995.9965 ml 893.33 ml


 


  


 


Intake Oral 0 ml 0 ml


 


IV Total 995.9965 ml 893.33 ml


 


Output Urine Total 0 ml 0 ml


 


Hemodialysis UF  0 ml








General Appearance:  no acute distress


HEENT:  normocephalic, status post trach


Respiratory/Chest:  chest wall non-tender, lungs clear


Cardiovascular:  normal peripheral pulses


Abdomen:  normal bowel sounds, soft, non tender





Microbiology








 Date/Time


Source Procedure


Growth Status


 


 


 1/31/19 17:50


Blood Blood Culture - Preliminary Resulted


 


 1/31/19 17:40


Blood Blood Culture - Preliminary Resulted





 1/31/19 23:30


Wound Gram Stain - Final Resulted


 


 1/31/19 23:30


Wound Wound Culture


Pending Resulted


 


 1/31/19 18:00


Nasal Nares MRSA Culture - Final


Staphylococcus Aureus - Mrsa Complete


 


 1/31/19 16:26


Urine,Clean Catch Urine Culture - Preliminary


Escherichia Coli Resulted





 1/31/19 18:00


Rectum VRE Culture - Final


Enterococcus Faecalis - Vre


Enterococcus Faecium - Vre Resulted


 


 1/31/19 18:00


Rectum 


Pending Resulted








Laboratory Tests


2/1/19 09:55: 


Arterial Blood pH 7.418, Arterial Blood Partial Pressure CO2 37.4, Arterial 

Blood Partial Pressure O2 104.2H, Arterial Blood HCO3 23.6, Arterial Blood 

Oxygen Saturation 97.3, Arterial Blood Base Excess -0.8, Arcadio Test Positive





Current Medications








 Medications


  (Trade)  Dose


 Ordered  Sig/Tony


 Route


 PRN Reason  Start Time


 Stop Time Status Last Admin


Dose Admin


 


 Acetaminophen


  (Tylenol)  650 mg  Q4H  PRN


 RECTAL


 Mild Pain (Pain Scale 1-3)  1/31/19 19:15


 3/2/19 19:14  2/1/19 00:27


 


 


 Albuterol/


 Ipratropium


  (Albuterol/


 Ipratropium)  3 ml  Q6H  PRN


 HHN


 Shortness of Breath  1/31/19 19:15


 2/5/19 19:14   


 


 


 Apixaban


  (Eliquis)  2.5 mg  BID


 ORAL


   2/1/19 09:00


 3/3/19 08:59  2/2/19 08:24


 


 


 Chlorhexidine


 Gluconate


  (Juana-Hex 2%)  1 applic  DAILY@2000


 TOPIC


   2/1/19 20:00


 3/3/19 19:59  2/1/19 21:41


 


 


 Dextrose


  (Dextrose 50%)  25 ml  Q30M  PRN


 IV


 Hypoglycemia  1/31/19 19:30


 3/2/19 19:29   


 


 


 Dextrose


  (Dextrose 50%)  50 ml  Q30M  PRN


 IV


 Hypoglycemia  1/31/19 19:30


 3/2/19 19:29   


 


 


 Epoetin Rupesh


  (Epoetin


 Rupesh(ESRD on


 dialysis))  10,000 unit  MON-WED-FRI


 SUBQ


   2/1/19 21:00


 3/3/19 20:59  2/1/19 21:41


 


 


 Norepinephrine


 Bitartrate 8 mg/


 Sodium Chloride  250 ml @ 0


 mls/hr  Q24H


 IV


   1/31/19 21:15


 3/2/19 21:14  1/31/19 22:36


 


 


 Ondansetron HCl


  (Zofran)  4 mg  Q6H  PRN


 IVP


 Nausea & Vomiting  1/31/19 19:15


 3/2/19 19:14   


 


 


 Pantoprazole


  (Protonix)  40 mg  DAILY


 IV


   2/1/19 09:00


 3/3/19 08:59  2/2/19 08:23


 


 


 Piperacillin Sod/


 Tazobactam Sod


 3.375 gm/Sodium


 Chloride  110 ml @ 


 27.5 mls/hr  EVERY 8  HOURS


 IVPB


   1/31/19 23:00


 2/5/19 22:59  2/2/19 05:56


 


 


 Sodium Chloride  1,000 ml @ 


 50 mls/hr  Q20H


 IV


   1/31/19 20:00


 3/2/19 19:59  2/1/19 14:12


 


 


 Vancomycin HCl


  (Vanco rx to


 dose)  1 ea  DAILY  PRN


 MISC


 Per rx protocol  1/31/19 22:15


 3/2/19 22:14   


 


 


 Vancomycin HCl


 750 mg/Dextrose  275 ml @ 


 183.333


 mls/hr  Q24H


 IVPB


   2/1/19 18:30


 2/6/19 18:29  2/1/19 17:31


 

















Quentin Cardoso MD Feb 2, 2019 09:29

## 2019-02-02 NOTE — NUR
NURSE NOTES:

Awake, restless. Afebrile. BP stable; no pressors. Left arm oozing serous fluid. Left 
femoral TLC intact. Bilat soft wrist restraints renewed

## 2019-02-02 NOTE — NUR
NURSE NOTES:

Completed bath. No BM. Redressed sacral wound. PEG exit site dressing changed. Still awake 
and restless, afib on the monitor. Afebrile; BP stable. Off pressors.

## 2019-02-02 NOTE — NUR
NURSE NOTES:

Patient turned and repositioned. HOB elevated, mouth care done.Tolerate well feeding at this 
time,no residual.Kept clean dry and comfortable.

## 2019-02-03 VITALS — SYSTOLIC BLOOD PRESSURE: 120 MMHG | DIASTOLIC BLOOD PRESSURE: 41 MMHG

## 2019-02-03 VITALS — DIASTOLIC BLOOD PRESSURE: 48 MMHG | SYSTOLIC BLOOD PRESSURE: 121 MMHG

## 2019-02-03 VITALS — SYSTOLIC BLOOD PRESSURE: 128 MMHG | DIASTOLIC BLOOD PRESSURE: 50 MMHG

## 2019-02-03 VITALS — DIASTOLIC BLOOD PRESSURE: 46 MMHG | SYSTOLIC BLOOD PRESSURE: 120 MMHG

## 2019-02-03 VITALS — SYSTOLIC BLOOD PRESSURE: 120 MMHG | DIASTOLIC BLOOD PRESSURE: 54 MMHG

## 2019-02-03 VITALS — SYSTOLIC BLOOD PRESSURE: 111 MMHG | DIASTOLIC BLOOD PRESSURE: 47 MMHG

## 2019-02-03 VITALS — DIASTOLIC BLOOD PRESSURE: 44 MMHG | SYSTOLIC BLOOD PRESSURE: 120 MMHG

## 2019-02-03 VITALS — SYSTOLIC BLOOD PRESSURE: 120 MMHG | DIASTOLIC BLOOD PRESSURE: 45 MMHG

## 2019-02-03 VITALS — SYSTOLIC BLOOD PRESSURE: 118 MMHG | DIASTOLIC BLOOD PRESSURE: 48 MMHG

## 2019-02-03 VITALS — SYSTOLIC BLOOD PRESSURE: 118 MMHG | DIASTOLIC BLOOD PRESSURE: 42 MMHG

## 2019-02-03 VITALS — SYSTOLIC BLOOD PRESSURE: 115 MMHG | DIASTOLIC BLOOD PRESSURE: 43 MMHG

## 2019-02-03 VITALS — DIASTOLIC BLOOD PRESSURE: 54 MMHG | SYSTOLIC BLOOD PRESSURE: 119 MMHG

## 2019-02-03 VITALS — DIASTOLIC BLOOD PRESSURE: 47 MMHG | SYSTOLIC BLOOD PRESSURE: 107 MMHG

## 2019-02-03 VITALS — SYSTOLIC BLOOD PRESSURE: 115 MMHG | DIASTOLIC BLOOD PRESSURE: 41 MMHG

## 2019-02-03 VITALS — SYSTOLIC BLOOD PRESSURE: 110 MMHG | DIASTOLIC BLOOD PRESSURE: 39 MMHG

## 2019-02-03 VITALS — SYSTOLIC BLOOD PRESSURE: 112 MMHG | DIASTOLIC BLOOD PRESSURE: 41 MMHG

## 2019-02-03 VITALS — SYSTOLIC BLOOD PRESSURE: 115 MMHG | DIASTOLIC BLOOD PRESSURE: 39 MMHG

## 2019-02-03 VITALS — SYSTOLIC BLOOD PRESSURE: 113 MMHG | DIASTOLIC BLOOD PRESSURE: 49 MMHG

## 2019-02-03 VITALS — DIASTOLIC BLOOD PRESSURE: 57 MMHG | SYSTOLIC BLOOD PRESSURE: 137 MMHG

## 2019-02-03 VITALS — SYSTOLIC BLOOD PRESSURE: 118 MMHG | DIASTOLIC BLOOD PRESSURE: 41 MMHG

## 2019-02-03 VITALS — SYSTOLIC BLOOD PRESSURE: 110 MMHG | DIASTOLIC BLOOD PRESSURE: 47 MMHG

## 2019-02-03 VITALS — DIASTOLIC BLOOD PRESSURE: 43 MMHG | SYSTOLIC BLOOD PRESSURE: 114 MMHG

## 2019-02-03 LAB
ADD MANUAL DIFF: YES
ANION GAP SERPL CALC-SCNC: 10 MMOL/L (ref 5–15)
BUN SERPL-MCNC: 41 MG/DL (ref 7–18)
CALCIUM SERPL-MCNC: 8.8 MG/DL (ref 8.5–10.1)
CHLORIDE SERPL-SCNC: 98 MMOL/L (ref 98–107)
CO2 SERPL-SCNC: 25 MMOL/L (ref 21–32)
CREAT SERPL-MCNC: 1.7 MG/DL (ref 0.55–1.3)
ERYTHROCYTE [DISTWIDTH] IN BLOOD BY AUTOMATED COUNT: 17.1 % (ref 11.6–14.8)
HCT VFR BLD CALC: 25.1 % (ref 37–47)
HGB BLD-MCNC: 8.2 G/DL (ref 12–16)
MCV RBC AUTO: 93 FL (ref 80–99)
PLATELET # BLD: 408 K/UL (ref 150–450)
POTASSIUM SERPL-SCNC: 2.7 MMOL/L (ref 3.5–5.1)
RBC # BLD AUTO: 2.71 M/UL (ref 4.2–5.4)
SODIUM SERPL-SCNC: 134 MMOL/L (ref 136–145)
WBC # BLD AUTO: 11.7 K/UL (ref 4.8–10.8)

## 2019-02-03 RX ADMIN — DEXTROSE MONOHYDRATE SCH MLS/HR: 50 INJECTION, SOLUTION INTRAVENOUS at 22:09

## 2019-02-03 RX ADMIN — DEXTROSE MONOHYDRATE SCH MLS/HR: 50 INJECTION, SOLUTION INTRAVENOUS at 05:45

## 2019-02-03 RX ADMIN — PANTOPRAZOLE SODIUM SCH MG: 40 INJECTION, POWDER, FOR SOLUTION INTRAVENOUS at 09:21

## 2019-02-03 RX ADMIN — SODIUM CHLORIDE SCH MLS/HR: 0.9 INJECTION INTRAVENOUS at 20:57

## 2019-02-03 RX ADMIN — DEXTROSE MONOHYDRATE SCH MLS/HR: 50 INJECTION, SOLUTION INTRAVENOUS at 13:27

## 2019-02-03 NOTE — NUR
NURSE NOTES:

Awake and restless. Wants restraints off; released for a minute but pt attempted to pull 
vent tubing out. Reapplied restraints. Repositioned.

## 2019-02-03 NOTE — GENERAL PROGRESS NOTE
Assessment/Plan


Problem List:  


(1) MDD (major depressive disorder), recurrent episode


ICD Codes:  F33.9 - Major depressive disorder, recurrent, unspecified


SNOMED:  090091266


(2) Dementia with behavioral disturbance


ICD Codes:  F03.91 - Unspecified dementia with behavioral disturbance


SNOMED:  8859344738807


(3) Encephalopathy chronic


ICD Codes:  G93.49 - Other encephalopathy


SNOMED:  50205296


Assessment/Plan


lexapro 10mg po qam


haldol


cont restraints





Subjective


Neurologic/Psychiatric:  Reports: anxiety, depressed, emotional problems


Allergies:  


Coded Allergies:  


     No Known Allergies (Unverified , 11/26/18)


Subjective


waxing and waning of consciousness





Objective





Last 24 Hour Vital Signs








  Date Time  Temp Pulse Resp B/P (MAP) Pulse Ox O2 Delivery O2 Flow Rate FiO2


 


2/3/19 21:15  72 16     40


 


2/3/19 20:57    123/43    


 


2/3/19 19:42  71 17     40


 


2/3/19 19:00  76 14 110/47 (68) 100   


 


2/3/19 18:00  66 14 112/41 (64) 100   


 


2/3/19 17:00  67 14 118/42 (67) 100   


 


2/3/19 16:51  75 16     40


 


2/3/19 16:00        40


 


2/3/19 16:00      Mechanical Ventilator  


 


2/3/19 16:00 98.6 72 16 121/48 (72) 100   


 


2/3/19 16:00  72      


 


2/3/19 15:26  72 13     40


 


2/3/19 15:00  71 16 119/54 (75) 100   


 


2/3/19 14:00  74 14 120/54 (76) 100   


 


2/3/19 13:25  76 18     40


 


2/3/19 13:00  74 14 118/48 (71) 100   


 


2/3/19 12:00  73      


 


2/3/19 12:00        40


 


2/3/19 12:00      Mechanical Ventilator  


 


2/3/19 12:00 98.0 72 16 111/47 (68) 100   


 


2/3/19 11:03  80 19     40


 


2/3/19 11:00  76 14 137/57 (83) 100   


 


2/3/19 10:00  71 14 107/47 (67) 100   


 


2/3/19 09:21  87 14     40


 


2/3/19 09:00 98.7 70 16 115/39 (64) 100   


 


2/3/19 08:00  67      


 


2/3/19 08:00        40


 


2/3/19 08:00      Mechanical Ventilator  


 


2/3/19 07:14  70 16     40


 


2/3/19 07:00  72 14 120/44 (69) 100   


 


2/3/19 06:00  71 15 115/41 (65) 100   


 


2/3/19 05:00  67 14 120/45 (70) 100   


 


2/3/19 04:50  67 13     40


 


2/3/19 04:06  66      


 


2/3/19 04:00 98.7 68 16 110/39 (62) 100   


 


2/3/19 04:00      Mechanical Ventilator  


 


2/3/19 04:00        40


 


2/3/19 03:00  67 15 120/41 (67) 100   


 


2/3/19 02:46  67 17     40


 


2/3/19 02:00  64 11 114/43 (66) 100   


 


2/3/19 01:09  70 15     40


 


2/3/19 01:00  72 17 128/50 (76) 99   


 


2/3/19 00:00  67      


 


2/3/19 00:00      Mechanical Ventilator  


 


2/3/19 00:00        40


 


2/3/19 00:00 98.8 67 12 118/41 (66) 100   


 


2/2/19 23:19  76 13     40


 


2/2/19 23:00  66 14 113/44 (67) 100   


 


2/2/19 22:00  79 18 137/52 (80) 100   

















Intake and Output  


 


 2/2/19 2/3/19





 19:00 07:00


 


Intake Total 854.499 ml 1067.5 ml


 


Output Total 0 ml 0 ml


 


Balance 854.499 ml 1067.5 ml


 


  


 


Free Water 50 ml 


 


IV Total 594.499 ml 647.5 ml


 


Tube Feeding 210 ml 420 ml


 


Output Urine Total 0 ml 0 ml








Laboratory Tests


2/3/19 04:40: 


White Blood Count 11.7H, Red Blood Count 2.71L, Hemoglobin 8.2L, Hematocrit 

25.1L, Mean Corpuscular Volume 93, Mean Corpuscular Hemoglobin 30.5, Mean 

Corpuscular Hemoglobin Concent 32.9, Red Cell Distribution Width 17.1H, 

Platelet Count 408, Mean Platelet Volume 5.8L, Neutrophils (%) (Auto) , 

Lymphocytes (%) (Auto) , Monocytes (%) (Auto) , Eosinophils (%) (Auto) , 

Basophils (%) (Auto) , Differential Total Cells Counted 100, Neutrophils % (

Manual) 92H, Lymphocytes % (Manual) 3L, Monocytes % (Manual) 4, Eosinophils % (

Manual) 1, Basophils % (Manual) 0, Band Neutrophils 0, Platelet Estimate 

Adequate, Platelet Morphology Normal, Hypochromasia 1+, Anisocytosis 1+, Sodium 

Level 134L, Potassium Level 2.7*L, Chloride Level 98, Carbon Dioxide Level 25, 

Anion Gap 10, Blood Urea Nitrogen 41H, Creatinine 1.7H, Estimat Glomerular 

Filtration Rate , Glucose Level 87, Calcium Level 8.8


2/3/19 17:25: Vancomycin Level Trough 22.7H


Height (Feet):  5


Height (Inches):  3.00


Weight (Pounds):  150


General Appearance:  alert, confused, moderate distress, agitated











Cass Levi MD Feb 3, 2019 21:48

## 2019-02-03 NOTE — SURGERY PROGRESS NOTE
Surgery Progress Note


Subjective


Additional Comments


no acute events. in ICU ill appearing





Objective





Last 24 Hour Vital Signs








  Date Time  Temp Pulse Resp B/P (MAP) Pulse Ox O2 Delivery O2 Flow Rate FiO2


 


2/3/19 13:00  74 14 118/48 (71) 100   


 


2/3/19 12:00  73      


 


2/3/19 12:00        40


 


2/3/19 12:00      Mechanical Ventilator  


 


2/3/19 12:00 98.0 72 16 111/47 (68) 100   


 


2/3/19 11:03  80 19     40


 


2/3/19 11:00  76 14 137/57 (83) 100   


 


2/3/19 10:00  71 14 107/47 (67) 100   


 


2/3/19 09:21  87 14     40


 


2/3/19 09:00 98.7 70 16 115/39 (64) 100   


 


2/3/19 08:00  67      


 


2/3/19 08:00        40


 


2/3/19 08:00      Mechanical Ventilator  


 


2/3/19 07:14  70 16     40


 


2/3/19 07:00  72 14 120/44 (69) 100   


 


2/3/19 06:00  71 15 115/41 (65) 100   


 


2/3/19 05:00  67 14 120/45 (70) 100   


 


2/3/19 04:50  67 13     40


 


2/3/19 04:06  66      


 


2/3/19 04:00 98.7 68 16 110/39 (62) 100   


 


2/3/19 04:00      Mechanical Ventilator  


 


2/3/19 04:00        40


 


2/3/19 03:00  67 15 120/41 (67) 100   


 


2/3/19 02:46  67 17     40


 


2/3/19 02:00  64 11 114/43 (66) 100   


 


2/3/19 01:09  70 15     40


 


2/3/19 01:00  72 17 128/50 (76) 99   


 


2/3/19 00:00  67      


 


2/3/19 00:00      Mechanical Ventilator  


 


2/3/19 00:00        40


 


2/3/19 00:00 98.8 67 12 118/41 (66) 100   


 


2/2/19 23:19  76 13     40


 


2/2/19 23:00  66 14 113/44 (67) 100   


 


2/2/19 22:00  79 18 137/52 (80) 100   


 


2/2/19 21:15    110/52    


 


2/2/19 21:03  76 18     40


 


2/2/19 21:00  75 18 110/52 (71) 100   


 


2/2/19 20:00  75      


 


2/2/19 20:00 98.2 75 15 114/40 (64) 100   


 


2/2/19 20:00      Mechanical Ventilator  


 


2/2/19 20:00        40


 


2/2/19 19:28  77 14     40


 


2/2/19 19:00  78 18 121/42 (68) 100   


 


2/2/19 18:00  78 18 114/50 (71) 100   


 


2/2/19 17:20  76 21     40


 


2/2/19 17:00  75 19 117/43 (67) 100   


 


2/2/19 16:00 98.0 72 17 114/55 (74) 100   


 


2/2/19 16:00      Mechanical Ventilator  


 


2/2/19 16:00  78      


 


2/2/19 16:00        40


 


2/2/19 15:22  76 14     40


 


2/2/19 15:00  74 19 111/39 (63) 100   


 


2/2/19 14:00  78 18 108/61 (77) 100   








I&O











Intake and Output  


 


 2/2/19 2/3/19





 18:59 06:59


 


Intake Total 768.666 ml 1200.833 ml


 


Output Total 0 ml 0 ml


 


Balance 768.666 ml 1200.833 ml


 


  


 


Free Water 50 ml 


 


IV Total 543.666 ml 780.833 ml


 


Tube Feeding 175 ml 420 ml


 


Output Urine Total 0 ml 0 ml








Dressing:  other


Wound:  other


Drains:  other


Cardiovascular:  RSR


Respiratory:  decreased breath sounds


Abdomen:  soft, non-distended


Extremities:  other





Laboratory Tests








Test


  2/3/19


04:40


 


White Blood Count


  11.7 K/UL


(4.8-10.8)  H


 


Red Blood Count


  2.71 M/UL


(4.20-5.40)  L


 


Hemoglobin


  8.2 G/DL


(12.0-16.0)  L


 


Hematocrit


  25.1 %


(37.0-47.0)  L


 


Mean Corpuscular Volume 93 FL (80-99)  


 


Mean Corpuscular Hemoglobin


  30.5 PG


(27.0-31.0)


 


Mean Corpuscular Hemoglobin


Concent 32.9 G/DL


(32.0-36.0)


 


Red Cell Distribution Width


  17.1 %


(11.6-14.8)  H


 


Platelet Count


  408 K/UL


(150-450)


 


Mean Platelet Volume


  5.8 FL


(6.5-10.1)  L


 


Neutrophils (%) (Auto)


  % (45.0-75.0)


 


 


Lymphocytes (%) (Auto)


  % (20.0-45.0)


 


 


Monocytes (%) (Auto)  % (1.0-10.0)  


 


Eosinophils (%) (Auto)  % (0.0-3.0)  


 


Basophils (%) (Auto)  % (0.0-2.0)  


 


Differential Total Cells


Counted 100  


 


 


Neutrophils % (Manual) 92 % (45-75)  H


 


Lymphocytes % (Manual) 3 % (20-45)  L


 


Monocytes % (Manual) 4 % (1-10)  


 


Eosinophils % (Manual) 1 % (0-3)  


 


Basophils % (Manual) 0 % (0-2)  


 


Band Neutrophils 0 % (0-8)  


 


Platelet Estimate Adequate  


 


Platelet Morphology Normal  


 


Hypochromasia 1+  


 


Anisocytosis 1+  


 


Sodium Level


  134 MMOL/L


(136-145)  L


 


Potassium Level


  2.7 MMOL/L


(3.5-5.1)  *L


 


Chloride Level


  98 MMOL/L


()


 


Carbon Dioxide Level


  25 MMOL/L


(21-32)


 


Anion Gap


  10 mmol/L


(5-15)


 


Blood Urea Nitrogen


  41 mg/dL


(7-18)  H


 


Creatinine


  1.7 MG/DL


(0.55-1.30)  H


 


Estimat Glomerular Filtration


Rate  mL/min (>60)  


 


 


Glucose Level


  87 MG/DL


()


 


Calcium Level


  8.8 MG/DL


(8.5-10.1)











Plan


Problems:  


(1) Septic shock


Assessment & Plan:  infected central line removed


line free 


plan for PICC soon 





(2) Decubital ulcer


Assessment & Plan:  Pt presents with full thickness pressure injury sacrum with 

deep undermining into L buttocks .Historical arched surgical scar Upper L 

buttocks.Wound measures(L)3.4cm x (W)3cm x (D)1.5cm ,undermining 12-6 by 12 cm @

8o'clock. Scattered Biofilm at base of sacrum with40% yellow slough along 

borders of wound .minimal non-odorous serous exudate.Non-blanchable erythema 

with additional scattered partial wounds periwound to R and L buttocks 


Unstageable pressure injury R ischium  with 95% slough, 5% erythema along 

margins.Non-blanchable erythema periwound (L)2cm x (W)0.4cm.


Stable dry eschar lateral/posterior R tibia.Periwound is clean and pink.(L)3cmx 

(W)2cm.


Stable dry eschar R heel (L)2cm x (W)1.7cm .Periwound blanchable .


Pressure injury with 100% eschar ,loose around edges ,satish pink borders .No 

odor or exudate noted.(L)1.7cm x (W)2cm. 


L forearm edematous and weeping serous exudate. 


Scattered senile purpuras bilat upper and bilat lower ext.


Skin assessed under trach collar and no evidence of skin breakdown noted.


Wound medial /distal R tibia with 100% stable dry eschar (L)2cm x (W)1.6cm. 

Periwound is pink and dry.








Tx.Plan:


Cleanse sacral wound with Saline.Loosely pack with Silvasorb impregnated 1/2 

inch packing strip .Apply Moisture Barrier paste periwound .Cover with Optifoam 

drsg Daily and prn.


Apply Cavilon Skin Barrier to R ischial wound .Cover with Optifoam drsg .Change 

every 3 days and prn.


Apply Cavilon Skin Barrier to wound medial R tibia.Cover with Optifoam drsg 

every 7  days and prn.


Apply Cavilon Skin Barrier to R heel .Cover with Optifoam drsg. Change every 7 

days and prn.


Apply Cavilon Skin Barrier to Medial L heel.Cover with Optifoam drsg .Change 

every 7 days and prn.


APM /LANDEN mattress overlay.


Reposition at least every 2 hours or as tolerated.


Off-load heels with pillow.





(3) Severe malnutrition


Assessment & Plan:  nutrition consult


needs optimal nutrition in septic state for recovery and wound healing 














Bob Lewis Feb 3, 2019 13:10

## 2019-02-03 NOTE — NUR
RESPIRATORY NOTE: Received pt. on 840 vent. Vent settings are: A/C rate of 8, Vt 450, FI02 
40%, PEEP +5. No respiratory distress noted, pt. Sp02 @ 100%. Ambu bag @ BS. Vent plug red 
outlet. Will continue to monitor pt.

## 2019-02-03 NOTE — PULMONOLOGY PROGRESS NOTE
Assessment/Plan


Assessment/Plan


1. Shock, possibly septic.


2. UTI.


3. ESRD, on dialysis.


4. Ventilator dependent respiratory failure.


5. Tracheostomy.


6. Diabetes mellitus.


7. Chronic atrial fibrillation.





DISCUSSION:  Continue broad-spectrum antibiotics.  I will continue AC mode.


Continue to decrease FiO2 as tolerated.  Blood transfusion as required.


Hemodialysis per renal.














  ______________________________________________


  Quentin Cardoso M.D.





Subjective


Interval Events:  None new


Constitutional:  Reports: no symptoms


HEENT:  Repors: no symptoms


Respiratory:  Reports: no symptoms


Cardiovascular:  Reports: no symptoms


Gastrointestinal/Abdominal:  Reports: no symptoms


Allergies:  


Coded Allergies:  


     No Known Allergies (Unverified , 11/26/18)





Objective





Last 24 Hour Vital Signs








  Date Time  Temp Pulse Resp B/P (MAP) Pulse Ox O2 Delivery O2 Flow Rate FiO2


 


2/3/19 09:00 98.7 70 16 115/39 (64) 100   


 


2/3/19 08:00        40


 


2/3/19 08:00      Mechanical Ventilator  


 


2/3/19 07:14  70 16     40


 


2/3/19 07:00  72 14 120/44 (69) 100   


 


2/3/19 06:00  71 15 115/41 (65) 100   


 


2/3/19 05:00  67 14 120/45 (70) 100   


 


2/3/19 04:50  67 13     40


 


2/3/19 04:06  66      


 


2/3/19 04:00 98.7 68 16 110/39 (62) 100   


 


2/3/19 04:00      Mechanical Ventilator  


 


2/3/19 04:00        40


 


2/3/19 03:00  67 15 120/41 (67) 100   


 


2/3/19 02:46  67 17     40


 


2/3/19 02:00  64 11 114/43 (66) 100   


 


2/3/19 01:09  70 15     40


 


2/3/19 01:00  72 17 128/50 (76) 99   


 


2/3/19 00:00  67      


 


2/3/19 00:00      Mechanical Ventilator  


 


2/3/19 00:00        40


 


2/3/19 00:00 98.8 67 12 118/41 (66) 100   


 


2/2/19 23:19  76 13     40


 


2/2/19 23:00  66 14 113/44 (67) 100   


 


2/2/19 22:00  79 18 137/52 (80) 100   


 


2/2/19 21:15    110/52    


 


2/2/19 21:03  76 18     40


 


2/2/19 21:00  75 18 110/52 (71) 100   


 


2/2/19 20:00  75      


 


2/2/19 20:00 98.2 75 15 114/40 (64) 100   


 


2/2/19 20:00      Mechanical Ventilator  


 


2/2/19 20:00        40


 


2/2/19 19:28  77 14     40


 


2/2/19 19:00  78 18 121/42 (68) 100   


 


2/2/19 18:00  78 18 114/50 (71) 100   


 


2/2/19 17:20  76 21     40


 


2/2/19 17:00  75 19 117/43 (67) 100   


 


2/2/19 16:00 98.0 72 17 114/55 (74) 100   


 


2/2/19 16:00      Mechanical Ventilator  


 


2/2/19 16:00  78      


 


2/2/19 16:00        40


 


2/2/19 15:22  76 14     40


 


2/2/19 15:00  74 19 111/39 (63) 100   


 


2/2/19 14:00  78 18 108/61 (77) 100   


 


2/2/19 13:05  85 16     40


 


2/2/19 13:00  97 20 133/59 (83) 100   


 


2/2/19 12:00        40


 


2/2/19 12:00      Mechanical Ventilator  


 


2/2/19 12:00 98.6 75 17 108/45 (66) 100   


 


2/2/19 12:00  75      


 


2/2/19 11:00  76 16 112/70 (84) 100   


 


2/2/19 10:49  78 16     40


 


2/2/19 10:00  79 16 112/70 (84) 100   


 


2/2/19 09:24  123 16     40

















Intake and Output  


 


 2/2/19 2/3/19





 18:59 06:59


 


Intake Total 768.666 ml 1200.833 ml


 


Output Total 0 ml 0 ml


 


Balance 768.666 ml 1200.833 ml


 


  


 


Free Water 50 ml 


 


IV Total 543.666 ml 780.833 ml


 


Tube Feeding 175 ml 420 ml


 


Output Urine Total 0 ml 0 ml








General Appearance:  no acute distress


HEENT:  status post trach


Respiratory/Chest:  chest wall non-tender, lungs clear


Cardiovascular:  normal peripheral pulses, normal rate





Microbiology








 Date/Time


Source Procedure


Growth Status


 


 


 1/31/19 17:50


Blood Blood Culture - Preliminary


Staphylococcus Aureus Resulted


 


 1/31/19 17:40


Blood Blood Culture - Preliminary


Staphylococcus Aureus Resulted





 1/31/19 23:30


Wound Gram Stain - Final Resulted


 


 1/31/19 23:30 Wound Culture - Preliminary


Staphylococcus Aureus - Mrsa


Pseudomonas Aeruginosa Resulted





 2/2/19 14:00


Sputum Gram Stain - Final Resulted


 


 2/2/19 14:00


Sputum Sputum Culture


Pending Resulted


 


 1/31/19 18:00


Nasal Nares MRSA Culture - Final


Staphylococcus Aureus - Mrsa Complete


 


 1/31/19 16:26


Urine,Clean Catch Urine Culture - Preliminary


Escherichia Coli Resulted





 1/31/19 18:00


Rectum VRE Culture - Final


Enterococcus Faecalis - Vre


Enterococcus Faecium - Vre Resulted


 


 1/31/19 18:00


Rectum 


Pending Resulted








Laboratory Tests


2/3/19 04:40: 


White Blood Count 11.7H, Red Blood Count 2.71L, Hemoglobin 8.2L, Hematocrit 

25.1L, Mean Corpuscular Volume 93, Mean Corpuscular Hemoglobin 30.5, Mean 

Corpuscular Hemoglobin Concent 32.9, Red Cell Distribution Width 17.1H, 

Platelet Count 408, Mean Platelet Volume 5.8L, Neutrophils (%) (Auto) , 

Lymphocytes (%) (Auto) , Monocytes (%) (Auto) , Eosinophils (%) (Auto) , 

Basophils (%) (Auto) , Neutrophils % (Manual) [Pending], Lymphocytes % (Manual) 

[Pending], Platelet Estimate [Pending], Platelet Morphology [Pending], Sodium 

Level 134L, Potassium Level 2.7*L, Chloride Level 98, Carbon Dioxide Level 25, 

Anion Gap 10, Blood Urea Nitrogen 41H, Creatinine 1.7H, Estimat Glomerular 

Filtration Rate , Glucose Level 87, Calcium Level 8.8





Current Medications








 Medications


  (Trade)  Dose


 Ordered  Sig/Tony


 Route


 PRN Reason  Start Time


 Stop Time Status Last Admin


Dose Admin


 


 Acetaminophen


  (Tylenol)  650 mg  Q4H  PRN


 RECTAL


 Mild Pain (Pain Scale 1-3)  1/31/19 19:15


 3/2/19 19:14  2/1/19 00:27


 


 


 Albuterol/


 Ipratropium


  (Albuterol/


 Ipratropium)  3 ml  Q6H  PRN


 HHN


 Shortness of Breath  1/31/19 19:15


 2/5/19 19:14   


 


 


 Dextrose


  (Dextrose 50%)  25 ml  Q30M  PRN


 IV


 Hypoglycemia  1/31/19 19:30


 3/2/19 19:29   


 


 


 Dextrose


  (Dextrose 50%)  50 ml  Q30M  PRN


 IV


 Hypoglycemia  1/31/19 19:30


 3/2/19 19:29   


 


 


 Epoetin Rupesh


  (Epoetin


 Rupesh(ESRD on


 dialysis))  10,000 unit  MON-WED-FRI


 SUBQ


   2/1/19 21:00


 3/3/19 20:59  2/1/19 21:41


 


 


 Escitalopram


 Oxalate


  (Lexapro)  10 mg  DAILY


 ORAL


   2/2/19 11:00


 3/4/19 10:59  2/2/19 11:45


 


 


 Haloperidol


  (Haldol)  5 mg  BID


 ORAL


   2/2/19 18:00


 3/4/19 17:59  2/2/19 17:28


 


 


 Iron Sucrose 100


 mg/Sodium Chloride  60 ml @ 


 240 mls/hr  BEDTIME


 IV


   2/2/19 21:00


 2/6/19 21:14  2/2/19 20:39


 


 


 Norepinephrine


 Bitartrate 8 mg/


 Sodium Chloride  250 ml @ 0


 mls/hr  Q24H


 IV


   1/31/19 21:15


 3/2/19 21:14  1/31/19 22:36


 


 


 Ondansetron HCl


  (Zofran)  4 mg  Q6H  PRN


 IVP


 Nausea & Vomiting  1/31/19 19:15


 3/2/19 19:14   


 


 


 Pantoprazole


  (Protonix)  40 mg  DAILY


 IV


   2/1/19 09:00


 3/3/19 08:59  2/2/19 08:23


 


 


 Piperacillin Sod/


 Tazobactam Sod


 3.375 gm/Sodium


 Chloride  110 ml @ 


 27.5 mls/hr  EVERY 8  HOURS


 IVPB


   1/31/19 23:00


 2/5/19 22:59  2/3/19 05:45


 


 


 Potassium Chloride  100 ml @ 


 100 mls/hr  Q1H


 IVPB


   2/3/19 07:30


 2/3/19 10:29   


 


 


 Sodium Chloride  1,000 ml @ 


 50 mls/hr  Q20H


 IV


   1/31/19 20:00


 3/2/19 19:59  2/2/19 21:24


 


 


 Vancomycin HCl


  (Vanco rx to


 dose)  1 ea  DAILY  PRN


 MISC


 Per rx protocol  1/31/19 22:15


 3/2/19 22:14   


 


 


 Vancomycin HCl


 750 mg/Dextrose  275 ml @ 


 183.333


 mls/hr  Q24H


 IVPB


   2/1/19 18:30


 2/6/19 18:29  2/2/19 17:28


 

















Quentin Cardoso MD Feb 3, 2019 09:05

## 2019-02-03 NOTE — NEPHROLOGY PROGRESS NOTE
Assessment/Plan


Plan


Urosepsis (GNR) IV Abx per ID.


Worsening anemia 2 above -transfuse on HD.


ESRD HD MWF


Chr. A. Fib - apixaban. DC'ed due to wound bleeding.








I have been notified by Dr. Santillan has GPC in the blood!!!


Pt. Has groin TLC inserted last admission. Has Line Sepsis. TLC pulled by Dr. Mitchell.


Polymicrobial sepsis. Unstable for leaving ICU yet





Subjective


Subjective


Less confused. More alert. Per RN depressed.





Objective


Objective





Last 24 Hour Vital Signs








  Date Time  Temp Pulse Resp B/P (MAP) Pulse Ox O2 Delivery O2 Flow Rate FiO2


 


2/3/19 10:00  71 14 107/47 (67) 100   


 


2/3/19 09:21  87 14     40


 


2/3/19 09:00 98.7 70 16 115/39 (64) 100   


 


2/3/19 08:00  67      


 


2/3/19 08:00        40


 


2/3/19 08:00      Mechanical Ventilator  


 


2/3/19 07:14  70 16     40


 


2/3/19 07:00  72 14 120/44 (69) 100   


 


2/3/19 06:00  71 15 115/41 (65) 100   


 


2/3/19 05:00  67 14 120/45 (70) 100   


 


2/3/19 04:50  67 13     40


 


2/3/19 04:06  66      


 


2/3/19 04:00 98.7 68 16 110/39 (62) 100   


 


2/3/19 04:00      Mechanical Ventilator  


 


2/3/19 04:00        40


 


2/3/19 03:00  67 15 120/41 (67) 100   


 


2/3/19 02:46  67 17     40


 


2/3/19 02:00  64 11 114/43 (66) 100   


 


2/3/19 01:09  70 15     40


 


2/3/19 01:00  72 17 128/50 (76) 99   


 


2/3/19 00:00  67      


 


2/3/19 00:00      Mechanical Ventilator  


 


2/3/19 00:00        40


 


2/3/19 00:00 98.8 67 12 118/41 (66) 100   


 


2/2/19 23:19  76 13     40


 


2/2/19 23:00  66 14 113/44 (67) 100   


 


2/2/19 22:00  79 18 137/52 (80) 100   


 


2/2/19 21:15    110/52    


 


2/2/19 21:03  76 18     40


 


2/2/19 21:00  75 18 110/52 (71) 100   


 


2/2/19 20:00  75      


 


2/2/19 20:00 98.2 75 15 114/40 (64) 100   


 


2/2/19 20:00      Mechanical Ventilator  


 


2/2/19 20:00        40


 


2/2/19 19:28  77 14     40


 


2/2/19 19:00  78 18 121/42 (68) 100   


 


2/2/19 18:00  78 18 114/50 (71) 100   


 


2/2/19 17:20  76 21     40


 


2/2/19 17:00  75 19 117/43 (67) 100   


 


2/2/19 16:00 98.0 72 17 114/55 (74) 100   


 


2/2/19 16:00      Mechanical Ventilator  


 


2/2/19 16:00  78      


 


2/2/19 16:00        40


 


2/2/19 15:22  76 14     40


 


2/2/19 15:00  74 19 111/39 (63) 100   


 


2/2/19 14:00  78 18 108/61 (77) 100   


 


2/2/19 13:05  85 16     40


 


2/2/19 13:00  97 20 133/59 (83) 100   


 


2/2/19 12:00        40


 


2/2/19 12:00      Mechanical Ventilator  


 


2/2/19 12:00 98.6 75 17 108/45 (66) 100   


 


2/2/19 12:00  75      


 


2/2/19 11:00  76 16 112/70 (84) 100   


 


2/2/19 10:49  78 16     40

















Intake and Output  


 


 2/2/19 2/3/19





 18:59 06:59


 


Intake Total 768.666 ml 1200.833 ml


 


Output Total 0 ml 0 ml


 


Balance 768.666 ml 1200.833 ml


 


  


 


Free Water 50 ml 


 


IV Total 543.666 ml 780.833 ml


 


Tube Feeding 175 ml 420 ml


 


Output Urine Total 0 ml 0 ml








Laboratory Tests


2/3/19 04:40: 


White Blood Count 11.7H, Red Blood Count 2.71L, Hemoglobin 8.2L, Hematocrit 

25.1L, Mean Corpuscular Volume 93, Mean Corpuscular Hemoglobin 30.5, Mean 

Corpuscular Hemoglobin Concent 32.9, Red Cell Distribution Width 17.1H, 

Platelet Count 408, Mean Platelet Volume 5.8L, Neutrophils (%) (Auto) , 

Lymphocytes (%) (Auto) , Monocytes (%) (Auto) , Eosinophils (%) (Auto) , 

Basophils (%) (Auto) , Differential Total Cells Counted 100, Neutrophils % (

Manual) 92H, Lymphocytes % (Manual) 3L, Monocytes % (Manual) 4, Eosinophils % (

Manual) 1, Basophils % (Manual) 0, Band Neutrophils 0, Platelet Estimate 

Adequate, Platelet Morphology Normal, Hypochromasia 1+, Anisocytosis 1+, Sodium 

Level 134L, Potassium Level 2.7*L, Chloride Level 98, Carbon Dioxide Level 25, 

Anion Gap 10, Blood Urea Nitrogen 41H, Creatinine 1.7H, Estimat Glomerular 

Filtration Rate , Glucose Level 87, Calcium Level 8.8


Height (Feet):  5


Height (Inches):  3.00


Weight (Pounds):  150


Objective


On Vent.


Cv IRR +tach


Lungs CTA


Abd SNT. BS +


E No CCE











Gavino Daigle MD Feb 3, 2019 10:15

## 2019-02-03 NOTE — NUR
Received Patient on AC 8,  , FIO2 40%, PEEP +5. Pt is trach-dependent with a cuffed, 
Shiley 8 tube. Pt is alert and awake, follows commands. Both hands on soft restraints. 
Breath sounds reveal bilateral rhonchi. Suction small amounts of thick, yellow-green 
secretions. Vent plugged into red outlet, ambubag at bedside. Pt in no apparent distress at 
this time. Will continue to monitor throughout the day.

## 2019-02-03 NOTE — NUR
NURSE NOTES:

Pt suctioned with thick mucus secretion. Sputum culture still pending.Turned and 
repositioned. HOB elevated to prevent aspiration.Will continue to monitor.

## 2019-02-03 NOTE — NUR
NURSE NOTES:

Pt turned and repositioned. HOB elevated to prevent aspiration.Kept clean and dry. Will 
continue to monitor.

## 2019-02-03 NOTE — INFECTIOUS DISEASES PROG NOTE
Assessment/Plan


Assessment/Plan


A


1. Staph aureus sepsis


2. E.coli UTI


3. shock resolved


4. pneumonia


5. respiratory failure


6. sacral decubitus ulcer


7. leucocytosis improving


8. Central line infection, femoral line removed


9. ESRD on HD





P


1. continue vancomycin iv, Zosyn


2. will follow up cultures





Subjective


ROS Limited/Unobtainable:  Yes


Constitutional:  Reports: no symptoms


Neurologic:  Reports: confusion, other - on restraint


Allergies:  


Coded Allergies:  


     No Known Allergies (Unverified , 11/26/18)





Objective


Vital Signs





Last 24 Hour Vital Signs








  Date Time  Temp Pulse Resp B/P (MAP) Pulse Ox O2 Delivery O2 Flow Rate FiO2


 


2/3/19 10:00  71 14 107/47 (67) 100   


 


2/3/19 09:21  87 14     40


 


2/3/19 09:00 98.7 70 16 115/39 (64) 100   


 


2/3/19 08:00  67      


 


2/3/19 08:00        40


 


2/3/19 08:00      Mechanical Ventilator  


 


2/3/19 07:14  70 16     40


 


2/3/19 07:00  72 14 120/44 (69) 100   


 


2/3/19 06:00  71 15 115/41 (65) 100   


 


2/3/19 05:00  67 14 120/45 (70) 100   


 


2/3/19 04:50  67 13     40


 


2/3/19 04:06  66      


 


2/3/19 04:00 98.7 68 16 110/39 (62) 100   


 


2/3/19 04:00      Mechanical Ventilator  


 


2/3/19 04:00        40


 


2/3/19 03:00  67 15 120/41 (67) 100   


 


2/3/19 02:46  67 17     40


 


2/3/19 02:00  64 11 114/43 (66) 100   


 


2/3/19 01:09  70 15     40


 


2/3/19 01:00  72 17 128/50 (76) 99   


 


2/3/19 00:00  67      


 


2/3/19 00:00      Mechanical Ventilator  


 


2/3/19 00:00        40


 


2/3/19 00:00 98.8 67 12 118/41 (66) 100   


 


2/2/19 23:19  76 13     40


 


2/2/19 23:00  66 14 113/44 (67) 100   


 


2/2/19 22:00  79 18 137/52 (80) 100   


 


2/2/19 21:15    110/52    


 


2/2/19 21:03  76 18     40


 


2/2/19 21:00  75 18 110/52 (71) 100   


 


2/2/19 20:00  75      


 


2/2/19 20:00 98.2 75 15 114/40 (64) 100   


 


2/2/19 20:00      Mechanical Ventilator  


 


2/2/19 20:00        40


 


2/2/19 19:28  77 14     40


 


2/2/19 19:00  78 18 121/42 (68) 100   


 


2/2/19 18:00  78 18 114/50 (71) 100   


 


2/2/19 17:20  76 21     40


 


2/2/19 17:00  75 19 117/43 (67) 100   


 


2/2/19 16:00 98.0 72 17 114/55 (74) 100   


 


2/2/19 16:00      Mechanical Ventilator  


 


2/2/19 16:00  78      


 


2/2/19 16:00        40


 


2/2/19 15:22  76 14     40


 


2/2/19 15:00  74 19 111/39 (63) 100   


 


2/2/19 14:00  78 18 108/61 (77) 100   


 


2/2/19 13:05  85 16     40


 


2/2/19 13:00  97 20 133/59 (83) 100   


 


2/2/19 12:00        40


 


2/2/19 12:00      Mechanical Ventilator  


 


2/2/19 12:00 98.6 75 17 108/45 (66) 100   


 


2/2/19 12:00  75      


 


2/2/19 11:00  76 16 112/70 (84) 100   


 


2/2/19 10:49  78 16     40








Height (Feet):  5


Height (Inches):  3.00


Weight (Pounds):  150


General Appearance:  no acute distress


HEENT:  status post trach


Respiratory/Chest:  lungs clear, other - on ventilator


Cardiovascular:  normal rate


Abdomen:  soft, non tender, other - GT feeding


Extremities:  no edema


Neurologic/Psychiatric:  alert, responsive, disoriented





Microbiology








 Date/Time


Source Procedure


Growth Status


 


 


 1/31/19 17:50


Blood Blood Culture - Preliminary


Staphylococcus Aureus Resulted


 


 1/31/19 17:40


Blood Blood Culture - Preliminary


Staphylococcus Aureus Resulted





 1/31/19 23:30


Wound Gram Stain - Final Resulted


 


 1/31/19 23:30 Wound Culture - Preliminary


Staphylococcus Aureus - Mrsa


Pseudomonas Aeruginosa Resulted





 2/2/19 14:00


Sputum Gram Stain - Final Resulted


 


 2/2/19 14:00


Sputum Sputum Culture


Pending Resulted


 


 1/31/19 18:00


Nasal Nares MRSA Culture - Final


Staphylococcus Aureus - Mrsa Complete


 


 1/31/19 16:26


Urine,Clean Catch Urine Culture - Preliminary


Escherichia Coli Resulted





 1/31/19 18:00


Rectum VRE Culture - Final


Enterococcus Faecalis - Vre


Enterococcus Faecium - Vre Resulted


 


 1/31/19 18:00


Rectum 


Pending Resulted











Laboratory Tests








Test


  2/3/19


04:40


 


White Blood Count


  11.7 K/UL


(4.8-10.8)  H


 


Red Blood Count


  2.71 M/UL


(4.20-5.40)  L


 


Hemoglobin


  8.2 G/DL


(12.0-16.0)  L


 


Hematocrit


  25.1 %


(37.0-47.0)  L


 


Mean Corpuscular Volume 93 FL (80-99)  


 


Mean Corpuscular Hemoglobin


  30.5 PG


(27.0-31.0)


 


Mean Corpuscular Hemoglobin


Concent 32.9 G/DL


(32.0-36.0)


 


Red Cell Distribution Width


  17.1 %


(11.6-14.8)  H


 


Platelet Count


  408 K/UL


(150-450)


 


Mean Platelet Volume


  5.8 FL


(6.5-10.1)  L


 


Neutrophils (%) (Auto)


  % (45.0-75.0)


 


 


Lymphocytes (%) (Auto)


  % (20.0-45.0)


 


 


Monocytes (%) (Auto)  % (1.0-10.0)  


 


Eosinophils (%) (Auto)  % (0.0-3.0)  


 


Basophils (%) (Auto)  % (0.0-2.0)  


 


Differential Total Cells


Counted 100  


 


 


Neutrophils % (Manual) 92 % (45-75)  H


 


Lymphocytes % (Manual) 3 % (20-45)  L


 


Monocytes % (Manual) 4 % (1-10)  


 


Eosinophils % (Manual) 1 % (0-3)  


 


Basophils % (Manual) 0 % (0-2)  


 


Band Neutrophils 0 % (0-8)  


 


Platelet Estimate Adequate  


 


Platelet Morphology Normal  


 


Hypochromasia 1+  


 


Anisocytosis 1+  


 


Sodium Level


  134 MMOL/L


(136-145)  L


 


Potassium Level


  2.7 MMOL/L


(3.5-5.1)  *L


 


Chloride Level


  98 MMOL/L


()


 


Carbon Dioxide Level


  25 MMOL/L


(21-32)


 


Anion Gap


  10 mmol/L


(5-15)


 


Blood Urea Nitrogen


  41 mg/dL


(7-18)  H


 


Creatinine


  1.7 MG/DL


(0.55-1.30)  H


 


Estimat Glomerular Filtration


Rate  mL/min (>60)  


 


 


Glucose Level


  87 MG/DL


()


 


Calcium Level


  8.8 MG/DL


(8.5-10.1)











Current Medications








 Medications


  (Trade)  Dose


 Ordered  Sig/Tony


 Route


 PRN Reason  Start Time


 Stop Time Status Last Admin


Dose Admin


 


 Acetaminophen


  (Tylenol)  650 mg  Q4H  PRN


 RECTAL


 Mild Pain (Pain Scale 1-3)  1/31/19 19:15


 3/2/19 19:14  2/1/19 00:27


 


 


 Albuterol/


 Ipratropium


  (Albuterol/


 Ipratropium)  3 ml  Q6H  PRN


 HHN


 Shortness of Breath  1/31/19 19:15


 2/5/19 19:14   


 


 


 Dextrose


  (Dextrose 50%)  25 ml  Q30M  PRN


 IV


 Hypoglycemia  1/31/19 19:30


 3/2/19 19:29   


 


 


 Dextrose


  (Dextrose 50%)  50 ml  Q30M  PRN


 IV


 Hypoglycemia  1/31/19 19:30


 3/2/19 19:29   


 


 


 Epoetin Rupesh


  (Epoetin


 Rupesh(ESRD on


 dialysis))  10,000 unit  MON-WED-FRI


 SUBQ


   2/1/19 21:00


 3/3/19 20:59  2/1/19 21:41


 


 


 Escitalopram


 Oxalate


  (Lexapro)  10 mg  DAILY


 ORAL


   2/2/19 11:00


 3/4/19 10:59  2/3/19 09:20


 


 


 Haloperidol


  (Haldol)  5 mg  BID


 ORAL


   2/2/19 18:00


 3/4/19 17:59  2/3/19 09:20


 


 


 Iron Sucrose 100


 mg/Sodium Chloride  60 ml @ 


 240 mls/hr  BEDTIME


 IV


   2/2/19 21:00


 2/6/19 21:14  2/2/19 20:39


 


 


 Norepinephrine


 Bitartrate 8 mg/


 Sodium Chloride  250 ml @ 0


 mls/hr  Q24H


 IV


   1/31/19 21:15


 3/2/19 21:14  1/31/19 22:36


 


 


 Ondansetron HCl


  (Zofran)  4 mg  Q6H  PRN


 IVP


 Nausea & Vomiting  1/31/19 19:15


 3/2/19 19:14   


 


 


 Pantoprazole


  (Protonix)  40 mg  DAILY


 IV


   2/1/19 09:00


 3/3/19 08:59  2/3/19 09:21


 


 


 Piperacillin Sod/


 Tazobactam Sod


 3.375 gm/Sodium


 Chloride  110 ml @ 


 27.5 mls/hr  EVERY 8  HOURS


 IVPB


   1/31/19 23:00


 2/5/19 22:59  2/3/19 05:45


 


 


 Potassium Chloride  100 ml @ 


 100 mls/hr  Q1H


 IVPB


   2/3/19 07:30


 2/3/19 10:29  2/3/19 09:21


 


 


 Sodium Chloride  1,000 ml @ 


 50 mls/hr  Q20H


 IV


   1/31/19 20:00


 3/2/19 19:59  2/2/19 21:24


 


 


 Vancomycin HCl


  (Vanco rx to


 dose)  1 ea  DAILY  PRN


 MISC


 Per rx protocol  1/31/19 22:15


 3/2/19 22:14   


 


 


 Vancomycin HCl


 750 mg/Dextrose  275 ml @ 


 183.333


 mls/hr  Q24H


 IVPB


   2/1/19 18:30


 2/6/19 18:29  2/2/19 17:28


 

















Luke Maynard MD Feb 3, 2019 10:24

## 2019-02-03 NOTE — NUR
NURSE NOTES:

Pt turned and repositioned.HOB elevated to prevent aspiration. Kept clean and dry. Mouth 
care done.Will continue to monitor.

## 2019-02-03 NOTE — NUR
NURSE NOTES:

Seen by Dr Arias will follow up with new orders.Turned and repositioned. Kept clean dry and 
comfortable.HOB elevated to prevent aspiration.

## 2019-02-03 NOTE — NUR
NURSE NOTES:

Endorsement received from CAROLYN Swan. Patient opens eyes spontaneously, follows simple 
commands. With trache to vent. Shiley 8.0 AC 8 450, PEEP 5, 40% FiO2. With AV shunt at left 
upper arm. on left arm precautions. Right forearm g22 and r hand g20. Ongoing 1/2NS at 
50ml/hr. With GT patent and intact. Ongoing Nepro 35ml/hr. No residual. With bilateral soft 
wrist restraints for attempting to pull out tubes. On P200 mattress. Head of bed elevated. 
Call light within reach. Bed locked and in low position. Bed alarm on.

## 2019-02-03 NOTE — NUR
NURSE NOTES:

Awake when received and watching tv with no apparent signs distress. Side rails packed and 
hand cover with gauze due to self inflicted injuries.Bilateral soft wrist restraints in 
place release for skin check and improve circulation.Skin intact and afebrile at this 
time.Trach/Vent Martha 8 with AC 8 Vt 450 FiO2 40 Peep 5. GT placement check and intact 
with no residual at this time. HOB elevated to prevent aspiration. Mouth care done.Turned 
and repositioned.Dialysis schedule 2/4 and message left to [facility]. AVgraft on Left upper arm 
with bruit and thrill present. Right hand 20G and right upper arm /22G running D51/2 NS at 
50cc/hr with no s/s infiltration. Kept clean and dry. Will continue to monitor.

-------------------------------------------------------------------------------

Addendum: 02/03/19 at 1903 by Sosa Mejia RN

-------------------------------------------------------------------------------

Spoke to Radha darby [facility] for dialysis tomorrow

## 2019-02-03 NOTE — NUR
NURSE NOTES:

Complete bed bath done; no BM. Redressed all wounds. No bleeding noted from sacral ulcer. 
Remains restless. Afebrile; no hypotensive episodes. Tolerates GTF. Bed locked and in low 
position. Will continue to monitor

## 2019-02-03 NOTE — NUR
NURSE NOTES:

Received a call from pharmacy, Vancomycin discontinued due to high vanco trough result. 
1830H dose was not given. For random vancomycin level in am.

## 2019-02-03 NOTE — NEPHROLOGY PROGRESS NOTE
Assessment/Plan


Plan


Urosepsis (GNR) IV Abx per ID.


Worsening anemia 2 above -transfuse on HD.


ESRD HD


Chr. A. Fib - apixaban. DC'ed due to wound bleeding.








I have been notified by Dr. Santillan has GPC in the blood!!!


Pt. Has groin TLC inserted last admission. Has Line Sepsis. TLC pulled by Dr. Mitchell.


Polymicrobial sepsis. Unstable for leaving ICU yet





Subjective


Subjective


Less confused. More alert. Per RN depressed.





Objective


Objective





Last 24 Hour Vital Signs








  Date Time  Temp Pulse Resp B/P (MAP) Pulse Ox O2 Delivery O2 Flow Rate FiO2


 


2/3/19 09:21  87 14     40


 


2/3/19 09:00 98.7 70 16 115/39 (64) 100   


 


2/3/19 08:00        40


 


2/3/19 08:00      Mechanical Ventilator  


 


2/3/19 07:14  70 16     40


 


2/3/19 07:00  72 14 120/44 (69) 100   


 


2/3/19 06:00  71 15 115/41 (65) 100   


 


2/3/19 05:00  67 14 120/45 (70) 100   


 


2/3/19 04:50  67 13     40


 


2/3/19 04:06  66      


 


2/3/19 04:00 98.7 68 16 110/39 (62) 100   


 


2/3/19 04:00      Mechanical Ventilator  


 


2/3/19 04:00        40


 


2/3/19 03:00  67 15 120/41 (67) 100   


 


2/3/19 02:46  67 17     40


 


2/3/19 02:00  64 11 114/43 (66) 100   


 


2/3/19 01:09  70 15     40


 


2/3/19 01:00  72 17 128/50 (76) 99   


 


2/3/19 00:00  67      


 


2/3/19 00:00      Mechanical Ventilator  


 


2/3/19 00:00        40


 


2/3/19 00:00 98.8 67 12 118/41 (66) 100   


 


2/2/19 23:19  76 13     40


 


2/2/19 23:00  66 14 113/44 (67) 100   


 


2/2/19 22:00  79 18 137/52 (80) 100   


 


2/2/19 21:15    110/52    


 


2/2/19 21:03  76 18     40


 


2/2/19 21:00  75 18 110/52 (71) 100   


 


2/2/19 20:00  75      


 


2/2/19 20:00 98.2 75 15 114/40 (64) 100   


 


2/2/19 20:00      Mechanical Ventilator  


 


2/2/19 20:00        40


 


2/2/19 19:28  77 14     40


 


2/2/19 19:00  78 18 121/42 (68) 100   


 


2/2/19 18:00  78 18 114/50 (71) 100   


 


2/2/19 17:20  76 21     40


 


2/2/19 17:00  75 19 117/43 (67) 100   


 


2/2/19 16:00 98.0 72 17 114/55 (74) 100   


 


2/2/19 16:00      Mechanical Ventilator  


 


2/2/19 16:00  78      


 


2/2/19 16:00        40


 


2/2/19 15:22  76 14     40


 


2/2/19 15:00  74 19 111/39 (63) 100   


 


2/2/19 14:00  78 18 108/61 (77) 100   


 


2/2/19 13:05  85 16     40


 


2/2/19 13:00  97 20 133/59 (83) 100   


 


2/2/19 12:00        40


 


2/2/19 12:00      Mechanical Ventilator  


 


2/2/19 12:00 98.6 75 17 108/45 (66) 100   


 


2/2/19 12:00  75      


 


2/2/19 11:00  76 16 112/70 (84) 100   


 


2/2/19 10:49  78 16     40

















Intake and Output  


 


 2/2/19 2/3/19





 19:00 07:00


 


Intake Total 854.499 ml 990.0 ml


 


Output Total 0 ml 0 ml


 


Balance 854.499 ml 990.0 ml


 


  


 


Free Water 50 ml 


 


IV Total 594.499 ml 570.0 ml


 


Tube Feeding 210 ml 420 ml


 


Output Urine Total 0 ml 0 ml








Laboratory Tests


2/3/19 04:40: 


White Blood Count 11.7H, Red Blood Count 2.71L, Hemoglobin 8.2L, Hematocrit 

25.1L, Mean Corpuscular Volume 93, Mean Corpuscular Hemoglobin 30.5, Mean 

Corpuscular Hemoglobin Concent 32.9, Red Cell Distribution Width 17.1H, 

Platelet Count 408, Mean Platelet Volume 5.8L, Neutrophils (%) (Auto) , 

Lymphocytes (%) (Auto) , Monocytes (%) (Auto) , Eosinophils (%) (Auto) , 

Basophils (%) (Auto) , Differential Total Cells Counted 100, Neutrophils % (

Manual) 92H, Lymphocytes % (Manual) 3L, Monocytes % (Manual) 4, Eosinophils % (

Manual) 1, Basophils % (Manual) 0, Band Neutrophils 0, Platelet Estimate 

Adequate, Platelet Morphology Normal, Hypochromasia 1+, Anisocytosis 1+, Sodium 

Level 134L, Potassium Level 2.7*L, Chloride Level 98, Carbon Dioxide Level 25, 

Anion Gap 10, Blood Urea Nitrogen 41H, Creatinine 1.7H, Estimat Glomerular 

Filtration Rate , Glucose Level 87, Calcium Level 8.8


Height (Feet):  5


Height (Inches):  3.00


Weight (Pounds):  150


Objective


On Vent.


Cv IRR +tach


Lungs CTA


Abd SNT. BS +


E No CCE











Gavino Daigle MD Feb 3, 2019 10:10

## 2019-02-03 NOTE — NUR
NURSE NOTES:

ADLs done,pt turned and repositioned.Kept clean and dry. Vancomycin not given at this 
time.Awaiting the trough result.Will endorsed next nurse for follow up.HOB elevated to 
prevent aspiration.Kept on close monitoring.

## 2019-02-04 VITALS — DIASTOLIC BLOOD PRESSURE: 67 MMHG | SYSTOLIC BLOOD PRESSURE: 122 MMHG

## 2019-02-04 VITALS — DIASTOLIC BLOOD PRESSURE: 53 MMHG | SYSTOLIC BLOOD PRESSURE: 128 MMHG

## 2019-02-04 VITALS — DIASTOLIC BLOOD PRESSURE: 51 MMHG | SYSTOLIC BLOOD PRESSURE: 134 MMHG

## 2019-02-04 VITALS — SYSTOLIC BLOOD PRESSURE: 122 MMHG | DIASTOLIC BLOOD PRESSURE: 67 MMHG

## 2019-02-04 VITALS — SYSTOLIC BLOOD PRESSURE: 130 MMHG | DIASTOLIC BLOOD PRESSURE: 51 MMHG

## 2019-02-04 VITALS — SYSTOLIC BLOOD PRESSURE: 117 MMHG | DIASTOLIC BLOOD PRESSURE: 46 MMHG

## 2019-02-04 VITALS — SYSTOLIC BLOOD PRESSURE: 140 MMHG | DIASTOLIC BLOOD PRESSURE: 68 MMHG

## 2019-02-04 VITALS — SYSTOLIC BLOOD PRESSURE: 135 MMHG | DIASTOLIC BLOOD PRESSURE: 55 MMHG

## 2019-02-04 VITALS — SYSTOLIC BLOOD PRESSURE: 124 MMHG | DIASTOLIC BLOOD PRESSURE: 50 MMHG

## 2019-02-04 VITALS — DIASTOLIC BLOOD PRESSURE: 50 MMHG | SYSTOLIC BLOOD PRESSURE: 133 MMHG

## 2019-02-04 VITALS — SYSTOLIC BLOOD PRESSURE: 121 MMHG | DIASTOLIC BLOOD PRESSURE: 50 MMHG

## 2019-02-04 VITALS — DIASTOLIC BLOOD PRESSURE: 63 MMHG | SYSTOLIC BLOOD PRESSURE: 123 MMHG

## 2019-02-04 VITALS — DIASTOLIC BLOOD PRESSURE: 47 MMHG | SYSTOLIC BLOOD PRESSURE: 120 MMHG

## 2019-02-04 VITALS — SYSTOLIC BLOOD PRESSURE: 127 MMHG | DIASTOLIC BLOOD PRESSURE: 53 MMHG

## 2019-02-04 VITALS — SYSTOLIC BLOOD PRESSURE: 125 MMHG | DIASTOLIC BLOOD PRESSURE: 55 MMHG

## 2019-02-04 VITALS — DIASTOLIC BLOOD PRESSURE: 47 MMHG | SYSTOLIC BLOOD PRESSURE: 121 MMHG

## 2019-02-04 VITALS — SYSTOLIC BLOOD PRESSURE: 120 MMHG | DIASTOLIC BLOOD PRESSURE: 47 MMHG

## 2019-02-04 VITALS — SYSTOLIC BLOOD PRESSURE: 128 MMHG | DIASTOLIC BLOOD PRESSURE: 50 MMHG

## 2019-02-04 VITALS — SYSTOLIC BLOOD PRESSURE: 121 MMHG | DIASTOLIC BLOOD PRESSURE: 48 MMHG

## 2019-02-04 VITALS — DIASTOLIC BLOOD PRESSURE: 50 MMHG | SYSTOLIC BLOOD PRESSURE: 119 MMHG

## 2019-02-04 VITALS — DIASTOLIC BLOOD PRESSURE: 49 MMHG | SYSTOLIC BLOOD PRESSURE: 124 MMHG

## 2019-02-04 VITALS — DIASTOLIC BLOOD PRESSURE: 47 MMHG | SYSTOLIC BLOOD PRESSURE: 125 MMHG

## 2019-02-04 VITALS — SYSTOLIC BLOOD PRESSURE: 133 MMHG | DIASTOLIC BLOOD PRESSURE: 52 MMHG

## 2019-02-04 VITALS — DIASTOLIC BLOOD PRESSURE: 46 MMHG | SYSTOLIC BLOOD PRESSURE: 121 MMHG

## 2019-02-04 LAB
ADD MANUAL DIFF: YES
ADD MANUAL DIFF: YES
ALBUMIN SERPL-MCNC: 1.2 G/DL (ref 3.4–5)
ALP SERPL-CCNC: 116 U/L (ref 46–116)
ALT SERPL-CCNC: 20 U/L (ref 12–78)
ANION GAP SERPL CALC-SCNC: 12 MMOL/L (ref 5–15)
AST SERPL-CCNC: 34 U/L (ref 15–37)
BILIRUB DIRECT SERPL-MCNC: 0.1 MG/DL (ref 0–0.3)
BILIRUB SERPL-MCNC: 0.4 MG/DL (ref 0.2–1)
BUN SERPL-MCNC: 50 MG/DL (ref 7–18)
CALCIUM SERPL-MCNC: 8.5 MG/DL (ref 8.5–10.1)
CHLORIDE SERPL-SCNC: 99 MMOL/L (ref 98–107)
CO2 SERPL-SCNC: 23 MMOL/L (ref 21–32)
CREAT SERPL-MCNC: 1.9 MG/DL (ref 0.55–1.3)
ERYTHROCYTE [DISTWIDTH] IN BLOOD BY AUTOMATED COUNT: 16.5 % (ref 11.6–14.8)
ERYTHROCYTE [DISTWIDTH] IN BLOOD BY AUTOMATED COUNT: 16.6 % (ref 11.6–14.8)
HCT VFR BLD CALC: 23.1 % (ref 37–47)
HCT VFR BLD CALC: 26.4 % (ref 37–47)
HGB BLD-MCNC: 7.7 G/DL (ref 12–16)
HGB BLD-MCNC: 8.5 G/DL (ref 12–16)
MCV RBC AUTO: 93 FL (ref 80–99)
MCV RBC AUTO: 93 FL (ref 80–99)
PLATELET # BLD: 414 K/UL (ref 150–450)
PLATELET # BLD: 453 K/UL (ref 150–450)
POTASSIUM SERPL-SCNC: 3 MMOL/L (ref 3.5–5.1)
RBC # BLD AUTO: 2.49 M/UL (ref 4.2–5.4)
RBC # BLD AUTO: 2.83 M/UL (ref 4.2–5.4)
SODIUM SERPL-SCNC: 134 MMOL/L (ref 136–145)
WBC # BLD AUTO: 12 K/UL (ref 4.8–10.8)
WBC # BLD AUTO: 12.8 K/UL (ref 4.8–10.8)

## 2019-02-04 RX ADMIN — MEROPENEM SCH MLS/HR: 500 INJECTION INTRAVENOUS at 17:03

## 2019-02-04 RX ADMIN — PANTOPRAZOLE SODIUM SCH MG: 40 INJECTION, POWDER, FOR SOLUTION INTRAVENOUS at 08:33

## 2019-02-04 RX ADMIN — EPOETIN ALFA-EPBX SCH UNIT: 10000 INJECTION, SOLUTION INTRAVENOUS; SUBCUTANEOUS at 20:54

## 2019-02-04 RX ADMIN — DEXTROSE MONOHYDRATE SCH MLS/HR: 50 INJECTION, SOLUTION INTRAVENOUS at 05:35

## 2019-02-04 NOTE — NUR
NURSE NOTES:

Patient awake, restless. Offered reassurance. Repositioned for comfort. Feeding tolerated. 
Head of bed kept elevated.

## 2019-02-04 NOTE — NUR
NURSE NOTES:

Call received from Dr Pate with order to transfuse 1 unit PRBC and give 4K during 
dialysis.Order noted and carried out

## 2019-02-04 NOTE — NUR
NURSE NOTES:

Adls done,mouth care done.kept clean and dry.HOB elevated to prevent aspiration.Kept on 
close monitoring.

## 2019-02-04 NOTE — NUR
NURSE NOTES:

Patient turned and repositioned. Kept clean and dry. HOB elevated to prevent aspiration.Will 
continue to monitor.

## 2019-02-04 NOTE — NUR
NURSE NOTES:

Dialysis done and 1L out and 1 unit PRBC given during dialysis with 4K as ordered. Will 
continue to monitor.

## 2019-02-04 NOTE — INFECTIOUS DISEASES PROG NOTE
Assessment/Plan


Assessment/Plan


antibiotics : vancomycin iv, zosyn





A


1. MRSA sepsis s/p catheter removal


2. e.coli UTI


3. shock resolved


4. pneumonia


5. respiratory failure


6. sacral decubitus ulcer


7. leucocytosis improving


8. renal failure





P


1. continue vancomycin iv


2. d/c zosyn


3. start ertapenem


4. 2 d echo


5. will follow up cultures





Subjective


ROS Limited/Unobtainable:  Yes


Allergies:  


Coded Allergies:  


     No Known Allergies (Unverified , 11/26/18)





Objective


Vital Signs





Last 24 Hour Vital Signs








  Date Time  Temp Pulse Resp B/P (MAP) Pulse Ox O2 Delivery O2 Flow Rate FiO2


 


2/4/19 10:00  108 15 119/50 (73) 100   


 


2/4/19 09:00  68 15 128/50 (76) 100   


 


2/4/19 08:33  66 15     40


 


2/4/19 08:00  74      


 


2/4/19 08:00      Mechanical Ventilator  


 


2/4/19 08:00        40


 


2/4/19 08:00 97.8 69 15 121/48 (72) 100   


 


2/4/19 08:00  69      


 


2/4/19 07:11  112 15     40


 


2/4/19 07:00  100 15 123/63 (83) 100   


 


2/4/19 06:00  74 15 128/53 (78) 100   


 


2/4/19 05:01  70 15     40


 


2/4/19 05:00  72 15 124/49 (74) 100   


 


2/4/19 04:00      Mechanical Ventilator  


 


2/4/19 04:00        40


 


2/4/19 04:00  66      


 


2/4/19 04:00 98.0 68 15 124/50 (74) 100   


 


2/4/19 03:30  68 11     40


 


2/4/19 03:00  67 14 121/47 (71) 100   


 


2/4/19 02:00  67 16 120/47 (71) 100   


 


2/4/19 01:27  67 13     40


 


2/4/19 01:00  69 16 120/47 (71) 100   


 


2/4/19 00:00 98.2 69 16 125/55 (78) 100   


 


2/4/19 00:00      Mechanical Ventilator  


 


2/4/19 00:00  67      


 


2/4/19 00:00        40


 


2/3/19 23:00  69 15 120/46 (70) 100   


 


2/3/19 22:00  69 15 113/49 (70) 100   


 


2/3/19 21:15  72 16     40


 


2/3/19 21:00  70 16 115/43 (67) 100   


 


2/3/19 20:57    123/43    


 


2/3/19 20:00      Mechanical Ventilator  


 


2/3/19 20:00        40


 


2/3/19 20:00  69      


 


2/3/19 20:00 98.0 70 16 115/43 (67) 100   


 


2/3/19 19:42  71 17     40


 


2/3/19 19:00  76 14 110/47 (68) 100   


 


2/3/19 18:00  66 14 112/41 (64) 100   


 


2/3/19 17:00  67 14 118/42 (67) 100   


 


2/3/19 16:51  75 16     40


 


2/3/19 16:00        40


 


2/3/19 16:00      Mechanical Ventilator  


 


2/3/19 16:00 98.6 72 16 121/48 (72) 100   


 


2/3/19 16:00  72      


 


2/3/19 15:26  72 13     40


 


2/3/19 15:00  71 16 119/54 (75) 100   


 


2/3/19 14:00  74 14 120/54 (76) 100   


 


2/3/19 13:25  76 18     40


 


2/3/19 13:00  74 14 118/48 (71) 100   


 


2/3/19 12:00  73      


 


2/3/19 12:00        40


 


2/3/19 12:00      Mechanical Ventilator  


 


2/3/19 12:00 98.0 72 16 111/47 (68) 100   








Height (Feet):  5


Height (Inches):  3.00


Weight (Pounds):  151


HEENT:  status post trach


Respiratory/Chest:  lungs clear


Cardiovascular:  normal rate, regular rhythm, no gallop/murmur


Abdomen:  soft, non tender, other - GT


Extremities:  no edema





Microbiology








 Date/Time


Source Procedure


Growth Status


 


 


 2/2/19 14:00


Sputum Gram Stain - Final Resulted


 


 2/2/19 14:00


Sputum Sputum Culture


Pending Resulted











Laboratory Tests








Test


  2/3/19


17:25 2/4/19


04:20 2/4/19


10:30


 


Vancomycin Level Trough


  22.7 ug/mL


(5.0-12.0)  H 


  


 


 


White Blood Count


  


  12.0 K/UL


(4.8-10.8)  H 12.8 K/UL


(4.8-10.8)  H


 


Red Blood Count


  


  2.49 M/UL


(4.20-5.40)  L 2.83 M/UL


(4.20-5.40)  L


 


Hemoglobin


  


  7.7 G/DL


(12.0-16.0)  L 8.5 G/DL


(12.0-16.0)  L


 


Hematocrit


  


  23.1 %


(37.0-47.0)  L 26.4 %


(37.0-47.0)  L


 


Mean Corpuscular Volume  93 FL (80-99)   93 FL (80-99)  


 


Mean Corpuscular Hemoglobin


  


  31.1 PG


(27.0-31.0)  H 30.2 PG


(27.0-31.0)


 


Mean Corpuscular Hemoglobin


Concent 


  33.5 G/DL


(32.0-36.0) 32.3 G/DL


(32.0-36.0)


 


Red Cell Distribution Width


  


  16.5 %


(11.6-14.8)  H 16.6 %


(11.6-14.8)  H


 


Platelet Count


  


  414 K/UL


(150-450) 453 K/UL


(150-450)  H


 


Mean Platelet Volume


  


  6.0 FL


(6.5-10.1)  L 5.8 FL


(6.5-10.1)  L


 


Neutrophils (%) (Auto)


  


  % (45.0-75.0)


  % (45.0-75.0)


 


 


Lymphocytes (%) (Auto)


  


  % (20.0-45.0)


  % (20.0-45.0)


 


 


Monocytes (%) (Auto)   % (1.0-10.0)    % (1.0-10.0)  


 


Eosinophils (%) (Auto)   % (0.0-3.0)    % (0.0-3.0)  


 


Basophils (%) (Auto)   % (0.0-2.0)    % (0.0-2.0)  


 


Differential Total Cells


Counted 


  100  


  


 


 


Neutrophils % (Manual)  82 % (45-75)  H Pending  


 


Lymphocytes % (Manual)  8 % (20-45)  L Pending  


 


Monocytes % (Manual)  7 % (1-10)   


 


Eosinophils % (Manual)  2 % (0-3)   


 


Basophils % (Manual)  1 % (0-2)   


 


Band Neutrophils  0 % (0-8)   


 


Platelet Estimate  Adequate   Pending  


 


Platelet Morphology  Normal   Pending  


 


Hypochromasia  3+   


 


Anisocytosis  1+   


 


Spherocytes  1+   


 


Sodium Level


  


  134 MMOL/L


(136-145)  L 


 


 


Potassium Level


  


  3.0 MMOL/L


(3.5-5.1)  L 


 


 


Chloride Level


  


  99 MMOL/L


() 


 


 


Carbon Dioxide Level


  


  23 MMOL/L


(21-32) 


 


 


Anion Gap


  


  12 mmol/L


(5-15) 


 


 


Blood Urea Nitrogen


  


  50 mg/dL


(7-18)  H 


 


 


Creatinine


  


  1.9 MG/DL


(0.55-1.30)  H 


 


 


Estimat Glomerular Filtration


Rate 


   mL/min (>60)  


  


 


 


Glucose Level


  


  108 MG/DL


()  H 


 


 


Calcium Level


  


  8.5 MG/DL


(8.5-10.1) 


 


 


Phosphorus Level  Pending   


 


Total Bilirubin  Pending   


 


Direct Bilirubin  Pending   


 


Aspartate Amino Transf


(AST/SGOT) 


  Pending  


  


 


 


Alanine Aminotransferase


(ALT/SGPT) 


  Pending  


  


 


 


Alkaline Phosphatase  Pending   


 


Total Protein  Pending   


 


Albumin  Pending   


 


Random Vancomycin Level  21.2 ug/mL   











Current Medications








 Medications


  (Trade)  Dose


 Ordered  Sig/Tony


 Route


 PRN Reason  Start Time


 Stop Time Status Last Admin


Dose Admin


 


 Acetaminophen


  (Tylenol)  650 mg  Q4H  PRN


 RECTAL


 Mild Pain (Pain Scale 1-3)  1/31/19 19:15


 3/2/19 19:14  2/1/19 00:27


 


 


 Albuterol/


 Ipratropium


  (Albuterol/


 Ipratropium)  3 ml  Q6H  PRN


 HHN


 Shortness of Breath  1/31/19 19:15


 2/5/19 19:14   


 


 


 Dextrose


  (Dextrose 50%)  25 ml  Q30M  PRN


 IV


 Hypoglycemia  1/31/19 19:30


 3/2/19 19:29   


 


 


 Dextrose


  (Dextrose 50%)  50 ml  Q30M  PRN


 IV


 Hypoglycemia  1/31/19 19:30


 3/2/19 19:29   


 


 


 Epoetin Rupesh


  (Epoetin


 Rupesh(ESRD on


 dialysis))  10,000 unit  MON-WED-FRI


 SUBQ


   2/1/19 21:00


 3/3/19 20:59  2/1/19 21:41


 


 


 Escitalopram


 Oxalate


  (Lexapro)  10 mg  DAILY


 ORAL


   2/2/19 11:00


 3/4/19 10:59  2/4/19 08:33


 


 


 Haloperidol


  (Haldol)  5 mg  BID


 ORAL


   2/2/19 18:00


 3/4/19 17:59  2/4/19 08:33


 


 


 Heparin Sodium


  (Porcine)


  (Heparin Sod


 1000 units/ml


 10ml)  500 unit  ONCE


 IV


   2/4/19 10:30


 2/4/19 18:00   


 


 


 Iron Sucrose 100


 mg/Sodium Chloride  60 ml @ 


 240 mls/hr  BEDTIME


 IV


   2/2/19 21:00


 2/6/19 21:14  2/3/19 20:57


 


 


 Norepinephrine


 Bitartrate 8 mg/


 Sodium Chloride  250 ml @ 0


 mls/hr  Q24H


 IV


   1/31/19 21:15


 3/2/19 21:14  1/31/19 22:36


 


 


 Ondansetron HCl


  (Zofran)  4 mg  Q6H  PRN


 IVP


 Nausea & Vomiting  1/31/19 19:15


 3/2/19 19:14   


 


 


 Pantoprazole


  (Protonix)  40 mg  DAILY


 IV


   2/1/19 09:00


 3/3/19 08:59  2/4/19 08:33


 


 


 Piperacillin Sod/


 Tazobactam Sod


 3.375 gm/Sodium


 Chloride  110 ml @ 


 27.5 mls/hr  EVERY 8  HOURS


 IVPB


   1/31/19 23:00


 2/5/19 22:59  2/4/19 05:35


 


 


 Sodium Chloride  1,000 ml @ 


 50 mls/hr  Q20H


 IV


   1/31/19 20:00


 3/2/19 19:59  2/4/19 09:46


 


 


 Sodium Chloride  1,000 ml @ 


 500 mls/hr  Q2H PRN


 IVLG


 sbp<90 during hd  2/4/19 10:17


 3/6/19 10:16   


 


 


 Vancomycin HCl


  (Vanco rx to


 dose)  1 ea  DAILY  PRN


 MISC


 Per rx protocol  1/31/19 22:15


 3/2/19 22:14   


 

















Chase Santillan MD Feb 4, 2019 11:18

## 2019-02-04 NOTE — SURGERY PROGRESS NOTE
Surgery Progress Note


Subjective


Additional Comments


still in ICU.  ill appearing.  labs noted.  line out.  awaiting new line. feeds 

going okay





Objective





Last 24 Hour Vital Signs








  Date Time  Temp Pulse Resp B/P (MAP) Pulse Ox O2 Delivery O2 Flow Rate FiO2


 


2/4/19 12:00        40


 


2/4/19 12:00 98.2 73 13 122/67 (85) 100   


 


2/4/19 12:00      Mechanical Ventilator  


 


2/4/19 11:00  107 15 122/67 (85) 100   


 


2/4/19 10:00  108 15 119/50 (73) 100   


 


2/4/19 09:00  68 15 128/50 (76) 100   


 


2/4/19 08:33  66 15     40


 


2/4/19 08:00  74      


 


2/4/19 08:00      Mechanical Ventilator  


 


2/4/19 08:00        40


 


2/4/19 08:00 97.8 69 15 121/48 (72) 100   


 


2/4/19 08:00  69      


 


2/4/19 07:11  112 15     40


 


2/4/19 07:00  100 15 123/63 (83) 100   


 


2/4/19 06:00  74 15 128/53 (78) 100   


 


2/4/19 05:01  70 15     40


 


2/4/19 05:00  72 15 124/49 (74) 100   


 


2/4/19 04:00      Mechanical Ventilator  


 


2/4/19 04:00        40


 


2/4/19 04:00  66      


 


2/4/19 04:00 98.0 68 15 124/50 (74) 100   


 


2/4/19 03:30  68 11     40


 


2/4/19 03:00  67 14 121/47 (71) 100   


 


2/4/19 02:00  67 16 120/47 (71) 100   


 


2/4/19 01:27  67 13     40


 


2/4/19 01:00  69 16 120/47 (71) 100   


 


2/4/19 00:00 98.2 69 16 125/55 (78) 100   


 


2/4/19 00:00      Mechanical Ventilator  


 


2/4/19 00:00  67      


 


2/4/19 00:00        40


 


2/3/19 23:00  69 15 120/46 (70) 100   


 


2/3/19 22:00  69 15 113/49 (70) 100   


 


2/3/19 21:15  72 16     40


 


2/3/19 21:00  70 16 115/43 (67) 100   


 


2/3/19 20:57    123/43    


 


2/3/19 20:00      Mechanical Ventilator  


 


2/3/19 20:00        40


 


2/3/19 20:00  69      


 


2/3/19 20:00 98.0 70 16 115/43 (67) 100   


 


2/3/19 19:42  71 17     40


 


2/3/19 19:00  76 14 110/47 (68) 100   


 


2/3/19 18:00  66 14 112/41 (64) 100   


 


2/3/19 17:00  67 14 118/42 (67) 100   


 


2/3/19 16:51  75 16     40


 


2/3/19 16:00        40


 


2/3/19 16:00      Mechanical Ventilator  


 


2/3/19 16:00 98.6 72 16 121/48 (72) 100   


 


2/3/19 16:00  72      


 


2/3/19 15:26  72 13     40


 


2/3/19 15:00  71 16 119/54 (75) 100   


 


2/3/19 14:00  74 14 120/54 (76) 100   


 


2/3/19 13:25  76 18     40


 


2/3/19 13:00  74 14 118/48 (71) 100   








I&O











Intake and Output  


 


 2/3/19 2/4/19





 19:00 07:00


 


Intake Total 1486.25 ml 1057.5 ml


 


Output Total 0 ml 0 ml


 


Balance 1486.25 ml 1057.5 ml


 


  


 


Free Water  60 ml


 


IV Total 1066.25 ml 577.5 ml


 


Tube Feeding 420 ml 420 ml


 


Output Urine Total 0 ml 0 ml


 


# Bowel Movements 1 2








Dressing:  other


Wound:  other


Drains:  other


Cardiovascular:  RSR


Respiratory:  decreased breath sounds


Abdomen:  soft, decreased bowel sounds


Extremities:  other





Laboratory Tests








Test


  2/3/19


17:25 2/4/19


04:20 2/4/19


10:30


 


Vancomycin Level Trough


  22.7 ug/mL


(5.0-12.0)  H 


  


 


 


White Blood Count


  


  12.0 K/UL


(4.8-10.8)  H 12.8 K/UL


(4.8-10.8)  H


 


Red Blood Count


  


  2.49 M/UL


(4.20-5.40)  L 2.83 M/UL


(4.20-5.40)  L


 


Hemoglobin


  


  7.7 G/DL


(12.0-16.0)  L 8.5 G/DL


(12.0-16.0)  L


 


Hematocrit


  


  23.1 %


(37.0-47.0)  L 26.4 %


(37.0-47.0)  L


 


Mean Corpuscular Volume  93 FL (80-99)   93 FL (80-99)  


 


Mean Corpuscular Hemoglobin


  


  31.1 PG


(27.0-31.0)  H 30.2 PG


(27.0-31.0)


 


Mean Corpuscular Hemoglobin


Concent 


  33.5 G/DL


(32.0-36.0) 32.3 G/DL


(32.0-36.0)


 


Red Cell Distribution Width


  


  16.5 %


(11.6-14.8)  H 16.6 %


(11.6-14.8)  H


 


Platelet Count


  


  414 K/UL


(150-450) 453 K/UL


(150-450)  H


 


Mean Platelet Volume


  


  6.0 FL


(6.5-10.1)  L 5.8 FL


(6.5-10.1)  L


 


Neutrophils (%) (Auto)


  


  % (45.0-75.0)


  % (45.0-75.0)


 


 


Lymphocytes (%) (Auto)


  


  % (20.0-45.0)


  % (20.0-45.0)


 


 


Monocytes (%) (Auto)   % (1.0-10.0)    % (1.0-10.0)  


 


Eosinophils (%) (Auto)   % (0.0-3.0)    % (0.0-3.0)  


 


Basophils (%) (Auto)   % (0.0-2.0)    % (0.0-2.0)  


 


Differential Total Cells


Counted 


  100  


  100  


 


 


Neutrophils % (Manual)  82 % (45-75)  H 88 % (45-75)  H


 


Lymphocytes % (Manual)  8 % (20-45)  L 4 % (20-45)  L


 


Monocytes % (Manual)  7 % (1-10)   6 % (1-10)  


 


Eosinophils % (Manual)  2 % (0-3)   1 % (0-3)  


 


Basophils % (Manual)  1 % (0-2)   1 % (0-2)  


 


Band Neutrophils  0 % (0-8)   0 % (0-8)  


 


Platelet Estimate  Adequate   Increased  H


 


Platelet Morphology  Normal     


 


Hypochromasia  3+   


 


Anisocytosis  1+   2+  


 


Spherocytes  1+   


 


Sodium Level


  


  134 MMOL/L


(136-145)  L 


 


 


Potassium Level


  


  3.0 MMOL/L


(3.5-5.1)  L 


 


 


Chloride Level


  


  99 MMOL/L


() 


 


 


Carbon Dioxide Level


  


  23 MMOL/L


(21-32) 


 


 


Anion Gap


  


  12 mmol/L


(5-15) 


 


 


Blood Urea Nitrogen


  


  50 mg/dL


(7-18)  H 


 


 


Creatinine


  


  1.9 MG/DL


(0.55-1.30)  H 


 


 


Estimat Glomerular Filtration


Rate 


   mL/min (>60)  


  


 


 


Glucose Level


  


  108 MG/DL


()  H 


 


 


Calcium Level


  


  8.5 MG/DL


(8.5-10.1) 


 


 


Phosphorus Level


  


  4.0 MG/DL


(2.5-4.9) 


 


 


Total Bilirubin


  


  0.4 MG/DL


(0.2-1.0) 


 


 


Direct Bilirubin


  


  0.1 MG/DL


(0.0-0.3) 


 


 


Aspartate Amino Transf


(AST/SGOT) 


  34 U/L (15-37)


  


 


 


Alanine Aminotransferase


(ALT/SGPT) 


  20 U/L (12-78)


  


 


 


Alkaline Phosphatase


  


  116 U/L


() 


 


 


Total Protein


  


  5.8 G/DL


(6.4-8.2)  L 


 


 


Albumin


  


  1.2 G/DL


(3.4-5.0)  L 


 


 


Random Vancomycin Level  21.2 ug/mL   











Plan


Problems:  


(1) Septic shock


Assessment & Plan:  infected central line removed


line free 


plan for PICC soon 





(2) Decubital ulcer


Assessment & Plan:  Pt presents with full thickness pressure injury sacrum with 

deep undermining into L buttocks .Historical arched surgical scar Upper L 

buttocks.Wound measures(L)3.4cm x (W)3cm x (D)1.5cm ,undermining 12-6 by 12 cm @

8o'clock. Scattered Biofilm at base of sacrum with40% yellow slough along 

borders of wound .minimal non-odorous serous exudate.Non-blanchable erythema 

with additional scattered partial wounds periwound to R and L buttocks 


Unstageable pressure injury R ischium  with 95% slough, 5% erythema along 

margins.Non-blanchable erythema periwound (L)2cm x (W)0.4cm.


Stable dry eschar lateral/posterior R tibia.Periwound is clean and pink.(L)3cmx 

(W)2cm.


Stable dry eschar R heel (L)2cm x (W)1.7cm .Periwound blanchable .


Pressure injury with 100% eschar ,loose around edges ,satish pink borders .No 

odor or exudate noted.(L)1.7cm x (W)2cm. 


L forearm edematous and weeping serous exudate. 


Scattered senile purpuras bilat upper and bilat lower ext.


Skin assessed under trach collar and no evidence of skin breakdown noted.


Wound medial /distal R tibia with 100% stable dry eschar (L)2cm x (W)1.6cm. 

Periwound is pink and dry.








Tx.Plan:


Cleanse sacral wound with Saline.Loosely pack with Silvasorb impregnated 1/2 

inch packing strip .Apply Moisture Barrier paste periwound .Cover with Optifoam 

drsg Daily and prn.


Apply Cavilon Skin Barrier to R ischial wound .Cover with Optifoam drsg .Change 

every 3 days and prn.


Apply Cavilon Skin Barrier to wound medial R tibia.Cover with Optifoam drsg 

every 7  days and prn.


Apply Cavilon Skin Barrier to R heel .Cover with Optifoam drsg. Change every 7 

days and prn.


Apply Cavilon Skin Barrier to Medial L heel.Cover with Optifoam drsg .Change 

every 7 days and prn.


APM /LANDEN mattress overlay.


Reposition at least every 2 hours or as tolerated.


Off-load heels with pillow.





(3) Severe malnutrition


Assessment & Plan:  nutrition consult


needs optimal nutrition in septic state for recovery and wound healing 














Bob Lewis Feb 4, 2019 12:46

## 2019-02-04 NOTE — NUR
NURSE NOTES:

Received Pt is resting on the bed and awake and confused. Trach to Vent dependent setting 
with AC : 8, T: 450, P: 5, FiO2 40% with SaO2 100% noted. Given tracheal and oral suction. 
provide oral care. On Tele monitor with SR. IV site intact and no sign of infiltration 
noted. Lt. upper arm AV shunt present of bruit and thrills. On Bilateral wrist restraint. 
Trying to release restraint when during care Pt still trying to touch Vent and Iv line. 
Given verbal cueing but doesn't understand. Checked comfort and circulation. On G-tubed 
feeding as ordered and tolerated well. No residual noted. Dressing is clean and dry on wound 
area. Changed position. Placed fall precaution. Will continue to care plan.

## 2019-02-04 NOTE — NUR
RESPIRATORY NOTE:

received pt on current vent settings. tonja 8 trach in place, secured via trach tie/guard. 
no resp distress noted at this time. vent is plugged into red outlet with alarms on and 
audible. ambu bag and back up trach at bedside. will cont to monitor

## 2019-02-04 NOTE — NUR
RESPIRATORY NOTE: Received pt. on 840 vent. Vent settings are: A/C rate of 8, Vt 450, FI02 
40%, PEEP +-5. No respiratory distress noted, Sp02 @ 100%. Ambu bag @ BS. Vent plugged on 
red outlet. Will continue to monitor pt.

## 2019-02-04 NOTE — NUR
RD ASSESSMENT & RECOMMENDATIONS

SEE CARE ACTIVITY FOR COMPLETE ASSESSMENT



DAILY ESTIMATED NEEDS:

Needs based on Critical care +  Wounds + ESRD w/ HD/ 60kg 

22-30  kcals/kg 

8236-8000  total kcals

1.5-2.0  g protein/kg

90-120g  g total protein 

Fluid per MD on HD   mL/kg

 total fluid mLs



NUTRITION DIAGNOSIS:

* Swallowing difficulty R/T respiratory status as evidenced by pt is trach

and PEG dep.

* Increased kcal/prot needs R/T wound healing as evidenced by pt admitted

w/ multiple wounds advanced wound, refer to WC eval.

* Altered nutrition related lab values R/T ESRD dx, clinical condition as

evidenced by low Hgb (5.8->8.5), critical WBC now trending down, low BP,

now off pressor support.





CURRENT TF:Nepro @35ml/hr x 24 hr 





ENTERAL NUTRITION RECOMMENDATIONS:

Nepro @35ml/hr x 24 hrs + Prosource 1pkt BID  to provide 840ml, 1512 kcal, 78g + 22g prot, 
611ml free water 



1. add Prosource 1 pack BID to  better meet est protein needs

2. HOB over 30 degrees, flush per MD.



** FEED W/ HEMODYNAMIC STABILITY, OTHERWISE TROPHIC FEEDS OF 5-10ML/HR FOR

GUT INTEGRITY **







ADDITIONAL RECOMMENDATIONS:

*  Re-calibrate bed scale (per [facility] 1/30 wts: 132#, 60kg) 

* Wound care: add EMILY BID + Nephrovite x1 daily  

    -> rec VIT C per nephro MD  

* Monitor HD stability 

. 

.

## 2019-02-04 NOTE — NUR
NURSE NOTES:

Patient turned and repositioned.HOB elevated to prevent aspiration.No residual noted , 
placement still intact.Will continue to monitor.

## 2019-02-04 NOTE — PULMONOLOGY PROGRESS NOTE
Assessment/Plan


Assessment/Plan


1. Shock, septic.


2. UTI.


3. ESRD, on dialysis.


4. Ventilator dependent respiratory failure.


5. Tracheostomy.


6. Diabetes mellitus.


7. Chronic atrial fibrillation.


8. Bacteremia.





DISCUSSION:  Continue broad-spectrum antibiotics.  I will continue AC mode.


Continue to decrease FiO2 as tolerated.  Blood transfusion as required.


Hemodialysis per renal.














  ______________________________________________


  Quentin Cardoso M.D.





Subjective


Interval Events:  Noted to be bacteremic; seen by ID


Constitutional:  Reports: no symptoms


HEENT:  Repors: no symptoms


Respiratory:  Reports: no symptoms


Cardiovascular:  Reports: no symptoms


Gastrointestinal/Abdominal:  Reports: no symptoms


Genitourinary:  Reports: no symptoms


Allergies:  


Coded Allergies:  


     No Known Allergies (Unverified , 11/26/18)





Objective





Last 24 Hour Vital Signs








  Date Time  Temp Pulse Resp B/P (MAP) Pulse Ox O2 Delivery O2 Flow Rate FiO2


 


2/4/19 07:11  112 15     40


 


2/4/19 07:00  100 15 123/63 (83) 100   


 


2/4/19 06:00  74 15 128/53 (78) 100   


 


2/4/19 05:01  70 15     40


 


2/4/19 05:00  72 15 124/49 (74) 100   


 


2/4/19 04:00      Mechanical Ventilator  


 


2/4/19 04:00        40


 


2/4/19 04:00  66      


 


2/4/19 04:00 98.0 68 15 124/50 (74) 100   


 


2/4/19 03:30  68 11     40


 


2/4/19 03:00  67 14 121/47 (71) 100   


 


2/4/19 02:00  67 16 120/47 (71) 100   


 


2/4/19 01:27  67 13     40


 


2/4/19 01:00  69 16 120/47 (71) 100   


 


2/4/19 00:00 98.2 69 16 125/55 (78) 100   


 


2/4/19 00:00      Mechanical Ventilator  


 


2/4/19 00:00  67      


 


2/4/19 00:00        40


 


2/3/19 23:00  69 15 120/46 (70) 100   


 


2/3/19 22:00  69 15 113/49 (70) 100   


 


2/3/19 21:15  72 16     40


 


2/3/19 21:00  70 16 115/43 (67) 100   


 


2/3/19 20:57    123/43    


 


2/3/19 20:00      Mechanical Ventilator  


 


2/3/19 20:00        40


 


2/3/19 20:00  69      


 


2/3/19 20:00 98.0 70 16 115/43 (67) 100   


 


2/3/19 19:42  71 17     40


 


2/3/19 19:00  76 14 110/47 (68) 100   


 


2/3/19 18:00  66 14 112/41 (64) 100   


 


2/3/19 17:00  67 14 118/42 (67) 100   


 


2/3/19 16:51  75 16     40


 


2/3/19 16:00        40


 


2/3/19 16:00      Mechanical Ventilator  


 


2/3/19 16:00 98.6 72 16 121/48 (72) 100   


 


2/3/19 16:00  72      


 


2/3/19 15:26  72 13     40


 


2/3/19 15:00  71 16 119/54 (75) 100   


 


2/3/19 14:00  74 14 120/54 (76) 100   


 


2/3/19 13:25  76 18     40


 


2/3/19 13:00  74 14 118/48 (71) 100   


 


2/3/19 12:00  73      


 


2/3/19 12:00        40


 


2/3/19 12:00      Mechanical Ventilator  


 


2/3/19 12:00 98.0 72 16 111/47 (68) 100   


 


2/3/19 11:03  80 19     40


 


2/3/19 11:00  76 14 137/57 (83) 100   


 


2/3/19 10:00  71 14 107/47 (67) 100   


 


2/3/19 09:21  87 14     40


 


2/3/19 09:00 98.7 70 16 115/39 (64) 100   


 


2/3/19 08:00  67      


 


2/3/19 08:00        40


 


2/3/19 08:00      Mechanical Ventilator  

















Intake and Output  


 


 2/3/19 2/4/19





 19:00 07:00


 


Intake Total 1486.25 ml 980 ml


 


Output Total 0 ml 0 ml


 


Balance 1486.25 ml 980 ml


 


  


 


Free Water  60 ml


 


IV Total 1066.25 ml 500 ml


 


Tube Feeding 420 ml 420 ml


 


Output Urine Total 0 ml 0 ml


 


# Bowel Movements 1 2








General Appearance:  no acute distress


HEENT:  normocephalic, status post trach


Respiratory/Chest:  chest wall non-tender, lungs clear


Cardiovascular:  normal peripheral pulses, normal rate





Microbiology








 Date/Time


Source Procedure


Growth Status


 


 


 2/2/19 14:00


Sputum Gram Stain - Final Resulted


 


 2/2/19 14:00


Sputum Sputum Culture


Pending Resulted








Laboratory Tests


2/3/19 17:25: Vancomycin Level Trough 22.7H


2/4/19 04:20: 


White Blood Count 12.0H, Red Blood Count 2.49L, Hemoglobin 7.7L, Hematocrit 

23.1L, Mean Corpuscular Volume 93, Mean Corpuscular Hemoglobin 31.1H, Mean 

Corpuscular Hemoglobin Concent 33.5, Red Cell Distribution Width 16.5H, 

Platelet Count 414, Mean Platelet Volume 6.0L, Neutrophils (%) (Auto) , 

Lymphocytes (%) (Auto) , Monocytes (%) (Auto) , Eosinophils (%) (Auto) , 

Basophils (%) (Auto) , Neutrophils % (Manual) [Pending], Lymphocytes % (Manual) 

[Pending], Platelet Estimate [Pending], Platelet Morphology [Pending], Sodium 

Level 134L, Potassium Level 3.0L, Chloride Level 99, Carbon Dioxide Level 23, 

Anion Gap 12, Blood Urea Nitrogen 50H, Creatinine 1.9H, Estimat Glomerular 

Filtration Rate , Glucose Level 108H, Calcium Level 8.5, Random Vancomycin 

Level 21.2





Current Medications








 Medications


  (Trade)  Dose


 Ordered  Sig/Tony


 Route


 PRN Reason  Start Time


 Stop Time Status Last Admin


Dose Admin


 


 Acetaminophen


  (Tylenol)  650 mg  Q4H  PRN


 RECTAL


 Mild Pain (Pain Scale 1-3)  1/31/19 19:15


 3/2/19 19:14  2/1/19 00:27


 


 


 Albuterol/


 Ipratropium


  (Albuterol/


 Ipratropium)  3 ml  Q6H  PRN


 HHN


 Shortness of Breath  1/31/19 19:15


 2/5/19 19:14   


 


 


 Dextrose


  (Dextrose 50%)  25 ml  Q30M  PRN


 IV


 Hypoglycemia  1/31/19 19:30


 3/2/19 19:29   


 


 


 Dextrose


  (Dextrose 50%)  50 ml  Q30M  PRN


 IV


 Hypoglycemia  1/31/19 19:30


 3/2/19 19:29   


 


 


 Epoetin Rupesh


  (Epoetin


 Rupesh(ESRD on


 dialysis))  10,000 unit  MON-WED-FRI


 SUBQ


   2/1/19 21:00


 3/3/19 20:59  2/1/19 21:41


 


 


 Escitalopram


 Oxalate


  (Lexapro)  10 mg  DAILY


 ORAL


   2/2/19 11:00


 3/4/19 10:59  2/3/19 09:20


 


 


 Haloperidol


  (Haldol)  5 mg  BID


 ORAL


   2/2/19 18:00


 3/4/19 17:59  2/3/19 18:09


 


 


 Heparin Sodium


  (Porcine)


  (Heparin Sod


 1000 units/ml


 10ml)  500 unit  ONCE


 IV


   2/4/19 10:30


 2/4/19 18:00   


 


 


 Iron Sucrose 100


 mg/Sodium Chloride  60 ml @ 


 240 mls/hr  BEDTIME


 IV


   2/2/19 21:00


 2/6/19 21:14  2/3/19 20:57


 


 


 Norepinephrine


 Bitartrate 8 mg/


 Sodium Chloride  250 ml @ 0


 mls/hr  Q24H


 IV


   1/31/19 21:15


 3/2/19 21:14  1/31/19 22:36


 


 


 Ondansetron HCl


  (Zofran)  4 mg  Q6H  PRN


 IVP


 Nausea & Vomiting  1/31/19 19:15


 3/2/19 19:14   


 


 


 Pantoprazole


  (Protonix)  40 mg  DAILY


 IV


   2/1/19 09:00


 3/3/19 08:59  2/3/19 09:21


 


 


 Piperacillin Sod/


 Tazobactam Sod


 3.375 gm/Sodium


 Chloride  110 ml @ 


 27.5 mls/hr  EVERY 8  HOURS


 IVPB


   1/31/19 23:00


 2/5/19 22:59  2/4/19 05:35


 


 


 Sodium Chloride  1,000 ml @ 


 50 mls/hr  Q20H


 IV


   1/31/19 20:00


 3/2/19 19:59  2/3/19 18:43


 


 


 Sodium Chloride  1,000 ml @ 


 500 mls/hr  Q2H PRN


 IVLG


 sbp<90 during hd  2/4/19 10:17


 3/6/19 10:16   


 


 


 Vancomycin HCl


  (Vanco rx to


 dose)  1 ea  DAILY  PRN


 MISC


 Per rx protocol  1/31/19 22:15


 3/2/19 22:14   


 

















Quentin Cardoso MD Feb 4, 2019 07:32

## 2019-02-04 NOTE — NUR
NURSE NOTES:

Blood transfusion started with dialysis.No s/s adverse reaction noted.Will continue to 
monitor.

## 2019-02-04 NOTE — NUR
NURSE NOTES:

Pt is resting on the bed and tolerated well with current Vent setting. V/S stable. On 
running with G-tube feeding as ordered and no residual noted. Changed position. Will 
continue to care of plan.

## 2019-02-04 NOTE — NEPHROLOGY PROGRESS NOTE
Assessment/Plan


Plan


Urosepsis (GNR) IV Abx per ID.


Worsening anemia 2 above -transfuse on HD.


ESRD HD MWF


Chr. A. Fib - apixaban. DC'ed due to wound bleeding.


HD done. Transfused.





Subjective


Subjective


Less confused. More alert. Per RN depressed.





Objective


Objective





Last 24 Hour Vital Signs








  Date Time  Temp Pulse Resp B/P (MAP) Pulse Ox O2 Delivery O2 Flow Rate FiO2


 


2/4/19 15:15  68 15     40


 


2/4/19 15:00  70 15 127/53 (77) 100   


 


2/4/19 14:00  69 15 135/55 (81) 100   


 


2/4/19 13:15  73 16     40


 


2/4/19 13:00  71 15 121/50 (73) 100   


 


2/4/19 12:00  71      


 


2/4/19 12:00        40


 


2/4/19 12:00 98.2 73 13 122/67 (85) 100   


 


2/4/19 12:00      Mechanical Ventilator  


 


2/4/19 11:10  108 19     40


 


2/4/19 11:00  107 15 122/67 (85) 100   


 


2/4/19 10:00  108 15 119/50 (73) 100   


 


2/4/19 09:00  68 15 128/50 (76) 100   


 


2/4/19 08:33  66 15     40


 


2/4/19 08:00  74      


 


2/4/19 08:00      Mechanical Ventilator  


 


2/4/19 08:00        40


 


2/4/19 08:00 97.8 69 15 121/48 (72) 100   


 


2/4/19 08:00  69      


 


2/4/19 07:11  112 15     40


 


2/4/19 07:00  100 15 123/63 (83) 100   


 


2/4/19 06:00  74 15 128/53 (78) 100   


 


2/4/19 05:01  70 15     40


 


2/4/19 05:00  72 15 124/49 (74) 100   


 


2/4/19 04:00      Mechanical Ventilator  


 


2/4/19 04:00        40


 


2/4/19 04:00  66      


 


2/4/19 04:00 98.0 68 15 124/50 (74) 100   


 


2/4/19 03:30  68 11     40


 


2/4/19 03:00  67 14 121/47 (71) 100   


 


2/4/19 02:00  67 16 120/47 (71) 100   


 


2/4/19 01:27  67 13     40


 


2/4/19 01:00  69 16 120/47 (71) 100   


 


2/4/19 00:00 98.2 69 16 125/55 (78) 100   


 


2/4/19 00:00      Mechanical Ventilator  


 


2/4/19 00:00  67      


 


2/4/19 00:00        40


 


2/3/19 23:00  69 15 120/46 (70) 100   


 


2/3/19 22:00  69 15 113/49 (70) 100   


 


2/3/19 21:15  72 16     40


 


2/3/19 21:00  70 16 115/43 (67) 100   


 


2/3/19 20:57    123/43    


 


2/3/19 20:00      Mechanical Ventilator  


 


2/3/19 20:00        40


 


2/3/19 20:00  69      


 


2/3/19 20:00 98.0 70 16 115/43 (67) 100   


 


2/3/19 19:42  71 17     40


 


2/3/19 19:00  76 14 110/47 (68) 100   


 


2/3/19 18:00  66 14 112/41 (64) 100   


 


2/3/19 17:00  67 14 118/42 (67) 100   


 


2/3/19 16:51  75 16     40

















Intake and Output  


 


 2/3/19 2/4/19





 18:59 06:59


 


Intake Total 1553.75 ml 990 ml


 


Output Total 0 ml 0 ml


 


Balance 1553.75 ml 990 ml


 


  


 


Free Water  60 ml


 


IV Total 1133.75 ml 510 ml


 


Tube Feeding 420 ml 420 ml


 


Output Urine Total 0 ml 0 ml


 


# Bowel Movements 1 2








Laboratory Tests


2/3/19 17:25: Vancomycin Level Trough 22.7H


2/4/19 04:20: 


White Blood Count 12.0H, Red Blood Count 2.49L, Hemoglobin 7.7L, Hematocrit 

23.1L, Mean Corpuscular Volume 93, Mean Corpuscular Hemoglobin 31.1H, Mean 

Corpuscular Hemoglobin Concent 33.5, Red Cell Distribution Width 16.5H, 

Platelet Count 414, Mean Platelet Volume 6.0L, Neutrophils (%) (Auto) , 

Lymphocytes (%) (Auto) , Monocytes (%) (Auto) , Eosinophils (%) (Auto) , 

Basophils (%) (Auto) , Differential Total Cells Counted 100, Neutrophils % (

Manual) 82H, Lymphocytes % (Manual) 8L, Monocytes % (Manual) 7, Eosinophils % (

Manual) 2, Basophils % (Manual) 1, Band Neutrophils 0, Platelet Estimate 

Adequate, Platelet Morphology Normal, Hypochromasia 3+, Anisocytosis 1+, 

Spherocytes 1+, Sodium Level 134L, Potassium Level 3.0L, Chloride Level 99, 

Carbon Dioxide Level 23, Anion Gap 12, Blood Urea Nitrogen 50H, Creatinine 1.9H

, Estimat Glomerular Filtration Rate , Glucose Level 108H, Calcium Level 8.5, 

Phosphorus Level 4.0, Total Bilirubin 0.4, Direct Bilirubin 0.1, Aspartate 

Amino Transf (AST/SGOT) 34, Alanine Aminotransferase (ALT/SGPT) 20, Alkaline 

Phosphatase 116, Total Protein 5.8L, Albumin 1.2L, Random Vancomycin Level 21.2


2/4/19 10:30: 


White Blood Count 12.8H, Red Blood Count 2.83L, Hemoglobin 8.5L, Hematocrit 

26.4L, Mean Corpuscular Volume 93, Mean Corpuscular Hemoglobin 30.2, Mean 

Corpuscular Hemoglobin Concent 32.3, Red Cell Distribution Width 16.6H, 

Platelet Count 453H, Mean Platelet Volume 5.8L, Neutrophils (%) (Auto) , 

Lymphocytes (%) (Auto) , Monocytes (%) (Auto) , Eosinophils (%) (Auto) , 

Basophils (%) (Auto) , Differential Total Cells Counted 100, Neutrophils % (

Manual) 88H, Lymphocytes % (Manual) 4L, Monocytes % (Manual) 6, Eosinophils % (

Manual) 1, Basophils % (Manual) 1, Band Neutrophils 0, Platelet Estimate 

IncreasedH, Platelet Morphology , Anisocytosis 2+


Height (Feet):  5


Height (Inches):  3.00


Weight (Pounds):  151


Objective


On Vent.


Cv IRR +tach


Lungs CTA


Abd SNT. BS +


E No CCE











Gavino Daigle MD Feb 4, 2019 16:12

## 2019-02-04 NOTE — GENERAL PROGRESS NOTE
Assessment/Plan


Problem List:  


(1) MDD (major depressive disorder), recurrent episode


ICD Codes:  F33.9 - Major depressive disorder, recurrent, unspecified


SNOMED:  133067036


(2) Dementia with behavioral disturbance


ICD Codes:  F03.91 - Unspecified dementia with behavioral disturbance


SNOMED:  9741777350435


(3) Encephalopathy chronic


ICD Codes:  G93.49 - Other encephalopathy


SNOMED:  41000731


Assessment/Plan


lexapro 10mg po qam


haldol


cont restraints 


xanax .5 q 6hr /prn





Subjective


Neurologic/Psychiatric:  Reports: anxiety, depressed, emotional problems


Allergies:  


Coded Allergies:  


     No Known Allergies (Unverified , 11/26/18)


Subjective


waxing and waning of consciousness





Objective





Last 24 Hour Vital Signs








  Date Time  Temp Pulse Resp B/P (MAP) Pulse Ox O2 Delivery O2 Flow Rate FiO2


 


2/4/19 12:00        40


 


2/4/19 12:00 98.2 73 13 122/67 (85) 100   


 


2/4/19 12:00      Mechanical Ventilator  


 


2/4/19 11:00  107 15 122/67 (85) 100   


 


2/4/19 10:00  108 15 119/50 (73) 100   


 


2/4/19 09:00  68 15 128/50 (76) 100   


 


2/4/19 08:33  66 15     40


 


2/4/19 08:00  74      


 


2/4/19 08:00      Mechanical Ventilator  


 


2/4/19 08:00        40


 


2/4/19 08:00 97.8 69 15 121/48 (72) 100   


 


2/4/19 08:00  69      


 


2/4/19 07:11  112 15     40


 


2/4/19 07:00  100 15 123/63 (83) 100   


 


2/4/19 06:00  74 15 128/53 (78) 100   


 


2/4/19 05:01  70 15     40


 


2/4/19 05:00  72 15 124/49 (74) 100   


 


2/4/19 04:00      Mechanical Ventilator  


 


2/4/19 04:00        40


 


2/4/19 04:00  66      


 


2/4/19 04:00 98.0 68 15 124/50 (74) 100   


 


2/4/19 03:30  68 11     40


 


2/4/19 03:00  67 14 121/47 (71) 100   


 


2/4/19 02:00  67 16 120/47 (71) 100   


 


2/4/19 01:27  67 13     40


 


2/4/19 01:00  69 16 120/47 (71) 100   


 


2/4/19 00:00 98.2 69 16 125/55 (78) 100   


 


2/4/19 00:00      Mechanical Ventilator  


 


2/4/19 00:00  67      


 


2/4/19 00:00        40


 


2/3/19 23:00  69 15 120/46 (70) 100   


 


2/3/19 22:00  69 15 113/49 (70) 100   


 


2/3/19 21:15  72 16     40


 


2/3/19 21:00  70 16 115/43 (67) 100   


 


2/3/19 20:57    123/43    


 


2/3/19 20:00      Mechanical Ventilator  


 


2/3/19 20:00        40


 


2/3/19 20:00  69      


 


2/3/19 20:00 98.0 70 16 115/43 (67) 100   


 


2/3/19 19:42  71 17     40


 


2/3/19 19:00  76 14 110/47 (68) 100   


 


2/3/19 18:00  66 14 112/41 (64) 100   


 


2/3/19 17:00  67 14 118/42 (67) 100   


 


2/3/19 16:51  75 16     40


 


2/3/19 16:00        40


 


2/3/19 16:00      Mechanical Ventilator  


 


2/3/19 16:00 98.6 72 16 121/48 (72) 100   


 


2/3/19 16:00  72      


 


2/3/19 15:26  72 13     40


 


2/3/19 15:00  71 16 119/54 (75) 100   


 


2/3/19 14:00  74 14 120/54 (76) 100   

















Intake and Output  


 


 2/3/19 2/4/19





 18:59 06:59


 


Intake Total 1553.75 ml 990 ml


 


Output Total 0 ml 0 ml


 


Balance 1553.75 ml 990 ml


 


  


 


Free Water  60 ml


 


IV Total 1133.75 ml 510 ml


 


Tube Feeding 420 ml 420 ml


 


Output Urine Total 0 ml 0 ml


 


# Bowel Movements 1 2








Laboratory Tests


2/3/19 17:25: Vancomycin Level Trough 22.7H


2/4/19 04:20: 


White Blood Count 12.0H, Red Blood Count 2.49L, Hemoglobin 7.7L, Hematocrit 

23.1L, Mean Corpuscular Volume 93, Mean Corpuscular Hemoglobin 31.1H, Mean 

Corpuscular Hemoglobin Concent 33.5, Red Cell Distribution Width 16.5H, 

Platelet Count 414, Mean Platelet Volume 6.0L, Neutrophils (%) (Auto) , 

Lymphocytes (%) (Auto) , Monocytes (%) (Auto) , Eosinophils (%) (Auto) , 

Basophils (%) (Auto) , Differential Total Cells Counted 100, Neutrophils % (

Manual) 82H, Lymphocytes % (Manual) 8L, Monocytes % (Manual) 7, Eosinophils % (

Manual) 2, Basophils % (Manual) 1, Band Neutrophils 0, Platelet Estimate 

Adequate, Platelet Morphology Normal, Hypochromasia 3+, Anisocytosis 1+, 

Spherocytes 1+, Sodium Level 134L, Potassium Level 3.0L, Chloride Level 99, 

Carbon Dioxide Level 23, Anion Gap 12, Blood Urea Nitrogen 50H, Creatinine 1.9H

, Estimat Glomerular Filtration Rate , Glucose Level 108H, Calcium Level 8.5, 

Phosphorus Level 4.0, Total Bilirubin 0.4, Direct Bilirubin 0.1, Aspartate 

Amino Transf (AST/SGOT) 34, Alanine Aminotransferase (ALT/SGPT) 20, Alkaline 

Phosphatase 116, Total Protein 5.8L, Albumin 1.2L, Random Vancomycin Level 21.2


2/4/19 10:30: 


White Blood Count 12.8H, Red Blood Count 2.83L, Hemoglobin 8.5L, Hematocrit 

26.4L, Mean Corpuscular Volume 93, Mean Corpuscular Hemoglobin 30.2, Mean 

Corpuscular Hemoglobin Concent 32.3, Red Cell Distribution Width 16.6H, 

Platelet Count 453H, Mean Platelet Volume 5.8L, Neutrophils (%) (Auto) , 

Lymphocytes (%) (Auto) , Monocytes (%) (Auto) , Eosinophils (%) (Auto) , 

Basophils (%) (Auto) , Differential Total Cells Counted 100, Neutrophils % (

Manual) 88H, Lymphocytes % (Manual) 4L, Monocytes % (Manual) 6, Eosinophils % (

Manual) 1, Basophils % (Manual) 1, Band Neutrophils 0, Platelet Estimate 

IncreasedH, Platelet Morphology , Anisocytosis 2+


Height (Feet):  5


Height (Inches):  3.00


Weight (Pounds):  151


General Appearance:  alert, agitated


Neurologic:  alert, responsive, depressed affect











Cass Levi MD Feb 4, 2019 13:40

## 2019-02-04 NOTE — NUR
NURSE NOTES:

Patient asleep when received still packed side rails and hand cover with gauze due to self 
inflicted injuries.Skin intact and afebrile at this time.Trach/Vent Martha 8 with AC 8 Vt 
450 FiO2 40 Peep 5. Bilateral soft wrist restraints in place release for skin check and 
improve circulation.GT placement check and intact with no residual at this time. HOB 
elevated to prevent aspiration. Mouth care done.Turned and repositioned.Dialysis schedule 
today. AVgraft on Left upper arm with bruit and thrill present. Right hand 20G and right 
upper arm /22G running D51/2 NS at 50cc/hr with no s/s infiltration. Kept clean and dry. 
Will continue to monitor.

-------------------------------------------------------------------------------

Addendum: 02/04/19 at 1357 by Sosa Mejia RN

-------------------------------------------------------------------------------

Seen by Dr Arias will follow up with new orders

## 2019-02-04 NOTE — NUR
NURSE NOTES:

Patient passed soft brown stool. Bed bath, oral care, change of dressings and linens done.

## 2019-02-04 NOTE — NUR
RESPIRATORY NOTE:

PT. STABLE ON CURRENT CMV SETTINGS AND ALL VS ARE WNL. VENT CIRCUIT AND SX TUBBING SECURE 
AND OUT OF THE WAY. PT SX PRN WITH NO ADVERSE REACTION. NO S/S OF RESPIRATORY DISTRESS NOTED 
AT THIS TIME.

## 2019-02-05 VITALS — SYSTOLIC BLOOD PRESSURE: 130 MMHG | DIASTOLIC BLOOD PRESSURE: 56 MMHG

## 2019-02-05 VITALS — DIASTOLIC BLOOD PRESSURE: 88 MMHG | SYSTOLIC BLOOD PRESSURE: 142 MMHG

## 2019-02-05 VITALS — DIASTOLIC BLOOD PRESSURE: 64 MMHG | SYSTOLIC BLOOD PRESSURE: 142 MMHG

## 2019-02-05 VITALS — DIASTOLIC BLOOD PRESSURE: 61 MMHG | SYSTOLIC BLOOD PRESSURE: 157 MMHG

## 2019-02-05 VITALS — DIASTOLIC BLOOD PRESSURE: 56 MMHG | SYSTOLIC BLOOD PRESSURE: 133 MMHG

## 2019-02-05 VITALS — DIASTOLIC BLOOD PRESSURE: 68 MMHG | SYSTOLIC BLOOD PRESSURE: 113 MMHG

## 2019-02-05 VITALS — SYSTOLIC BLOOD PRESSURE: 140 MMHG | DIASTOLIC BLOOD PRESSURE: 84 MMHG

## 2019-02-05 VITALS — DIASTOLIC BLOOD PRESSURE: 62 MMHG | SYSTOLIC BLOOD PRESSURE: 133 MMHG

## 2019-02-05 VITALS — DIASTOLIC BLOOD PRESSURE: 87 MMHG | SYSTOLIC BLOOD PRESSURE: 145 MMHG

## 2019-02-05 VITALS — DIASTOLIC BLOOD PRESSURE: 47 MMHG | SYSTOLIC BLOOD PRESSURE: 133 MMHG

## 2019-02-05 VITALS — SYSTOLIC BLOOD PRESSURE: 135 MMHG | DIASTOLIC BLOOD PRESSURE: 80 MMHG

## 2019-02-05 VITALS — SYSTOLIC BLOOD PRESSURE: 135 MMHG | DIASTOLIC BLOOD PRESSURE: 50 MMHG

## 2019-02-05 VITALS — DIASTOLIC BLOOD PRESSURE: 65 MMHG | SYSTOLIC BLOOD PRESSURE: 133 MMHG

## 2019-02-05 VITALS — DIASTOLIC BLOOD PRESSURE: 66 MMHG | SYSTOLIC BLOOD PRESSURE: 132 MMHG

## 2019-02-05 VITALS — DIASTOLIC BLOOD PRESSURE: 65 MMHG | SYSTOLIC BLOOD PRESSURE: 130 MMHG

## 2019-02-05 VITALS — DIASTOLIC BLOOD PRESSURE: 88 MMHG | SYSTOLIC BLOOD PRESSURE: 150 MMHG

## 2019-02-05 VITALS — DIASTOLIC BLOOD PRESSURE: 53 MMHG | SYSTOLIC BLOOD PRESSURE: 135 MMHG

## 2019-02-05 VITALS — DIASTOLIC BLOOD PRESSURE: 60 MMHG | SYSTOLIC BLOOD PRESSURE: 138 MMHG

## 2019-02-05 VITALS — SYSTOLIC BLOOD PRESSURE: 114 MMHG | DIASTOLIC BLOOD PRESSURE: 43 MMHG

## 2019-02-05 VITALS — DIASTOLIC BLOOD PRESSURE: 66 MMHG | SYSTOLIC BLOOD PRESSURE: 135 MMHG

## 2019-02-05 VITALS — SYSTOLIC BLOOD PRESSURE: 120 MMHG | DIASTOLIC BLOOD PRESSURE: 68 MMHG

## 2019-02-05 VITALS — SYSTOLIC BLOOD PRESSURE: 147 MMHG | DIASTOLIC BLOOD PRESSURE: 68 MMHG

## 2019-02-05 LAB
ADD MANUAL DIFF: NO
ANION GAP SERPL CALC-SCNC: 10 MMOL/L (ref 5–15)
BUN SERPL-MCNC: 36 MG/DL (ref 7–18)
CALCIUM SERPL-MCNC: 8.6 MG/DL (ref 8.5–10.1)
CHLORIDE SERPL-SCNC: 98 MMOL/L (ref 98–107)
CO2 SERPL-SCNC: 26 MMOL/L (ref 21–32)
CREAT SERPL-MCNC: 1.4 MG/DL (ref 0.55–1.3)
ERYTHROCYTE [DISTWIDTH] IN BLOOD BY AUTOMATED COUNT: 16.7 % (ref 11.6–14.8)
HCT VFR BLD CALC: 28.6 % (ref 37–47)
HGB BLD-MCNC: 9.4 G/DL (ref 12–16)
MCV RBC AUTO: 92 FL (ref 80–99)
PLATELET # BLD: 413 K/UL (ref 150–450)
POTASSIUM SERPL-SCNC: 3.7 MMOL/L (ref 3.5–5.1)
RBC # BLD AUTO: 3.1 M/UL (ref 4.2–5.4)
SODIUM SERPL-SCNC: 133 MMOL/L (ref 136–145)
WBC # BLD AUTO: 12.6 K/UL (ref 4.8–10.8)

## 2019-02-05 RX ADMIN — MEROPENEM SCH MLS/HR: 500 INJECTION INTRAVENOUS at 01:10

## 2019-02-05 RX ADMIN — MEROPENEM SCH MLS/HR: 500 INJECTION INTRAVENOUS at 12:09

## 2019-02-05 RX ADMIN — PANTOPRAZOLE SODIUM SCH MG: 40 INJECTION, POWDER, FOR SOLUTION INTRAVENOUS at 09:00

## 2019-02-05 NOTE — NEPHROLOGY PROGRESS NOTE
Assessment/Plan


Plan


Urosepsis (GNR) IV Abx per ID.


Worsening anemia 2 above -transfuse on HD.


ESRD HD MWF


Chr. A. Fib - apixaban. DC'ed due to wound bleeding.


HD done. Transfused.





Subjective


Subjective


Less confused. More alert. Per RN depressed.





Objective


Objective





Last 24 Hour Vital Signs








  Date Time  Temp Pulse Resp B/P (MAP) Pulse Ox O2 Delivery O2 Flow Rate FiO2


 


2/5/19 13:00  124 17     40


 


2/5/19 12:00  117      


 


2/5/19 12:00 98.8 110 17 157/61 (93) 100   


 


2/5/19 12:00      Mechanical Ventilator  


 


2/5/19 12:00        40


 


2/5/19 11:02  85 15     40


 


2/5/19 11:00  100 17 142/88 (106) 100   


 


2/5/19 10:00  89 17 140/84 (102) 100   


 


2/5/19 09:20  86 14     40


 


2/5/19 09:00  99 17 135/80 (98) 100   


 


2/5/19 08:00        40


 


2/5/19 08:00  110      


 


2/5/19 08:00      Mechanical Ventilator  


 


2/5/19 08:00 98.6 118 14 150/88 (108) 100   


 


2/5/19 07:14  133 18     40


 


2/5/19 07:00  115 17 145/87 (106) 100   


 


2/5/19 06:00  84 17 133/65 (87) 100   


 


2/5/19 05:30  66 13     40


 


2/5/19 05:00  72 14 130/56 (80) 100   


 


2/5/19 04:00 98.5 73 17 133/56 (81) 100   


 


2/5/19 04:00      Mechanical Ventilator  


 


2/5/19 04:00  107      


 


2/5/19 04:00        40


 


2/5/19 03:30  67 12     40


 


2/5/19 03:00  108 14 113/68 (83) 100   


 


2/5/19 02:00  80 15 120/68 (85) 100   


 


2/5/19 01:00  67 12 114/43 (66) 100   


 


2/5/19 00:59  66 13     40


 


2/5/19 00:00        40


 


2/5/19 00:00  109      


 


2/5/19 00:00 98.3 107 13 135/53 (80) 100   


 


2/5/19 00:00      Mechanical Ventilator  


 


2/4/19 23:00  84 15 140/68 (92) 100   


 


2/4/19 22:59  97 16     40


 


2/4/19 22:00  70 12 134/51 (78) 100   


 


2/4/19 21:15    121/46    


 


2/4/19 21:00  63 12 121/46 (71) 100   


 


2/4/19 20:35  70 15     40


 


2/4/19 20:00        40


 


2/4/19 20:00  72      


 


2/4/19 20:00 98.1 70 14 133/52 (79) 100   


 


2/4/19 20:00      Mechanical Ventilator  


 


2/4/19 19:24  69 12     40


 


2/4/19 18:57  71 15 133/50 (77) 100   


 


2/4/19 18:00  69 14 130/51 (77) 100   


 


2/4/19 17:07  63 12     40


 


2/4/19 17:00  64 15 117/46 (69) 100   


 


2/4/19 16:00 98.0 67 14 125/47 (73) 100   


 


2/4/19 16:00        40


 


2/4/19 16:00      Mechanical Ventilator  


 


2/4/19 16:00  65      


 


2/4/19 15:15  68 15     40


 


2/4/19 15:00  70 15 127/53 (77) 100   


 


2/4/19 14:00  69 15 135/55 (81) 100   

















Intake and Output  


 


 2/4/19 2/5/19





 19:00 07:00


 


Intake Total 2287 ml 1015 ml


 


Output Total 0 ml 0 ml


 


Balance 2287 ml 1015 ml


 


  


 


IV Total 617 ml 595 ml


 


Tube Feeding 420 ml 420 ml


 


Blood Product 250 ml 


 


Hemodialysis 1000 ml 


 


Output Urine Total 0 ml 0 ml


 


# Bowel Movements 1 2








Laboratory Tests


2/5/19 04:45: 


White Blood Count 12.6H, Red Blood Count 3.10L, Hemoglobin 9.4L, Hematocrit 

28.6L, Mean Corpuscular Volume 92, Mean Corpuscular Hemoglobin 30.4, Mean 

Corpuscular Hemoglobin Concent 33.0, Red Cell Distribution Width 16.7H, 

Platelet Count 413, Mean Platelet Volume 6.0L, Neutrophils (%) (Auto) , 

Lymphocytes (%) (Auto) , Monocytes (%) (Auto) , Eosinophils (%) (Auto) , 

Basophils (%) (Auto) , Sodium Level 133L, Potassium Level 3.7, Chloride Level 98

, Carbon Dioxide Level 26, Anion Gap 10, Blood Urea Nitrogen 36H, Creatinine 

1.4H, Estimat Glomerular Filtration Rate , Glucose Level 99, Calcium Level 8.6


Height (Feet):  5


Height (Inches):  3.00


Weight (Pounds):  155


Objective


On Vent.


Cv IRR +tach


Lungs CTA


Abd SNT. BS +


E No CCE











Gavino Daigle MD Feb 5, 2019 13:42

## 2019-02-05 NOTE — NUR
NURSE NOTES:

Pt is sleeping on the bed and no sign of acute distress noted. Tolerated well with G-tube 
feeding. SaO2 100% with current Vent setting. Provided good sleep environment. Will continue 
to care plan.

## 2019-02-05 NOTE — PULMONOLOGY PROGRESS NOTE
Assessment/Plan


Assessment/Plan


1. Shock, septic.


2. UTI.


3. ESRD, on dialysis.


4. Ventilator dependent respiratory failure.


5. Tracheostomy.


6. Diabetes mellitus.


7. Chronic atrial fibrillation.


8. Bacteremia.





DISCUSSION:  Continue broad-spectrum antibiotics.  I will continue AC mode.


Continue to decrease FiO2 as tolerated.  Blood transfusion as required.


Hemodialysis per renal.














  ______________________________________________


  Quentin Cardoso M.D.





Subjective


Interval Events:  None new


Constitutional:  Reports: no symptoms


HEENT:  Repors: no symptoms


Respiratory:  Reports: no symptoms


Cardiovascular:  Reports: no symptoms


Gastrointestinal/Abdominal:  Reports: no symptoms


Genitourinary:  Reports: no symptoms


Neurologic:  Reports: no symptoms


Allergies:  


Coded Allergies:  


     No Known Allergies (Unverified , 11/26/18)





Objective





Last 24 Hour Vital Signs








  Date Time  Temp Pulse Resp B/P (MAP) Pulse Ox O2 Delivery O2 Flow Rate FiO2


 


2/5/19 08:00        40


 


2/5/19 08:00      Mechanical Ventilator  


 


2/5/19 08:00 98.6 118 14 150/88 (108) 100   


 


2/5/19 07:14  133 18     40


 


2/5/19 07:00  115 17 145/87 (106) 100   


 


2/5/19 06:00  84 17 133/65 (87) 100   


 


2/5/19 05:30  66 13     40


 


2/5/19 05:00  72 14 130/56 (80) 100   


 


2/5/19 04:00 98.5 73 17 133/56 (81) 100   


 


2/5/19 04:00      Mechanical Ventilator  


 


2/5/19 04:00  107      


 


2/5/19 04:00        40


 


2/5/19 03:30  67 12     40


 


2/5/19 03:00  108 14 113/68 (83) 100   


 


2/5/19 02:00  80 15 120/68 (85) 100   


 


2/5/19 01:00  67 12 114/43 (66) 100   


 


2/5/19 00:59  66 13     40


 


2/5/19 00:00        40


 


2/5/19 00:00  109      


 


2/5/19 00:00 98.3 107 13 135/53 (80) 100   


 


2/5/19 00:00      Mechanical Ventilator  


 


2/4/19 23:00  84 15 140/68 (92) 100   


 


2/4/19 22:59  97 16     40


 


2/4/19 22:00  70 12 134/51 (78) 100   


 


2/4/19 21:15    121/46    


 


2/4/19 21:00  63 12 121/46 (71) 100   


 


2/4/19 20:35  70 15     40


 


2/4/19 20:00        40


 


2/4/19 20:00  72      


 


2/4/19 20:00 98.1 70 14 133/52 (79) 100   


 


2/4/19 20:00      Mechanical Ventilator  


 


2/4/19 19:24  69 12     40


 


2/4/19 18:57  71 15 133/50 (77) 100   


 


2/4/19 18:00  69 14 130/51 (77) 100   


 


2/4/19 17:07  63 12     40


 


2/4/19 17:00  64 15 117/46 (69) 100   


 


2/4/19 16:00 98.0 67 14 125/47 (73) 100   


 


2/4/19 16:00        40


 


2/4/19 16:00      Mechanical Ventilator  


 


2/4/19 16:00  65      


 


2/4/19 15:15  68 15     40


 


2/4/19 15:00  70 15 127/53 (77) 100   


 


2/4/19 14:00  69 15 135/55 (81) 100   


 


2/4/19 13:15  73 16     40


 


2/4/19 13:00  71 15 121/50 (73) 100   


 


2/4/19 12:00  71      


 


2/4/19 12:00        40


 


2/4/19 12:00 98.2 73 13 122/67 (85) 100   


 


2/4/19 12:00      Mechanical Ventilator  


 


2/4/19 11:10  108 19     40


 


2/4/19 11:00  107 15 122/67 (85) 100   


 


2/4/19 10:00  108 15 119/50 (73) 100   


 


2/4/19 09:00  68 15 128/50 (76) 100   


 


2/4/19 08:33  66 15     40

















Intake and Output  


 


 2/4/19 2/5/19





 19:00 07:00


 


Intake Total 2287 ml 1015 ml


 


Output Total 0 ml 0 ml


 


Balance 2287 ml 1015 ml


 


  


 


IV Total 617 ml 595 ml


 


Tube Feeding 420 ml 420 ml


 


Blood Product 250 ml 


 


Hemodialysis 1000 ml 


 


Output Urine Total 0 ml 0 ml


 


# Bowel Movements 1 2








General Appearance:  no acute distress


HEENT:  normocephalic, status post trach


Respiratory/Chest:  chest wall non-tender, lungs clear


Cardiovascular:  normal peripheral pulses, normal rate


Abdomen:  normal bowel sounds, soft, non tender





Microbiology








 Date/Time


Source Procedure


Growth Status


 


 


 2/2/19 14:00


Sputum Gram Stain - Final Resulted


 


 2/2/19 14:00 Sputum Culture - Preliminary


Gram Negative Bacillus 1


Gram Negative Bacillus 2 Resulted








Laboratory Tests


2/4/19 10:30: 


White Blood Count 12.8H, Red Blood Count 2.83L, Hemoglobin 8.5L, Hematocrit 

26.4L, Mean Corpuscular Volume 93, Mean Corpuscular Hemoglobin 30.2, Mean 

Corpuscular Hemoglobin Concent 32.3, Red Cell Distribution Width 16.6H, 

Platelet Count 453H, Mean Platelet Volume 5.8L, Neutrophils (%) (Auto) , 

Lymphocytes (%) (Auto) , Monocytes (%) (Auto) , Eosinophils (%) (Auto) , 

Basophils (%) (Auto) , Differential Total Cells Counted 100, Neutrophils % (

Manual) 88H, Lymphocytes % (Manual) 4L, Monocytes % (Manual) 6, Eosinophils % (

Manual) 1, Basophils % (Manual) 1, Band Neutrophils 0, Platelet Estimate 

IncreasedH, Platelet Morphology , Anisocytosis 2+


2/5/19 04:45: 


White Blood Count 12.6H, Red Blood Count 3.10L, Hemoglobin 9.4L, Hematocrit 

28.6L, Mean Corpuscular Volume 92, Mean Corpuscular Hemoglobin 30.4, Mean 

Corpuscular Hemoglobin Concent 33.0, Red Cell Distribution Width 16.7H, 

Platelet Count 413, Mean Platelet Volume 6.0L, Neutrophils (%) (Auto) , 

Lymphocytes (%) (Auto) , Monocytes (%) (Auto) , Eosinophils (%) (Auto) , 

Basophils (%) (Auto) , Sodium Level 133L, Potassium Level 3.7, Chloride Level 98

, Carbon Dioxide Level 26, Anion Gap 10, Blood Urea Nitrogen 36H, Creatinine 

1.4H, Estimat Glomerular Filtration Rate , Glucose Level 99, Calcium Level 8.6





Current Medications








 Medications


  (Trade)  Dose


 Ordered  Sig/Tony


 Route


 PRN Reason  Start Time


 Stop Time Status Last Admin


Dose Admin


 


 Acetaminophen


  (Tylenol)  650 mg  Q4H  PRN


 RECTAL


 Mild Pain (Pain Scale 1-3)  1/31/19 19:15


 3/2/19 19:14  2/1/19 00:27


 


 


 Albuterol/


 Ipratropium


  (Albuterol/


 Ipratropium)  3 ml  Q6H  PRN


 HHN


 Shortness of Breath  1/31/19 19:15


 2/5/19 19:14   


 


 


 Alprazolam


  (Xanax)  0.5 mg  TIDPRN  PRN


 ORAL


 agitation  2/4/19 13:45


 2/11/19 13:44   


 


 


 Dextrose


  (Dextrose 50%)  25 ml  Q30M  PRN


 IV


 Hypoglycemia  1/31/19 19:30


 3/2/19 19:29   


 


 


 Dextrose


  (Dextrose 50%)  50 ml  Q30M  PRN


 IV


 Hypoglycemia  1/31/19 19:30


 3/2/19 19:29   


 


 


 Epoetin Rupesh


  (Epoetin


 Rupesh(ESRD on


 dialysis))  10,000 unit  MON-WED-FRI


 SUBQ


   2/1/19 21:00


 3/3/19 20:59  2/4/19 20:54


 


 


 Escitalopram


 Oxalate


  (Lexapro)  10 mg  DAILY


 ORAL


   2/2/19 11:00


 3/4/19 10:59  2/4/19 08:33


 


 


 Haloperidol


  (Haldol)  5 mg  BID


 ORAL


   2/2/19 18:00


 3/4/19 17:59  2/4/19 17:03


 


 


 Iron Sucrose 100


 mg/Sodium Chloride  60 ml @ 


 240 mls/hr  BEDTIME


 IV


   2/5/19 21:00


 2/7/19 21:14   


 


 


 Meropenem 500 mg/


 Sodium Chloride  55 ml @ 


 110 mls/hr  Q12H


 IVPB


   2/4/19 13:00


 2/9/19 12:59  2/5/19 01:10


 


 


 Norepinephrine


 Bitartrate 8 mg/


 Sodium Chloride  250 ml @ 0


 mls/hr  Q24H


 IV


   1/31/19 21:15


 3/2/19 21:14  1/31/19 22:36


 


 


 Ondansetron HCl


  (Zofran)  4 mg  Q6H  PRN


 IVP


 Nausea & Vomiting  1/31/19 19:15


 3/2/19 19:14   


 


 


 Pantoprazole


  (Protonix)  40 mg  DAILY


 IV


   2/1/19 09:00


 3/3/19 08:59  2/4/19 08:33


 


 


 Sodium Chloride  1,000 ml @ 


 50 mls/hr  Q20H


 IV


   1/31/19 20:00


 3/2/19 19:59  2/5/19 06:08


 


 


 Sodium Chloride  1,000 ml @ 


 500 mls/hr  Q2H PRN


 IVLG


 sbp<90 during hd  2/4/19 10:17


 3/6/19 10:16   


 


 


 Vancomycin HCl


  (Vanco rx to


 dose)  1 ea  DAILY  PRN


 MISC


 Per rx protocol  1/31/19 22:15


 3/2/19 22:14   


 

















Quentin Cardoso MD Feb 5, 2019 08:30

## 2019-02-05 NOTE — NUR
NURSE NOTES:

Patient comfortable, turned and repositioned. Watching television, No distress noted. VSS at 
this time. No new orders. Will continue to monitor patient.

## 2019-02-05 NOTE — NUR
NURSE NOTES:

Endorsement received from CAROLYN Cabezas. Patient opens eyes spontaneously, follows simple 
commands. With trache to vent. Shiley 8.0 AC 8 450, PEEP 5, 40% FiO2. With AV shunt at left 
upper arm. on left arm precautions. Ongoing 1/2NS at 50ml/hr. With GT patent and intact. 
Ongoing Nepro 35ml/hr. No residual. With bilateral soft wrist restraints for attempting to 
pull out tubes. On P200 mattress. Head of bed elevated. Call light within reach. Bed locked 
and in low position. Bed alarm on

## 2019-02-05 NOTE — NUR
NURSE NOTES:



Received report from MARLENY Quiroga RN. Patient is asleep in bed, nonverbal, A/O x3. No s/s of 
acute distress noted. Sinus rhythm on cardiac monitor. Saturating well on trach-vent 
settings: Portex 8, AC 8, Vt 450, FiO2 40%, PEEP 5. Receiving Nepro @ 35 cc/hr via GT and 
tolerating well. Left upper arm AV shunt noted. Right hand 20g IV saline lock, right hand 
22g IV saline, both intact and patent. Right forearm 20g IV, intact and patent, running 1/2 
NS @ 50 cc/hr. Bed locked in lowest position with padded side rails up x3. Call light left 
within reach. Will continue to monitor.

## 2019-02-05 NOTE — NUR
HAND-OFF: 

Report given to CAROLYN Cabezas. Pt is resting on the bed and awake and confused. SaO2 100% with 
current Vent setting. No sign of acute distress noted.

## 2019-02-05 NOTE — CARDIOLOGY REPORT
--------------- APPROVED REPORT --------------





EXAM: Two-dimensional and M-mode echocardiogram with Doppler and color 

Doppler.



INDICATION

ENDOCARDITIS



M-Mode DIMENSIONS 

IVSd1.2 (0.7-1.1cm)Left Atrium (MM)5.2 (1.6-4.0cm)

LVDd4.5 (3.5-5.6cm)Aortic Root3.8 (2.0-3.7cm)

PWd1.0 (0.7-1.1cm)Aortic Cusp Exc.1.6 (1.5-2.0cm)

IVSs1.3 cm

LVDs3.0 (2.5-4.0cm)

PWs1.2 cm





Normal left ventricular chamber size, systolic function and wall motion.

Left ventricular ejection fraction estimated to be 70-75%.

Mild left ventricular hypertrophy by 2-D.

Small posterior pericardial  effusion.

Moderate left atrial enlargement .

Right  cardiac chamber sizes are within normal limits. 

Mild aortic valve sclerosis with adequate cusp excursion.

Moderately thickened mitral valve leaflets with normal excursion.

Moderately  annulus and aortic root calcification.

Pulmonic valve not well visualized.

IVC  at 2.0 cm without physiologic collapse suggestive of increased RA pressure.



A  color flow and spectral Doppler study was performed and revealed:

No aortic insufficiency .

Left ventricular diastolic function can not determined due to A-FIB.

Moderate  mitral regurgitation.

Mild tricuspid regurgitation.

Tricuspid  systolic velocities suggests peak right ventricular systolic pressure of  

58-63 mmHg,consistent with severe pulmonary hypertension .

## 2019-02-05 NOTE — NUR
NURSE NOTES:

Given mouth care. Turn and repositioning. No sign of acute distress noted. Tolerated well 
with current Vent setting. Will continue to monitor.

## 2019-02-05 NOTE — NUR
TRANSFER TO FLOOR:

Patient transferred to [facility], per hospital bed. Report given to CAROLYN Babb.  Patient has no 
belongings.

## 2019-02-05 NOTE — INFECTIOUS DISEASES PROG NOTE
Assessment/Plan


Assessment/Plan


antibiotics : vancomycin iv, meropenem





A


1. MRSA sepsis s/p catheter removal


2. e.coli UTI


3. shock resolved


4. gram negative pneumonia


5. respiratory failure


6. sacral decubitus ulcer


7. leucocytosis improving


8. renal failure improving





P


1. continue vancomycin iv


2. continue meropenem


3. 2 d echo pending


4. will follow up cultures





Subjective


ROS Limited/Unobtainable:  Yes


Allergies:  


Coded Allergies:  


     No Known Allergies (Unverified , 11/26/18)





Objective


Vital Signs





Last 24 Hour Vital Signs








  Date Time  Temp Pulse Resp B/P (MAP) Pulse Ox O2 Delivery O2 Flow Rate FiO2


 


2/5/19 09:20  86 14     40


 


2/5/19 08:00        40


 


2/5/19 08:00      Mechanical Ventilator  


 


2/5/19 08:00 98.6 118 14 150/88 (108) 100   


 


2/5/19 07:14  133 18     40


 


2/5/19 07:00  115 17 145/87 (106) 100   


 


2/5/19 06:00  84 17 133/65 (87) 100   


 


2/5/19 05:30  66 13     40


 


2/5/19 05:00  72 14 130/56 (80) 100   


 


2/5/19 04:00 98.5 73 17 133/56 (81) 100   


 


2/5/19 04:00      Mechanical Ventilator  


 


2/5/19 04:00  107      


 


2/5/19 04:00        40


 


2/5/19 03:30  67 12     40


 


2/5/19 03:00  108 14 113/68 (83) 100   


 


2/5/19 02:00  80 15 120/68 (85) 100   


 


2/5/19 01:00  67 12 114/43 (66) 100   


 


2/5/19 00:59  66 13     40


 


2/5/19 00:00        40


 


2/5/19 00:00  109      


 


2/5/19 00:00 98.3 107 13 135/53 (80) 100   


 


2/5/19 00:00      Mechanical Ventilator  


 


2/4/19 23:00  84 15 140/68 (92) 100   


 


2/4/19 22:59  97 16     40


 


2/4/19 22:00  70 12 134/51 (78) 100   


 


2/4/19 21:15    121/46    


 


2/4/19 21:00  63 12 121/46 (71) 100   


 


2/4/19 20:35  70 15     40


 


2/4/19 20:00        40


 


2/4/19 20:00  72      


 


2/4/19 20:00 98.1 70 14 133/52 (79) 100   


 


2/4/19 20:00      Mechanical Ventilator  


 


2/4/19 19:24  69 12     40


 


2/4/19 18:57  71 15 133/50 (77) 100   


 


2/4/19 18:00  69 14 130/51 (77) 100   


 


2/4/19 17:07  63 12     40


 


2/4/19 17:00  64 15 117/46 (69) 100   


 


2/4/19 16:00 98.0 67 14 125/47 (73) 100   


 


2/4/19 16:00        40


 


2/4/19 16:00      Mechanical Ventilator  


 


2/4/19 16:00  65      


 


2/4/19 15:15  68 15     40


 


2/4/19 15:00  70 15 127/53 (77) 100   


 


2/4/19 14:00  69 15 135/55 (81) 100   


 


2/4/19 13:15  73 16     40


 


2/4/19 13:00  71 15 121/50 (73) 100   


 


2/4/19 12:00  71      


 


2/4/19 12:00        40


 


2/4/19 12:00 98.2 73 13 122/67 (85) 100   


 


2/4/19 12:00      Mechanical Ventilator  


 


2/4/19 11:10  108 19     40


 


2/4/19 11:00  107 15 122/67 (85) 100   








Height (Feet):  5


Height (Inches):  3.00


Weight (Pounds):  155


HEENT:  status post trach


Respiratory/Chest:  lungs clear


Cardiovascular:  normal rate, regular rhythm, no gallop/murmur


Abdomen:  soft, non tender, other - GT


Extremities:  no edema





Microbiology








 Date/Time


Source Procedure


Growth Status


 


 


 2/2/19 14:00


Sputum Gram Stain - Final Resulted


 


 2/2/19 14:00 Sputum Culture - Preliminary


Gram Negative Bacillus 1


Gram Negative Bacillus 2 Resulted











Laboratory Tests








Test


  2/4/19


10:30 2/5/19


04:45


 


White Blood Count


  12.8 K/UL


(4.8-10.8)  H 12.6 K/UL


(4.8-10.8)  H


 


Red Blood Count


  2.83 M/UL


(4.20-5.40)  L 3.10 M/UL


(4.20-5.40)  L


 


Hemoglobin


  8.5 G/DL


(12.0-16.0)  L 9.4 G/DL


(12.0-16.0)  L


 


Hematocrit


  26.4 %


(37.0-47.0)  L 28.6 %


(37.0-47.0)  L


 


Mean Corpuscular Volume 93 FL (80-99)   92 FL (80-99)  


 


Mean Corpuscular Hemoglobin


  30.2 PG


(27.0-31.0) 30.4 PG


(27.0-31.0)


 


Mean Corpuscular Hemoglobin


Concent 32.3 G/DL


(32.0-36.0) 33.0 G/DL


(32.0-36.0)


 


Red Cell Distribution Width


  16.6 %


(11.6-14.8)  H 16.7 %


(11.6-14.8)  H


 


Platelet Count


  453 K/UL


(150-450)  H 413 K/UL


(150-450)


 


Mean Platelet Volume


  5.8 FL


(6.5-10.1)  L 6.0 FL


(6.5-10.1)  L


 


Neutrophils (%) (Auto)


  % (45.0-75.0)


  % (45.0-75.0)


 


 


Lymphocytes (%) (Auto)


  % (20.0-45.0)


  % (20.0-45.0)


 


 


Monocytes (%) (Auto)  % (1.0-10.0)    % (1.0-10.0)  


 


Eosinophils (%) (Auto)  % (0.0-3.0)    % (0.0-3.0)  


 


Basophils (%) (Auto)  % (0.0-2.0)    % (0.0-2.0)  


 


Differential Total Cells


Counted 100  


  


 


 


Neutrophils % (Manual) 88 % (45-75)  H 


 


Lymphocytes % (Manual) 4 % (20-45)  L 


 


Monocytes % (Manual) 6 % (1-10)   


 


Eosinophils % (Manual) 1 % (0-3)   


 


Basophils % (Manual) 1 % (0-2)   


 


Band Neutrophils 0 % (0-8)   


 


Platelet Estimate Increased  H 


 


Platelet Morphology    


 


Anisocytosis 2+   


 


Sodium Level


  


  133 MMOL/L


(136-145)  L


 


Potassium Level


  


  3.7 MMOL/L


(3.5-5.1)


 


Chloride Level


  


  98 MMOL/L


()


 


Carbon Dioxide Level


  


  26 MMOL/L


(21-32)


 


Anion Gap


  


  10 mmol/L


(5-15)


 


Blood Urea Nitrogen


  


  36 mg/dL


(7-18)  H


 


Creatinine


  


  1.4 MG/DL


(0.55-1.30)  H


 


Estimat Glomerular Filtration


Rate 


   mL/min (>60)  


 


 


Glucose Level


  


  99 MG/DL


()


 


Calcium Level


  


  8.6 MG/DL


(8.5-10.1)











Current Medications








 Medications


  (Trade)  Dose


 Ordered  Sig/Tony


 Route


 PRN Reason  Start Time


 Stop Time Status Last Admin


Dose Admin


 


 Acetaminophen


  (Tylenol)  650 mg  Q4H  PRN


 RECTAL


 Mild Pain (Pain Scale 1-3)  1/31/19 19:15


 3/2/19 19:14  2/1/19 00:27


 


 


 Albuterol/


 Ipratropium


  (Albuterol/


 Ipratropium)  3 ml  Q6H  PRN


 HHN


 Shortness of Breath  1/31/19 19:15


 2/5/19 19:14   


 


 


 Alprazolam


  (Xanax)  0.5 mg  TIDPRN  PRN


 ORAL


 agitation  2/4/19 13:45


 2/11/19 13:44   


 


 


 Dextrose


  (Dextrose 50%)  25 ml  Q30M  PRN


 IV


 Hypoglycemia  1/31/19 19:30


 3/2/19 19:29   


 


 


 Dextrose


  (Dextrose 50%)  50 ml  Q30M  PRN


 IV


 Hypoglycemia  1/31/19 19:30


 3/2/19 19:29   


 


 


 Epoetin Rupesh


  (Epoetin


 Rupesh(ESRD on


 dialysis))  10,000 unit  MON-WED-FRI


 SUBQ


   2/1/19 21:00


 3/3/19 20:59  2/4/19 20:54


 


 


 Escitalopram


 Oxalate


  (Lexapro)  10 mg  DAILY


 ORAL


   2/2/19 11:00


 3/4/19 10:59  2/5/19 09:00


 


 


 Haloperidol


  (Haldol)  5 mg  BID


 ORAL


   2/2/19 18:00


 3/4/19 17:59  2/5/19 09:00


 


 


 Iron Sucrose 100


 mg/Sodium Chloride  60 ml @ 


 240 mls/hr  BEDTIME


 IV


   2/5/19 21:00


 2/7/19 21:14   


 


 


 Meropenem 500 mg/


 Sodium Chloride  55 ml @ 


 110 mls/hr  Q12H


 IVPB


   2/4/19 13:00


 2/9/19 12:59  2/5/19 01:10


 


 


 Norepinephrine


 Bitartrate 8 mg/


 Sodium Chloride  250 ml @ 0


 mls/hr  Q24H


 IV


   1/31/19 21:15


 3/2/19 21:14  1/31/19 22:36


 


 


 Ondansetron HCl


  (Zofran)  4 mg  Q6H  PRN


 IVP


 Nausea & Vomiting  1/31/19 19:15


 3/2/19 19:14   


 


 


 Pantoprazole


  (Protonix)  40 mg  DAILY


 IV


   2/1/19 09:00


 3/3/19 08:59  2/5/19 09:00


 


 


 Sodium Chloride  1,000 ml @ 


 50 mls/hr  Q20H


 IV


   1/31/19 20:00


 3/2/19 19:59  2/5/19 06:08


 


 


 Sodium Chloride  1,000 ml @ 


 500 mls/hr  Q2H PRN


 IVLG


 sbp<90 during hd  2/4/19 10:17


 3/6/19 10:16   


 


 


 Vancomycin HCl


  (Vanco rx to


 dose)  1 ea  DAILY  PRN


 MISC


 Per rx protocol  1/31/19 22:15


 3/2/19 22:14   


 

















Chase Santillan MD Feb 5, 2019 10:16

## 2019-02-05 NOTE — NUR
NURSE NOTES:

Turned and repositioned. VSS. Transfer order obtained awaiting on room for patient in [facility]. 
Son at bedside. Plan of care discussed with son. Will continue plan of care.

## 2019-02-05 NOTE — GENERAL PROGRESS NOTE
Assessment/Plan


Problem List:  


(1) MDD (major depressive disorder), recurrent episode


ICD Codes:  F33.9 - Major depressive disorder, recurrent, unspecified


SNOMED:  212728356


(2) Dementia with behavioral disturbance


ICD Codes:  F03.91 - Unspecified dementia with behavioral disturbance


SNOMED:  9614554059350


(3) Encephalopathy chronic


ICD Codes:  G93.49 - Other encephalopathy


SNOMED:  74024495


Assessment/Plan


lexapro 10mg po qam


haldol


cont restraints 


xanax .5 q 6hr /prn





Subjective


Neurologic/Psychiatric:  Reports: anxiety, depressed, emotional problems


Allergies:  


Coded Allergies:  


     No Known Allergies (Unverified , 11/26/18)


Subjective


waxing and waning of consciousness


cont to be agitated and in restraints and mittens





Objective





Last 24 Hour Vital Signs








  Date Time  Temp Pulse Resp B/P (MAP) Pulse Ox O2 Delivery O2 Flow Rate FiO2


 


2/5/19 11:02  85 15     40


 


2/5/19 11:00  100 17 142/88 (106) 100   


 


2/5/19 10:00  89 17 140/84 (102) 100   


 


2/5/19 09:20  86 14     40


 


2/5/19 09:00  99 17 135/80 (98) 100   


 


2/5/19 08:00        40


 


2/5/19 08:00  110      


 


2/5/19 08:00      Mechanical Ventilator  


 


2/5/19 08:00 98.6 118 14 150/88 (108) 100   


 


2/5/19 07:14  133 18     40


 


2/5/19 07:00  115 17 145/87 (106) 100   


 


2/5/19 06:00  84 17 133/65 (87) 100   


 


2/5/19 05:30  66 13     40


 


2/5/19 05:00  72 14 130/56 (80) 100   


 


2/5/19 04:00 98.5 73 17 133/56 (81) 100   


 


2/5/19 04:00      Mechanical Ventilator  


 


2/5/19 04:00  107      


 


2/5/19 04:00        40


 


2/5/19 03:30  67 12     40


 


2/5/19 03:00  108 14 113/68 (83) 100   


 


2/5/19 02:00  80 15 120/68 (85) 100   


 


2/5/19 01:00  67 12 114/43 (66) 100   


 


2/5/19 00:59  66 13     40


 


2/5/19 00:00        40


 


2/5/19 00:00  109      


 


2/5/19 00:00 98.3 107 13 135/53 (80) 100   


 


2/5/19 00:00      Mechanical Ventilator  


 


2/4/19 23:00  84 15 140/68 (92) 100   


 


2/4/19 22:59  97 16     40


 


2/4/19 22:00  70 12 134/51 (78) 100   


 


2/4/19 21:15    121/46    


 


2/4/19 21:00  63 12 121/46 (71) 100   


 


2/4/19 20:35  70 15     40


 


2/4/19 20:00        40


 


2/4/19 20:00  72      


 


2/4/19 20:00 98.1 70 14 133/52 (79) 100   


 


2/4/19 20:00      Mechanical Ventilator  


 


2/4/19 19:24  69 12     40


 


2/4/19 18:57  71 15 133/50 (77) 100   


 


2/4/19 18:00  69 14 130/51 (77) 100   


 


2/4/19 17:07  63 12     40


 


2/4/19 17:00  64 15 117/46 (69) 100   


 


2/4/19 16:00 98.0 67 14 125/47 (73) 100   


 


2/4/19 16:00        40


 


2/4/19 16:00      Mechanical Ventilator  


 


2/4/19 16:00  65      


 


2/4/19 15:15  68 15     40


 


2/4/19 15:00  70 15 127/53 (77) 100   


 


2/4/19 14:00  69 15 135/55 (81) 100   


 


2/4/19 13:15  73 16     40


 


2/4/19 13:00  71 15 121/50 (73) 100   

















Intake and Output  


 


 2/4/19 2/5/19





 19:00 07:00


 


Intake Total 2287 ml 1015 ml


 


Output Total 0 ml 0 ml


 


Balance 2287 ml 1015 ml


 


  


 


IV Total 617 ml 595 ml


 


Tube Feeding 420 ml 420 ml


 


Blood Product 250 ml 


 


Hemodialysis 1000 ml 


 


Output Urine Total 0 ml 0 ml


 


# Bowel Movements 1 2








Laboratory Tests


2/5/19 04:45: 


White Blood Count 12.6H, Red Blood Count 3.10L, Hemoglobin 9.4L, Hematocrit 

28.6L, Mean Corpuscular Volume 92, Mean Corpuscular Hemoglobin 30.4, Mean 

Corpuscular Hemoglobin Concent 33.0, Red Cell Distribution Width 16.7H, 

Platelet Count 413, Mean Platelet Volume 6.0L, Neutrophils (%) (Auto) , 

Lymphocytes (%) (Auto) , Monocytes (%) (Auto) , Eosinophils (%) (Auto) , 

Basophils (%) (Auto) , Sodium Level 133L, Potassium Level 3.7, Chloride Level 98

, Carbon Dioxide Level 26, Anion Gap 10, Blood Urea Nitrogen 36H, Creatinine 

1.4H, Estimat Glomerular Filtration Rate , Glucose Level 99, Calcium Level 8.6


Height (Feet):  5


Height (Inches):  3.00


Weight (Pounds):  155


General Appearance:  alert, confused, agitated











Cass Levi MD Feb 5, 2019 12:13

## 2019-02-05 NOTE — NUR
NURSE NOTES:

Turned and repositioned. Patient able to make needs known. No distress noted. Will continue 
plan of care.

## 2019-02-05 NOTE — NUR
NURSE NOTES:

Given morning care. Noted large amount soft BM. Cleaned Pt and applied lotion and cream. 
Changed wounds dressing. Provided oral care. Changed potion. Still noted swelling on Both 
arm. Elevated both arm. Placed fall precaution. Will continue to care plan.

## 2019-02-05 NOTE — NUR
NURSE NOTES:

Trying to release Bilateral wrist soft restraint during care but Pt still trying to touch 
medical device; Vent and IV. Checked circulation and comfort. Will continue to close 
monitor.

## 2019-02-05 NOTE — NUR
NURSE NOTES:

Received Pt is resting on the bed and awake and confused. Trach to Vent dependent setting 
with AC : 8, T: 450, P: 5, FiO2 40% with SaO2 40%. Lung sounds diminished bilaterally. 
provided oral care. On cardiac monitor with SR. IV site intact and no sign of infiltration 
noted. Lt. upper arm AV shunt present of bruit and thrills. On Bilateral wrist restraint. 
Trying to release restraint when during care Pt still trying to touch Vent and Iv line. 
Given verbal cueing but doesn't demonstrate understanding. Checked comfort and circulation. 
On G-tubed feeding as ordered and tolerated well. No residual noted. Hypoactive bowel sounds 
present on all 4 quadrants. Dressing is clean and dry intact on wound area. Changed 
position. HOB elevated, call light within reach, bed on lowest position, bed alarm on for 
safety.  Will continue to care plan.

## 2019-02-06 VITALS — SYSTOLIC BLOOD PRESSURE: 122 MMHG | DIASTOLIC BLOOD PRESSURE: 52 MMHG

## 2019-02-06 VITALS — SYSTOLIC BLOOD PRESSURE: 140 MMHG | DIASTOLIC BLOOD PRESSURE: 60 MMHG

## 2019-02-06 VITALS — DIASTOLIC BLOOD PRESSURE: 61 MMHG | SYSTOLIC BLOOD PRESSURE: 138 MMHG

## 2019-02-06 VITALS — SYSTOLIC BLOOD PRESSURE: 105 MMHG | DIASTOLIC BLOOD PRESSURE: 52 MMHG

## 2019-02-06 VITALS — SYSTOLIC BLOOD PRESSURE: 128 MMHG | DIASTOLIC BLOOD PRESSURE: 56 MMHG

## 2019-02-06 VITALS — DIASTOLIC BLOOD PRESSURE: 71 MMHG | SYSTOLIC BLOOD PRESSURE: 120 MMHG

## 2019-02-06 RX ADMIN — SODIUM CHLORIDE SCH MLS/HR: 0.9 INJECTION INTRAVENOUS at 20:24

## 2019-02-06 RX ADMIN — PANTOPRAZOLE SODIUM SCH MG: 40 INJECTION, POWDER, FOR SOLUTION INTRAVENOUS at 09:26

## 2019-02-06 RX ADMIN — MEROPENEM SCH MLS/HR: 500 INJECTION INTRAVENOUS at 12:53

## 2019-02-06 RX ADMIN — OLANZAPINE SCH MG: 2.5 TABLET, FILM COATED ORAL at 18:22

## 2019-02-06 RX ADMIN — EPOETIN ALFA-EPBX SCH UNIT: 10000 INJECTION, SOLUTION INTRAVENOUS; SUBCUTANEOUS at 20:24

## 2019-02-06 RX ADMIN — MEROPENEM SCH MLS/HR: 500 INJECTION INTRAVENOUS at 00:53

## 2019-02-06 RX ADMIN — OLANZAPINE SCH MG: 2.5 TABLET, FILM COATED ORAL at 13:42

## 2019-02-06 NOTE — NUR
NURSE NOTES:

Informed Dr. Pate that pt having diarrhea, okay to insert rectal tube per PMD. 

-------------------------------------------------------------------------------

Addendum: 02/06/19 at 1922 by SOYEON HUH RN

-------------------------------------------------------------------------------

sacral dressing was soiled. dressing changed per order. pt kept clean and dry.

## 2019-02-06 NOTE — SURGERY PROGRESS NOTE
Surgery Progress Note


Subjective


Additional Comments


no acute events.  sacral wound with some decline.  still a stage III but not 

forming good granulation tissue





Objective





Last 24 Hour Vital Signs








  Date Time  Temp Pulse Resp B/P (MAP) Pulse Ox O2 Delivery O2 Flow Rate FiO2


 


2/6/19 13:02  67 16     40


 


2/6/19 12:25 97.2 63 12 122/52 (75) 99   


 


2/6/19 12:00        40


 


2/6/19 12:00  64      


 


2/6/19 12:00      Mechanical Ventilator  


 


2/6/19 11:05  69 16     40


 


2/6/19 09:06  70 16     40


 


2/6/19 09:00      Mechanical Ventilator  


 


2/6/19 08:00 97.7 68 15 140/60 (86) 100   


 


2/6/19 08:00  69      


 


2/6/19 08:00        40


 


2/6/19 07:14  72 15     40


 


2/6/19 05:11  70 13     40


 


2/6/19 04:00 97.7 63 14 128/56 (80) 98   


 


2/6/19 04:00      Mechanical Ventilator  


 


2/6/19 04:00        40


 


2/6/19 03:30  101 13     40


 


2/6/19 03:21  70      


 


2/6/19 01:30  104 11     40


 


2/6/19 00:00 97.7 109 13 120/71 (87) 100   


 


2/6/19 00:00  64      


 


2/6/19 00:00        40


 


2/6/19 00:00      Mechanical Ventilator  


 


2/5/19 23:05  110 12     40


 


2/5/19 21:45 97.7 64 12 130/56 (80) 100   


 


2/5/19 21:00  67 16 133/47 (75) 100   


 


2/5/19 20:41  114 13     40


 


2/5/19 20:00        40


 


2/5/19 20:00      Mechanical Ventilator  


 


2/5/19 20:00 98.6 103 15 135/50 (78) 100   


 


2/5/19 20:00  82      


 


2/5/19 19:30  105 14     40


 


2/5/19 19:00  82 17 130/65 (86) 100   


 


2/5/19 18:00  80 17 132/66 (88) 100   


 


2/5/19 17:04  75 15     40


 


2/5/19 17:00  105 17 142/64 (90) 100   


 


2/5/19 16:00        40


 


2/5/19 16:00      Mechanical Ventilator  


 


2/5/19 16:00 98.6 102 17 135/66 (89) 100   


 


2/5/19 16:00  78      


 


2/5/19 15:33  124 18   Mechanical Ventilator  35


 


2/5/19 15:21  80 17     40


 


2/5/19 15:00  108 17 147/68 (94) 100   








I&O











Intake and Output  


 


 2/5/19 2/6/19





 19:00 07:00


 


Intake Total 470 ml 963.333 ml


 


Output Total 0 ml 0 ml


 


Balance 470 ml 963.333 ml


 


  


 


Free Water  30 ml


 


IV Total 50 ml 513.333 ml


 


Tube Feeding 420 ml 420 ml


 


Output Urine Total 0 ml 0 ml


 


# Bowel Movements 3 5








Dressing:  saturated


Wound:  other


Drains:  other


Cardiovascular:  RSR


Respiratory:  clear, decreased breath sounds


Abdomen:  soft, present bowel sounds, non-distended


Extremities:  other





Laboratory Tests








Test


  2/6/19


03:45


 


Random Vancomycin Level 17.1 ug/mL  











Plan


Problems:  


(1) Septic shock


Assessment & Plan:  infected central line removed


line free 


plan for PICC soon 





(2) Decubital ulcer


Assessment & Plan:  Pt presents with full thickness stage III pressure injury 

sacrum with deep undermining into L buttocks .Historical arched surgical scar 

Upper L buttocks.Wound measures(L)3.4cm x (W)3cm x (D)1.5cm ,undermining 12-6 

by 12 cm @8o'clock. Scattered Biofilm at base of sacrum with40% yellow slough 

along borders of wound .minimal non-odorous serous exudate.Non-blanchable 

erythema with additional scattered partial wounds periwound to R and L buttocks 


Unstageable pressure injury R ischium  with 95% slough, 5% erythema along 

margins.Non-blanchable erythema periwound (L)2cm x (W)0.4cm.


Stable dry eschar lateral/posterior R tibia.Periwound is clean and pink.(L)3cmx 

(W)2cm.


Stable dry eschar R heel (L)2cm x (W)1.7cm .Periwound blanchable .


Pressure injury with 100% eschar ,loose around edges ,satish pink borders .No 

odor or exudate noted.(L)1.7cm x (W)2cm. 


L forearm edematous and weeping serous exudate. 


Scattered senile purpuras bilat upper and bilat lower ext.


Skin assessed under trach collar and no evidence of skin breakdown noted.


Wound medial /distal R tibia with 100% stable dry eschar (L)2cm x (W)1.6cm. 

Periwound is pink and dry.








Tx.Plan:


Cleanse sacral wound with Saline.Loosely pack with Silvasorb impregnated 1/2 

inch packing strip .Apply Moisture Barrier paste periwound .Cover with Optifoam 

drsg Daily and prn.


Apply Cavilon Skin Barrier to R ischial wound .Cover with Optifoam drsg .Change 

every 3 days and prn.


Apply Cavilon Skin Barrier to wound medial R tibia.Cover with Optifoam drsg 

every 7  days and prn.


Apply Cavilon Skin Barrier to R heel .Cover with Optifoam drsg. Change every 7 

days and prn.


Apply Cavilon Skin Barrier to Medial L heel.Cover with Optifoam drsg .Change 

every 7 days and prn.


APM /LANDEN mattress overlay.


Reposition at least every 2 hours or as tolerated.


Off-load heels with pillow.





Discussed with PCP.  Will consider debridement soon to clean biofilm and 

promote granulation tissue. 





(3) Severe malnutrition


Assessment & Plan:  nutrition consult


needs optimal nutrition in septic state for recovery and wound healing 














Bob Lewis Feb 6, 2019 14:37

## 2019-02-06 NOTE — NUR
RESPIRATORY NOTE:



Patient received mechanically ventilated on  with current ordered vent settings. 
Patient has trach size 8.0 Shiley cuffed that is secured with trach tie and guard. Patient 
presents with bilateral coarse breath sounds and moderated amount of thick white/yellow 
secretions were suctioned without incident. There is an ambu bag available at the bedside 
and the vent is connected to a red outlet. Vent alarms are functional and audible. Will 
continue to monitor.

## 2019-02-06 NOTE — NUR
NURSE NOTES:

Patient resting in bed with eyes closed, no acute distress noted. 2.5L out from HD per HD 
nurse.

## 2019-02-06 NOTE — NUR
NURSE NOTES:

patient resting in bed, no acute distress. pt tolerating feeding, 5ml residual noted. HOB 
elevated.

## 2019-02-06 NOTE — NEPHROLOGY PROGRESS NOTE
Assessment/Plan


Plan


Urosepsis (GNR) IV Abx per ID.


Worsening anemia 2 above -transfuse on HD.


ESRD HD MWF


Chr. A. Fib - apixaban. DC'ed due to wound bleeding.


HD done. Transfused.


Sacral Decub - GS Consult





Subjective


Subjective


Less confused. More alert. On HD. Stable run





Objective


Objective





Last 24 Hour Vital Signs








  Date Time  Temp Pulse Resp B/P (MAP) Pulse Ox O2 Delivery O2 Flow Rate FiO2


 


2/6/19 13:02  67 16     40


 


2/6/19 12:25 97.2 63 12 122/52 (75) 99   


 


2/6/19 12:00        40


 


2/6/19 12:00      Mechanical Ventilator  


 


2/6/19 11:05  69 16     40


 


2/6/19 09:06  70 16     40


 


2/6/19 09:00      Mechanical Ventilator  


 


2/6/19 08:00 97.7 68 15 140/60 (86) 100   


 


2/6/19 08:00  69      


 


2/6/19 08:00        40


 


2/6/19 07:14  72 15     40


 


2/6/19 05:11  70 13     40


 


2/6/19 04:00 97.7 63 14 128/56 (80) 98   


 


2/6/19 04:00      Mechanical Ventilator  


 


2/6/19 04:00        40


 


2/6/19 03:30  101 13     40


 


2/6/19 03:21  70      


 


2/6/19 01:30  104 11     40


 


2/6/19 00:00 97.7 109 13 120/71 (87) 100   


 


2/6/19 00:00  64      


 


2/6/19 00:00        40


 


2/6/19 00:00      Mechanical Ventilator  


 


2/5/19 23:05  110 12     40


 


2/5/19 21:45 97.7 64 12 130/56 (80) 100   


 


2/5/19 21:00  67 16 133/47 (75) 100   


 


2/5/19 20:41  114 13     40


 


2/5/19 20:00        40


 


2/5/19 20:00      Mechanical Ventilator  


 


2/5/19 20:00 98.6 103 15 135/50 (78) 100   


 


2/5/19 20:00  82      


 


2/5/19 19:30  105 14     40


 


2/5/19 19:00  82 17 130/65 (86) 100   


 


2/5/19 18:00  80 17 132/66 (88) 100   


 


2/5/19 17:04  75 15     40


 


2/5/19 17:00  105 17 142/64 (90) 100   


 


2/5/19 16:00        40


 


2/5/19 16:00      Mechanical Ventilator  


 


2/5/19 16:00 98.6 102 17 135/66 (89) 100   


 


2/5/19 16:00  78      


 


2/5/19 15:33  124 18   Mechanical Ventilator  35


 


2/5/19 15:21  80 17     40


 


2/5/19 15:00  108 17 147/68 (94) 100   


 


2/5/19 14:00  104 17 133/62 (85) 100   

















Intake and Output  


 


 2/5/19 2/6/19





 19:00 07:00


 


Intake Total 470 ml 963.333 ml


 


Output Total 0 ml 0 ml


 


Balance 470 ml 963.333 ml


 


  


 


Free Water  30 ml


 


IV Total 50 ml 513.333 ml


 


Tube Feeding 420 ml 420 ml


 


Output Urine Total 0 ml 0 ml


 


# Bowel Movements 3 5








Laboratory Tests


2/6/19 03:45: Random Vancomycin Level 17.1


Height (Feet):  5


Height (Inches):  3.00


Weight (Pounds):  157


Objective


On Vent.


Cv IRR +tach


Lungs CTA


Abd SNT. BS +


E No CCE


Skin stage 3 sacral decub.











Gavino Daigle MD Feb 6, 2019 14:00

## 2019-02-06 NOTE — NUR
NURSE NOTES:

Received patient from CAROLYN Duran. Patient awake, able to answer by nodding, follows simple 
commands. Shiley 8 trach to vent, settings: AC8, , Fio2 40%, PEEP 5. SR on the cardiac 
monitor. No acute distress noted.  Left arm AV shunt noted. On bilateral soft wrist 
restraints. skin intact and warm, pulses present. G-tube intact, running nephro at 35ml/hr. 
HOB elevated. Wound dressings intact, clean and dry. bed in lowest position. Bed alarm on. 
Call light within reach. Will continue to care plan.

## 2019-02-06 NOTE — GENERAL PROGRESS NOTE
Assessment/Plan


Problem List:  


(1) MDD (major depressive disorder), recurrent episode


ICD Codes:  F33.9 - Major depressive disorder, recurrent, unspecified


SNOMED:  146685461


(2) Dementia with behavioral disturbance


ICD Codes:  F03.91 - Unspecified dementia with behavioral disturbance


SNOMED:  1954269657039


(3) Encephalopathy chronic


ICD Codes:  G93.49 - Other encephalopathy


SNOMED:  74505757


Status:  unchanged


Assessment/Plan


lexapro 10mg po qam


haldol


cont restraints 


xanax .5 q 6hr /prn





Subjective


Neurologic/Psychiatric:  Reports: anxiety, depressed, emotional problems


Allergies:  


Coded Allergies:  


     No Known Allergies (Unverified , 11/26/18)


Subjective


waxing and waning of consciousness


cont to be agitated and in restraints


asked me to take her restraints off





Objective





Last 24 Hour Vital Signs








  Date Time  Temp Pulse Resp B/P (MAP) Pulse Ox O2 Delivery O2 Flow Rate FiO2


 


2/6/19 12:25 97.2 63 12 122/52 (75) 99   


 


2/6/19 12:00        40


 


2/6/19 12:00      Mechanical Ventilator  


 


2/6/19 11:05  69 16     40


 


2/6/19 09:06  70 16     40


 


2/6/19 09:00      Mechanical Ventilator  


 


2/6/19 08:00 97.7 68 15 140/60 (86) 100   


 


2/6/19 08:00  69      


 


2/6/19 08:00        40


 


2/6/19 07:14  72 15     40


 


2/6/19 05:11  70 13     40


 


2/6/19 04:00 97.7 63 14 128/56 (80) 98   


 


2/6/19 04:00      Mechanical Ventilator  


 


2/6/19 04:00        40


 


2/6/19 03:30  101 13     40


 


2/6/19 03:21  70      


 


2/6/19 01:30  104 11     40


 


2/6/19 00:00 97.7 109 13 120/71 (87) 100   


 


2/6/19 00:00  64      


 


2/6/19 00:00        40


 


2/6/19 00:00      Mechanical Ventilator  


 


2/5/19 23:05  110 12     40


 


2/5/19 21:45 97.7 64 12 130/56 (80) 100   


 


2/5/19 21:00  67 16 133/47 (75) 100   


 


2/5/19 20:41  114 13     40


 


2/5/19 20:00        40


 


2/5/19 20:00      Mechanical Ventilator  


 


2/5/19 20:00 98.6 103 15 135/50 (78) 100   


 


2/5/19 20:00  82      


 


2/5/19 19:30  105 14     40


 


2/5/19 19:00  82 17 130/65 (86) 100   


 


2/5/19 18:00  80 17 132/66 (88) 100   


 


2/5/19 17:04  75 15     40


 


2/5/19 17:00  105 17 142/64 (90) 100   


 


2/5/19 16:00        40


 


2/5/19 16:00      Mechanical Ventilator  


 


2/5/19 16:00 98.6 102 17 135/66 (89) 100   


 


2/5/19 16:00  78      


 


2/5/19 15:33  124 18   Mechanical Ventilator  35


 


2/5/19 15:21  80 17     40


 


2/5/19 15:00  108 17 147/68 (94) 100   


 


2/5/19 14:00  104 17 133/62 (85) 100   


 


2/5/19 13:00  124 17     40


 


2/5/19 13:00  108 17 138/60 (86) 100   

















Intake and Output  


 


 2/5/19 2/6/19





 19:00 07:00


 


Intake Total 470 ml 928.333 ml


 


Output Total 0 ml 0 ml


 


Balance 470 ml 928.333 ml


 


  


 


Free Water  30 ml


 


IV Total 50 ml 513.333 ml


 


Tube Feeding 420 ml 385 ml


 


Output Urine Total 0 ml 0 ml


 


# Bowel Movements 3 5








Laboratory Tests


2/6/19 03:45: Random Vancomycin Level 17.1


Height (Feet):  5


Height (Inches):  3.00


Weight (Pounds):  157


General Appearance:  alert, confused, agitated











Cass Levi MD Feb 6, 2019 12:56

## 2019-02-06 NOTE — NUR
RESPIRATORY NOTE: Received pt on current vent setting. Small amount of white, tan secretions 
and will sxn prn. SPO2 94%, HR 69. Vent is plugged into red outlet. Alarms are on and 
audible. Ambu bag is in the bedside. Will continue to monitor pt.

## 2019-02-06 NOTE — PULMONOLOGY PROGRESS NOTE
Assessment/Plan


Assessment/Plan


1. Shock, septic. Resolved


2. UTI.


3. ESRD, on dialysis.


4. Ventilator dependent respiratory failure.


5. Tracheostomy.


6. Diabetes mellitus.


7. Chronic atrial fibrillation.


8. Bacteremia.





DISCUSSION:  Continue broad-spectrum antibiotics.  I will continue AC mode.


Continue to decrease FiO2 as tolerated.  Blood transfusion as required.


Hemodialysis per renal.





Transferred to [facility]














  ______________________________________________


  Quentin Cardoso M.D.





Subjective


Interval Events:  Transferred to [facility]


Constitutional:  Reports: no symptoms


HEENT:  Repors: no symptoms


Respiratory:  Reports: no symptoms


Cardiovascular:  Reports: no symptoms


Gastrointestinal/Abdominal:  Reports: no symptoms


Genitourinary:  Reports: no symptoms


Allergies:  


Coded Allergies:  


     No Known Allergies (Unverified , 11/26/18)





Objective





Last 24 Hour Vital Signs








  Date Time  Temp Pulse Resp B/P (MAP) Pulse Ox O2 Delivery O2 Flow Rate FiO2


 


2/6/19 07:14  72 15     40


 


2/6/19 05:11  70 13     40


 


2/6/19 04:00 97.7 63 14 128/56 (80) 98   


 


2/6/19 04:00      Mechanical Ventilator  


 


2/6/19 04:00        40


 


2/6/19 03:30  101 13     40


 


2/6/19 03:21  70      


 


2/6/19 01:30  104 11     40


 


2/6/19 00:00 97.7 109 13 120/71 (87) 100   


 


2/6/19 00:00  64      


 


2/6/19 00:00        40


 


2/6/19 00:00      Mechanical Ventilator  


 


2/5/19 23:05  110 12     40


 


2/5/19 21:45 97.7 64 12 130/56 (80) 100   


 


2/5/19 21:00  67 16 133/47 (75) 100   


 


2/5/19 20:41  114 13     40


 


2/5/19 20:00        40


 


2/5/19 20:00      Mechanical Ventilator  


 


2/5/19 20:00 98.6 103 15 135/50 (78) 100   


 


2/5/19 20:00  82      


 


2/5/19 19:30  105 14     40


 


2/5/19 19:00  82 17 130/65 (86) 100   


 


2/5/19 18:00  80 17 132/66 (88) 100   


 


2/5/19 17:04  75 15     40


 


2/5/19 17:00  105 17 142/64 (90) 100   


 


2/5/19 16:00        40


 


2/5/19 16:00      Mechanical Ventilator  


 


2/5/19 16:00 98.6 102 17 135/66 (89) 100   


 


2/5/19 16:00  78      


 


2/5/19 15:33  124 18   Mechanical Ventilator  35


 


2/5/19 15:21  80 17     40


 


2/5/19 15:00  108 17 147/68 (94) 100   


 


2/5/19 14:00  104 17 133/62 (85) 100   


 


2/5/19 13:00  124 17     40


 


2/5/19 13:00  108 17 138/60 (86) 100   


 


2/5/19 12:00  81      


 


2/5/19 12:00 98.8 110 17 157/61 (93) 100   


 


2/5/19 12:00      Mechanical Ventilator  


 


2/5/19 12:00        40


 


2/5/19 11:02  85 15     40


 


2/5/19 11:00  100 17 142/88 (106) 100   


 


2/5/19 10:00  89 17 140/84 (102) 100   


 


2/5/19 09:20  86 14     40


 


2/5/19 09:00  99 17 135/80 (98) 100   


 


2/5/19 08:00        40


 


2/5/19 08:00  110      


 


2/5/19 08:00      Mechanical Ventilator  


 


2/5/19 08:00 98.6 118 14 150/88 (108) 100   

















Intake and Output  


 


 2/5/19 2/6/19





 19:00 07:00


 


Intake Total 470 ml 928.333 ml


 


Output Total 0 ml 0 ml


 


Balance 470 ml 928.333 ml


 


  


 


Free Water  30 ml


 


IV Total 50 ml 513.333 ml


 


Tube Feeding 420 ml 385 ml


 


Output Urine Total 0 ml 0 ml


 


# Bowel Movements 3 5








General Appearance:  no acute distress


HEENT:  normocephalic, status post trach


Respiratory/Chest:  chest wall non-tender, lungs clear, normal breath sounds


Cardiovascular:  normal peripheral pulses, regular rhythm


Laboratory Tests


2/6/19 03:45: Random Vancomycin Level 17.1





Current Medications








 Medications


  (Trade)  Dose


 Ordered  Sig/Tony


 Route


 PRN Reason  Start Time


 Stop Time Status Last Admin


Dose Admin


 


 Acetaminophen


  (Tylenol)  650 mg  Q4H  PRN


 RECTAL


 Mild Pain (Pain Scale 1-3)  2/5/19 21:45


 3/2/19 21:44   


 


 


 Alprazolam


  (Xanax)  0.5 mg  TIDPRN  PRN


 ORAL


 agitation  2/5/19 21:45


 2/12/19 21:44   


 


 


 Dextrose


  (Dextrose 50%)  25 ml  Q30M  PRN


 IV


 Hypoglycemia  2/5/19 21:45


 3/2/19 21:44   


 


 


 Dextrose


  (Dextrose 50%)  50 ml  Q30M  PRN


 IV


 Hypoglycemia  2/5/19 21:45


 3/2/19 21:44   


 


 


 Epoetin Rupesh


  (Epoetin


 Rupesh(ESRD on


 dialysis))  10,000 unit  MON-WED-FRI


 SUBQ


   2/6/19 21:00


 3/3/19 20:59   


 


 


 Escitalopram


 Oxalate


  (Lexapro)  10 mg  DAILY


 ORAL


   2/6/19 09:00


 3/4/19 10:59   


 


 


 Haloperidol


  (Haldol)  5 mg  BID


 ORAL


   2/6/19 09:00


 3/4/19 17:59   


 


 


 Heparin Sodium


  (Porcine)


  (Heparin Sod


 1000 units/ml


 10ml)  500 unit  ONCE  PRN


 IV


 HD USE  2/6/19 06:00


 2/6/19 21:59   


 


 


 Iron Sucrose 100


 mg/Sodium Chloride  60 ml @ 


 240 mls/hr  BEDTIME


 IV


   2/6/19 21:00


 2/7/19 21:14   


 


 


 Meropenem 500 mg/


 Sodium Chloride  55 ml @ 


 110 mls/hr  Q12H


 IVPB


   2/6/19 01:00


 2/9/19 12:59  2/6/19 00:53


 


 


 Ondansetron HCl


  (Zofran)  4 mg  Q6H  PRN


 IVP


 Nausea & Vomiting  2/5/19 21:45


 3/7/19 21:44   


 


 


 Pantoprazole


  (Protonix)  40 mg  DAILY


 IV


   2/6/19 09:00


 3/3/19 08:59   


 


 


 Sodium Chloride  1,000 ml @ 


 50 mls/hr  Q20H


 IV


   2/5/19 21:45


 3/2/19 21:44  2/5/19 21:50


 


 


 Sodium Chloride  1,000 ml @ 


 500 mls/hr  Q2H PRN


 IVLG


 sbp<90 during hd  2/6/19 06:00


 2/6/19 23:59   


 


 


 Vancomycin HCl


  (Vanco rx to


 dose)  1 ea  DAILY  PRN


 MISC


 Per rx protocol  2/5/19 21:45


 3/7/19 21:44   


 

















Quentin Cardoso MD Feb 6, 2019 07:33

## 2019-02-06 NOTE — NUR
NURSE NOTES:

Received report from So Yeon RN, pt. in bed awake, A/O x's 2-3, no signs or symptoms of 
acute cardiac or respiratory distress noted, bed in lowest position and call light within 
easy reach, bed alarm on, side rails up x's3 and safety braked engaged, cardiac monitoring 
on, pt. appears to be tolerating current vent settings well- AC 8, , Fio2 40% and Peep 
5, Nephro running via G tube at 40cc/hr- at goal- no residual noted, pt. appears to be 
resting comfortably, VALE AV shunt for Hemodialysis, RT. hand 22G IV intact and patent, RFA 
20G running 1/2 NS at50cc/hr- Iv intact and patent, safety measures continued, will continue 
with plan of care.

## 2019-02-07 VITALS — DIASTOLIC BLOOD PRESSURE: 60 MMHG | SYSTOLIC BLOOD PRESSURE: 137 MMHG

## 2019-02-07 VITALS — DIASTOLIC BLOOD PRESSURE: 80 MMHG | SYSTOLIC BLOOD PRESSURE: 137 MMHG

## 2019-02-07 VITALS — DIASTOLIC BLOOD PRESSURE: 56 MMHG | SYSTOLIC BLOOD PRESSURE: 137 MMHG

## 2019-02-07 VITALS — SYSTOLIC BLOOD PRESSURE: 138 MMHG | DIASTOLIC BLOOD PRESSURE: 75 MMHG

## 2019-02-07 VITALS — SYSTOLIC BLOOD PRESSURE: 138 MMHG | DIASTOLIC BLOOD PRESSURE: 76 MMHG

## 2019-02-07 VITALS — SYSTOLIC BLOOD PRESSURE: 130 MMHG | DIASTOLIC BLOOD PRESSURE: 69 MMHG

## 2019-02-07 LAB
ADD MANUAL DIFF: YES
ALBUMIN SERPL-MCNC: 1.3 G/DL (ref 3.4–5)
ALBUMIN/GLOB SERPL: 0.3 {RATIO} (ref 1–2.7)
ALP SERPL-CCNC: 122 U/L (ref 46–116)
ALT SERPL-CCNC: 18 U/L (ref 12–78)
ANION GAP SERPL CALC-SCNC: 6 MMOL/L (ref 5–15)
AST SERPL-CCNC: 25 U/L (ref 15–37)
BILIRUB SERPL-MCNC: 0.2 MG/DL (ref 0.2–1)
BUN SERPL-MCNC: 31 MG/DL (ref 7–18)
CALCIUM SERPL-MCNC: 8.8 MG/DL (ref 8.5–10.1)
CHLORIDE SERPL-SCNC: 99 MMOL/L (ref 98–107)
CO2 SERPL-SCNC: 29 MMOL/L (ref 21–32)
CREAT SERPL-MCNC: 1.5 MG/DL (ref 0.55–1.3)
ERYTHROCYTE [DISTWIDTH] IN BLOOD BY AUTOMATED COUNT: 16.8 % (ref 11.6–14.8)
GLOBULIN SER-MCNC: 4.7 G/DL
HCT VFR BLD CALC: 29 % (ref 37–47)
HGB BLD-MCNC: 9.8 G/DL (ref 12–16)
MCV RBC AUTO: 91 FL (ref 80–99)
PLATELET # BLD: 504 K/UL (ref 150–450)
POTASSIUM SERPL-SCNC: 2.9 MMOL/L (ref 3.5–5.1)
RBC # BLD AUTO: 3.18 M/UL (ref 4.2–5.4)
SODIUM SERPL-SCNC: 134 MMOL/L (ref 136–145)
WBC # BLD AUTO: 17.6 K/UL (ref 4.8–10.8)

## 2019-02-07 RX ADMIN — ALPRAZOLAM PRN MG: 0.5 TABLET ORAL at 11:21

## 2019-02-07 RX ADMIN — MEROPENEM SCH MLS/HR: 500 INJECTION INTRAVENOUS at 13:17

## 2019-02-07 RX ADMIN — HYDROCODONE BITARTRATE AND ACETAMINOPHEN PRN TAB: 5; 325 TABLET ORAL at 17:34

## 2019-02-07 RX ADMIN — MEROPENEM SCH MLS/HR: 500 INJECTION INTRAVENOUS at 00:14

## 2019-02-07 RX ADMIN — OLANZAPINE SCH MG: 2.5 TABLET, FILM COATED ORAL at 11:20

## 2019-02-07 RX ADMIN — PANTOPRAZOLE SODIUM SCH MG: 40 INJECTION, POWDER, FOR SOLUTION INTRAVENOUS at 11:20

## 2019-02-07 RX ADMIN — ALPRAZOLAM PRN MG: 0.5 TABLET ORAL at 21:06

## 2019-02-07 RX ADMIN — SODIUM CHLORIDE SCH MLS/HR: 0.9 INJECTION INTRAVENOUS at 20:48

## 2019-02-07 NOTE — NUR
NURSE NOTES:

Left msg for DR. Daigle regarding potassium trending down- waiting for call back form 
doctor.

## 2019-02-07 NOTE — GENERAL PROGRESS NOTE
Assessment/Plan


Problem List:  


(1) MDD (major depressive disorder), recurrent episode


ICD Codes:  F33.9 - Major depressive disorder, recurrent, unspecified


SNOMED:  802366101


(2) Dementia with behavioral disturbance


ICD Codes:  F03.91 - Unspecified dementia with behavioral disturbance


SNOMED:  1719478870219


(3) Encephalopathy chronic


ICD Codes:  G93.49 - Other encephalopathy


SNOMED:  46154423


Status:  unchanged


Assessment/Plan


lexapro 10mg po qam


zyprexa 5mg tid


cont restraints 


xanax .5 q 6hr /prn





Subjective


Neurologic/Psychiatric:  Reports: anxiety


Allergies:  


Coded Allergies:  


     No Known Allergies (Unverified , 11/26/18)


Subjective


waxing and waning of consciousness


cont to be agitated and in restraints


more agitated today


son stated that he wanted Xanax on board





Objective





Last 24 Hour Vital Signs








  Date Time  Temp Pulse Resp B/P (MAP) Pulse Ox O2 Delivery O2 Flow Rate FiO2


 


2/7/19 13:17  80 14     40


 


2/7/19 12:00 98.2 78 20 137/80 (99) 100   


 


2/7/19 12:00        40


 


2/7/19 12:00      Mechanical Ventilator  


 


2/7/19 11:54  115      


 


2/7/19 11:16  75 19     40


 


2/7/19 08:41  79 18     40


 


2/7/19 08:19  79 17     40


 


2/7/19 08:00      Mechanical Ventilator  


 


2/7/19 08:00 79.0 79 19 138/75 (96) 100   


 


2/7/19 08:00        40


 


2/7/19 07:19  77      


 


2/7/19 05:27  77 18     40


 


2/7/19 04:00        40


 


2/7/19 04:00      Mechanical Ventilator  


 


2/7/19 04:00 97.1 75 24 138/76 (96) 98   


 


2/7/19 04:00  74      


 


2/7/19 03:22  74 15     40


 


2/7/19 01:16  69 16     40


 


2/7/19 00:00 97.7 67 16 137/60 (85) 100   


 


2/7/19 00:00      Mechanical Ventilator  


 


2/7/19 00:00  63      


 


2/6/19 22:45  67 14     40


 


2/6/19 21:28  72 14     40


 


2/6/19 20:00        40


 


2/6/19 20:00  67      


 


2/6/19 20:00      Mechanical Ventilator  


 


2/6/19 20:00 97.9 69 24 138/61 (86) 100   


 


2/6/19 19:25  69 15     40


 


2/6/19 16:48  100 17     40


 


2/6/19 16:00 97.2 77 17 105/52 (69) 98   


 


2/6/19 16:00  63      


 


2/6/19 16:00        40


 


2/6/19 16:00      Mechanical Ventilator  


 


2/6/19 14:36  75 15     40

















Intake and Output  


 


 2/6/19 2/7/19





 19:00 07:00


 


Intake Total 1519.000 ml 1385 ml


 


Output Total 0 ml 30 ml


 


Balance 1519.000 ml 1355 ml


 


  


 


Free Water 120 ml 50 ml


 


IV Total 979.000 ml 950 ml


 


Tube Feeding 420 ml 385 ml


 


Output Urine Total 0 ml 


 


Stool Total  30 ml


 


# Bowel Movements 3 3








Laboratory Tests


2/7/19 03:00: 


White Blood Count 17.6H, Red Blood Count 3.18L, Hemoglobin 9.8L, Hematocrit 

29.0L, Mean Corpuscular Volume 91, Mean Corpuscular Hemoglobin 30.7, Mean 

Corpuscular Hemoglobin Concent 33.6, Red Cell Distribution Width 16.8H, 

Platelet Count 504H, Mean Platelet Volume 5.9L, Neutrophils (%) (Auto) , 

Lymphocytes (%) (Auto) , Monocytes (%) (Auto) , Eosinophils (%) (Auto) , 

Basophils (%) (Auto) , Differential Total Cells Counted 100, Neutrophils % (

Manual) 81H, Lymphocytes % (Manual) 4L, Monocytes % (Manual) 8, Eosinophils % (

Manual) 1, Basophils % (Manual) 0, Myelocytes % 2H, Band Neutrophils 4, 

Platelet Estimate IncreasedH, Platelet Morphology Normal, Anisocytosis 1+, 

Sodium Level 134L, Potassium Level 2.9L, Chloride Level 99, Carbon Dioxide 

Level 29, Anion Gap 6, Blood Urea Nitrogen 31H, Creatinine 1.5H, Estimat 

Glomerular Filtration Rate , Glucose Level 113H, Calcium Level 8.8, Total 

Bilirubin 0.2, Aspartate Amino Transf (AST/SGOT) 25, Alanine Aminotransferase (

ALT/SGPT) 18, Alkaline Phosphatase 122H, Total Protein 6.0L, Albumin 1.3L, 

Globulin 4.7, Albumin/Globulin Ratio 0.3L


Height (Feet):  5


Height (Inches):  3.00


Weight (Pounds):  157


General Appearance:  alert, lethargic, confused, agitated











Cass Levi MD Feb 7, 2019 13:28

## 2019-02-07 NOTE — NUR
NURSE NOTES:

Received report from CAROLYN Posada.  Patient seen in bed in semi-carroll position with vent and 
setting of shiley 8, AC 18, , fi02 40%, PEEP 5.  Spo2 is 100% at this time. Patient is 
alert, responsive, able to make simple needs known and able to use writing board for 
communication.  Denies any pain at this time.  Patient is on GTF of nepro @ 35cc/hr and is 
tolerating well.  Rectal tube is intact.  IV site to right hand 22G and right forearm 22G is 
intact.  AV shunt noted to left upper arm.  Bed is in lowest position.  Noted with wrist 
restraints and no redness, swelling or skin breakdown at bilateral wrist area.  Call light 
is within reach with wrist restraints.  Will continue to monitor.

## 2019-02-07 NOTE — NUR
NURSE NOTES:

rhythm strip noted from SR to ST , then to RVR, Currently patient is ST.  Paged Dr Daigle. 
Currently awaiting for call back.

## 2019-02-07 NOTE — NUR
NURSE NOTES:PT VERY AGITATED PULLING MEDICAL DEVICES.PT MEDICATED WITH XANAX 0.5MG VIA 
GT.WILL CONT TO MONITOR.

## 2019-02-07 NOTE — NUR
RESPIRATORY NOTE: 

PT. RECEIVED STABLE ON AC 8, 450, 40%, +5. ALARMS ON AND AUDIBLE. VENT CIRCUIT AND SX 
TUBBING SECURE AND OUT OF THE WAY. PT. HAS BILATERAL SOFT WRIST RESTRAINS WHICH ARE SECURELY 
PLACED.  NO S/S OF RESPIRATORY DISTRESS NOTED AT THIS TIME.  WILL CONTINUE TO MONITOR.

## 2019-02-07 NOTE — NUR
RD ASSESSMENT & RECOMMENDATIONS

SEE CARE ACTIVITY FOR COMPLETE ASSESSMENT



DAILY ESTIMATED NEEDS:

Needs based on Critical care +  Wounds + ESRD w/ HD/ 60kg 

22-30  kcals/kg 

[id number]  total kcals

1.5-2.0  g protein/kg

90-120g  g total protein 

Fluid per MD on HD   mL/kg

 total fluid mLs



NUTRITION DIAGNOSIS:

* Swallowing difficulty R/T respiratory status as evidenced by pt is trach

and PEG dep.

* Increased kcal/prot needs R/T wound healing as evidenced by pt admitted

w/ multiple wounds advanced wound, refer to  eval.

* Altered nutrition related lab values R/T ESRD dx, clinical condition as

evidenced by low Hgb (5.8->8.5), critical WBC now trending down, low BP,

now off pressor support.





CURRENT TF:Nepro @35ml/hr x 24 hr 







ENTERAL NUTRITION RECOMMENDATIONS:

Nepro @35ml/hr x 24 hrs + Prosource 1pkt BID  

to provide 840ml, 1512 kcal, 78g + 22g prot, 611ml free water 



1. Maintain current TF rate to meet est kcal needs

2. add Prosource 1 pack BID to  better meet est protein needs

3. HOB over 30 degrees, flush per MD.





ADDITIONAL RECOMMENDATIONS:

*  Re-calibrate bed scale (per SNF 1/30 wts: 132#, 60kg) 

* Wound care: add EMILY BID + Nephrovite x1 daily  

    -> rec VIT C per nephro MD  

* Monitor HD stability 

. 

.

## 2019-02-07 NOTE — NUR
RESPIRATORY NOTE:

PT. REMAINED STABLE ON CMV WITH CURRENT SETTINGS. SXN'D PRN WITH SCANT AMOUNT OF CLEAR WHITE 
SECRETIONS OBTAINED.  VENT CIRCUIT AND SX TUBBING SECURE AND OUT OF THE WAY. PT. HAS 
BILATERAL SOFT WRIST RESTRAINS IN PLACE. NO RESPIRATORY DISTRESS NOTED AT THIS TIME.

## 2019-02-07 NOTE — NEPHROLOGY PROGRESS NOTE
Assessment/Plan


Plan


Urosepsis (GNR) IV Abx per ID.


Worsening anemia 2 above -transfuse on HD.


ESRD HD MWF


Chr. A. Fib - apixaban. DC'ed due to wound bleeding.


HD done. Transfused.


Sacral Decub - GS Consult





Subjective


Subjective


Less confused. More alert.





Objective


Objective





Last 24 Hour Vital Signs








  Date Time  Temp Pulse Resp B/P (MAP) Pulse Ox O2 Delivery O2 Flow Rate FiO2


 


2/7/19 15:42  70 18     40


 


2/7/19 13:17  80 14     40


 


2/7/19 12:00 98.2 78 20 137/80 (99) 100   


 


2/7/19 12:00        40


 


2/7/19 12:00      Mechanical Ventilator  


 


2/7/19 11:54  115      


 


2/7/19 11:16  75 19     40


 


2/7/19 08:41  79 18     40


 


2/7/19 08:19  79 17     40


 


2/7/19 08:00      Mechanical Ventilator  


 


2/7/19 08:00 79.0 79 19 138/75 (96) 100   


 


2/7/19 08:00        40


 


2/7/19 07:19  77      


 


2/7/19 05:27  77 18     40


 


2/7/19 04:00        40


 


2/7/19 04:00      Mechanical Ventilator  


 


2/7/19 04:00 97.1 75 24 138/76 (96) 98   


 


2/7/19 04:00  74      


 


2/7/19 03:22  74 15     40


 


2/7/19 01:16  69 16     40


 


2/7/19 00:00 97.7 67 16 137/60 (85) 100   


 


2/7/19 00:00      Mechanical Ventilator  


 


2/7/19 00:00  63      


 


2/6/19 22:45  67 14     40


 


2/6/19 21:28  72 14     40


 


2/6/19 20:00        40


 


2/6/19 20:00  67      


 


2/6/19 20:00      Mechanical Ventilator  


 


2/6/19 20:00 97.9 69 24 138/61 (86) 100   


 


2/6/19 19:25  69 15     40


 


2/6/19 16:48  100 17     40

















Intake and Output  


 


 2/6/19 2/7/19





 19:00 07:00


 


Intake Total 1519.000 ml 1470 ml


 


Output Total 0 ml 30 ml


 


Balance 1519.000 ml 1440 ml


 


  


 


Free Water 120 ml 50 ml


 


IV Total 979.000 ml 1000 ml


 


Tube Feeding 420 ml 420 ml


 


Output Urine Total 0 ml 


 


Stool Total  30 ml


 


# Bowel Movements 3 3








Laboratory Tests


2/7/19 03:00: 


White Blood Count 17.6H, Red Blood Count 3.18L, Hemoglobin 9.8L, Hematocrit 

29.0L, Mean Corpuscular Volume 91, Mean Corpuscular Hemoglobin 30.7, Mean 

Corpuscular Hemoglobin Concent 33.6, Red Cell Distribution Width 16.8H, 

Platelet Count 504H, Mean Platelet Volume 5.9L, Neutrophils (%) (Auto) , 

Lymphocytes (%) (Auto) , Monocytes (%) (Auto) , Eosinophils (%) (Auto) , 

Basophils (%) (Auto) , Differential Total Cells Counted 100, Neutrophils % (

Manual) 81H, Lymphocytes % (Manual) 4L, Monocytes % (Manual) 8, Eosinophils % (

Manual) 1, Basophils % (Manual) 0, Myelocytes % 2H, Band Neutrophils 4, 

Platelet Estimate IncreasedH, Platelet Morphology Normal, Anisocytosis 1+, 

Sodium Level 134L, Potassium Level 2.9L, Chloride Level 99, Carbon Dioxide 

Level 29, Anion Gap 6, Blood Urea Nitrogen 31H, Creatinine 1.5H, Estimat 

Glomerular Filtration Rate , Glucose Level 113H, Calcium Level 8.8, Total 

Bilirubin 0.2, Aspartate Amino Transf (AST/SGOT) 25, Alanine Aminotransferase (

ALT/SGPT) 18, Alkaline Phosphatase 122H, Total Protein 6.0L, Albumin 1.3L, 

Globulin 4.7, Albumin/Globulin Ratio 0.3L


Height (Feet):  5


Height (Inches):  3.00


Weight (Pounds):  157


Objective


On Vent.


Cv IRR +tach


Lungs CTA


Abd SNT. BS +


E No CCE


Skin stage 3 sacral decub.











Gavino Daigle MD Feb 7, 2019 16:04

## 2019-02-07 NOTE — NUR
NURSE NOTES:

left msg for DR. Santillan, regarding WBC's trending up- waiting for call back from 
doctor.

## 2019-02-07 NOTE — PULMONOLOGY PROGRESS NOTE
Assessment/Plan


Assessment/Plan


1. Shock, septic. Resolved


2. UTI.


3. ESRD, on dialysis.


4. Ventilator dependent respiratory failure.


5. Tracheostomy.


6. Diabetes mellitus.


7. Chronic atrial fibrillation.


8. Bacteremia.





DISCUSSION:  Continue broad-spectrum antibiotics.  I will continue AC mode.


Continue to decrease FiO2 as tolerated.  Blood transfusion as required.


Hemodialysis per renal.





Now in [facility]














  ______________________________________________


  Quentin Cardoso M.D.





Subjective


Interval Events:  No new events; WBC higher; K low


Constitutional:  Reports: no symptoms


HEENT:  Repors: no symptoms


Respiratory:  Reports: no symptoms


Cardiovascular:  Reports: no symptoms


Gastrointestinal/Abdominal:  Reports: no symptoms


Genitourinary:  Reports: no symptoms


Neurologic:  Reports: no symptoms


Allergies:  


Coded Allergies:  


     No Known Allergies (Unverified , 11/26/18)





Objective





Last 24 Hour Vital Signs








  Date Time  Temp Pulse Resp B/P (MAP) Pulse Ox O2 Delivery O2 Flow Rate FiO2


 


2/7/19 08:41  79 18     40


 


2/7/19 08:19  79 17     40


 


2/7/19 05:27  77 18     40


 


2/7/19 04:00        40


 


2/7/19 04:00      Mechanical Ventilator  


 


2/7/19 04:00 97.1 75 24 138/76 (96) 98   


 


2/7/19 04:00  74      


 


2/7/19 03:22  74 15     40


 


2/7/19 01:16  69 16     40


 


2/7/19 00:00 97.7 67 16 137/60 (85) 100   


 


2/7/19 00:00      Mechanical Ventilator  


 


2/7/19 00:00  63      


 


2/6/19 22:45  67 14     40


 


2/6/19 21:28  72 14     40


 


2/6/19 20:00        40


 


2/6/19 20:00  67      


 


2/6/19 20:00      Mechanical Ventilator  


 


2/6/19 20:00 97.9 69 24 138/61 (86) 100   


 


2/6/19 19:25  69 15     40


 


2/6/19 16:48  100 17     40


 


2/6/19 16:00 97.2 77 17 105/52 (69) 98   


 


2/6/19 16:00  63      


 


2/6/19 16:00        40


 


2/6/19 16:00      Mechanical Ventilator  


 


2/6/19 14:36  75 15     40


 


2/6/19 13:02  67 16     40


 


2/6/19 12:25 97.2 63 12 122/52 (75) 99   


 


2/6/19 12:00        40


 


2/6/19 12:00  64      


 


2/6/19 12:00      Mechanical Ventilator  


 


2/6/19 11:05  69 16     40


 


2/6/19 09:06  70 16     40


 


2/6/19 09:00      Mechanical Ventilator  

















Intake and Output  


 


 2/6/19 2/7/19





 19:00 07:00


 


Intake Total 1519.000 ml 1385 ml


 


Output Total 0 ml 30 ml


 


Balance 1519.000 ml 1355 ml


 


  


 


Free Water 120 ml 50 ml


 


IV Total 979.000 ml 950 ml


 


Tube Feeding 420 ml 385 ml


 


Output Urine Total 0 ml 


 


Stool Total  30 ml


 


# Bowel Movements 3 3








General Appearance:  no acute distress


HEENT:  normocephalic


Respiratory/Chest:  chest wall non-tender, lungs clear


Cardiovascular:  normal peripheral pulses, normal rate


Abdomen:  normal bowel sounds, soft, non tender


Laboratory Tests


2/7/19 03:00: 


White Blood Count 17.6H, Red Blood Count 3.18L, Hemoglobin 9.8L, Hematocrit 

29.0L, Mean Corpuscular Volume 91, Mean Corpuscular Hemoglobin 30.7, Mean 

Corpuscular Hemoglobin Concent 33.6, Red Cell Distribution Width 16.8H, 

Platelet Count 504H, Mean Platelet Volume 5.9L, Neutrophils (%) (Auto) , 

Lymphocytes (%) (Auto) , Monocytes (%) (Auto) , Eosinophils (%) (Auto) , 

Basophils (%) (Auto) , Neutrophils % (Manual) [Pending], Lymphocytes % (Manual) 

[Pending], Platelet Estimate [Pending], Platelet Morphology [Pending], Sodium 

Level 134L, Potassium Level 2.9L, Chloride Level 99, Carbon Dioxide Level 29, 

Anion Gap 6, Blood Urea Nitrogen 31H, Creatinine 1.5H, Estimat Glomerular 

Filtration Rate , Glucose Level 113H, Calcium Level 8.8, Total Bilirubin 0.2, 

Aspartate Amino Transf (AST/SGOT) 25, Alanine Aminotransferase (ALT/SGPT) 18, 

Alkaline Phosphatase 122H, Total Protein 6.0L, Albumin 1.3L, Globulin 4.7, 

Albumin/Globulin Ratio 0.3L





Current Medications








 Medications


  (Trade)  Dose


 Ordered  Sig/Tony


 Route


 PRN Reason  Start Time


 Stop Time Status Last Admin


Dose Admin


 


 Acetaminophen


  (Tylenol)  650 mg  Q4H  PRN


 RECTAL


 Mild Pain (Pain Scale 1-3)  2/5/19 21:45


 3/2/19 21:44   


 


 


 Alprazolam


  (Xanax)  0.5 mg  TIDPRN  PRN


 ORAL


 agitation  2/5/19 21:45


 2/12/19 21:44   


 


 


 Dextrose


  (Dextrose 50%)  25 ml  Q30M  PRN


 IV


 Hypoglycemia  2/5/19 21:45


 3/2/19 21:44   


 


 


 Dextrose


  (Dextrose 50%)  50 ml  Q30M  PRN


 IV


 Hypoglycemia  2/5/19 21:45


 3/2/19 21:44   


 


 


 Epoetin Rupesh


  (Epoetin


 Rupesh(ESRD on


 dialysis))  10,000 unit  MON-WED-FRI


 SUBQ


   2/6/19 21:00


 3/3/19 20:59  2/6/19 20:24


 


 


 Escitalopram


 Oxalate


  (Lexapro)  10 mg  DAILY


 GT


   2/6/19 09:00


 3/4/19 10:59  2/6/19 09:27


 


 


 Iron Sucrose 100


 mg/Sodium Chloride  60 ml @ 


 240 mls/hr  BEDTIME


 IV


   2/6/19 21:00


 2/7/19 21:14  2/6/19 20:24


 


 


 Meropenem 500 mg/


 Sodium Chloride  55 ml @ 


 110 mls/hr  Q12H


 IVPB


   2/6/19 01:00


 2/9/19 12:59  2/7/19 00:14


 


 


 Olanzapine


  (ZyPREXA)  2.5 mg  TID


 ORAL


   2/6/19 13:00


 3/8/19 12:59  2/6/19 18:22


 


 


 Ondansetron HCl


  (Zofran)  4 mg  Q6H  PRN


 IVP


 Nausea & Vomiting  2/5/19 21:45


 3/7/19 21:44   


 


 


 Pantoprazole


  (Protonix)  40 mg  DAILY


 IV


   2/6/19 09:00


 3/3/19 08:59  2/6/19 09:26


 


 


 Sodium Chloride  1,000 ml @ 


 50 mls/hr  Q20H


 IV


   2/5/19 21:45


 3/2/19 21:44  2/6/19 12:53


 


 


 Vancomycin HCl


  (Vanco rx to


 dose)  1 ea  DAILY  PRN


 MISC


 Per rx protocol  2/5/19 21:45


 3/7/19 21:44   


 

















Quentin Cardoso MD Feb 7, 2019 08:55

## 2019-02-07 NOTE — NUR
NURSE NOTES: RECEIVED BED SIDE REPORT FROM [place] AGUILAR RN STAFF OF NOC SHIFT.RECEIVED PT 
WITH HOB ELEVATED 45 DEGREE ,TRACH TO VENT .PT TOLERATING WELL CURRENTS VENT SETTINGS.PT 
AWAKE AND SEEMS AGITATED PULLING MEDICAL DEVICES.PT REPOSITIONED AND BED,RELEASED BILAT SOFT 
WRIST RESTRAINS AND PROVIDE ROM,S TO ALL EXT,S AND REPOSITIONED IN BED TO PROVIDE COMFORT 
AND TO PREVENT FURTHERS SKIN BREAK DOWN.RE- APPLIED BILAT SOFT WRIST RESTRAINTS FOR PT 
SAFETY .PT REMAINS FREE OF INJURIES AT THIS TIME.NO ACUTE DISTRESS NOTE .WILL CONT TO 
MONITOR.

## 2019-02-07 NOTE — INFECTIOUS DISEASES PROG NOTE
Assessment/Plan


Assessment/Plan


A


1. Staph aureus sepsis


2. E.coli UTI


3. shock resolved


4. pneumonia


5. respiratory failure


6. sacral decubitus ulcer


7. leucocytosis increasing


8. Central line infection, femoral line removed


9. ESRD on HD





P


1. continue vancomycin iv, Zosyn


2. will follow up cultures


3. Stool for C. difficile





Subjective


ROS Limited/Unobtainable:  Yes


Gastrointestinal/Abdominal:  Reports: diarrhea


Allergies:  


Coded Allergies:  


     No Known Allergies (Unverified , 11/26/18)





Objective


Vital Signs





Last 24 Hour Vital Signs








  Date Time  Temp Pulse Resp B/P (MAP) Pulse Ox O2 Delivery O2 Flow Rate FiO2


 


2/7/19 13:17  80 14     40


 


2/7/19 12:00 98.2 78 20 137/80 (99) 100   


 


2/7/19 12:00        40


 


2/7/19 12:00      Mechanical Ventilator  


 


2/7/19 11:54  115      


 


2/7/19 11:16  75 19     40


 


2/7/19 08:41  79 18     40


 


2/7/19 08:19  79 17     40


 


2/7/19 08:00      Mechanical Ventilator  


 


2/7/19 08:00 79.0 79 19 138/75 (96) 100   


 


2/7/19 08:00        40


 


2/7/19 07:19  77      


 


2/7/19 05:27  77 18     40


 


2/7/19 04:00        40


 


2/7/19 04:00      Mechanical Ventilator  


 


2/7/19 04:00 97.1 75 24 138/76 (96) 98   


 


2/7/19 04:00  74      


 


2/7/19 03:22  74 15     40


 


2/7/19 01:16  69 16     40


 


2/7/19 00:00 97.7 67 16 137/60 (85) 100   


 


2/7/19 00:00      Mechanical Ventilator  


 


2/7/19 00:00  63      


 


2/6/19 22:45  67 14     40


 


2/6/19 21:28  72 14     40


 


2/6/19 20:00        40


 


2/6/19 20:00  67      


 


2/6/19 20:00      Mechanical Ventilator  


 


2/6/19 20:00 97.9 69 24 138/61 (86) 100   


 


2/6/19 19:25  69 15     40


 


2/6/19 16:48  100 17     40


 


2/6/19 16:00 97.2 77 17 105/52 (69) 98   


 


2/6/19 16:00  63      


 


2/6/19 16:00        40


 


2/6/19 16:00      Mechanical Ventilator  


 


2/6/19 14:36  75 15     40








Height (Feet):  5


Height (Inches):  3.00


Weight (Pounds):  157


HEENT:  mucous membranes moist, status post trach


Respiratory/Chest:  lungs clear, other - on ventilator


Cardiovascular:  normal rate


Abdomen:  soft, non tender, other - GT & rectal tube


Extremities:  other - mild devora edema


Neurologic/Psychiatric:  disoriented





Laboratory Tests








Test


  2/7/19


03:00


 


White Blood Count


  17.6 K/UL


(4.8-10.8)  H


 


Red Blood Count


  3.18 M/UL


(4.20-5.40)  L


 


Hemoglobin


  9.8 G/DL


(12.0-16.0)  L


 


Hematocrit


  29.0 %


(37.0-47.0)  L


 


Mean Corpuscular Volume 91 FL (80-99)  


 


Mean Corpuscular Hemoglobin


  30.7 PG


(27.0-31.0)


 


Mean Corpuscular Hemoglobin


Concent 33.6 G/DL


(32.0-36.0)


 


Red Cell Distribution Width


  16.8 %


(11.6-14.8)  H


 


Platelet Count


  504 K/UL


(150-450)  H


 


Mean Platelet Volume


  5.9 FL


(6.5-10.1)  L


 


Neutrophils (%) (Auto)


  % (45.0-75.0)


 


 


Lymphocytes (%) (Auto)


  % (20.0-45.0)


 


 


Monocytes (%) (Auto)  % (1.0-10.0)  


 


Eosinophils (%) (Auto)  % (0.0-3.0)  


 


Basophils (%) (Auto)  % (0.0-2.0)  


 


Differential Total Cells


Counted 100  


 


 


Neutrophils % (Manual) 81 % (45-75)  H


 


Lymphocytes % (Manual) 4 % (20-45)  L


 


Monocytes % (Manual) 8 % (1-10)  


 


Eosinophils % (Manual) 1 % (0-3)  


 


Basophils % (Manual) 0 % (0-2)  


 


Myelocytes % 2 % (0-0)  H


 


Band Neutrophils 4 % (0-8)  


 


Platelet Estimate Increased  H


 


Platelet Morphology Normal  


 


Anisocytosis 1+  


 


Sodium Level


  134 MMOL/L


(136-145)  L


 


Potassium Level


  2.9 MMOL/L


(3.5-5.1)  L


 


Chloride Level


  99 MMOL/L


()


 


Carbon Dioxide Level


  29 MMOL/L


(21-32)


 


Anion Gap


  6 mmol/L


(5-15)


 


Blood Urea Nitrogen


  31 mg/dL


(7-18)  H


 


Creatinine


  1.5 MG/DL


(0.55-1.30)  H


 


Estimat Glomerular Filtration


Rate  mL/min (>60)  


 


 


Glucose Level


  113 MG/DL


()  H


 


Calcium Level


  8.8 MG/DL


(8.5-10.1)


 


Total Bilirubin


  0.2 MG/DL


(0.2-1.0)


 


Aspartate Amino Transf


(AST/SGOT) 25 U/L (15-37)


 


 


Alanine Aminotransferase


(ALT/SGPT) 18 U/L (12-78)


 


 


Alkaline Phosphatase


  122 U/L


()  H


 


Total Protein


  6.0 G/DL


(6.4-8.2)  L


 


Albumin


  1.3 G/DL


(3.4-5.0)  L


 


Globulin 4.7 g/dL  


 


Albumin/Globulin Ratio


  0.3 (1.0-2.7)


L











Current Medications








 Medications


  (Trade)  Dose


 Ordered  Sig/Tony


 Route


 PRN Reason  Start Time


 Stop Time Status Last Admin


Dose Admin


 


 Acetaminophen


  (Tylenol)  650 mg  Q4H  PRN


 RECTAL


 Mild Pain (Pain Scale 1-3)  2/5/19 21:45


 3/2/19 21:44   


 


 


 Alprazolam


  (Xanax)  0.5 mg  TIDPRN  PRN


 ORAL


 agitation  2/5/19 21:45


 2/12/19 21:44  2/7/19 11:21


 


 


 Dextrose


  (Dextrose 50%)  25 ml  Q30M  PRN


 IV


 Hypoglycemia  2/5/19 21:45


 3/2/19 21:44   


 


 


 Dextrose


  (Dextrose 50%)  50 ml  Q30M  PRN


 IV


 Hypoglycemia  2/5/19 21:45


 3/2/19 21:44   


 


 


 Epoetin Rupesh


  (Epoetin


 Rupesh(ESRD on


 dialysis))  10,000 unit  MON-WED-FRI


 SUBQ


   2/6/19 21:00


 3/3/19 20:59  2/6/19 20:24


 


 


 Escitalopram


 Oxalate


  (Lexapro)  10 mg  DAILY


 GT


   2/6/19 09:00


 3/4/19 10:59  2/7/19 11:20


 


 


 Iron Sucrose 100


 mg/Sodium Chloride  60 ml @ 


 240 mls/hr  BEDTIME


 IV


   2/6/19 21:00


 2/7/19 21:14  2/6/19 20:24


 


 


 Meropenem 500 mg/


 Sodium Chloride  55 ml @ 


 110 mls/hr  Q12H


 IVPB


   2/6/19 01:00


 2/9/19 12:59  2/7/19 13:17


 


 


 Olanzapine


  (ZyPREXA)  2.5 mg  TID


 ORAL


   2/6/19 13:00


 3/8/19 12:59  2/7/19 11:20


 


 


 Ondansetron HCl


  (Zofran)  4 mg  Q6H  PRN


 IVP


 Nausea & Vomiting  2/5/19 21:45


 3/7/19 21:44   


 


 


 Pantoprazole


  (Protonix)  40 mg  DAILY


 IV


   2/6/19 09:00


 3/3/19 08:59  2/7/19 11:20


 


 


 Sodium Chloride  1,000 ml @ 


 50 mls/hr  Q20H


 IV


   2/5/19 21:45


 3/2/19 21:44  2/7/19 11:32


 


 


 Vancomycin HCl


  (Vanco rx to


 dose)  1 ea  DAILY  PRN


 MISC


 Per rx protocol  2/5/19 21:45


 3/7/19 21:44   


 

















Luke Maynard MD Feb 7, 2019 13:29

## 2019-02-07 NOTE — NUR
HAND-OFF: 

Report given to Guillermo RN, pt. stable and no  signs of distress noted- aware to f/u on 
abnormal labs.

## 2019-02-08 VITALS — DIASTOLIC BLOOD PRESSURE: 76 MMHG | SYSTOLIC BLOOD PRESSURE: 131 MMHG

## 2019-02-08 VITALS — DIASTOLIC BLOOD PRESSURE: 69 MMHG | SYSTOLIC BLOOD PRESSURE: 125 MMHG

## 2019-02-08 VITALS — SYSTOLIC BLOOD PRESSURE: 109 MMHG | DIASTOLIC BLOOD PRESSURE: 55 MMHG

## 2019-02-08 VITALS — DIASTOLIC BLOOD PRESSURE: 51 MMHG | SYSTOLIC BLOOD PRESSURE: 111 MMHG

## 2019-02-08 VITALS — DIASTOLIC BLOOD PRESSURE: 62 MMHG | SYSTOLIC BLOOD PRESSURE: 140 MMHG

## 2019-02-08 VITALS — DIASTOLIC BLOOD PRESSURE: 60 MMHG | SYSTOLIC BLOOD PRESSURE: 127 MMHG

## 2019-02-08 LAB
ADD MANUAL DIFF: NO
ALBUMIN SERPL-MCNC: 1.2 G/DL (ref 3.4–5)
ALBUMIN/GLOB SERPL: 0.3 {RATIO} (ref 1–2.7)
ALP SERPL-CCNC: 111 U/L (ref 46–116)
ALT SERPL-CCNC: 16 U/L (ref 12–78)
ANION GAP SERPL CALC-SCNC: 6 MMOL/L (ref 5–15)
AST SERPL-CCNC: 19 U/L (ref 15–37)
BASOPHILS NFR BLD AUTO: 1.2 % (ref 0–2)
BILIRUB SERPL-MCNC: 0.3 MG/DL (ref 0.2–1)
BUN SERPL-MCNC: 38 MG/DL (ref 7–18)
CALCIUM SERPL-MCNC: 9 MG/DL (ref 8.5–10.1)
CHLORIDE SERPL-SCNC: 99 MMOL/L (ref 98–107)
CO2 SERPL-SCNC: 29 MMOL/L (ref 21–32)
CREAT SERPL-MCNC: 1.8 MG/DL (ref 0.55–1.3)
EOSINOPHIL NFR BLD AUTO: 2.3 % (ref 0–3)
ERYTHROCYTE [DISTWIDTH] IN BLOOD BY AUTOMATED COUNT: 17.7 % (ref 11.6–14.8)
GLOBULIN SER-MCNC: 4.7 G/DL
HCT VFR BLD CALC: 28.7 % (ref 37–47)
HGB BLD-MCNC: 9.5 G/DL (ref 12–16)
LYMPHOCYTES NFR BLD AUTO: 4.7 % (ref 20–45)
MCV RBC AUTO: 93 FL (ref 80–99)
MONOCYTES NFR BLD AUTO: 8.5 % (ref 1–10)
NEUTROPHILS NFR BLD AUTO: 83.2 % (ref 45–75)
PHOSPHATE SERPL-MCNC: 3.2 MG/DL (ref 2.5–4.9)
PLATELET # BLD: 502 K/UL (ref 150–450)
POTASSIUM SERPL-SCNC: 3.2 MMOL/L (ref 3.5–5.1)
RBC # BLD AUTO: 3.07 M/UL (ref 4.2–5.4)
SODIUM SERPL-SCNC: 134 MMOL/L (ref 136–145)
WBC # BLD AUTO: 13.2 K/UL (ref 4.8–10.8)

## 2019-02-08 RX ADMIN — EPOETIN ALFA-EPBX SCH UNIT: 10000 INJECTION, SOLUTION INTRAVENOUS; SUBCUTANEOUS at 21:05

## 2019-02-08 RX ADMIN — MEROPENEM SCH MLS/HR: 500 INJECTION INTRAVENOUS at 00:37

## 2019-02-08 RX ADMIN — PANTOPRAZOLE SODIUM SCH MG: 40 INJECTION, POWDER, FOR SOLUTION INTRAVENOUS at 08:26

## 2019-02-08 RX ADMIN — MEROPENEM SCH MLS/HR: 500 INJECTION INTRAVENOUS at 14:13

## 2019-02-08 RX ADMIN — HYDROCODONE BITARTRATE AND ACETAMINOPHEN PRN TAB: 5; 325 TABLET ORAL at 15:43

## 2019-02-08 NOTE — NUR
NURSE NOTES:



Called [facility] and spoke to CAROLYN Silva Supervisor. She told me to call back 
later around 7pm. Will endorse to PM shift.

## 2019-02-08 NOTE — NUR
*-* DISCHARGE PLANNED *-*



PATIENT IS DISCHARGE TO:



[facility]

ROOM# [address]

SKILLED

T:[phone] FOR NURSE TO NURSE REPORT

[facility] HAS BEEN ARRANGED FOR 1930 S/W [address]NA[address]INE ..



PLEASE CALL [facility] AND GIVE FI02.

## 2019-02-08 NOTE — NUR
NURSE NOTES:



Simon Yang, son is at bedside and aware of transfer to [facility]. 


-------------------------------------------------------------------------------

Addendum: 02/08/19 at 1815 by Radha Gillis RN

-------------------------------------------------------------------------------

NURSE NOTES:



Patient will be going to [facility].

## 2019-02-08 NOTE — NUR
NURSE NOTES:



Patient received from CAROLYN Peguero. Patient in bed and able to open her eyes. Trach to vent 
dependent. In no respiratory distress. Cardiac monitor in placed. Gtube feeding with Nephro 
35 cc/hour. Rectal tube in placed. IV sites are asymptomatic. On bilateral soft wrist 
restraints. Per PM nurse, patient has dialysis today and dialysis company was called 
already. Bed in lowest position with side rails up. Will continue to follow plan of care.

## 2019-02-08 NOTE — NUR
HAND-OFF: 

Report given to CAROLYN Haas. Patient is table. Wound pictures are uploaded. 

-------------------------------------------------------------------------------

Addendum: 02/08/19 at 1913 by Radha Gillis RN

-------------------------------------------------------------------------------

HAND-OFF: 

Patient is stable.

## 2019-02-08 NOTE — NUR
NURSE NOTES:

Report given to transportation. V/S within normal range. No acute distress noted at this 
time. Telemonitor out and cleaned. Pt has no belongings. pt is discharged to [facility].

## 2019-02-08 NOTE — GENERAL PROGRESS NOTE
Assessment/Plan


Problem List:  


(1) MDD (major depressive disorder), recurrent episode


ICD Codes:  F33.9 - Major depressive disorder, recurrent, unspecified


SNOMED:  577623025


(2) Dementia with behavioral disturbance


ICD Codes:  F03.91 - Unspecified dementia with behavioral disturbance


SNOMED:  2717335612516


(3) Encephalopathy chronic


ICD Codes:  G93.49 - Other encephalopathy


SNOMED:  84585296


Status:  stable


Assessment/Plan


lexapro 10mg po qam


zyprexa 5mg tid


cont restraints 


xanax .5 q 6hr /prn





Subjective


Neurologic/Psychiatric:  Reports: anxiety


Allergies:  


Coded Allergies:  


     No Known Allergies (Unverified , 11/26/18)


Subjective


waxing and waning of consciousness


cont to be agitated and bilat restraints


son stated that he wanted Xanax on board





Objective





Last 24 Hour Vital Signs








  Date Time  Temp Pulse Resp B/P (MAP) Pulse Ox O2 Delivery O2 Flow Rate FiO2


 


2/8/19 08:35  82 13     40


 


2/8/19 08:00        40


 


2/8/19 08:00      Mechanical Ventilator  


 


2/8/19 08:00 97.3 118 14 125/69 (87) 98   


 


2/8/19 07:54  117      


 


2/8/19 06:45  89 14     40


 


2/8/19 04:57  92 15     40


 


2/8/19 04:00 99.1 75 12 131/76 (94) 100   


 


2/8/19 04:00        40


 


2/8/19 04:00      Mechanical Ventilator  


 


2/8/19 03:41  115      


 


2/8/19 02:53  77 14     40


 


2/8/19 01:01  74 14     40


 


2/8/19 00:00        40


 


2/8/19 00:00 98.4 65 13 127/60 (82) 100   


 


2/8/19 00:00      Mechanical Ventilator  


 


2/7/19 23:40  67      


 


2/7/19 22:53  79 13     40


 


2/7/19 20:39  74 16     40


 


2/7/19 20:00        40


 


2/7/19 20:00 97.5 118 14 130/69 (89) 100   


 


2/7/19 20:00      Mechanical Ventilator  


 


2/7/19 19:29  69      


 


2/7/19 19:06  78 14     40


 


2/7/19 18:04 98.7       


 


2/7/19 17:21  72 18     40


 


2/7/19 16:00      Mechanical Ventilator  


 


2/7/19 16:00        40


 


2/7/19 16:00 98.7 68 17 137/56 (83) 99   


 


2/7/19 15:42  70 18     40


 


2/7/19 15:37  74      


 


2/7/19 13:17  80 14     40


 


2/7/19 12:00 98.2 78 20 137/80 (99) 100   


 


2/7/19 12:00        40


 


2/7/19 12:00      Mechanical Ventilator  


 


2/7/19 11:54  115      

















Intake and Output  


 


 2/7/19 2/8/19





 19:00 07:00


 


Intake Total 790 ml 580 ml


 


Output Total 40 ml 50 ml


 


Balance 750 ml 530 ml


 


  


 


Free Water 150 ml 80 ml


 


IV Total 255 ml 115 ml


 


Tube Feeding 385 ml 385 ml


 


Stool Total 40 ml 50 ml


 


# Bowel Movements 3 








Laboratory Tests


2/8/19 03:35: 


White Blood Count 13.2H, Red Blood Count 3.07L, Hemoglobin 9.5L, Hematocrit 

28.7L, Mean Corpuscular Volume 93, Mean Corpuscular Hemoglobin 31.0, Mean 

Corpuscular Hemoglobin Concent 33.2, Red Cell Distribution Width 17.7H, 

Platelet Count 502H, Mean Platelet Volume 5.5L, Neutrophils (%) (Auto) 83.2H, 

Lymphocytes (%) (Auto) 4.7L, Monocytes (%) (Auto) 8.5, Eosinophils (%) (Auto) 

2.3, Basophils (%) (Auto) 1.2, Sodium Level 134L, Potassium Level 3.2L, 

Chloride Level 99, Carbon Dioxide Level 29, Anion Gap 6, Blood Urea Nitrogen 38H

, Creatinine 1.8H, Estimat Glomerular Filtration Rate , Glucose Level 68L, 

Calcium Level 9.0, Phosphorus Level 3.2, Total Bilirubin 0.3, Aspartate Amino 

Transf (AST/SGOT) 19, Alanine Aminotransferase (ALT/SGPT) 16, Alkaline 

Phosphatase 111, Total Protein 5.9L, Albumin 1.2L, Globulin 4.7, Albumin/

Globulin Ratio 0.3L, Random Vancomycin Level 19.5


Height (Feet):  5


Height (Inches):  3.00


Weight (Pounds):  153


General Appearance:  lethargic - waxing and waninig, confused


Neurologic:  depressed affect











Cass Levi MD Feb 8, 2019 11:53

## 2019-02-08 NOTE — PULMONOLOGY PROGRESS NOTE
Assessment/Plan


Assessment/Plan


1. Shock, septic. Resolved


2. UTI.


3. ESRD, on dialysis.


4. Ventilator dependent respiratory failure.


5. Tracheostomy.


6. Diabetes mellitus.


7. Chronic atrial fibrillation.


8. Bacteremia.





DISCUSSION:  Continue broad-spectrum antibiotics.  I will continue AC mode.


Continue to decrease FiO2 as tolerated.  Blood transfusion as required.


Hemodialysis per renal.


Surgical eval for sacral wound


Apixiban dc


Now in [facility]














  ______________________________________________


  Quentin Cardoso M.D.





Subjective


Interval Events:  Events noted; surgical consult for sacral wound; WC better 

today


Constitutional:  Reports: no symptoms


HEENT:  Repors: no symptoms


Respiratory:  Reports: no symptoms


Cardiovascular:  Reports: no symptoms


Gastrointestinal/Abdominal:  Reports: no symptoms


Genitourinary:  Reports: no symptoms


Allergies:  


Coded Allergies:  


     No Known Allergies (Unverified , 11/26/18)





Objective





Last 24 Hour Vital Signs








  Date Time  Temp Pulse Resp B/P (MAP) Pulse Ox O2 Delivery O2 Flow Rate FiO2


 


2/8/19 04:57  92 15     40


 


2/8/19 04:00 99.1 75 12 131/76 (94) 100   


 


2/8/19 04:00        40


 


2/8/19 04:00      Mechanical Ventilator  


 


2/8/19 03:41  115      


 


2/8/19 02:53  77 14     40


 


2/8/19 01:01  74 14     40


 


2/8/19 00:00        40


 


2/8/19 00:00 98.4 65 13 127/60 (82) 100   


 


2/8/19 00:00      Mechanical Ventilator  


 


2/7/19 23:40  67      


 


2/7/19 22:53  79 13     40


 


2/7/19 20:39  74 16     40


 


2/7/19 20:00        40


 


2/7/19 20:00 97.5 118 14 130/69 (89) 100   


 


2/7/19 20:00      Mechanical Ventilator  


 


2/7/19 19:29  69      


 


2/7/19 19:06  78 14     40


 


2/7/19 18:04 98.7       


 


2/7/19 17:21  72 18     40


 


2/7/19 16:00      Mechanical Ventilator  


 


2/7/19 16:00        40


 


2/7/19 16:00 98.7 68 17 137/56 (83) 99   


 


2/7/19 15:42  70 18     40


 


2/7/19 15:37  74      


 


2/7/19 13:17  80 14     40


 


2/7/19 12:00 98.2 78 20 137/80 (99) 100   


 


2/7/19 12:00        40


 


2/7/19 12:00      Mechanical Ventilator  


 


2/7/19 11:54  115      


 


2/7/19 11:16  75 19     40


 


2/7/19 08:41  79 18     40


 


2/7/19 08:19  79 17     40


 


2/7/19 08:00      Mechanical Ventilator  


 


2/7/19 08:00 79.0 79 19 138/75 (96) 100   


 


2/7/19 08:00        40


 


2/7/19 07:19  77      

















Intake and Output  


 


 2/7/19 2/8/19





 19:00 07:00


 


Intake Total 790 ml 545 ml


 


Output Total 40 ml 50 ml


 


Balance 750 ml 495 ml


 


  


 


Free Water 150 ml 80 ml


 


IV Total 255 ml 115 ml


 


Tube Feeding 385 ml 350 ml


 


Stool Total 40 ml 50 ml


 


# Bowel Movements 3 








General Appearance:  no acute distress


HEENT:  normocephalic


Respiratory/Chest:  chest wall non-tender, lungs clear


Cardiovascular:  normal peripheral pulses, normal rate


Laboratory Tests


2/8/19 03:35: 


White Blood Count 13.2H, Red Blood Count 3.07L, Hemoglobin 9.5L, Hematocrit 

28.7L, Mean Corpuscular Volume 93, Mean Corpuscular Hemoglobin 31.0, Mean 

Corpuscular Hemoglobin Concent 33.2, Red Cell Distribution Width 17.7H, 

Platelet Count 502H, Mean Platelet Volume 5.5L, Neutrophils (%) (Auto) 83.2H, 

Lymphocytes (%) (Auto) 4.7L, Monocytes (%) (Auto) 8.5, Eosinophils (%) (Auto) 

2.3, Basophils (%) (Auto) 1.2, Sodium Level 134L, Potassium Level 3.2L, 

Chloride Level 99, Carbon Dioxide Level 29, Anion Gap 6, Blood Urea Nitrogen 38H

, Creatinine 1.8H, Estimat Glomerular Filtration Rate , Glucose Level 68L, 

Calcium Level 9.0, Phosphorus Level 3.2, Total Bilirubin 0.3, Aspartate Amino 

Transf (AST/SGOT) 19, Alanine Aminotransferase (ALT/SGPT) 16, Alkaline 

Phosphatase 111, Total Protein 5.9L, Albumin 1.2L, Globulin 4.7, Albumin/

Globulin Ratio 0.3L, Random Vancomycin Level 19.5





Current Medications








 Medications


  (Trade)  Dose


 Ordered  Sig/Tony


 Route


 PRN Reason  Start Time


 Stop Time Status Last Admin


Dose Admin


 


 Acetaminophen


  (Tylenol)  650 mg  Q4H  PRN


 RECTAL


 Mild Pain (Pain Scale 1-3)  2/5/19 21:45


 3/2/19 21:44   


 


 


 Acetaminophen/


 Hydrocodone Bitart


  (Norco 5/325)  1 tab  Q6H  PRN


 ORAL


 For Pain  2/7/19 17:13


 2/14/19 17:12  2/7/19 17:34


 


 


 Alprazolam


  (Xanax)  0.5 mg  TIDPRN  PRN


 ORAL


 agitation  2/5/19 21:45


 2/12/19 21:44  2/7/19 21:06


 


 


 Dextrose


  (Dextrose 50%)  25 ml  Q30M  PRN


 IV


 Hypoglycemia  2/5/19 21:45


 3/2/19 21:44   


 


 


 Dextrose


  (Dextrose 50%)  50 ml  Q30M  PRN


 IV


 Hypoglycemia  2/5/19 21:45


 3/2/19 21:44   


 


 


 Epoetin Rupesh


  (Epoetin


 Rupesh(ESRD on


 dialysis))  10,000 unit  MON-WED-FRI


 SUBQ


   2/6/19 21:00


 3/3/19 20:59  2/6/19 20:24


 


 


 Escitalopram


 Oxalate


  (Lexapro)  10 mg  DAILY


 GT


   2/6/19 09:00


 3/4/19 10:59  2/7/19 11:20


 


 


 Heparin Sodium


  (Porcine)


  (Heparin Sod


 1000 units/ml


 10ml)  2,000 unit  ONCE  PRN


 IV


 FOR HD USE ONLY  2/7/19 16:15


 2/8/19 23:59   


 


 


 Meropenem 500 mg/


 Sodium Chloride  55 ml @ 


 110 mls/hr  Q12H


 IVPB


   2/6/19 01:00


 2/9/19 12:59  2/8/19 00:37


 


 


 Olanzapine


  (ZyPREXA)  5 mg  TID


 ORAL


   2/7/19 18:00


 3/9/19 17:59  2/7/19 17:34


 


 


 Ondansetron HCl


  (Zofran)  4 mg  Q6H  PRN


 IVP


 Nausea & Vomiting  2/5/19 21:45


 3/7/19 21:44   


 


 


 Pantoprazole


  (Protonix)  40 mg  DAILY


 IV


   2/6/19 09:00


 3/3/19 08:59  2/7/19 11:20


 


 


 Sodium Chloride  1,000 ml @ 


 500 mls/hr  Q2H PRN


 IVLG


 sbp<90 during hd  2/7/19 16:05


 2/8/19 23:59   


 


 


 Vancomycin HCl


  (Vanco rx to


 dose)  1 ea  DAILY  PRN


 MISC


 Per rx protocol  2/5/19 21:45


 3/7/19 21:44   


 


 


 Vancomycin HCl


 500 mg/Dextrose  110 ml @ 


 110 mls/hr  ONCE  ONCE


 IVPB


   2/8/19 07:00


 2/8/19 07:59   


 

















Quentin Cardoso MD Feb 8, 2019 07:18

## 2019-02-08 NOTE — NUR
NURSE NOTES:

Report received from CAROLYN Lira. Observed pt sleeping on the bed. Family member at the 
bedside. A/O x2. SR with HR of 70s. Trach to vent, AC 8, , PEEP 5, FIO2 40%. Gtube 
intact and running Nepro at 35cc/hr. Rectal tube intact and draining well. IV on R H 22G, 
intact and heplock. R FA 22G, intact and TKO. Bed in the lowest position. Side rails up x3. 
Will continue to monitor.

## 2019-02-08 NOTE — NUR
NOTES





PT ACCEPTED BACK TO [facility]. PT GOES TO [facility] 5PM MWF. 
TRANSPORTATION TO  PT @3600 FOR  TO HD.

## 2019-02-08 NOTE — NEPHROLOGY PROGRESS NOTE
Assessment/Plan


Plan


Urosepsis (GNR) IV Abx per ID.Resolved


Worsening anemia 2 above -transfuse on HD.


ESRD HD MWF


Chr. A. Fib - apixaban. DC'ed due to wound bleeding.


HD done. Transfused.


Sacral Decub - s/p debridement.











Cleared by ID ..





OK for DC to [place].





Subjective


Subjective


Less confused. More alert.





Objective


Objective





Last 24 Hour Vital Signs








  Date Time  Temp Pulse Resp B/P (MAP) Pulse Ox O2 Delivery O2 Flow Rate FiO2


 


2/8/19 13:41  74      


 


2/8/19 13:16  87 18     40


 


2/8/19 12:00        40


 


2/8/19 12:00 97.9 100 18 111/51 (71) 98   


 


2/8/19 12:00      Mechanical Ventilator  


 


2/8/19 11:00  84 12     40


 


2/8/19 08:35  82 13     40


 


2/8/19 08:00        40


 


2/8/19 08:00      Mechanical Ventilator  


 


2/8/19 08:00 97.3 118 14 125/69 (87) 98   


 


2/8/19 07:54  117      


 


2/8/19 06:45  89 14     40


 


2/8/19 04:57  92 15     40


 


2/8/19 04:00 99.1 75 12 131/76 (94) 100   


 


2/8/19 04:00        40


 


2/8/19 04:00      Mechanical Ventilator  


 


2/8/19 03:41  115      


 


2/8/19 02:53  77 14     40


 


2/8/19 01:01  74 14     40


 


2/8/19 00:00        40


 


2/8/19 00:00 98.4 65 13 127/60 (82) 100   


 


2/8/19 00:00      Mechanical Ventilator  


 


2/7/19 23:40  67      


 


2/7/19 22:53  79 13     40


 


2/7/19 20:39  74 16     40


 


2/7/19 20:00        40


 


2/7/19 20:00 97.5 118 14 130/69 (89) 100   


 


2/7/19 20:00      Mechanical Ventilator  


 


2/7/19 19:29  69      


 


2/7/19 19:06  78 14     40


 


2/7/19 18:04 98.7       


 


2/7/19 17:21  72 18     40


 


2/7/19 16:00      Mechanical Ventilator  


 


2/7/19 16:00        40


 


2/7/19 16:00 98.7 68 17 137/56 (83) 99   


 


2/7/19 15:42  70 18     40


 


2/7/19 15:37  74      

















Intake and Output  


 


 2/7/19 2/8/19





 19:00 07:00


 


Intake Total 790 ml 580 ml


 


Output Total 40 ml 50 ml


 


Balance 750 ml 530 ml


 


  


 


Free Water 150 ml 80 ml


 


IV Total 255 ml 115 ml


 


Tube Feeding 385 ml 385 ml


 


Stool Total 40 ml 50 ml


 


# Bowel Movements 3 








Laboratory Tests


2/8/19 03:35: 


White Blood Count 13.2H, Red Blood Count 3.07L, Hemoglobin 9.5L, Hematocrit 

28.7L, Mean Corpuscular Volume 93, Mean Corpuscular Hemoglobin 31.0, Mean 

Corpuscular Hemoglobin Concent 33.2, Red Cell Distribution Width 17.7H, 

Platelet Count 502H, Mean Platelet Volume 5.5L, Neutrophils (%) (Auto) 83.2H, 

Lymphocytes (%) (Auto) 4.7L, Monocytes (%) (Auto) 8.5, Eosinophils (%) (Auto) 

2.3, Basophils (%) (Auto) 1.2, Sodium Level 134L, Potassium Level 3.2L, 

Chloride Level 99, Carbon Dioxide Level 29, Anion Gap 6, Blood Urea Nitrogen 38H

, Creatinine 1.8H, Estimat Glomerular Filtration Rate , Glucose Level 68L, 

Calcium Level 9.0, Phosphorus Level 3.2, Total Bilirubin 0.3, Aspartate Amino 

Transf (AST/SGOT) 19, Alanine Aminotransferase (ALT/SGPT) 16, Alkaline 

Phosphatase 111, Total Protein 5.9L, Albumin 1.2L, Globulin 4.7, Albumin/

Globulin Ratio 0.3L, Random Vancomycin Level 19.5


Height (Feet):  5


Height (Inches):  3.00


Weight (Pounds):  153


Objective


On Vent.


Cv IRR +tach


Lungs CTA


Abd SNT. BS +


E No CCE


Skin stage 3 sacral decub.











Gavino Daigle MD Feb 8, 2019 15:15

## 2019-02-08 NOTE — NUR
NURSE NOTES:

Observed pt lying on the bed, calm and cooperative. Pt does not pull tubings. Distraction 
measures done and successful. Soft restraints discontinued. Will continue to monitor.

## 2019-02-08 NOTE — NUR
NURSE NOTES:



Dr. Daigle at bedside. Informed about Potassium 3.2 before dialysis. Patient had dialysis 
today with 3L out. No new order for potassium. Per Dr. Daigle, patient is cleared by ID 
doctor without any antibiotics for discharge.

## 2019-02-08 NOTE — NUR
NURSE NOTES:

Awaiting for transportation to come to discharge. No acute distress noted at this time.

## 2019-02-09 NOTE — DISCHARGE SUMMARY
Discharge Summary


Discharge Summary


_


DATE OF ADMISSION: 1/31/2019





DATE OF DISCHARGE: 2/8/2019





DISCHARGED BY: Dr. Daigle





REASON FOR ADMISSION: 


78 years old female with past medical history of hypertension, atrial 

fibrillation, COPD, GERD, ventilator dependent respiratory failure , end-stage 

renal disease , on hemodialysis, dementia, GERD, was brought to emergency 

department from nursing facility for evaluation.  


According to report patient had a hemoglobin 6.7 on recent lab results.


No reported rectal bleeding , no hematemesis.  


Upon evaluation patient  was found to be hypotensive , tachycardic ,  with 

marked leukocytosis. 


EKG revealed atrial fibrillation with rapid ventricular response .


Chest x-ray revealed  bilateral extensive interstitial and airspace infiltrates 

versus edema, new since previous study.,


Central line was placed in ED for pressors.  


Metoprolol was given for rate control, and heart rate stabilized. 


Patient was started on pressors, pancultured , started on empiric antibiotic 

and was admitted to ICU for further management.





CONSULTANTS:


pulmonary Dr. Cardoso


ID specialist Dr. Santillan


surgery Dr. Lewis


psychiatrist 


 


[facility] COURSE: 


Patient was admitted to ICU.  


Patient was started on pressors to maintain mean arterial blood pressure above 

65.  Hemodynamic status was closely monitored.


Patient was able to be weaned from  pressors on 2/1.


Patient started on broad-spectrum antibiotics .


Ventilator support and tracheostomy care provided.  


ABG was stable on current settings.  Pulmonologist followed .   


Hemodialysis provided with close monitoring of volumes and cardiorenal 

parameters.  


Electrolytes were closely monitored and corrected as needed.





Pulmonologist  closely followed.  


Ventilator support and tracheostomy care provided.  


Ventilator settings optimized as per pulmonologist recommendations.  


Pulmonary toilet provided.   


Anticoagulation with apixaban was discontinued due to severe anemia.


Heart rate remained stable.





ID specialist closely followed.  


Blood culture revealed MRSA.  


Urine culture revealed E. coli.  


Wound culture revealed MRSA and Pseudomonas.  


Sputum culture revealed Pseudomonas and Serratia


Stool for C. difficile was negative.  


Antibiotic regimen was optimized as per infectious disease recommendation.  


Echocardiogram revealed no evidence of vegetation.


Central line was removed at the bedside by surgeon, presumed to be infected.


Surgeon seen and evaluated patient for decubitus ulcer present on admission.  


Patient had full-thickness stage III sacral pressure ulcer with deep 

undermining into left buttock. 


Wound care provided  as per surgeon recommendation.  


Wound culture revealed MRSA and Pseudomonas .


Per surgeon further evaluation , sacral decubitus ulcer was not granulating  

well.  


He recommended to  consider debridement soon to clean biofilm and promote 

granulation tissue. 


meantime continue  meticulous wound care at the facility with clsoe 

observation.  


Patient may need further interventions.





Hemoglobin and hematocrit were closely monitored  with goal to keep hemoglobin 

above 7.  


On 2/1 hemoglobin down to 5.8,  hematocrit 17.8


Patient undergone transfusion of total of 3 units of packed red blood cells.


Prior to discharge hemoglobin 9.5 hematocrit 28.7.  


Epogen was continued.  


Patient received 2 doses of IV Venofer.  





G-tube feeding  provided with strict aspiration /reflux precautions.  


Patient was able to tolerate tube feeding.  


Nutritionist recommendations regarding protein supplements  implemented in plan 

of care.


Supportive care provided .


Bowel regimen instituted.





Psychiatrist followed and diagnosed patient with dementia with behavioral 

disturbances , chronic encephalopathy , and major depressive disorder .  


Psychiatric medication regimen was optimized as per psychiatrist.





Patient clinically stabilized and was ready for transfer back to continuation 

of care





FINAL DIAGNOSES: 


Septic shock-resolved


Ventilator dependent respiratory failure


Sepsis with MRSA bacteremia 


E. coli UTI


Infected central line, status post femoral line removal


Pneumonia with Pseudomonas and Serratia


Sacral decubitus ulcer stage  3,  present on admission 


Anemia, requiring blood transfusion 


Anemia of chronic kidney disease


End-stage renal disease , on hemodialysis


Type 2 diabetes mellitus


Atrial fibrillation with rapid ventricular rate -resolved


Chronic atrial fibrillation


Dysphagia G-tube


Severe protein calorie malnutrition


Major depressive disorder, recurrent episode


Chronic encephalopathy


Dementia with behavioral disturbances


Peripheral vascular disease


 


DISCHARGE MEDICATIONS:


See Medication Reconciliation list.





DISCHARGE INSTRUCTIONS:


Patient was discharged to the skilled nursing facility. 


Follow up with medical doctor at the facility.  








I have been assigned to dictate discharge summary for this account. 


I was not involved in the patient's management.











Zoe Thompson NP Feb 9, 2019 13:22

## 2019-02-10 NOTE — NUR
CASE MANAGEMENT:



CM review and clinical information (face sheet/ DC summary/ ER MD Note/ H&P) faxed to [employer] @ [phone].

## 2019-03-27 ENCOUNTER — HOSPITAL ENCOUNTER (INPATIENT)
Dept: HOSPITAL 54 - ER | Age: 79
LOS: 5 days | DRG: 853 | End: 2019-04-01
Attending: INTERNAL MEDICINE | Admitting: INTERNAL MEDICINE
Payer: MEDICARE

## 2019-03-27 VITALS — SYSTOLIC BLOOD PRESSURE: 103 MMHG | DIASTOLIC BLOOD PRESSURE: 75 MMHG

## 2019-03-27 VITALS — DIASTOLIC BLOOD PRESSURE: 69 MMHG | SYSTOLIC BLOOD PRESSURE: 121 MMHG

## 2019-03-27 VITALS — WEIGHT: 161 LBS | HEIGHT: 62 IN | BODY MASS INDEX: 29.63 KG/M2

## 2019-03-27 VITALS — SYSTOLIC BLOOD PRESSURE: 116 MMHG | DIASTOLIC BLOOD PRESSURE: 25 MMHG

## 2019-03-27 DIAGNOSIS — Z99.2: ICD-10-CM

## 2019-03-27 DIAGNOSIS — Z51.5: ICD-10-CM
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
## 2019-03-27 DIAGNOSIS — A41.9: Primary | ICD-10-CM

## 2019-03-27 DIAGNOSIS — Z93.1: ICD-10-CM
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
## 2019-03-27 DIAGNOSIS — E11.22: ICD-10-CM

## 2019-03-27 DIAGNOSIS — S41.111A: ICD-10-CM

## 2019-03-27 DIAGNOSIS — L89.610: ICD-10-CM

## 2019-03-27 DIAGNOSIS — G93.41: ICD-10-CM

## 2019-03-27 DIAGNOSIS — L89.514: ICD-10-CM

## 2019-03-27 DIAGNOSIS — E11.51: ICD-10-CM

## 2019-03-27 DIAGNOSIS — I13.2: ICD-10-CM

## 2019-03-27 DIAGNOSIS — D63.8: ICD-10-CM

## 2019-03-27 DIAGNOSIS — L89.154: ICD-10-CM

## 2019-03-27 DIAGNOSIS — J93.9: ICD-10-CM

## 2019-03-27 DIAGNOSIS — R65.21: ICD-10-CM

## 2019-03-27 DIAGNOSIS — J96.11: ICD-10-CM

## 2019-03-27 DIAGNOSIS — J98.19: ICD-10-CM

## 2019-03-27 DIAGNOSIS — I48.91: ICD-10-CM

## 2019-03-27 DIAGNOSIS — F03.90: ICD-10-CM

## 2019-03-27 DIAGNOSIS — N18.6: ICD-10-CM

## 2019-03-27 DIAGNOSIS — I50.9: ICD-10-CM

## 2019-03-27 DIAGNOSIS — K21.9: ICD-10-CM

## 2019-03-27 DIAGNOSIS — Z66: ICD-10-CM

## 2019-03-27 DIAGNOSIS — J98.11: ICD-10-CM

## 2019-03-27 DIAGNOSIS — Z86.74: ICD-10-CM

## 2019-03-27 DIAGNOSIS — S41.112A: ICD-10-CM

## 2019-03-27 DIAGNOSIS — T17.990A: ICD-10-CM

## 2019-03-27 DIAGNOSIS — L89.894: ICD-10-CM

## 2019-03-27 DIAGNOSIS — L98.8: ICD-10-CM
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
## 2019-03-27 DIAGNOSIS — I25.10: ICD-10-CM

## 2019-03-27 DIAGNOSIS — R13.10: ICD-10-CM

## 2019-03-27 DIAGNOSIS — L89.623: ICD-10-CM

## 2019-03-27 DIAGNOSIS — E87.5: ICD-10-CM

## 2019-03-27 DIAGNOSIS — E87.1: ICD-10-CM

## 2019-03-27 DIAGNOSIS — L89.310: ICD-10-CM

## 2019-03-27 DIAGNOSIS — Z79.4: ICD-10-CM

## 2019-03-27 DIAGNOSIS — R53.2: ICD-10-CM

## 2019-03-27 DIAGNOSIS — Z99.11: ICD-10-CM

## 2019-03-27 LAB
ALBUMIN SERPL BCP-MCNC: 1.5 G/DL (ref 3.4–5)
ALP SERPL-CCNC: 137 U/L (ref 46–116)
ALT SERPL W P-5'-P-CCNC: 10 U/L (ref 12–78)
AST SERPL W P-5'-P-CCNC: 16 U/L (ref 15–37)
BASOPHILS # BLD AUTO: 0 /CMM (ref 0–0.2)
BASOPHILS NFR BLD AUTO: 0.2 % (ref 0–2)
BILIRUB DIRECT SERPL-MCNC: 0.1 MG/DL (ref 0–0.2)
BILIRUB SERPL-MCNC: 0.3 MG/DL (ref 0.2–1)
BUN SERPL-MCNC: 86 MG/DL (ref 7–18)
CALCIUM SERPL-MCNC: 9.1 MG/DL (ref 8.5–10.1)
CHLORIDE SERPL-SCNC: 96 MMOL/L (ref 98–107)
CO2 SERPL-SCNC: 31 MMOL/L (ref 21–32)
CREAT SERPL-MCNC: 2 MG/DL (ref 0.6–1.3)
EOSINOPHIL NFR BLD AUTO: 0.1 % (ref 0–6)
GLUCOSE SERPL-MCNC: 128 MG/DL (ref 74–106)
HCT VFR BLD AUTO: 26 % (ref 33–45)
HGB BLD-MCNC: 8.6 G/DL (ref 11.5–14.8)
LYMPHOCYTES NFR BLD AUTO: 0.4 /CMM (ref 0.8–4.8)
LYMPHOCYTES NFR BLD AUTO: 4.7 % (ref 20–44)
MCHC RBC AUTO-ENTMCNC: 34 G/DL (ref 31–36)
MCV RBC AUTO: 93 FL (ref 82–100)
MONOCYTES NFR BLD AUTO: 0.8 /CMM (ref 0.1–1.3)
MONOCYTES NFR BLD AUTO: 8.7 % (ref 2–12)
NEUTROPHILS # BLD AUTO: 7.5 /CMM (ref 1.8–8.9)
NEUTROPHILS NFR BLD AUTO: 86.3 % (ref 43–81)
PLATELET # BLD AUTO: 185 /CMM (ref 150–450)
POTASSIUM SERPL-SCNC: 5.4 MMOL/L (ref 3.5–5.1)
PROT SERPL-MCNC: 6.5 G/DL (ref 6.4–8.2)
RBC # BLD AUTO: 2.74 MIL/UL (ref 4–5.2)
SODIUM SERPL-SCNC: 132 MMOL/L (ref 136–145)
WBC NRBC COR # BLD AUTO: 8.7 K/UL (ref 4.3–11)

## 2019-03-27 PROCEDURE — G0378 HOSPITAL OBSERVATION PER HR: HCPCS

## 2019-03-27 PROCEDURE — A4216 STERILE WATER/SALINE, 10 ML: HCPCS

## 2019-03-27 PROCEDURE — A6402 STERILE GAUZE <= 16 SQ IN: HCPCS

## 2019-03-27 PROCEDURE — C1751 CATH, INF, PER/CENT/MIDLINE: HCPCS

## 2019-03-27 PROCEDURE — A6403 STERILE GAUZE>16 <= 48 SQ IN: HCPCS

## 2019-03-27 PROCEDURE — A7526 TRACHEOSTOMY TUBE COLLAR: HCPCS

## 2019-03-27 PROCEDURE — 5A1955Z RESPIRATORY VENTILATION, GREATER THAN 96 CONSECUTIVE HOURS: ICD-10-PCS | Performed by: INTERNAL MEDICINE

## 2019-03-27 PROCEDURE — A6253 ABSORPT DRG > 48 SQ IN W/O B: HCPCS

## 2019-03-27 NOTE — NUR
RECEIVED TRACH PT ON VENT. CAME TO ER FOR LOW BP. SETTINGS FROM TRANSPORT RT AC 8, 350, 40%, 
+5. PT TRACH IS A SHILEY 8. TRACH PATENT AND WELL SECURED. MLT DONE. VENT PLUGGED INTO RED 
OUTLET. ALARMS TESTED AND WELL FUNCTIONING WITH AMBU BAG PLACED AT BEDSIDE. SX MODERATE 
AMOUNT OF THICK YELLOW SECRETIONS. WILL CONT TO MONITOR PT. 

-------------------------------------------------------------------------------

Addendum: 03/27/19 at 1922 by VALERY ALMAZAN RT

-------------------------------------------------------------------------------

Amended: Links added.

## 2019-03-27 NOTE — NUR
[facility] RN NOTES



RECEIVED PATIENT REPORT FROM ER RN  AND RECEIVED PATIENT ON REILLY. PATIENT  I VENT/ TRACH 
DEPENDANT, ON SETTINGS AS ORDERED RT AT THE BEDSIDE. PATIENT WAS PLACED ON CARDIAC MONITOR 
WITH SR, RIGHT UPPER ARM IV LINE IS PATIENT AND INTACT. G TUBE IS IN PLACE, CHECKED FOR 
POSITIVE PLACEMENT. SKIN ASSESSMENT IS DONE AND WOUND CARE CONSULT IS IN PLACE. CALLED THE 
SNF AND ASKED FOR ALL CURRENT  MEDICATIONS LIST AND MED RECON IS DONE. ALL SAFETY MEASURES 
ARE IMPLEMENTED, BED IN LOW, LOCKED POSITION, CALL LIGHT IN REACH. WILL CONTINUE TO MONITOR 
PATIENT CLOSELY.WILL INDORSE TO AM SHIFT RN FOR CPC.

## 2019-03-27 NOTE — NUR
PT BIBPA FOR LOW BLOOD PRESSURE FROM DIALYSIS CENTER. PER EMT, DAILYSIS CENTER 
DID NOT HAVE THIGH CUFFF. PT ON THE VENTILATOR, SETTING AC 8, PEEP 5, 40% O2. 
PT AXO0. PT PUT ON THE CARDIAC MONITOR AND PULSE OX.

## 2019-03-28 VITALS — SYSTOLIC BLOOD PRESSURE: 119 MMHG | DIASTOLIC BLOOD PRESSURE: 49 MMHG

## 2019-03-28 VITALS — DIASTOLIC BLOOD PRESSURE: 28 MMHG | SYSTOLIC BLOOD PRESSURE: 113 MMHG

## 2019-03-28 VITALS — SYSTOLIC BLOOD PRESSURE: 103 MMHG | DIASTOLIC BLOOD PRESSURE: 53 MMHG

## 2019-03-28 VITALS — SYSTOLIC BLOOD PRESSURE: 111 MMHG | DIASTOLIC BLOOD PRESSURE: 46 MMHG

## 2019-03-28 VITALS — SYSTOLIC BLOOD PRESSURE: 132 MMHG | DIASTOLIC BLOOD PRESSURE: 59 MMHG

## 2019-03-28 PROCEDURE — 5A1D70Z PERFORMANCE OF URINARY FILTRATION, INTERMITTENT, LESS THAN 6 HOURS PER DAY: ICD-10-PCS | Performed by: INTERNAL MEDICINE

## 2019-03-28 RX ADMIN — Medication PRN EACH: at 11:53

## 2019-03-28 RX ADMIN — AMIODARONE HYDROCHLORIDE SCH MG: 200 TABLET ORAL at 21:43

## 2019-03-28 RX ADMIN — Medication SCH MLS/HR: at 21:45

## 2019-03-28 RX ADMIN — SODIUM HYPOCHLORITE SCH ML: 1.25 SOLUTION TOPICAL at 21:46

## 2019-03-28 RX ADMIN — ACETYLCYSTEINE SCH MG: 100 INHALANT RESPIRATORY (INHALATION) at 23:08

## 2019-03-28 RX ADMIN — SODIUM HYPOCHLORITE SCH ML: 1.25 SOLUTION TOPICAL at 15:35

## 2019-03-28 RX ADMIN — MEROPENEM SCH MLS/HR: 500 INJECTION INTRAVENOUS at 15:35

## 2019-03-28 RX ADMIN — Medication SCH MG: at 20:11

## 2019-03-28 RX ADMIN — Medication PRN ML: at 03:13

## 2019-03-28 RX ADMIN — ALBUTEROL SULFATE SCH MG: 1.25 SOLUTION RESPIRATORY (INHALATION) at 23:08

## 2019-03-28 RX ADMIN — ACETYLCYSTEINE SCH MG: 100 INHALANT RESPIRATORY (INHALATION) at 16:06

## 2019-03-28 RX ADMIN — Medication PRN MG: at 18:00

## 2019-03-28 RX ADMIN — Medication SCH MG: at 16:06

## 2019-03-28 RX ADMIN — METOPROLOL TARTRATE SCH MG: 50 TABLET, FILM COATED ORAL at 21:42

## 2019-03-28 RX ADMIN — ALBUTEROL SULFATE SCH MG: 1.25 SOLUTION RESPIRATORY (INHALATION) at 16:06

## 2019-03-28 NOTE — NUR
TELE RN END OF SHIFT NOTES

PT IN BED, MITTENS ON BOTH HANDS. PT STILL PULLING AT LINES WHEN REMOVED. GTF RUNNING WITH 
NO RESIDUAL NOTED. EXTREMITIES OFFLOADED. KCI MATTRESS ON BED, PT TOLERATED TREATMENTS WELL. 
SITTER AT BEDSIDE. ON VENT SETTINGS TOLERATING WELL. WILL ENDORSE TO [facility] NURSE FOR LAURENCE.

## 2019-03-28 NOTE — NUR
TELE RN OPENING NOTES

RECEIVED PT IN BED, HOB ELEVATED 30 DEG ON VENT TOLERATING WELL. NO S/SX OF RESP DISTRESS. 
MITTENS ON BOTH HANDS. RIGHT HAND BLOODY, PER PM NURSE, PT SCRATCHES SKIN AGAINST MITTEN. PT 
ABLE TO MOUTH WORDS, A/OX2. SR ON TELE. NEPRO GTF RUNNING AT 30 ML/HR. NO RESIDUAL NOTED. 
ASHISH IV LINE IN PLACE. BED IN LOCKED/LOWEST POSITION. CALL LIGHT IN REACH. WILL CONT TO 
MONITOR.

## 2019-03-28 NOTE — NUR
PATIENT RECEIVED ON TRACH TO VENT WITH SETTINGS OF AC 8, 350 VT, 40%, +5. SUCTIONED FOR 
MINIMAL, THIN, YELLOW SECRETIONS. AMBU BAG AT BEDSIDE. VENT AND PULSE OXIMETER ALARMS 
AUDIBLE AND VISIBLE. VENT PLUGGED INTO RED OUTLET.

-------------------------------------------------------------------------------

Addendum: 03/28/19 at 0544 by CORIE BASSETT RT

-------------------------------------------------------------------------------

Amended: Links added.

## 2019-03-29 VITALS — DIASTOLIC BLOOD PRESSURE: 35 MMHG | SYSTOLIC BLOOD PRESSURE: 91 MMHG

## 2019-03-29 VITALS — DIASTOLIC BLOOD PRESSURE: 53 MMHG | SYSTOLIC BLOOD PRESSURE: 117 MMHG

## 2019-03-29 VITALS — SYSTOLIC BLOOD PRESSURE: 125 MMHG | DIASTOLIC BLOOD PRESSURE: 72 MMHG

## 2019-03-29 VITALS — DIASTOLIC BLOOD PRESSURE: 58 MMHG | SYSTOLIC BLOOD PRESSURE: 109 MMHG

## 2019-03-29 VITALS — DIASTOLIC BLOOD PRESSURE: 71 MMHG | SYSTOLIC BLOOD PRESSURE: 123 MMHG

## 2019-03-29 VITALS — SYSTOLIC BLOOD PRESSURE: 110 MMHG | DIASTOLIC BLOOD PRESSURE: 61 MMHG

## 2019-03-29 LAB
ALBUMIN SERPL BCP-MCNC: 1.5 G/DL (ref 3.4–5)
ALP SERPL-CCNC: 185 U/L (ref 46–116)
ALT SERPL W P-5'-P-CCNC: 12 U/L (ref 12–78)
AST SERPL W P-5'-P-CCNC: 19 U/L (ref 15–37)
BASE EXCESS BLDA CALC-SCNC: 5.7 MMOL/L
BASOPHILS # BLD AUTO: 0 /CMM (ref 0–0.2)
BASOPHILS NFR BLD AUTO: 0.3 % (ref 0–2)
BILIRUB SERPL-MCNC: 0.4 MG/DL (ref 0.2–1)
BUN SERPL-MCNC: 86 MG/DL (ref 7–18)
CALCIUM SERPL-MCNC: 8.9 MG/DL (ref 8.5–10.1)
CHLORIDE SERPL-SCNC: 95 MMOL/L (ref 98–107)
CHOLEST SERPL-MCNC: 97 MG/DL (ref ?–200)
CO2 SERPL-SCNC: 28 MMOL/L (ref 21–32)
CREAT SERPL-MCNC: 1.8 MG/DL (ref 0.6–1.3)
DO-HGB MFR BLDA: 146.9 MMHG
EOSINOPHIL NFR BLD AUTO: 0.1 % (ref 0–6)
GLUCOSE SERPL-MCNC: 149 MG/DL (ref 74–106)
HCT VFR BLD AUTO: 24 % (ref 33–45)
HDLC SERPL-MCNC: 49 MG/DL (ref 40–60)
HGB BLD-MCNC: 7.9 G/DL (ref 11.5–14.8)
INHALED O2 CONCENTRATION: 40 %
INTRINSIC PEEP RESPIRATORY: 5 CM H2O
IRON SERPL-MCNC: 85 UG/DL (ref 50–175)
LDLC SERPL DIRECT ASSAY-MCNC: 43 MG/DL (ref 0–99)
LYMPHOCYTES NFR BLD AUTO: 0.4 /CMM (ref 0.8–4.8)
LYMPHOCYTES NFR BLD AUTO: 3.6 % (ref 20–44)
MAGNESIUM SERPL-MCNC: 2.4 MG/DL (ref 1.8–2.4)
MCHC RBC AUTO-ENTMCNC: 33 G/DL (ref 31–36)
MCV RBC AUTO: 92 FL (ref 82–100)
MONOCYTES NFR BLD AUTO: 1.2 /CMM (ref 0.1–1.3)
MONOCYTES NFR BLD AUTO: 10.3 % (ref 2–12)
NEUTROPHILS # BLD AUTO: 9.9 /CMM (ref 1.8–8.9)
NEUTROPHILS NFR BLD AUTO: 85.7 % (ref 43–81)
PCO2 TEMP ADJ BLDA: 44.4 MMHG (ref 35–45)
PEEP SETTING VENT: 350 ML
PH TEMP ADJ BLDA: 7.45 [PH] (ref 7.35–7.45)
PHOSPHATE SERPL-MCNC: 3.3 MG/DL (ref 2.5–4.9)
PLATELET # BLD AUTO: 208 /CMM (ref 150–450)
PO2 TEMP ADJ BLDA: 87.2 MMHG (ref 75–100)
POTASSIUM SERPL-SCNC: 5 MMOL/L (ref 3.5–5.1)
PROT SERPL-MCNC: 6.5 G/DL (ref 6.4–8.2)
RBC # BLD AUTO: 2.61 MIL/UL (ref 4–5.2)
SAO2 % BLDA: 96 % (ref 92–98.5)
SET RATE, BG: 8
SODIUM SERPL-SCNC: 131 MMOL/L (ref 136–145)
TIBC SERPL-MCNC: 108 UG/DL (ref 250–450)
TRIGL SERPL-MCNC: 91 MG/DL (ref 30–150)
TSH SERPL DL<=0.005 MIU/L-ACNC: 4.84 UIU/ML (ref 0.36–3.74)
VENTILATION MODE VENT: (no result)
WBC NRBC COR # BLD AUTO: 11.6 K/UL (ref 4.3–11)

## 2019-03-29 PROCEDURE — 5A1D70Z PERFORMANCE OF URINARY FILTRATION, INTERMITTENT, LESS THAN 6 HOURS PER DAY: ICD-10-PCS

## 2019-03-29 PROCEDURE — 0JBQ0ZZ EXCISION OF RIGHT FOOT SUBCUTANEOUS TISSUE AND FASCIA, OPEN APPROACH: ICD-10-PCS

## 2019-03-29 PROCEDURE — 0JBN0ZZ EXCISION OF RIGHT LOWER LEG SUBCUTANEOUS TISSUE AND FASCIA, OPEN APPROACH: ICD-10-PCS | Performed by: PODIATRIST

## 2019-03-29 PROCEDURE — 0JBP0ZZ EXCISION OF LEFT LOWER LEG SUBCUTANEOUS TISSUE AND FASCIA, OPEN APPROACH: ICD-10-PCS

## 2019-03-29 PROCEDURE — 0KBP0ZZ EXCISION OF LEFT HIP MUSCLE, OPEN APPROACH: ICD-10-PCS | Performed by: INTERNAL MEDICINE

## 2019-03-29 PROCEDURE — 0KBN0ZZ EXCISION OF RIGHT HIP MUSCLE, OPEN APPROACH: ICD-10-PCS | Performed by: NURSE PRACTITIONER

## 2019-03-29 RX ADMIN — Medication SCH MG: at 01:30

## 2019-03-29 RX ADMIN — ALBUTEROL SULFATE SCH MG: 1.25 SOLUTION RESPIRATORY (INHALATION) at 08:10

## 2019-03-29 RX ADMIN — AMIODARONE HYDROCHLORIDE SCH MG: 200 TABLET ORAL at 09:26

## 2019-03-29 RX ADMIN — Medication SCH MLS/HR: at 21:35

## 2019-03-29 RX ADMIN — MEROPENEM SCH MLS/HR: 500 INJECTION INTRAVENOUS at 04:36

## 2019-03-29 RX ADMIN — ACETYLCYSTEINE SCH MG: 100 INHALANT RESPIRATORY (INHALATION) at 23:35

## 2019-03-29 RX ADMIN — AMIODARONE HYDROCHLORIDE SCH MG: 200 TABLET ORAL at 21:22

## 2019-03-29 RX ADMIN — Medication SCH GM: at 09:29

## 2019-03-29 RX ADMIN — METOPROLOL TARTRATE SCH MG: 50 TABLET, FILM COATED ORAL at 21:23

## 2019-03-29 RX ADMIN — METOPROLOL TARTRATE SCH MG: 50 TABLET, FILM COATED ORAL at 09:26

## 2019-03-29 RX ADMIN — Medication SCH MG: at 09:25

## 2019-03-29 RX ADMIN — ALBUTEROL SULFATE SCH MG: 1.25 SOLUTION RESPIRATORY (INHALATION) at 23:35

## 2019-03-29 RX ADMIN — Medication PRN MG: at 09:44

## 2019-03-29 RX ADMIN — Medication SCH TAB: at 09:25

## 2019-03-29 RX ADMIN — Medication SCH MLS/HR: at 09:27

## 2019-03-29 RX ADMIN — SODIUM HYPOCHLORITE SCH ML: 1.25 SOLUTION TOPICAL at 21:25

## 2019-03-29 RX ADMIN — MEROPENEM SCH MLS/HR: 500 INJECTION INTRAVENOUS at 15:41

## 2019-03-29 RX ADMIN — Medication SCH MG: at 08:10

## 2019-03-29 RX ADMIN — SODIUM HYPOCHLORITE SCH ML: 1.25 SOLUTION TOPICAL at 09:28

## 2019-03-29 RX ADMIN — OXYCODONE HYDROCHLORIDE AND ACETAMINOPHEN SCH MG: 500 TABLET ORAL at 09:26

## 2019-03-29 RX ADMIN — ALBUTEROL SULFATE SCH MG: 1.25 SOLUTION RESPIRATORY (INHALATION) at 15:35

## 2019-03-29 RX ADMIN — ACETYLCYSTEINE SCH MG: 100 INHALANT RESPIRATORY (INHALATION) at 15:35

## 2019-03-29 RX ADMIN — Medication SCH MG: at 19:45

## 2019-03-29 RX ADMIN — Medication SCH MG: at 15:35

## 2019-03-29 RX ADMIN — ACETYLCYSTEINE SCH MG: 100 INHALANT RESPIRATORY (INHALATION) at 08:11

## 2019-03-29 NOTE — NUR
WOUND CARE CONSULT   WOUND CARE RECEIVED CONSULT FOR SACRAL WOUND/MULTIPLE WOUNDS.  WOUND 
CARE WILL DEFER CONSULT AND ALL TREATMENT PLANS TO PLASTIC SURGICAL TEAM INCLUDING DPM DR SOUSA AS THE ARE CURRENTLY FOLLOWING THIS PATIENT.  PATIENT WITH CARLOS AT 11, ALL 
PRESSURE ULCER PREVENTION MEASURES ARE NOTED TO BE IN PLACE AT THIS TIME.  WILL SEE PRN.

## 2019-03-29 NOTE — NUR
RT

PT RECEIVED TRACHED ON [facility] VENT ON CHARTED SETTINGS. NO SIGNS OF RESP DISTRESS/NOTED. 
AIRWAY PATENT AND SECURED. PT SUCTIONED. PT GIVEN HHN TX. ALARMS SET AND AUDIBLE. VENT 
CONNECTED TO RED OUTLET. WILL CONT TO MONITOR.

-------------------------------------------------------------------------------

Addendum: 03/29/19 at 2046 by MARC OWEN RT

-------------------------------------------------------------------------------

Amended: Links added.

## 2019-03-29 NOTE — NUR
RN CLOSING NOTES

GAVE REPORT TO NIGHT SHIFT RN. PT IN BED, MITTENS ARE ON BOTH HANDS. PT STILL PULLING AT 
LINES WHEN REMOVED. GTF RUNNING WITH NO RESIDUAL NOTED. EXTREMITIES OFFLOADED. KCI MATTRESS 
ON BED, PT TOLERATED TREATMENTS WELL. SITTER AT BEDSIDE. ON VENT SETTINGS TOLERATING WELL. 
WILL ENDORSE CONTINUITY OF CARE TO NIGHT SHIFT RN.

## 2019-03-29 NOTE — NUR
RN TELE NOTES

ASSISTED MARILIN BRADLEY WITH WOUND DEBRIDEMENT AT BEDSIDE OF SACRAL WOUND STAGE 4. NP PACKED 
WOUND AND COVERED.

## 2019-03-30 VITALS — SYSTOLIC BLOOD PRESSURE: 91 MMHG | DIASTOLIC BLOOD PRESSURE: 42 MMHG

## 2019-03-30 VITALS — DIASTOLIC BLOOD PRESSURE: 41 MMHG | SYSTOLIC BLOOD PRESSURE: 102 MMHG

## 2019-03-30 VITALS — SYSTOLIC BLOOD PRESSURE: 146 MMHG | DIASTOLIC BLOOD PRESSURE: 49 MMHG

## 2019-03-30 VITALS — SYSTOLIC BLOOD PRESSURE: 119 MMHG | DIASTOLIC BLOOD PRESSURE: 46 MMHG

## 2019-03-30 VITALS — DIASTOLIC BLOOD PRESSURE: 38 MMHG | SYSTOLIC BLOOD PRESSURE: 105 MMHG

## 2019-03-30 VITALS — SYSTOLIC BLOOD PRESSURE: 108 MMHG | DIASTOLIC BLOOD PRESSURE: 39 MMHG

## 2019-03-30 VITALS — DIASTOLIC BLOOD PRESSURE: 44 MMHG | SYSTOLIC BLOOD PRESSURE: 91 MMHG

## 2019-03-30 VITALS — SYSTOLIC BLOOD PRESSURE: 94 MMHG | DIASTOLIC BLOOD PRESSURE: 34 MMHG

## 2019-03-30 VITALS — DIASTOLIC BLOOD PRESSURE: 39 MMHG | SYSTOLIC BLOOD PRESSURE: 86 MMHG

## 2019-03-30 VITALS — SYSTOLIC BLOOD PRESSURE: 83 MMHG | DIASTOLIC BLOOD PRESSURE: 40 MMHG

## 2019-03-30 VITALS — DIASTOLIC BLOOD PRESSURE: 39 MMHG | SYSTOLIC BLOOD PRESSURE: 87 MMHG

## 2019-03-30 VITALS — SYSTOLIC BLOOD PRESSURE: 84 MMHG | DIASTOLIC BLOOD PRESSURE: 28 MMHG

## 2019-03-30 VITALS — DIASTOLIC BLOOD PRESSURE: 45 MMHG | SYSTOLIC BLOOD PRESSURE: 122 MMHG

## 2019-03-30 VITALS — DIASTOLIC BLOOD PRESSURE: 48 MMHG | SYSTOLIC BLOOD PRESSURE: 86 MMHG

## 2019-03-30 VITALS — DIASTOLIC BLOOD PRESSURE: 38 MMHG | SYSTOLIC BLOOD PRESSURE: 81 MMHG

## 2019-03-30 VITALS — SYSTOLIC BLOOD PRESSURE: 82 MMHG | DIASTOLIC BLOOD PRESSURE: 37 MMHG

## 2019-03-30 VITALS — DIASTOLIC BLOOD PRESSURE: 40 MMHG | SYSTOLIC BLOOD PRESSURE: 86 MMHG

## 2019-03-30 VITALS — DIASTOLIC BLOOD PRESSURE: 29 MMHG | SYSTOLIC BLOOD PRESSURE: 88 MMHG

## 2019-03-30 VITALS — SYSTOLIC BLOOD PRESSURE: 105 MMHG | DIASTOLIC BLOOD PRESSURE: 41 MMHG

## 2019-03-30 VITALS — DIASTOLIC BLOOD PRESSURE: 38 MMHG | SYSTOLIC BLOOD PRESSURE: 80 MMHG

## 2019-03-30 VITALS — DIASTOLIC BLOOD PRESSURE: 40 MMHG | SYSTOLIC BLOOD PRESSURE: 114 MMHG

## 2019-03-30 VITALS — SYSTOLIC BLOOD PRESSURE: 100 MMHG | DIASTOLIC BLOOD PRESSURE: 36 MMHG

## 2019-03-30 VITALS — SYSTOLIC BLOOD PRESSURE: 88 MMHG | DIASTOLIC BLOOD PRESSURE: 48 MMHG

## 2019-03-30 VITALS — SYSTOLIC BLOOD PRESSURE: 82 MMHG | DIASTOLIC BLOOD PRESSURE: 34 MMHG

## 2019-03-30 VITALS — SYSTOLIC BLOOD PRESSURE: 71 MMHG | DIASTOLIC BLOOD PRESSURE: 24 MMHG

## 2019-03-30 VITALS — DIASTOLIC BLOOD PRESSURE: 23 MMHG | SYSTOLIC BLOOD PRESSURE: 72 MMHG

## 2019-03-30 VITALS — SYSTOLIC BLOOD PRESSURE: 119 MMHG | DIASTOLIC BLOOD PRESSURE: 49 MMHG

## 2019-03-30 VITALS — SYSTOLIC BLOOD PRESSURE: 85 MMHG | DIASTOLIC BLOOD PRESSURE: 41 MMHG

## 2019-03-30 VITALS — SYSTOLIC BLOOD PRESSURE: 122 MMHG | DIASTOLIC BLOOD PRESSURE: 48 MMHG

## 2019-03-30 VITALS — DIASTOLIC BLOOD PRESSURE: 28 MMHG | SYSTOLIC BLOOD PRESSURE: 73 MMHG

## 2019-03-30 VITALS — DIASTOLIC BLOOD PRESSURE: 44 MMHG | SYSTOLIC BLOOD PRESSURE: 92 MMHG

## 2019-03-30 VITALS — DIASTOLIC BLOOD PRESSURE: 39 MMHG | SYSTOLIC BLOOD PRESSURE: 88 MMHG

## 2019-03-30 VITALS — DIASTOLIC BLOOD PRESSURE: 50 MMHG | SYSTOLIC BLOOD PRESSURE: 84 MMHG

## 2019-03-30 VITALS — SYSTOLIC BLOOD PRESSURE: 92 MMHG | DIASTOLIC BLOOD PRESSURE: 32 MMHG

## 2019-03-30 VITALS — DIASTOLIC BLOOD PRESSURE: 38 MMHG | SYSTOLIC BLOOD PRESSURE: 110 MMHG

## 2019-03-30 VITALS — DIASTOLIC BLOOD PRESSURE: 43 MMHG | SYSTOLIC BLOOD PRESSURE: 116 MMHG

## 2019-03-30 VITALS — DIASTOLIC BLOOD PRESSURE: 47 MMHG | SYSTOLIC BLOOD PRESSURE: 123 MMHG

## 2019-03-30 VITALS — SYSTOLIC BLOOD PRESSURE: 101 MMHG | DIASTOLIC BLOOD PRESSURE: 36 MMHG

## 2019-03-30 VITALS — DIASTOLIC BLOOD PRESSURE: 31 MMHG | SYSTOLIC BLOOD PRESSURE: 92 MMHG

## 2019-03-30 VITALS — SYSTOLIC BLOOD PRESSURE: 78 MMHG | DIASTOLIC BLOOD PRESSURE: 28 MMHG

## 2019-03-30 VITALS — DIASTOLIC BLOOD PRESSURE: 41 MMHG | SYSTOLIC BLOOD PRESSURE: 88 MMHG

## 2019-03-30 VITALS — DIASTOLIC BLOOD PRESSURE: 43 MMHG | SYSTOLIC BLOOD PRESSURE: 114 MMHG

## 2019-03-30 VITALS — SYSTOLIC BLOOD PRESSURE: 90 MMHG | DIASTOLIC BLOOD PRESSURE: 33 MMHG

## 2019-03-30 VITALS — DIASTOLIC BLOOD PRESSURE: 46 MMHG | SYSTOLIC BLOOD PRESSURE: 124 MMHG

## 2019-03-30 VITALS — DIASTOLIC BLOOD PRESSURE: 37 MMHG | SYSTOLIC BLOOD PRESSURE: 89 MMHG

## 2019-03-30 VITALS — DIASTOLIC BLOOD PRESSURE: 49 MMHG | SYSTOLIC BLOOD PRESSURE: 107 MMHG

## 2019-03-30 VITALS — SYSTOLIC BLOOD PRESSURE: 106 MMHG | DIASTOLIC BLOOD PRESSURE: 38 MMHG

## 2019-03-30 VITALS — DIASTOLIC BLOOD PRESSURE: 46 MMHG | SYSTOLIC BLOOD PRESSURE: 117 MMHG

## 2019-03-30 VITALS — SYSTOLIC BLOOD PRESSURE: 78 MMHG | DIASTOLIC BLOOD PRESSURE: 32 MMHG

## 2019-03-30 VITALS — SYSTOLIC BLOOD PRESSURE: 81 MMHG | DIASTOLIC BLOOD PRESSURE: 32 MMHG

## 2019-03-30 VITALS — DIASTOLIC BLOOD PRESSURE: 28 MMHG | SYSTOLIC BLOOD PRESSURE: 103 MMHG

## 2019-03-30 VITALS — DIASTOLIC BLOOD PRESSURE: 39 MMHG | SYSTOLIC BLOOD PRESSURE: 102 MMHG

## 2019-03-30 VITALS — SYSTOLIC BLOOD PRESSURE: 93 MMHG | DIASTOLIC BLOOD PRESSURE: 36 MMHG

## 2019-03-30 VITALS — DIASTOLIC BLOOD PRESSURE: 37 MMHG | SYSTOLIC BLOOD PRESSURE: 101 MMHG

## 2019-03-30 VITALS — SYSTOLIC BLOOD PRESSURE: 88 MMHG | DIASTOLIC BLOOD PRESSURE: 40 MMHG

## 2019-03-30 VITALS — DIASTOLIC BLOOD PRESSURE: 44 MMHG | SYSTOLIC BLOOD PRESSURE: 108 MMHG

## 2019-03-30 VITALS — SYSTOLIC BLOOD PRESSURE: 77 MMHG | DIASTOLIC BLOOD PRESSURE: 29 MMHG

## 2019-03-30 VITALS — DIASTOLIC BLOOD PRESSURE: 31 MMHG | SYSTOLIC BLOOD PRESSURE: 93 MMHG

## 2019-03-30 VITALS — DIASTOLIC BLOOD PRESSURE: 40 MMHG | SYSTOLIC BLOOD PRESSURE: 120 MMHG

## 2019-03-30 VITALS — DIASTOLIC BLOOD PRESSURE: 30 MMHG | SYSTOLIC BLOOD PRESSURE: 90 MMHG

## 2019-03-30 VITALS — DIASTOLIC BLOOD PRESSURE: 41 MMHG | SYSTOLIC BLOOD PRESSURE: 99 MMHG

## 2019-03-30 VITALS — SYSTOLIC BLOOD PRESSURE: 93 MMHG | DIASTOLIC BLOOD PRESSURE: 35 MMHG

## 2019-03-30 VITALS — DIASTOLIC BLOOD PRESSURE: 48 MMHG | SYSTOLIC BLOOD PRESSURE: 124 MMHG

## 2019-03-30 VITALS — DIASTOLIC BLOOD PRESSURE: 48 MMHG | SYSTOLIC BLOOD PRESSURE: 133 MMHG

## 2019-03-30 VITALS — DIASTOLIC BLOOD PRESSURE: 35 MMHG | SYSTOLIC BLOOD PRESSURE: 93 MMHG

## 2019-03-30 VITALS — SYSTOLIC BLOOD PRESSURE: 82 MMHG | DIASTOLIC BLOOD PRESSURE: 24 MMHG

## 2019-03-30 VITALS — DIASTOLIC BLOOD PRESSURE: 33 MMHG | SYSTOLIC BLOOD PRESSURE: 97 MMHG

## 2019-03-30 VITALS — DIASTOLIC BLOOD PRESSURE: 47 MMHG | SYSTOLIC BLOOD PRESSURE: 133 MMHG

## 2019-03-30 VITALS — DIASTOLIC BLOOD PRESSURE: 28 MMHG | SYSTOLIC BLOOD PRESSURE: 95 MMHG

## 2019-03-30 VITALS — SYSTOLIC BLOOD PRESSURE: 95 MMHG | DIASTOLIC BLOOD PRESSURE: 40 MMHG

## 2019-03-30 VITALS — DIASTOLIC BLOOD PRESSURE: 55 MMHG | SYSTOLIC BLOOD PRESSURE: 92 MMHG

## 2019-03-30 VITALS — SYSTOLIC BLOOD PRESSURE: 88 MMHG | DIASTOLIC BLOOD PRESSURE: 32 MMHG

## 2019-03-30 VITALS — SYSTOLIC BLOOD PRESSURE: 85 MMHG | DIASTOLIC BLOOD PRESSURE: 36 MMHG

## 2019-03-30 VITALS — DIASTOLIC BLOOD PRESSURE: 41 MMHG | SYSTOLIC BLOOD PRESSURE: 83 MMHG

## 2019-03-30 VITALS — DIASTOLIC BLOOD PRESSURE: 28 MMHG | SYSTOLIC BLOOD PRESSURE: 85 MMHG

## 2019-03-30 VITALS — SYSTOLIC BLOOD PRESSURE: 93 MMHG | DIASTOLIC BLOOD PRESSURE: 57 MMHG

## 2019-03-30 VITALS — SYSTOLIC BLOOD PRESSURE: 103 MMHG | DIASTOLIC BLOOD PRESSURE: 54 MMHG

## 2019-03-30 VITALS — SYSTOLIC BLOOD PRESSURE: 93 MMHG | DIASTOLIC BLOOD PRESSURE: 31 MMHG

## 2019-03-30 VITALS — SYSTOLIC BLOOD PRESSURE: 94 MMHG | DIASTOLIC BLOOD PRESSURE: 32 MMHG

## 2019-03-30 VITALS — SYSTOLIC BLOOD PRESSURE: 91 MMHG | DIASTOLIC BLOOD PRESSURE: 44 MMHG

## 2019-03-30 VITALS — DIASTOLIC BLOOD PRESSURE: 30 MMHG | SYSTOLIC BLOOD PRESSURE: 88 MMHG

## 2019-03-30 VITALS — SYSTOLIC BLOOD PRESSURE: 153 MMHG | DIASTOLIC BLOOD PRESSURE: 63 MMHG

## 2019-03-30 VITALS — DIASTOLIC BLOOD PRESSURE: 48 MMHG | SYSTOLIC BLOOD PRESSURE: 118 MMHG

## 2019-03-30 VITALS — SYSTOLIC BLOOD PRESSURE: 74 MMHG | DIASTOLIC BLOOD PRESSURE: 29 MMHG

## 2019-03-30 VITALS — SYSTOLIC BLOOD PRESSURE: 63 MMHG | DIASTOLIC BLOOD PRESSURE: 26 MMHG

## 2019-03-30 VITALS — DIASTOLIC BLOOD PRESSURE: 31 MMHG | SYSTOLIC BLOOD PRESSURE: 96 MMHG

## 2019-03-30 VITALS — DIASTOLIC BLOOD PRESSURE: 52 MMHG | SYSTOLIC BLOOD PRESSURE: 109 MMHG

## 2019-03-30 VITALS — SYSTOLIC BLOOD PRESSURE: 90 MMHG | DIASTOLIC BLOOD PRESSURE: 42 MMHG

## 2019-03-30 VITALS — DIASTOLIC BLOOD PRESSURE: 24 MMHG | SYSTOLIC BLOOD PRESSURE: 83 MMHG

## 2019-03-30 VITALS — DIASTOLIC BLOOD PRESSURE: 36 MMHG | SYSTOLIC BLOOD PRESSURE: 81 MMHG

## 2019-03-30 VITALS — DIASTOLIC BLOOD PRESSURE: 34 MMHG | SYSTOLIC BLOOD PRESSURE: 108 MMHG

## 2019-03-30 VITALS — SYSTOLIC BLOOD PRESSURE: 88 MMHG | DIASTOLIC BLOOD PRESSURE: 30 MMHG

## 2019-03-30 VITALS — DIASTOLIC BLOOD PRESSURE: 30 MMHG | SYSTOLIC BLOOD PRESSURE: 81 MMHG

## 2019-03-30 VITALS — SYSTOLIC BLOOD PRESSURE: 82 MMHG | DIASTOLIC BLOOD PRESSURE: 36 MMHG

## 2019-03-30 VITALS — DIASTOLIC BLOOD PRESSURE: 35 MMHG | SYSTOLIC BLOOD PRESSURE: 101 MMHG

## 2019-03-30 VITALS — SYSTOLIC BLOOD PRESSURE: 90 MMHG | DIASTOLIC BLOOD PRESSURE: 37 MMHG

## 2019-03-30 VITALS — DIASTOLIC BLOOD PRESSURE: 63 MMHG | SYSTOLIC BLOOD PRESSURE: 153 MMHG

## 2019-03-30 VITALS — SYSTOLIC BLOOD PRESSURE: 127 MMHG | DIASTOLIC BLOOD PRESSURE: 44 MMHG

## 2019-03-30 VITALS — SYSTOLIC BLOOD PRESSURE: 142 MMHG | DIASTOLIC BLOOD PRESSURE: 54 MMHG

## 2019-03-30 LAB
BASE EXCESS BLDA CALC-SCNC: -2 MMOL/L
BASE EXCESS BLDA CALC-SCNC: 2 MMOL/L
BASOPHILS # BLD AUTO: 0 /CMM (ref 0–0.2)
BASOPHILS NFR BLD AUTO: 0.1 % (ref 0–2)
BILIRUB DIRECT SERPL-MCNC: 0.6 MG/DL (ref 0–0.2)
BILIRUB SERPL-MCNC: 1.1 MG/DL (ref 0.2–1)
BUN SERPL-MCNC: 73 MG/DL (ref 7–18)
CALCIUM SERPL-MCNC: 8.1 MG/DL (ref 8.5–10.1)
CHLORIDE SERPL-SCNC: 102 MMOL/L (ref 98–107)
CO2 SERPL-SCNC: 24 MMOL/L (ref 21–32)
CREAT SERPL-MCNC: 1.7 MG/DL (ref 0.6–1.3)
DO-HGB MFR BLDA: 272.7 MMHG
DO-HGB MFR BLDA: 616.7 MMHG
EOSINOPHIL NFR BLD AUTO: 0.1 % (ref 0–6)
GLUCOSE SERPL-MCNC: 206 MG/DL (ref 74–106)
HCT VFR BLD AUTO: 11 % (ref 33–45)
HCT VFR BLD AUTO: 19 % (ref 33–45)
HCT VFR BLD AUTO: 24 % (ref 33–45)
HEMOCCULT STL QL: POSITIVE
HGB BLD-MCNC: 3.6 G/DL (ref 11.5–14.8)
HGB BLD-MCNC: 6.1 G/DL (ref 11.5–14.8)
HGB BLD-MCNC: 8.2 G/DL (ref 11.5–14.8)
INHALED O2 CONCENTRATION: 100 %
INHALED O2 CONCENTRATION: 100 %
INTRINSIC PEEP RESPIRATORY: 5 CM H2O
INTRINSIC PEEP RESPIRATORY: 5 CM H2O
LYMPHOCYTES NFR BLD AUTO: 0.5 /CMM (ref 0.8–4.8)
LYMPHOCYTES NFR BLD AUTO: 3.3 % (ref 20–44)
LYMPHOCYTES NFR BLD MANUAL: 3 % (ref 16–48)
MAGNESIUM SERPL-MCNC: 2.2 MG/DL (ref 1.8–2.4)
MCHC RBC AUTO-ENTMCNC: 32 G/DL (ref 31–36)
MCV RBC AUTO: 94 FL (ref 82–100)
MONOCYTES NFR BLD AUTO: 1.6 /CMM (ref 0.1–1.3)
MONOCYTES NFR BLD AUTO: 10.5 % (ref 2–12)
MONOCYTES NFR BLD MANUAL: 8 % (ref 0–11)
MYELOCYTES NFR BLD MANUAL: 1 % (ref 0–0)
NEUTROPHILS # BLD AUTO: 13.1 /CMM (ref 1.8–8.9)
NEUTROPHILS NFR BLD AUTO: 86 % (ref 43–81)
NEUTS BAND NFR BLD MANUAL: 7 % (ref 0–5)
NEUTS SEG NFR BLD MANUAL: 81 % (ref 42–76)
PCO2 TEMP ADJ BLDA: 55.3 MMHG (ref 35–45)
PCO2 TEMP ADJ BLDA: 63.5 MMHG (ref 35–45)
PEEP SETTING VENT: 350 ML
PEEP SETTING VENT: 350 ML
PH TEMP ADJ BLDA: 7.21 [PH] (ref 7.35–7.45)
PH TEMP ADJ BLDA: 7.32 [PH] (ref 7.35–7.45)
PHOSPHATE SERPL-MCNC: 3.5 MG/DL (ref 2.5–4.9)
PLATELET # BLD AUTO: 198 /CMM (ref 150–450)
PO2 TEMP ADJ BLDA: 32.8 MMHG (ref 75–100)
PO2 TEMP ADJ BLDA: 385 MMHG (ref 75–100)
POTASSIUM SERPL-SCNC: 4.7 MMOL/L (ref 3.5–5.1)
RBC # BLD AUTO: 1.21 MIL/UL (ref 4–5.2)
SAO2 % BLDA: 46.8 % (ref 92–98.5)
SAO2 % BLDA: 98.9 % (ref 92–98.5)
SET RATE, BG: 8
SET RATE, BG: 8
SODIUM SERPL-SCNC: 137 MMOL/L (ref 136–145)
VENTILATION MODE VENT: (no result)
WBC NRBC COR # BLD AUTO: 15.3 K/UL (ref 4.3–11)

## 2019-03-30 PROCEDURE — 05HN33Z INSERTION OF INFUSION DEVICE INTO LEFT INTERNAL JUGULAR VEIN, PERCUTANEOUS APPROACH: ICD-10-PCS | Performed by: NURSE PRACTITIONER

## 2019-03-30 PROCEDURE — 5A1D70Z PERFORMANCE OF URINARY FILTRATION, INTERMITTENT, LESS THAN 6 HOURS PER DAY: ICD-10-PCS | Performed by: INTERNAL MEDICINE

## 2019-03-30 PROCEDURE — B544ZZA ULTRASONOGRAPHY OF LEFT JUGULAR VEINS, GUIDANCE: ICD-10-PCS | Performed by: NURSE PRACTITIONER

## 2019-03-30 PROCEDURE — 30233N1 TRANSFUSION OF NONAUTOLOGOUS RED BLOOD CELLS INTO PERIPHERAL VEIN, PERCUTANEOUS APPROACH: ICD-10-PCS | Performed by: INTERNAL MEDICINE

## 2019-03-30 RX ADMIN — DEXTROSE MONOHYDRATE PRN MLS/HR: 50 INJECTION, SOLUTION INTRAVENOUS at 06:29

## 2019-03-30 RX ADMIN — Medication PRN EACH: at 03:30

## 2019-03-30 RX ADMIN — Medication SCH GM: at 16:55

## 2019-03-30 RX ADMIN — AMIODARONE HYDROCHLORIDE SCH MG: 200 TABLET ORAL at 21:00

## 2019-03-30 RX ADMIN — MEROPENEM SCH MLS/HR: 500 INJECTION INTRAVENOUS at 03:18

## 2019-03-30 RX ADMIN — Medication PRN ML: at 03:18

## 2019-03-30 RX ADMIN — Medication SCH MG: at 09:00

## 2019-03-30 RX ADMIN — OXYCODONE HYDROCHLORIDE AND ACETAMINOPHEN SCH MG: 500 TABLET ORAL at 09:00

## 2019-03-30 RX ADMIN — Medication SCH TAB: at 09:00

## 2019-03-30 RX ADMIN — Medication PRN ML: at 13:30

## 2019-03-30 RX ADMIN — SODIUM HYPOCHLORITE SCH ML: 1.25 SOLUTION TOPICAL at 16:54

## 2019-03-30 RX ADMIN — Medication SCH MLS/HR: at 21:20

## 2019-03-30 RX ADMIN — MEROPENEM SCH MLS/HR: 500 INJECTION INTRAVENOUS at 15:04

## 2019-03-30 RX ADMIN — METOPROLOL TARTRATE SCH MG: 50 TABLET, FILM COATED ORAL at 09:00

## 2019-03-30 RX ADMIN — ALBUTEROL SULFATE SCH MG: 1.25 SOLUTION RESPIRATORY (INHALATION) at 15:23

## 2019-03-30 RX ADMIN — Medication SCH MG: at 07:48

## 2019-03-30 RX ADMIN — SODIUM HYPOCHLORITE SCH ML: 1.25 SOLUTION TOPICAL at 21:28

## 2019-03-30 RX ADMIN — Medication SCH MG: at 13:45

## 2019-03-30 RX ADMIN — Medication PRN EACH: at 06:05

## 2019-03-30 RX ADMIN — ALBUTEROL SULFATE SCH MG: 1.25 SOLUTION RESPIRATORY (INHALATION) at 07:48

## 2019-03-30 RX ADMIN — ACETYLCYSTEINE SCH MG: 100 INHALANT RESPIRATORY (INHALATION) at 07:48

## 2019-03-30 RX ADMIN — Medication PRN EACH: at 15:04

## 2019-03-30 RX ADMIN — DEXTROSE MONOHYDRATE PRN MLS/HR: 50 INJECTION, SOLUTION INTRAVENOUS at 15:21

## 2019-03-30 RX ADMIN — DEXTROSE MONOHYDRATE PRN MLS/HR: 50 INJECTION, SOLUTION INTRAVENOUS at 16:54

## 2019-03-30 RX ADMIN — METOPROLOL TARTRATE SCH MG: 50 TABLET, FILM COATED ORAL at 21:00

## 2019-03-30 RX ADMIN — Medication SCH MG: at 19:54

## 2019-03-30 RX ADMIN — AMIODARONE HYDROCHLORIDE SCH MG: 200 TABLET ORAL at 09:00

## 2019-03-30 RX ADMIN — ACETYLCYSTEINE SCH MG: 100 INHALANT RESPIRATORY (INHALATION) at 15:23

## 2019-03-30 RX ADMIN — Medication SCH MG: at 01:32

## 2019-03-30 RX ADMIN — DEXTROSE MONOHYDRATE PRN MLS/HR: 50 INJECTION, SOLUTION INTRAVENOUS at 10:08

## 2019-03-30 RX ADMIN — Medication SCH MLS/HR: at 10:30

## 2019-03-30 NOTE — NUR
RN ICU: Levophed gtt 40 mcg/m through HD machine, Justin gtt started via PIVL with atbxs, 
 changed Tv to 400, started PRBC BT with HD, removed GT occlusion/stomach residual 
160ml/hold GTF

## 2019-03-30 NOTE — NUR
ICU RN NOTES

RECEIVED PATIENT ON THE VENTILATOR WITH TRACHEOSTOMY ON AC MODE ,AWAKE,ALERT,SEEMS TO 
UNDERSTAND BUT A LITTLE RESTLESS,MOVING ARMS AND TRYING TO PULL, RESTRAINTS 
MAINTAINED.HYPOTENSIVE ON LEVOPHED DRIP AND NEOSYNEPHRINE DRIP VIA LEFT IJ TRIPLE LUMEN 
CATH. NOTED SOME BLEEDING FROM THE LEFT IL TRIPLE LUMEN CATH SITE.BUT DRESSING 
INTACT.eXTREMELY EDEMATOUS UPPER AND LOWER EXTREMITIES .

## 2019-03-30 NOTE — NUR
RN ICU: pt had dark brown, black big BM, sent stool OB, wound care done again, sent message 
for  to notify and ?continue GTF

## 2019-03-30 NOTE — NUR
ICU RN: RECEIVED CHRONIC VENT DEPENDENT PT FROM TELE. ABLE TO OPEN EYES BUT NOT FOLLOWING 
COMMANDS. NOTED SKIN PALE LOOKING AND S/P SACRAL DEBRIDEMENT WT LARGE AMT. OF SACRAL WOUND 
BLEEDING. ONGOING  ML BOLUS. CALLED AND NOTIFIED MARILIN PEDERSEN WT STAT LAB ORDERS. NOTED 
AND CARRIED OUT.

## 2019-03-30 NOTE — NUR
ICU RN: NOTIFIED MARILIN PEDERSEN FOR CRITICALLY LOW H&H AND ABG RESULTS WT ORDERS FOR 2UNITS PRBC 
AND TO GIVE 1 AMP OF BICARB IV. NOTED AND CARRIED OUT. PT NOW ON LEVOPHED AT 5MCG/MIN. WILL 
CONTINUE TO MONITOR.

## 2019-03-30 NOTE — NUR
O2 titrated post abg, vent setting changes recommended to MD

-------------------------------------------------------------------------------

Addendum: 03/30/19 at 0751 by ERICK ELIZALDE RT

-------------------------------------------------------------------------------

Amended: Links added.

## 2019-03-30 NOTE — NUR
RN ICU: getting Levophed via PIVL, blood return+, unable to place PIVL over arms d/t edema, 
removed arms dressing, old AV shunts on both arms, nobody reported, called HD nurse Blake 
for verification and possible start HD today for blood transfusion via R.chest permcath, 
 is in unit, updated with all above, ABG, changed Rate on vent to 10, called 
 for TLC placement

## 2019-03-30 NOTE — NUR
RN NOTES

AROUND 0520 HEARD RN ARA ASKED  TO CALL RT BEC PATIENT UNRESPONSIVE. WENT TO 
PATIENT ROOM , OTHER STAFF WENT TO THE ROOM; PATIENT ON VENT, UNRESPONSIVE; CALLED RRT; BP 
TAKEN AS 85/28 BS CHECKED  O2 SAT86%; NUVIA BARROW NP AT THE SEEN RIGHT AWAY. AWARE OF 
THE V/S VERBAL ORDER OBTAINED FOR 500ML NS BOLUS AND TRANSFER TO ICU.  CALLED ICU 
FOR BED; ORDERS PUT IN AND SPOKE TO HOUSE SUPERVISOR. BOLUS STARTED BY ANOTHER RN. ICU 
CHARGE RN ED AWARE OF LEVOPHED ORDER. TRANSFERRED PATIENT AROUND 0530 BY BED. INSTRUCTED 
ARA, PRIMARY RN TO NOTIFY FAMILY OF TRANSFER.

## 2019-03-30 NOTE — NUR
RN ICU: 2xPRBC given with HD, continue HD,  evaluated CXR, said same as yesterday 
(left hemithorax opacification), pt.is candidate for bronchoscopy if pt. will be more 
stable, ordered: recheck H/H in 1hr, if Hb less 7.0 give one PRBC, if less 6.0 give two 
units PRBC

## 2019-03-30 NOTE — NUR
Peep changed to +0 per Charge RN.

-------------------------------------------------------------------------------

Addendum: 03/30/19 at 0740 by ERICK ELIZALDE RT

-------------------------------------------------------------------------------

Amended: Links added.

## 2019-03-30 NOTE — NUR
RN ICU:  is in room, updated with all above, pressors, BT, HD, H/H, spoke with pt.son, 
see new orders

## 2019-03-30 NOTE — NUR
RN ICU: pull out TLC for 1cm with sterile technique, fixed well, new dressing+, still good 
blood return+ from white, gray lumens, not from brown as  noted before, flushing NS 
well, no stomach residual now, restarted GTF, gave pain med before wound care

## 2019-03-30 NOTE — NUR
RN ICU: 40mcg/m Levophed, 200mcg/m Justin gtts now,  attempted to place groin TLC 
but unsuccessfully d/t pt.restless, hemodynamic status

## 2019-03-30 NOTE — NUR
RR INCREASED TO 10 PER LASHAY NP ORDERS

-------------------------------------------------------------------------------

Addendum: 03/30/19 at 0820 by PHILIP BELL RT

-------------------------------------------------------------------------------

Amended: Links added.

## 2019-03-30 NOTE — NUR
RN ICU: pt.is obtunded, can open eyes spont. for seconds, rest, weak arms uncontrolled 
activity, uncooperative, very weak, pale, on B.mittens, SR, on 5mcg/m Levophed, BP 90/30, 
will increase Levo, peep changed to 0, ABG done: pH 7.32/55/385/27, hold GTF now, called to 
son, got BT, PICC placement consent, H/H 3.6/11, night nurse confirmed: ordered STAT 2 units 
PRBC

## 2019-03-30 NOTE — NUR
RN ICU: radiology dep.MD called: TLC needs to be readjusted little, got info:  
evaluated CXR and ok to use TLC, will notify  to see CXR report by radiology MD 
call. SBP over 140, continue titrate pressors

## 2019-03-30 NOTE — NUR
RN NOTES



PATIENT RECIEVED IN BED, AWAKE ALERT AND RESPONSIVE, NOTED WITH CONFUSION. NO DISTRESS 
NOTED. BREATHING EVEN AND UNLABORED. VENT SETTING WELL TOLERATED. NO PHYSICAL MANIFESTATION 
OF PAIN OR DISCOMFORT. VITAL SIGNS WNL. KEPT CLEAN AND DRY.



TURNED AND REPOSITION EVERY TWO HOURS WITH LIMBS IN FUNCTIONAL ALIGNMENT. NORCO GIVEN FOR 
PAIN PRIOR TO WOUND TREATMENT STARTED AT 0330. NOTED WITH BLEEDING IN SACRUM STATUS POST 
WOUND DEBRIBEMENT. 



PEG TUBE IN PLACE, PATENT AND INTACT. FEEDING WELL TOLERATED. NO RESIDUAL NOTED. 



AT AROUND 0520 NOTED PATIENT TO BE UNRESPONSIVE WITH 02SAT BETWEEN 88 TO 92%. SUCTIONED 
SMALL AMOUNT OF YELLOWISH THIN SECRETION. CALLED PATIENT'S NAME, STIMULATED STILL WITH NO 
RESPONSE AND OBSERVED SATURATION GOING DOWN TO 82%. CALLED RAPID RESPONSE.



SEEN BY DR PEDERSEN WITH ORDERS FOR FiO2 CHANGE, BOLUS 500ML, LEVOPHED IV DRIP AND TRANSFER TO 
ICU. NOTED AND CARRIED OUT. 



TRANSFERRED . REPORTS GIVEN TO RAGHU AND ED.

## 2019-03-30 NOTE — NUR
NUVIA BARROW RESPONDED TO CALL TO RELAY LACTIC ACID =[id number],UPDATED ON PATIENT'S STATUS. ORDERED 
TO GIVE 500 ML NSS BOLUS,NO FURTHER ORDERS.

## 2019-03-30 NOTE — NUR
INCREASED VT  PER DR LANDIN REQUEST


-------------------------------------------------------------------------------

Addendum: 03/30/19 at 1125 by PHILIP BELL RT

-------------------------------------------------------------------------------

Amended: Links added.

## 2019-03-30 NOTE — NUR
RT

RT RESPOND TO RAPID RESPONSE TO SEE THE PATIENT UNRESPONSIVE AND SPO2 OF 89%. PT SUCTIONED. 
FIO2 SETTINGS CHANGED % PER MD ORDER. SPO2 INCREASE %. PT TRANSFER TO ICU.

## 2019-03-30 NOTE — NUR
RN ICU:  tried to place L.IJ TLC, unsuccessfully d/t low BP, hypovolemia, 
 was in room/updated with ABG, vent.setting, ordered: CXR stat

## 2019-03-30 NOTE — NUR
RN ICU: pt.is awake with open eyes, rest, eyes contact+, weak, unable to follow commands, 
uncooperative, still with B.mittens, able to pull out lines, no grimacing now, SR, continue 
titrate pressors down, all skin care, PM care done, GTF WNL, sacral wound care done, still 
bloody oozing, spoke with charge nurse, agree for H/H recheck at 20.00 with  
misc.order for BT,  is in room, notified re pt.history, VS, pressors, wounds, BT, 
HD, lungs status/possible bronchoscopy, anuria, orders, POC,

## 2019-03-31 VITALS — DIASTOLIC BLOOD PRESSURE: 27 MMHG | SYSTOLIC BLOOD PRESSURE: 69 MMHG

## 2019-03-31 VITALS — SYSTOLIC BLOOD PRESSURE: 113 MMHG | DIASTOLIC BLOOD PRESSURE: 46 MMHG

## 2019-03-31 VITALS — SYSTOLIC BLOOD PRESSURE: 122 MMHG | DIASTOLIC BLOOD PRESSURE: 43 MMHG

## 2019-03-31 VITALS — DIASTOLIC BLOOD PRESSURE: 45 MMHG | SYSTOLIC BLOOD PRESSURE: 92 MMHG

## 2019-03-31 VITALS — DIASTOLIC BLOOD PRESSURE: 51 MMHG | SYSTOLIC BLOOD PRESSURE: 108 MMHG

## 2019-03-31 VITALS — DIASTOLIC BLOOD PRESSURE: 43 MMHG | SYSTOLIC BLOOD PRESSURE: 93 MMHG

## 2019-03-31 VITALS — SYSTOLIC BLOOD PRESSURE: 92 MMHG | DIASTOLIC BLOOD PRESSURE: 45 MMHG

## 2019-03-31 VITALS — SYSTOLIC BLOOD PRESSURE: 105 MMHG | DIASTOLIC BLOOD PRESSURE: 74 MMHG

## 2019-03-31 VITALS — SYSTOLIC BLOOD PRESSURE: 94 MMHG | DIASTOLIC BLOOD PRESSURE: 48 MMHG

## 2019-03-31 VITALS — SYSTOLIC BLOOD PRESSURE: 98 MMHG | DIASTOLIC BLOOD PRESSURE: 51 MMHG

## 2019-03-31 VITALS — DIASTOLIC BLOOD PRESSURE: 56 MMHG | SYSTOLIC BLOOD PRESSURE: 98 MMHG

## 2019-03-31 VITALS — DIASTOLIC BLOOD PRESSURE: 59 MMHG | SYSTOLIC BLOOD PRESSURE: 92 MMHG

## 2019-03-31 VITALS — DIASTOLIC BLOOD PRESSURE: 52 MMHG | SYSTOLIC BLOOD PRESSURE: 98 MMHG

## 2019-03-31 VITALS — SYSTOLIC BLOOD PRESSURE: 108 MMHG | DIASTOLIC BLOOD PRESSURE: 57 MMHG

## 2019-03-31 VITALS — DIASTOLIC BLOOD PRESSURE: 47 MMHG | SYSTOLIC BLOOD PRESSURE: 93 MMHG

## 2019-03-31 VITALS — SYSTOLIC BLOOD PRESSURE: 102 MMHG | DIASTOLIC BLOOD PRESSURE: 49 MMHG

## 2019-03-31 VITALS — SYSTOLIC BLOOD PRESSURE: 110 MMHG | DIASTOLIC BLOOD PRESSURE: 41 MMHG

## 2019-03-31 VITALS — DIASTOLIC BLOOD PRESSURE: 56 MMHG | SYSTOLIC BLOOD PRESSURE: 80 MMHG

## 2019-03-31 VITALS — SYSTOLIC BLOOD PRESSURE: 55 MMHG | DIASTOLIC BLOOD PRESSURE: 35 MMHG

## 2019-03-31 VITALS — DIASTOLIC BLOOD PRESSURE: 32 MMHG | SYSTOLIC BLOOD PRESSURE: 78 MMHG

## 2019-03-31 VITALS — DIASTOLIC BLOOD PRESSURE: 47 MMHG | SYSTOLIC BLOOD PRESSURE: 96 MMHG

## 2019-03-31 VITALS — DIASTOLIC BLOOD PRESSURE: 48 MMHG | SYSTOLIC BLOOD PRESSURE: 94 MMHG

## 2019-03-31 VITALS — SYSTOLIC BLOOD PRESSURE: 86 MMHG | DIASTOLIC BLOOD PRESSURE: 52 MMHG

## 2019-03-31 VITALS — DIASTOLIC BLOOD PRESSURE: 66 MMHG | SYSTOLIC BLOOD PRESSURE: 87 MMHG

## 2019-03-31 VITALS — SYSTOLIC BLOOD PRESSURE: 82 MMHG | DIASTOLIC BLOOD PRESSURE: 36 MMHG

## 2019-03-31 VITALS — SYSTOLIC BLOOD PRESSURE: 114 MMHG | DIASTOLIC BLOOD PRESSURE: 72 MMHG

## 2019-03-31 VITALS — SYSTOLIC BLOOD PRESSURE: 64 MMHG | DIASTOLIC BLOOD PRESSURE: 42 MMHG

## 2019-03-31 VITALS — SYSTOLIC BLOOD PRESSURE: 81 MMHG | DIASTOLIC BLOOD PRESSURE: 53 MMHG

## 2019-03-31 VITALS — DIASTOLIC BLOOD PRESSURE: 68 MMHG | SYSTOLIC BLOOD PRESSURE: 108 MMHG

## 2019-03-31 VITALS — DIASTOLIC BLOOD PRESSURE: 46 MMHG | SYSTOLIC BLOOD PRESSURE: 82 MMHG

## 2019-03-31 VITALS — DIASTOLIC BLOOD PRESSURE: 50 MMHG | SYSTOLIC BLOOD PRESSURE: 92 MMHG

## 2019-03-31 VITALS — SYSTOLIC BLOOD PRESSURE: 93 MMHG | DIASTOLIC BLOOD PRESSURE: 46 MMHG

## 2019-03-31 VITALS — DIASTOLIC BLOOD PRESSURE: 38 MMHG | SYSTOLIC BLOOD PRESSURE: 83 MMHG

## 2019-03-31 VITALS — DIASTOLIC BLOOD PRESSURE: 50 MMHG | SYSTOLIC BLOOD PRESSURE: 85 MMHG

## 2019-03-31 VITALS — SYSTOLIC BLOOD PRESSURE: 90 MMHG | DIASTOLIC BLOOD PRESSURE: 38 MMHG

## 2019-03-31 VITALS — SYSTOLIC BLOOD PRESSURE: 89 MMHG | DIASTOLIC BLOOD PRESSURE: 49 MMHG

## 2019-03-31 VITALS — SYSTOLIC BLOOD PRESSURE: 79 MMHG | DIASTOLIC BLOOD PRESSURE: 52 MMHG

## 2019-03-31 VITALS — SYSTOLIC BLOOD PRESSURE: 86 MMHG | DIASTOLIC BLOOD PRESSURE: 35 MMHG

## 2019-03-31 VITALS — DIASTOLIC BLOOD PRESSURE: 73 MMHG | SYSTOLIC BLOOD PRESSURE: 105 MMHG

## 2019-03-31 VITALS — SYSTOLIC BLOOD PRESSURE: 94 MMHG | DIASTOLIC BLOOD PRESSURE: 39 MMHG

## 2019-03-31 VITALS — SYSTOLIC BLOOD PRESSURE: 107 MMHG | DIASTOLIC BLOOD PRESSURE: 73 MMHG

## 2019-03-31 VITALS — DIASTOLIC BLOOD PRESSURE: 47 MMHG | SYSTOLIC BLOOD PRESSURE: 102 MMHG

## 2019-03-31 VITALS — SYSTOLIC BLOOD PRESSURE: 104 MMHG | DIASTOLIC BLOOD PRESSURE: 46 MMHG

## 2019-03-31 VITALS — DIASTOLIC BLOOD PRESSURE: 52 MMHG | SYSTOLIC BLOOD PRESSURE: 86 MMHG

## 2019-03-31 VITALS — SYSTOLIC BLOOD PRESSURE: 130 MMHG | DIASTOLIC BLOOD PRESSURE: 33 MMHG

## 2019-03-31 VITALS — DIASTOLIC BLOOD PRESSURE: 42 MMHG | SYSTOLIC BLOOD PRESSURE: 101 MMHG

## 2019-03-31 VITALS — SYSTOLIC BLOOD PRESSURE: 109 MMHG | DIASTOLIC BLOOD PRESSURE: 46 MMHG

## 2019-03-31 VITALS — SYSTOLIC BLOOD PRESSURE: 104 MMHG | DIASTOLIC BLOOD PRESSURE: 61 MMHG

## 2019-03-31 VITALS — DIASTOLIC BLOOD PRESSURE: 73 MMHG | SYSTOLIC BLOOD PRESSURE: 111 MMHG

## 2019-03-31 VITALS — DIASTOLIC BLOOD PRESSURE: 75 MMHG | SYSTOLIC BLOOD PRESSURE: 98 MMHG

## 2019-03-31 VITALS — SYSTOLIC BLOOD PRESSURE: 102 MMHG | DIASTOLIC BLOOD PRESSURE: 71 MMHG

## 2019-03-31 VITALS — DIASTOLIC BLOOD PRESSURE: 49 MMHG | SYSTOLIC BLOOD PRESSURE: 102 MMHG

## 2019-03-31 VITALS — DIASTOLIC BLOOD PRESSURE: 55 MMHG | SYSTOLIC BLOOD PRESSURE: 119 MMHG

## 2019-03-31 VITALS — DIASTOLIC BLOOD PRESSURE: 73 MMHG | SYSTOLIC BLOOD PRESSURE: 95 MMHG

## 2019-03-31 VITALS — DIASTOLIC BLOOD PRESSURE: 36 MMHG | SYSTOLIC BLOOD PRESSURE: 103 MMHG

## 2019-03-31 VITALS — DIASTOLIC BLOOD PRESSURE: 35 MMHG | SYSTOLIC BLOOD PRESSURE: 96 MMHG

## 2019-03-31 VITALS — SYSTOLIC BLOOD PRESSURE: 99 MMHG | DIASTOLIC BLOOD PRESSURE: 50 MMHG

## 2019-03-31 VITALS — DIASTOLIC BLOOD PRESSURE: 46 MMHG | SYSTOLIC BLOOD PRESSURE: 109 MMHG

## 2019-03-31 VITALS — SYSTOLIC BLOOD PRESSURE: 115 MMHG | DIASTOLIC BLOOD PRESSURE: 38 MMHG

## 2019-03-31 VITALS — SYSTOLIC BLOOD PRESSURE: 96 MMHG | DIASTOLIC BLOOD PRESSURE: 42 MMHG

## 2019-03-31 VITALS — DIASTOLIC BLOOD PRESSURE: 49 MMHG | SYSTOLIC BLOOD PRESSURE: 118 MMHG

## 2019-03-31 VITALS — SYSTOLIC BLOOD PRESSURE: 73 MMHG | DIASTOLIC BLOOD PRESSURE: 38 MMHG

## 2019-03-31 VITALS — SYSTOLIC BLOOD PRESSURE: 74 MMHG | DIASTOLIC BLOOD PRESSURE: 41 MMHG

## 2019-03-31 VITALS — SYSTOLIC BLOOD PRESSURE: 106 MMHG | DIASTOLIC BLOOD PRESSURE: 51 MMHG

## 2019-03-31 VITALS — DIASTOLIC BLOOD PRESSURE: 59 MMHG | SYSTOLIC BLOOD PRESSURE: 90 MMHG

## 2019-03-31 VITALS — SYSTOLIC BLOOD PRESSURE: 96 MMHG | DIASTOLIC BLOOD PRESSURE: 73 MMHG

## 2019-03-31 VITALS — SYSTOLIC BLOOD PRESSURE: 100 MMHG | DIASTOLIC BLOOD PRESSURE: 48 MMHG

## 2019-03-31 VITALS — SYSTOLIC BLOOD PRESSURE: 93 MMHG | DIASTOLIC BLOOD PRESSURE: 68 MMHG

## 2019-03-31 VITALS — DIASTOLIC BLOOD PRESSURE: 65 MMHG | SYSTOLIC BLOOD PRESSURE: 124 MMHG

## 2019-03-31 VITALS — DIASTOLIC BLOOD PRESSURE: 37 MMHG | SYSTOLIC BLOOD PRESSURE: 93 MMHG

## 2019-03-31 VITALS — DIASTOLIC BLOOD PRESSURE: 55 MMHG | SYSTOLIC BLOOD PRESSURE: 97 MMHG

## 2019-03-31 VITALS — DIASTOLIC BLOOD PRESSURE: 58 MMHG | SYSTOLIC BLOOD PRESSURE: 110 MMHG

## 2019-03-31 VITALS — DIASTOLIC BLOOD PRESSURE: 49 MMHG | SYSTOLIC BLOOD PRESSURE: 94 MMHG

## 2019-03-31 VITALS — SYSTOLIC BLOOD PRESSURE: 118 MMHG | DIASTOLIC BLOOD PRESSURE: 49 MMHG

## 2019-03-31 VITALS — SYSTOLIC BLOOD PRESSURE: 98 MMHG | DIASTOLIC BLOOD PRESSURE: 56 MMHG

## 2019-03-31 VITALS — SYSTOLIC BLOOD PRESSURE: 118 MMHG | DIASTOLIC BLOOD PRESSURE: 74 MMHG

## 2019-03-31 VITALS — SYSTOLIC BLOOD PRESSURE: 110 MMHG | DIASTOLIC BLOOD PRESSURE: 50 MMHG

## 2019-03-31 VITALS — SYSTOLIC BLOOD PRESSURE: 98 MMHG | DIASTOLIC BLOOD PRESSURE: 75 MMHG

## 2019-03-31 VITALS — DIASTOLIC BLOOD PRESSURE: 55 MMHG | SYSTOLIC BLOOD PRESSURE: 102 MMHG

## 2019-03-31 VITALS — DIASTOLIC BLOOD PRESSURE: 51 MMHG | SYSTOLIC BLOOD PRESSURE: 111 MMHG

## 2019-03-31 VITALS — DIASTOLIC BLOOD PRESSURE: 62 MMHG | SYSTOLIC BLOOD PRESSURE: 100 MMHG

## 2019-03-31 VITALS — SYSTOLIC BLOOD PRESSURE: 110 MMHG | DIASTOLIC BLOOD PRESSURE: 34 MMHG

## 2019-03-31 VITALS — DIASTOLIC BLOOD PRESSURE: 57 MMHG | SYSTOLIC BLOOD PRESSURE: 115 MMHG

## 2019-03-31 VITALS — DIASTOLIC BLOOD PRESSURE: 38 MMHG | SYSTOLIC BLOOD PRESSURE: 114 MMHG

## 2019-03-31 VITALS — DIASTOLIC BLOOD PRESSURE: 61 MMHG | SYSTOLIC BLOOD PRESSURE: 94 MMHG

## 2019-03-31 VITALS — DIASTOLIC BLOOD PRESSURE: 51 MMHG | SYSTOLIC BLOOD PRESSURE: 106 MMHG

## 2019-03-31 VITALS — DIASTOLIC BLOOD PRESSURE: 70 MMHG | SYSTOLIC BLOOD PRESSURE: 102 MMHG

## 2019-03-31 VITALS — DIASTOLIC BLOOD PRESSURE: 69 MMHG | SYSTOLIC BLOOD PRESSURE: 107 MMHG

## 2019-03-31 VITALS — DIASTOLIC BLOOD PRESSURE: 47 MMHG | SYSTOLIC BLOOD PRESSURE: 97 MMHG

## 2019-03-31 VITALS — SYSTOLIC BLOOD PRESSURE: 99 MMHG | DIASTOLIC BLOOD PRESSURE: 65 MMHG

## 2019-03-31 VITALS — SYSTOLIC BLOOD PRESSURE: 93 MMHG | DIASTOLIC BLOOD PRESSURE: 48 MMHG

## 2019-03-31 VITALS — DIASTOLIC BLOOD PRESSURE: 55 MMHG | SYSTOLIC BLOOD PRESSURE: 96 MMHG

## 2019-03-31 VITALS — DIASTOLIC BLOOD PRESSURE: 62 MMHG | SYSTOLIC BLOOD PRESSURE: 87 MMHG

## 2019-03-31 VITALS — SYSTOLIC BLOOD PRESSURE: 108 MMHG | DIASTOLIC BLOOD PRESSURE: 52 MMHG

## 2019-03-31 VITALS — SYSTOLIC BLOOD PRESSURE: 107 MMHG | DIASTOLIC BLOOD PRESSURE: 53 MMHG

## 2019-03-31 VITALS — DIASTOLIC BLOOD PRESSURE: 40 MMHG | SYSTOLIC BLOOD PRESSURE: 114 MMHG

## 2019-03-31 VITALS — DIASTOLIC BLOOD PRESSURE: 52 MMHG | SYSTOLIC BLOOD PRESSURE: 108 MMHG

## 2019-03-31 VITALS — SYSTOLIC BLOOD PRESSURE: 115 MMHG | DIASTOLIC BLOOD PRESSURE: 41 MMHG

## 2019-03-31 LAB
BASE EXCESS BLDA CALC-SCNC: -14.8 MMOL/L
BASE EXCESS BLDA CALC-SCNC: -20.6 MMOL/L
BASOPHILS # BLD AUTO: 0 /CMM (ref 0–0.2)
BASOPHILS NFR BLD AUTO: 0.1 % (ref 0–2)
BUN SERPL-MCNC: 62 MG/DL (ref 7–18)
CALCIUM SERPL-MCNC: 7.5 MG/DL (ref 8.5–10.1)
CHLORIDE SERPL-SCNC: 97 MMOL/L (ref 98–107)
CO2 SERPL-SCNC: 15 MMOL/L (ref 21–32)
CREAT SERPL-MCNC: 1.7 MG/DL (ref 0.6–1.3)
DO-HGB MFR BLDA: 303.7 MMHG
DO-HGB MFR BLDA: 308.8 MMHG
EOSINOPHIL NFR BLD AUTO: 0.6 % (ref 0–6)
GLUCOSE SERPL-MCNC: 147 MG/DL (ref 74–106)
HCT VFR BLD AUTO: 20 % (ref 33–45)
HCT VFR BLD AUTO: 27 % (ref 33–45)
HGB BLD-MCNC: 5.9 G/DL (ref 11.5–14.8)
HGB BLD-MCNC: 8.7 G/DL (ref 11.5–14.8)
INHALED O2 CONCENTRATION: 60 %
INHALED O2 CONCENTRATION: 60 %
LYMPHOCYTES NFR BLD AUTO: 0.5 /CMM (ref 0.8–4.8)
LYMPHOCYTES NFR BLD AUTO: 1.5 % (ref 20–44)
LYMPHOCYTES NFR BLD MANUAL: 4 % (ref 16–48)
MAGNESIUM SERPL-MCNC: 2 MG/DL (ref 1.8–2.4)
MCHC RBC AUTO-ENTMCNC: 33 G/DL (ref 31–36)
MCV RBC AUTO: 94 FL (ref 82–100)
MONOCYTES NFR BLD AUTO: 1.4 /CMM (ref 0.1–1.3)
MONOCYTES NFR BLD AUTO: 4.5 % (ref 2–12)
MONOCYTES NFR BLD MANUAL: 3 % (ref 0–11)
NEUTROPHILS # BLD AUTO: 28.6 /CMM (ref 1.8–8.9)
NEUTROPHILS NFR BLD AUTO: 93.3 % (ref 43–81)
NEUTS BAND NFR BLD MANUAL: 21 % (ref 0–5)
NEUTS SEG NFR BLD MANUAL: 72 % (ref 42–76)
PCO2 TEMP ADJ BLDA: 30.7 MMHG (ref 35–45)
PCO2 TEMP ADJ BLDA: 32.8 MMHG (ref 35–45)
PH TEMP ADJ BLDA: 7.05 [PH] (ref 7.35–7.45)
PH TEMP ADJ BLDA: 7.19 [PH] (ref 7.35–7.45)
PHOSPHATE SERPL-MCNC: 5.2 MG/DL (ref 2.5–4.9)
PLATELET # BLD AUTO: 81 /CMM (ref 150–450)
PO2 TEMP ADJ BLDA: 85.2 MMHG (ref 75–100)
PO2 TEMP ADJ BLDA: 88 MMHG (ref 75–100)
POTASSIUM SERPL-SCNC: 4.4 MMOL/L (ref 3.5–5.1)
RBC # BLD AUTO: 2.83 MIL/UL (ref 4–5.2)
SAO2 % BLDA: 92.2 % (ref 92–98.5)
SAO2 % BLDA: 94.5 % (ref 92–98.5)
SODIUM SERPL-SCNC: 131 MMOL/L (ref 136–145)
VENTILATION MODE VENT: (no result)
VENTILATION MODE VENT: (no result)
WBC NRBC COR # BLD AUTO: 30.6 K/UL (ref 4.3–11)

## 2019-03-31 RX ADMIN — ALBUTEROL SULFATE SCH MG: 1.25 SOLUTION RESPIRATORY (INHALATION) at 23:28

## 2019-03-31 RX ADMIN — DEXTROSE MONOHYDRATE PRN MLS/HR: 50 INJECTION, SOLUTION INTRAVENOUS at 17:38

## 2019-03-31 RX ADMIN — DEXTROSE MONOHYDRATE PRN MLS/HR: 50 INJECTION, SOLUTION INTRAVENOUS at 16:28

## 2019-03-31 RX ADMIN — Medication SCH MLS/HR: at 21:24

## 2019-03-31 RX ADMIN — Medication SCH EACH: at 16:26

## 2019-03-31 RX ADMIN — ACETYLCYSTEINE SCH MG: 100 INHALANT RESPIRATORY (INHALATION) at 07:29

## 2019-03-31 RX ADMIN — ALBUTEROL SULFATE SCH MG: 1.25 SOLUTION RESPIRATORY (INHALATION) at 00:32

## 2019-03-31 RX ADMIN — SODIUM HYPOCHLORITE SCH ML: 1.25 SOLUTION TOPICAL at 21:00

## 2019-03-31 RX ADMIN — DEXTROSE MONOHYDRATE PRN MLS/HR: 50 INJECTION, SOLUTION INTRAVENOUS at 03:02

## 2019-03-31 RX ADMIN — Medication PRN OZ: at 16:28

## 2019-03-31 RX ADMIN — METOPROLOL TARTRATE SCH MG: 50 TABLET, FILM COATED ORAL at 08:01

## 2019-03-31 RX ADMIN — ALBUTEROL SULFATE SCH MG: 1.25 SOLUTION RESPIRATORY (INHALATION) at 14:53

## 2019-03-31 RX ADMIN — ACETYLCYSTEINE SCH MG: 100 INHALANT RESPIRATORY (INHALATION) at 23:28

## 2019-03-31 RX ADMIN — Medication SCH MG: at 08:22

## 2019-03-31 RX ADMIN — AMIODARONE HYDROCHLORIDE SCH MG: 200 TABLET ORAL at 08:22

## 2019-03-31 RX ADMIN — ACETYLCYSTEINE SCH MG: 100 INHALANT RESPIRATORY (INHALATION) at 14:53

## 2019-03-31 RX ADMIN — Medication SCH MLS/HR: at 08:21

## 2019-03-31 RX ADMIN — Medication SCH MG: at 07:29

## 2019-03-31 RX ADMIN — Medication SCH TAB: at 08:22

## 2019-03-31 RX ADMIN — ACETYLCYSTEINE SCH MG: 100 INHALANT RESPIRATORY (INHALATION) at 00:33

## 2019-03-31 RX ADMIN — Medication SCH MG: at 00:33

## 2019-03-31 RX ADMIN — Medication SCH MG: at 19:18

## 2019-03-31 RX ADMIN — DEXTROSE MONOHYDRATE PRN MLS/HR: 50 INJECTION, SOLUTION INTRAVENOUS at 12:50

## 2019-03-31 RX ADMIN — MEROPENEM SCH MLS/HR: 500 INJECTION INTRAVENOUS at 02:55

## 2019-03-31 RX ADMIN — METOPROLOL TARTRATE SCH MG: 50 TABLET, FILM COATED ORAL at 21:00

## 2019-03-31 RX ADMIN — Medication SCH APPLIC: at 08:21

## 2019-03-31 RX ADMIN — Medication PRN ML: at 06:28

## 2019-03-31 RX ADMIN — Medication PRN OZ: at 08:20

## 2019-03-31 RX ADMIN — SODIUM HYPOCHLORITE SCH ML: 1.25 SOLUTION TOPICAL at 08:22

## 2019-03-31 RX ADMIN — OXYCODONE HYDROCHLORIDE AND ACETAMINOPHEN SCH MG: 500 TABLET ORAL at 08:22

## 2019-03-31 RX ADMIN — ALBUTEROL SULFATE SCH MG: 1.25 SOLUTION RESPIRATORY (INHALATION) at 07:30

## 2019-03-31 RX ADMIN — Medication SCH MG: at 13:49

## 2019-03-31 RX ADMIN — AMIODARONE HYDROCHLORIDE SCH MG: 200 TABLET ORAL at 22:27

## 2019-03-31 RX ADMIN — DEXTROSE MONOHYDRATE PRN MLS/HR: 50 INJECTION, SOLUTION INTRAVENOUS at 08:20

## 2019-03-31 RX ADMIN — DEXTROSE MONOHYDRATE PRN MLS/HR: 50 INJECTION, SOLUTION INTRAVENOUS at 13:32

## 2019-03-31 RX ADMIN — MEROPENEM SCH MLS/HR: 500 INJECTION INTRAVENOUS at 16:25

## 2019-03-31 RX ADMIN — DEXTROSE MONOHYDRATE PRN MLS/HR: 50 INJECTION, SOLUTION INTRAVENOUS at 21:15

## 2019-03-31 RX ADMIN — DEXTROSE MONOHYDRATE PRN MLS/HR: 50 INJECTION, SOLUTION INTRAVENOUS at 01:11

## 2019-03-31 NOTE — NUR
PRBC transfusion over,no transfusion reaction noted,Patient status unchanged,not in any 
respiratory distress,remains awake,trying to talk,seems to be more coherent . BP still 
labile,still titrating Levophed and Neosynephrine drip .

## 2019-03-31 NOTE — NUR
RT

PATIENT REC'D TRACHED ON [facility] VENT WITH ORDERED SETTINGS IN CRITICAL CONDITION. VENT ALARMS 
CHECKED + AUDIBLE. CUFF PRESSURE CHECKED MLT. B/S DIM. SUCTIONED AIRWAY WITH SMALL AMT TAN 
SEMITHICK SECRETIONS. AMBU BAG AT \[facility]\"". 

-------------------------------------------------------------------------------

Addendum: 04/01/19 at 0728 by MAURO STEWART RT

-------------------------------------------------------------------------------

Amended: Links added.

## 2019-03-31 NOTE — NUR
ALL DUE MEDS GIVEN AND ALL NEEDS MET. PATIENT RIGHT PUPIL NON RESPONSIVE. PATIENT NON 
RESPONSIVE TO PAINFUL STIMULI; DR MARTITA KHALIL AWARE. PATIENT CONTINUES ON MAX RYAN AND 10 
OF LEVO BP STABLE. IV SITES C/D/I/P WITH GOOD BLOOD RETURN. PENDING UPDATED HH. PATIENT 
EXTREMITIES CONTINUE COLD; WARM BLANKETS APPLIED. TOLERATING VENT NO SOB NOTED. G TUBE 
FEEDING PER ORDER NO RESIDUAL. SKIN CARE PER ORDER/PRN SOILING. NO S/S OVERT BLEEDING FROM 
SACRUM PATIENT CONTINUES DNR PER SON SHALONDA. SAFETY, SKIN, ASPIRATION PRECAUTIONS IN PLACE 
AND MONITORED.

## 2019-03-31 NOTE — NUR
PATIENT RIGHT PUPIL FIXED 5CM AND LEFT PUPIL 3CM SLUGGISH REACTION. PATIENT CONTINUES NON 
RESPONSIVE. WILL AT MOST GRIMACE TO PAINFUL STIMULI. DR KHALIL AWARE AND PER MD PATIENT 
TOO UNSTABLE TO HAVE HEAD CT.

## 2019-03-31 NOTE — NUR
2ND UNIT PRBC TRANSFUSION FINISHED ,NO TRANSFUSION REACTION NOTED.

0500 BLOOD DRAWN FOR LABS.PATIENT REMAINS AWAKE,ALERT.NOT IN NAY DISTRESS.BP IMPROVING,WILL 
SLOWLY WEAN DOWN PRESSORS AS TOLERATED.

## 2019-03-31 NOTE — NUR
RECEIVED PATIENT FROM RN. PATIENT EYES OPEN AND MOVING BUE WITH MITTENS IN PLACE FOR SAFETY. 
VENT SETTINGS PER MD ORDER AND TOLERATING. NO SOB, DIFFICULTY BREATHING OR PAIN NOTED. 
PATIENT UNABLE TO FOLLOW COMMANDS. ANURIC. WOUND NOTED AND ALL DRESSINGS C/D/I/P. NO 
BLEEDING NOTED FROM SACRAL WOUND; WILL MONITOR. HD CATH INTACT. LEFT IJ INTACT AND PATENT 
WITH GOOD BLOOD RETURN. PATIENT ON BOTH LEVO AND RYAN; SEE SPREADSHEET. DRESSING SOILED WILL 
CHANGE. PATIENT NOTED WITH BUE AV FISTULAS; LEFT AV FISTULA WITH BRUIT/THRILL. G TUBE WITH 
FEEDING PER ORDER WITHOUT RESIDUAL AT THIS TIME. SKIN SAFETY, ASPIRATION PRECAUTIONS IN 
PLACE AND WILL MONITOR.

## 2019-03-31 NOTE — NUR
NOTIFIED MD PATIENT LESS RESPONSIVE. NO LONGER REQUIRING MITTENS. WILL OPEN EYES MORE OR 
GRIMACE TO PAINFUL STIMULI ONLY. PUPILS REACTIVE HOWEVER SLUGGISH.

## 2019-03-31 NOTE — NUR
REPORT GIVEN TO DILEEP LEON,PATIENT  STILL ON LEVOPHED AND NEOSYNERHRINE DRIP FOR BP SUPPORT 
,HGB=8.7 POST 2 UNITS PRBC ALL NIGHT.PATIENT REMAINS AWAKE AND ALERT.

## 2019-03-31 NOTE — NUR
2nd unit PRBC started.

0200 AM care done, sacral wound dressing changed,saturated with blood,Noted some area in the 
sacral wound with some active bleeding.Dressing changed.

## 2019-03-31 NOTE — NUR
ICU RN. H&H 5.9 MD ORDER 2UNITS PRBC. 1ST UNIT STARTED. DURING TRANSFUSION NO COMPLICATION 
NOTED. WILL CONTINUE TO MONITOR

## 2019-03-31 NOTE — NUR
DR LANDIN AWARE OF RECENT ABG PH OF 7.1. PER MD VENT SETTING CHANGES  AND AC 20. ORDER 
CHEST PHYSIOTHERAPY LEFT.

## 2019-03-31 NOTE — NUR
PATIENT SON AT BEDSIDE AND UPDATED ON PATIENT CONDITION, EXPLAINED MEDICATIONS, NO PUPIL 
REACTION. ALL QUESTIONS ANSWERED. PER SON HE WILL TRY TO BE BY TOMORROW AFTERNOON TO SPEAK 
WITH MD'S

## 2019-04-01 VITALS — DIASTOLIC BLOOD PRESSURE: 28 MMHG | SYSTOLIC BLOOD PRESSURE: 62 MMHG

## 2019-04-01 VITALS — DIASTOLIC BLOOD PRESSURE: 78 MMHG | SYSTOLIC BLOOD PRESSURE: 101 MMHG

## 2019-04-01 VITALS — SYSTOLIC BLOOD PRESSURE: 85 MMHG | DIASTOLIC BLOOD PRESSURE: 28 MMHG

## 2019-04-01 VITALS — DIASTOLIC BLOOD PRESSURE: 33 MMHG | SYSTOLIC BLOOD PRESSURE: 74 MMHG

## 2019-04-01 VITALS — DIASTOLIC BLOOD PRESSURE: 34 MMHG | SYSTOLIC BLOOD PRESSURE: 77 MMHG

## 2019-04-01 VITALS — DIASTOLIC BLOOD PRESSURE: 75 MMHG | SYSTOLIC BLOOD PRESSURE: 109 MMHG

## 2019-04-01 VITALS — DIASTOLIC BLOOD PRESSURE: 65 MMHG | SYSTOLIC BLOOD PRESSURE: 119 MMHG

## 2019-04-01 VITALS — DIASTOLIC BLOOD PRESSURE: 68 MMHG | SYSTOLIC BLOOD PRESSURE: 101 MMHG

## 2019-04-01 VITALS — SYSTOLIC BLOOD PRESSURE: 101 MMHG | DIASTOLIC BLOOD PRESSURE: 74 MMHG

## 2019-04-01 VITALS — DIASTOLIC BLOOD PRESSURE: 60 MMHG | SYSTOLIC BLOOD PRESSURE: 115 MMHG

## 2019-04-01 VITALS — SYSTOLIC BLOOD PRESSURE: 105 MMHG | DIASTOLIC BLOOD PRESSURE: 50 MMHG

## 2019-04-01 VITALS — SYSTOLIC BLOOD PRESSURE: 105 MMHG | DIASTOLIC BLOOD PRESSURE: 38 MMHG

## 2019-04-01 VITALS — DIASTOLIC BLOOD PRESSURE: 73 MMHG | SYSTOLIC BLOOD PRESSURE: 108 MMHG

## 2019-04-01 VITALS — DIASTOLIC BLOOD PRESSURE: 31 MMHG | SYSTOLIC BLOOD PRESSURE: 72 MMHG

## 2019-04-01 VITALS — DIASTOLIC BLOOD PRESSURE: 24 MMHG | SYSTOLIC BLOOD PRESSURE: 89 MMHG

## 2019-04-01 VITALS — SYSTOLIC BLOOD PRESSURE: 69 MMHG | DIASTOLIC BLOOD PRESSURE: 31 MMHG

## 2019-04-01 VITALS — SYSTOLIC BLOOD PRESSURE: 127 MMHG | DIASTOLIC BLOOD PRESSURE: 72 MMHG

## 2019-04-01 VITALS — SYSTOLIC BLOOD PRESSURE: 120 MMHG | DIASTOLIC BLOOD PRESSURE: 48 MMHG

## 2019-04-01 VITALS — DIASTOLIC BLOOD PRESSURE: 29 MMHG | SYSTOLIC BLOOD PRESSURE: 77 MMHG

## 2019-04-01 VITALS — DIASTOLIC BLOOD PRESSURE: 26 MMHG | SYSTOLIC BLOOD PRESSURE: 74 MMHG

## 2019-04-01 VITALS — DIASTOLIC BLOOD PRESSURE: 59 MMHG | SYSTOLIC BLOOD PRESSURE: 78 MMHG

## 2019-04-01 VITALS — DIASTOLIC BLOOD PRESSURE: 62 MMHG | SYSTOLIC BLOOD PRESSURE: 90 MMHG

## 2019-04-01 VITALS — SYSTOLIC BLOOD PRESSURE: 74 MMHG | DIASTOLIC BLOOD PRESSURE: 31 MMHG

## 2019-04-01 VITALS — SYSTOLIC BLOOD PRESSURE: 82 MMHG | DIASTOLIC BLOOD PRESSURE: 27 MMHG

## 2019-04-01 VITALS — SYSTOLIC BLOOD PRESSURE: 89 MMHG | DIASTOLIC BLOOD PRESSURE: 24 MMHG

## 2019-04-01 VITALS — DIASTOLIC BLOOD PRESSURE: 28 MMHG | SYSTOLIC BLOOD PRESSURE: 77 MMHG

## 2019-04-01 VITALS — DIASTOLIC BLOOD PRESSURE: 65 MMHG | SYSTOLIC BLOOD PRESSURE: 118 MMHG

## 2019-04-01 VITALS — DIASTOLIC BLOOD PRESSURE: 70 MMHG | SYSTOLIC BLOOD PRESSURE: 118 MMHG

## 2019-04-01 VITALS — SYSTOLIC BLOOD PRESSURE: 60 MMHG | DIASTOLIC BLOOD PRESSURE: 25 MMHG

## 2019-04-01 VITALS — DIASTOLIC BLOOD PRESSURE: 36 MMHG | SYSTOLIC BLOOD PRESSURE: 95 MMHG

## 2019-04-01 VITALS — DIASTOLIC BLOOD PRESSURE: 25 MMHG | SYSTOLIC BLOOD PRESSURE: 68 MMHG

## 2019-04-01 VITALS — DIASTOLIC BLOOD PRESSURE: 27 MMHG | SYSTOLIC BLOOD PRESSURE: 69 MMHG

## 2019-04-01 VITALS — SYSTOLIC BLOOD PRESSURE: 72 MMHG | DIASTOLIC BLOOD PRESSURE: 30 MMHG

## 2019-04-01 VITALS — DIASTOLIC BLOOD PRESSURE: 38 MMHG | SYSTOLIC BLOOD PRESSURE: 105 MMHG

## 2019-04-01 VITALS — SYSTOLIC BLOOD PRESSURE: 109 MMHG | DIASTOLIC BLOOD PRESSURE: 70 MMHG

## 2019-04-01 VITALS — DIASTOLIC BLOOD PRESSURE: 53 MMHG | SYSTOLIC BLOOD PRESSURE: 101 MMHG

## 2019-04-01 VITALS — DIASTOLIC BLOOD PRESSURE: 76 MMHG | SYSTOLIC BLOOD PRESSURE: 111 MMHG

## 2019-04-01 VITALS — DIASTOLIC BLOOD PRESSURE: 49 MMHG | SYSTOLIC BLOOD PRESSURE: 103 MMHG

## 2019-04-01 VITALS — DIASTOLIC BLOOD PRESSURE: 73 MMHG | SYSTOLIC BLOOD PRESSURE: 109 MMHG

## 2019-04-01 VITALS — SYSTOLIC BLOOD PRESSURE: 119 MMHG | DIASTOLIC BLOOD PRESSURE: 58 MMHG

## 2019-04-01 VITALS — DIASTOLIC BLOOD PRESSURE: 75 MMHG | SYSTOLIC BLOOD PRESSURE: 110 MMHG

## 2019-04-01 LAB
BASOPHILS # BLD AUTO: 0 /CMM (ref 0–0.2)
BASOPHILS NFR BLD AUTO: 0.1 % (ref 0–2)
BUN SERPL-MCNC: 54 MG/DL (ref 7–18)
CALCIUM SERPL-MCNC: 7.6 MG/DL (ref 8.5–10.1)
CHLORIDE SERPL-SCNC: 91 MMOL/L (ref 98–107)
CO2 SERPL-SCNC: 6 MMOL/L (ref 21–32)
CREAT SERPL-MCNC: 1.7 MG/DL (ref 0.6–1.3)
EOSINOPHIL NFR BLD AUTO: 0.6 % (ref 0–6)
GLUCOSE SERPL-MCNC: 188 MG/DL (ref 74–106)
HCT VFR BLD AUTO: 31 % (ref 33–45)
HGB BLD-MCNC: 9.1 G/DL (ref 11.5–14.8)
LYMPHOCYTES NFR BLD AUTO: 1.3 /CMM (ref 0.8–4.8)
LYMPHOCYTES NFR BLD AUTO: 3.5 % (ref 20–44)
LYMPHOCYTES NFR BLD MANUAL: 2 % (ref 16–48)
MAGNESIUM SERPL-MCNC: 2.2 MG/DL (ref 1.8–2.4)
MCHC RBC AUTO-ENTMCNC: 30 G/DL (ref 31–36)
MCV RBC AUTO: 99 FL (ref 82–100)
MONOCYTES NFR BLD AUTO: 1.3 /CMM (ref 0.1–1.3)
MONOCYTES NFR BLD AUTO: 3.4 % (ref 2–12)
MONOCYTES NFR BLD MANUAL: 3 % (ref 0–11)
NEUTROPHILS # BLD AUTO: 35.1 /CMM (ref 1.8–8.9)
NEUTROPHILS NFR BLD AUTO: 92.4 % (ref 43–81)
NEUTS BAND NFR BLD MANUAL: 54 % (ref 0–5)
NEUTS SEG NFR BLD MANUAL: 41 % (ref 42–76)
PHOSPHATE SERPL-MCNC: 8.6 MG/DL (ref 2.5–4.9)
PLATELET # BLD AUTO: 45 /CMM (ref 150–450)
POTASSIUM SERPL-SCNC: 5.8 MMOL/L (ref 3.5–5.1)
RBC # BLD AUTO: 3.11 MIL/UL (ref 4–5.2)
SODIUM SERPL-SCNC: 128 MMOL/L (ref 136–145)
WBC NRBC COR # BLD AUTO: 38 K/UL (ref 4.3–11)

## 2019-04-01 RX ADMIN — METOPROLOL TARTRATE SCH MG: 50 TABLET, FILM COATED ORAL at 09:00

## 2019-04-01 RX ADMIN — Medication SCH MLS/HR: at 09:26

## 2019-04-01 RX ADMIN — AMIODARONE HYDROCHLORIDE SCH MG: 200 TABLET ORAL at 09:00

## 2019-04-01 RX ADMIN — ACETYLCYSTEINE SCH MG: 100 INHALANT RESPIRATORY (INHALATION) at 07:11

## 2019-04-01 RX ADMIN — Medication SCH MG: at 01:46

## 2019-04-01 RX ADMIN — DEXTROSE MONOHYDRATE PRN MLS/HR: 50 INJECTION, SOLUTION INTRAVENOUS at 09:39

## 2019-04-01 RX ADMIN — Medication SCH EACH: at 09:00

## 2019-04-01 RX ADMIN — DEXTROSE MONOHYDRATE PRN MLS/HR: 50 INJECTION, SOLUTION INTRAVENOUS at 00:48

## 2019-04-01 RX ADMIN — Medication SCH TAB: at 09:00

## 2019-04-01 RX ADMIN — SODIUM HYPOCHLORITE SCH ML: 1.25 SOLUTION TOPICAL at 09:00

## 2019-04-01 RX ADMIN — Medication SCH MG: at 09:00

## 2019-04-01 RX ADMIN — Medication SCH MG: at 07:11

## 2019-04-01 RX ADMIN — Medication SCH APPLIC: at 09:00

## 2019-04-01 RX ADMIN — DEXTROSE MONOHYDRATE PRN MLS/HR: 50 INJECTION, SOLUTION INTRAVENOUS at 05:02

## 2019-04-01 RX ADMIN — DEXTROSE MONOHYDRATE PRN MLS/HR: 50 INJECTION, SOLUTION INTRAVENOUS at 02:05

## 2019-04-01 RX ADMIN — DEXTROSE MONOHYDRATE PRN MLS/HR: 50 INJECTION, SOLUTION INTRAVENOUS at 09:26

## 2019-04-01 RX ADMIN — ALBUTEROL SULFATE SCH MG: 1.25 SOLUTION RESPIRATORY (INHALATION) at 07:11

## 2019-04-01 RX ADMIN — Medication PRN ML: at 00:39

## 2019-04-01 RX ADMIN — OXYCODONE HYDROCHLORIDE AND ACETAMINOPHEN SCH MG: 500 TABLET ORAL at 09:00

## 2019-04-01 RX ADMIN — DEXTROSE MONOHYDRATE PRN MLS/HR: 50 INJECTION, SOLUTION INTRAVENOUS at 03:43

## 2019-04-01 RX ADMIN — MEROPENEM SCH MLS/HR: 500 INJECTION INTRAVENOUS at 03:43

## 2019-04-01 NOTE — NUR
unable to complete wound care s/t patient critical condition

-------------------------------------------------------------------------------

Addendum: 04/01/19 at 1034 by DILEEP HORVATH RN

-------------------------------------------------------------------------------

Amended: Links added.

## 2019-04-01 NOTE — NUR
RT

PATIENT REC'D TRACHED ON [facility] VENT WITH ORDERED SETTINGS IN CRITICAL CONDITION. VENT ALARMS 
CHECKED + AUDIBLE. CUFF PRESSURE CHECKED MLT. B/S DIM. SUCTIONED AIRWAY WITH SMALL AMT TAN 
SEMITHICK SECRETIONS. AMBU BAG AT [facility]. 

-------------------------------------------------------------------------------

Addendum: 04/01/19 at 0728 by MAURO STEWART RT

-------------------------------------------------------------------------------

Amended: Links added.

## 2019-04-01 NOTE — NUR
dr curiel at bedside. son notified spoke with family and they are in agreement to have 
patient transferred to comfort measures. son will try to get a hold of their . i 
called multiple pastors listed on our resources and left messages.

## 2019-04-01 NOTE — NUR
patient asystole. no pulses present. no heart beat. ana wild at bedside to confirm. dr Cross at bedside during time of patient expiration and aware.

## 2019-04-01 NOTE — NUR
TURNED FEEDING OFF. NO RESIDUAL HOWEVER BOWEL SOUNDS VERY HYPOACTIVE AND PATIENT REQUIRING 
POSITIONING TO ASSIST BP.

## 2019-04-01 NOTE — NUR
comfort measures only per dr curiel order and per son chi armstrong request. per son he wishes to 
speak with his  over the phone before we dc medications.

## 2019-04-01 NOTE — NUR
RECEIVED REPORT FROM CAROLYN HARLEY. PATIENT VENT TRACH WITH VENT PER ORDER. PULSE OX 92% WITH 
GOOD READING BUT DENSE WAVEFORM. WARMING BLANKETS APPLIED TO PATIENT. TUBE FEEDING PER ORDER 
NO RESIDUAL. PATIENT IS COMATOSE. NO GAG REFLEX WITH ORAL CARE OR SUCTIONING. NOR PUPIL 
REFLEX PRESENT. RIGHT PUPIL FIXED 5CM AND LEFT PUPIL FIXED 3 CM. PATIENT EXTREMITIES COLD 
AND PULSES UNABLE TO BE FELT HOWEVER AUDIBLE WITH DOPPLER. FINGER TIPS ARE BLUE AND COVERED 
WITH BLANKETS.

## 2019-04-01 NOTE — NUR
NON ADMIN ALL GT MEDICATIONS AS PATIENT HAD TUBE FEEDING COME OUT OF MOUTH. NO RESIDUALS 
THROUGH GT. ORAL CARE COMPLETED AND PATIENT SUCTIONED. EXCESSIVE SECRETIONS FROM TRACH SITE 
DR LANDIN AWARE. UNABLE TO REPOSITION PATIENT FOR WOUND CARE/ OFFLOADING AS PATIENT REMAINS 
CRITICAL CONDITION

## 2019-04-01 NOTE — NUR
RT

PER MD ORDER PATIENT ON COMFORT MEASURES HAS . [facility] VENTILATOR WAS TURNED OFF WITH NO 
PULSES OR RESPIRATIONS. SON AT BEDSIDE.

## 2019-04-01 NOTE — NUR
DR VALLES UPDATED ON PATIENT CONDITION THIS AM. PUPILS NON RESPONSIVE, HYPOTENSION, MAXED OUT 
ON RYAN AND LEVO. NO GAG REFLEX AND NO RESPONSE WITH PAINFUL STIMULI. MD AWARE SON ON WAY AND 
WILL SPEAK WITH SON SHALONDA AT BEDSIDE

## 2020-05-25 NOTE — NUR
RESPIRATORY NOTE:Received pt on ETT 7.5 @ 22cm lips line with settings: AC 12-500ml-40% 
FiO2- peep of 5. Pt saturates at 100%, pt is awake and alert, no SOB or acute distress noted 
at this time. Alarms are set and audible,  vent is plugged into the red outlet, ambu bag is 
at bedside. Will continue to monitor and sxn as needed. Received report from Nita, at pt bedside. Pt A/O x 4 Ethiopian speaking will use  line will educate pt to use urinal for strict I/O, POC discussed. Call light and belongings within reach. Bed locked and in low position. Alarm on and fall precautions in place.

## 2020-06-18 NOTE — NUR
ICU RN. INITIAL  ASSESSMENT RECEIVED THE PT REST ON THE BED. TRACH TO VENT CONNECTED. PT IS 
VERY UNSTABLE. RT PUPIL FIXED AND DILATED. LT EYE      VENT SETTINGS SHILEY#8,AC 20,TV 
450,FIO2 60%, SAT 90%. CARDIAC MONITOR SHOWING AFIB. IV LT IJ TRIPLE LUMEN RYAN 300MCG/MIN,RT 
SUBCLAVIAN HD CATH. LEVOPHED 10MCG/MIN, BICARB DRIP 100ML/H. HOB ELEVATED. GT INTACT. NEPRO 
45ML/H.  WILL CONTINUE TO MONITOR VITALS. DC instructions

## 2020-12-12 NOTE — NUR
NURSE NOTES:

Called and spoke with Jen from [facility]. She told me that on call nurse will call back for 
confirmation. Awaiting for call back. Patient Education        Seizure: Care Instructions    You were seen in emergency department today after suffering a seizure. You have a history of seizures and are currently on seizure medication. Based on your physical exam at this time and your head CT, we do not believe that your seizure today was related to a stroke. You have had no strokelike symptoms and your head CT was normal.  We have given you an additional dose of Keppra today. He is continue taking your Keppra as prescribed and call your neurologist on Monday to see if you need to go up on your Keppra dosing. We have also sent in a urinalysis you do not have a urinary tract infection given that your blood sugars have been elevated. If your urinalysis shows that you have an infection, we will call in an antibiotic to your pharmacy. Your Care Instructions     Seizures are caused by abnormal patterns of electrical signals in the brain. They are different for each person. Seizures can affect movement, speech, vision, or awareness. Some people have only slight shaking of a hand and do not pass out. Other people may pass out and have violent shaking of the whole body. Some people appear to stare into space. They are awake, but they can't respond normally. Later, they may not remember what happened. You may need tests to identify the type and cause of the seizures. A seizure may occur only once, or you may have them more than one time. Taking medicines as directed and following up with your doctor may help keep you from having more seizures. The doctor has checked you carefully, but problems can develop later. If you notice any problems or new symptoms, get medical treatment right away. Follow-up care is a key part of your treatment and safety. Be sure to make and go to all appointments, and call your doctor if you are having problems. It's also a good idea to know your test results and keep a list of the medicines you take.   How can you care for yourself at home? · Be safe with medicines. Take your medicines exactly as prescribed. Call your doctor if you think you are having a problem with your medicine. · Do not do any activity that could be dangerous to you or others until your doctor says it is safe to do so. For example, do not drive a car, operate machinery, swim, or climb ladders. · Be sure that anyone treating you for any health problem knows that you have had a seizure and what medicines you are taking for it. · Identify and avoid things that may make you more likely to have a seizure. These may include lack of sleep, alcohol or drug use, stress, or not eating. · Make sure you go to your follow-up appointment. When should you call for help? Call 911 anytime you think you may need emergency care. For example, call if:    · You have another seizure.     · You have new symptoms, such as trouble walking, speaking, or thinking clearly. Call your doctor now or seek immediate medical care if:    · You are not acting normally. Watch closely for changes in your health, and be sure to contact your doctor if you have any problems. Where can you learn more? Go to http://www.gray.com/  Enter M769 in the search box to learn more about \"Seizure: Care Instructions. \"  Current as of: November 20, 2019               Content Version: 12.6  © 3702-2322 [employer]. Care instructions adapted under license by [employer] (which disclaims liability or warranty for this information). If you have questions about a medical condition or this instruction, always ask your healthcare professional. [address] any warranty or liability for your use of this information.

## 2021-05-19 NOTE — NUR
NURSE NOTES: Patient repositioned, patient received oral care. Patient is afib on the 
monitor, patine vent settings are the same 02 saturation 100%. patient is tolerating feeding 
well, no residual noted. Patient nepro has been advanced to 20 ml. patient is not positive 
for MRSA of the nares. Doctor will be made aware. no change in condition. Head of bed is at 
30 degrees, bed is locked and in the lowest position, bed alarm s locked and in the lowest 
position. Will continue to monitor patient and follow plan of care. Island Pedicle Flap-Requiring Vessel Identification Text: The defect edges were debeveled with a #15 scalpel blade.  Given the location of the defect, shape of the defect and the proximity to free margins an island pedicle advancement flap was deemed most appropriate.  Using a sterile surgical marker, an appropriate advancement flap was drawn, based on the axial vessel mentioned above, incorporating the defect, outlining the appropriate donor tissue and placing the expected incisions within the relaxed skin tension lines where possible.    The area thus outlined was incised deep to adipose tissue with a #15 scalpel blade.  The skin margins were undermined to an appropriate distance in all directions around the primary defect and laterally outward around the island pedicle utilizing iris scissors.  There was minimal undermining beneath the pedicle flap.

## 2022-04-28 NOTE — INFECTIOUS DISEASES PROG NOTE
Assessment/Plan


Assessment/Plan


A:


Sepsis


UTI


Cellulitis of R leg


Hypercapnic respiratory


failure


ESRD on HD


DM


Anemia


Dementia


PVD


R pleural effusion


Mucous plug


P;


Continue Zyvox & Meropenem





Subjective


ROS Limited/Unobtainable:  Yes


Constitutional:  Reports: no symptoms


Respiratory:  Reports: other - on weaning process


Musculoskeletal:  Reports: pain, other - in right leg


Allergies:  


Coded Allergies:  


     No Known Allergies (Unverified , 11/26/18)





Objective


Vital Signs





Last 24 Hour Vital Signs








  Date Time  Temp Pulse Resp B/P (MAP) Pulse Ox O2 Delivery O2 Flow Rate FiO2


 


12/6/18 09:40  78 16     30


 


12/6/18 09:09  70  108/93    


 


12/6/18 09:08    108/93    


 


12/6/18 08:00      Mechanical Ventilator  





      Mechanical Ventilator  


 


12/6/18 08:00 98.2 70 14 92/23 (46) 100   


 


12/6/18 08:00        30


 


12/6/18 07:06  72 13     30


 


12/6/18 07:00  70 14 104/80 (88) 100   


 


12/6/18 06:00  70 14 111/22 (51) 100   


 


12/6/18 05:30  70 12     30


 


12/6/18 05:00  70 14 133/35 (67) 100   


 


12/6/18 04:00  69      


 


12/6/18 04:00      Mechanical Ventilator  





      Mechanical Ventilator  


 


12/6/18 04:00 99.0 71 14 144/11 (55) 100   


 


12/6/18 04:00        30


 


12/6/18 03:30  71 12     30


 


12/6/18 03:00  70 14 133/66 (88) 100   


 


12/6/18 02:00  71 15 123/11 (48) 100   


 


12/6/18 01:30  70 12     30


 


12/6/18 01:00 99.8 68 15 121/24 (56) 100   


 


12/6/18 00:00  68 15 136/87 (103) 100   


 


12/6/18 00:00  68      


 


12/6/18 00:00      Mechanical Ventilator  





      Mechanical Ventilator  


 


12/5/18 23:38  67 13     30


 


12/5/18 23:00  68 14 130/10 (50) 100   


 


12/5/18 22:00  73 17 120/12 (48) 99   


 


12/5/18 21:43  76  128/13    


 


12/5/18 21:42    128/13    


 


12/5/18 21:30  82 14     30


 


12/5/18 21:00  79 17 119/13 (48) 99   


 


12/5/18 20:00      Mechanical Ventilator  





      Mechanical Ventilator  


 


12/5/18 20:00 99.0 79 17 142/20 (60) 99   


 


12/5/18 20:00  76      


 


12/5/18 20:00        30


 


12/5/18 19:30  84 15     30


 


12/5/18 19:00  79 17 161/20 (67) 99   


 


12/5/18 18:00 98.2 68 15 /27 99   


 


12/5/18 17:00  68 14 92/15 (40) 100   


 


12/5/18 16:47  68 13     30


 


12/5/18 16:00      Mechanical Ventilator  





      Mechanical Ventilator  


 


12/5/18 16:00  67      


 


12/5/18 16:00        30


 


12/5/18 16:00  67 14 70/54 (59) 99   


 


12/5/18 15:00  66 14 91/77 (82) 100   


 


12/5/18 14:54  64 12     30


 


12/5/18 14:00  64 13 131/92 (105) 100   


 


12/5/18 13:15  61 12     30


 


12/5/18 13:00  64 14 96/62 (73) 100   


 


12/5/18 12:00        30


 


12/5/18 12:00  64 14 118/37 (64) 100   


 


12/5/18 12:00      Mechanical Ventilator  





      Mechanical Ventilator  


 


12/5/18 12:00  58      


 


12/5/18 11:00  67 19 133/59 (83) 100   


 


12/5/18 10:49  58 12     30








Height (Feet):  5


Height (Inches):  2.00


Weight (Pounds):  125


HEENT:  other - orally intubated


Respiratory/Chest:  rhonchi - bilaterally, other - on ventilator


Cardiovascular:  normal rate


Abdomen:  soft, non tender


Extremities:  no edema


Skin:  ulcers


Neurologic/Psychiatric:  alert, responsive





Microbiology








 Date/Time


Source Procedure


Growth Status


 


 


 12/4/18 17:30


Sputum Gram Stain - Final Complete


 


 12/4/18 17:30 Sputum Culture - Final


Candida Albicans Complete











Laboratory Tests








Test


  12/5/18


19:55


 


Stool Occult Blood Pending  











Current Medications








 Medications


  (Trade)  Dose


 Ordered  Sig/Tony


 Route


 PRN Reason  Start Time


 Stop Time Status Last Admin


Dose Admin


 


 Acetaminophen


  (Tylenol)  650 mg  Q6H  PRN


 ORAL


 Mild Pain/Temp > 100.5  12/4/18 14:15


 1/3/19 14:14   


 


 


 Amiodarone HCl


  (Cordarone)  400 mg  EVERY 12  HOURS


 ORAL


   12/5/18 21:00


 1/4/19 20:59  12/5/18 20:39


 


 


 Chlorhexidine


 Gluconate


  (Juana-Hex 2%)  1 applic  DAILY@2000


 TOPIC


   11/27/18 20:00


 12/27/18 19:59  12/5/18 20:38


 


 


 Epoetin Rupesh


  (Procrit (for


 ESRD on dialysis))  10,000 units  MON-WED-FRI


 SUBQ


   11/28/18 21:00


 12/28/18 20:59  12/5/18 20:39


 


 


 Heparin Sodium


  (Porcine)


  (Heparin Sod


 1000 units/ml


 10ml)  2,000 unit  ONCE  ONCE


 IV


   12/7/18 08:00


 12/7/18 08:01   


 


 


 Linezolid


  (Zyvox)  600 mg  EVERY 12  HOURS


 ORAL


   11/28/18 21:00


 12/8/18 20:59  12/6/18 09:10


 


 


 Meropenem 500 mg/


 Sodium Chloride  55 ml @ 


 110 mls/hr  DAILY


 IVPB


   11/28/18 12:00


 12/8/18 11:59  12/6/18 09:07


 


 


 Metoprolol


 Tartrate


  (Lopressor)  12.5 mg  Q12HR


 ORAL


   12/3/18 21:00


 1/2/19 20:59  12/6/18 09:09


 


 


 Minoxidil


  (Loniten)  5 mg  Q12HR


 NG


   12/2/18 21:00


 1/1/19 20:59  12/6/18 09:08


 


 


 Pantoprazole


  (Protonix)  40 mg  Q12HR


 IVP


   11/26/18 21:00


 12/27/18 08:59  12/6/18 09:07


 


 


 Sodium Chloride  1,000 ml @ 


 500 mls/hr  Q2H PRN


 IVLG


 sbp<90 during hd  12/7/18 07:51


 1/6/19 07:50   


 

















Luke Maynard MD Dec 6, 2018 10:29 Asthma   AMBULATORY CARE:   Asthma  is a lung disease that makes breathing difficult  Chronic inflammation and reactions to triggers narrow the airways in your lungs  Asthma can become life-threatening if it is not managed  Cough-variant asthma  is a type of asthma that causes a dry cough that keeps coming back  A dry cough may be your only symptom, or you may also have chest tightness  These symptoms may be caused by exercise or exposure to odors, allergens, or respiratory tract infections  Cough-variant asthma is treated the same way as typical asthma  Common symptoms include the following:   · Coughing    · Wheezing    · Shortness of breath    · Chest tightness    Call your local emergency number (911 in the [address] [facility]) if:   · You have severe shortness of breath  · The skin around your neck and ribs pulls in with each breath  · Your peak flow numbers are in the red zone of your AAP  Seek care immediately if:   · You have shortness of breath, even after you take your short-term medicine as directed  · Your lips or nails turn blue or gray  Call your doctor or asthma specialist if:   · You run out of medicine before your next refill is due  · Your symptoms get worse  · You need to take more medicine than usual to control your symptoms  · You have questions or concerns about your condition or care  Treatment for asthma  will depend on how severe your asthma is  Medicine may be used to decrease inflammation, open airways, and make it easier to breathe  Medicines may be inhaled, taken as a pill, or injected  Short-term medicines relieve your symptoms quickly  Long-term medicines are used to prevent future attacks  Other medicines may be needed if your regular medicines are not able to prevent attacks  You may also need medicine to help control your allergies  Manage and prevent future asthma attacks:   · Follow your asthma action plan    This is a written plan that you and your healthcare provider create  It explains which medicine you need and when to change doses if necessary  It also explains how you can monitor symptoms and use a peak flow meter  The meter measures how well your lungs are working  · Manage other health conditions , such as allergies, acid reflux, and sleep apnea  · Identify and avoid triggers  These may include pets, dust mites, mold, and cockroaches  · Do not smoke or be around others who smoke  Nicotine and other chemicals in cigarettes and cigars can cause lung damage  Ask your healthcare provider for information if you currently smoke and need help to quit  E-cigarettes or smokeless tobacco still contain nicotine  Talk to your healthcare provider before you use these products  · Ask about the flu vaccine  The flu can make your asthma worse  You may need a yearly flu shot  Follow up with your healthcare provider as directed: You will need to return to make sure your medicine is working and your symptoms are controlled  You may be referred to an asthma or allergy specialist  Rona Perrin may be asked to keep a record of your peak flow values and bring it with you to your appointments  Write down your questions so you remember to ask them during your visits  © Copyright [employer] 2022 Information is for End User's use only and may not be sold, redistributed or otherwise used for commercial purposes  All illustrations and images included in CareNotes® are the copyrighted property of A [employer]  or [facility] Abdullahi Lamb   The above information is an  only  It is not intended as medical advice for individual conditions or treatments  Talk to your doctor, nurse or pharmacist before following any medical regimen to see if it is safe and effective for you

## 2022-04-29 NOTE — NUR
RESPIRATORY NOTE: Pt was received stable on vent settings of AC 8, 450 VT, 35%, PEEP 5. 
Trach is a Shiley 8 and cuffed. Vent is plugged into red circuit and ambubag is present in 
the room. No distress noted. Will continue to monitor pt.

## 2022-10-31 NOTE — NUR
RN TELE NOTES

RECEIVED REPORT FROM NIGHT SHIFT RN. PT IN BED, MITTENS ARE OFF BOTH HANDS. PT STILL PULLING 
AT LINES WHEN REMOVED. GTF RUNNING WITH NO RESIDUAL NOTED. EXTREMITIES OFFLOADED. [employer] 
MATTRESS ON BED, PT TOLERATED TREATMENTS WELL. SITTER AT BEDSIDE. ON VENT SETTINGS 
TOLERATING WELL. WILL CONTINUE TO MONITOR. Niacinamide Pregnancy And Lactation Text: These medications are considered safe during pregnancy.

## 2022-12-02 NOTE — NUR
CASE MANAGEMENT: REVIEW



12/29/2018





SI: ESRD ON HD

TRACHEOSTOMY 12/17

PEG PLACEMENT 12/19 

T 98.1 HR 83 RR 18 B/P 144/64 SATS 95% ON MECH VENT FiO2 40 

 CL 95 BUN 38 CR 2.3 GLUCOSE 127 MG 2.5







IS: MINOXIDIL GT Q12HR 

PROCRIT SQ MWF

ELIQUIS NG BID 

AMIODARONE GT Q12HR 

GT FEEDING NEPRO @ 40ML/HR



***STEP DOWN UNIT  STATUS***



DCP: PATIENT IS FROM [facility]



12/30/2018





SI: ESRD ON HD

TRACHEOSTOMY 12/17

PEG PLACEMENT 12/19 

T 98 HR 74 RR 17 B/P 121/44 SATS 98% ON MECH VENT FiO2 40 

NO LABS TODAY 





IS: MINOXIDIL GT Q12HR 

PROCRIT SQ MWF

ELIQUIS NG BID 

AMIODARONE GT Q12HR 

GT FEEDING NEPRO @ 40ML/HR



***STEP DOWN UNIT  STATUS***



DCP: PATIENT IS FROM [facility] - - -

## 2023-01-06 NOTE — NUR
NURSE NOTES: Patient began to have tachycardia and has been put back on Ac mode 12, Tv 500, 
Fio2 40%, peep 5. o2 saturation 100%. Will continue to monitor patient and follow plan of 
care. No

## 2023-05-02 NOTE — 48 HOUR POST ANESTHESIA EVAL
Post Anesthesia Evaluation


Procedure:  Tracheostomy


Date of Evaluation:  Dec 18, 2018


Time of Evaluation:  06:27


Blood Pressure Systolic:  92


0:  23


Pulse Rate:  85


Respiratory Rate:  12 - Mech Vent


Airway:  patent


Nausea:  No


Vomiting:  No


Pain Intensity:  0


Hydration Status:  adequate


Cardiopulmonary Status:


Stable


Mental Status/LOC:  patient returned to baseline


Follow-up Care/Observations:


0


Post-Anesthesia Complications:


0


Follow-up care needed:  N/A











Juan Licona MD Dec 18, 2018 06:28
Post Anesthesia Evaluation


Procedure:  egd/peg


Date of Evaluation:  Dec 19, 2018


Time of Evaluation:  09:32


Blood Pressure Systolic:  127


0:  29


Pulse Rate:  86


Respiratory Rate:  11


Temperature (Fahrenheit):  98.1


O2 Sat by Pulse Oximetry:  100


Airway:  patent


Nausea:  No


Vomiting:  No


Hydration Status:  adequate


Cardiopulmonary Status:


stable


Mental Status/LOC:  patient returned to baseline


Post-Anesthesia Complications:


none


Follow-up care needed:  N/A











Eileen Mario MD Dec 19, 2018 11:23
02-May-2023

## 2025-02-06 NOTE — NUR
CASE MANAGEMENT: REVIEW





SI: ESRD ON HD

TRACHEOSTOMY 12/17

PEG PLACEMENT 12/19 

T 98.6 HR 79 RR 21 /31 SAT 98% MECH VENT FIO2 40

H/H 8.5/26.6  BUN 44 CR 1.8 









IS: MEROPENEM IV QD

AMIODARONE GT QD

MINOXIDIL GT Q12HR 

ELEQUIS GT BID 

GT FEEDING NEPRO @ 40ML/HR

HD PRN 





***STEP DOWN UNIT  STATUS***

DCP: PATIENT WILL TRANSFER TO  [facility] [place] [facility].  DIALYSIS CHAIR TIME SET UP WITH [employer].  PATIENT REFERRED TO [employer] FOR TRANSPORTATION. AWAITING AUTH 
FROM INS[employer]ANCE [employer]. Instructions   from Dr. Fuentes Schroeder MD    Rv PRN     Rv prn; told pt to call us if/when they needed to reschedule and to have a nice day

## 2025-06-25 NOTE — NUR
JOEY RN AND MYSELF SPOKE WITH SON SHALONDA BENOIT AND UPDATED ON PATIENT CONDITION. PER SON HE 
WISHES PATIENT TO BE DNR. PLEASE CONTINUE AT THIS TIME WITH ALL MEDICATIONS AND BP SUPPORT 
HOWEVER IF PATIENT HEART STOPS PLEASE NO CPR AND ALLOW NATURAL DEATH. DR VALLES AWARE AND 
UPDATED AND PER MD PLEASE ORDER DNR CODE STATUS. Lab Results   Component Value Date/Time    LACTICACID 1.9 06/24/2025 02:03 AM    LACTICACID 2.5 (H) 06/23/2025 11:27 PM     Lactic acid can be elevated in the setting of hypotension versus albuterol induced  Resolved